# Patient Record
Sex: FEMALE | Race: BLACK OR AFRICAN AMERICAN | NOT HISPANIC OR LATINO | Employment: OTHER | URBAN - METROPOLITAN AREA
[De-identification: names, ages, dates, MRNs, and addresses within clinical notes are randomized per-mention and may not be internally consistent; named-entity substitution may affect disease eponyms.]

---

## 2017-01-31 ENCOUNTER — PATIENT MESSAGE (OUTPATIENT)
Dept: ADMINISTRATIVE | Facility: OTHER | Age: 67
End: 2017-01-31

## 2017-06-26 ENCOUNTER — OFFICE VISIT (OUTPATIENT)
Dept: INTERNAL MEDICINE | Facility: CLINIC | Age: 67
End: 2017-06-26
Attending: FAMILY MEDICINE
Payer: MEDICARE

## 2017-06-26 ENCOUNTER — LAB VISIT (OUTPATIENT)
Dept: LAB | Facility: OTHER | Age: 67
End: 2017-06-26
Attending: FAMILY MEDICINE
Payer: MEDICARE

## 2017-06-26 VITALS
DIASTOLIC BLOOD PRESSURE: 60 MMHG | HEART RATE: 74 BPM | BODY MASS INDEX: 25.64 KG/M2 | OXYGEN SATURATION: 97 % | HEIGHT: 62 IN | SYSTOLIC BLOOD PRESSURE: 96 MMHG | WEIGHT: 139.31 LBS

## 2017-06-26 DIAGNOSIS — F43.9 STRESS: ICD-10-CM

## 2017-06-26 DIAGNOSIS — R59.9 LYMPH NODE ENLARGEMENT: ICD-10-CM

## 2017-06-26 DIAGNOSIS — D64.9 NORMOCYTIC ANEMIA: ICD-10-CM

## 2017-06-26 DIAGNOSIS — Z13.220 ENCOUNTER FOR LIPID SCREENING FOR CARDIOVASCULAR DISEASE: ICD-10-CM

## 2017-06-26 DIAGNOSIS — R53.83 FATIGUE, UNSPECIFIED TYPE: ICD-10-CM

## 2017-06-26 DIAGNOSIS — Z13.6 ENCOUNTER FOR LIPID SCREENING FOR CARDIOVASCULAR DISEASE: ICD-10-CM

## 2017-06-26 DIAGNOSIS — M50.20 HERNIATED DISC, CERVICAL: ICD-10-CM

## 2017-06-26 DIAGNOSIS — M25.519 SHOULDER PAIN, UNSPECIFIED CHRONICITY, UNSPECIFIED LATERALITY: ICD-10-CM

## 2017-06-26 DIAGNOSIS — M25.519 SHOULDER PAIN, UNSPECIFIED CHRONICITY, UNSPECIFIED LATERALITY: Primary | ICD-10-CM

## 2017-06-26 DIAGNOSIS — R35.0 URINARY FREQUENCY: ICD-10-CM

## 2017-06-26 DIAGNOSIS — M54.9 UPPER BACK PAIN: ICD-10-CM

## 2017-06-26 LAB
ALBUMIN SERPL BCP-MCNC: 3.9 G/DL
ALP SERPL-CCNC: 68 U/L
ALT SERPL W/O P-5'-P-CCNC: 10 U/L
ANION GAP SERPL CALC-SCNC: 9 MMOL/L
AST SERPL-CCNC: 14 U/L
BASOPHILS # BLD AUTO: 0.01 K/UL
BASOPHILS NFR BLD: 0.2 %
BILIRUB SERPL-MCNC: 0.4 MG/DL
BUN SERPL-MCNC: 13 MG/DL
CALCIUM SERPL-MCNC: 10.2 MG/DL
CHLORIDE SERPL-SCNC: 106 MMOL/L
CHOLEST/HDLC SERPL: 5.3 {RATIO}
CO2 SERPL-SCNC: 24 MMOL/L
CREAT SERPL-MCNC: 0.9 MG/DL
DIFFERENTIAL METHOD: ABNORMAL
EOSINOPHIL # BLD AUTO: 0.1 K/UL
EOSINOPHIL NFR BLD: 1.2 %
ERYTHROCYTE [DISTWIDTH] IN BLOOD BY AUTOMATED COUNT: 16.6 %
EST. GFR  (AFRICAN AMERICAN): >60 ML/MIN/1.73 M^2
EST. GFR  (NON AFRICAN AMERICAN): >60 ML/MIN/1.73 M^2
FERRITIN SERPL-MCNC: 12 NG/ML
GLUCOSE SERPL-MCNC: 81 MG/DL
HCT VFR BLD AUTO: 38.3 %
HDL/CHOLESTEROL RATIO: 19 %
HDLC SERPL-MCNC: 353 MG/DL
HDLC SERPL-MCNC: 67 MG/DL
HGB BLD-MCNC: 12.2 G/DL
IRON SERPL-MCNC: 57 UG/DL
LDLC SERPL CALC-MCNC: 249.6 MG/DL
LYMPHOCYTES # BLD AUTO: 2.2 K/UL
LYMPHOCYTES NFR BLD: 39.2 %
MCH RBC QN AUTO: 25.5 PG
MCHC RBC AUTO-ENTMCNC: 31.9 %
MCV RBC AUTO: 80 FL
MONOCYTES # BLD AUTO: 0.4 K/UL
MONOCYTES NFR BLD: 7.8 %
NEUTROPHILS # BLD AUTO: 2.9 K/UL
NEUTROPHILS NFR BLD: 51.2 %
NONHDLC SERPL-MCNC: 286 MG/DL
PLATELET # BLD AUTO: 318 K/UL
PMV BLD AUTO: 10.3 FL
POTASSIUM SERPL-SCNC: 4.6 MMOL/L
PROT SERPL-MCNC: 8 G/DL
RBC # BLD AUTO: 4.78 M/UL
SATURATED IRON: 12 %
SODIUM SERPL-SCNC: 139 MMOL/L
T4 FREE SERPL-MCNC: 0.91 NG/DL
TOTAL IRON BINDING CAPACITY: 474 UG/DL
TRANSFERRIN SERPL-MCNC: 320 MG/DL
TRIGL SERPL-MCNC: 182 MG/DL
TSH SERPL DL<=0.005 MIU/L-ACNC: 0.86 UIU/ML
WBC # BLD AUTO: 5.67 K/UL

## 2017-06-26 PROCEDURE — 99999 PR PBB SHADOW E&M-EST. PATIENT-LVL III: CPT | Mod: PBBFAC,,, | Performed by: FAMILY MEDICINE

## 2017-06-26 PROCEDURE — 80053 COMPREHEN METABOLIC PANEL: CPT

## 2017-06-26 PROCEDURE — 1159F MED LIST DOCD IN RCRD: CPT | Mod: ,,, | Performed by: FAMILY MEDICINE

## 2017-06-26 PROCEDURE — 82728 ASSAY OF FERRITIN: CPT

## 2017-06-26 PROCEDURE — 99214 OFFICE O/P EST MOD 30 MIN: CPT | Mod: S$PBB,,, | Performed by: FAMILY MEDICINE

## 2017-06-26 PROCEDURE — 84466 ASSAY OF TRANSFERRIN: CPT

## 2017-06-26 PROCEDURE — 80061 LIPID PANEL: CPT

## 2017-06-26 PROCEDURE — 84443 ASSAY THYROID STIM HORMONE: CPT

## 2017-06-26 PROCEDURE — 83540 ASSAY OF IRON: CPT

## 2017-06-26 PROCEDURE — 84439 ASSAY OF FREE THYROXINE: CPT

## 2017-06-26 PROCEDURE — 36415 COLL VENOUS BLD VENIPUNCTURE: CPT

## 2017-06-26 PROCEDURE — 85025 COMPLETE CBC W/AUTO DIFF WBC: CPT

## 2017-06-26 PROCEDURE — 1125F AMNT PAIN NOTED PAIN PRSNT: CPT | Mod: ,,, | Performed by: FAMILY MEDICINE

## 2017-06-26 RX ORDER — HYDROCODONE BITARTRATE AND ACETAMINOPHEN 5; 325 MG/1; MG/1
TABLET ORAL
COMMUNITY
Start: 2017-06-06 | End: 2018-03-09

## 2017-06-26 RX ORDER — LIDOCAINE AND PRILOCAINE 25; 25 MG/G; MG/G
CREAM TOPICAL
Qty: 30 G | Refills: 0 | Status: SHIPPED | OUTPATIENT
Start: 2017-06-26 | End: 2018-08-01

## 2017-06-26 RX ORDER — METHYLPREDNISOLONE 4 MG/1
TABLET ORAL
COMMUNITY
Start: 2017-03-24 | End: 2017-06-26

## 2017-06-26 RX ORDER — PREGABALIN 25 MG/1
25 CAPSULE ORAL 2 TIMES DAILY
COMMUNITY
End: 2017-07-25

## 2017-06-26 RX ORDER — LORAZEPAM 2 MG/1
TABLET ORAL
COMMUNITY
Start: 2017-06-19 | End: 2017-06-26

## 2017-06-26 RX ORDER — HYDROCODONE BITARTRATE AND ACETAMINOPHEN 7.5; 325 MG/1; MG/1
TABLET ORAL
COMMUNITY
Start: 2017-05-10 | End: 2017-06-26

## 2017-06-26 NOTE — PROGRESS NOTES
"Subjective:      Patient ID: Jasson Pineda is a 66 y.o. female.    Chief Complaint: Annual Exam    She did have shoulder replacement 6 months ago. Upper back pain for 10 years, pressure on it worsens it, she used to get trigger point injection by pain management for it. She did just restart lyrica one week. She has noticed increased urination for the last month and she has follow up with urology. She has noticed fatigue for one month. Sleep is low because of shoulder pain.  In the last few weeks her mood has been low, interest in hobbies not there, no guilt, energy level low, sleep is low, anxiety controlled, no SI or HI.      Review of Systems   Constitutional: Positive for activity change and fatigue.   HENT: Negative for hearing loss, rhinorrhea and trouble swallowing.    Eyes: Negative for discharge and visual disturbance.   Respiratory: Negative for chest tightness and wheezing.    Cardiovascular: Negative for chest pain and palpitations.   Gastrointestinal: Negative for blood in stool, constipation, diarrhea and vomiting.   Endocrine: Positive for polyuria. Negative for polydipsia.   Genitourinary: Negative for difficulty urinating, dysuria, hematuria and menstrual problem.   Musculoskeletal: Positive for neck pain. Negative for arthralgias and joint swelling.   Neurological: Negative for headaches.   Psychiatric/Behavioral: Negative for confusion and dysphoric mood.     I personally reviewed Past Medical History, Past Surgical history,  Past Social History and Family History    Objective:   BP 96/60   Pulse 74   Ht 5' 2" (1.575 m)   Wt 63.2 kg (139 lb 5.3 oz)   SpO2 97%   BMI 25.48 kg/m²     Physical Exam   Constitutional: She is oriented to person, place, and time. She appears well-developed and well-nourished. No distress.   HENT:   Head: Normocephalic and atraumatic.   Right Ear: External ear normal.   Left Ear: External ear normal.   Mouth/Throat: Oropharynx is clear and moist.   Eyes: " Conjunctivae and EOM are normal. Pupils are equal, round, and reactive to light. Right eye exhibits no discharge. Left eye exhibits no discharge. No scleral icterus.   Neck: Normal range of motion. Neck supple. No thyromegaly present.   Cardiovascular: Normal rate, regular rhythm, normal heart sounds and intact distal pulses.  Exam reveals no gallop.    No murmur heard.  Pulmonary/Chest: Effort normal and breath sounds normal. No respiratory distress. She has no wheezes. She has no rales. She exhibits no tenderness.   Abdominal: Soft. Bowel sounds are normal. She exhibits no distension and no mass. There is no tenderness. There is no rebound and no guarding.   Musculoskeletal: Normal range of motion.        Cervical back: Normal.        Thoracic back: She exhibits tenderness. She exhibits normal range of motion, no bony tenderness and no edema.   TTP left thoracic paraspinal muscle    Lymphadenopathy:        Head (right side): Submandibular adenopathy present.        Head (left side): Submandibular adenopathy present.   Neurological: She is alert and oriented to person, place, and time.   Skin: Skin is warm and dry.   Psychiatric: She has a normal mood and affect. Her behavior is normal. Judgment and thought content normal.   Vitals reviewed.      Jasson was seen today for annual exam.    Diagnoses and all orders for this visit:    Shoulder pain, unspecified chronicity, unspecified laterality  Upper back pain  Herniated disc, cervical  -will trial topical emla, if no improvement, will schedule follow up with pain management to discuss trigger point injections   -     Ambulatory consult to Pain Clinic  -     lidocaine-prilocaine (EMLA) cream; Apply topically as needed.  -     CBC auto differential; Future  -     Comprehensive metabolic panel; Future  -     TSH; Future  -     T4, free; Future  -     Ambulatory consult to Pain Clinic    Urinary frequency  -     Urinalysis  -     Urine culture    Fatigue, unspecified  type  Normocytic anemia  -     CBC auto differential; Future  -     Comprehensive metabolic panel; Future  -     TSH; Future  -     T4, free; Future  -     Ferritin; Future  -     Iron and TIBC; Future    Stress  -patient declined medication or therapy, she is worried about the recovery time for shoulder replacement     Encounter for lipid screening for cardiovascular disease  -     Lipid panel; Future    Lymph node enlargement  -     US Soft Tissue Head Neck Thyroid; Future    Other orders  -     (In Office Administered) Pneumococcal Conjugate Vaccine (13 Valent) (IM)

## 2017-06-26 NOTE — PROGRESS NOTES
Patient given Pneumococcal 13 IM in the LD. Patient tolerated well and Band-Aid was applied. Lot#[id number] Exp:05/01/2018. Patient advised to wait in the lobby for 15 min to make sure no adverse reactions occur. Patient states verbal understanding and has no further questions.  Pt has been given vaccine information sheet.

## 2017-06-27 ENCOUNTER — TELEPHONE (OUTPATIENT)
Dept: INTERNAL MEDICINE | Facility: CLINIC | Age: 67
End: 2017-06-27

## 2017-06-27 DIAGNOSIS — E78.5 HYPERLIPIDEMIA, UNSPECIFIED HYPERLIPIDEMIA TYPE: Primary | ICD-10-CM

## 2017-06-27 DIAGNOSIS — E61.1 LOW IRON: ICD-10-CM

## 2017-06-27 RX ORDER — ATORVASTATIN CALCIUM 40 MG/1
40 TABLET, FILM COATED ORAL NIGHTLY
Qty: 30 TABLET | Refills: 3 | Status: SHIPPED | OUTPATIENT
Start: 2017-06-27 | End: 2017-09-27 | Stop reason: SDUPTHER

## 2017-06-27 NOTE — TELEPHONE ENCOUNTER
Informed pt of lab results and advise. Lab has been scheduled. Pt demonstrated verbal understanding of information and had no further questions or concerns at this time.

## 2017-06-27 NOTE — TELEPHONE ENCOUNTER
"Your iron studies are low, we will call to get you scheduled for a colonoscopy for further work up  Your thyroid studies are normal.   Your LDL or "bad cholesterol" is elevated which places you at increased risk for heart attacks and strokes.  I recommend a low cholesterol, low carbohydrate diet and exercise to achieve weight loss. In addition to his I would like to start you on a medication called a statin, which is very effective at lowering your cholesterol. lipitor has been sent to the pharmacy for you   We will need to recheck your cholesterol in 4 weeks   "

## 2017-06-28 ENCOUNTER — HOSPITAL ENCOUNTER (OUTPATIENT)
Dept: RADIOLOGY | Facility: OTHER | Age: 67
Discharge: HOME | End: 2017-06-28
Attending: FAMILY MEDICINE
Payer: MEDICARE

## 2017-06-28 DIAGNOSIS — R59.9 LYMPH NODE ENLARGEMENT: ICD-10-CM

## 2017-06-28 PROCEDURE — 76536 US EXAM OF HEAD AND NECK: CPT | Mod: TC

## 2017-06-28 PROCEDURE — 76536 US EXAM OF HEAD AND NECK: CPT | Mod: 26,,, | Performed by: RADIOLOGY

## 2017-07-05 ENCOUNTER — TELEPHONE (OUTPATIENT)
Dept: ENDOSCOPY | Facility: HOSPITAL | Age: 67
End: 2017-07-05

## 2017-07-12 ENCOUNTER — PATIENT OUTREACH (OUTPATIENT)
Dept: ADMINISTRATIVE | Facility: HOSPITAL | Age: 67
End: 2017-07-12

## 2017-07-14 ENCOUNTER — OFFICE VISIT (OUTPATIENT)
Dept: UROLOGY | Facility: CLINIC | Age: 67
End: 2017-07-14
Attending: UROLOGY
Payer: MEDICARE

## 2017-07-14 ENCOUNTER — HOSPITAL ENCOUNTER (OUTPATIENT)
Dept: RADIOLOGY | Facility: OTHER | Age: 67
Discharge: HOME | End: 2017-07-14
Attending: UROLOGY
Payer: MEDICARE

## 2017-07-14 ENCOUNTER — TELEPHONE (OUTPATIENT)
Dept: INTERNAL MEDICINE | Facility: CLINIC | Age: 67
End: 2017-07-14

## 2017-07-14 VITALS
DIASTOLIC BLOOD PRESSURE: 69 MMHG | HEART RATE: 70 BPM | HEIGHT: 62 IN | WEIGHT: 139.88 LBS | BODY MASS INDEX: 25.74 KG/M2 | SYSTOLIC BLOOD PRESSURE: 101 MMHG

## 2017-07-14 DIAGNOSIS — N20.0 KIDNEY STONE: ICD-10-CM

## 2017-07-14 DIAGNOSIS — N20.0 KIDNEY STONES: Primary | ICD-10-CM

## 2017-07-14 PROCEDURE — 1126F AMNT PAIN NOTED NONE PRSNT: CPT | Mod: ,,, | Performed by: UROLOGY

## 2017-07-14 PROCEDURE — 1159F MED LIST DOCD IN RCRD: CPT | Mod: ,,, | Performed by: UROLOGY

## 2017-07-14 PROCEDURE — 99999 PR PBB SHADOW E&M-EST. PATIENT-LVL III: CPT | Mod: PBBFAC,,, | Performed by: UROLOGY

## 2017-07-14 PROCEDURE — 99213 OFFICE O/P EST LOW 20 MIN: CPT | Mod: S$PBB,,, | Performed by: UROLOGY

## 2017-07-14 PROCEDURE — 74000 XR KUB: CPT | Mod: 26,,, | Performed by: RADIOLOGY

## 2017-07-14 PROCEDURE — 99213 OFFICE O/P EST LOW 20 MIN: CPT | Mod: PBBFAC,25 | Performed by: UROLOGY

## 2017-07-14 PROCEDURE — 76770 US EXAM ABDO BACK WALL COMP: CPT | Mod: 26,,, | Performed by: RADIOLOGY

## 2017-07-14 RX ORDER — TETANUS TOXOID, REDUCED DIPHTHERIA TOXOID AND ACELLULAR PERTUSSIS VACCINE, ADSORBED 5; 2.5; 8; 8; 2.5 [IU]/.5ML; [IU]/.5ML; UG/.5ML; UG/.5ML; UG/.5ML
SUSPENSION INTRAMUSCULAR
COMMUNITY
Start: 2017-06-26 | End: 2017-07-14 | Stop reason: ALTCHOICE

## 2017-07-14 NOTE — PROGRESS NOTES
"Subjective:      Jasson Pineda is a 66 y.o. female who returns today regarding her     History of renal stones.  No flank pain no symptoms.  Last year she was stone free following left-sided ureteroscopy.  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    The following portions of the patient's history were reviewed and updated as appropriate: allergies, current medications, past family history, past medical history, past social history, past surgical history and problem list.    Review of Systems  Pertinent items are noted in HPI.  A comprehensive multipoint review of systems was negative except as otherwise stated in the HPI.     Objective:   Vitals: /69 (BP Location: Left arm, Patient Position: Sitting, BP Method: Automatic)   Pulse 70   Ht 5' 2" (1.575 m)   Wt 63.5 kg (139 lb 14.1 oz)   BMI 25.58 kg/m²     Physical Exam   General: alert and oriented, no acute distress  Respiratory: Symmetric expansion, non-labored breathing  Cardiovascular: no peripheral edema  Abdomen: soft, non distended  Skin: normal coloration and turgor, no rashes, no suspicious skin lesions noted  Neuro: no gross deficits  Psych: normal judgment and insight, normal mood/affect and non-anxious    Physical Exam    Lab Review   Urinalysis demonstrates no specimen  Lab Results   Component Value Date    WBC 5.67 06/26/2017    HGB 12.2 06/26/2017    HCT 38.3 06/26/2017    MCV 80 (L) 06/26/2017     06/26/2017     Lab Results   Component Value Date    CREATININE 0.9 06/26/2017    BUN 13 06/26/2017       Imaging  Renal ultrasound shows 2 nonobstructive renal stones 5 and 9 mm.  KUB by report shows no stones but I believe there are 2 fragments which are faintly visible but obscured by bowel gas on the left side  Assessment and Plan:   Kidney stones  -     POCT Bladder Scan  -     POCT URINE DIPSTICK WITHOUT MICROSCOPE  -     X-Ray KUB      We discussed observation lithotripsy and ureteroscopy.  " Lithotripsy would require stopping anticoagulation which is prohibitively risky.  We will order a 24-hour urine and have her return to review the results.    High fluid, low salt diet  Avoid coffee, tea and cola  Drink water and diet lemonade

## 2017-07-25 ENCOUNTER — LAB VISIT (OUTPATIENT)
Dept: LAB | Facility: OTHER | Age: 67
End: 2017-07-25
Attending: FAMILY MEDICINE
Payer: MEDICARE

## 2017-07-25 ENCOUNTER — OFFICE VISIT (OUTPATIENT)
Dept: INTERNAL MEDICINE | Facility: CLINIC | Age: 67
End: 2017-07-25
Attending: FAMILY MEDICINE
Payer: MEDICARE

## 2017-07-25 VITALS
WEIGHT: 140.19 LBS | HEART RATE: 69 BPM | DIASTOLIC BLOOD PRESSURE: 60 MMHG | BODY MASS INDEX: 25.8 KG/M2 | SYSTOLIC BLOOD PRESSURE: 94 MMHG | OXYGEN SATURATION: 99 % | HEIGHT: 62 IN

## 2017-07-25 DIAGNOSIS — B02.29 POST HERPETIC NEURALGIA: ICD-10-CM

## 2017-07-25 DIAGNOSIS — R59.1 LAD (LYMPHADENOPATHY): Primary | ICD-10-CM

## 2017-07-25 DIAGNOSIS — Z12.11 SCREENING FOR COLORECTAL CANCER: ICD-10-CM

## 2017-07-25 DIAGNOSIS — E78.5 HYPERLIPIDEMIA, UNSPECIFIED HYPERLIPIDEMIA TYPE: ICD-10-CM

## 2017-07-25 DIAGNOSIS — Z12.12 SCREENING FOR COLORECTAL CANCER: ICD-10-CM

## 2017-07-25 DIAGNOSIS — Z12.31 ENCOUNTER FOR SCREENING MAMMOGRAM FOR BREAST CANCER: ICD-10-CM

## 2017-07-25 DIAGNOSIS — E61.1 LOW IRON: ICD-10-CM

## 2017-07-25 LAB
CHOLEST/HDLC SERPL: 2.8 {RATIO}
HDL/CHOLESTEROL RATIO: 35.2 %
HDLC SERPL-MCNC: 210 MG/DL
HDLC SERPL-MCNC: 74 MG/DL
LDLC SERPL CALC-MCNC: 114 MG/DL
NONHDLC SERPL-MCNC: 136 MG/DL
TRIGL SERPL-MCNC: 110 MG/DL

## 2017-07-25 PROCEDURE — 99999 PR PBB SHADOW E&M-EST. PATIENT-LVL IV: CPT | Mod: PBBFAC,,, | Performed by: FAMILY MEDICINE

## 2017-07-25 PROCEDURE — 99214 OFFICE O/P EST MOD 30 MIN: CPT | Mod: PBBFAC | Performed by: FAMILY MEDICINE

## 2017-07-25 PROCEDURE — 99214 OFFICE O/P EST MOD 30 MIN: CPT | Mod: S$PBB,,, | Performed by: FAMILY MEDICINE

## 2017-07-25 PROCEDURE — 1125F AMNT PAIN NOTED PAIN PRSNT: CPT | Mod: ,,, | Performed by: FAMILY MEDICINE

## 2017-07-25 PROCEDURE — 1159F MED LIST DOCD IN RCRD: CPT | Mod: ,,, | Performed by: FAMILY MEDICINE

## 2017-07-25 RX ORDER — PREGABALIN 50 MG/1
50 CAPSULE ORAL 3 TIMES DAILY
Qty: 90 CAPSULE | Refills: 0 | Status: SHIPPED | OUTPATIENT
Start: 2017-07-25 | End: 2018-03-09 | Stop reason: CLARIF

## 2017-07-25 NOTE — PROGRESS NOTES
"Subjective:      Patient ID: Jasson Pineda is a 66 y.o. female.    Chief Complaint: Shoulder Pain (4 f/u)    She reports her shoulder pain is improving. She reports right shoulder bothers her everyday. She did follow up with Dr. Patel and he did inject her incision and she is scheduled to see dermatology for scar revision.  She does have post herpetic neuralgia which is helped by lyrica on her back. She reports her fatigue is improved and her depression is improving. In the last few weeks her mood has been mainly happy, interest in hobbies there, sleep varies with her shoulder/back pain, no SI or HI, anxiety level is controlled.        Review of Systems   Constitutional: Negative.    Respiratory: Negative.    Cardiovascular: Negative.    Gastrointestinal: Negative.      I personally reviewed Past Medical History, Past Surgical history,  Past Social History and Family History    Objective:   BP 94/60   Pulse 69   Ht 5' 2" (1.575 m)   Wt 63.6 kg (140 lb 3.4 oz)   SpO2 99%   BMI 25.65 kg/m²     Physical Exam   Constitutional: She is oriented to person, place, and time. She appears well-developed and well-nourished. No distress.   HENT:   Head: Normocephalic and atraumatic.   Right Ear: Hearing, tympanic membrane, external ear and ear canal normal.   Left Ear: Hearing, tympanic membrane, external ear and ear canal normal.   Mouth/Throat: Oropharynx is clear and moist.   Eyes: Conjunctivae and EOM are normal. Pupils are equal, round, and reactive to light. Right eye exhibits no discharge. Left eye exhibits no discharge. No scleral icterus.   Neck: Normal range of motion. Neck supple.   Cardiovascular: Normal rate, regular rhythm, normal heart sounds and intact distal pulses.  Exam reveals no gallop.    No murmur heard.  Pulmonary/Chest: Effort normal and breath sounds normal. No respiratory distress. She has no wheezes. She has no rales. She exhibits no tenderness.   Abdominal: Soft. Bowel sounds are " normal. She exhibits no distension and no mass. There is no tenderness. There is no rebound and no guarding.   Lymphadenopathy:        Head (right side): Submandibular adenopathy present. No occipital adenopathy present.        Head (left side): Submandibular adenopathy present. No occipital adenopathy present.     She has no cervical adenopathy.     She has no axillary adenopathy.   Neurological: She is alert and oriented to person, place, and time.   Psychiatric: She has a normal mood and affect. Her behavior is normal. Judgment and thought content normal.   Vitals reviewed.      Jasson was seen today for shoulder pain.    Diagnoses and all orders for this visit:    LAD (lymphadenopathy)  -will monitor and patient to follow up in ENT in 4-6 weeks  -     Ambulatory consult to ENT    Screening for colorectal cancer  Low iron  -patient currently on xarelto for recurrent dvt ppx, Dr. Doty has been contacted in regards to holding her medication prior to colonoscopy, discussed the importance of completing this to work up low iron     Encounter for screening mammogram for breast cancer  -patient cannot lift up arm yet, recommend she schedule in the next 1-2 months once arm is improving    Post herpetic neuralgia  -increase lyrica to 50mg tid     Other orders  -     Cancel: Ambulatory referral to Gastroenterology  -     Cancel: Mammo Digital Screening Bilat with CAD; Future  -     pregabalin (LYRICA) 50 MG capsule; Take 1 capsule (50 mg total) by mouth 3 (three) times daily.

## 2017-08-22 ENCOUNTER — OFFICE VISIT (OUTPATIENT)
Dept: UROLOGY | Facility: CLINIC | Age: 67
End: 2017-08-22
Attending: UROLOGY
Payer: MEDICARE

## 2017-08-22 VITALS
WEIGHT: 143 LBS | DIASTOLIC BLOOD PRESSURE: 67 MMHG | HEIGHT: 62 IN | HEART RATE: 83 BPM | SYSTOLIC BLOOD PRESSURE: 107 MMHG | BODY MASS INDEX: 26.31 KG/M2

## 2017-08-22 DIAGNOSIS — R10.9 RIGHT FLANK PAIN: ICD-10-CM

## 2017-08-22 DIAGNOSIS — N20.0 RENAL STONES: Primary | ICD-10-CM

## 2017-08-22 LAB
BILIRUB SERPL-MCNC: ABNORMAL MG/DL
BLOOD URINE, POC: 50
COLOR, POC UA: ABNORMAL
GLUCOSE UR QL STRIP: ABNORMAL
KETONES UR QL STRIP: ABNORMAL
LEUKOCYTE ESTERASE URINE, POC: ABNORMAL
NITRITE, POC UA: ABNORMAL
PH, POC UA: 5
PROTEIN, POC: ABNORMAL
SPECIFIC GRAVITY, POC UA: 1
UROBILINOGEN, POC UA: ABNORMAL

## 2017-08-22 PROCEDURE — 1159F MED LIST DOCD IN RCRD: CPT | Mod: S$GLB,,, | Performed by: UROLOGY

## 2017-08-22 PROCEDURE — 1125F AMNT PAIN NOTED PAIN PRSNT: CPT | Mod: S$GLB,,, | Performed by: UROLOGY

## 2017-08-22 PROCEDURE — 99214 OFFICE O/P EST MOD 30 MIN: CPT | Mod: 25,S$GLB,, | Performed by: UROLOGY

## 2017-08-22 PROCEDURE — 81002 URINALYSIS NONAUTO W/O SCOPE: CPT | Mod: S$GLB,,, | Performed by: UROLOGY

## 2017-08-22 RX ORDER — TAMSULOSIN HYDROCHLORIDE 0.4 MG/1
0.4 CAPSULE ORAL DAILY
Qty: 30 CAPSULE | Refills: 11 | Status: SHIPPED | OUTPATIENT
Start: 2017-08-22 | End: 2017-09-05 | Stop reason: ALTCHOICE

## 2017-08-22 RX ORDER — POTASSIUM CITRATE 10 MEQ/1
10 TABLET, EXTENDED RELEASE ORAL
Qty: 90 TABLET | Refills: 11 | Status: SHIPPED | OUTPATIENT
Start: 2017-08-22 | End: 2017-09-05 | Stop reason: SDUPTHER

## 2017-08-22 NOTE — PROGRESS NOTES
"Subjective:      Jasson Pineda is a 66 y.o. female who returns today regarding her     Renal stones    Right flank pain started last week.    The following portions of the patient's history were reviewed and updated as appropriate: allergies, current medications, past family history, past medical history, past social history, past surgical history and problem list.    Review of Systems  Pertinent items are noted in HPI.  A comprehensive multipoint review of systems was negative except as otherwise stated in the HPI.     Objective:   Vitals: /67   Pulse 83   Ht 5' 2" (1.575 m)   Wt 64.9 kg (143 lb)   BMI 26.16 kg/m²     Physical Exam   General: alert and oriented, no acute distress  Respiratory: Symmetric expansion, non-labored breathing  Cardiovascular: no peripheral edema  Abdomen: mild right cva tenderness and soft, non distended  Skin: normal coloration and turgor, no rashes, no suspicious skin lesions noted  Neuro: no gross deficits  Psych: normal judgment and insight, normal mood/affect and non-anxious    Physical Exam    Lab Review   Urinalysis demonstrates +blood othwsie neg; ph 5; sg 1.005    24hr urine  Volume 890cc  Low citrate  High uric acid    Lab Results   Component Value Date    WBC 5.67 06/26/2017    HGB 12.2 06/26/2017    HCT 38.3 06/26/2017    MCV 80 (L) 06/26/2017     06/26/2017     Lab Results   Component Value Date    CREATININE 0.9 06/26/2017    BUN 13 06/26/2017       Imaging  -  Assessment and Plan:   Renal stones  -     URIC ACID; Future; Expected date: 08/22/2017  -     Comprehensive metabolic panel; Future; Expected date: 08/22/2017  -     CT Renal Stone Study ABD Pelvis WO; Future; Expected date: 08/22/2017    Right flank pain  -     CT Renal Stone Study ABD Pelvis WO; Future; Expected date: 08/22/2017    Other orders  -     potassium citrate (UROCIT-K) 10 mEq (1,080 mg) TbSR; Take 1 tablet (10 mEq total) by mouth 3 (three) times daily with meals.  Dispense: 90 " tablet; Refill: 11  -     tamsulosin (FLOMAX) 0.4 mg Cp24; Take 1 capsule (0.4 mg total) by mouth once daily.  Dispense: 30 capsule; Refill: 11      High fluid, low salt diet  Avoid coffee, tea and cola  Drink water and diet lemonade    rtc 1week    Pt is on anticoagulation

## 2017-08-24 ENCOUNTER — HOSPITAL ENCOUNTER (OUTPATIENT)
Dept: RADIOLOGY | Facility: OTHER | Age: 67
Discharge: HOME | End: 2017-08-24
Attending: UROLOGY
Payer: MEDICARE

## 2017-08-24 DIAGNOSIS — R10.9 RIGHT FLANK PAIN: ICD-10-CM

## 2017-08-24 DIAGNOSIS — N20.0 RENAL STONES: ICD-10-CM

## 2017-08-24 PROCEDURE — 74176 CT ABD & PELVIS W/O CONTRAST: CPT | Mod: 26,,, | Performed by: RADIOLOGY

## 2017-08-24 PROCEDURE — 74176 CT ABD & PELVIS W/O CONTRAST: CPT | Mod: TC

## 2017-08-28 ENCOUNTER — LAB VISIT (OUTPATIENT)
Dept: LAB | Facility: OTHER | Age: 67
End: 2017-08-28
Attending: UROLOGY
Payer: MEDICARE

## 2017-08-28 DIAGNOSIS — N20.0 RENAL STONES: ICD-10-CM

## 2017-08-28 LAB
ALBUMIN SERPL BCP-MCNC: 3.9 G/DL
ALP SERPL-CCNC: 77 U/L
ALT SERPL W/O P-5'-P-CCNC: 22 U/L
ANION GAP SERPL CALC-SCNC: 10 MMOL/L
AST SERPL-CCNC: 22 U/L
BILIRUB SERPL-MCNC: 0.4 MG/DL
BUN SERPL-MCNC: 12 MG/DL
CALCIUM SERPL-MCNC: 10 MG/DL
CHLORIDE SERPL-SCNC: 108 MMOL/L
CO2 SERPL-SCNC: 24 MMOL/L
CREAT SERPL-MCNC: 1 MG/DL
EST. GFR  (AFRICAN AMERICAN): >60 ML/MIN/1.73 M^2
EST. GFR  (NON AFRICAN AMERICAN): 58 ML/MIN/1.73 M^2
GLUCOSE SERPL-MCNC: 65 MG/DL
POTASSIUM SERPL-SCNC: 4.5 MMOL/L
PROT SERPL-MCNC: 8.1 G/DL
SODIUM SERPL-SCNC: 142 MMOL/L
URATE SERPL-MCNC: 4.2 MG/DL

## 2017-08-28 PROCEDURE — 84550 ASSAY OF BLOOD/URIC ACID: CPT

## 2017-08-28 PROCEDURE — 36415 COLL VENOUS BLD VENIPUNCTURE: CPT

## 2017-08-28 PROCEDURE — 80053 COMPREHEN METABOLIC PANEL: CPT

## 2017-09-05 ENCOUNTER — OFFICE VISIT (OUTPATIENT)
Dept: UROLOGY | Facility: CLINIC | Age: 67
End: 2017-09-05
Attending: UROLOGY
Payer: MEDICARE

## 2017-09-05 VITALS
BODY MASS INDEX: 26.69 KG/M2 | SYSTOLIC BLOOD PRESSURE: 102 MMHG | WEIGHT: 145.06 LBS | HEART RATE: 77 BPM | HEIGHT: 62 IN | DIASTOLIC BLOOD PRESSURE: 63 MMHG

## 2017-09-05 DIAGNOSIS — N20.0 RENAL STONES: Primary | ICD-10-CM

## 2017-09-05 PROCEDURE — 1126F AMNT PAIN NOTED NONE PRSNT: CPT | Mod: S$GLB,,, | Performed by: UROLOGY

## 2017-09-05 PROCEDURE — 1159F MED LIST DOCD IN RCRD: CPT | Mod: S$GLB,,, | Performed by: UROLOGY

## 2017-09-05 PROCEDURE — 99214 OFFICE O/P EST MOD 30 MIN: CPT | Mod: S$GLB,,, | Performed by: UROLOGY

## 2017-09-05 RX ORDER — POTASSIUM CITRATE 10 MEQ/1
10 TABLET, EXTENDED RELEASE ORAL
Qty: 90 TABLET | Refills: 1 | Status: SHIPPED | OUTPATIENT
Start: 2017-09-05 | End: 2018-08-01

## 2017-09-05 NOTE — PROGRESS NOTES
"Subjective:      Jasson Pineda is a 66 y.o. female who returns today regarding her     ) Pain resolved spontaneously.  No complaints at this time.  She is tolerating potassium citrate without difficulty.    The following portions of the patient's history were reviewed and updated as appropriate: allergies, current medications, past family history, past medical history, past social history, past surgical history and problem list.    Review of Systems  Pertinent items are noted in HPI.  A comprehensive multipoint review of systems was negative except as otherwise stated in the HPI.     Objective:   Vitals: /63 (BP Location: Left arm, Patient Position: Sitting, BP Method: Large (Automatic))   Pulse 77   Ht 5' 2" (1.575 m)   Wt 65.8 kg (145 lb 1 oz)   BMI 26.53 kg/m²     Physical Exam   General: alert and oriented, no acute distress  Respiratory: Symmetric expansion, non-labored breathing  Cardiovascular: no peripheral edema  Abdomen: soft, non distended  Skin: normal coloration and turgor, no rashes, no suspicious skin lesions noted  Neuro: no gross deficits  Psych: normal judgment and insight, normal mood/affect and non-anxious    Physical Exam    Lab Review   Urinalysis demonstrates negative for all components  Lab Results   Component Value Date    WBC 5.67 06/26/2017    HGB 12.2 06/26/2017    HCT 38.3 06/26/2017    MCV 80 (L) 06/26/2017     06/26/2017     Lab Results   Component Value Date    CREATININE 1.0 08/28/2017    BUN 12 08/28/2017       Imaging  The scan shows no hydronephrosis.  There are 2 punctate nonobstructing stones in the left kidney the largest being 4 mm    Assessment and Plan:   Renal stones  -     POCT URINE DIPSTICK WITHOUT MICROSCOPE  -     X-Ray KUB; Future; Expected date: 03/05/2018    Other orders  -     potassium citrate (UROCIT-K) 10 mEq (1,080 mg) TbSR; Take 1 tablet (10 mEq total) by mouth 3 (three) times daily with meals.  Dispense: 90 tablet; Refill: " 1      Stop Flomax.  Return to clinic 6 months with KUB  High fluid, low salt diet  Avoid coffee, tea and cola  Drink water and diet lemonade

## 2017-09-22 DIAGNOSIS — Z12.31 OTHER SCREENING MAMMOGRAM: ICD-10-CM

## 2017-09-27 RX ORDER — ATORVASTATIN CALCIUM 40 MG/1
TABLET, FILM COATED ORAL
Qty: 90 TABLET | Refills: 3 | Status: SHIPPED | OUTPATIENT
Start: 2017-09-27 | End: 2018-06-21 | Stop reason: SDUPTHER

## 2017-10-23 RX ORDER — ATORVASTATIN CALCIUM 40 MG/1
TABLET, FILM COATED ORAL
Qty: 30 TABLET | Refills: 0 | Status: SHIPPED | OUTPATIENT
Start: 2017-10-23 | End: 2017-10-23 | Stop reason: SDUPTHER

## 2017-10-23 RX ORDER — ATORVASTATIN CALCIUM 40 MG/1
TABLET, FILM COATED ORAL
Qty: 90 TABLET | Refills: 0 | Status: SHIPPED | OUTPATIENT
Start: 2017-10-23 | End: 2018-03-09 | Stop reason: SDUPTHER

## 2017-11-01 ENCOUNTER — HOSPITAL ENCOUNTER (OUTPATIENT)
Dept: RADIOLOGY | Facility: OTHER | Age: 67
Discharge: HOME | End: 2017-11-01
Attending: FAMILY MEDICINE
Payer: MEDICARE

## 2017-11-01 DIAGNOSIS — Z12.31 SCREENING MAMMOGRAM, ENCOUNTER FOR: ICD-10-CM

## 2017-11-01 PROCEDURE — 77063 BREAST TOMOSYNTHESIS BI: CPT | Mod: 26,,, | Performed by: RADIOLOGY

## 2017-11-01 PROCEDURE — 77067 SCR MAMMO BI INCL CAD: CPT | Mod: TC

## 2017-11-01 PROCEDURE — 77067 SCR MAMMO BI INCL CAD: CPT | Mod: 26,,, | Performed by: RADIOLOGY

## 2018-03-08 NOTE — PROGRESS NOTES
"Subjective:      Jasson Pineda is a 67 y.o. female who returns today regarding her nephrolithiasis. She is an established patient of Dr. Padilla and is new to me today.    The patient has a history of renal stones. S/p ESWL in 2016 and ureteroscopy in 2014. She intermittently taking potassium citrate, due to SE. Reports only drinking water and diet lemonade. Denies flank pain.     Today the pt reports urinary frequency for the last few months, which she finds bothersome. Denies dysuria, abdominal/flank pain and fever.     The following portions of the patient's history were reviewed and updated as appropriate: allergies, current medications, past family history, past medical history, past social history, past surgical history and problem list.    Review of Systems  A comprehensive multipoint review of systems was negative except as otherwise stated in the HPI.     Objective:   Vitals:   Vitals:    03/09/18 1357   BP: 121/75   Pulse: 81     Physical Exam   General: alert and oriented, no acute distress  Respiratory: Symmetric expansion, non-labored breathing  Cardiovascular: no peripheral edema  Abdomen: soft, non distended  Skin: normal coloration and turgor, no rashes, no suspicious skin lesions noted  Neuro: no gross deficits  Psych: normal judgment and insight, normal mood/affect and non-anxious    Lab Review   Urinalysis demonstrates negative for all components  PVR: 51 mL  Lab Results   Component Value Date    WBC 5.67 06/26/2017    HGB 12.2 06/26/2017    HCT 38.3 06/26/2017    MCV 80 (L) 06/26/2017     06/26/2017     Lab Results   Component Value Date    CREATININE 1.0 08/28/2017    BUN 12 08/28/2017       Imaging   (all images personally reviewed; agree with report below)  KUB (3/9/18)- "Abdomen supine views, comparison CT 8/24/17. The intestinal gas pattern is unremarkable. One or 2 small stones are seen at the low pole of the left kidney, similar to before. No ureter stones are detected. " "Skeletal structures are intact."    Assessment and Plan:   Jasson was seen today for follow-up.    Diagnoses and all orders for this visit:    Kidney stone  -     X-Ray KUB; Future    Urinary frequency  -     mirabegron (MYRBETRIQ) 25 mg Tb24 ER tablet; Take 1 tablet (25 mg total) by mouth once daily.  -     oxybutynin (DITROPAN-XL) 5 MG TR24; Take 1 tablet (5 mg total) by mouth once daily.    OAB (overactive bladder)    Plan:  --Stones are stable   --Trial of myrbetriq for OAB symptoms (paper rx for ditropan provided if myrbetriq is too expensive)  --Discussed common bladder irritants such as caffeine, alcohol, citrus, and acidic and spicy foods. Encouraged to minimize in diet to reduce symptoms.  --Follow up in 6-8 weeks with PVR, KUB in 6 months   "

## 2018-03-09 ENCOUNTER — HOSPITAL ENCOUNTER (OUTPATIENT)
Dept: RADIOLOGY | Facility: OTHER | Age: 68
Discharge: HOME | End: 2018-03-09
Attending: UROLOGY
Payer: MEDICARE

## 2018-03-09 ENCOUNTER — OFFICE VISIT (OUTPATIENT)
Dept: UROLOGY | Facility: CLINIC | Age: 68
End: 2018-03-09
Attending: UROLOGY
Payer: MEDICARE

## 2018-03-09 VITALS
HEIGHT: 63 IN | HEART RATE: 81 BPM | BODY MASS INDEX: 25.87 KG/M2 | DIASTOLIC BLOOD PRESSURE: 75 MMHG | WEIGHT: 146 LBS | SYSTOLIC BLOOD PRESSURE: 121 MMHG

## 2018-03-09 DIAGNOSIS — R35.0 URINARY FREQUENCY: ICD-10-CM

## 2018-03-09 DIAGNOSIS — N20.0 RENAL STONES: ICD-10-CM

## 2018-03-09 DIAGNOSIS — N20.0 KIDNEY STONE: Primary | ICD-10-CM

## 2018-03-09 DIAGNOSIS — N32.81 OAB (OVERACTIVE BLADDER): ICD-10-CM

## 2018-03-09 LAB
BILIRUB SERPL-MCNC: NORMAL MG/DL
BLOOD URINE, POC: NORMAL
COLOR, POC UA: YELLOW
GLUCOSE UR QL STRIP: NORMAL
KETONES UR QL STRIP: NORMAL
LEUKOCYTE ESTERASE URINE, POC: NORMAL
NITRITE, POC UA: NORMAL
PH, POC UA: 7
POC RESIDUAL URINE VOLUME: 51 ML (ref 0–100)
PROTEIN, POC: NORMAL
SPECIFIC GRAVITY, POC UA: 1
UROBILINOGEN, POC UA: NORMAL

## 2018-03-09 PROCEDURE — 74018 RADEX ABDOMEN 1 VIEW: CPT | Mod: 26,,, | Performed by: RADIOLOGY

## 2018-03-09 PROCEDURE — 99214 OFFICE O/P EST MOD 30 MIN: CPT | Mod: 25,S$GLB,, | Performed by: NURSE PRACTITIONER

## 2018-03-09 PROCEDURE — 81002 URINALYSIS NONAUTO W/O SCOPE: CPT | Mod: S$GLB,,, | Performed by: NURSE PRACTITIONER

## 2018-03-09 PROCEDURE — 51798 US URINE CAPACITY MEASURE: CPT | Mod: S$GLB,,, | Performed by: NURSE PRACTITIONER

## 2018-03-09 PROCEDURE — 74018 RADEX ABDOMEN 1 VIEW: CPT | Mod: TC,FY

## 2018-03-09 RX ORDER — LIDOCAINE 50 MG/G
1 PATCH TOPICAL
COMMUNITY
End: 2018-08-01

## 2018-03-09 RX ORDER — PREGABALIN 150 MG/1
150 CAPSULE ORAL 2 TIMES DAILY
COMMUNITY

## 2018-03-09 RX ORDER — OXYBUTYNIN CHLORIDE 5 MG/1
5 TABLET, EXTENDED RELEASE ORAL DAILY
Qty: 30 TABLET | Refills: 11 | Status: SHIPPED | OUTPATIENT
Start: 2018-03-09 | End: 2018-04-17 | Stop reason: ALTCHOICE

## 2018-04-01 ENCOUNTER — EXTERNAL CHRONIC CARE MANAGEMENT (OUTPATIENT)
Dept: PRIMARY CARE CLINIC | Facility: CLINIC | Age: 68
End: 2018-04-01
Payer: MEDICARE

## 2018-04-17 ENCOUNTER — OFFICE VISIT (OUTPATIENT)
Dept: UROLOGY | Facility: CLINIC | Age: 68
End: 2018-04-17
Payer: MEDICARE

## 2018-04-17 VITALS — DIASTOLIC BLOOD PRESSURE: 66 MMHG | HEART RATE: 80 BPM | SYSTOLIC BLOOD PRESSURE: 101 MMHG

## 2018-04-17 DIAGNOSIS — N32.81 OAB (OVERACTIVE BLADDER): Primary | ICD-10-CM

## 2018-04-17 PROCEDURE — 99213 OFFICE O/P EST LOW 20 MIN: CPT | Mod: S$GLB,,, | Performed by: NURSE PRACTITIONER

## 2018-04-17 RX ORDER — OXYBUTYNIN CHLORIDE 10 MG/1
10 TABLET, EXTENDED RELEASE ORAL DAILY
Qty: 30 TABLET | Refills: 11 | Status: SHIPPED | OUTPATIENT
Start: 2018-04-17 | End: 2019-01-15

## 2018-04-17 NOTE — PROGRESS NOTES
Subjective:      Jasson Pineda is a 67 y.o. female who returns today regarding her urinary frequency.     The patient has a history of renal stones. S/p ESWL in 2016 and ureteroscopy in 2014. She intermittently taking potassium citrate, due to SE. Reports only drinking water and diet lemonade. Denies flank pain.     The patient was seen last month and reported bothersome urinary frequency for the last few months. She started ditropan 5 mg XL. Denies SE of the medication but has not found a significant improvement in her urinary symptoms with the medication. Denies dysuria and flank pain.      The following portions of the patient's history were reviewed and updated as appropriate: allergies, current medications, past family history, past medical history, past social history, past surgical history and problem list.    Review of Systems  A comprehensive multipoint review of systems was negative except as otherwise stated in the HPI.     Objective:   Vitals: /66   Pulse 80     Physical Exam   General: alert and oriented, no acute distress  Respiratory: Symmetric expansion, non-labored breathing  Cardiovascular: no peripheral edema  Abdomen: soft, non distended  Skin: normal coloration and turgor, no rashes, no suspicious skin lesions noted  Neuro: no gross deficits  Psych: normal judgment and insight, normal mood/affect and non-anxious    Lab Review   Urinalysis demonstrates : no sample  PVR: 12 mL  Lab Results   Component Value Date    WBC 5.67 06/26/2017    HGB 12.2 06/26/2017    HCT 38.3 06/26/2017    MCV 80 (L) 06/26/2017     06/26/2017     Lab Results   Component Value Date    CREATININE 1.0 08/28/2017    BUN 12 08/28/2017       Imaging   None    Assessment and Plan:   Diagnoses and all orders for this visit:    OAB (overactive bladder)  -     oxybutynin (DITROPAN-XL) 10 MG 24 hr tablet; Take 1 tablet (10 mg total) by mouth once daily.    Plan:  --Increase dose of ditropan to 10 mg XL  daily  --Discussed common bladder irritants such as caffeine, alcohol, citrus, and acidic and spicy foods. Encouraged to minimize in diet to reduce symptoms.  --Follow up as previously scheduled with Dr. Padilla in 6 months with [facility]

## 2018-04-30 PROCEDURE — 99490 CHRNC CARE MGMT STAFF 1ST 20: CPT | Mod: PBBFAC | Performed by: FAMILY MEDICINE

## 2018-04-30 PROCEDURE — 99490 CHRNC CARE MGMT STAFF 1ST 20: CPT | Mod: S$PBB,,, | Performed by: FAMILY MEDICINE

## 2018-05-01 ENCOUNTER — EXTERNAL CHRONIC CARE MANAGEMENT (OUTPATIENT)
Dept: PRIMARY CARE CLINIC | Facility: CLINIC | Age: 68
End: 2018-05-01
Payer: MEDICARE

## 2018-05-31 PROCEDURE — 99490 CHRNC CARE MGMT STAFF 1ST 20: CPT | Mod: PBBFAC | Performed by: FAMILY MEDICINE

## 2018-05-31 PROCEDURE — 99490 CHRNC CARE MGMT STAFF 1ST 20: CPT | Mod: S$PBB,,, | Performed by: FAMILY MEDICINE

## 2018-06-04 ENCOUNTER — OFFICE VISIT (OUTPATIENT)
Dept: FAMILY MEDICINE | Facility: CLINIC | Age: 68
End: 2018-06-04
Payer: MEDICARE

## 2018-06-04 VITALS
SYSTOLIC BLOOD PRESSURE: 100 MMHG | RESPIRATION RATE: 20 BRPM | TEMPERATURE: 98 F | DIASTOLIC BLOOD PRESSURE: 66 MMHG | WEIGHT: 149.06 LBS | BODY MASS INDEX: 26.41 KG/M2 | HEIGHT: 63 IN

## 2018-06-04 DIAGNOSIS — K21.9 GASTROESOPHAGEAL REFLUX DISEASE, ESOPHAGITIS PRESENCE NOT SPECIFIED: ICD-10-CM

## 2018-06-04 DIAGNOSIS — R07.9 CHEST PAIN, UNSPECIFIED TYPE: Primary | ICD-10-CM

## 2018-06-04 DIAGNOSIS — M94.0 COSTOCHONDRITIS: ICD-10-CM

## 2018-06-04 PROCEDURE — 99213 OFFICE O/P EST LOW 20 MIN: CPT | Mod: PBBFAC,PO | Performed by: FAMILY MEDICINE

## 2018-06-04 PROCEDURE — 99999 PR PBB SHADOW E&M-EST. PATIENT-LVL III: CPT | Mod: PBBFAC,,, | Performed by: FAMILY MEDICINE

## 2018-06-04 PROCEDURE — 99214 OFFICE O/P EST MOD 30 MIN: CPT | Mod: S$PBB,,, | Performed by: FAMILY MEDICINE

## 2018-06-04 RX ORDER — SUCRALFATE 1 G/1
1 TABLET ORAL
Qty: 120 TABLET | Refills: 0 | Status: SHIPPED | OUTPATIENT
Start: 2018-06-04 | End: 2018-07-28 | Stop reason: SDUPTHER

## 2018-06-04 RX ORDER — HYDROCODONE BITARTRATE AND ACETAMINOPHEN 5; 325 MG/1; MG/1
5 TABLET ORAL 2 TIMES DAILY PRN
COMMUNITY
Start: 2018-06-02 | End: 2018-06-22

## 2018-06-04 RX ORDER — DICLOFENAC SODIUM 10 MG/G
2 GEL TOPICAL 4 TIMES DAILY
Qty: 100 G | Refills: 1 | Status: SHIPPED | OUTPATIENT
Start: 2018-06-04 | End: 2018-08-01

## 2018-06-04 NOTE — PATIENT INSTRUCTIONS
Full Liquid Diet  A full liquid diet is a middle step between a clear liquid diet and eating solid foods. A clear liquid diet allows only liquids you can see through. A full liquid diet allows thicker, liquid foods as listed below. It can be anything that is liquid at room temperature.  The full liquid diet may be used before or after surgery. It is also used before some medical tests. It is easy to digest and leaves little food in the stomach and intestines.  A full liquid diet meets calorie and protein needs for your body with liquids only. It should not be used for more than 5 days. Your healthcare provider or dietitian may also order high-protein, high-calorie liquid supplements. These will give you extra vitamins and minerals. You may include the items below on a full liquid diet.    Adults  Adults should drink a total of 2 to 3 quarts of liquid per day. It may be easier to drink small, frequent servings rather than a few large ones. People with severe kidney or heart disease can drink only limited amounts of fluids. Check with your provider.  · Cereals and soups. Creamy, hot breakfast cereals (wheat or rice), pureed soups (including pureed meats, bland vegetables, and white potatoes), tomato puree.  · Desserts. Gelatin, whipped topping, custard-style yogurt, pudding, custard, plain ice cream, sherbet, sorbet, popsicles, fruit juice bars.  · Drinks. Coffee, tea, cream, milk, milkshakes, fruit and vegetable juices, sodas, mineral water (plain or flavored), liquid gelatin, electrolyte replacement sport drinks.  · Other items. Salt, mild-flavored seasonings, chocolate flavoring, gravy, margarine, sugar, syrup, jelly, honey, hard candy (to suck on).  Children  Follow the healthcare providers instructions. Young children who are eating solid foods may be able to have the items listed above without problems. Be sure to follow these safety measures:  · Don't give hard candies to young children. The candies may  cause choking.  · Children under 1 year old. Do not give honey. It may cause illness.  · Children under 2 years old. Ask your childs provider if you should supplement your childs diet with oral rehydration solutions, which have electrolytes. You can buy these drinks at pharmacies and grocery stores. You dont need a prescription.  · Children over 1 year old. Limit milk to 3 or 4 cups per day. Too much milk can make your child less hungry for other foods.  Date Last Reviewed: 8/1/2016 © 2000-2017 [employer]. [address] [place] [id number]. All rights reserved. This information is not intended as a substitute for professional medical care. Always follow your healthcare professional's instructions.

## 2018-06-05 NOTE — PROGRESS NOTES
Subjective:       Patient ID: Jasson Pineda is a 67 y.o. female.    Chief Complaint:  Patient had a follow-up after being seen in the emergency room for chest pain  Nausea developed after the patient took the pain medicine given to her making it difficult for her to eat  Now patient is feeling weak because she has only been able to take fluids for 2 days.  HPI see above  Review of Systems   Constitutional: Positive for fatigue.   HENT: Negative.    Eyes: Negative.    Respiratory: Negative.    Cardiovascular: Positive for chest pain.   Gastrointestinal: Positive for nausea.   Endocrine: Negative.    Genitourinary: Negative.    Musculoskeletal: Negative.    Skin: Negative.    Allergic/Immunologic: Negative.    Neurological: Positive for dizziness, light-headedness and headaches.   Hematological: Negative.    Psychiatric/Behavioral: Negative.        Objective:      Physical Exam   Constitutional: She is oriented to person, place, and time. She appears well-developed and well-nourished. She appears distressed.   HENT:   Head: Normocephalic and atraumatic.   Right Ear: External ear normal.   Left Ear: External ear normal.   Nose: Nose normal.   Mouth/Throat: Oropharynx is clear and moist. No oropharyngeal exudate.   Eyes: Conjunctivae and EOM are normal. Pupils are equal, round, and reactive to light.   Neck: Normal range of motion. Neck supple. No JVD present. No thyromegaly present.   Cardiovascular: Normal rate, regular rhythm and normal heart sounds.    No murmur heard.  Pulmonary/Chest: Effort normal and breath sounds normal. She exhibits tenderness.   Abdominal: Soft. Bowel sounds are normal. She exhibits no distension and no mass. There is no tenderness. There is no rebound and no guarding. No hernia.   Neurological: She is alert and oriented to person, place, and time. She displays normal reflexes. No cranial nerve deficit or sensory deficit. She exhibits normal muscle tone. Coordination normal.   Skin:  Skin is warm and dry. No rash noted. No erythema. No pallor.   Psychiatric: She has a normal mood and affect. Her behavior is normal. Judgment and thought content normal.   Nursing note and vitals reviewed.      Assessment:       1. Chest pain, unspecified type    2. Costochondritis    3. Gastroesophageal reflux disease, esophagitis presence not specified        Plan:     see med card dated 06/04/2018       XARELTO 20 mg Tab 1 tablet, Nightly        sucralfate (CARAFATE) 1 gram tablet 1 g, Before meals & nightly        pregabalin (LYRICA) 150 MG capsule 150 mg, 2 times daily        potassium citrate (UROCIT-K) 10 mEq (1,080 mg) TbSR 10 mEq, 3 times daily with meals        oxybutynin (DITROPAN-XL) 10 MG 24 hr tablet 10 mg, Daily        lidocaine-prilocaine (EMLA) cream As needed (PRN)        lidocaine (LIDODERM) 5 % 1 patch, Every 24 hours (non-standard times)        HYDROcodone-acetaminophen (NORCO) 5-325 mg per tablet 5 tablet, 2 times daily PRN        diclofenac sodium 1 % Gel 2 g, 4 times daily        atorvastatin (LIPITOR) 40 MG tablet          Negative POCT URINE DIPSTICK WITHOUT MICROSCOPE

## 2018-06-20 PROBLEM — J06.9 UPPER RESPIRATORY INFECTION: Status: ACTIVE | Noted: 2018-06-20

## 2018-06-20 PROBLEM — J02.9 ACUTE PHARYNGITIS: Status: ACTIVE | Noted: 2018-06-20

## 2018-06-20 PROBLEM — A09 INFECTIOUS GASTROENTERITIS: Status: ACTIVE | Noted: 2018-06-20

## 2018-06-21 ENCOUNTER — OFFICE VISIT (OUTPATIENT)
Dept: INTERNAL MEDICINE | Facility: CLINIC | Age: 68
End: 2018-06-21
Attending: FAMILY MEDICINE
Payer: MEDICARE

## 2018-06-21 VITALS
HEART RATE: 72 BPM | BODY MASS INDEX: 27.31 KG/M2 | WEIGHT: 154.13 LBS | DIASTOLIC BLOOD PRESSURE: 68 MMHG | HEIGHT: 63 IN | OXYGEN SATURATION: 98 % | SYSTOLIC BLOOD PRESSURE: 110 MMHG

## 2018-06-21 DIAGNOSIS — R07.9 CHEST PAIN, UNSPECIFIED TYPE: Primary | ICD-10-CM

## 2018-06-21 DIAGNOSIS — K21.9 GASTROESOPHAGEAL REFLUX DISEASE, ESOPHAGITIS PRESENCE NOT SPECIFIED: ICD-10-CM

## 2018-06-21 DIAGNOSIS — R13.10 DYSPHAGIA, UNSPECIFIED TYPE: ICD-10-CM

## 2018-06-21 DIAGNOSIS — D68.8 OTHER SPECIFIED COAGULATION DEFECTS: ICD-10-CM

## 2018-06-21 PROCEDURE — 99999 PR PBB SHADOW E&M-EST. PATIENT-LVL IV: CPT | Mod: PBBFAC,,, | Performed by: FAMILY MEDICINE

## 2018-06-21 PROCEDURE — 99214 OFFICE O/P EST MOD 30 MIN: CPT | Mod: PBBFAC | Performed by: FAMILY MEDICINE

## 2018-06-21 PROCEDURE — 99214 OFFICE O/P EST MOD 30 MIN: CPT | Mod: S$PBB,,, | Performed by: FAMILY MEDICINE

## 2018-06-21 RX ORDER — ENOXAPARIN SODIUM 100 MG/ML
INJECTION SUBCUTANEOUS
COMMUNITY
Start: 2018-06-20 | End: 2018-09-28

## 2018-06-21 RX ORDER — OMEPRAZOLE 40 MG/1
40 CAPSULE, DELAYED RELEASE ORAL DAILY
Qty: 30 CAPSULE | Refills: 1 | Status: SHIPPED | OUTPATIENT
Start: 2018-06-21 | End: 2018-08-13 | Stop reason: SDUPTHER

## 2018-06-21 RX ORDER — ATORVASTATIN CALCIUM 40 MG/1
TABLET, FILM COATED ORAL
Qty: 90 TABLET | Refills: 3 | Status: SHIPPED | OUTPATIENT
Start: 2018-06-21 | End: 2019-06-20 | Stop reason: SDUPTHER

## 2018-06-21 RX ORDER — POLYETHYLENE GLYCOL 3350 17 G/17G
17 POWDER, FOR SOLUTION ORAL DAILY PRN
Qty: 100 EACH | Refills: 1 | Status: SHIPPED | OUTPATIENT
Start: 2018-06-21 | End: 2018-08-01

## 2018-06-21 NOTE — PROGRESS NOTES
"Subjective:      Patient ID: Jasson Pineda is a 67 y.o. female.    Chief Complaint: Chest Pain    2 months ago seen in the ED for weakness, lack of appetite and chest pain. She had a negative troponin, CMP, CK, CBC, chest xray, CT chest to rule out a PE and treated for costochondritis, she took flexeril. She then followed up 5 weeks later and negative urinalysis. She started the medication for reflux and topical gel. Overall she is feeling better and thinks the medication helped. She has a tightness in her chest with movement, she also notices with eating it will bring on the pain. She is also constipated. She denies blood or black tarry stools. She reports she was packing and lifting boxes prior to onset.       Review of Systems   Constitutional: Negative.    Respiratory: Negative.    Cardiovascular: Positive for chest pain. Negative for palpitations and leg swelling.   Gastrointestinal: Positive for constipation.   Genitourinary: Negative.    Neurological: Negative.      I personally reviewed Past Medical History, Past Surgical history,  Past Social History and Family History    Objective:   /68   Pulse 72   Ht 5' 3" (1.6 m)   Wt 69.9 kg (154 lb 1.6 oz)   SpO2 98%   BMI 27.30 kg/m²     Physical Exam   Constitutional: She is oriented to person, place, and time. She appears well-developed and well-nourished. No distress.   HENT:   Head: Normocephalic.   Right Ear: External ear normal.   Left Ear: External ear normal.   Mouth/Throat: Oropharynx is clear and moist.   Eyes: Conjunctivae and EOM are normal. Pupils are equal, round, and reactive to light. Right eye exhibits no discharge. Left eye exhibits no discharge. No scleral icterus.   Neck: Normal range of motion. Neck supple.   Cardiovascular: Normal rate, regular rhythm, normal heart sounds and intact distal pulses.  Exam reveals no gallop.    No murmur heard.  Pulmonary/Chest: Effort normal and breath sounds normal. No respiratory distress. " She has no wheezes. She has no rales. She exhibits tenderness.   Abdominal: Soft. Bowel sounds are normal. She exhibits no distension and no mass. There is no tenderness. There is no rebound and no guarding.   Neurological: She is alert and oriented to person, place, and time.   Skin: Skin is warm and dry.   Vitals reviewed.      Jasson was seen today for chest pain.    Diagnoses and all orders for this visit:    Chest pain, unspecified type  Gastroesophageal reflux disease, esophagitis presence not specified  Dysphagia, unspecified type  Other specified coagulation defects  -discussed with patient possibility of esophageal stricture, will check US and stress test, consider repeat labs after reviewing labs completed in ED [facility], release of records signed   -ED prompts reviewed   -     atorvastatin (LIPITOR) 40 MG tablet; TAKE 1 TABLET(40 MG) BY MOUTH EVERY EVENING  -     Ambulatory consult to Gastroenterology  -     omeprazole (PRILOSEC) 40 MG capsule; Take 1 capsule (40 mg total) by mouth once daily.  -     Exercise stress echo; Future  -     polyethylene glycol (GLYCOLAX) 17 gram PwPk; Take 17 g by mouth daily as needed.  -     US Abdominal Aorta; Future  -     Ambulatory referral to Gastroenterology

## 2018-06-21 NOTE — PATIENT INSTRUCTIONS
80-90 fl oz water daily  Take fiber supplements such as Metamucil, Citrucel, Konsyl, etc. (Benefiber is less effective so avoid)  The goal is 1-2 nonstraining nonloose bowel movements per day.  In general we recommend about 25-30 grams of fiber daily or reaching the above bowel movement goal.  Sitz baths or tub soaks three times a day      Discharge Instructions: Eating a Soft Diet  You have been prescribed a soft diet (also called gastrointestinal soft diet or bland diet). This reduces the amount of work your digestive tract has to do. It also reduces the chance that your digestive tract will be irritated by the food you eat. A soft diet is prescribed for people with digestive problems. The diet consists of foods that are tender, mildly seasoned, and easy to digest. While on this diet, you should not eat fried or spicy foods, or raw fruits and vegetables. Also avoid alcoholic beverages.  General guidelines  · Eat in a calm, relaxed atmosphere. How you eat may be as important as what you eat. Dont rush while eating. Chew your food slowly and thoroughly, and swallow slowly.  · Eat small frequent meals throughout the day, but dont eat within 2 hours of bedtime.  · Avoid any foods that cause discomfort.  · Dont use NSAIDs (nonsteroidal anti-inflammatory drugs), such as aspirin, and ibuprofen. Also avoid medicine that contain aspirin. NSAIDs can cause ulcers and delay or prevent ulcer healing.  · Use antacids as needed, but keep in mind that magnesium-containing antacids may cause diarrhea.  Foods to eat  · Cream of wheat and cream of rice  · Cooked white rice  · Mashed potatoes, and boiled potatoes without skin  · Plain pasta and noodles  · Plain white crackers (such as no-salt soda crackers)  · White bread  · Applesauce  · Cooked fruits without skins or seeds  · Mild juices, such as apple and grape  · Bananas  · Cooked or mashed vegetables without stems and seeds  ¨ Carrots  ¨ Summer squash (zucchini, yellow  squash)  ¨ Winter squash (acorn, butternut, spaghetti squash)  · Cottage cheese  · Mild hard or soft cheeses  · Custard  · Yogurt without seeds or nuts  · Milk (you may need lactose-free milk)  · Ice cream without seeds or nuts  · Smooth peanut butter  · Eggs  · Fish, turkey, chicken, or other meat that is not tough or stringy  · Tofu  Foods to avoid  · Nuts and seeds  · Snack foods, such as the following:  ¨ Chocolate-containing snacks, candy, pastries, or cakes.  ¨ Potato chips (plain, barbecued, or other flavors)  ¨ Taco chips or nachos  ¨ Corn chips  ¨ Popcorn, popcorn cakes, or rice cakes  ¨ Crackers with nuts, seeds, or spicy seasonings  ¨ French fries  · Fried or greasy foods  · Whole-grain breads, rolls, and crackers  · Breads and rolls with nuts, seeds, or bran  · Bran and granola cereals  · Berries with seeds, such as strawberries, raspberries, and blackberries  · Acidic fruits, such as oranges, grapefruits, nik, limes, and pineapples  · Raw vegetables  · Mild or hot peppers  · Sauerkraut and pickled vegetables  · Tomatoes or tomato products, such as tomato paste, tomato sauce, and tomato juice  · Barbecue sauce  · Spicy or flavored cheeses, such as jalapeño and black pepper cheese  · Crunchy peanut butter  · Dried cooked beans, such as fisher, kidney, or navy beans  · The following meats:  ¨ Fried or greasy meats  ¨ Processed, spicy meats, such as sausage, dale, ham, and lunch meats  ¨ Ribs and other meats with barbecue sauce  ¨ Tough or stringy meats, such as corned beef or beef jerky  Fluids to avoid  · Alcoholic beverages  · Coffee and regular teas  · Kush and other drinks with caffeine  · Cranberry, orange, pineapple, and grapefruit juice  · Lemonade  · Vegetable juice  · Whole milk, if you are lactose intolerant  Follow-up  Make a follow-up appointment with a dietitian as directed by our staff.  Date Last Reviewed: 6/21/2015  © 4301-3226 [employer]. [address] [facility], [place] [address]. All rights reserved. This information is not intended as a substitute for professional medical care. Always follow your healthcare professional's instructions.        Treating Dysphagia     Talk to your healthcare provider before you take any medicines.    A medical evaluation helps your healthcare provider find the cause of your trouble swallowing, or dysphagia. Your evaluation may include a medical history and some special tests. Your provider will make a treatment plan based on the results of your evaluation. You may need to take medicines. In some cases, your provider may suggest a procedure to stretch or widen your esophagus (esophageal dilation). Or your provider may suggest surgery.  What you can do  To help control dysphagia, follow your treatment plan. Take all medicines as directed. You also can help lessen your dysphagia symptoms by being careful about what and how you eat.  Medicines  You may need medicines, such as those that:  · Reduce or neutralize stomach acids  · Control esophagus muscle spasms  · Treat an allergic disorder of the esophagus that is causing the problem  · Are injected into the esophagus to help symptoms  Esophageal dilation  Dilation is a procedure that your provider can use to widen your esophagus. It is most often done when a narrowing (stricture) of the esophagus is causing the problem. There are many ways your provider can widen your esophagus. He or she can discuss them with you.  Eating tips  · Eat slowly in a relaxed setting.  · Dont talk while you eat.  · Take small bites and chew slowly and thoroughly  · Sit in an upright position during and after meals.  · Ask your provider about any special diets that may help, such as liquid diets.  · If you have trouble swallowing solid foods, you can use a  to mash or purée them.  · Thicken liquids with milk, juice, broth, gravy, or starch to make swallowing easier.  Occupational or speech therapy  Your healthcare  provider may recommend that you have an evaluation or sessions with a speech or occupational therapist. These specialists in dysphagia may give you exercises and instructions to help you eat safely.   Date Last Reviewed: 10/1/2016  © 0404-1375 [employer]. [address] [place] [address], [place] [address]. All rights reserved. This information is not intended as a substitute for professional medical care. Always follow your healthcare professional's instructions.        Understanding Dysphagia    If you have a problem swallowing foods or liquids, you may have dysphagia. This condition has a number of causes. Your healthcare provider can find out what is causing your problem and help relieve your symptoms.  When You Swallow  Your tongue pushes foods or liquids from your mouth to your throat as you eat or drink and swallow. They then pass down the esophagus (a muscular tube) into the stomach. To keep foods or liquids moving, the esophagus muscles tighten and relax in wavelive motions.  Causes of dysphagia  With dysphagia, foods or liquids do not easily pass down the esophagus. Dysphagia may happen if the esophagus walls thicken, causing a narrowing (stricture) of the passage. Dysphagia can also be caused by any of the following:  · A problem in the esophagus, such as an ulcer, stricture, irritation, infection, inflammation, or cancer  · Muscles in your mouth, throat, or esophagus that dont work right or are not coordinated  · A nerve or brain problem (such as a stroke) that leaves your mouth, tongue, or throat muscles weak or changes how your muscles coordinate  Common symptoms  If you have dysphagia, you may:  · Feel chest pressure or pain when you swallow  · Choke or cough when swallowing  · Vomit after eating or drinking  · Regurgitate food or liquid out your nose   · Aspirate (inhale into the lungs) foods or liquids when you swallow  · Have fatigue and weight loss  Date Last Reviewed: 7/1/2016 © 2000-2017  [employer]. [address] [place] [address], [place] [address]. All rights reserved. This information is not intended as a substitute for professional medical care. Always follow your healthcare professional's instructions.

## 2018-06-22 ENCOUNTER — HOSPITAL ENCOUNTER (OUTPATIENT)
Dept: RADIOLOGY | Facility: OTHER | Age: 68
Discharge: HOME | End: 2018-06-22
Attending: FAMILY MEDICINE
Payer: MEDICARE

## 2018-06-22 ENCOUNTER — TELEPHONE (OUTPATIENT)
Dept: ENDOSCOPY | Facility: HOSPITAL | Age: 68
End: 2018-06-22

## 2018-06-22 ENCOUNTER — OFFICE VISIT (OUTPATIENT)
Dept: GASTROENTEROLOGY | Facility: CLINIC | Age: 68
End: 2018-06-22
Payer: MEDICARE

## 2018-06-22 VITALS
BODY MASS INDEX: 27.7 KG/M2 | HEIGHT: 62 IN | WEIGHT: 150.56 LBS | DIASTOLIC BLOOD PRESSURE: 66 MMHG | HEART RATE: 68 BPM | SYSTOLIC BLOOD PRESSURE: 102 MMHG

## 2018-06-22 DIAGNOSIS — R07.89 NON-CARDIAC CHEST PAIN: Primary | ICD-10-CM

## 2018-06-22 DIAGNOSIS — D50.9 IRON DEFICIENCY ANEMIA, UNSPECIFIED IRON DEFICIENCY ANEMIA TYPE: ICD-10-CM

## 2018-06-22 DIAGNOSIS — R13.10 DYSPHAGIA, UNSPECIFIED TYPE: ICD-10-CM

## 2018-06-22 DIAGNOSIS — K21.9 GASTROESOPHAGEAL REFLUX DISEASE, ESOPHAGITIS PRESENCE NOT SPECIFIED: ICD-10-CM

## 2018-06-22 DIAGNOSIS — Z80.0 FAMILY HISTORY OF COLON CANCER: ICD-10-CM

## 2018-06-22 DIAGNOSIS — R13.10 ODYNOPHAGIA: ICD-10-CM

## 2018-06-22 DIAGNOSIS — R13.19 ESOPHAGEAL DYSPHAGIA: ICD-10-CM

## 2018-06-22 DIAGNOSIS — R07.9 CHEST PAIN, UNSPECIFIED TYPE: ICD-10-CM

## 2018-06-22 DIAGNOSIS — D68.8 OTHER SPECIFIED COAGULATION DEFECTS: ICD-10-CM

## 2018-06-22 PROBLEM — A09 INFECTIOUS GASTROENTERITIS: Status: RESOLVED | Noted: 2018-06-20 | Resolved: 2018-06-22

## 2018-06-22 PROBLEM — J06.9 UPPER RESPIRATORY INFECTION: Status: RESOLVED | Noted: 2018-06-20 | Resolved: 2018-06-22

## 2018-06-22 PROBLEM — J02.9 ACUTE PHARYNGITIS: Status: RESOLVED | Noted: 2018-06-20 | Resolved: 2018-06-22

## 2018-06-22 PROCEDURE — 99999 PR PBB SHADOW E&M-EST. PATIENT-LVL III: CPT | Mod: PBBFAC,,, | Performed by: INTERNAL MEDICINE

## 2018-06-22 PROCEDURE — 76775 US EXAM ABDO BACK WALL LIM: CPT | Mod: 26,,, | Performed by: INTERNAL MEDICINE

## 2018-06-22 PROCEDURE — 99204 OFFICE O/P NEW MOD 45 MIN: CPT | Mod: S$PBB,,, | Performed by: INTERNAL MEDICINE

## 2018-06-22 PROCEDURE — 99213 OFFICE O/P EST LOW 20 MIN: CPT | Mod: PBBFAC,25 | Performed by: INTERNAL MEDICINE

## 2018-06-22 PROCEDURE — 76775 US EXAM ABDO BACK WALL LIM: CPT | Mod: TC

## 2018-06-22 NOTE — LETTER
June 26, 2018      Jennifer Cheatham MD  [address]  [facility] [address]           [facility]  [address]  [facility] [address]  Phone: [phone]  Fax: [phone]          Patient: Jasson Pineda   MR Number: [id number]   YOB: 1950   Date of Visit: 6/22/2018       Dear Dr. Jennifer Cheatham:    Thank you for referring Jasson Pineda to me for evaluation. Attached you will find relevant portions of my assessment and plan of care.    If you have questions, please do not hesitate to call me. I look forward to following Jasson Pineda along with you.    Sincerely,    Armand Foy MD    Enclosure  CC:  No Recipients    If you would like to receive this communication electronically, please contact [email or web address] or [phone] to request more information on [employer] access.    For providers and/or their staff who would like to refer a patient to [facility], please contact us through our one-stop-shop provider referral line, [facility], at [phone].    If you feel you have received this communication in error or would no longer like to receive these types of communications, please e-mail [email or web address]

## 2018-06-22 NOTE — PROGRESS NOTES
[facility] Consultation Note    Reason for Consult:    Chief Complaint   Patient presents with    Chest Pain    GI Problem     food gets stuck in esophagus, it's very painful and she has to drink water to make food go down       PCP:   Jennifer Cheatham    Referring MD:  Jennifer Cheatham Md  [address] [place] [address]    HPI:  Jasson Pineda is a 67 y.o. female here for evaluation of chest pain and dysphagia.  She also reports mild odynophagia.  Symptoms have been present for about a month.  She has a constant tightness in her chest that is worse after activity.  The discomfort never goes away.  It was up to 10/10 intensity but is now a 5/10.  She went to the local ED at [facility] and was told no acute coronary findings (EKG and labs okay).  She denies heartburn or indigestion or regurgitation.  She also denies abdominal pain, nausea, or vomiting.  She has occasional constipation, but bowels are generally regular without blood or black stools.  She denies NSAID use.  She was prescribed omeprazole yesterday, but hasn't started it yet.      She believes her last colonoscopy was around 2-3 years ago, but is unsure.  Her mother had colon cancer at age 74.  She also reports that a CT of the chest was done in May and was negative for PE.      She provides labs for me to review and I note below.          ROS:  Constitutional: No fevers, chills, No weight loss, normal appetite  ENT: No congestion, rhinorrhea, or chronic sinus problems  CV: Positive for chest pain as per the HPI; positive for dyspnea as well  Pulm: No cough, No shortness of breath  Ophtho: No vision changes or pain  GI: see HPI  Derm: No rash or lesions  Heme: No lymphadenopathy, No bruising  MSK: No arthritis or joint swelling  : No dysuria, No frequent urination          Medical History:  has a past medical history of Anticoagulant long-term use; Chronic back pain; Chronic neck pain;  DVT (deep venous thrombosis); Herniated disc, cervical; Kidney stone; Lumbar herniated disc; PE (pulmonary embolism); and Pulmonary embolism.    Surgical History:  has a past surgical history that includes Kidney stone surgery; left knee surgery; Tonsillectomy;  section; Hernia repair; Total shoulder arthroplasty (Right); and Breast cyst excision (Left).    Family History: family history includes Breast cancer (age of onset: 45) in her maternal cousin; Breast cancer (age of onset: 50) in her maternal aunt; Cervical cancer in her maternal aunt; Colon cancer (age of onset: 74) in her mother; Diabetes in her sister; Liver cancer in her maternal grandfather and sister.    Social History:  reports that she has never smoked. She has never used smokeless tobacco. She reports that she does not drink alcohol or use drugs.    Review of patient's allergies indicates:   Allergen Reactions    Sulfa (sulfonamide antibiotics) Rash       Prior to Admission medications    Medication Sig Start Date End Date Taking? Authorizing Provider   atorvastatin (LIPITOR) 40 MG tablet TAKE 1 TABLET(40 MG) BY MOUTH EVERY EVENING 18  Yes Jennifer Cheatham MD   diclofenac sodium 1 % Gel Apply 2 g topically 4 (four) times daily. 18  Yes Gudelia Mcdonough MD   enoxaparin (LOVENOX) 80 mg/0.8 mL Syrg  18  Yes Historical Provider, MD   lidocaine (LIDODERM) 5 % Place 1 patch onto the skin every 24 hours. Remove & Discard patch within 12 hours or as directed by MD   Yes Historical Provider, MD   lidocaine-prilocaine (EMLA) cream Apply topically as needed. 17  Yes Jennifer Cheatham MD   omeprazole (PRILOSEC) 40 MG capsule Take 1 capsule (40 mg total) by mouth once daily. 18 Yes Jennifer Cheatham MD   oxybutynin (DITROPAN-XL) 10 MG 24 hr tablet Take 1 tablet (10 mg total) by mouth once daily. 18 Yes Chantel Boyle, ROCK   potassium citrate (UROCIT-K) 10 mEq (1,080 mg) TbSR Take 1 tablet  "(10 mEq total) by mouth 3 (three) times daily with meals. 9/5/17 9/5/18 Yes Inocencio Padilla MD   pregabalin (LYRICA) 150 MG capsule Take 150 mg by mouth 2 (two) times daily.   Yes Historical Provider, MD   sucralfate (CARAFATE) 1 gram tablet Take 1 tablet (1 g total) by mouth 4 (four) times daily before meals and nightly. 6/4/18  Yes Gudelia Mcdonough MD   XARELTO 20 mg Tab Take 1 tablet by mouth every evening.  12/29/15  Yes Historical Provider, MD   polyethylene glycol (GLYCOLAX) 17 gram PwPk Take 17 g by mouth daily as needed. 6/21/18   Jennifer Cheatham MD   HYDROcodone-acetaminophen (NORCO) 5-325 mg per tablet 5 tablets 2 (two) times daily as needed. 6/2/18 6/22/18  Historical Provider, MD       Objective Findings:  Vital Signs:  /66   Pulse 68   Ht 5' 2" (1.575 m)   Wt 68.3 kg (150 lb 9.2 oz)   BMI 27.54 kg/m²   Body mass index is 27.54 kg/m².    Physical Exam:  General Appearance:  Well appearing in no acute distress, appears stated age  Head:  Normocephalic, atraumatic  Eyes:  No scleral icterus or pallor, EOMI  ENT:  Neck supple, moist mucosa; OP clear  Lungs:  CTA bilaterally in anterior and posterior fields, no wheezes, no crackles; no dullness  Heart:  Regular rate and rhythm, S1, S2 normal, no murmurs heard  Abdomen:  Soft, non tender, non distended with positive bowel sounds in all four quadrants. No hepatosplenomegaly, ascites, or mass  Extremities:  No clubbing, cyanosis, or edema  Skin:  No rash        Labs:  Lab Results   Component Value Date    WBC 5.67 06/26/2017    HGB 12.2 06/26/2017    HCT 38.3 06/26/2017    MCV 80 (L) 06/26/2017    RDW 16.6 (H) 06/26/2017     06/26/2017    GRAN 2.9 06/26/2017    GRAN 51.2 06/26/2017    LYMPH 2.2 06/26/2017    LYMPH 39.2 06/26/2017    MONO 0.4 06/26/2017    MONO 7.8 06/26/2017    EOS 0.1 06/26/2017    BASO 0.01 06/26/2017     Lab Results   Component Value Date     08/28/2017    K 4.5 08/28/2017     08/28/2017    CO2 24 " 08/28/2017    GLU 65 (L) 08/28/2017    BUN 12 08/28/2017    CREATININE 1.0 08/28/2017    CALCIUM 10.0 08/28/2017    PROT 8.1 08/28/2017    ALBUMIN 3.9 08/28/2017    BILITOT 0.4 08/28/2017    ALKPHOS 77 08/28/2017    AST 22 08/28/2017    ALT 22 08/28/2017     Outside labs 4/29/18:  WBC 4.9; Hb 11.9; Plt 253; MCV 80.6  , Cl 110, K 4.5, bicarb 19, BUN 14, creat 0.8, glucose 86, and Ca 9.4  PT 10.5, INR 1  Troponin 0.0  Total prot 7.8, alb 3.7, t bili 0.3, AST 24, ALT 16, alk phos 59                  Assessment:  Jasson Pineda is a 67 y.o. female with:  1. Non-cardiac chest pain    2. Esophageal dysphagia    3. Odynophagia    4. Iron deficiency anemia, unspecified iron deficiency anemia type    5. Family history of colon cancer      The constant nature of the pain is atypical for coronary syndrome, but she also has not yet seen her cardiologist.  Consider esophagitis, stricture, infection.  She has not yet started the recommended PPI.        Recommendations/Plan:  1. Follow-up with cardiologist  2. Start taking omeprazole as directed  3. I have placed order for EGD  4. Colonoscopy for assessment of MARIA DEL CARMEN and family history of colon cancer  5. Labs today to evaluate her iron and for Celiac disease    Follow-up pending the above.      Order summary:  Orders Placed This Encounter    Celiac Disease Panel    CBC auto differential    Ferritin    Iron and TIBC    Case request GI: EGD (ESOPHAGOGASTRODUODENOSCOPY), COLONOSCOPY         Thank you so much for allowing me to participate in the care of Jasson Pineda    Armand Foy MD

## 2018-06-26 ENCOUNTER — TELEPHONE (OUTPATIENT)
Dept: ENDOSCOPY | Facility: HOSPITAL | Age: 68
End: 2018-06-26

## 2018-06-26 NOTE — TELEPHONE ENCOUNTER
Received documentation for approval to hold Xarelto 2 days prior and Lovenox 25 hrs prior to scheduled EGD/Colonscopy. See Media tab for scanned documentation per Dr. Doty.  Waiting to schedule patient until after 7/3/18 Echo is completed.

## 2018-06-28 ENCOUNTER — TELEPHONE (OUTPATIENT)
Dept: INTERNAL MEDICINE | Facility: CLINIC | Age: 68
End: 2018-06-28

## 2018-06-28 NOTE — TELEPHONE ENCOUNTER
----- Message from Kizzy Rose sent at 6/28/2018  7:55 AM CDT -----            Name of Who is Calling:NABOR ZEPEDA [[id number]]    What is the request in detail: pt was in on 6/21/18, she is calling because she is not feeling any better. Please call pt    Can the clinic reply by [facility]: no    [facility] Back [facility]: [phone]    10:11 AM  Patient states that she is not feeling any better since her last visit. Was placed on a soft diet, but when she eats she fills as if the food stops in the middle of her chest. Patient was scheduled for an endoscopy and colonoscopy in September but was like recommendations on what to do in the mean time.

## 2018-06-29 NOTE — TELEPHONE ENCOUNTER
Returned patient's call. Mrs. Pineda stated she did not request a call from Dr. Foy staff and she does not need any assistance. She needed to speak to Dr. Cheatham staff.    Mrs. Pineda thanked MA for call.

## 2018-06-30 ENCOUNTER — EXTERNAL CHRONIC CARE MANAGEMENT (OUTPATIENT)
Dept: PRIMARY CARE CLINIC | Facility: CLINIC | Age: 68
End: 2018-06-30
Payer: MEDICARE

## 2018-06-30 PROCEDURE — 99490 CHRNC CARE MGMT STAFF 1ST 20: CPT | Mod: S$PBB,,, | Performed by: FAMILY MEDICINE

## 2018-06-30 PROCEDURE — 99490 CHRNC CARE MGMT STAFF 1ST 20: CPT | Mod: PBBFAC | Performed by: FAMILY MEDICINE

## 2018-07-01 ENCOUNTER — HOSPITAL ENCOUNTER (EMERGENCY)
Facility: HOSPITAL | Age: 68
Discharge: HOME | End: 2018-07-01
Attending: EMERGENCY MEDICINE
Payer: MEDICARE

## 2018-07-01 VITALS
OXYGEN SATURATION: 96 % | TEMPERATURE: 99 F | BODY MASS INDEX: 27.6 KG/M2 | HEIGHT: 62 IN | RESPIRATION RATE: 17 BRPM | SYSTOLIC BLOOD PRESSURE: 104 MMHG | DIASTOLIC BLOOD PRESSURE: 62 MMHG | WEIGHT: 150 LBS | HEART RATE: 72 BPM

## 2018-07-01 DIAGNOSIS — R07.9 CHEST PAIN: Primary | ICD-10-CM

## 2018-07-01 LAB
ALBUMIN SERPL BCP-MCNC: 4 G/DL
ALP SERPL-CCNC: 75 U/L
ALT SERPL W/O P-5'-P-CCNC: 34 U/L
ANION GAP SERPL CALC-SCNC: 13 MMOL/L
AST SERPL-CCNC: 25 U/L
BASOPHILS # BLD AUTO: 0.02 K/UL
BASOPHILS NFR BLD: 0.3 %
BILIRUB SERPL-MCNC: 0.4 MG/DL
BNP SERPL-MCNC: 13 PG/ML
BUN SERPL-MCNC: 8 MG/DL
CALCIUM SERPL-MCNC: 10.1 MG/DL
CHLORIDE SERPL-SCNC: 109 MMOL/L
CO2 SERPL-SCNC: 20 MMOL/L
CREAT SERPL-MCNC: 0.9 MG/DL
DIFFERENTIAL METHOD: ABNORMAL
EOSINOPHIL # BLD AUTO: 0 K/UL
EOSINOPHIL NFR BLD: 0.5 %
ERYTHROCYTE [DISTWIDTH] IN BLOOD BY AUTOMATED COUNT: 15.8 %
EST. GFR  (AFRICAN AMERICAN): >60 ML/MIN/1.73 M^2
EST. GFR  (NON AFRICAN AMERICAN): >60 ML/MIN/1.73 M^2
GLUCOSE SERPL-MCNC: 100 MG/DL
HCT VFR BLD AUTO: 42.2 %
HGB BLD-MCNC: 13 G/DL
IMM GRANULOCYTES # BLD AUTO: 0.03 K/UL
IMM GRANULOCYTES NFR BLD AUTO: 0.5 %
INR PPP: 1.1
LYMPHOCYTES # BLD AUTO: 1.7 K/UL
LYMPHOCYTES NFR BLD: 29.2 %
MCH RBC QN AUTO: 25.6 PG
MCHC RBC AUTO-ENTMCNC: 30.8 G/DL
MCV RBC AUTO: 83 FL
MONOCYTES # BLD AUTO: 0.4 K/UL
MONOCYTES NFR BLD: 7.1 %
NEUTROPHILS # BLD AUTO: 3.6 K/UL
NEUTROPHILS NFR BLD: 62.4 %
NRBC BLD-RTO: 0 /100 WBC
PLATELET # BLD AUTO: 266 K/UL
PMV BLD AUTO: 10.7 FL
POTASSIUM SERPL-SCNC: 4.4 MMOL/L
PROT SERPL-MCNC: 8 G/DL
PROTHROMBIN TIME: 11.5 SEC
RBC # BLD AUTO: 5.08 M/UL
SODIUM SERPL-SCNC: 142 MMOL/L
TROPONIN I SERPL DL<=0.01 NG/ML-MCNC: <0.006 NG/ML
WBC # BLD AUTO: 5.79 K/UL

## 2018-07-01 PROCEDURE — 25000003 PHARM REV CODE 250: Performed by: EMERGENCY MEDICINE

## 2018-07-01 PROCEDURE — 96361 HYDRATE IV INFUSION ADD-ON: CPT

## 2018-07-01 PROCEDURE — 93010 ELECTROCARDIOGRAM REPORT: CPT | Mod: ,,, | Performed by: INTERNAL MEDICINE

## 2018-07-01 PROCEDURE — 83880 ASSAY OF NATRIURETIC PEPTIDE: CPT

## 2018-07-01 PROCEDURE — 80053 COMPREHEN METABOLIC PANEL: CPT

## 2018-07-01 PROCEDURE — 96360 HYDRATION IV INFUSION INIT: CPT

## 2018-07-01 PROCEDURE — 85025 COMPLETE CBC W/AUTO DIFF WBC: CPT

## 2018-07-01 PROCEDURE — 99284 EMERGENCY DEPT VISIT MOD MDM: CPT | Mod: 25

## 2018-07-01 PROCEDURE — 84484 ASSAY OF TROPONIN QUANT: CPT

## 2018-07-01 PROCEDURE — 99285 EMERGENCY DEPT VISIT HI MDM: CPT | Mod: ,,, | Performed by: EMERGENCY MEDICINE

## 2018-07-01 PROCEDURE — 85610 PROTHROMBIN TIME: CPT

## 2018-07-01 PROCEDURE — 93005 ELECTROCARDIOGRAM TRACING: CPT

## 2018-07-01 RX ADMIN — ALUMINUM HYDROXIDE, MAGNESIUM HYDROXIDE, AND SIMETHICONE 50 ML: 200; 200; 20 SUSPENSION ORAL at 08:07

## 2018-07-01 RX ADMIN — SODIUM CHLORIDE 1000 ML: 0.9 INJECTION, SOLUTION INTRAVENOUS at 07:07

## 2018-07-01 NOTE — ED NOTES
LOC: The patient is awake, alert, and aware of environment. The patient is oriented x 3 and speaking appropriately.   APPEARANCE: No acute distress noted.   PSYCHOSOCIAL: Patient is calm and cooperative.   SKIN: The skin is warm, dry.   RESPIRATORY: Airway is open and patent. Bilateral chest rise and fall. Respirations are spontaneous, even and unlabored. Normal effort and rate noted. No accessory muscle use noted.   CARDIAC: Patient has a normal rate and rhythm. Pt reports mid sternal tightness non-radiating. Pt reports sob with exertion. RR 20 even and unlabored.   ABDOMEN: Soft and non tender to palpation. No distention noted.   URINARY:  Voids independently.   NEUROLOGIC: Eyes open spontaneously. Speech clear. Tolerating saliva secretions well. Able to follow commands, demonstrating ability to actively and appropriately communicate within context of current conversation. Symmetrical facial muscles. Moving all extremities well. Movement is purposeful.   MUSCULOSKELETAL: No obvious deformities noted.

## 2018-07-01 NOTE — ED PROVIDER NOTES
"Encounter Date: 2018    SCRIBE #1 NOTE: I, Milena Hamilton, am scribing for, and in the presence of,  Dr. Flores. I have scribed the entire note. the EKG reading.       History     Chief Complaint   Patient presents with    Chest Pain     onset x1 week ago     Time patient was seen by the provider: 7:19 AM      The patient is a 67 y.o. female with anticoagulant long-term use and history of DVT and PE who presents to the ED with a complaint of constant chest pain x 1 month. Patient reports mid sternal chest pain that is exacerbated on exertion and even more with PO intake. She describes it as a, "smothering feeling," and notes it feels like it radiates to her throat. She endorses trouble swallowing, decreased appetite and states, "it feels like my food gets stuck when I eat." She notes she has been on a soft diet since she noticed trouble swallowing and her last PO intake was two spoonfuls of vegetable soup last night. She also endorses minimal cough and SOB. Denies fever. Patient is on xarelto and is compliant with medications.      The history is provided by the patient and medical records.     Review of patient's allergies indicates:   Allergen Reactions    Sulfa (sulfonamide antibiotics) Rash     Past Medical History:   Diagnosis Date    Anticoagulant long-term use     xarelto    Chronic back pain     Chronic neck pain     DVT (deep venous thrombosis)     Herniated disc, cervical     Kidney stone     Lumbar herniated disc     PE (pulmonary embolism)     2013    Pulmonary embolism      Past Surgical History:   Procedure Laterality Date    BREAST CYST EXCISION Left      SECTION      x1    HERNIA REPAIR      umbilical    KIDNEY STONE SURGERY      ureteral stent    left knee surgery      TONSILLECTOMY      TOTAL SHOULDER ARTHROPLASTY Right      Family History   Problem Relation Age of Onset    Colon cancer Mother 74    Liver cancer Sister     Diabetes Sister     Liver cancer Maternal " Grandfather     Breast cancer Maternal Aunt 50        50s    Cervical cancer Maternal Aunt     Breast cancer Maternal Cousin 45    Ovarian cancer Neg Hx      Social History   Substance Use Topics    Smoking status: Never Smoker    Smokeless tobacco: Never Used    Alcohol use No     Review of Systems   Constitutional: Positive for appetite change (decreased). Negative for fever.   HENT: Positive for trouble swallowing. Negative for sore throat.    Respiratory: Positive for cough (minimal), chest tightness and shortness of breath.    Cardiovascular: Positive for chest pain (midsternal radiates to throat).   Gastrointestinal: Negative for nausea.   Genitourinary: Negative for dysuria.   Musculoskeletal: Negative for back pain.   Skin: Negative for rash.   Neurological: Negative for weakness.   Hematological: Does not bruise/bleed easily.       Physical Exam     Initial Vitals [07/01/18 0659]   BP Pulse Resp Temp SpO2   (!) 107/59 107 18 98.9 °F (37.2 °C) 100 %      MAP       --         Physical Exam    Nursing note and vitals reviewed.  Constitutional: She appears well-developed and well-nourished. She is not diaphoretic. No distress.   HENT:   Head: Normocephalic and atraumatic.   Mouth/Throat: Oropharynx is clear and moist.   Eyes: Conjunctivae and EOM are normal.   Neck: Normal range of motion. Neck supple.   Cardiovascular: Normal rate and regular rhythm.   Pulmonary/Chest: Breath sounds normal. No respiratory distress.   Abdominal: Soft. She exhibits no distension. There is no tenderness. There is no guarding.   Musculoskeletal: Normal range of motion. She exhibits no edema.   Neurological: She is alert and oriented to person, place, and time. No cranial nerve deficit.   Skin: Skin is warm and dry. No rash noted.         ED Course   Procedures  Labs Reviewed   CBC W/ AUTO DIFFERENTIAL - Abnormal; Notable for the following:        Result Value    MCH 25.6 (*)     MCHC 30.8 (*)     RDW 15.8 (*)     All other  components within normal limits   COMPREHENSIVE METABOLIC PANEL - Abnormal; Notable for the following:     CO2 20 (*)     All other components within normal limits   B-TYPE NATRIURETIC PEPTIDE   TROPONIN I   PROTIME-INR     EKG Readings: (Independently Interpreted)   normal sinus rhythm at a rate of 75 with no ST abnormalities.       Imaging Results          X-Ray Chest PA And Lateral (Final result)  Result time 07/01/18 08:42:54    Final result by Marino Morales MD (07/01/18 08:42:54)                 Impression:      No acute findings.      Electronically signed by: Marino Morales MD  Date:    07/01/2018  Time:    08:42             Narrative:    EXAMINATION:  XR CHEST PA AND LATERAL    CLINICAL HISTORY:  Chest pain, unspecified    TECHNIQUE:  PA and lateral views of the chest were performed.    COMPARISON:  None    FINDINGS:  The cardiomediastinal contour is within normal limits.  The lungs are clear.  No pneumothorax.  No pleural effusions.                                 Medical Decision Making:   History:   Old Medical Records: I decided to obtain old medical records.  Old Records Summarized: records from previous admission(s) and other records.       <> Summary of Records: Patient seen by 3 providers in past month for chest pain. One of the visits note she was recently seen in outside ED for chest pain, where she had a CTA that was negative for PE. She was seen by GI for chest pain and dysphasia. There are plans for EGD and stress echo, both ordered, but have not been completed.   Initial Assessment:   68 yo f, c/o CP x 1 month, smothering pain, unusual in that it is significantly worsened by eating and feels that food getting stuck (only taking liquids now and recent GI eval as above) but also thinks pain worsens with physical exertion.  In addition, reports pain has been presently constantly since onset weeks-month ago.  + compliant with anticoagulants for management of PE.  On exam, well-appearing, exam  normal.  EKG normal  Differential Diagnosis:   Very atypical CP, seems more likely to be GI given strong association with food, less likely cardiac (HEART score 2) and scheduled for cardiology f/u this week    Very low suspicion for PE as pt on long-term anticoagulation and compliant w meds, recent CTA negative  Independently Interpreted Test(s):   I have ordered and independently interpreted EKG Reading(s) - see prior notes  Clinical Tests:   Lab Tests: Ordered and Reviewed  Radiological Study: Ordered and Reviewed  Medical Tests: Ordered and Reviewed  ED Management:  Labs, troponin, CXR  Reassess    8:22 AM  Given applesauce, able to tolerate without gagging/vomiting but reports severe pain/discomfort-mid chest region- after swallowing  Will give GI cocktail  Labs normal (troponin negative and given long duration of pain, do not feel repeat needed), scheduled for stress test in 2 days  Safe for d/c home with continuation of outpatient work-up    9:13 AM  Pt reports significant improvement in sx with GI cocktail  Will d/c home            Scribe Attestation:   Scribe #1: I performed the above scribed service and the documentation accurately describes the services I performed. I attest to the accuracy of the note.               Clinical Impression:   The encounter diagnosis was Chest pain.      Disposition:   Disposition: Discharged  Condition: Stable       I, Dr. Nancy Flores, personally performed the services described in this documentation. All medical record entries made by the scribe were at my direction and in my presence.  I have reviewed the chart and agree that the record reflects my personal performance and is accurate and complete. Nancy Flores MD.  8:08 AM 07/02/2018                     Nancy Flores MD  07/02/18 0758

## 2018-07-01 NOTE — ED TRIAGE NOTES
Pt reports midsternal tightness non-radiating chest pain that woke her up around 2:30 am. PT reports the chest pain has been constant x 1 week. Pt reports she is scheduled for a stress test. Pt reports sob with exertion.

## 2018-07-03 ENCOUNTER — HOSPITAL ENCOUNTER (OUTPATIENT)
Dept: CARDIOLOGY | Facility: OTHER | Age: 68
Discharge: HOME | End: 2018-07-03
Attending: FAMILY MEDICINE
Payer: MEDICARE

## 2018-07-03 DIAGNOSIS — D68.8 OTHER SPECIFIED COAGULATION DEFECTS: ICD-10-CM

## 2018-07-03 DIAGNOSIS — R07.9 CHEST PAIN, UNSPECIFIED TYPE: ICD-10-CM

## 2018-07-03 DIAGNOSIS — K21.9 GASTROESOPHAGEAL REFLUX DISEASE, ESOPHAGITIS PRESENCE NOT SPECIFIED: ICD-10-CM

## 2018-07-03 DIAGNOSIS — R13.10 DYSPHAGIA, UNSPECIFIED TYPE: ICD-10-CM

## 2018-07-03 LAB
DIASTOLIC DYSFUNCTION: NO
RETIRED EF AND QEF - SEE NOTES: 64 (ref 55–65)

## 2018-07-03 PROCEDURE — 93321 DOPPLER ECHO F-UP/LMTD STD: CPT

## 2018-07-08 ENCOUNTER — PATIENT MESSAGE (OUTPATIENT)
Dept: GASTROENTEROLOGY | Facility: CLINIC | Age: 68
End: 2018-07-08

## 2018-07-08 ENCOUNTER — PATIENT MESSAGE (OUTPATIENT)
Dept: INTERNAL MEDICINE | Facility: CLINIC | Age: 68
End: 2018-07-08

## 2018-07-09 ENCOUNTER — PATIENT MESSAGE (OUTPATIENT)
Dept: ENDOSCOPY | Facility: HOSPITAL | Age: 68
End: 2018-07-09

## 2018-07-09 DIAGNOSIS — Z12.11 SPECIAL SCREENING FOR MALIGNANT NEOPLASMS, COLON: Primary | ICD-10-CM

## 2018-07-09 RX ORDER — POLYETHYLENE GLYCOL 3350, SODIUM SULFATE ANHYDROUS, SODIUM BICARBONATE, SODIUM CHLORIDE, POTASSIUM CHLORIDE 236; 22.74; 6.74; 5.86; 2.97 G/4L; G/4L; G/4L; G/4L; G/4L
4 POWDER, FOR SOLUTION ORAL ONCE
Qty: 4000 ML | Refills: 0 | Status: SHIPPED | OUTPATIENT
Start: 2018-07-09 | End: 2018-07-09

## 2018-07-09 NOTE — TELEPHONE ENCOUNTER
Attempted to contact patient, but she did not answer. Left voicemail for patient to return call, in regards to test results.    [facility] message sent to patient.

## 2018-07-17 ENCOUNTER — SURGERY (OUTPATIENT)
Age: 68
End: 2018-07-17

## 2018-07-17 ENCOUNTER — ANESTHESIA EVENT (OUTPATIENT)
Dept: ENDOSCOPY | Facility: HOSPITAL | Age: 68
End: 2018-07-17
Payer: MEDICARE

## 2018-07-17 ENCOUNTER — ANESTHESIA (OUTPATIENT)
Dept: ENDOSCOPY | Facility: HOSPITAL | Age: 68
End: 2018-07-17
Payer: MEDICARE

## 2018-07-17 ENCOUNTER — HOSPITAL ENCOUNTER (OUTPATIENT)
Facility: HOSPITAL | Age: 68
Discharge: HOME | End: 2018-07-17
Attending: INTERNAL MEDICINE | Admitting: INTERNAL MEDICINE
Payer: MEDICARE

## 2018-07-17 VITALS
SYSTOLIC BLOOD PRESSURE: 113 MMHG | WEIGHT: 148 LBS | BODY MASS INDEX: 27.23 KG/M2 | DIASTOLIC BLOOD PRESSURE: 70 MMHG | RESPIRATION RATE: 18 BRPM | HEART RATE: 108 BPM | TEMPERATURE: 98 F | OXYGEN SATURATION: 98 % | HEIGHT: 62 IN

## 2018-07-17 DIAGNOSIS — R13.19 ESOPHAGEAL DYSPHAGIA: ICD-10-CM

## 2018-07-17 DIAGNOSIS — D50.9 IDA (IRON DEFICIENCY ANEMIA): ICD-10-CM

## 2018-07-17 DIAGNOSIS — D50.9 IRON DEFICIENCY ANEMIA, UNSPECIFIED IRON DEFICIENCY ANEMIA TYPE: ICD-10-CM

## 2018-07-17 DIAGNOSIS — R07.89 NON-CARDIAC CHEST PAIN: Primary | ICD-10-CM

## 2018-07-17 PROCEDURE — 43235 EGD DIAGNOSTIC BRUSH WASH: CPT | Mod: 51,,, | Performed by: INTERNAL MEDICINE

## 2018-07-17 PROCEDURE — 45378 DIAGNOSTIC COLONOSCOPY: CPT | Mod: ,,, | Performed by: INTERNAL MEDICINE

## 2018-07-17 PROCEDURE — 63600175 PHARM REV CODE 636 W HCPCS: Performed by: NURSE ANESTHETIST, CERTIFIED REGISTERED

## 2018-07-17 PROCEDURE — 43235 EGD DIAGNOSTIC BRUSH WASH: CPT | Performed by: INTERNAL MEDICINE

## 2018-07-17 PROCEDURE — 25000003 PHARM REV CODE 250: Performed by: INTERNAL MEDICINE

## 2018-07-17 PROCEDURE — 37000009 HC ANESTHESIA EA ADD 15 MINS: Performed by: INTERNAL MEDICINE

## 2018-07-17 PROCEDURE — 37000008 HC ANESTHESIA 1ST 15 MINUTES: Performed by: INTERNAL MEDICINE

## 2018-07-17 PROCEDURE — 25000003 PHARM REV CODE 250: Performed by: NURSE ANESTHETIST, CERTIFIED REGISTERED

## 2018-07-17 PROCEDURE — 45378 DIAGNOSTIC COLONOSCOPY: CPT | Performed by: INTERNAL MEDICINE

## 2018-07-17 PROCEDURE — E9220 PRA ENDO ANESTHESIA: HCPCS | Mod: ,,, | Performed by: NURSE ANESTHETIST, CERTIFIED REGISTERED

## 2018-07-17 RX ORDER — SODIUM CHLORIDE 0.9 % (FLUSH) 0.9 %
3 SYRINGE (ML) INJECTION
Status: DISCONTINUED | OUTPATIENT
Start: 2018-07-17 | End: 2018-07-17 | Stop reason: HOSPADM

## 2018-07-17 RX ORDER — SODIUM CHLORIDE 9 MG/ML
INJECTION, SOLUTION INTRAVENOUS CONTINUOUS
Status: DISCONTINUED | OUTPATIENT
Start: 2018-07-17 | End: 2018-07-17 | Stop reason: HOSPADM

## 2018-07-17 RX ORDER — LIDOCAINE HCL/PF 100 MG/5ML
SYRINGE (ML) INTRAVENOUS
Status: DISCONTINUED | OUTPATIENT
Start: 2018-07-17 | End: 2018-07-17

## 2018-07-17 RX ORDER — PROPOFOL 10 MG/ML
VIAL (ML) INTRAVENOUS
Status: DISCONTINUED | OUTPATIENT
Start: 2018-07-17 | End: 2018-07-17

## 2018-07-17 RX ORDER — PROPOFOL 10 MG/ML
VIAL (ML) INTRAVENOUS CONTINUOUS PRN
Status: DISCONTINUED | OUTPATIENT
Start: 2018-07-17 | End: 2018-07-17

## 2018-07-17 RX ORDER — GLYCOPYRROLATE 0.2 MG/ML
INJECTION INTRAMUSCULAR; INTRAVENOUS
Status: DISCONTINUED | OUTPATIENT
Start: 2018-07-17 | End: 2018-07-17

## 2018-07-17 RX ADMIN — PROPOFOL 50 MG: 10 INJECTION, EMULSION INTRAVENOUS at 04:07

## 2018-07-17 RX ADMIN — LIDOCAINE HYDROCHLORIDE 100 MG: 20 INJECTION, SOLUTION INTRAVENOUS at 04:07

## 2018-07-17 RX ADMIN — SODIUM CHLORIDE: 0.9 INJECTION, SOLUTION INTRAVENOUS at 01:07

## 2018-07-17 RX ADMIN — GLYCOPYRROLATE 0.2 MG: 0.2 INJECTION, SOLUTION INTRAMUSCULAR; INTRAVENOUS at 04:07

## 2018-07-17 RX ADMIN — SODIUM CHLORIDE: 0.9 INJECTION, SOLUTION INTRAVENOUS at 04:07

## 2018-07-17 RX ADMIN — PROPOFOL 150 MCG/KG/MIN: 10 INJECTION, EMULSION INTRAVENOUS at 04:07

## 2018-07-17 NOTE — [employer] PATIENT INSTRUCTIONS
Discharge Summary/Instructions after an Endoscopic Procedure  Patient Name: Jasson Pineda  Patient MRN: [id number]  Patient YOB: 1950 Tuesday, July 17, 2018  Armand Foy MD  RESTRICTIONS:  During your procedure today, you received medications for sedation.  These   medications may affect your judgment, balance and coordination.  Therefore,   for 24 hours, you have the following restrictions:   - DO NOT drive a car, operate machinery, make legal/financial decisions,   sign important papers or drink alcohol.    ACTIVITY:  Today: no heavy lifting, straining or running due to procedural   sedation/anesthesia.  The following day: return to full activity including work.  DIET:  Eat and drink normally unless instructed otherwise.     TREATMENT FOR COMMON SIDE EFFECTS:  - Mild abdominal pain, nausea, belching, bloating or excessive gas:  rest,   eat lightly and use a heating pad.  - Sore Throat: treat with throat lozenges and/or gargle with warm salt   water.  - Because air was used during the procedure, expelling large amounts of air   from your rectum or belching is normal.  - If a bowel prep was taken, you may not have a bowel movement for 1-3 days.    This is normal.  SYMPTOMS TO WATCH FOR AND REPORT TO YOUR PHYSICIAN:  1. Abdominal pain or bloating, other than gas cramps.  2. Chest pain.  3. Back pain.  4. Signs of infection such as: chills or fever occurring within 24 hours   after the procedure.  5. Rectal bleeding, which would show as bright red, maroon, or black stools.   (A tablespoon of blood from the rectum is not serious, especially if   hemorrhoids are present.)  6. Vomiting.  7. Weakness or dizziness.  GO DIRECTLY TO THE NEAREST EMERGENCY ROOM IF YOU HAVE ANY OF THE FOLLOWING:      Difficulty breathing              Chills and/or fever over 101 F   Persistent vomiting and/or vomiting blood   Severe abdominal pain   Severe chest pain   Black, tarry stools   Bleeding- more than one  tablespoon   Any other symptom or condition that you feel may need urgent attention  Your doctor recommends these additional instructions:  If any biopsies were taken, your doctors clinic will contact you in 1 to 2   weeks with any results.  - Discharge patient to home.   - Patient has a contact number available for emergencies.  The signs and   symptoms of potential delayed complications were discussed with the   patient.  Return to normal activities tomorrow.  Written discharge   instructions were provided to the patient.   - Resume previous diet.   - Continue present medications.   - Resume Lovenox (enoxaparin) today and Xarelto (rivaroxaban) today at prior   doses.   - Repeat colonoscopy in 10 years for screening purposes.  For questions, problems or results please call your physician - Armand Foy MD at Work:  [phone].  [facility] [place], EMERGENCY ROOM PHONE NUMBER: [phone]  IF A COMPLICATION OR EMERGENCY SITUATION ARISES AND YOU ARE UNABLE TO REACH   YOUR PHYSICIAN - GO DIRECTLY TO THE EMERGENCY ROOM.  Armand Foy MD  7/17/2018 4:37:49 PM  This report has been verified and signed electronically.  [employer]

## 2018-07-17 NOTE — ANESTHESIA PREPROCEDURE EVALUATION
07/17/2018  Jasson Pineda is a 67 y.o., female.  Patient Active Problem List   Diagnosis    Kidney stone    Herniated disc, cervical    Complete rotator cuff tear or rupture of right shoulder, not specified as traumatic    Shoulder arthritis    OAB (overactive bladder)    Body mass index (BMI) of 25.0 to 29.9    Non-cardiac chest pain    Esophageal dysphagia    Iron deficiency anemia    MARIA DEL CARMEN (iron deficiency anemia)     Past Medical History:   Diagnosis Date    Anticoagulant long-term use     xarelto    Chronic back pain     Chronic neck pain     DVT (deep venous thrombosis)     Herniated disc, cervical     Kidney stone     Lumbar herniated disc     PE (pulmonary embolism)     2013    Pulmonary embolism          Anesthesia Evaluation    I have reviewed the Patient Summary Reports.     I have reviewed the Medications.     Review of Systems  Anesthesia Hx:  No problems with previous Anesthesia  Denies Family Hx of Anesthesia complications.   Denies Personal Hx of Anesthesia complications.   Hematology/Oncology:  Hematology Normal   Oncology Normal     EENT/Dental:EENT/Dental Normal   Cardiovascular:   Exercise tolerance: good    Pulmonary:  Pulmonary Normal Hx of PE; on anticoagulants   Renal/:   renal calculi    Hepatic/GI:  Hepatic/GI Normal    Musculoskeletal:  Musculoskeletal Normal    Neurological:   Neuromuscular Disease,    Endocrine:  Endocrine Normal    Dermatological:  Skin Normal    Psych:  Psychiatric Normal           Physical Exam  General:  Well nourished    Airway/Jaw/Neck:  Airway Findings: Mouth Opening: Normal Tongue: Normal  General Airway Assessment: Adult  TM Distance: Normal, at least 6 cm       Chest/Lungs:  Chest/Lungs Clear    Heart/Vascular:  Heart Findings: Normal Heart murmur: negative            Anesthesia Plan  Type of Anesthesia, risks & benefits  discussed:  Anesthesia Type:  general  Patient's Preference:   Intra-op Monitoring Plan: standard ASA monitors  Intra-op Monitoring Plan Comments:   Post Op Pain Control Plan:   Post Op Pain Control Plan Comments:   Induction:   IV  Beta Blocker:  Patient is not currently on a Beta-Blocker (No further documentation required).       Informed Consent: Patient understands risks and agrees with Anesthesia plan.  Questions answered. Anesthesia consent signed with patient.  ASA Score: 2     Day of Surgery Review of History & Physical:    H&P update referred to the surgeon.         Ready For Surgery From Anesthesia Perspective.

## 2018-07-17 NOTE — INTERVAL H&P NOTE
Pre-Procedure H and P Addendum    Patient seen and examined.  History and exam unchanged from prior history and physical.      Procedure: EGD and colonoscopy   Indication: Non-cardiac chest pain, dysphagia, and iron deficiency anemia  ASA Class: per anesthesiology  Airway: normal  Neck Mobility: full range of motion  Mallampatti score: per anesthesia  History of anesthesia problems: no  Family history of anesthesia problems: no  Anesthesia Plan: MAC    Anesthesia/Surgery risks, benefits and alternative options discussed and understood by patient/family.          Active Hospital Problems    Diagnosis  POA    MARIA DEL CARMEN (iron deficiency anemia) [D50.9]  Yes      Resolved Hospital Problems    Diagnosis Date Resolved POA   No resolved problems to display.

## 2018-07-17 NOTE — PROVATION PATIENT INSTRUCTIONS
Discharge Summary/Instructions after an Endoscopic Procedure  Patient Name: Jasson Pineda  Patient MRN: [id number]  Patient YOB: 1950 Tuesday, July 17, 2018  Armand Foy MD  RESTRICTIONS:  During your procedure today, you received medications for sedation.  These   medications may affect your judgment, balance and coordination.  Therefore,   for 24 hours, you have the following restrictions:   - DO NOT drive a car, operate machinery, make legal/financial decisions,   sign important papers or drink alcohol.    ACTIVITY:  Today: no heavy lifting, straining or running due to procedural   sedation/anesthesia.  The following day: return to full activity including work.  DIET:  Eat and drink normally unless instructed otherwise.     TREATMENT FOR COMMON SIDE EFFECTS:  - Mild abdominal pain, nausea, belching, bloating or excessive gas:  rest,   eat lightly and use a heating pad.  - Sore Throat: treat with throat lozenges and/or gargle with warm salt   water.  - Because air was used during the procedure, expelling large amounts of air   from your rectum or belching is normal.  - If a bowel prep was taken, you may not have a bowel movement for 1-3 days.    This is normal.  SYMPTOMS TO WATCH FOR AND REPORT TO YOUR PHYSICIAN:  1. Abdominal pain or bloating, other than gas cramps.  2. Chest pain.  3. Back pain.  4. Signs of infection such as: chills or fever occurring within 24 hours   after the procedure.  5. Rectal bleeding, which would show as bright red, maroon, or black stools.   (A tablespoon of blood from the rectum is not serious, especially if   hemorrhoids are present.)  6. Vomiting.  7. Weakness or dizziness.  GO DIRECTLY TO THE NEAREST EMERGENCY ROOM IF YOU HAVE ANY OF THE FOLLOWING:      Difficulty breathing              Chills and/or fever over 101 F   Persistent vomiting and/or vomiting blood   Severe abdominal pain   Severe chest pain   Black, tarry stools   Bleeding- more than one  tablespoon   Any other symptom or condition that you feel may need urgent attention  Your doctor recommends these additional instructions:  If any biopsies were taken, your doctors clinic will contact you in 1 to 2   weeks with any results.  - Perform a colonoscopy now.   - See colonoscopy report for further details.  - Schedule for esophageal manometry.  For questions, problems or results please call your physician - Armand Foy MD at Work:  [phone].  [facility] [place], EMERGENCY ROOM PHONE NUMBER: [phone]  IF A COMPLICATION OR EMERGENCY SITUATION ARISES AND YOU ARE UNABLE TO REACH   YOUR PHYSICIAN - GO DIRECTLY TO THE EMERGENCY ROOM.  Armand Foy MD  7/17/2018 4:42:07 PM  This report has been verified and signed electronically.  [employer]

## 2018-07-17 NOTE — TRANSFER OF CARE
"Anesthesia Transfer of Care Note    Patient: Jasson Pineda    Procedure(s) Performed: Procedure(s) (LRB):  EGD (ESOPHAGOGASTRODUODENOSCOPY) (N/A)  COLONOSCOPY (N/A)    Patient location: PACU    Anesthesia Type: general    Transport from OR: Transported from OR on room air with adequate spontaneous ventilation    Post pain: adequate analgesia    Post assessment: no apparent anesthetic complications and tolerated procedure well    Post vital signs: stable    Level of consciousness: awake, alert and oriented    Nausea/Vomiting: no nausea/vomiting    Complications: none    Transfer of care protocol was followed      Last vitals:   Visit Vitals  /70 (BP Location: Left arm, Patient Position: Lying)   Pulse 103   Temp 36.7 °C (98.1 °F) (Temporal)   Resp 16   Ht 5' 2" (1.575 m)   Wt 67.1 kg (148 lb)   SpO2 97%   Breastfeeding? No   BMI 27.07 kg/m²     "

## 2018-07-18 NOTE — ANESTHESIA POSTPROCEDURE EVALUATION
"Anesthesia Post Evaluation    Patient: Jasson Pineda    Procedure(s) Performed: Procedure(s) (LRB):  EGD (ESOPHAGOGASTRODUODENOSCOPY) (N/A)  COLONOSCOPY (N/A)    Final Anesthesia Type: general  Patient location during evaluation: GI PACU  Patient participation: Yes- Able to Participate  Level of consciousness: awake and alert  Post-procedure vital signs: reviewed and stable  Pain management: adequate  Airway patency: patent  PONV status at discharge: No PONV  Anesthetic complications: no      Cardiovascular status: stable  Respiratory status: unassisted and spontaneous ventilation  Hydration status: euvolemic  Follow-up not needed.        Visit Vitals  /70 (BP Location: Left arm, Patient Position: Lying)   Pulse 108   Temp 36.6 °C (97.9 °F) (Temporal)   Resp 18   Ht 5' 2" (1.575 m)   Wt 67.1 kg (148 lb)   SpO2 98%   Breastfeeding? No   BMI 27.07 kg/m²       Pain/Riley Score: Pain Assessment Performed: Yes (7/17/2018  4:45 PM)  Presence of Pain: non-verbal indicators absent (7/17/2018  4:45 PM)  Riley Score: 10 (7/17/2018  4:37 PM)      "

## 2018-07-19 ENCOUNTER — TELEPHONE (OUTPATIENT)
Dept: GASTROENTEROLOGY | Facility: CLINIC | Age: 68
End: 2018-07-19

## 2018-07-19 RX ORDER — DICYCLOMINE HYDROCHLORIDE 10 MG/1
10 CAPSULE ORAL 4 TIMES DAILY PRN
Qty: 40 CAPSULE | Refills: 0 | Status: SHIPPED | OUTPATIENT
Start: 2018-07-19 | End: 2018-07-29

## 2018-07-19 NOTE — TELEPHONE ENCOUNTER
----- Message from Armand Foy MD sent at 7/17/2018  4:37 PM CDT -----  Regarding: follow-up  Needs follow-up appointment with BRANDON next available to discuss iron deficiency anemia.

## 2018-07-19 NOTE — TELEPHONE ENCOUNTER
Attempted to contact patient, but she did not answer. Left voicemail for patient to return call, in regards to scheduling GI f/u appointment.

## 2018-07-19 NOTE — TELEPHONE ENCOUNTER
Recommendation given to patient per Dr. Foy. Patient verbalized understanding. Number given to endoscopy schedulers.

## 2018-07-19 NOTE — TELEPHONE ENCOUNTER
We can try Bentyl to see if this helps as an antispasm medication.  However, I feel this may not be related to her esophagus or intestinal tract.  She needs to schedule the manometry that I recommended.

## 2018-07-19 NOTE — TELEPHONE ENCOUNTER
Returned patient's call.     Mrs. Pineda does not to scheduled her follow up that was recommended by Dr. Foy at this time.    She is complaining on chest and throat pain rated a 10/10. She is also experiencing chest tightness when she moves around.    She would like to know what does Dr. Foy recommends to help with her symptoms.    Message routed to provider.

## 2018-07-19 NOTE — TELEPHONE ENCOUNTER
----- Message from Cassi Santa MA sent at 7/19/2018  1:03 PM CDT -----  Contact: self    Patient Returning Call from [facility]    Who Left Message for Patient:  magno  Communication Preference:  Phone# above  Additional Information:  na

## 2018-07-24 ENCOUNTER — TELEPHONE (OUTPATIENT)
Dept: ENDOSCOPY | Facility: HOSPITAL | Age: 68
End: 2018-07-24

## 2018-07-25 ENCOUNTER — SURGERY (OUTPATIENT)
Age: 68
End: 2018-07-25

## 2018-07-25 ENCOUNTER — HOSPITAL ENCOUNTER (OUTPATIENT)
Facility: HOSPITAL | Age: 68
Discharge: HOME | End: 2018-07-25
Attending: INTERNAL MEDICINE | Admitting: INTERNAL MEDICINE
Payer: MEDICARE

## 2018-07-25 VITALS
HEIGHT: 62 IN | TEMPERATURE: 98 F | WEIGHT: 146 LBS | RESPIRATION RATE: 16 BRPM | BODY MASS INDEX: 26.87 KG/M2 | DIASTOLIC BLOOD PRESSURE: 71 MMHG | HEART RATE: 64 BPM | SYSTOLIC BLOOD PRESSURE: 118 MMHG | OXYGEN SATURATION: 96 %

## 2018-07-25 DIAGNOSIS — R13.10 DYSPHAGIA: ICD-10-CM

## 2018-07-25 PROCEDURE — 91037 ESOPH IMPED FUNCTION TEST: CPT | Performed by: INTERNAL MEDICINE

## 2018-07-25 PROCEDURE — 25000003 PHARM REV CODE 250: Performed by: INTERNAL MEDICINE

## 2018-07-25 PROCEDURE — 91010 ESOPHAGUS MOTILITY STUDY: CPT | Performed by: INTERNAL MEDICINE

## 2018-07-25 PROCEDURE — 91010 ESOPHAGUS MOTILITY STUDY: CPT | Mod: 26,,, | Performed by: INTERNAL MEDICINE

## 2018-07-25 RX ORDER — LIDOCAINE HYDROCHLORIDE 20 MG/ML
JELLY TOPICAL ONCE
Status: COMPLETED | OUTPATIENT
Start: 2018-07-25 | End: 2018-07-25

## 2018-07-25 RX ADMIN — LIDOCAINE HYDROCHLORIDE 10 ML: 20 JELLY TOPICAL at 08:07

## 2018-07-25 NOTE — DISCHARGE INSTRUCTIONS
Esophageal Manometry     A catheter measures pressure along the esophagus.     Esophageal manometry is a test to measure the strength and function of the esophagus (the food pipe). Results can help identify causes of heartburn, swallowing problems, or chest pain. The test can also help plan surgery and determine the success of previous surgery.  Preparing for the test  Be sure to talk to your healthcare provider about any medicines you take. Some medicines can affect the test results. Also ask any questions you have about the risks of the test. These include irritation to the nose and throat. Be sure not to smoke, eat, or drink for up to 12 hours before the test.  During the test  Manometry takes about an hour. Usually you lie down during the test. Your nose and throat are numbed. Then a soft, thin tube is placed through the nose and down the esophagus. At first you may notice a gagging feeling. You will be asked to swallow several times. Holes along the tube measure the pressure while you swallow. Measurements are printed out as tracings, much like a heart test tracing. After the test, another catheter may be left in the esophagus for up to 24 hours to measure acid (pH) levels.  After esophageal manometry  Youll probably discuss the results of the test with your healthcare provider at another appointment. This is because time is needed to review the tracings. You may have a mild sore throat for a short time. As soon as the numbness in your throat is gone, you can return to eating and your normal activities.  Date Last Reviewed: 6/1/2016  © 1215-7163 [employer]. [address] [place] [address], [place] [address]. All rights reserved. This information is not intended as a substitute for professional medical care. Always follow your healthcare professional's instructions.

## 2018-07-28 DIAGNOSIS — K21.9 GASTROESOPHAGEAL REFLUX DISEASE, ESOPHAGITIS PRESENCE NOT SPECIFIED: ICD-10-CM

## 2018-07-29 RX ORDER — SUCRALFATE 1 G/1
TABLET ORAL
Qty: 120 TABLET | Refills: 0 | Status: SHIPPED | OUTPATIENT
Start: 2018-07-29 | End: 2018-08-01

## 2018-07-29 NOTE — [employer] PATIENT INSTRUCTIONS
Discharge Summary/Instructions after an Endoscopic Procedure  Patient Name: Jasson Pineda  Patient MRN: [id number]  Patient YOB: 1950 Wednesday, July 25, 2018  Armand Foy MD  RESTRICTIONS:  During your procedure today, you received medications for sedation.  These   medications may affect your judgment, balance and coordination.  Therefore,   for 24 hours, you have the following restrictions:   - DO NOT drive a car, operate machinery, make legal/financial decisions,   sign important papers or drink alcohol.    ACTIVITY:  Today: no heavy lifting, straining or running due to procedural   sedation/anesthesia.  The following day: return to full activity including work.  DIET:  Eat and drink normally unless instructed otherwise.     TREATMENT FOR COMMON SIDE EFFECTS:  - Mild abdominal pain, nausea, belching, bloating or excessive gas:  rest,   eat lightly and use a heating pad.  - Sore Throat: treat with throat lozenges and/or gargle with warm salt   water.  - Because air was used during the procedure, expelling large amounts of air   from your rectum or belching is normal.  - If a bowel prep was taken, you may not have a bowel movement for 1-3 days.    This is normal.  SYMPTOMS TO WATCH FOR AND REPORT TO YOUR PHYSICIAN:  1. Abdominal pain or bloating, other than gas cramps.  2. Chest pain.  3. Back pain.  4. Signs of infection such as: chills or fever occurring within 24 hours   after the procedure.  5. Rectal bleeding, which would show as bright red, maroon, or black stools.   (A tablespoon of blood from the rectum is not serious, especially if   hemorrhoids are present.)  6. Vomiting.  7. Weakness or dizziness.  GO DIRECTLY TO THE NEAREST EMERGENCY ROOM IF YOU HAVE ANY OF THE FOLLOWING:      Difficulty breathing              Chills and/or fever over 101 F   Persistent vomiting and/or vomiting blood   Severe abdominal pain   Severe chest pain   Black, tarry stools   Bleeding- more than one  tablespoon   Any other symptom or condition that you feel may need urgent attention  Your doctor recommends these additional instructions:  If any biopsies were taken, your doctors clinic will contact you in 1 to 2   weeks with any results.  - Return to GI clinic.   For questions, problems or results please call your physician - Armand Foy MD at Work:  [phone].  [facility], EMERGENCY ROOM PHONE NUMBER: [phone]  IF A COMPLICATION OR EMERGENCY SITUATION ARISES AND YOU ARE UNABLE TO REACH   YOUR PHYSICIAN - GO DIRECTLY TO THE EMERGENCY ROOM.  Armand Foy MD  7/29/2018 4:44:58 PM  This report has been verified and signed electronically.  [employer]

## 2018-07-31 ENCOUNTER — PATIENT MESSAGE (OUTPATIENT)
Dept: ADMINISTRATIVE | Facility: HOSPITAL | Age: 68
End: 2018-07-31

## 2018-07-31 ENCOUNTER — EXTERNAL CHRONIC CARE MANAGEMENT (OUTPATIENT)
Dept: PRIMARY CARE CLINIC | Facility: CLINIC | Age: 68
End: 2018-07-31
Payer: MEDICARE

## 2018-07-31 PROCEDURE — 99490 CHRNC CARE MGMT STAFF 1ST 20: CPT | Mod: S$PBB,,, | Performed by: FAMILY MEDICINE

## 2018-07-31 PROCEDURE — 99490 CHRNC CARE MGMT STAFF 1ST 20: CPT | Mod: PBBFAC | Performed by: FAMILY MEDICINE

## 2018-08-01 ENCOUNTER — OFFICE VISIT (OUTPATIENT)
Dept: INTERNAL MEDICINE | Facility: CLINIC | Age: 68
End: 2018-08-01
Attending: FAMILY MEDICINE
Payer: MEDICARE

## 2018-08-01 VITALS
HEIGHT: 62 IN | WEIGHT: 147.69 LBS | OXYGEN SATURATION: 99 % | DIASTOLIC BLOOD PRESSURE: 72 MMHG | BODY MASS INDEX: 27.18 KG/M2 | SYSTOLIC BLOOD PRESSURE: 120 MMHG | HEART RATE: 69 BPM

## 2018-08-01 DIAGNOSIS — R13.19 ESOPHAGEAL DYSPHAGIA: Primary | ICD-10-CM

## 2018-08-01 DIAGNOSIS — F41.9 ANXIETY: ICD-10-CM

## 2018-08-01 PROCEDURE — 99214 OFFICE O/P EST MOD 30 MIN: CPT | Mod: S$PBB,,, | Performed by: FAMILY MEDICINE

## 2018-08-01 PROCEDURE — 99213 OFFICE O/P EST LOW 20 MIN: CPT | Mod: PBBFAC | Performed by: FAMILY MEDICINE

## 2018-08-01 PROCEDURE — 99999 PR PBB SHADOW E&M-EST. PATIENT-LVL III: CPT | Mod: PBBFAC,,, | Performed by: FAMILY MEDICINE

## 2018-08-01 RX ORDER — SUCRALFATE 1 G/1
1 TABLET ORAL 4 TIMES DAILY
Qty: 120 TABLET | Refills: 3 | Status: SHIPPED | OUTPATIENT
Start: 2018-08-01 | End: 2018-11-28

## 2018-08-01 RX ORDER — ESCITALOPRAM OXALATE 5 MG/1
5 TABLET ORAL DAILY
Qty: 30 TABLET | Refills: 1 | Status: SHIPPED | OUTPATIENT
Start: 2018-08-01 | End: 2018-11-28

## 2018-08-01 RX ORDER — SUCRALFATE 1 G/1
1 TABLET ORAL 4 TIMES DAILY
COMMUNITY
End: 2018-08-01 | Stop reason: SDUPTHER

## 2018-08-01 NOTE — PROGRESS NOTES
"Subjective:      Patient ID: Jasson Pineda is a 67 y.o. female.    Chief Complaint: Dysphagia    She reports with eating her food does feel like it gets stuck in her throat and upper stomach and within about 4 hours it moves down. She denies dizziness, lightheadedness with it. She does take carafate four times a day and this does help with the pain. No pain with consuming liquids. No blood or black tarry stools. Her bowel movements are soft daily and not straining. She reports her mood is down, interest in hobbies not there, no guilt, energy level is low, sleep is variable, no panic attacks, elevated anxiety, no SI or HI.       Review of Systems   Constitutional: Negative for activity change, appetite change, chills, diaphoresis, fatigue, fever and unexpected weight change.   HENT: Negative for congestion, ear discharge, ear pain, hearing loss, postnasal drip, rhinorrhea, sinus pressure and sore throat.    Eyes: Negative for visual disturbance.   Respiratory: Negative for cough, shortness of breath and wheezing.    Cardiovascular: Negative for chest pain.   Gastrointestinal: Negative for abdominal pain, constipation, diarrhea, nausea and vomiting.   Genitourinary: Negative for dysuria, frequency, hematuria and vaginal discharge.   Musculoskeletal: Negative.    Skin: Negative.    Neurological: Negative for dizziness, facial asymmetry, light-headedness and headaches.   Psychiatric/Behavioral: Negative for suicidal ideas.     I personally reviewed Past Medical History, Past Surgical history,  Past Social History and Family History    Objective:   /72   Pulse 69   Ht 5' 2" (1.575 m)   Wt 67 kg (147 lb 11.3 oz)   SpO2 99%   BMI 27.02 kg/m²     Physical Exam   Constitutional: She is oriented to person, place, and time. She appears well-developed and well-nourished. No distress.   HENT:   Head: Normocephalic.   Right Ear: External ear normal.   Left Ear: External ear normal.   Mouth/Throat: Oropharynx " is clear and moist.   Eyes: Conjunctivae and EOM are normal. Pupils are equal, round, and reactive to light. Right eye exhibits no discharge. Left eye exhibits no discharge. No scleral icterus.   Neck: Normal range of motion. No tracheal deviation present. No thyromegaly present.   Cardiovascular: Normal rate, regular rhythm, normal heart sounds and intact distal pulses.  Exam reveals no gallop.    No murmur heard.  Pulmonary/Chest: Effort normal and breath sounds normal. No respiratory distress. She has no wheezes. She has no rales. She exhibits no tenderness.   Abdominal: Soft. Bowel sounds are normal. She exhibits no distension and no mass. There is no tenderness. There is no rebound and no guarding.   Musculoskeletal: Normal range of motion.   Neurological: She is alert and oriented to person, place, and time.   Skin: Skin is warm and dry.   Psychiatric: She has a normal mood and affect. Her behavior is normal. Judgment and thought content normal.   Vitals reviewed.      Jasson was seen today for dysphagia.    Diagnoses and all orders for this visit:    Esophageal dysphagia  -negative EGD and manometry with esophageal dysmotility   -     sucralfate (CARAFATE) 1 gram tablet; Take 1 tablet (1 g total) by mouth 4 (four) times daily.  -discussed soft diet     Anxiety  -     escitalopram oxalate (LEXAPRO) 5 MG Tab; Take 1 tablet (5 mg total) by mouth once daily.  -return in 4 weeks     Other orders

## 2018-08-01 NOTE — PATIENT INSTRUCTIONS
Escitalopram tablets  What is this medicine?  ESCITALOPRAM (es sye TEN oh pram) is used to treat depression and certain types of anxiety.  How should I use this medicine?  Take this medicine by mouth with a glass of water. Follow the directions on the prescription label. You can take it with or without food. If it upsets your stomach, take it with food. Take your medicine at regular intervals. Do not take it more often than directed. Do not stop taking this medicine suddenly except upon the advice of your doctor. Stopping this medicine too quickly may cause serious side effects or your condition may worsen.  A special MedGuide will be given to you by the pharmacist with each prescription and refill. Be sure to read this information carefully each time.  Talk to your pediatrician regarding the use of this medicine in children. Special care may be needed.  What side effects may I notice from receiving this medicine?  Side effects that you should report to your doctor or health care professional as soon as possible:  · allergic reactions like skin rash, itching or hives, swelling of the face, lips, or tongue  · confusion  · feeling faint or lightheaded, falls  · fast talking and excited feelings or actions that are out of control  · hallucination, loss of contact with reality  · seizures  · suicidal thoughts or other mood changes  · unusual bleeding or bruising  Side effects that usually do not require medical attention (report to your doctor or health care professional if they continue or are bothersome):  · blurred vision  · changes in appetite  · change in sex drive or performance  · headache  · increased sweating  · nausea  What may interact with this medicine?  Do not take this medicine with any of the following medications:  · certain medicines for fungal infections like fluconazole, itraconazole, ketoconazole, posaconazole,  voriconazole  · cisapride  · citalopram  · dofetilide  · dronedarone  · linezolid  · MAOIs like Carbex, Eldepryl, Marplan, Nardil, and Parnate  · methylene blue (injected into a vein)  · pimozide  · thioridazine  · ziprasidone  This medicine may also interact with the following medications:  · alcohol  · aspirin and aspirin-like medicines  · carbamazepine  · certain medicines for depression, anxiety, or psychotic disturbances  · certain medicines for migraine headache like almotriptan, eletriptan, frovatriptan, naratriptan, rizatriptan, sumatriptan, zolmitriptan  · certain medicines for sleep  · certain medicines that treat or prevent blood clots like warfarin, enoxaparin, dalteparin  · cimetidine  · diuretics  · fentanyl  · furazolidone  · isoniazid  · lithium  · metoprolol  · NSAIDs, medicines for pain and inflammation, like ibuprofen or naproxen  · other medicines that prolong the QT interval (cause an abnormal heart rhythm)  · procarbazine  · rasagiline  · supplements like Deandre's wort, kava kava, valerian  · tramadol  · tryptophan  What if I miss a dose?  If you miss a dose, take it as soon as you can. If it is almost time for your next dose, take only that dose. Do not take double or extra doses.  Where should I keep my medicine?  Keep out of reach of children.  Store at room temperature between 15 and 30 degrees C (59 and 86 degrees F). Throw away any unused medicine after the expiration date.  What should I tell my health care provider before I take this medicine?  They need to know if you have any of these conditions:  · bipolar disorder or a family history of bipolar disorder  · diabetes  · glaucoma  · heart disease  · kidney or liver disease  · receiving electroconvulsive therapy  · seizures (convulsions)  · suicidal thoughts, plans, or attempt by you or a family member  · an unusual or allergic reaction to escitalopram, the related drug citalopram, other medicines, foods, dyes, or  preservatives  · pregnant or trying to become pregnant  · breast-feeding  What should I watch for while using this medicine?  Tell your doctor if your symptoms do not get better or if they get worse. Visit your doctor or health care professional for regular checks on your progress. Because it may take several weeks to see the full effects of this medicine, it is important to continue your treatment as prescribed by your doctor.  Patients and their families should watch out for new or worsening thoughts of suicide or depression. Also watch out for sudden changes in feelings such as feeling anxious, agitated, panicky, irritable, hostile, aggressive, impulsive, severely restless, overly excited and hyperactive, or not being able to sleep. If this happens, especially at the beginning of treatment or after a change in dose, call your health care professional.  You may get drowsy or dizzy. Do not drive, use machinery, or do anything that needs mental alertness until you know how this medicine affects you. Do not stand or sit up quickly, especially if you are an older patient. This reduces the risk of dizzy or fainting spells. Alcohol may interfere with the effect of this medicine. Avoid alcoholic drinks.  Your mouth may get dry. Chewing sugarless gum or sucking hard candy, and drinking plenty of water may help. Contact your doctor if the problem does not go away or is severe.  NOTE:This sheet is a summary. It may not cover all possible information. If you have questions about this medicine, talk to your doctor, pharmacist, or health care provider. Copyright© 2017 [employer]

## 2018-08-13 DIAGNOSIS — R07.9 CHEST PAIN, UNSPECIFIED TYPE: ICD-10-CM

## 2018-08-13 DIAGNOSIS — K21.9 GASTROESOPHAGEAL REFLUX DISEASE, ESOPHAGITIS PRESENCE NOT SPECIFIED: ICD-10-CM

## 2018-08-13 DIAGNOSIS — R13.10 DYSPHAGIA, UNSPECIFIED TYPE: ICD-10-CM

## 2018-08-13 DIAGNOSIS — D68.8 OTHER SPECIFIED COAGULATION DEFECTS: ICD-10-CM

## 2018-08-13 RX ORDER — OMEPRAZOLE 40 MG/1
CAPSULE, DELAYED RELEASE ORAL
Qty: 30 CAPSULE | Refills: 1 | Status: SHIPPED | OUTPATIENT
Start: 2018-08-13 | End: 2018-09-14

## 2018-08-31 ENCOUNTER — EXTERNAL CHRONIC CARE MANAGEMENT (OUTPATIENT)
Dept: PRIMARY CARE CLINIC | Facility: CLINIC | Age: 68
End: 2018-08-31
Payer: MEDICARE

## 2018-08-31 PROCEDURE — 99490 CHRNC CARE MGMT STAFF 1ST 20: CPT | Mod: PBBFAC | Performed by: FAMILY MEDICINE

## 2018-08-31 PROCEDURE — 99490 CHRNC CARE MGMT STAFF 1ST 20: CPT | Mod: S$PBB,,, | Performed by: FAMILY MEDICINE

## 2018-09-14 ENCOUNTER — OFFICE VISIT (OUTPATIENT)
Dept: GASTROENTEROLOGY | Facility: CLINIC | Age: 68
End: 2018-09-14
Payer: MEDICARE

## 2018-09-14 VITALS
HEART RATE: 84 BPM | WEIGHT: 153.69 LBS | DIASTOLIC BLOOD PRESSURE: 66 MMHG | HEIGHT: 62 IN | BODY MASS INDEX: 28.28 KG/M2 | SYSTOLIC BLOOD PRESSURE: 103 MMHG

## 2018-09-14 DIAGNOSIS — M94.0 COSTOCHONDRITIS: ICD-10-CM

## 2018-09-14 DIAGNOSIS — K22.4 INEFFECTIVE ESOPHAGEAL MOTILITY: Primary | ICD-10-CM

## 2018-09-14 PROBLEM — R07.89 NON-CARDIAC CHEST PAIN: Status: RESOLVED | Noted: 2018-06-22 | Resolved: 2018-09-14

## 2018-09-14 PROBLEM — D50.9 IDA (IRON DEFICIENCY ANEMIA): Status: RESOLVED | Noted: 2018-07-17 | Resolved: 2018-09-14

## 2018-09-14 PROBLEM — D50.9 IRON DEFICIENCY ANEMIA: Status: RESOLVED | Noted: 2018-06-22 | Resolved: 2018-09-14

## 2018-09-14 PROCEDURE — 99999 PR PBB SHADOW E&M-EST. PATIENT-LVL III: CPT | Mod: PBBFAC,,, | Performed by: INTERNAL MEDICINE

## 2018-09-14 PROCEDURE — 99213 OFFICE O/P EST LOW 20 MIN: CPT | Mod: PBBFAC | Performed by: INTERNAL MEDICINE

## 2018-09-14 PROCEDURE — 99213 OFFICE O/P EST LOW 20 MIN: CPT | Mod: S$PBB,,, | Performed by: INTERNAL MEDICINE

## 2018-09-14 NOTE — PROGRESS NOTES
[facility] Established Patient Visit    Reason for Visit:    Chief Complaint   Patient presents with    Follow-up    Chest Pain       PCP: Jennifer Cheatham    HPI:  Jasson Pineda is a 67 y.o. female here for follow-up of cheat pain.  I last saw her in  for the same.  She had a stress echo done 7/3/18 that was normal.  EGD 18 was normal.  Esophageal manometry done 18 revealed ineffective esophageal motility.  I performed a colonoscopy also 18 that revealed only diverticulosis and a 10-yr follow-up was recommended.  She has been on omeprazole.  She is doing better now.  The pain has decreased, but not resolved.  She eats soft foods and hasn't tried to advance her diet.  She is also on carafate.  She still has intermittent pain in the xyphoid area with tenderness.  No new complaints.           ROS:  GI: see HPI      PMHX:  has a past medical history of Anticoagulant long-term use, Chronic back pain, Chronic neck pain, DVT (deep venous thrombosis), Herniated disc, cervical, Kidney stone, Lumbar herniated disc, PE (pulmonary embolism), and Pulmonary embolism.    PSHX:  has a past surgical history that includes Kidney stone surgery; left knee surgery; Tonsillectomy;  section; Hernia repair; Total shoulder arthroplasty (Right); Breast cyst excision (Left); MANOMETRY, ESOPHAGEAL, WITH IMPEDANCE MEASUREMENT (N/A, 2018); EGD (ESOPHAGOGASTRODUODENOSCOPY) (N/A, 2018); COLONOSCOPY (N/A, 2018); ARTHROPLASTY-SHOULDER-REVERSE (Right, 2016); REPAIR-TENDON-BICEP (Right, 2016); CYSTOSCOPY WITH STENT PLACEMENT (3/24/2016); and CYSTOSCOPY WITH RETROGRADE PYELOGRAM (Left, 3/24/2016).    The patient's social and family histories were reviewed by me and updated in the appropriate section of the electronic medical record.    Review of patient's allergies indicates:   Allergen Reactions    Sulfa (sulfonamide antibiotics) Rash       Prior to Admission  "medications    Medication Sig Start Date End Date Taking? Authorizing Provider   atorvastatin (LIPITOR) 40 MG tablet TAKE 1 TABLET(40 MG) BY MOUTH EVERY EVENING 6/21/18  Yes Jennifer Cheatham MD   oxybutynin (DITROPAN-XL) 10 MG 24 hr tablet Take 1 tablet (10 mg total) by mouth once daily. 4/17/18 4/17/19 Yes Chantel Boyle NP   pregabalin (LYRICA) 150 MG capsule Take 150 mg by mouth 2 (two) times daily.   Yes Historical Provider, MD   sucralfate (CARAFATE) 1 gram tablet Take 1 tablet (1 g total) by mouth 4 (four) times daily. 8/1/18  Yes Jennifer Cheatham MD   XARELTO 20 mg Tab Take 1 tablet by mouth every evening.  12/29/15  Yes Historical Provider, MD   enoxaparin (LOVENOX) 80 mg/0.8 mL Syrg  6/20/18   Historical Provider, MD   escitalopram oxalate (LEXAPRO) 5 MG Tab Take 1 tablet (5 mg total) by mouth once daily. 8/1/18 8/1/19  Jennifer Cheatham MD   omeprazole (PRILOSEC) 40 MG capsule TAKE 1 CAPSULE BY MOUTH EVERY DAY 8/13/18 9/14/18  Jennifer Cheatham MD         Objective Findings:  Vital Signs:  /66   Pulse 84   Ht 5' 2" (1.575 m)   Wt 69.7 kg (153 lb 10.6 oz)   BMI 28.10 kg/m²  Body mass index is 28.1 kg/m².    Physical Exam:  General Appearance:  Well appearing in no acute distress, appears stated age      Labs:  Lab Results   Component Value Date    WBC 5.79 07/01/2018    HGB 13.0 07/01/2018    HCT 42.2 07/01/2018    MCV 83 07/01/2018    RDW 15.8 (H) 07/01/2018     07/01/2018    GRAN 3.6 07/01/2018    GRAN 62.4 07/01/2018    LYMPH 1.7 07/01/2018    LYMPH 29.2 07/01/2018    MONO 0.4 07/01/2018    MONO 7.1 07/01/2018    EOS 0.0 07/01/2018    BASO 0.02 07/01/2018     Lab Results   Component Value Date     07/01/2018    K 4.4 07/01/2018     07/01/2018    CO2 20 (L) 07/01/2018     07/01/2018    BUN 8 07/01/2018    CREATININE 0.9 07/01/2018    CALCIUM 10.1 07/01/2018    PROT 8.0 07/01/2018    ALBUMIN 4.0 07/01/2018    BILITOT 0.4 07/01/2018    ALKPHOS 75 " 07/01/2018    AST 25 07/01/2018    ALT 34 07/01/2018               Assessment:  Jasson Pineda is a 67 y.o. female here with:  1. Ineffective esophageal motility    2. Costochondritis      Symptoms of chest pain likely related to her esophageal motility disorder and have improved with dietary changes and omeprazole.  There may have been a component of acid reflux.  She also appears to have xyphoiditis/costochondritis.        Recommendations:  1. She may try to advance her diet, but should take small bites and chew well.  Drink liquids between bites.    2. Continue omeprazole as is  3. Stop carafate  4. Follow-up with PCP regarding costochondritis     Follow-up in GI clinic in 6 months        Armand Foy MD

## 2018-09-28 ENCOUNTER — HOSPITAL ENCOUNTER (OUTPATIENT)
Dept: RADIOLOGY | Facility: OTHER | Age: 68
Discharge: HOME | End: 2018-09-28
Attending: NURSE PRACTITIONER
Payer: MEDICARE

## 2018-09-28 ENCOUNTER — OFFICE VISIT (OUTPATIENT)
Dept: UROLOGY | Facility: CLINIC | Age: 68
End: 2018-09-28
Attending: UROLOGY
Payer: MEDICARE

## 2018-09-28 VITALS
WEIGHT: 153 LBS | HEART RATE: 86 BPM | DIASTOLIC BLOOD PRESSURE: 77 MMHG | HEIGHT: 62 IN | SYSTOLIC BLOOD PRESSURE: 119 MMHG | BODY MASS INDEX: 28.16 KG/M2

## 2018-09-28 DIAGNOSIS — N32.81 OAB (OVERACTIVE BLADDER): ICD-10-CM

## 2018-09-28 DIAGNOSIS — N20.0 KIDNEY STONE: ICD-10-CM

## 2018-09-28 DIAGNOSIS — N20.0 KIDNEY STONE: Primary | ICD-10-CM

## 2018-09-28 LAB
BILIRUB SERPL-MCNC: ABNORMAL MG/DL
BLOOD URINE, POC: ABNORMAL
COLOR, POC UA: ABNORMAL
GLUCOSE UR QL STRIP: ABNORMAL
KETONES UR QL STRIP: ABNORMAL
LEUKOCYTE ESTERASE URINE, POC: ABNORMAL
NITRITE, POC UA: ABNORMAL
PH, POC UA: 7
POC RESIDUAL URINE VOLUME: 15 ML (ref 0–100)
PROTEIN, POC: ABNORMAL
SPECIFIC GRAVITY, POC UA: 1.01
UROBILINOGEN, POC UA: 1

## 2018-09-28 PROCEDURE — 81002 URINALYSIS NONAUTO W/O SCOPE: CPT | Mod: S$GLB,,, | Performed by: UROLOGY

## 2018-09-28 PROCEDURE — 51798 US URINE CAPACITY MEASURE: CPT | Mod: S$GLB,,, | Performed by: UROLOGY

## 2018-09-28 PROCEDURE — 74018 RADEX ABDOMEN 1 VIEW: CPT | Mod: 26,,, | Performed by: RADIOLOGY

## 2018-09-28 PROCEDURE — 99214 OFFICE O/P EST MOD 30 MIN: CPT | Mod: 25,S$GLB,, | Performed by: UROLOGY

## 2018-09-28 PROCEDURE — 74018 RADEX ABDOMEN 1 VIEW: CPT | Mod: TC,FY

## 2018-09-28 NOTE — PROGRESS NOTES
"Subjective:      Jasson Pineda is a 67 y.o. female who returns today regarding her     History of renal stones.  No recent flank pain.  She still has overactive bladder symptoms.  She stopped her medications because she feels like she is taking to medicate medications overall.  She has urgency but no incontinence..    The following portions of the patient's history were reviewed and updated as appropriate: allergies, current medications, past family history, past medical history, past social history, past surgical history and problem list.    Review of Systems  Pertinent items are noted in HPI.  A comprehensive multipoint review of systems was negative except as otherwise stated in the HPI.     Objective:   Vitals: /77 (BP Location: Right arm, Patient Position: Sitting, BP Method: Large (Automatic))   Pulse 86   Ht 5' 2" (1.575 m)   Wt 69.4 kg (153 lb)   BMI 27.98 kg/m²     Physical Exam   General: alert and oriented, no acute distress  Respiratory: Symmetric expansion, non-labored breathing  Cardiovascular: no peripheral edema  Abdomen: non distended -  Skin: normal coloration and turgor, no rashes, no suspicious skin lesions noted  Neuro: no gross deficits  Psych: normal judgment and insight, normal mood/affect and non-anxious    Physical Exam    Lab Review   Urinalysis demonstrates negative for all components  Lab Results   Component Value Date    WBC 5.79 07/01/2018    HGB 13.0 07/01/2018    HCT 42.2 07/01/2018    MCV 83 07/01/2018     07/01/2018     Lab Results   Component Value Date    CREATININE 0.9 07/01/2018    BUN 8 07/01/2018       Imaging   Postvoid residual 11 cc    KUB very small fragments in left lower pole unchanged  Assessment and Plan:   Kidney stone; small left lower pole fragments nonobstructive not symptomatic  High fluid, low salt diet  Avoid coffee, tea and cola  Drink water and diet lemonade      OAB (overactive bladder)  Avoid pm fluids  Avoid caffeine and " alcohol  Timed voiding      Return to clinic 1 year with KUB

## 2018-09-30 ENCOUNTER — EXTERNAL CHRONIC CARE MANAGEMENT (OUTPATIENT)
Dept: PRIMARY CARE CLINIC | Facility: CLINIC | Age: 68
End: 2018-09-30
Payer: MEDICARE

## 2018-09-30 PROCEDURE — 99490 CHRNC CARE MGMT STAFF 1ST 20: CPT | Mod: S$PBB,,, | Performed by: FAMILY MEDICINE

## 2018-09-30 PROCEDURE — 99490 CHRNC CARE MGMT STAFF 1ST 20: CPT | Mod: PBBFAC | Performed by: FAMILY MEDICINE

## 2018-10-08 DIAGNOSIS — R93.89 ABNORMAL X-RAY: Primary | ICD-10-CM

## 2018-10-09 ENCOUNTER — TELEPHONE (OUTPATIENT)
Dept: INTERNAL MEDICINE | Facility: CLINIC | Age: 68
End: 2018-10-09

## 2018-10-09 DIAGNOSIS — J84.9 INTERSTITIAL LUNG DISEASE: Primary | ICD-10-CM

## 2018-10-09 NOTE — TELEPHONE ENCOUNTER
----- Message from Chantel Boyle NP sent at 10/8/2018  2:24 PM CDT -----  Dr. Cheatham,  I ordered a CXR for Ms. Pineda and asked her to follow up with you. Hope this is okay!  Chantel

## 2018-10-17 ENCOUNTER — HOSPITAL ENCOUNTER (OUTPATIENT)
Dept: RADIOLOGY | Facility: OTHER | Age: 68
Discharge: HOME | End: 2018-10-17
Attending: NURSE PRACTITIONER
Payer: MEDICARE

## 2018-10-17 ENCOUNTER — OFFICE VISIT (OUTPATIENT)
Dept: INTERNAL MEDICINE | Facility: CLINIC | Age: 68
End: 2018-10-17
Attending: FAMILY MEDICINE
Payer: MEDICARE

## 2018-10-17 VITALS
DIASTOLIC BLOOD PRESSURE: 72 MMHG | SYSTOLIC BLOOD PRESSURE: 124 MMHG | BODY MASS INDEX: 28.16 KG/M2 | HEIGHT: 62 IN | WEIGHT: 153 LBS

## 2018-10-17 DIAGNOSIS — N64.4 BREAST PAIN, RIGHT: ICD-10-CM

## 2018-10-17 DIAGNOSIS — Z12.31 VISIT FOR SCREENING MAMMOGRAM: ICD-10-CM

## 2018-10-17 DIAGNOSIS — R91.8 OPACITY OF LUNG ON IMAGING STUDY: Primary | ICD-10-CM

## 2018-10-17 DIAGNOSIS — R93.89 ABNORMAL X-RAY: ICD-10-CM

## 2018-10-17 DIAGNOSIS — R06.02 SOB (SHORTNESS OF BREATH): ICD-10-CM

## 2018-10-17 DIAGNOSIS — J84.9 INTERSTITIAL LUNG DISEASE: ICD-10-CM

## 2018-10-17 PROCEDURE — 99214 OFFICE O/P EST MOD 30 MIN: CPT | Mod: PBBFAC,25 | Performed by: FAMILY MEDICINE

## 2018-10-17 PROCEDURE — 99214 OFFICE O/P EST MOD 30 MIN: CPT | Mod: S$PBB,,, | Performed by: FAMILY MEDICINE

## 2018-10-17 PROCEDURE — 71046 X-RAY EXAM CHEST 2 VIEWS: CPT | Mod: TC,FY

## 2018-10-17 PROCEDURE — 71046 X-RAY EXAM CHEST 2 VIEWS: CPT | Mod: 26,,, | Performed by: RADIOLOGY

## 2018-10-17 PROCEDURE — 99999 PR PBB SHADOW E&M-EST. PATIENT-LVL IV: CPT | Mod: PBBFAC,,, | Performed by: FAMILY MEDICINE

## 2018-10-17 NOTE — PROGRESS NOTES
"Subjective:      Patient ID: Jasson Pineda is a 67 y.o. female.    Chief Complaint: Chest Pain (f/u)    HPI   She does find that soft diet did help but she is now eating small portions and liquids with the small portions. She reports food is not getting stuck. She had imaging completed by urology that revealed an opacity on CXR. She does not have any fevers, chills, coughing, chest pain, wheezing. She is not taking lexapro for anxiety. No history of smoking. Her  was a smoker and has had second hand smoke exposure for 22 years. She has never worked in a ship yard or factory. No new pets or international travel. She has burning sensation in her right breast for a few days. No trauma or injury to the breast. No rashes or skin changes. No change in bra.       Review of Systems   Constitutional: Negative for activity change, appetite change, chills, diaphoresis, fatigue, fever and unexpected weight change.   HENT: Negative for congestion, ear discharge, ear pain, hearing loss, postnasal drip, rhinorrhea, sinus pressure and sore throat.    Respiratory: Negative for cough, shortness of breath and wheezing.    Cardiovascular: Negative for chest pain.   Gastrointestinal: Negative for abdominal pain, constipation, diarrhea, nausea and vomiting.   Genitourinary: Negative for dysuria and frequency.   Musculoskeletal: Negative.    Psychiatric/Behavioral: Negative for suicidal ideas.     I personally reviewed Past Medical History, Past Surgical history,  Past Social History and Family History      Objective:   /72   Ht 5' 2" (1.575 m)   Wt 69.4 kg (152 lb 16 oz)   BMI 27.98 kg/m²     Physical Exam   Constitutional: She is oriented to person, place, and time. She appears well-developed and well-nourished. No distress.   HENT:   Head: Normocephalic and atraumatic.   Right Ear: Hearing, tympanic membrane, external ear and ear canal normal.   Left Ear: Hearing, tympanic membrane, external ear and ear canal " normal.   Nose: Nose normal.   Mouth/Throat: Uvula is midline and oropharynx is clear and moist. No oropharyngeal exudate.   Eyes: Conjunctivae and EOM are normal. Pupils are equal, round, and reactive to light. Right eye exhibits no discharge. Left eye exhibits no discharge. No scleral icterus.   Neck: Normal range of motion. Neck supple.   Cardiovascular: Normal rate, regular rhythm, normal heart sounds and intact distal pulses. Exam reveals no gallop.   No murmur heard.  Pulmonary/Chest: Effort normal and breath sounds normal. No respiratory distress. She has no wheezes. She has no rales. She exhibits no tenderness. Right breast exhibits no inverted nipple, no mass, no nipple discharge, no skin change and no tenderness.   Abdominal: Soft. Bowel sounds are normal. She exhibits no distension and no mass. There is no tenderness. There is no rebound and no guarding.   Lymphadenopathy:        Head (right side): No submental and no submandibular adenopathy present.        Head (left side): No submental adenopathy present.     She has no axillary adenopathy.        Right: No inguinal and no supraclavicular adenopathy present.        Left: No inguinal and no supraclavicular adenopathy present.   Neurological: She is alert and oriented to person, place, and time.   Skin: Skin is warm and dry.   Vitals reviewed.      1. Opacity of lung on imaging study    2. SOB (shortness of breath)    3. Interstitial lung disease    4. Breast pain, right    5. Visit for screening mammogram        1-3. Will repeat cxr,  pulmonology follow up for further work up, will check pft, ED prompts reviewed  4/5. Will schedule imaging, breast specialist follow up if no improvement or worsening of symptoms     Orders Placed This Encounter   Procedures    X-Ray Chest PA And Lateral    Mammo Digital Screening Bilateral With CAD    US Breast Right Complete    Ambulatory consult to Pulmonology    Complete PFT with bronchodilator and FeNO

## 2018-10-31 ENCOUNTER — EXTERNAL CHRONIC CARE MANAGEMENT (OUTPATIENT)
Dept: PRIMARY CARE CLINIC | Facility: CLINIC | Age: 68
End: 2018-10-31
Payer: MEDICARE

## 2018-10-31 PROCEDURE — 99490 CHRNC CARE MGMT STAFF 1ST 20: CPT | Mod: S$PBB,,, | Performed by: FAMILY MEDICINE

## 2018-10-31 PROCEDURE — 99490 CHRNC CARE MGMT STAFF 1ST 20: CPT | Mod: PBBFAC | Performed by: FAMILY MEDICINE

## 2018-11-19 ENCOUNTER — HOSPITAL ENCOUNTER (OUTPATIENT)
Dept: RADIOLOGY | Facility: OTHER | Age: 68
Discharge: HOME | End: 2018-11-19
Attending: FAMILY MEDICINE
Payer: MEDICARE

## 2018-11-19 DIAGNOSIS — N63.0 LUMP OR MASS IN BREAST: ICD-10-CM

## 2018-11-19 DIAGNOSIS — Z12.31 VISIT FOR SCREENING MAMMOGRAM: ICD-10-CM

## 2018-11-19 DIAGNOSIS — N64.4 BREAST PAIN, RIGHT: ICD-10-CM

## 2018-11-19 PROCEDURE — 77066 DX MAMMO INCL CAD BI: CPT | Mod: 26,,, | Performed by: RADIOLOGY

## 2018-11-19 PROCEDURE — 77062 BREAST TOMOSYNTHESIS BI: CPT | Mod: 26,,, | Performed by: RADIOLOGY

## 2018-11-19 PROCEDURE — 77066 DX MAMMO INCL CAD BI: CPT | Mod: TC

## 2018-11-19 PROCEDURE — 76642 ULTRASOUND BREAST LIMITED: CPT | Mod: TC,RT

## 2018-11-19 PROCEDURE — 76642 ULTRASOUND BREAST LIMITED: CPT | Mod: 26,RT,, | Performed by: RADIOLOGY

## 2018-11-20 ENCOUNTER — HOSPITAL ENCOUNTER (OUTPATIENT)
Dept: PULMONOLOGY | Facility: OTHER | Age: 68
Discharge: HOME | End: 2018-11-20
Attending: FAMILY MEDICINE
Payer: MEDICARE

## 2018-11-20 DIAGNOSIS — R06.02 SOB (SHORTNESS OF BREATH): ICD-10-CM

## 2018-11-20 PROCEDURE — 94729 DIFFUSING CAPACITY: CPT

## 2018-11-20 PROCEDURE — 94727 GAS DIL/WSHOT DETER LNG VOL: CPT

## 2018-11-20 PROCEDURE — 94060 EVALUATION OF WHEEZING: CPT

## 2018-11-22 NOTE — PROCEDURES
The patient is a 68-year-old female, 62 inches tall and 150 pounds.    DIAGNOSIS:  Shortness of breath.    The patient has a forced vital capacity of 2.24 L, which is 100% of predicted.    FEV1 is 1.87, which is 118% of predicted.  FEV1/FVC is 118% of predicted.  FEF   25-75 123% of predicted.  There is no significant response to bronchodilators.    Total lung capacity is reduced at 73% of predicted based on a decreased   expiratory reserve volume of 49% of predicted.  This can be seen with abdominal   obesity or kyphoscoliosis.  Diffusing capacity is normal.  Flow volume loop is   consistent with above.    ASSESSMENT:  1.  Normal spirometry values.  2.  Mild restrictive ventilatory defect as discussed above.  3.  No evidence of diffusion defect.      CCS/HN  dd: 11/22/2018 08:04:57 (CST)  td: 11/22/2018 08:29:04 (CST)  Doc ID   #[id number]  Job ID #[id number]    CC: Jennifer Cheatham M.D.

## 2018-11-28 ENCOUNTER — HOSPITAL ENCOUNTER (OUTPATIENT)
Dept: RADIOLOGY | Facility: OTHER | Age: 68
Discharge: HOME | End: 2018-11-28
Attending: FAMILY MEDICINE
Payer: MEDICARE

## 2018-11-28 ENCOUNTER — OFFICE VISIT (OUTPATIENT)
Dept: INTERNAL MEDICINE | Facility: CLINIC | Age: 68
End: 2018-11-28
Attending: FAMILY MEDICINE
Payer: MEDICARE

## 2018-11-28 VITALS
OXYGEN SATURATION: 98 % | HEART RATE: 74 BPM | HEIGHT: 62 IN | SYSTOLIC BLOOD PRESSURE: 124 MMHG | DIASTOLIC BLOOD PRESSURE: 70 MMHG | BODY MASS INDEX: 27.79 KG/M2 | WEIGHT: 151 LBS

## 2018-11-28 DIAGNOSIS — R91.8 OPACITY OF LUNG ON IMAGING STUDY: ICD-10-CM

## 2018-11-28 DIAGNOSIS — K21.9 GASTROESOPHAGEAL REFLUX DISEASE, ESOPHAGITIS PRESENCE NOT SPECIFIED: ICD-10-CM

## 2018-11-28 DIAGNOSIS — F41.9 ANXIETY: Primary | ICD-10-CM

## 2018-11-28 PROCEDURE — 99213 OFFICE O/P EST LOW 20 MIN: CPT | Mod: PBBFAC,25 | Performed by: FAMILY MEDICINE

## 2018-11-28 PROCEDURE — 99999 PR PBB SHADOW E&M-EST. PATIENT-LVL III: CPT | Mod: PBBFAC,,, | Performed by: FAMILY MEDICINE

## 2018-11-28 PROCEDURE — 99214 OFFICE O/P EST MOD 30 MIN: CPT | Mod: S$PBB,,, | Performed by: FAMILY MEDICINE

## 2018-11-28 PROCEDURE — 71046 X-RAY EXAM CHEST 2 VIEWS: CPT | Mod: 26,,, | Performed by: RADIOLOGY

## 2018-11-28 PROCEDURE — 71046 X-RAY EXAM CHEST 2 VIEWS: CPT | Mod: TC,FY

## 2018-11-28 RX ORDER — PHENOL/SODIUM PHENOLATE
1 AEROSOL, SPRAY (ML) MUCOUS MEMBRANE DAILY PRN
Qty: 30 EACH | Refills: 2 | Status: SHIPPED | OUTPATIENT
Start: 2018-11-28 | End: 2018-11-30

## 2018-11-28 RX ORDER — TRAMADOL HYDROCHLORIDE 50 MG/1
50 TABLET ORAL 2 TIMES DAILY PRN
Refills: 1 | COMMUNITY
Start: 2018-10-26

## 2018-11-28 RX ORDER — ESCITALOPRAM OXALATE 10 MG/1
10 TABLET ORAL DAILY
Qty: 30 TABLET | Refills: 1 | Status: SHIPPED | OUTPATIENT
Start: 2018-11-28 | End: 2019-01-22 | Stop reason: SDUPTHER

## 2018-11-30 ENCOUNTER — EXTERNAL CHRONIC CARE MANAGEMENT (OUTPATIENT)
Dept: PRIMARY CARE CLINIC | Facility: CLINIC | Age: 68
End: 2018-11-30
Payer: MEDICARE

## 2018-11-30 DIAGNOSIS — K21.9 GASTROESOPHAGEAL REFLUX DISEASE, ESOPHAGITIS PRESENCE NOT SPECIFIED: Primary | ICD-10-CM

## 2018-11-30 PROCEDURE — 99490 CHRNC CARE MGMT STAFF 1ST 20: CPT | Mod: S$PBB,,, | Performed by: FAMILY MEDICINE

## 2018-11-30 PROCEDURE — 99490 CHRNC CARE MGMT STAFF 1ST 20: CPT | Mod: PBBFAC | Performed by: FAMILY MEDICINE

## 2018-11-30 RX ORDER — OMEPRAZOLE 20 MG/1
20 CAPSULE, DELAYED RELEASE ORAL DAILY
Qty: 30 CAPSULE | Refills: 11 | Status: SHIPPED | OUTPATIENT
Start: 2018-11-30 | End: 2019-12-11 | Stop reason: SDUPTHER

## 2018-12-03 DIAGNOSIS — R06.02 SOB (SHORTNESS OF BREATH): Primary | ICD-10-CM

## 2018-12-12 RX ORDER — ESCITALOPRAM OXALATE 5 MG/1
TABLET ORAL
Qty: 30 TABLET | Refills: 1 | Status: SHIPPED | OUTPATIENT
Start: 2018-12-12 | End: 2019-01-15

## 2018-12-31 ENCOUNTER — EXTERNAL CHRONIC CARE MANAGEMENT (OUTPATIENT)
Dept: PRIMARY CARE CLINIC | Facility: CLINIC | Age: 68
End: 2018-12-31
Payer: MEDICARE

## 2018-12-31 PROCEDURE — 99490 CHRNC CARE MGMT STAFF 1ST 20: CPT | Mod: S$PBB,,, | Performed by: FAMILY MEDICINE

## 2018-12-31 PROCEDURE — 99490 PR CHRONIC CARE MGMT, 1ST 20 MIN: ICD-10-PCS | Mod: S$PBB,,, | Performed by: FAMILY MEDICINE

## 2018-12-31 PROCEDURE — 99490 CHRNC CARE MGMT STAFF 1ST 20: CPT | Mod: PBBFAC | Performed by: FAMILY MEDICINE

## 2019-01-10 ENCOUNTER — HOSPITAL ENCOUNTER (OUTPATIENT)
Dept: RADIOLOGY | Facility: HOSPITAL | Age: 69
Discharge: HOME | End: 2019-01-10
Attending: INTERNAL MEDICINE
Payer: MEDICARE

## 2019-01-10 ENCOUNTER — OFFICE VISIT (OUTPATIENT)
Dept: PULMONOLOGY | Facility: CLINIC | Age: 69
End: 2019-01-10
Payer: MEDICARE

## 2019-01-10 VITALS
DIASTOLIC BLOOD PRESSURE: 76 MMHG | HEIGHT: 62 IN | SYSTOLIC BLOOD PRESSURE: 130 MMHG | HEART RATE: 84 BPM | OXYGEN SATURATION: 98 % | BODY MASS INDEX: 27.38 KG/M2 | WEIGHT: 148.81 LBS

## 2019-01-10 DIAGNOSIS — R06.02 SOB (SHORTNESS OF BREATH): ICD-10-CM

## 2019-01-10 DIAGNOSIS — R93.89 NEW ABNORMALITY ON CHEST X-RAY: Primary | ICD-10-CM

## 2019-01-10 PROCEDURE — 99999 PR PBB SHADOW E&M-EST. PATIENT-LVL III: CPT | Mod: PBBFAC,,, | Performed by: INTERNAL MEDICINE

## 2019-01-10 PROCEDURE — 99213 OFFICE O/P EST LOW 20 MIN: CPT | Mod: PBBFAC,25 | Performed by: INTERNAL MEDICINE

## 2019-01-10 PROCEDURE — 71046 XR CHEST PA AND LATERAL: ICD-10-PCS | Mod: 26,,, | Performed by: RADIOLOGY

## 2019-01-10 PROCEDURE — 99999 PR PBB SHADOW E&M-EST. PATIENT-LVL III: ICD-10-PCS | Mod: PBBFAC,,, | Performed by: INTERNAL MEDICINE

## 2019-01-10 PROCEDURE — 99204 PR OFFICE/OUTPT VISIT, NEW, LEVL IV, 45-59 MIN: ICD-10-PCS | Mod: S$PBB,,, | Performed by: INTERNAL MEDICINE

## 2019-01-10 PROCEDURE — 99204 OFFICE O/P NEW MOD 45 MIN: CPT | Mod: S$PBB,,, | Performed by: INTERNAL MEDICINE

## 2019-01-10 PROCEDURE — 71046 X-RAY EXAM CHEST 2 VIEWS: CPT | Mod: 26,,, | Performed by: RADIOLOGY

## 2019-01-10 PROCEDURE — 71046 X-RAY EXAM CHEST 2 VIEWS: CPT | Mod: TC,FY

## 2019-01-10 NOTE — LETTER
January 14, 2019      Jennifer Cheatham MD  [address]  [facility] [address]           [facility]  [address] [facility] [address]  [facility] [address]  Phone: [phone]          Patient: Jasson Pineda   MR Number: [id number]   YOB: 1950   Date of Visit: 1/10/2019       Dear Dr. Jennifer Cheatham:    Thank you for referring Jasson Pineda to me for evaluation. Attached you will find relevant portions of my assessment and plan of care.    If you have questions, please do not hesitate to call me. I look forward to following Jasson Pineda along with you.    Sincerely,    Desmond Morris MD    Enclosure  CC:  No Recipients    If you would like to receive this communication electronically, please contact [email or web address] or [phone] to request more information on [employer] access.    For providers and/or their staff who would like to refer a patient to [facility], please contact us through our one-stop-shop provider referral line, [facility], at [phone].    If you feel you have received this communication in error or would no longer like to receive these types of communications, please e-mail [email or web address]

## 2019-01-14 NOTE — PROGRESS NOTES
Subjective:      Patient ID: Jasson Pineda is a 68 y.o. female.    Chief Complaint: Abnormal Ct Scan    HPI  67 yo female referred for assessment of an abnormal chest x-ray. She had a film that raised the question of a mass in the lateral aspect of the right lung. In the interpretation, the radiologist states it is a confluence of shadows of rib and scapula. She has had a shoulder replacement and was not able to wing her shoulders and hence she has the tip of the scapula overlying the rib. NO mass. A repeat Chest x-ray done today confirms NO mass.SHE IS A NON SMOKER  Review of Systems   Constitutional: Negative.    HENT: Negative.    Eyes: Negative.    Respiratory: Negative.         Had normal PFT's 11/20/18   Cardiovascular: Negative.    Genitourinary: Negative.    Musculoskeletal: Negative.         Right shoulder replacement.   Skin: Negative.    Gastrointestinal: Negative.    Neurological: Negative.    Psychiatric/Behavioral: Negative.      Objective:     Physical Exam   Constitutional: She is oriented to person, place, and time. She appears well-developed and well-nourished. No distress.   HENT:   Head: Normocephalic and atraumatic.   Right Ear: External ear normal.   Left Ear: External ear normal.   Nose: Nose normal.   Mouth/Throat: Oropharynx is clear and moist.   Eyes: Conjunctivae and EOM are normal. Pupils are equal, round, and reactive to light.   Neck: Normal range of motion. Neck supple. No JVD present. No thyromegaly present.   Cardiovascular: Normal rate, regular rhythm, normal heart sounds and intact distal pulses. Exam reveals no gallop.   No murmur heard.  Pulmonary/Chest: Breath sounds normal. No stridor. No respiratory distress. She has no wheezes. She has no rales. She exhibits no tenderness.   Clear   Sa02: 98%   Abdominal: Soft. Bowel sounds are normal. She exhibits no distension and no mass. There is no tenderness. There is no rebound and no guarding.   Musculoskeletal: Normal range  of motion. She exhibits no edema.   Lymphadenopathy:     She has no cervical adenopathy.   Neurological: She is alert and oriented to person, place, and time. She has normal reflexes. She displays normal reflexes. No cranial nerve deficit.   Skin: Skin is warm and dry. No rash noted.   Psychiatric: She has a normal mood and affect. Her behavior is normal. Judgment and thought content normal.   Nursing note and vitals reviewed.      Assessment:     1. New abnormality on chest x-ray      Outpatient Encounter Medications as of 1/10/2019   Medication Sig Dispense Refill    AFLURIA QUAD [id number], PF, 60 mcg/0.5 mL vaccine ADM 0.5ML IM UTD  0    atorvastatin (LIPITOR) 40 MG tablet TAKE 1 TABLET(40 MG) BY MOUTH EVERY EVENING 90 tablet 3    escitalopram oxalate (LEXAPRO) 10 MG tablet Take 1 tablet (10 mg total) by mouth once daily. 30 tablet 1    escitalopram oxalate (LEXAPRO) 5 MG Tab TAKE 1 TABLET BY MOUTH EVERY DAY 30 tablet 1    omeprazole (PRILOSEC) 20 MG capsule Take 1 capsule (20 mg total) by mouth once daily. 30 capsule 11    oxybutynin (DITROPAN-XL) 10 MG 24 hr tablet Take 1 tablet (10 mg total) by mouth once daily. 30 tablet 11    pregabalin (LYRICA) 150 MG capsule Take 150 mg by mouth 2 (two) times daily.      traMADol (ULTRAM) 50 mg tablet Take 50 mg by mouth 2 (two) times daily as needed.  1    XARELTO 20 mg Tab Take 1 tablet by mouth every evening.   1     No facility-administered encounter medications on file as of 1/10/2019.      No orders of the defined types were placed in this encounter.    Plan:   Artifact on chest x-ray: Tip of the right scapula  Problem List Items Addressed This Visit     None      Visit Diagnoses     New abnormality on chest x-ray    -  Primary

## 2019-01-15 ENCOUNTER — OFFICE VISIT (OUTPATIENT)
Dept: INTERNAL MEDICINE | Facility: CLINIC | Age: 69
End: 2019-01-15
Attending: FAMILY MEDICINE
Payer: MEDICARE

## 2019-01-15 VITALS
HEIGHT: 62 IN | SYSTOLIC BLOOD PRESSURE: 102 MMHG | BODY MASS INDEX: 26.25 KG/M2 | HEART RATE: 79 BPM | OXYGEN SATURATION: 98 % | WEIGHT: 142.63 LBS | DIASTOLIC BLOOD PRESSURE: 60 MMHG

## 2019-01-15 DIAGNOSIS — K22.4 ESOPHAGEAL MOTILITY DISORDER: Primary | ICD-10-CM

## 2019-01-15 DIAGNOSIS — D68.9 CLOTTING DISORDER: ICD-10-CM

## 2019-01-15 DIAGNOSIS — K21.9 GASTROESOPHAGEAL REFLUX DISEASE, ESOPHAGITIS PRESENCE NOT SPECIFIED: ICD-10-CM

## 2019-01-15 DIAGNOSIS — R07.89 XYPHOIDALGIA: ICD-10-CM

## 2019-01-15 DIAGNOSIS — J84.9 INTERSTITIAL LUNG DISEASE: ICD-10-CM

## 2019-01-15 PROCEDURE — 99999 PR PBB SHADOW E&M-EST. PATIENT-LVL IV: CPT | Mod: PBBFAC,,, | Performed by: FAMILY MEDICINE

## 2019-01-15 PROCEDURE — 99214 OFFICE O/P EST MOD 30 MIN: CPT | Mod: S$PBB,,, | Performed by: FAMILY MEDICINE

## 2019-01-15 PROCEDURE — 99214 OFFICE O/P EST MOD 30 MIN: CPT | Mod: PBBFAC | Performed by: FAMILY MEDICINE

## 2019-01-15 PROCEDURE — 99214 PR OFFICE/OUTPT VISIT, EST, LEVL IV, 30-39 MIN: ICD-10-PCS | Mod: S$PBB,,, | Performed by: FAMILY MEDICINE

## 2019-01-15 PROCEDURE — 99999 PR PBB SHADOW E&M-EST. PATIENT-LVL IV: ICD-10-PCS | Mod: PBBFAC,,, | Performed by: FAMILY MEDICINE

## 2019-01-15 RX ORDER — LIDOCAINE AND PRILOCAINE 25; 25 MG/G; MG/G
CREAM TOPICAL
Qty: 30 G | Refills: 1 | Status: SHIPPED | OUTPATIENT
Start: 2019-01-15 | End: 2019-06-03

## 2019-01-15 NOTE — PROGRESS NOTES
"Subjective:      Patient ID: Jasson Pineda is a 68 y.o. female.    Chief Complaint: Anxiety    HPI     She is taking 10 mg lexapro. She finds her mood is good, interest in hobbies there, no guilt, energy level is ok, no panic attacks, anxiety level controlled, no SI or HI. She has tried to advance her diet but finds that food still gets stuck. She does have pain over the xyphoid process. It is the same and no worsening.     Review of Systems   Constitutional: Negative.    HENT: Negative.    Respiratory: Negative for shortness of breath.    Gastrointestinal: Positive for abdominal distention. Negative for abdominal pain, anal bleeding, blood in stool, constipation, diarrhea, nausea, rectal pain and vomiting.   Genitourinary: Negative.    Neurological: Negative.      I personally reviewed Past Medical History, Past Surgical history,  Past Social History and Family History      Objective:   /60   Pulse 79   Ht 5' 2" (1.575 m)   Wt 64.7 kg (142 lb 10.2 oz)   SpO2 98%   BMI 26.09 kg/m²     Physical Exam   Constitutional: She is oriented to person, place, and time. She appears well-developed and well-nourished. No distress.   HENT:   Head: Normocephalic and atraumatic.   Right Ear: Hearing, tympanic membrane, external ear and ear canal normal.   Left Ear: Hearing, tympanic membrane, external ear and ear canal normal.   Nose: Nose normal.   Mouth/Throat: Uvula is midline and oropharynx is clear and moist. No oropharyngeal exudate.   Eyes: Conjunctivae and EOM are normal. Pupils are equal, round, and reactive to light. Right eye exhibits no discharge. Left eye exhibits no discharge. No scleral icterus.   Neck: Normal range of motion. Neck supple.   Cardiovascular: Normal rate, regular rhythm, normal heart sounds and intact distal pulses. Exam reveals no gallop.   No murmur heard.  Pulmonary/Chest: Effort normal and breath sounds normal. No respiratory distress. She has no wheezes. She has no rales. She " exhibits no tenderness.   Abdominal: Soft. Bowel sounds are normal. She exhibits no distension and no mass. There is no tenderness. There is no rebound and no guarding.   Neurological: She is alert and oriented to person, place, and time.   Skin: Skin is warm and dry.   Vitals reviewed.      1. Esophageal motility disorder    2. Gastroesophageal reflux disease, esophagitis presence not specified    3. Xyphoidalgia    4. Clotting disorder    5. Interstitial lung disease      1. Patient to try soft diet, f/u GI   2. Follow up with nutritionist   3. Trial emla and discuss with pain management alternative treatment options  4. stable, cont xarelto   5. Followed up with pulmonology and stable       Orders Placed This Encounter   Procedures    Ambulatory consult to Speech Therapy    Ambulatory consult to Nutrition Services     Medications Ordered This Encounter   Medications    lidocaine-prilocaine (EMLA) cream     Sig: Apply topically as needed.     Dispense:  30 g     Refill:  1     Esophageal dysmotility unable to take nsaids/tylenol

## 2019-01-15 NOTE — PATIENT INSTRUCTIONS
esophageal motility disorder follow up in March with gastroenterology       Soft Diet  Your healthcare provider has prescribed a soft diet (also called gastrointestinal soft diet). This means eating foods that are soft, low in fiber, and easy to digest. This diet is for people with digestive problems. A soft diet provides foods that are easy to chew and swallow. Foods should be bite-size and very soft or moist. Follow your healthcare providers specific instructions about what foods and drinks you may have. The general guidelines below can help you get started on this diet.       Beverages  OK: Milk, tea, coffee, fruit juices, carbonated beverages, nutrition shakes, and drinks (Note: Thin liquids may be hard to swallow. They may need to be thickened.)  Avoid: None, unless they need to be thickened  Breads and crackers  OK: Refined white, wheat, or seedless rye bread; fernanda or soda crackers that have been moistened; plain rolls or bagels; very soft tortillas  Avoid: Whole-grain breads, rolls, or bagels with nuts, raisins, or seeds; crackers, croutons, taco shells  Cereals and grains  OK: Cooked cereals, plain dry cereals that have been moistened, plain macaroni, spaghetti, noodles, rice  Avoid: Whole-grain cereals and granola, or cereals containing bran, raisins, seeds or nuts; coconut; brown or wild rice  Desserts and sweets  OK: Moist cake; soft fruit pie with bottom crust only; soft cookies moistened in milk or other liquid; gelatin, custard, pudding, plain ice cream, plain sherbet, sugar, honey, clear jelly  Avoid: Pastries, desserts, and ice cream that have nuts, coconut, seeds, or dried fruit; popcorn; chips of any kind including potato chips and tortilla chips; jam, marmalade  Eggs and cheese  OK: Poached, soft boiled, or scrambled eggs; cottage cheese, ricotta cheese, cream cheese, cheese sauces, or cheese melted in other dishes  Avoid: Crisp fried eggs, cheese slices and cubes  Fruits  OK: Avocado, banana,  baked peeled apple, applesauce, peeled ripe peaches or pears, canned fruit (apricots, cherries, peaches, pears), melons  Avoid: Raw apple, dried fruits, coconut, pineapple, grapes, fruit jordan, fruit snacks  Meat and fish  OK: All fresh meat, poultry, or fish that is cooked until tender  Avoid: Meat, fish, or poultry that is fried; tough or stringy meat including dale, sausage, bratwurst, jerky, corned beef  Other protein foods  OK: Tofu, baked beans, smooth peanut butter or other nut or seed butters  Avoid: Deep-fried tofu; crunchy peanut or other nut or seed butters; nuts or seeds that are whole or chopped  Soups  OK: All soups, but they may need to be thickened. Thin liquid may be too hard to swallow.  Avoid: Soups made with stringy meat pieces or chunky vegetables  Vegetables  OK: Peeled and well-cooked potatoes or sweet potatoes; fresh, cooked, canned, or frozen vegetables without seeds, skin, or coarse fiber  Avoid: Raw vegetables, deep-fried vegetables (such as tempura), and corn  Date Last Reviewed: 8/1/2016 © 2000-2017 for[MD]. [address] [place] [address], [place] [address]. All rights reserved. This information is not intended as a substitute for professional medical care. Always follow your healthcare professional's instructions.

## 2019-01-18 ENCOUNTER — TELEPHONE (OUTPATIENT)
Dept: INTERNAL MEDICINE | Facility: CLINIC | Age: 69
End: 2019-01-18

## 2019-01-18 NOTE — TELEPHONE ENCOUNTER
Left voice message for patient to schedule appointment from referral to Nutrition Services.  Brian ZHONG  [phone]

## 2019-01-22 RX ORDER — ESCITALOPRAM OXALATE 10 MG/1
TABLET ORAL
Qty: 30 TABLET | Refills: 1 | Status: SHIPPED | OUTPATIENT
Start: 2019-01-22 | End: 2019-03-27 | Stop reason: SDUPTHER

## 2019-01-31 ENCOUNTER — HOSPITAL ENCOUNTER (EMERGENCY)
Facility: HOSPITAL | Age: 69
Discharge: HOME | End: 2019-01-31
Attending: EMERGENCY MEDICINE
Payer: MEDICARE

## 2019-01-31 VITALS
HEART RATE: 74 BPM | WEIGHT: 142 LBS | HEIGHT: 62 IN | DIASTOLIC BLOOD PRESSURE: 54 MMHG | TEMPERATURE: 98 F | RESPIRATION RATE: 19 BRPM | BODY MASS INDEX: 26.13 KG/M2 | OXYGEN SATURATION: 97 % | SYSTOLIC BLOOD PRESSURE: 98 MMHG

## 2019-01-31 DIAGNOSIS — R53.83 FATIGUE: ICD-10-CM

## 2019-01-31 DIAGNOSIS — R11.2 NAUSEA AND VOMITING, INTRACTABILITY OF VOMITING NOT SPECIFIED, UNSPECIFIED VOMITING TYPE: Primary | ICD-10-CM

## 2019-01-31 LAB
ALBUMIN SERPL BCP-MCNC: 3.8 G/DL
ALP SERPL-CCNC: 67 U/L
ALT SERPL W/O P-5'-P-CCNC: 17 U/L
ANION GAP SERPL CALC-SCNC: 15 MMOL/L
AST SERPL-CCNC: 27 U/L
BACTERIA #/AREA URNS AUTO: NORMAL /HPF
BASOPHILS # BLD AUTO: 0.03 K/UL
BASOPHILS NFR BLD: 0.5 %
BILIRUB SERPL-MCNC: 0.8 MG/DL
BILIRUB UR QL STRIP: NEGATIVE
BUN SERPL-MCNC: 10 MG/DL
CALCIUM SERPL-MCNC: 10 MG/DL
CHLORIDE SERPL-SCNC: 100 MMOL/L
CLARITY UR REFRACT.AUTO: CLEAR
CO2 SERPL-SCNC: 19 MMOL/L
COLOR UR AUTO: YELLOW
CREAT SERPL-MCNC: 0.8 MG/DL
CREAT SERPL-MCNC: 0.9 MG/DL (ref 0.5–1.4)
DIFFERENTIAL METHOD: ABNORMAL
EOSINOPHIL # BLD AUTO: 0.1 K/UL
EOSINOPHIL NFR BLD: 1.6 %
ERYTHROCYTE [DISTWIDTH] IN BLOOD BY AUTOMATED COUNT: 15.9 %
EST. GFR  (AFRICAN AMERICAN): >60 ML/MIN/1.73 M^2
EST. GFR  (NON AFRICAN AMERICAN): >60 ML/MIN/1.73 M^2
GLUCOSE SERPL-MCNC: 95 MG/DL
GLUCOSE UR QL STRIP: NEGATIVE
HCT VFR BLD AUTO: 43.1 %
HGB BLD-MCNC: 13.7 G/DL
HGB UR QL STRIP: ABNORMAL
IMM GRANULOCYTES # BLD AUTO: 0.04 K/UL
IMM GRANULOCYTES NFR BLD AUTO: 0.6 %
KETONES UR QL STRIP: ABNORMAL
LACTATE SERPL-SCNC: 1.4 MMOL/L
LEUKOCYTE ESTERASE UR QL STRIP: ABNORMAL
LIPASE SERPL-CCNC: 26 U/L
LYMPHOCYTES # BLD AUTO: 0.7 K/UL
LYMPHOCYTES NFR BLD: 10.7 %
MCH RBC QN AUTO: 26 PG
MCHC RBC AUTO-ENTMCNC: 31.8 G/DL
MCV RBC AUTO: 82 FL
MICROSCOPIC COMMENT: NORMAL
MONOCYTES # BLD AUTO: 0.4 K/UL
MONOCYTES NFR BLD: 6.4 %
NEUTROPHILS # BLD AUTO: 5 K/UL
NEUTROPHILS NFR BLD: 80.2 %
NITRITE UR QL STRIP: NEGATIVE
NRBC BLD-RTO: 0 /100 WBC
PH UR STRIP: 6 [PH] (ref 5–8)
PLATELET # BLD AUTO: 248 K/UL
PMV BLD AUTO: 11.1 FL
POTASSIUM SERPL-SCNC: 3.6 MMOL/L
PROT SERPL-MCNC: 7.7 G/DL
PROT UR QL STRIP: NEGATIVE
RBC # BLD AUTO: 5.26 M/UL
RBC #/AREA URNS AUTO: 1 /HPF (ref 0–4)
SAMPLE: NORMAL
SODIUM SERPL-SCNC: 134 MMOL/L
SP GR UR STRIP: 1.01 (ref 1–1.03)
SQUAMOUS #/AREA URNS AUTO: 0 /HPF
URN SPEC COLLECT METH UR: ABNORMAL
WBC # BLD AUTO: 6.27 K/UL
WBC #/AREA URNS AUTO: 2 /HPF (ref 0–5)

## 2019-01-31 PROCEDURE — 96361 HYDRATE IV INFUSION ADD-ON: CPT

## 2019-01-31 PROCEDURE — 25500020 PHARM REV CODE 255: Performed by: EMERGENCY MEDICINE

## 2019-01-31 PROCEDURE — 99284 EMERGENCY DEPT VISIT MOD MDM: CPT | Mod: ,,, | Performed by: PHYSICIAN ASSISTANT

## 2019-01-31 PROCEDURE — 85025 COMPLETE CBC W/AUTO DIFF WBC: CPT

## 2019-01-31 PROCEDURE — 93010 EKG 12-LEAD: ICD-10-PCS | Mod: ,,, | Performed by: INTERNAL MEDICINE

## 2019-01-31 PROCEDURE — 83690 ASSAY OF LIPASE: CPT

## 2019-01-31 PROCEDURE — 63600175 PHARM REV CODE 636 W HCPCS: Performed by: PHYSICIAN ASSISTANT

## 2019-01-31 PROCEDURE — 93005 ELECTROCARDIOGRAM TRACING: CPT

## 2019-01-31 PROCEDURE — 99284 PR EMERGENCY DEPT VISIT,LEVEL IV: ICD-10-PCS | Mod: ,,, | Performed by: PHYSICIAN ASSISTANT

## 2019-01-31 PROCEDURE — 87040 BLOOD CULTURE FOR BACTERIA: CPT | Mod: 59

## 2019-01-31 PROCEDURE — 83605 ASSAY OF LACTIC ACID: CPT

## 2019-01-31 PROCEDURE — 96375 TX/PRO/DX INJ NEW DRUG ADDON: CPT

## 2019-01-31 PROCEDURE — 96365 THER/PROPH/DIAG IV INF INIT: CPT | Mod: 59

## 2019-01-31 PROCEDURE — 80053 COMPREHEN METABOLIC PANEL: CPT

## 2019-01-31 PROCEDURE — 93010 ELECTROCARDIOGRAM REPORT: CPT | Mod: ,,, | Performed by: INTERNAL MEDICINE

## 2019-01-31 PROCEDURE — 99285 EMERGENCY DEPT VISIT HI MDM: CPT | Mod: 25

## 2019-01-31 PROCEDURE — 81001 URINALYSIS AUTO W/SCOPE: CPT

## 2019-01-31 PROCEDURE — 25000003 PHARM REV CODE 250: Performed by: PHYSICIAN ASSISTANT

## 2019-01-31 RX ORDER — ONDANSETRON 4 MG/1
4 TABLET, FILM COATED ORAL EVERY 6 HOURS
Qty: 12 TABLET | Refills: 0 | Status: SHIPPED | OUTPATIENT
Start: 2019-01-31 | End: 2021-01-13

## 2019-01-31 RX ORDER — MORPHINE SULFATE 4 MG/ML
4 INJECTION, SOLUTION INTRAMUSCULAR; INTRAVENOUS
Status: COMPLETED | OUTPATIENT
Start: 2019-01-31 | End: 2019-01-31

## 2019-01-31 RX ORDER — ONDANSETRON 2 MG/ML
4 INJECTION INTRAMUSCULAR; INTRAVENOUS
Status: COMPLETED | OUTPATIENT
Start: 2019-01-31 | End: 2019-01-31

## 2019-01-31 RX ADMIN — MORPHINE SULFATE 4 MG: 4 INJECTION INTRAVENOUS at 02:01

## 2019-01-31 RX ADMIN — ONDANSETRON 4 MG: 2 INJECTION INTRAMUSCULAR; INTRAVENOUS at 02:01

## 2019-01-31 RX ADMIN — SODIUM CHLORIDE 1000 ML: 0.9 INJECTION, SOLUTION INTRAVENOUS at 02:01

## 2019-01-31 RX ADMIN — SODIUM CHLORIDE 1000 ML: 0.9 INJECTION, SOLUTION INTRAVENOUS at 04:01

## 2019-01-31 RX ADMIN — IOHEXOL 75 ML: 350 INJECTION, SOLUTION INTRAVENOUS at 03:01

## 2019-01-31 RX ADMIN — PIPERACILLIN AND TAZOBACTAM 4.5 G: 4; .5 INJECTION, POWDER, LYOPHILIZED, FOR SOLUTION INTRAVENOUS; PARENTERAL at 02:01

## 2019-01-31 NOTE — ED TRIAGE NOTES
Jasson Pineda, a 68 y.o. female presents to the ED w/ complaint of generalized weakness, decreased appetite, nausea and abdominal pain. Pt states that she was diagnosed with diverticulitis Thursday and is currently on Cipro and Flagyl. Pt states that she is still experiencing 7/10 epigastric pain. Pain is worsened with touch. Pt states that pain is now radiating into back after EMS pressed chest wall. Pt also reports having near syncopal episode/dizziness when attempting to stand. Pt in no acute distress at this time.    Triage note:  Chief Complaint   Patient presents with    Fatigue     Weakness x 1 week. Near syncopal episode earlier today.      Review of patient's allergies indicates:   Allergen Reactions    Sulfa (sulfonamide antibiotics) Rash     Past Medical History:   Diagnosis Date    Anticoagulant long-term use     xarelto    Chronic back pain     Chronic neck pain     DVT (deep venous thrombosis)     Herniated disc, cervical     Kidney stone     Lumbar herniated disc     PE (pulmonary embolism)     2013    Pulmonary embolism       Adult Physical Assessment  LOC: Jasson Pineda, 68 y.o. female verified via two identifiers.  The patient is awake, alert, oriented and speaking appropriately at this time.  APPEARANCE: Patient resting comfortably and appears to be in no acute distress at this time. Patient is clean and well groomed, patient's clothing is properly fastened.  SKIN:The skin is warm and dry, color consistent with ethnicity, patient has normal skin turgor and moist mucus membranes, skin intact, no breakdown or brusing noted.  MUSCULOSKELETAL: Patient moving all extremities well, no obvious swelling or deformities noted. Tenderness to sternum noted.  RESPIRATORY: Airway is open and patent, respirations are spontaneous, patient has a normal effort and rate, no accessory muscle use noted.  CARDIAC: Patient has a normal rate and rhythm, no periphreal edema noted in any  extremity, capillary refill < 3 seconds in all extremities  ABDOMEN: Soft and non tender to palpation, no abdominal distention noted. Bowel sounds present in all four quadrants.  NEUROLOGIC: Eyes open spontaneously, behavior appropriate to situation, follows commands, facial expression symmetrical, bilateral hand grasp equal and even, purposeful motor response noted, normal sensation in all extremities when touched with a finger.

## 2019-01-31 NOTE — ED PROVIDER NOTES
Encounter Date: 2019       History     Chief Complaint   Patient presents with    Fatigue     Weakness x 1 week. Near syncopal episode earlier today.      68-year-old female presents to the ER for evaluation of generalized malaise fatigue abdominal pain and nausea.  Symptoms started a week ago.  She presented to [facility] on Thursday last week, approximately 7 days ago.  She was diagnosed with diverticulitis and discharged with Cipro and Flagyl which she has been compliant with.  She endorses decreased appetite for the past couple of days.  Upon arrival here vital signs within normal limits.  She complains of left lower quadrant abdominal pain and appears uncomfortable.  She does not appear to be septic or toxic.          Review of patient's allergies indicates:   Allergen Reactions    Sulfa (sulfonamide antibiotics) Rash     Past Medical History:   Diagnosis Date    Anticoagulant long-term use     xarelto    Chronic back pain     Chronic neck pain     DVT (deep venous thrombosis)     Herniated disc, cervical     Kidney stone     Lumbar herniated disc     PE (pulmonary embolism)         Pulmonary embolism      Past Surgical History:   Procedure Laterality Date    ARTHROPLASTY-SHOULDER-REVERSE Right 2016    Performed by Sedrick Patel MD at [facility] OR    BREAST CYST EXCISION Left      SECTION      x1    COLONOSCOPY N/A 2018    Performed by Aramnd Foy [facility] at [facility] ENDO (4TH FLR)    CYSTOSCOPY WITH RETROGRADE PYELOGRAM Left 3/24/2016    Performed by Inocencio Padilla MD at [facility] OR    CYSTOSCOPY WITH STENT PLACEMENT  3/24/2016    Performed by Inocencio Padilla MD at [facility] OR    EGD (ESOPHAGOGASTRODUODENOSCOPY) N/A 2018    Performed by Armand Foy MD at [facility] ENDO (4TH FLR)    HERNIA REPAIR      umbilical    KIDNEY STONE SURGERY      ureteral stent    left knee surgery      MANOMETRY, ESOPHAGEAL, WITH IMPEDANCE MEASUREMENT N/A 2018     Performed by [facility] at [facility] (4TH FLR)    REPAIR-TENDON-BICEP Right 12/19/2016    Per[facility] at [facility] OR    TONSILLECTOMY      TOTAL SHOULDER ARTHROPLASTY Right      Family History   Problem Relation Age of Onset    Colon cancer Mother 74    Liver cancer Sister     Diabetes Sister     Liver cancer Maternal Grandfather     Breast cancer Maternal Aunt 50        50s    Cervical cancer Maternal Aunt     Breast cancer Maternal Cousin 45    Ovarian cancer Neg Hx     Esophageal cancer Neg Hx      Social History     Tobacco Use    Smoking status: Never Smoker    Smokeless tobacco: Never Used   Substance Use Topics    Alcohol use: No    Drug use: No     Review of Systems   Constitutional: Positive for appetite change, chills, fatigue and fever.   HENT: Negative for sore throat.    Respiratory: Negative for shortness of breath.    Cardiovascular: Negative for chest pain.   Gastrointestinal: Positive for abdominal pain and nausea. Negative for abdominal distention, anal bleeding, blood in stool, constipation, diarrhea and vomiting.   Genitourinary: Negative for dysuria.   Musculoskeletal: Negative for back pain.   Skin: Negative for rash.   Neurological: Negative for weakness.   Hematological: Does not bruise/bleed easily.       Physical Exam     Initial Vitals [01/31/19 0118]   BP Pulse Resp Temp SpO2   103/76 84 18 98 °F (36.7 °C) 98 %      MAP       --         Physical Exam    Constitutional: Vital signs are normal. She appears well-developed and well-nourished. She is not diaphoretic. No distress.   HENT:   Head: Normocephalic and atraumatic.   Right Ear: External ear normal.   Left Ear: External ear normal.   Eyes: Conjunctivae are normal.   Cardiovascular: Normal rate, regular rhythm and normal heart sounds. Exam reveals no gallop and no friction rub.    No murmur heard.  Pulmonary/Chest: No respiratory distress. She has no wheezes. She has no rhonchi. She has no  rales. She exhibits no tenderness.   Clear on exam   Abdominal: Soft. Normal appearance and bowel sounds are normal. She exhibits no distension and no mass. There is tenderness. There is no rebound and no guarding.   Diffuse tenderness, Worse to the LLQ   Musculoskeletal: Normal range of motion.   Neurological: She is alert and oriented to person, place, and time.   Skin: Skin is warm and intact.   Psychiatric: She has a normal mood and affect. Her speech is normal and behavior is normal. Cognition and memory are normal.         ED Course   Procedures  Labs Reviewed   CULTURE, BLOOD   CULTURE, BLOOD   URINALYSIS, REFLEX TO URINE CULTURE   CBC W/ AUTO DIFFERENTIAL   COMPREHENSIVE METABOLIC PANEL   LIPASE   URINALYSIS, REFLEX TO URINE CULTURE   LACTIC ACID, PLASMA   POCT CREATININE          Imaging Results    None          Medical Decision Making:   Differential Diagnosis:   Diverticulitis, abscess, bowel obstruction, Failed Outpatient MNGT  ED Management:  69 yo F with abdominal pain, I suspect failed outpatient mngt of diverticulitis  Will get labs, repeat CT, fluids and ABX  No acute findings on labs or CT imaging.   Pt reports improvement in her fatigue and abdominal discomfort  Plan:  Second liter of fluids, ambulate patient and PO challenge.   Patient passed a p.o. Challenge,   Blood pressure improved, I suspect blood pressure was low secondary to hypovolemia, and patient being fatigued.   Patient ambulated well in the ED and is stable for discharge in good with the follow-up plan.                      Clinical Impression:   The primary encounter diagnosis was Nausea and vomiting, intractability of vomiting not specified, unspecified vomiting type. A diagnosis of Fatigue was also pertinent to this visit.      Disposition:   Disposition: Discharged  Condition: Stable                        Shon Esquivel PA-C  01/31/19 0535

## 2019-02-05 LAB
BACTERIA BLD CULT: NORMAL
BACTERIA BLD CULT: NORMAL

## 2019-02-16 ENCOUNTER — OFFICE VISIT (OUTPATIENT)
Dept: URGENT CARE | Facility: CLINIC | Age: 69
End: 2019-02-16
Payer: MEDICARE

## 2019-02-16 VITALS
HEIGHT: 62 IN | HEART RATE: 99 BPM | TEMPERATURE: 102 F | WEIGHT: 142 LBS | DIASTOLIC BLOOD PRESSURE: 72 MMHG | RESPIRATION RATE: 18 BRPM | OXYGEN SATURATION: 96 % | BODY MASS INDEX: 26.13 KG/M2 | SYSTOLIC BLOOD PRESSURE: 101 MMHG

## 2019-02-16 DIAGNOSIS — J11.1 INFLUENZA: ICD-10-CM

## 2019-02-16 DIAGNOSIS — R50.9 FEVER, UNSPECIFIED FEVER CAUSE: Primary | ICD-10-CM

## 2019-02-16 DIAGNOSIS — J40 BRONCHITIS: ICD-10-CM

## 2019-02-16 LAB
CTP QC/QA: YES
FLUAV AG NPH QL: POSITIVE
FLUBV AG NPH QL: NEGATIVE

## 2019-02-16 PROCEDURE — 99214 PR OFFICE/OUTPT VISIT, EST, LEVL IV, 30-39 MIN: ICD-10-PCS | Mod: S$GLB,,, | Performed by: NURSE PRACTITIONER

## 2019-02-16 PROCEDURE — 87804 POCT INFLUENZA A/B: ICD-10-PCS | Mod: 59,QW,S$GLB, | Performed by: NURSE PRACTITIONER

## 2019-02-16 PROCEDURE — 87804 INFLUENZA ASSAY W/OPTIC: CPT | Mod: QW,S$GLB,, | Performed by: NURSE PRACTITIONER

## 2019-02-16 PROCEDURE — 99214 OFFICE O/P EST MOD 30 MIN: CPT | Mod: S$GLB,,, | Performed by: NURSE PRACTITIONER

## 2019-02-16 RX ORDER — OSELTAMIVIR PHOSPHATE 75 MG/1
75 CAPSULE ORAL 2 TIMES DAILY
Qty: 10 CAPSULE | Refills: 0 | Status: SHIPPED | OUTPATIENT
Start: 2019-02-16 | End: 2019-02-21

## 2019-02-16 RX ORDER — PROMETHAZINE HYDROCHLORIDE AND DEXTROMETHORPHAN HYDROBROMIDE 6.25; 15 MG/5ML; MG/5ML
5 SYRUP ORAL 3 TIMES DAILY PRN
Qty: 75 ML | Refills: 0 | Status: SHIPPED | OUTPATIENT
Start: 2019-02-16 | End: 2019-02-26

## 2019-02-16 RX ORDER — ALBUTEROL SULFATE 90 UG/1
2 AEROSOL, METERED RESPIRATORY (INHALATION) EVERY 4 HOURS PRN
Qty: 1 INHALER | Refills: 0 | Status: SHIPPED | OUTPATIENT
Start: 2019-02-16 | End: 2023-03-01

## 2019-02-16 NOTE — PROGRESS NOTES
"Subjective:       Patient ID: Jasson Pineda is a 68 y.o. female.    Vitals:  height is 5' 2" (1.575 m) and weight is 64.4 kg (142 lb). Her temperature is 102 °F (38.9 °C) (abnormal). Her blood pressure is 101/72 and her pulse is 99. Her respiration is 18 and oxygen saturation is 96%.     Chief Complaint: Cough (cough, headache, chills x 2 days )    Cough   This is a new problem. The current episode started yesterday. The problem has been unchanged. The problem occurs constantly. The cough is productive of sputum. Associated symptoms include chills and wheezing. Pertinent negatives include no ear pain, eye redness, fever, hemoptysis, myalgias, rash, sore throat or shortness of breath. Treatments tried: Mucinex DM. The treatment provided no relief.       Constitution: Positive for chills. Negative for sweating, fatigue and fever.   HENT: Positive for congestion. Negative for ear pain, sinus pain, sinus pressure, sore throat and voice change.    Neck: Negative for painful lymph nodes.   Eyes: Negative for eye redness.   Respiratory: Positive for cough, sputum production and wheezing. Negative for chest tightness, bloody sputum, COPD, shortness of breath, stridor and asthma.    Gastrointestinal: Negative for nausea and vomiting.   Musculoskeletal: Negative for muscle ache.   Skin: Negative for rash.   Allergic/Immunologic: Negative for seasonal allergies and asthma.   Hematologic/Lymphatic: Negative for swollen lymph nodes.       Objective:      Physical Exam   Constitutional: She is oriented to person, place, and time. She appears well-developed and well-nourished. She is cooperative.  Non-toxic appearance. She does not appear ill. No distress.   HENT:   Head: Normocephalic and atraumatic.   Right Ear: Hearing, external ear and ear canal normal. Tympanic membrane is retracted.   Left Ear: Hearing, external ear and ear canal normal. Tympanic membrane is retracted.   Nose: Mucosal edema present. No rhinorrhea or " nasal deformity. No epistaxis. Right sinus exhibits frontal sinus tenderness. Right sinus exhibits no maxillary sinus tenderness. Left sinus exhibits frontal sinus tenderness. Left sinus exhibits no maxillary sinus tenderness.   Mouth/Throat: Uvula is midline and mucous membranes are normal. No trismus in the jaw. Normal dentition. No uvula swelling. Posterior oropharyngeal erythema present. Tonsils are 2+ on the right. Tonsils are 1+ on the left.   Eyes: Conjunctivae and lids are normal. Right eye exhibits no discharge. Left eye exhibits no discharge. No scleral icterus.   Sclera clear bilat   Neck: Trachea normal, normal range of motion, full passive range of motion without pain and phonation normal. Neck supple.   Cardiovascular: Normal rate, regular rhythm, normal heart sounds, intact distal pulses and normal pulses.   Pulmonary/Chest: Effort normal. No respiratory distress. She has decreased breath sounds in the right upper field and the left upper field.   Abdominal: Soft. Normal appearance and bowel sounds are normal. She exhibits no distension, no pulsatile midline mass and no mass. There is no tenderness.   Musculoskeletal: Normal range of motion. She exhibits no edema or deformity.   Neurological: She is alert and oriented to person, place, and time. She exhibits normal muscle tone. Coordination normal.   Skin: Skin is warm, dry and intact. She is not diaphoretic. No pallor.   Psychiatric: She has a normal mood and affect. Her speech is normal and behavior is normal. Judgment and thought content normal. Cognition and memory are normal.   Nursing note and vitals reviewed.      Assessment:       1. Fever, unspecified fever cause    2. Influenza    3. Bronchitis        Plan:         Fever, unspecified fever cause  -     POCT Influenza A/B    Influenza  -     POCT Influenza A/B  -     oseltamivir (TAMIFLU) 75 MG capsule; Take 1 capsule (75 mg total) by mouth 2 (two) times daily. for 5 days  Dispense: 10  capsule; Refill: 0  -     promethazine-dextromethorphan (PROMETHAZINE-DM) 6.25-15 mg/5 mL Syrp; Take 5 mLs by mouth 3 (three) times daily as needed.  Dispense: 75 mL; Refill: 0    Bronchitis  -     albuterol (PROVENTIL/VENTOLIN HFA) 90 mcg/actuation inhaler; Inhale 2 puffs into the lungs every 4 (four) hours as needed for Wheezing. Rescue  Dispense: 1 Inhaler; Refill: 0        Please drink plenty of fluids.  Please get plenty of rest.  Please return here or go to the Emergency Department for any concerns or worsening of condition.  Tamiflu prescription has been discussed and if prescribed, please take to completion unless you cannot tolerate the side effects.     If you were prescribed a narcotic medication, do not drive or operate heavy equipment or machinery while taking these medications.  If you were given a steroid shot in the clinic and have also been given a prescription for a steroid such as Prednisone or a Medrol Dose Pack, please begin taking them tomorrow.  If you do not have Hypertension or any history of palpitations, it is ok to take over the counter Sudafed or Mucinex D or Allegra-D or Claritin-D or Zyrtec-D.    If you do take one of the above, it is ok to combine that with plain over the counter Mucinex or Allegra or Claritin or Zyrtec.  If for example you are taking Zyrtec -D, you can combine that with Mucinex, but not Mucinex-D.  If you are taking Mucinex-D, you can combine that with plain Allegra or Claritin or Zyrtec.     If you do have Hypertension or palpitations, it is safe to take Coricidin HBP for relief of sinus symptoms.     If not allergic, please take over the counter Tylenol (Acetaminophen) and/or Motrin (Ibuprofen) as directed for control of pain and/or fever.  Please follow up with your primary care doctor or specialist as needed.    If you  smoke, please stop smoking.

## 2019-02-16 NOTE — PATIENT INSTRUCTIONS
Please drink plenty of fluids.  Please get plenty of rest.  Please return here or go to the Emergency Department for any concerns or worsening of condition.  Tamiflu prescription has been discussed and if prescribed, please take to completion unless you cannot tolerate the side effects.     If you were prescribed a narcotic medication, do not drive or operate heavy equipment or machinery while taking these medications.  If you were given a steroid shot in the clinic and have also been given a prescription for a steroid such as Prednisone or a Medrol Dose Pack, please begin taking them tomorrow.  If you do not have Hypertension or any history of palpitations, it is ok to take over the counter Sudafed or Mucinex D or Allegra-D or Claritin-D or Zyrtec-D.    If you do take one of the above, it is ok to combine that with plain over the counter Mucinex or Allegra or Claritin or Zyrtec.  If for example you are taking Zyrtec -D, you can combine that with Mucinex, but not Mucinex-D.  If you are taking Mucinex-D, you can combine that with plain Allegra or Claritin or Zyrtec.     If you do have Hypertension or palpitations, it is safe to take Coricidin HBP for relief of sinus symptoms.     If not allergic, please take over the counter Tylenol (Acetaminophen) and/or Motrin (Ibuprofen) as directed for control of pain and/or fever.  Please follow up with your primary care doctor or specialist as needed.    If you  smoke, please stop smoking

## 2019-03-27 RX ORDER — ESCITALOPRAM OXALATE 10 MG/1
TABLET ORAL
Qty: 90 TABLET | Refills: 1 | Status: SHIPPED | OUTPATIENT
Start: 2019-03-27 | End: 2019-09-22 | Stop reason: SDUPTHER

## 2019-03-31 ENCOUNTER — EXTERNAL CHRONIC CARE MANAGEMENT (OUTPATIENT)
Dept: PRIMARY CARE CLINIC | Facility: CLINIC | Age: 69
End: 2019-03-31
Payer: MEDICARE

## 2019-03-31 PROCEDURE — 99490 CHRNC CARE MGMT STAFF 1ST 20: CPT | Mod: PBBFAC | Performed by: FAMILY MEDICINE

## 2019-03-31 PROCEDURE — 99490 PR CHRONIC CARE MGMT, 1ST 20 MIN: ICD-10-PCS | Mod: S$PBB,,, | Performed by: FAMILY MEDICINE

## 2019-03-31 PROCEDURE — 99490 CHRNC CARE MGMT STAFF 1ST 20: CPT | Mod: S$PBB,,, | Performed by: FAMILY MEDICINE

## 2019-04-03 ENCOUNTER — OFFICE VISIT (OUTPATIENT)
Dept: GASTROENTEROLOGY | Facility: CLINIC | Age: 69
End: 2019-04-03
Payer: MEDICARE

## 2019-04-03 VITALS
SYSTOLIC BLOOD PRESSURE: 112 MMHG | WEIGHT: 139.31 LBS | HEART RATE: 67 BPM | DIASTOLIC BLOOD PRESSURE: 70 MMHG | HEIGHT: 62 IN | BODY MASS INDEX: 25.64 KG/M2

## 2019-04-03 DIAGNOSIS — R13.19 ESOPHAGEAL DYSPHAGIA: ICD-10-CM

## 2019-04-03 DIAGNOSIS — Q45.3 ABNORMALITY OF PANCREATIC DUCT: ICD-10-CM

## 2019-04-03 DIAGNOSIS — Z87.19 HISTORY OF COLONIC DIVERTICULITIS: ICD-10-CM

## 2019-04-03 DIAGNOSIS — R93.2 ABNORMAL FINDINGS ON DIAGNOSTIC IMAGING OF LIVER AND BILIARY TRACT: ICD-10-CM

## 2019-04-03 DIAGNOSIS — K22.4 INEFFECTIVE ESOPHAGEAL MOTILITY: Primary | ICD-10-CM

## 2019-04-03 DIAGNOSIS — M94.0 COSTOCHONDRITIS: ICD-10-CM

## 2019-04-03 PROCEDURE — 99214 OFFICE O/P EST MOD 30 MIN: CPT | Mod: S$PBB,,, | Performed by: INTERNAL MEDICINE

## 2019-04-03 PROCEDURE — 99999 PR PBB SHADOW E&M-EST. PATIENT-LVL III: ICD-10-PCS | Mod: PBBFAC,,, | Performed by: INTERNAL MEDICINE

## 2019-04-03 PROCEDURE — 99214 PR OFFICE/OUTPT VISIT, EST, LEVL IV, 30-39 MIN: ICD-10-PCS | Mod: S$PBB,,, | Performed by: INTERNAL MEDICINE

## 2019-04-03 PROCEDURE — 99999 PR PBB SHADOW E&M-EST. PATIENT-LVL III: CPT | Mod: PBBFAC,,, | Performed by: INTERNAL MEDICINE

## 2019-04-03 PROCEDURE — 99213 OFFICE O/P EST LOW 20 MIN: CPT | Mod: PBBFAC | Performed by: INTERNAL MEDICINE

## 2019-04-03 RX ORDER — DIAZEPAM 10 MG/1
10 TABLET ORAL ONCE
Qty: 1 TABLET | Refills: 0 | Status: SHIPPED | OUTPATIENT
Start: 2019-04-03 | End: 2019-06-03

## 2019-04-03 RX ORDER — FLUTICASONE PROPIONATE 50 MCG
SPRAY, SUSPENSION (ML) NASAL DAILY PRN
COMMUNITY
End: 2023-03-01

## 2019-04-03 RX ORDER — CICLOPIROX OLAMINE 7.7 MG/G
CREAM TOPICAL
Refills: 4 | COMMUNITY
Start: 2019-03-20 | End: 2019-06-03

## 2019-04-03 NOTE — PROGRESS NOTES
[facility] Established Patient Visit    Reason for Visit:    Chief Complaint   Patient presents with    Follow-up       PCP: Jennifer Cheatham    HPI:  Jasson Pineda is a 68 y.o. female here for follow-up of problems eating.  I last saw her in September of last year for ineffective esophageal motility and diagnosed costochondritis as well.  I recommended dietary changes and omeprazole.  She continues to have trouble eating.  Things get stuck.  She cannot eat regular foods.  Soft foods are better tolerated.  She still has a lot of pain in the xyphoid area.  She feels a knot in that area and swelling that is worse after eating.  She denies heartburn or regurgitation.  She takes Prilosec daily.  Lexapro was started 2-3 months ago.  Tramadol helps pain some.  She is on Lyrica.      She was also diagnosed with diverticulitis in January at [facility] (records reviewed in [facility]where).  She had abdominal pain, nausea, and diarrhea with CT evidence of diverticulitis of the descending colon (uncomplicated).  Sh was treated with cipro and flagyl with improvement in symptoms after 2 weeks.      Colonoscopy 18 by me.  Diverticulosis noted, but was otherwise normal with 10-yr repeat.          PMHX:  has a past medical history of Anticoagulant long-term use, Chronic back pain, Chronic neck pain, DVT (deep venous thrombosis), Herniated disc, cervical, Kidney stone, Lumbar herniated disc, PE (pulmonary embolism), and Pulmonary embolism.    PSHX:  has a past surgical history that includes Kidney stone surgery; left knee surgery; Tonsillectomy;  section; Hernia repair; Total shoulder arthroplasty (Right); Breast cyst excision (Left); Esophagogastroduodenoscopy (N/A, 2018); Colonoscopy (N/A, 2018); and Esophageal manometry with measurement of impedance (N/A, 2018).    The patient's social and family histories were reviewed by me and updated in the appropriate section of the  "electronic medical record.    Review of patient's allergies indicates:   Allergen Reactions    Sulfa (sulfonamide antibiotics) Rash       Prior to Admission medications    Medication Sig Start Date End Date Taking? Authorizing Provider   albuterol (PROVENTIL/VENTOLIN HFA) 90 mcg/actuation inhaler Inhale 2 puffs into the lungs every 4 (four) hours as needed for Wheezing. Rescue 2/16/19  Yes Roxanne Ayoub NP   atorvastatin (LIPITOR) 40 MG tablet TAKE 1 TABLET(40 MG) BY MOUTH EVERY EVENING 6/21/18  Yes Jennifer Cheatham MD   ciclopirox (LOPROX) 0.77 % Crea 1 GM APPLY ON THE SKIN TWICE A DAY 3/20/19  Yes Historical Provider, MD   escitalopram oxalate (LEXAPRO) 10 MG tablet TAKE 1 TABLET BY MOUTH EVERY DAY 3/27/19  Yes Jennifer Cheatham MD   fluticasone (FLONASE) 50 mcg/actuation nasal spray daily as needed.   Yes Historical Provider, MD   lidocaine-prilocaine (EMLA) cream Apply topically as needed. 1/15/19  Yes Jennifer Cheatham MD   omeprazole (PRILOSEC) 20 MG capsule Take 1 capsule (20 mg total) by mouth once daily. 11/30/18 11/30/19 Yes Jennifer Cheatham MD   ondansetron (ZOFRAN) 4 MG tablet Take 1 tablet (4 mg total) by mouth every 6 (six) hours. 1/31/19  Yes Shon Esquivel PA-C   pregabalin (LYRICA) 150 MG capsule Take 150 mg by mouth 2 (two) times daily.   Yes Historical Provider, MD   traMADol (ULTRAM) 50 mg tablet Take 50 mg by mouth 2 (two) times daily as needed. 10/26/18  Yes Historical Provider, MD   XARELTO 20 mg Tab Take 1 tablet by mouth every evening.  12/29/15  Yes Historical Provider, MD   AFLURIA QUAD 6446-2108, PF, 60 mcg/0.5 mL vaccine ADM 0.5ML IM UTD 10/8/18   Historical Provider, MD         Objective Findings:  Vital Signs:  /70   Pulse 67   Ht 5' 2" (1.575 m)   Wt 63.2 kg (139 lb 5.3 oz)   BMI 25.48 kg/m²  Body mass index is 25.48 kg/m².    Physical Exam:  General Appearance:  Well appearing in no acute distress, appears stated age  Abdomen: tender to superficial " palpation of the xyphoid area with increased pain during Carnett's maneuver.        Labs:  Lab Results   Component Value Date    WBC 6.27 01/31/2019    HGB 13.7 01/31/2019    HCT 43.1 01/31/2019    MCV 82 01/31/2019    RDW 15.9 (H) 01/31/2019     01/31/2019    GRAN 5.0 01/31/2019    GRAN 80.2 (H) 01/31/2019    LYMPH 0.7 (L) 01/31/2019    LYMPH 10.7 (L) 01/31/2019    MONO 0.4 01/31/2019    MONO 6.4 01/31/2019    EOS 0.1 01/31/2019    BASO 0.03 01/31/2019     Lab Results   Component Value Date     (L) 01/31/2019    K 3.6 01/31/2019     01/31/2019    CO2 19 (L) 01/31/2019    GLU 95 01/31/2019    BUN 10 01/31/2019    CREATININE 0.8 01/31/2019    CALCIUM 10.0 01/31/2019    PROT 7.7 01/31/2019    ALBUMIN 3.8 01/31/2019    BILITOT 0.8 01/31/2019    ALKPHOS 67 01/31/2019    AST 27 01/31/2019    ALT 17 01/31/2019           Imaging:  CT abodmen 1/24/19 as per HPI, also with prominent pancreatic duct at 4 mm            Assessment:  Jasson Pineda is a 68 y.o. female here with:  1. Ineffective esophageal motility    2. Esophageal dysphagia    3. Costochondritis    4. Abnormality of pancreatic duct    5. History of colonic diverticulitis    6. Abnormal findings on diagnostic imaging of liver and biliary tract       Ongoing issues with dysphagia secondary to ineffective esophageal motility.  No signs of reflux on Prilosec.  Consider spasms as well.      She continues to have pain in the xyphoid area that appear musculoskeletal in nature.  I don't think this is related to her esophagus.     The prominent pancreatic duct is a new finding.  Unclear as well of the clinical significance.    Recent diverticulitis, uncomplicated, improved with antibiotics.  Colonoscopy was done less than a year ago and without significant findings.          Recommendations:  1. I will get an MRCP to eval her pancreas better  2. Try peppermint oil.  If this doesn't help, then can try Levsin.  3. Consider trazodone  4. I will  get an esophagram with barium tablet  5. Follow-up with PCP regarding xyphoid pain      Follow-up pending the above      Order summary:  Orders Placed This Encounter   Procedures    FL Esophagram With Barium Tablet    MRI MRCP Without Contrast         Armand Foy MD

## 2019-04-07 ENCOUNTER — HOSPITAL ENCOUNTER (EMERGENCY)
Facility: HOSPITAL | Age: 69
Discharge: HOME | End: 2019-04-07
Attending: EMERGENCY MEDICINE
Payer: MEDICARE

## 2019-04-07 VITALS
RESPIRATION RATE: 18 BRPM | HEART RATE: 88 BPM | TEMPERATURE: 99 F | BODY MASS INDEX: 27.23 KG/M2 | HEIGHT: 62 IN | WEIGHT: 148 LBS | DIASTOLIC BLOOD PRESSURE: 55 MMHG | SYSTOLIC BLOOD PRESSURE: 102 MMHG

## 2019-04-07 DIAGNOSIS — R11.0 NAUSEA: ICD-10-CM

## 2019-04-07 DIAGNOSIS — R07.9 CHEST PAIN: ICD-10-CM

## 2019-04-07 DIAGNOSIS — R10.13 MIDEPIGASTRIC PAIN: Primary | ICD-10-CM

## 2019-04-07 LAB
ABO + RH BLD: NORMAL
ALBUMIN SERPL BCP-MCNC: 4.1 G/DL (ref 3.5–5.2)
ALLENS TEST: NORMAL
ALP SERPL-CCNC: 63 U/L (ref 55–135)
ALT SERPL W/O P-5'-P-CCNC: 11 U/L (ref 10–44)
ANION GAP SERPL CALC-SCNC: 9 MMOL/L (ref 8–16)
AST SERPL-CCNC: 14 U/L (ref 10–40)
BACTERIA #/AREA URNS AUTO: NORMAL /HPF
BASOPHILS # BLD AUTO: 0.01 K/UL (ref 0–0.2)
BASOPHILS NFR BLD: 0.2 % (ref 0–1.9)
BILIRUB SERPL-MCNC: 0.9 MG/DL (ref 0.1–1)
BILIRUB UR QL STRIP: NEGATIVE
BLD GP AB SCN CELLS X3 SERPL QL: NORMAL
BUN SERPL-MCNC: 10 MG/DL (ref 8–23)
CALCIUM SERPL-MCNC: 10.3 MG/DL (ref 8.7–10.5)
CHLORIDE SERPL-SCNC: 104 MMOL/L (ref 95–110)
CLARITY UR REFRACT.AUTO: CLEAR
CO2 SERPL-SCNC: 26 MMOL/L (ref 23–29)
COLOR UR AUTO: YELLOW
CREAT SERPL-MCNC: 0.9 MG/DL (ref 0.5–1.4)
DIFFERENTIAL METHOD: ABNORMAL
EOSINOPHIL # BLD AUTO: 0 K/UL (ref 0–0.5)
EOSINOPHIL NFR BLD: 0.7 % (ref 0–8)
ERYTHROCYTE [DISTWIDTH] IN BLOOD BY AUTOMATED COUNT: 16.1 % (ref 11.5–14.5)
EST. GFR  (AFRICAN AMERICAN): >60 ML/MIN/1.73 M^2
EST. GFR  (NON AFRICAN AMERICAN): >60 ML/MIN/1.73 M^2
GLUCOSE SERPL-MCNC: 101 MG/DL (ref 70–110)
GLUCOSE UR QL STRIP: NEGATIVE
HCT VFR BLD AUTO: 38.3 % (ref 37–48.5)
HGB BLD-MCNC: 12.6 G/DL (ref 12–16)
HGB UR QL STRIP: ABNORMAL
HYALINE CASTS UR QL AUTO: 1 /LPF
IMM GRANULOCYTES # BLD AUTO: 0.01 K/UL (ref 0–0.04)
IMM GRANULOCYTES NFR BLD AUTO: 0.2 % (ref 0–0.5)
KETONES UR QL STRIP: NEGATIVE
LACTATE SERPL-SCNC: 2.5 MMOL/L (ref 0.5–2.2)
LDH SERPL L TO P-CCNC: 1.15 MMOL/L (ref 0.5–2.2)
LEUKOCYTE ESTERASE UR QL STRIP: ABNORMAL
LIPASE SERPL-CCNC: 27 U/L (ref 4–60)
LYMPHOCYTES # BLD AUTO: 1.3 K/UL (ref 1–4.8)
LYMPHOCYTES NFR BLD: 32.3 % (ref 18–48)
MAGNESIUM SERPL-MCNC: 2 MG/DL (ref 1.6–2.6)
MCH RBC QN AUTO: 26.7 PG (ref 27–31)
MCHC RBC AUTO-ENTMCNC: 32.9 G/DL (ref 32–36)
MCV RBC AUTO: 81 FL (ref 82–98)
MICROSCOPIC COMMENT: NORMAL
MONOCYTES # BLD AUTO: 0.3 K/UL (ref 0.3–1)
MONOCYTES NFR BLD: 8.2 % (ref 4–15)
NEUTROPHILS # BLD AUTO: 2.4 K/UL (ref 1.8–7.7)
NEUTROPHILS NFR BLD: 58.4 % (ref 38–73)
NITRITE UR QL STRIP: NEGATIVE
NRBC BLD-RTO: 0 /100 WBC
PH UR STRIP: 7 [PH] (ref 5–8)
PLATELET # BLD AUTO: 267 K/UL (ref 150–350)
PMV BLD AUTO: 9.9 FL (ref 9.2–12.9)
POTASSIUM SERPL-SCNC: 3.5 MMOL/L (ref 3.5–5.1)
PROT SERPL-MCNC: 7.8 G/DL (ref 6–8.4)
PROT UR QL STRIP: NEGATIVE
RBC # BLD AUTO: 4.72 M/UL (ref 4–5.4)
RBC #/AREA URNS AUTO: 4 /HPF (ref 0–4)
SAMPLE: NORMAL
SITE: NORMAL
SODIUM SERPL-SCNC: 139 MMOL/L (ref 136–145)
SP GR UR STRIP: 1.01 (ref 1–1.03)
SQUAMOUS #/AREA URNS AUTO: 2 /HPF
TROPONIN I SERPL DL<=0.01 NG/ML-MCNC: <0.006 NG/ML (ref 0–0.03)
URN SPEC COLLECT METH UR: ABNORMAL
WBC # BLD AUTO: 4.15 K/UL (ref 3.9–12.7)
WBC #/AREA URNS AUTO: 2 /HPF (ref 0–5)

## 2019-04-07 PROCEDURE — 99285 EMERGENCY DEPT VISIT HI MDM: CPT | Mod: GC,,, | Performed by: EMERGENCY MEDICINE

## 2019-04-07 PROCEDURE — 96361 HYDRATE IV INFUSION ADD-ON: CPT

## 2019-04-07 PROCEDURE — 86901 BLOOD TYPING SEROLOGIC RH(D): CPT

## 2019-04-07 PROCEDURE — 96374 THER/PROPH/DIAG INJ IV PUSH: CPT | Mod: 59

## 2019-04-07 PROCEDURE — 84484 ASSAY OF TROPONIN QUANT: CPT

## 2019-04-07 PROCEDURE — 80053 COMPREHEN METABOLIC PANEL: CPT

## 2019-04-07 PROCEDURE — 85025 COMPLETE CBC W/AUTO DIFF WBC: CPT

## 2019-04-07 PROCEDURE — 93010 EKG 12-LEAD: ICD-10-PCS | Mod: ,,, | Performed by: INTERNAL MEDICINE

## 2019-04-07 PROCEDURE — 81001 URINALYSIS AUTO W/SCOPE: CPT

## 2019-04-07 PROCEDURE — 96375 TX/PRO/DX INJ NEW DRUG ADDON: CPT

## 2019-04-07 PROCEDURE — 93005 ELECTROCARDIOGRAM TRACING: CPT

## 2019-04-07 PROCEDURE — 25500020 PHARM REV CODE 255: Performed by: EMERGENCY MEDICINE

## 2019-04-07 PROCEDURE — 83690 ASSAY OF LIPASE: CPT

## 2019-04-07 PROCEDURE — 93010 ELECTROCARDIOGRAM REPORT: CPT | Mod: ,,, | Performed by: INTERNAL MEDICINE

## 2019-04-07 PROCEDURE — 83735 ASSAY OF MAGNESIUM: CPT

## 2019-04-07 PROCEDURE — 99285 PR EMERGENCY DEPT VISIT,LEVEL V: ICD-10-PCS | Mod: GC,,, | Performed by: EMERGENCY MEDICINE

## 2019-04-07 PROCEDURE — 83605 ASSAY OF LACTIC ACID: CPT

## 2019-04-07 PROCEDURE — 99285 EMERGENCY DEPT VISIT HI MDM: CPT | Mod: 25

## 2019-04-07 PROCEDURE — 25000003 PHARM REV CODE 250: Performed by: EMERGENCY MEDICINE

## 2019-04-07 PROCEDURE — 63600175 PHARM REV CODE 636 W HCPCS: Performed by: STUDENT IN AN ORGANIZED HEALTH CARE EDUCATION/TRAINING PROGRAM

## 2019-04-07 RX ORDER — ONDANSETRON 2 MG/ML
4 INJECTION INTRAMUSCULAR; INTRAVENOUS
Status: COMPLETED | OUTPATIENT
Start: 2019-04-07 | End: 2019-04-07

## 2019-04-07 RX ADMIN — ALUMINUM HYDROXIDE, MAGNESIUM HYDROXIDE, AND SIMETHICONE 50 ML: 200; 200; 20 SUSPENSION ORAL at 06:04

## 2019-04-07 RX ADMIN — ONDANSETRON 4 MG: 2 INJECTION INTRAMUSCULAR; INTRAVENOUS at 06:04

## 2019-04-07 RX ADMIN — IOHEXOL 75 ML: 350 INJECTION, SOLUTION INTRAVENOUS at 08:04

## 2019-04-07 RX ADMIN — SODIUM CHLORIDE 1000 ML: 0.9 INJECTION, SOLUTION INTRAVENOUS at 07:04

## 2019-04-07 NOTE — ED PROVIDER NOTES
Encounter Date: 2019       History     Chief Complaint   Patient presents with    Chest Pain     pt reports substernal chest pain that started last night and dizziness, reports taking tramodol PTA with no relief of symptoms, denies cardiac hx      67 y/o F with a history of DVT and PE (2013) on Xarelto presents with severe epigastric pain that radiates towards her back. Patient states that this is a chronic ongoing issue since July. However this Saturday, the pain was extremely severe and sharp and was associated with multiple episodes of diarrhea that was black in nature. Endorses nausea but no vomiting. Denies fevers, chills, trauma, palpitations. Does not have any urinary symptoms however, patient noticed that her urine was more dark in nature. Patient has tried tramadol with no relief. Has seen Dr. Fyo (GI) multiple times for this issue, and colonoscopy and EGD was unrevealing. No Echo on file. Esophageal manometry does reveal esophageal dysmotility. Patient was scheduled to receive MRI next Thursday.         Review of patient's allergies indicates:   Allergen Reactions    Sulfa (sulfonamide antibiotics) Rash     Past Medical History:   Diagnosis Date    Anticoagulant long-term use     xarelto    Chronic back pain     Chronic neck pain     DVT (deep venous thrombosis)     Herniated disc, cervical     Kidney stone     Lumbar herniated disc     PE (pulmonary embolism)         Pulmonary embolism      Past Surgical History:   Procedure Laterality Date    ARTHROPLASTY-SHOULDER-REVERSE Right 2016    Performed by [facility]el [facility] at [facility] OR    BREAST CYST EXCISION Left      SECTION      x1    COLONOSCOPY N/A 2018    [facility] ([facility]    CYSTOSCOPY WITH RETROGRADE PYELOGRAM Left 3/24/2016    Performed by Inocencio Padilla MD at [facility] OR    CYSTOSCOPY WITH STENT PLACEMENT  3/24/2016    Performed by [facility]io Pdailla [facility] at [facility] OR     EGD (ESOPHAGOGASTRODUODENOSCOPY) N/A 7/17/2018    [facility]O (4TH FLR)    HERNIA REPAIR      umbilical    KIDNEY STONE SURGERY      ureteral stent    left knee surgery      MANOMETRY, ESOPHAGEAL, WITH IMPEDANCE MEASUREMENT N/A 7/25/2018    Performed by [facility] at [facility] ENDO (4TH FLR)    REPAIR-TENDON-BICEP Right 12/19/2016    [facility] by [facility] at [facility] OR    TONSILLECTOMY      TOTAL SHOULDER ARTHROPLASTY Right      Family History   Problem Relation Age of Onset    Colon cancer Mother 74    Liver cancer Sister     Diabetes Sister     Liver cancer Maternal Grandfather     Breast cancer Maternal Aunt 50        50s    Cervical cancer Maternal Aunt     Breast cancer Maternal Cousin 45    Ovarian cancer Neg Hx     Esophageal cancer Neg Hx      Social History     Tobacco Use    Smoking status: Never Smoker    Smokeless tobacco: Never Used   Substance Use Topics    Alcohol use: No    Drug use: No     Review of Systems   Constitutional: Positive for fatigue and unexpected weight change. Negative for activity change, appetite change, chills, diaphoresis and fever.   HENT: Negative for congestion, dental problem, drooling, ear discharge, ear pain, facial swelling, hearing loss, mouth sores, nosebleeds, postnasal drip, rhinorrhea, sinus pressure, sinus pain, sneezing, sore throat, tinnitus, trouble swallowing and voice change.    Respiratory: Positive for shortness of breath. Negative for apnea, cough, choking, chest tightness, wheezing and stridor.    Cardiovascular: Negative for chest pain, palpitations and leg swelling.   Gastrointestinal: Positive for abdominal distention, abdominal pain, blood in stool and diarrhea. Negative for anal bleeding, constipation, nausea, rectal pain and vomiting.   Endocrine: Negative for cold intolerance, heat intolerance, polydipsia, polyphagia and polyuria.   Genitourinary: Negative for decreased urine volume,  difficulty urinating, dyspareunia, dysuria, enuresis, flank pain, frequency, genital sores, hematuria, menstrual problem, pelvic pain, urgency, vaginal bleeding and vaginal discharge.   Musculoskeletal: Negative for arthralgias, back pain, gait problem, joint swelling, myalgias, neck pain and neck stiffness.   Skin: Negative for color change, pallor and rash.   Neurological: Positive for light-headedness. Negative for dizziness, tremors, seizures, syncope, facial asymmetry, speech difficulty, weakness, numbness and headaches.   Hematological: Negative for adenopathy. Does not bruise/bleed easily.   Psychiatric/Behavioral: Negative for agitation, behavioral problems, confusion, decreased concentration, dysphoric mood and hallucinations. The patient is not nervous/anxious and is not hyperactive.        Physical Exam     Initial Vitals [04/07/19 0549]   BP Pulse Resp Temp SpO2   (!) 102/55 88 18 98.8 °F (37.1 °C) --      MAP       --         Physical Exam    Constitutional: She appears well-developed and well-nourished. She is not diaphoretic. She appears distressed.   HENT:   Head: Normocephalic and atraumatic.   Right Ear: External ear normal.   Left Ear: External ear normal.   Nose: Nose normal.   Mouth/Throat: Oropharynx is clear and moist. No oropharyngeal exudate.   Eyes: Conjunctivae and EOM are normal. Pupils are equal, round, and reactive to light. Right eye exhibits no discharge. Left eye exhibits no discharge. No scleral icterus.   Neck: Normal range of motion. No thyromegaly present.   Cardiovascular: Normal rate, regular rhythm, normal heart sounds and intact distal pulses. Exam reveals no gallop and no friction rub.    No murmur heard.  Pulmonary/Chest: Breath sounds normal. No respiratory distress. She has no wheezes. She has no rhonchi. She has no rales. She exhibits no tenderness.   Abdominal: Soft. Bowel sounds are normal. She exhibits distension. She exhibits no mass. There is tenderness. There is no  rebound and no guarding.   Pain on palpation of epigastric region   Genitourinary:   Genitourinary Comments: Mild flank pain    Musculoskeletal: Normal range of motion. She exhibits no edema or tenderness.   Neurological: She is alert and oriented to person, place, and time. She has normal strength and normal reflexes. She displays normal reflexes. No cranial nerve deficit or sensory deficit. GCS score is 15. GCS eye subscore is 4. GCS verbal subscore is 5. GCS motor subscore is 6.   Skin: Skin is warm and dry. No rash noted. No erythema. No pallor.         ED Course   Procedures  Labs Reviewed   CBC W/ AUTO DIFFERENTIAL   COMPREHENSIVE METABOLIC PANEL   LACTIC ACID, PLASMA   LIPASE   MAGNESIUM   TROPONIN I   URINALYSIS, REFLEX TO URINE CULTURE   TYPE & SCREEN     EKG Readings: (Independently Interpreted)   NSR with QTc at 438        Imaging Results    None          Medical Decision Making:   History:   Old Medical Records: I decided to obtain old medical records.  Old Records Summarized: records from previous admission(s) and records from clinic visits.  Initial Assessment:   67 y/o F with a medical history of DVT and PE (on xarelto) in 2013, presents to the ED with severe, sharp epigastric pain that radiates towards her back.  It began Saturday, and is associated with multiple episodes of diarrhea that is a mixture of dark and regular brown stool. Patient has been having this chronic ongoing issue since last July. GI workup revealed esophageal dysmotility. On PE, patient exhibits midepigastric tenderness on palpation and flank pain.     Differential Diagnosis:   Esophageal dysmotility versus gastritis versus acute pancreatitis versus pylonephritis vs [place] Prateek syndrome vs aortic dissection vs cardiac chest pain (low likelihood)   ED Management:  Obtained EKG (NSR with QTc 438). CBC, CMP, lactic acid, lipase, CXR, troponin and urinalysis ordered. Patient received GI cocktail and bentyl to help relieve her  symptoms.   10:25 AM  After the bentyl patient's symptoms were relieved. CT of the abdomen revealed nonobstructing nephroliathiasis. LA was initially elevated to 2.5 but after patient received 1 L of LA decreased to 1.15       APC / Resident Notes:   Patient presents with piercing epigastric pain that began yesterday. Patient states that this pain has bee chronic (since last July), however, she was unable to bear the pain with any of her pain  Medications. After the GI cocktail patient felt significantly better. Labs were unremarkable. Pain can be due to an exacerbation of her esophageal dysmotility. She will follow up with her GI doctor next week. Scheduled for an MRI of her abdomen (gastroenterologist) after she gets discharged.                 Clinical Impression:       ICD-10-CM ICD-9-CM   1. Midepigastric pain R10.13 789.06   2. Chest pain R07.9 786.50   3. Nausea R11.0 787.02                                Phoenix Bal MD  Resident  04/07/19 6640

## 2019-04-07 NOTE — ED TRIAGE NOTES
Ongoing issue since last July.  Increasing epigastric pain that is not getting better, pt has been seen for same thing many times.  Multiple complaints regarding diarrhea, decreased appetite, dizziness.

## 2019-04-18 ENCOUNTER — HOSPITAL ENCOUNTER (OUTPATIENT)
Dept: RADIOLOGY | Facility: HOSPITAL | Age: 69
Discharge: HOME | End: 2019-04-18
Attending: INTERNAL MEDICINE
Payer: MEDICARE

## 2019-04-18 DIAGNOSIS — R93.2 ABNORMAL FINDINGS ON DIAGNOSTIC IMAGING OF LIVER AND BILIARY TRACT: ICD-10-CM

## 2019-04-18 DIAGNOSIS — Q45.3 ABNORMALITY OF PANCREATIC DUCT: ICD-10-CM

## 2019-04-18 PROCEDURE — 74181 MRI ABDOMEN W/O CONTRAST: CPT | Mod: 26,,, | Performed by: RADIOLOGY

## 2019-04-18 PROCEDURE — 76376 3D RENDER W/INTRP POSTPROCES: CPT | Mod: TC

## 2019-04-18 PROCEDURE — 74181 MRI ABDOMEN W/O CONTRAST: CPT | Mod: TC

## 2019-04-18 PROCEDURE — 76376 MRI ABDOMEN WITHOUT CONTRAST MRCP: ICD-10-PCS | Mod: 26,,, | Performed by: RADIOLOGY

## 2019-04-18 PROCEDURE — 76376 3D RENDER W/INTRP POSTPROCES: CPT | Mod: 26,,, | Performed by: RADIOLOGY

## 2019-04-18 PROCEDURE — 74181 MRI ABDOMEN WITHOUT CONTRAST MRCP: ICD-10-PCS | Mod: 26,,, | Performed by: RADIOLOGY

## 2019-04-25 ENCOUNTER — PES CALL (OUTPATIENT)
Dept: ADMINISTRATIVE | Facility: CLINIC | Age: 69
End: 2019-04-25

## 2019-04-29 ENCOUNTER — PATIENT MESSAGE (OUTPATIENT)
Dept: GASTROENTEROLOGY | Facility: CLINIC | Age: 69
End: 2019-04-29

## 2019-04-30 ENCOUNTER — EXTERNAL CHRONIC CARE MANAGEMENT (OUTPATIENT)
Dept: PRIMARY CARE CLINIC | Facility: CLINIC | Age: 69
End: 2019-04-30
Payer: MEDICARE

## 2019-04-30 PROCEDURE — 99490 CHRNC CARE MGMT STAFF 1ST 20: CPT | Mod: S$PBB,,, | Performed by: FAMILY MEDICINE

## 2019-04-30 PROCEDURE — 99490 CHRNC CARE MGMT STAFF 1ST 20: CPT | Mod: PBBFAC | Performed by: FAMILY MEDICINE

## 2019-04-30 PROCEDURE — 99490 PR CHRONIC CARE MGMT, 1ST 20 MIN: ICD-10-PCS | Mod: S$PBB,,, | Performed by: FAMILY MEDICINE

## 2019-05-16 ENCOUNTER — PATIENT MESSAGE (OUTPATIENT)
Dept: GASTROENTEROLOGY | Facility: CLINIC | Age: 69
End: 2019-05-16

## 2019-05-21 ENCOUNTER — TELEPHONE (OUTPATIENT)
Dept: RADIOLOGY | Facility: HOSPITAL | Age: 69
End: 2019-05-21

## 2019-05-30 ENCOUNTER — TELEPHONE (OUTPATIENT)
Dept: RADIOLOGY | Facility: HOSPITAL | Age: 69
End: 2019-05-30

## 2019-05-31 ENCOUNTER — HOSPITAL ENCOUNTER (OUTPATIENT)
Dept: RADIOLOGY | Facility: HOSPITAL | Age: 69
Discharge: HOME | End: 2019-05-31
Attending: INTERNAL MEDICINE
Payer: MEDICARE

## 2019-05-31 ENCOUNTER — EXTERNAL CHRONIC CARE MANAGEMENT (OUTPATIENT)
Dept: PRIMARY CARE CLINIC | Facility: CLINIC | Age: 69
End: 2019-05-31
Payer: MEDICARE

## 2019-05-31 DIAGNOSIS — R13.19 ESOPHAGEAL DYSPHAGIA: ICD-10-CM

## 2019-05-31 DIAGNOSIS — K22.4 INEFFECTIVE ESOPHAGEAL MOTILITY: ICD-10-CM

## 2019-05-31 PROCEDURE — 74220 X-RAY XM ESOPHAGUS 1CNTRST: CPT | Mod: 26,,, | Performed by: RADIOLOGY

## 2019-05-31 PROCEDURE — 74220 X-RAY XM ESOPHAGUS 1CNTRST: CPT | Mod: TC

## 2019-05-31 PROCEDURE — 99490 CHRNC CARE MGMT STAFF 1ST 20: CPT | Mod: S$PBB,,, | Performed by: FAMILY MEDICINE

## 2019-05-31 PROCEDURE — 99490 PR CHRONIC CARE MGMT, 1ST 20 MIN: ICD-10-PCS | Mod: S$PBB,,, | Performed by: FAMILY MEDICINE

## 2019-05-31 PROCEDURE — 99490 CHRNC CARE MGMT STAFF 1ST 20: CPT | Mod: PBBFAC | Performed by: FAMILY MEDICINE

## 2019-05-31 PROCEDURE — 74220 FL ESOPHAGRAM WITH BARIUM TABLET: ICD-10-PCS | Mod: 26,,, | Performed by: RADIOLOGY

## 2019-06-03 ENCOUNTER — HOSPITAL ENCOUNTER (OUTPATIENT)
Dept: PREADMISSION TESTING | Facility: OTHER | Age: 69
Discharge: HOME | End: 2019-06-03
Attending: SPECIALIST
Payer: MEDICARE

## 2019-06-03 ENCOUNTER — ANESTHESIA EVENT (OUTPATIENT)
Dept: SURGERY | Facility: OTHER | Age: 69
End: 2019-06-03
Payer: MEDICARE

## 2019-06-03 VITALS
DIASTOLIC BLOOD PRESSURE: 56 MMHG | BODY MASS INDEX: 26.87 KG/M2 | HEIGHT: 62 IN | OXYGEN SATURATION: 100 % | SYSTOLIC BLOOD PRESSURE: 99 MMHG | TEMPERATURE: 98 F | WEIGHT: 146 LBS | HEART RATE: 77 BPM

## 2019-06-03 LAB
ANION GAP SERPL CALC-SCNC: 4 MMOL/L (ref 8–16)
BASOPHILS # BLD AUTO: 0.01 K/UL (ref 0–0.2)
BASOPHILS NFR BLD: 0.2 % (ref 0–1.9)
BUN SERPL-MCNC: 14 MG/DL (ref 8–23)
CALCIUM SERPL-MCNC: 9.6 MG/DL (ref 8.7–10.5)
CHLORIDE SERPL-SCNC: 106 MMOL/L (ref 95–110)
CO2 SERPL-SCNC: 29 MMOL/L (ref 23–29)
CREAT SERPL-MCNC: 0.9 MG/DL (ref 0.5–1.4)
DIFFERENTIAL METHOD: ABNORMAL
EOSINOPHIL # BLD AUTO: 0.1 K/UL (ref 0–0.5)
EOSINOPHIL NFR BLD: 2.4 % (ref 0–8)
ERYTHROCYTE [DISTWIDTH] IN BLOOD BY AUTOMATED COUNT: 15.8 % (ref 11.5–14.5)
EST. GFR  (AFRICAN AMERICAN): >60 ML/MIN/1.73 M^2
EST. GFR  (NON AFRICAN AMERICAN): >60 ML/MIN/1.73 M^2
GLUCOSE SERPL-MCNC: 80 MG/DL (ref 70–110)
HCT VFR BLD AUTO: 38.1 % (ref 37–48.5)
HGB BLD-MCNC: 12 G/DL (ref 12–16)
LYMPHOCYTES # BLD AUTO: 1.6 K/UL (ref 1–4.8)
LYMPHOCYTES NFR BLD: 34.4 % (ref 18–48)
MCH RBC QN AUTO: 25.8 PG (ref 27–31)
MCHC RBC AUTO-ENTMCNC: 31.5 G/DL (ref 32–36)
MCV RBC AUTO: 82 FL (ref 82–98)
MONOCYTES # BLD AUTO: 0.4 K/UL (ref 0.3–1)
MONOCYTES NFR BLD: 9.8 % (ref 4–15)
NEUTROPHILS # BLD AUTO: 2.4 K/UL (ref 1.8–7.7)
NEUTROPHILS NFR BLD: 53 % (ref 38–73)
PLATELET # BLD AUTO: 275 K/UL (ref 150–350)
PMV BLD AUTO: 10.6 FL (ref 9.2–12.9)
POTASSIUM SERPL-SCNC: 4.7 MMOL/L (ref 3.5–5.1)
RBC # BLD AUTO: 4.65 M/UL (ref 4–5.4)
SODIUM SERPL-SCNC: 139 MMOL/L (ref 136–145)
WBC # BLD AUTO: 4.51 K/UL (ref 3.9–12.7)

## 2019-06-03 PROCEDURE — 85025 COMPLETE CBC W/AUTO DIFF WBC: CPT

## 2019-06-03 PROCEDURE — 80048 BASIC METABOLIC PNL TOTAL CA: CPT

## 2019-06-03 PROCEDURE — 36415 COLL VENOUS BLD VENIPUNCTURE: CPT

## 2019-06-03 RX ORDER — LIDOCAINE HYDROCHLORIDE 10 MG/ML
0.5 INJECTION, SOLUTION EPIDURAL; INFILTRATION; INTRACAUDAL; PERINEURAL ONCE
Status: CANCELLED | OUTPATIENT
Start: 2019-06-03 | End: 2019-06-03

## 2019-06-03 RX ORDER — PREGABALIN 75 MG/1
75 CAPSULE ORAL ONCE
Status: CANCELLED | OUTPATIENT
Start: 2019-06-03 | End: 2019-06-03

## 2019-06-03 RX ORDER — SODIUM CHLORIDE, SODIUM LACTATE, POTASSIUM CHLORIDE, CALCIUM CHLORIDE 600; 310; 30; 20 MG/100ML; MG/100ML; MG/100ML; MG/100ML
INJECTION, SOLUTION INTRAVENOUS CONTINUOUS
Status: CANCELLED | OUTPATIENT
Start: 2019-06-03

## 2019-06-03 RX ORDER — ACETAMINOPHEN 500 MG
1000 TABLET ORAL
Status: CANCELLED | OUTPATIENT
Start: 2019-06-03 | End: 2019-06-03

## 2019-06-03 NOTE — DISCHARGE INSTRUCTIONS
PRE-ADMIT TESTING -  [phone]    [address]  [place] [facility]          Your surgery has been scheduled at [facility]. We are pleased to have the opportunity to serve you. For Further Information please call [phone].    On the day of surgery please report to the Information Desk on the 1st floor.    · CONTACT YOUR PHYSICIAN'S OFFICE THE DAY PRIOR TO YOUR SURGERY TO OBTAIN YOUR ARRIVAL TIME.     · The evening before surgery do not eat anything after 9 p.m. ( this includes hard candy, chewing gum and mints).  You may only have GATORADE, POWERADE AND WATER  from 9 p.m. until you leave your home.   DO NOT DRINK ANY LIQUIDS ON THE WAY TO THE HOSPITAL.      SPECIAL MEDICATION INSTRUCTIONS: TAKE medications checked off by the Anesthesiologist on your Medication List.    Angiogram Patients: Take medications as instructed by your physician, including aspirin.     Surgery Patients:    If you take ASPIRIN - Your PHYSICIAN/SURGEON will need to inform you IF/OR when you need to stop taking aspirin prior to your surgery.     Do Not take any medications containing IBUPROFEN.  Do Not Wear any make-up or dark nail polish   (especially eye make-up) to surgery. If you come to surgery with makeup on you will be required to remove the makeup or nail polish.    Do not shave your surgical area at least 5 days prior to your surgery. The surgical prep will be performed at the hospital according to Infection Control regulations.    Leave all valuables at home.   Do Not wear any jewelry or watches, including any metal in body piercings. Jewelry must be removed prior to coming to the hospital.  There is a possibility that rings that are unable to be removed may be cut off if they are on the surgical extremity.    Contact Lens must be removed before surgery. Either do not wear the contact lens or bring a case and solution for storage.  Please bring a container for eyeglasses or dentures as required.  Bring  any paperwork your physician has provided, such as consent forms,  history and physicals, doctor's orders, etc.   Bring comfortable clothes that are loose fitting to wear upon discharge. Take into consideration the type of surgery being performed.  Maintain your diet as advised per your physician the day prior to surgery.      Adequate rest the night before surgery is advised.   Park in the Parking lot behind the hospital or in the [place] [facility] across the street from the parking lot. Parking is complimentary.  If you will be discharged the same day as your procedure, please arrange for a responsible adult to drive you home or to accompany you if traveling by taxi.   YOU WILL NOT BE PERMITTED TO DRIVE OR TO LEAVE THE HOSPITAL ALONE AFTER SURGERY.   It is strongly recommended that you arrange for someone to remain with you for the first 24 hrs following your surgery.    The Surgeon will speak to your family/visitor after your surgery regarding the outcome of your surgery and post op care.  The Surgeon may speak to you after your surgery, but there is a possibility you may not remember the details.  Please check with your family members regarding the conversation with the Surgeon.      Thank you for your cooperation.  The Staff of [facility].                Bathing Instructions with Hibiclens     Shower the evening before and morning of your procedure with Hibiclens:   Wash your face with water and your regular face wash/soap   Apply Hibiclens directly on your skin or on a wet washcloth and wash gently. When showering: Move away from the shower stream when applying Hibiclens to avoid rinsing off too soon.   Rinse thoroughly with warm water   Do not dilute Hibiclens         Dry off as usual, do not use any deodorant, powder, body lotions, perfume, after shave or cologne.

## 2019-06-03 NOTE — ANESTHESIA PREPROCEDURE EVALUATION
06/03/2019  Jasson Pineda is a 68 y.o., female.    Anesthesia Evaluation    I have reviewed the Patient Summary Reports.    I have reviewed the Nursing Notes.   I have reviewed the Medications.     Review of Systems  Anesthesia Hx:  No problems with previous Anesthesia  Denies Family Hx of Anesthesia complications.   Denies Personal Hx of Anesthesia complications.   Social:  Non-Smoker    Hematology/Oncology:  Hematology Normal   Oncology Normal   Hematology Comments: Pt on Xarelto    EENT/Dental:EENT/Dental Normal   Cardiovascular:  Cardiovascular Normal  Has had two DVT's one spontaneous, one five days after total shoulder. Dr.Nguyen Persaud following. [place] to coordinate Xarelto stopping and starting   Pulmonary:  Pulmonary Normal    Renal/:  Renal/ Normal     Hepatic/GI:   GERD Recent workup per Dr Foy for GI problems.  Currently on Prilosec   Musculoskeletal:  Musculoskeletal Normal    Neurological:  Neurology Normal    Endocrine:  Endocrine Normal    Dermatological:  Skin Normal    Psych:  Psychiatric Normal           Physical Exam  General:  Well nourished    Airway/Jaw/Neck:  Airway Findings: Mouth Opening: Normal Mallampati: II      Dental:  Dental Findings: In tact        Mental Status:  Mental Status Findings:  Cooperative, Alert and Oriented         Anesthesia Plan  Type of Anesthesia, risks & benefits discussed:  Anesthesia Type:  general  Patient's Preference:   Intra-op Monitoring Plan: standard ASA monitors  Intra-op Monitoring Plan Comments:   Post Op Pain Control Plan: multimodal analgesia and peripheral nerve block  Post Op Pain Control Plan Comments:   Induction:   IV  Beta Blocker:         Informed Consent: Patient understands risks and agrees with Anesthesia plan.  Questions answered. Anesthesia consent signed with patient.  ASA Score: 3     Day of Surgery Review  of History & Physical:    H&P update referred to the surgeon.     Anesthesia Plan Notes: Labs ordered  Recent EKG in Epic        Ready For Surgery From Anesthesia Perspective.

## 2019-06-04 NOTE — PRE ADMISSION SCREENING
Patient states her cardiologist instructed her to stop her xarelto 2-days prior to surgery,she was instructed to call dr berry's office and notify him when she was instructed to stop her xarelto.

## 2019-06-11 ENCOUNTER — HOSPITAL ENCOUNTER (OUTPATIENT)
Facility: OTHER | Age: 69
Discharge: HOME | End: 2019-06-11
Attending: ORTHOPAEDIC SURGERY | Admitting: ORTHOPAEDIC SURGERY
Payer: MEDICARE

## 2019-06-11 ENCOUNTER — ANESTHESIA (OUTPATIENT)
Dept: SURGERY | Facility: OTHER | Age: 69
End: 2019-06-11
Payer: MEDICARE

## 2019-06-11 VITALS
HEIGHT: 62 IN | WEIGHT: 146 LBS | DIASTOLIC BLOOD PRESSURE: 53 MMHG | OXYGEN SATURATION: 95 % | RESPIRATION RATE: 16 BRPM | HEART RATE: 67 BPM | TEMPERATURE: 98 F | BODY MASS INDEX: 26.87 KG/M2 | SYSTOLIC BLOOD PRESSURE: 108 MMHG

## 2019-06-11 DIAGNOSIS — M75.122 COMPLETE ROTATOR CUFF TEAR OR RUPTURE OF LEFT SHOULDER, NOT SPECIFIED AS TRAUMATIC: Primary | ICD-10-CM

## 2019-06-11 PROBLEM — S46.119A LABRAL TEAR OF LONG HEAD OF BICEPS TENDON, INITIAL ENCOUNTER: Status: ACTIVE | Noted: 2019-06-11

## 2019-06-11 PROBLEM — M75.42 IMPINGEMENT SYNDROME OF LEFT SHOULDER: Status: ACTIVE | Noted: 2019-06-11

## 2019-06-11 PROBLEM — M19.012 PRIMARY OSTEOARTHRITIS OF LEFT SHOULDER: Status: ACTIVE | Noted: 2019-06-11

## 2019-06-11 PROCEDURE — 25000003 PHARM REV CODE 250: Performed by: ORTHOPAEDIC SURGERY

## 2019-06-11 PROCEDURE — 36000710: Performed by: ORTHOPAEDIC SURGERY

## 2019-06-11 PROCEDURE — 71000016 HC POSTOP RECOV ADDL HR: Performed by: ORTHOPAEDIC SURGERY

## 2019-06-11 PROCEDURE — 25000003 PHARM REV CODE 250: Performed by: NURSE ANESTHETIST, CERTIFIED REGISTERED

## 2019-06-11 PROCEDURE — C1713 ANCHOR/SCREW BN/BN,TIS/BN: HCPCS | Performed by: ORTHOPAEDIC SURGERY

## 2019-06-11 PROCEDURE — 71000033 HC RECOVERY, INTIAL HOUR: Performed by: ORTHOPAEDIC SURGERY

## 2019-06-11 PROCEDURE — 71000039 HC RECOVERY, EACH ADD'L HOUR: Performed by: ORTHOPAEDIC SURGERY

## 2019-06-11 PROCEDURE — 63600175 PHARM REV CODE 636 W HCPCS: Performed by: ANESTHESIOLOGY

## 2019-06-11 PROCEDURE — S0077 INJECTION, CLINDAMYCIN PHOSP: HCPCS | Performed by: ORTHOPAEDIC SURGERY

## 2019-06-11 PROCEDURE — 27201423 OPTIME MED/SURG SUP & DEVICES STERILE SUPPLY: Performed by: ORTHOPAEDIC SURGERY

## 2019-06-11 PROCEDURE — 25000003 PHARM REV CODE 250: Performed by: ANESTHESIOLOGY

## 2019-06-11 PROCEDURE — 37000008 HC ANESTHESIA 1ST 15 MINUTES: Performed by: ORTHOPAEDIC SURGERY

## 2019-06-11 PROCEDURE — 37000009 HC ANESTHESIA EA ADD 15 MINS: Performed by: ORTHOPAEDIC SURGERY

## 2019-06-11 PROCEDURE — 63600175 PHARM REV CODE 636 W HCPCS: Performed by: NURSE ANESTHETIST, CERTIFIED REGISTERED

## 2019-06-11 PROCEDURE — 36000711: Performed by: ORTHOPAEDIC SURGERY

## 2019-06-11 PROCEDURE — 71000015 HC POSTOP RECOV 1ST HR: Performed by: ORTHOPAEDIC SURGERY

## 2019-06-11 PROCEDURE — 63600175 PHARM REV CODE 636 W HCPCS: Performed by: ORTHOPAEDIC SURGERY

## 2019-06-11 DEVICE — ANCHOR BIOCOMP W/3 SUTURES: Type: IMPLANTABLE DEVICE | Site: SHOULDER | Status: FUNCTIONAL

## 2019-06-11 DEVICE — ANCHOR BIOCOMP SWVLLOK: Type: IMPLANTABLE DEVICE | Site: SHOULDER | Status: FUNCTIONAL

## 2019-06-11 RX ORDER — GLYCOPYRROLATE 0.2 MG/ML
INJECTION INTRAMUSCULAR; INTRAVENOUS
Status: DISCONTINUED | OUTPATIENT
Start: 2019-06-11 | End: 2019-06-11

## 2019-06-11 RX ORDER — HYDROCODONE BITARTRATE AND ACETAMINOPHEN 10; 325 MG/1; MG/1
1 TABLET ORAL
Qty: 40 TABLET | Refills: 0 | Status: SHIPPED | OUTPATIENT
Start: 2019-06-11 | End: 2020-07-09

## 2019-06-11 RX ORDER — ACETAMINOPHEN 500 MG
1000 TABLET ORAL
Status: COMPLETED | OUTPATIENT
Start: 2019-06-11 | End: 2019-06-11

## 2019-06-11 RX ORDER — PROPOFOL 10 MG/ML
VIAL (ML) INTRAVENOUS
Status: DISCONTINUED | OUTPATIENT
Start: 2019-06-11 | End: 2019-06-11

## 2019-06-11 RX ORDER — ONDANSETRON 2 MG/ML
4 INJECTION INTRAMUSCULAR; INTRAVENOUS DAILY PRN
Status: DISCONTINUED | OUTPATIENT
Start: 2019-06-11 | End: 2019-06-11 | Stop reason: HOSPADM

## 2019-06-11 RX ORDER — ROCURONIUM BROMIDE 10 MG/ML
INJECTION, SOLUTION INTRAVENOUS
Status: DISCONTINUED | OUTPATIENT
Start: 2019-06-11 | End: 2019-06-11

## 2019-06-11 RX ORDER — MIDAZOLAM HYDROCHLORIDE 1 MG/ML
2 INJECTION INTRAMUSCULAR; INTRAVENOUS
Status: COMPLETED | OUTPATIENT
Start: 2019-06-11 | End: 2019-06-11

## 2019-06-11 RX ORDER — PREGABALIN 75 MG/1
75 CAPSULE ORAL ONCE
Status: COMPLETED | OUTPATIENT
Start: 2019-06-11 | End: 2019-06-11

## 2019-06-11 RX ORDER — LIDOCAINE HYDROCHLORIDE 10 MG/ML
0.5 INJECTION, SOLUTION EPIDURAL; INFILTRATION; INTRACAUDAL; PERINEURAL ONCE
Status: DISCONTINUED | OUTPATIENT
Start: 2019-06-11 | End: 2019-06-11 | Stop reason: HOSPADM

## 2019-06-11 RX ORDER — SODIUM CHLORIDE 0.9 % (FLUSH) 0.9 %
10 SYRINGE (ML) INJECTION
Status: DISCONTINUED | OUTPATIENT
Start: 2019-06-11 | End: 2019-06-11 | Stop reason: HOSPADM

## 2019-06-11 RX ORDER — MEPERIDINE HYDROCHLORIDE 25 MG/ML
12.5 INJECTION INTRAMUSCULAR; INTRAVENOUS; SUBCUTANEOUS ONCE AS NEEDED
Status: DISCONTINUED | OUTPATIENT
Start: 2019-06-11 | End: 2019-06-11 | Stop reason: HOSPADM

## 2019-06-11 RX ORDER — ONDANSETRON 2 MG/ML
4 INJECTION INTRAMUSCULAR; INTRAVENOUS EVERY 12 HOURS PRN
Status: CANCELLED | OUTPATIENT
Start: 2019-06-11

## 2019-06-11 RX ORDER — OXYCODONE HYDROCHLORIDE 5 MG/1
10 TABLET ORAL EVERY 4 HOURS PRN
Status: CANCELLED | OUTPATIENT
Start: 2019-06-11

## 2019-06-11 RX ORDER — HYDROMORPHONE HYDROCHLORIDE 2 MG/ML
0.4 INJECTION, SOLUTION INTRAMUSCULAR; INTRAVENOUS; SUBCUTANEOUS EVERY 5 MIN PRN
Status: DISCONTINUED | OUTPATIENT
Start: 2019-06-11 | End: 2019-06-11 | Stop reason: HOSPADM

## 2019-06-11 RX ORDER — ONDANSETRON 4 MG/1
8 TABLET, ORALLY DISINTEGRATING ORAL EVERY 8 HOURS PRN
Qty: 10 TABLET | Refills: 1 | Status: SHIPPED | OUTPATIENT
Start: 2019-06-11 | End: 2021-01-13

## 2019-06-11 RX ORDER — EPINEPHRINE 1 MG/ML
INJECTION, SOLUTION INTRACARDIAC; INTRAMUSCULAR; INTRAVENOUS; SUBCUTANEOUS
Status: DISCONTINUED | OUTPATIENT
Start: 2019-06-11 | End: 2019-06-11 | Stop reason: HOSPADM

## 2019-06-11 RX ORDER — EPHEDRINE SULFATE 50 MG/ML
INJECTION, SOLUTION INTRAVENOUS
Status: DISCONTINUED | OUTPATIENT
Start: 2019-06-11 | End: 2019-06-11

## 2019-06-11 RX ORDER — NEOSTIGMINE METHYLSULFATE 1 MG/ML
INJECTION, SOLUTION INTRAVENOUS
Status: DISCONTINUED | OUTPATIENT
Start: 2019-06-11 | End: 2019-06-11

## 2019-06-11 RX ORDER — HYDROCODONE BITARTRATE AND ACETAMINOPHEN 5; 325 MG/1; MG/1
1 TABLET ORAL EVERY 4 HOURS PRN
Status: CANCELLED | OUTPATIENT
Start: 2019-06-11

## 2019-06-11 RX ORDER — SODIUM CHLORIDE, SODIUM LACTATE, POTASSIUM CHLORIDE, CALCIUM CHLORIDE 600; 310; 30; 20 MG/100ML; MG/100ML; MG/100ML; MG/100ML
INJECTION, SOLUTION INTRAVENOUS CONTINUOUS
Status: DISCONTINUED | OUTPATIENT
Start: 2019-06-11 | End: 2019-06-11 | Stop reason: HOSPADM

## 2019-06-11 RX ORDER — FENTANYL CITRATE 50 UG/ML
100 INJECTION, SOLUTION INTRAMUSCULAR; INTRAVENOUS EVERY 5 MIN PRN
Status: COMPLETED | OUTPATIENT
Start: 2019-06-11 | End: 2019-06-11

## 2019-06-11 RX ORDER — OXYCODONE AND ACETAMINOPHEN 10; 325 MG/1; MG/1
1 TABLET ORAL
Qty: 40 TABLET | Refills: 0 | Status: SHIPPED | OUTPATIENT
Start: 2019-06-11 | End: 2019-07-25

## 2019-06-11 RX ORDER — LIDOCAINE HCL/PF 100 MG/5ML
SYRINGE (ML) INTRAVENOUS
Status: DISCONTINUED | OUTPATIENT
Start: 2019-06-11 | End: 2019-06-11

## 2019-06-11 RX ORDER — ROPIVACAINE HYDROCHLORIDE 5 MG/ML
INJECTION, SOLUTION EPIDURAL; INFILTRATION; PERINEURAL
Status: COMPLETED | OUTPATIENT
Start: 2019-06-11 | End: 2019-06-11

## 2019-06-11 RX ORDER — ONDANSETRON 2 MG/ML
INJECTION INTRAMUSCULAR; INTRAVENOUS
Status: DISCONTINUED | OUTPATIENT
Start: 2019-06-11 | End: 2019-06-11

## 2019-06-11 RX ORDER — OXYCODONE HYDROCHLORIDE 5 MG/1
5 TABLET ORAL
Status: DISCONTINUED | OUTPATIENT
Start: 2019-06-11 | End: 2019-06-11 | Stop reason: HOSPADM

## 2019-06-11 RX ORDER — SODIUM CHLORIDE 0.9 % (FLUSH) 0.9 %
3 SYRINGE (ML) INJECTION
Status: DISCONTINUED | OUTPATIENT
Start: 2019-06-11 | End: 2019-06-11 | Stop reason: HOSPADM

## 2019-06-11 RX ORDER — CLINDAMYCIN PHOSPHATE 900 MG/50ML
900 INJECTION, SOLUTION INTRAVENOUS
Status: COMPLETED | OUTPATIENT
Start: 2019-06-11 | End: 2019-06-11

## 2019-06-11 RX ADMIN — NEOSTIGMINE METHYLSULFATE 5 MG: 1 INJECTION INTRAVENOUS at 10:06

## 2019-06-11 RX ADMIN — MIDAZOLAM HYDROCHLORIDE 2 MG: 1 INJECTION, SOLUTION INTRAMUSCULAR; INTRAVENOUS at 08:06

## 2019-06-11 RX ADMIN — PROPOFOL 150 MG: 10 INJECTION, EMULSION INTRAVENOUS at 09:06

## 2019-06-11 RX ADMIN — GLYCOPYRROLATE 0.6 MG: 0.2 INJECTION, SOLUTION INTRAMUSCULAR; INTRAVENOUS at 10:06

## 2019-06-11 RX ADMIN — CLINDAMYCIN PHOSPHATE 900 MG: 18 INJECTION, SOLUTION INTRAVENOUS at 10:06

## 2019-06-11 RX ADMIN — SODIUM CHLORIDE, SODIUM LACTATE, POTASSIUM CHLORIDE, AND CALCIUM CHLORIDE: 600; 310; 30; 20 INJECTION, SOLUTION INTRAVENOUS at 08:06

## 2019-06-11 RX ADMIN — OXYCODONE HYDROCHLORIDE 5 MG: 5 TABLET ORAL at 12:06

## 2019-06-11 RX ADMIN — LIDOCAINE HYDROCHLORIDE 100 MG: 20 INJECTION, SOLUTION INTRAVENOUS at 09:06

## 2019-06-11 RX ADMIN — HYDROMORPHONE HYDROCHLORIDE 0.4 MG: 2 INJECTION, SOLUTION INTRAMUSCULAR; INTRAVENOUS; SUBCUTANEOUS at 12:06

## 2019-06-11 RX ADMIN — ONDANSETRON 4 MG: 2 INJECTION INTRAMUSCULAR; INTRAVENOUS at 10:06

## 2019-06-11 RX ADMIN — SUGAMMADEX 50 MG: 100 INJECTION, SOLUTION INTRAVENOUS at 11:06

## 2019-06-11 RX ADMIN — PREGABALIN 75 MG: 75 CAPSULE ORAL at 08:06

## 2019-06-11 RX ADMIN — ACETAMINOPHEN 1000 MG: 500 TABLET, FILM COATED ORAL at 08:06

## 2019-06-11 RX ADMIN — EPHEDRINE SULFATE 10 MG: 50 INJECTION INTRAMUSCULAR; INTRAVENOUS; SUBCUTANEOUS at 10:06

## 2019-06-11 RX ADMIN — FENTANYL CITRATE 100 MCG: 50 INJECTION, SOLUTION INTRAMUSCULAR; INTRAVENOUS at 08:06

## 2019-06-11 RX ADMIN — ROCURONIUM BROMIDE 50 MG: 10 INJECTION, SOLUTION INTRAVENOUS at 09:06

## 2019-06-11 RX ADMIN — ROPIVACAINE HYDROCHLORIDE 20 ML: 5 INJECTION, SOLUTION EPIDURAL; INFILTRATION; PERINEURAL at 08:06

## 2019-06-11 NOTE — ANESTHESIA PROCEDURE NOTES
Peripheral Block    Patient location during procedure: holding area   Block not for primary anesthetic.  Reason for block: at surgeon's request and post-op pain management   Post-op Pain Location: shoulder pain   End time: 6/11/2019 8:45 AM  Staffing  Anesthesiologist: Yohan Walters MD  Performed: anesthesiologist   Preanesthetic Checklist  Completed: patient identified, site marked, surgical consent, pre-op evaluation, timeout performed, IV checked, risks and benefits discussed and monitors and equipment checked  Peripheral Block  Patient position: supine  Prep: ChloraPrep  Patient monitoring: heart rate, cardiac monitor, continuous pulse ox and frequent blood pressure checks  Block type: interscalene  Laterality: left  Injection technique: single shot  Needle  Needle type: Stimuplex   Needle gauge: 22 G  Needle length: 4 in  Needle localization: nerve stimulator and ultrasound guidance     Assessment  Injection assessment: negative aspiration, negative parasthesia and local visualized surrounding nerve  Paresthesia pain: none  Slow fractionated injection: yes

## 2019-06-11 NOTE — OR NURSING
The patient was transported to room 48, the stretcher was locked and placed in the lowest position. The patient family members at the room upon arrival. A bedside handoff given to MAUREEN Villa.

## 2019-06-11 NOTE — OP NOTE
[facility]  Surgery Department  Operative Note    SUMMARY     Date of Procedure: 6/11/2019     Procedure:   1.  Left shoulder arthroscopic rotator cuff repair  2.  Left shoulder arthroscopic biceps tenotomy  3.  Left shoulder arthroscopic subacromial decompression (acromioplasty, CA ligament excision, bursectomy)  4.  Left shoulder arthroscopic distal clavicle coplaning  5.  Left shoulder arthroscopic partial synovectomy    Surgeon(s) and Role:     * Sedrick Patel MD - Primary    Assisting Surgeon: None    Pre-Operative Diagnosis:   1.  Left shoulder full-thickness rotator cuff tear  2.  Left shoulder long head biceps tendon tear  3.  Left shoulder impingement syndrome  4.  Left shoulder AC joint osteoarthritis  5.  Left shoulder glenohumeral joint osteoarthritis    Post-Operative Diagnosis:   Same    Anesthesia: General + regional    Technical Procedures Used: Ms. Pineda was brought to the operating room on 06/11/2019 for planned left shoulder arthroscopy.  She was given 900 mg of clindamycin as well as interscalene block by the anesthesia service preoperatively.  She was brought to the operating room and placed in the supine position.  General endotracheal anesthesia was administered. She was then placed in the lateral decubitus position with all bony prominences padded appropriately with her arm in a shoulder suspension device.  Her left shoulder was then prepped and draped in the usual sterile fashion and a time-out procedure was performed identifying the left shoulder as surgical site. Approximately 12 lb of suspension reuse.  A surgical marking pen was used to yolanda the bony landmarks.  20 cc of normal saline were then injected into the glenohumeral joint taping capsular distention and a standard posterior portal was then established.  This was then followed by an anterior portal established under direct visualization with spinal needle localization.  Diagnostic arthroscopy then  proceeded.    There is evidence of diffuse grade 3 and some patchy grade 4 chondromalacia involving the humeral head and lesser grade 2 and patchy grade 3 changes to the glenoid.  There was circumferential degenerative labral fraying.  There is extensive synovitis within the rotator interval.  There was a partial-thickness (25%) upper 1/3 subscapularis tear.  There was a partial-thickness (75%) long head biceps tendon tear within the joint with associated inflammatory changes around the long head biceps tendon. There was evidence of a full-thickness supraspinatus tear at the insertion.  The shaver was then brought in and the labral degenerative fraying was trimmed back to stable rim.  The ablator device was used to perform a biceps tenotomy which the tendon was seen to incarcerated cells within the groove.  Any unstable cartilage fragments were debrided with the oscillating shaver.  A percutaneously placed spinal needle was then used and a PDS suture was then shuttled through the spinal needle and retrieved out the anterior portal. This marked the location of the full-thickness tear for later inspection from the bursal side.  The partial-thickness subscapularis tear was left alone.  The extensive synovitis within the rotator interval was debrided any bleeding was controlled with the ablator device.    The scope was then placed into the subacromial space and a thorough bursectomy of some chronic appearing bursal tissue.  A type 3 acromion with a hypertrophied CA ligament was also encountered. There was calcification of the CA ligament and this was excised with the aid of the ablator device and a shaver.  The type 3 acromion was then smoothed to a type 1 morphology significantly increasing the acromiohumeral distance.  A an impinging distal clavicle inferior osteophyte was also encountered and a distal clavicle coplaning was performed to the level of the acromioplasty.  The shaver was then brought in to remove any  soft tissue off the rotator cuff insertion.  A full-thickness crescent-shaped tear involving the supraspinatus was encountered with no retraction.  After all soft tissue was removed a passport cannula was placed through a lateral portal and accessory lateral portal was established followed by the punch followed by 5.5 mm triple loaded corkscrew anchor.  All 6 sutures were then passed in a horizontal mattress fashion starting anteriorly and working posteriorly. A microfracture awl was then used to microfracture the insertion site.  Sliding knots were then tied starting posteriorly and working anteriorly getting a nice tension-free repair. A double row was then utilized placing 24.75 mm SwiveLock anchors 1 anterior 1 posterior each with 3 sutures each. The sutures were then cut the repair was probed and found to be solid with no undue tension.  The shaver was then reintroduced to remove any soft tissue or bony debris.  After this was done the portals were then closed with 3 0 Prolene suture, soft dressing was applied followed by a polar Care unit and an abduction brace.  The patient was then extubated in the operating room and taken to the PACU without complication.    Description of the Findings of the Procedure: see above    Significant Surgical Tasks Conducted by the Assistant(s), if Applicable: camera. instrumentation    Complications: No    Estimated Blood Loss (EBL): 3cc           Implants:   Implant Name Type Inv. Item Serial No.  Lot No. LRB No. Used   ANCHOR DBL LOAD 4.75MM SWVLK - [id number]  ANCHOR DBL LOAD 4.75MM SWVLK  [employer] [id number] Left 2   ANCHOR BIOCOMP W/3 SUTURES - [id number]  ANCHOR BIOCOMP W/3 SUTURES  [employer] [id number] Left 1       Specimens:   Specimen (12h ago, onward)    None                  Condition: Good    Disposition: PACU - hemodynamically stable.    Attestation: I was present and scrubbed for the entire procedure.    Discharge Note    SUMMARY     Admit Date:  6/11/2019    Discharge Date and Time:  06/11/2019 11:11 AM    Hospital Course (synopsis of major diagnoses, care, treatment, and services provided during the course of the hospital stay): outpt     Final Diagnosis: Post-Op Diagnosis Codes:     * Complete rotator cuff tear or rupture of left shoulder, not specified as traumatic [M75.122]    Disposition: Home or Self Care    Follow Up/Patient Instructions:     Medications:  Reconciled Home Medications:      Medication List      START taking these medications    ondansetron 4 MG Tbdl  Commonly known as:  ZOFRAN-ODT  Take 2 tablets (8 mg total) by mouth every 8 (eight) hours as needed.     oxyCODONE-acetaminophen  mg per tablet  Commonly known as:  PERCOCET  Take 1 tablet by mouth every 4 to 6 hours as needed.        CONTINUE taking these medications    AFLURIA QUAD 8569-9777 (PF) 60 mcg (15 mcg x 4)/0.5 mL Syrg vaccine  Generic drug:  influenza  ADM 0.5ML IM UTD     albuterol 90 mcg/actuation inhaler  Commonly known as:  PROVENTIL/VENTOLIN HFA  Inhale 2 puffs into the lungs every 4 (four) hours as needed for Wheezing. Rescue     atorvastatin 40 MG tablet  Commonly known as:  LIPITOR  TAKE 1 TABLET(40 MG) BY MOUTH EVERY EVENING     escitalopram oxalate 10 MG tablet  Commonly known as:  LEXAPRO  TAKE 1 TABLET BY MOUTH EVERY DAY     fluticasone propionate 50 mcg/actuation nasal spray  Commonly known as:  FLONASE  daily as needed.     omeprazole 20 MG capsule  Commonly known as:  PRILOSEC  Take 1 capsule (20 mg total) by mouth once daily.     ondansetron 4 MG tablet  Commonly known as:  ZOFRAN  Take 1 tablet (4 mg total) by mouth every 6 (six) hours.     pregabalin 150 MG capsule  Commonly known as:  LYRICA  Take 150 mg by mouth 2 (two) times daily.     traMADol 50 mg tablet  Commonly known as:  ULTRAM  Take 50 mg by mouth 2 (two) times daily as needed.     XARELTO 20 mg Tab  Generic drug:  rivaroxaban  Take 1 tablet by mouth every evening. Patient stated will stop  xarelto 6/9-per cardiologist          Discharge Procedure Orders   Diet general     Call MD for:  temperature >100.4     Call MD for:  persistent nausea and vomiting     Call MD for:  severe uncontrolled pain     Call MD for:  difficulty breathing, headache or visual disturbances     Call MD for:  redness, tenderness, or signs of infection (pain, swelling, redness, odor or green/yellow discharge around incision site)     Call MD for:  hives     Call MD for:  persistent dizziness or light-headedness     Call MD for:  extreme fatigue     Ice to affected area     Lifting restrictions     Keep surgical extremity elevated     Remove dressing in 72 hours   Order Comments: Then change daily, keep clean, dry and covered     Follow-up Information     Sedrick Berry MD. Schedule an appointment as soon as possible for a visit in 10 days.    Specialty:  Orthopedic Surgery  Why:  For suture removal, For wound re-check  Contact information:  [address] [place] [address] [phone]

## 2019-06-11 NOTE — OR NURSING
"The patient is lying on the stretcher in a sound sleep, awakens to voice. The patient vital signs are stable, pain reassessed and the patient stated "4, I have a headache".   "

## 2019-06-11 NOTE — OR NURSING
"The patient is being prepared for her transfer to the 4th floor, room, 48, by stretcher. I called and gave the sister an update. The patient vitals are stable, afebrile, pain reassessed on a scale of 0-10, and the patient stated "much better a 3".   "

## 2019-06-11 NOTE — PLAN OF CARE
Jasson Pineda has met all discharge criteria from Phase II. Vital Signs are stable, ambulating  without difficulty. Discharge instructions given, patient verbalized understanding. Discharged from facility via wheelchair in stable condition.

## 2019-06-11 NOTE — TRANSFER OF CARE
"Anesthesia Transfer of Care Note    Patient: Jasson Pineda    Procedure(s) Performed: Procedure(s) (LRB):  ARTHROSCOPY, SHOULDER (Left)  REPAIR, ROTATOR CUFF, ARTHROSCOPIC (Left)  CAPSULORRHAPHY (Left)  ARTHROSCOPY, SHOULDER, WITH DISTAL CLAVICLE EXCISION (Left)  TENOTOMY, BICEPS, ARTHROSCOPIC (Left)    Patient location: PACU    Anesthesia Type: general    Transport from OR: Transported from OR on 2-3 L/min O2 by NC with adequate spontaneous ventilation    Post pain: adequate analgesia    Post assessment: no apparent anesthetic complications    Post vital signs: stable    Level of consciousness: responds to stimulation    Nausea/Vomiting: no nausea/vomiting    Transfer of care protocol was followed      Last vitals:   Visit Vitals  /67 (BP Location: Right arm, Patient Position: Lying)   Pulse 67   Temp 36.6 °C (97.8 °F) (Oral)   Resp 18   Ht 5' 2" (1.575 m)   Wt 66.2 kg (146 lb)   SpO2 99%   Breastfeeding? No   BMI 26.70 kg/m²     "

## 2019-06-11 NOTE — DISCHARGE INSTRUCTIONS
Anesthesia: After Your Surgery  Youve just had surgery. During surgery, you received medication called anesthesia to keep you comfortable and pain-free. After surgery, you may experience some pain or nausea. This is common. Here are some tips for feeling better and recovering after surgery.    Going home  Your doctor or nurse will show you how to take care of yourself when you go home. He or she will also answer your questions. Have an adult family member or friend drive you home. For the first 24 hours after your surgery:  · Do not drive or use heavy equipment.  · Do not make important decisions or sign legal documents.  · Avoid alcohol.  · Have someone stay with you, if needed. He or she can watch for problems and help keep you safe.  Be sure to keep all follow-up appointments with your doctor. And rest after your procedure for as long as your doctor tells you to.    Coping with pain  If you have pain after surgery, pain medication will help you feel better. Take it as directed, before pain becomes severe. Also, ask your doctor or pharmacist about other ways to control pain, such as with heat, ice, and relaxation. And follow any other instructions your surgeon or nurse gives you.    URINARY RETENTION  Should you experience a decrease in your urine output or are unable to urinate following surgery, this can be due to the medications given during surgery.  We recommend you going to the nearest Emergency Department.    Tips for taking pain medication  To get the best relief possible, remember these points:  · Pain medications can upset your stomach. Taking them with a little food may help.  · Most pain relievers taken by mouth need at least 20 to 30 minutes to take effect.  · Taking medication on a schedule can help you remember to take it. Try to time your medication so that you can take it before beginning an activity, such as dressing, walking, or sitting down for dinner.  · Constipation is a common side effect  of pain medications. Contact your doctor before taking any medications like laxatives or stool softeners to help relieve constipation. Also ask about any dietary restrictions, because drinking lots of fluids and eating foods like fruits and vegetables that are high in fiber can also help. Remember, dont take laxatives unless your surgeon has prescribed them.  · Mixing alcohol and pain medication can cause dizziness and slow your breathing. It can even be fatal. Dont drink alcohol while taking pain medication.  · Pain medication can slow your reflexes. Dont drive or operate machinery while taking pain medication.  If your health care provider tells you to take acetaminophen to help relieve your pain, ask him or her how much you are supposed to take each day. (Acetaminophen is the generic name for Tylenol and other brand-name pain relievers.) Acetaminophen or other pain relievers may interact with your prescription medicines or other over-the-counter (OTC) drugs. Some prescription medications contain acetaminophen along with other active ingredients. Using both prescription and OTC acetaminophen for pain can cause you to overdose. The FDA recommends that you read the labels on your OTC medications carefully. This will help you to clearly understand the list of active ingredients, dosing instructions, and any warnings. It may also help you avoid taking too much acetaminophen. If you have questions or don't understand the information, ask your pharmacist or health care provider to explain it to you before you take the OTC medication.    Managing nausea  Some people have an upset stomach after surgery. This is often due to anesthesia, pain, pain medications, or the stress of surgery. The following tips will help you manage nausea and get good nutrition as you recover. If you were on a special diet before surgery, ask your doctor if you should follow it during recovery. These tips may help:  · Dont push yourself to  eat. Your body will tell you when to eat and how much.  · Start off with clear liquids and soup. They are easier to digest.  · Progress to semi-solid foods (mashed potatoes, applesauce, and gelatin) as you feel ready.  · Slowly move to solid foods. Dont eat fatty, rich, or spicy foods at first.  · Dont force yourself to have three large meals a day. Instead, eat smaller amounts more often.  · Take pain medications with a small amount of solid food, such as crackers or toast to avoid nausea.      Call your surgeon if    · You feel too sleepy, dizzy, or groggy (medication may be too strong).  · You have side effects like nausea, vomiting, or skin changes (rash, itching, or hives).   © 4659-8882 [employer]. [address] [place] [address]. All rights reserved. This information is not intended as a substitute for professional medical care. Always follow your healthcare professional's instructions.                   Shoulder Arthroscopy Post-Op Instructions                                       Dr. Sedrick Patel    1. Sling/Brace- You should wear the brace until the first post-op visit. You can take the sling off to shower but keep your arm at your side.  Do not lift your arm away from your body unless told otherwise.  I will give you/your family specific instructions about the length of brace wear.    2. Bandage- If you have physical therapy set up they will remove the bandage. Otherwise you can remove it in 3 days. Keep the incisions clean, dry and covered. Do not get it wet until you see me.     3. Ice- Use the polar care as much as you want. Make sure there are clothes or a towel between the cooling unit and your skin.     4. Exercise- If you have PT set up, they will instruct you on exercises to do. If you do not, it is OK to lean your arm over and make circles with your hand pointing to the floor (called Pendulum exercises). Do not lift or grasp anything away from the body until you  see me. It is OK to take your arm out of the sling and extend your elbow as long as your elbow is at your side.     5. Medications- You will be given pain and nausea prescriptions to go home. Take the medications as written.  Call the office if you are running low with at least 2-3 days notice to give us time to refill it.  We also recommend taking a baby aspirin for 2 weeks after surgery to decrease the (very,very low) risk of blood clot formation.    6. Bathing- Do not get your shoulder wet until you see me in clinic.    7. Appointment- You should already have your post-op appointment scheduled. If you do not or you can't remember, call the office for more information.

## 2019-06-11 NOTE — ANESTHESIA POSTPROCEDURE EVALUATION
Anesthesia Post Evaluation    Patient: Jasson Pineda    Procedure(s) Performed: Procedure(s) (LRB):  ARTHROSCOPY, SHOULDER (Left)  REPAIR, ROTATOR CUFF, ARTHROSCOPIC (Left)  CAPSULORRHAPHY (Left)  ARTHROSCOPY, SHOULDER, WITH DISTAL CLAVICLE EXCISION (Left)  TENOTOMY, BICEPS, ARTHROSCOPIC (Left)    Final Anesthesia Type: general  Patient location during evaluation: PACU  Patient participation: Yes- Able to Participate  Level of consciousness: awake and alert  Post-procedure vital signs: reviewed and stable  Pain management: adequate  Airway patency: patent  PONV status at discharge: No PONV  Anesthetic complications: no      Cardiovascular status: blood pressure returned to baseline  Respiratory status: unassisted and spontaneous ventilation  Hydration status: euvolemic  Follow-up not needed.          Vitals Value Taken Time   /61 6/11/2019 12:19 PM   Temp 36.6 °C (97.8 °F) 6/11/2019  7:55 AM   Pulse 76 6/11/2019 12:20 PM   Resp 18 6/11/2019 12:15 PM   SpO2 97 % 6/11/2019 12:20 PM   Vitals shown include unvalidated device data.      No case tracking events are documented in the log.      Pain/Riley Score: Pain Rating Prior to Med Admin: 4 (6/11/2019 12:03 PM)  Riley Score: 9 (6/11/2019 12:00 PM)

## 2019-06-20 DIAGNOSIS — R07.9 CHEST PAIN, UNSPECIFIED TYPE: ICD-10-CM

## 2019-06-20 DIAGNOSIS — D68.8 OTHER SPECIFIED COAGULATION DEFECTS: ICD-10-CM

## 2019-06-20 DIAGNOSIS — K21.9 GASTROESOPHAGEAL REFLUX DISEASE, ESOPHAGITIS PRESENCE NOT SPECIFIED: ICD-10-CM

## 2019-06-20 DIAGNOSIS — R13.10 DYSPHAGIA, UNSPECIFIED TYPE: ICD-10-CM

## 2019-06-20 RX ORDER — ATORVASTATIN CALCIUM 40 MG/1
TABLET, FILM COATED ORAL
Qty: 90 TABLET | Refills: 3 | Status: SHIPPED | OUTPATIENT
Start: 2019-06-20 | End: 2020-07-15

## 2019-06-23 ENCOUNTER — TELEPHONE (OUTPATIENT)
Dept: GASTROENTEROLOGY | Facility: HOSPITAL | Age: 69
End: 2019-06-23

## 2019-06-23 DIAGNOSIS — R13.13 CRICOPHARYNGEAL DYSPHAGIA: Primary | ICD-10-CM

## 2019-06-24 NOTE — TELEPHONE ENCOUNTER
Esophagram showed a prominent cricopharyngeal bar.  This could be contributing to her dysphagia symptoms.  I recommend that she see an ENT doctor.  Referral placed.

## 2019-06-24 NOTE — TELEPHONE ENCOUNTER
Results and recommendations given to patient per Dr. Foy. Patient verbalized understanding.     Mrs. Spaulding will contact our ENT department to schedule an appointment.

## 2019-06-30 ENCOUNTER — EXTERNAL CHRONIC CARE MANAGEMENT (OUTPATIENT)
Dept: PRIMARY CARE CLINIC | Facility: CLINIC | Age: 69
End: 2019-06-30
Payer: MEDICARE

## 2019-06-30 PROCEDURE — 99490 CHRNC CARE MGMT STAFF 1ST 20: CPT | Mod: S$PBB,,, | Performed by: FAMILY MEDICINE

## 2019-06-30 PROCEDURE — 99490 PR CHRONIC CARE MGMT, 1ST 20 MIN: ICD-10-PCS | Mod: S$PBB,,, | Performed by: FAMILY MEDICINE

## 2019-06-30 PROCEDURE — 99490 CHRNC CARE MGMT STAFF 1ST 20: CPT | Mod: PBBFAC | Performed by: FAMILY MEDICINE

## 2019-07-23 ENCOUNTER — PATIENT OUTREACH (OUTPATIENT)
Dept: ADMINISTRATIVE | Facility: OTHER | Age: 69
End: 2019-07-23

## 2019-07-25 ENCOUNTER — OFFICE VISIT (OUTPATIENT)
Dept: OTOLARYNGOLOGY | Facility: CLINIC | Age: 69
End: 2019-07-25
Payer: MEDICARE

## 2019-07-25 VITALS
TEMPERATURE: 99 F | DIASTOLIC BLOOD PRESSURE: 68 MMHG | SYSTOLIC BLOOD PRESSURE: 101 MMHG | WEIGHT: 149.81 LBS | BODY MASS INDEX: 27.57 KG/M2 | HEIGHT: 62 IN | HEART RATE: 91 BPM

## 2019-07-25 DIAGNOSIS — Z87.19 HISTORY OF GASTROESOPHAGEAL REFLUX (GERD): ICD-10-CM

## 2019-07-25 DIAGNOSIS — R13.19 ESOPHAGEAL DYSPHAGIA: ICD-10-CM

## 2019-07-25 DIAGNOSIS — R13.13 CRICOPHARYNGEAL DYSPHAGIA: ICD-10-CM

## 2019-07-25 DIAGNOSIS — K22.4 ESOPHAGEAL DYSMOTILITY: ICD-10-CM

## 2019-07-25 PROCEDURE — 99204 OFFICE O/P NEW MOD 45 MIN: CPT | Mod: 25,S$GLB,, | Performed by: OTOLARYNGOLOGY

## 2019-07-25 PROCEDURE — 99204 PR OFFICE/OUTPT VISIT, NEW, LEVL IV, 45-59 MIN: ICD-10-PCS | Mod: 25,S$GLB,, | Performed by: OTOLARYNGOLOGY

## 2019-07-25 PROCEDURE — 31575 LARYNGOSCOPY: ICD-10-PCS | Mod: S$GLB,,, | Performed by: OTOLARYNGOLOGY

## 2019-07-25 PROCEDURE — 31575 DIAGNOSTIC LARYNGOSCOPY: CPT | Mod: S$GLB,,, | Performed by: OTOLARYNGOLOGY

## 2019-07-25 NOTE — PROCEDURES
Laryngoscopy  Date/Time: 7/25/2019 10:44 AM  Performed by: Elisa Juan MD  Authorized by: Elisa Juan MD     Consent Done?:  Yes (Verbal)  Anesthesia:     Local anesthetic:  Lidocaine 2% and Dev-Synephrine 1/2%    Patient tolerance:  Patient tolerated the procedure well with no immediate complications    Decongestion performed?: Yes    Laryngoscopy:     Areas examined:  Nasal cavities, nasopharynx, oropharynx, hypopharynx, larynx and vocal cords  Nose External:      No external nasal deformity  Nose Intranasal:      Mucosa no polyps     Mucosa ulcers not present     No mucosa lesions present  Nasopharynx:      No mucosa lesions     Adenoids not present     Posterior choanae patent  Larynx/hypopharynx:      No epiglottis lesions     No epiglottis edema     No AE folds lesions     No vocal cord polyps     Equal and normal bilateral     No hypopharynx lesions     No piriform sinus pooling     No piriform sinus lesions

## 2019-07-25 NOTE — LETTER
August 7, 2019      Armand Foy MD  [address] [facility] [address]           [facility] [place] [address]  [address]  [facility] [address]  Phone: [phone]  Fax: [phone]          Patient: Jasson Pineda   MR Number: [id number]   YOB: 1950   Date of Visit: 7/25/2019       Dear Dr. Armand Foy:    Thank you for referring Jasson Pineda to me for evaluation. Attached you will find relevant portions of my assessment and plan of care.    If you have questions, please do not hesitate to call me. I look forward to following Jasson Pineda along with you.    Sincerely,    Socorro Brennan  CC:  No Recipients    If you would like to receive this communication electronically, please contact [email or web address] or [phone] to request more information on [employer] access.    For providers and/or their staff who would like to refer a patient to [facility], please contact us through our one-stop-shop provider referral line, [facility], at [phone].    If you feel you have received this communication in error or would no longer like to receive these types of communications, please e-mail [email or web address]

## 2019-07-25 NOTE — PATIENT INSTRUCTIONS
Reflux precautions as reviewed.  Increase omeprazole to twice a day if needed for now.  Continue soft diet as you are doing.  See Dr. Burgos for further recommendations.

## 2019-07-25 NOTE — PROGRESS NOTES
Subjective:       Patient ID: Jasson Pineda is a 68 y.o. female.    Chief Complaint: Other (Trouble when he eats certain foods, soft foods go down okay)    She is a new patient for me here today complaining of swallowing problems for about the past year.  States certain foods seem to get stuck and take a while to clear.  Examples include chicken and meats and other solid foods.  Feels they lodge in the lower chest with some discomfort until clears.  Soft foods go down fine as well as liquids and pills.  States had EGD within the past several months without change in her complaints.  Had esophagram ordered by GI and told cricopharyngeal bar and to see ENT.  Esophagram report reviewed done 05/31/2019 which also demonstrates possible esophageal web, tertiary contractions, small hiatal hernia, and lower esophageal Schatzki's ring.  Denies choking or regurgitation of food.  Lost 20 lb, unclear if intentional then regained it.  History of GERD on omeprazole regularly with some breakthrough symptoms of belching, burning, and sour taste.  Medical history reviewed otherwise and no tobacco or alcohol.        Review of Systems   Ears: Negative for hearing loss, ear pain, ear pressure, ringing in ear, ear discharge, ear infections, dizziness, head trauma, taken gentramycin/streptomycin and family history of hearing loss.    Nose:  Negative for nosebleeds, nasal obstruction, nasal or sinus surgery, loss of smell, postnasal drip and snoring.    Mouth/Throat: Positive for impaired swallowing and hoarse voice. Negative for throat mass, neck mass, oral ulcers and neck lumps.   Constitutional: Negative for recent unexplained weight loss and fever.    Eyes:  Negative for visual change.   Cardiovascular:  Negative for palpitations and history of high blood pressure.   Respiratory:  Negative for asthma, emphysema, history of tuberculosis, recent cough and shortness of breath.    Gastrointestinal:  Positive for acid reflux.  Negative for history of stomach ulcers or pain and blood in stool.   Other:  Positive for bladder problem and arthritis. Negative for weakness, disturbances in coordination, slurred, swollen glands, anemia and persistent infections.           Objective:        Vitals:    07/25/19 0959   BP: 101/68   Pulse: 91   Temp: 98.5 °F (36.9 °C)     Body mass index is 27.4 kg/m².  Physical Exam   Constitutional: She is oriented to person, place, and time. She appears well-developed and well-nourished. No distress.   HENT:   Head: Normocephalic and atraumatic.   Right Ear: Tympanic membrane, external ear and ear canal normal.   Left Ear: Tympanic membrane, external ear and ear canal normal.   Nose: No mucosal edema, rhinorrhea or nasal deformity. No epistaxis.   Mouth/Throat: Oropharynx is clear and moist and mucous membranes are normal. No oral lesions. No uvula swelling. No oropharyngeal exudate, posterior oropharyngeal edema or posterior oropharyngeal erythema.   Neck: Phonation normal. Neck supple. No tracheal deviation present.   Pulmonary/Chest: Effort normal. No respiratory distress.   Lymphadenopathy:     She has no cervical adenopathy.   Neurological: She is alert and oriented to person, place, and time. She displays no weakness.   Skin: Skin is warm and dry.   Psychiatric: She has a normal mood and affect. Her behavior is normal. Her speech is not slurred.       Tests / Results:    Epic chart reviewed including EGD report done 07/17/2018 and esophagram done 05/31/2019.        Assessment:       1. Esophageal dysphagia    2. Cricopharyngeal dysphagia    3. Esophageal dysmotility    4. History of gastroesophageal reflux (GERD)        Plan:        Reviewed above and office endoscopy and she would like to proceed.  See procedure note.    Reviewed all above and factors contributing to her swallowing complaints.  Has seen GI who referred for further evaluation of cricopharyngeal bar and patient to see Dr. Burgos for this.   Continue regular follow-ups as per GI.  Soft diet and swallow precautions and reflux precautions.  Increase omeprazole to twice daily for now.

## 2019-07-29 ENCOUNTER — OFFICE VISIT (OUTPATIENT)
Dept: INTERNAL MEDICINE | Facility: CLINIC | Age: 69
End: 2019-07-29
Payer: MEDICARE

## 2019-07-29 VITALS
WEIGHT: 147.25 LBS | HEART RATE: 82 BPM | BODY MASS INDEX: 27.8 KG/M2 | OXYGEN SATURATION: 96 % | HEIGHT: 61 IN | DIASTOLIC BLOOD PRESSURE: 60 MMHG | SYSTOLIC BLOOD PRESSURE: 108 MMHG

## 2019-07-29 DIAGNOSIS — M75.121 COMPLETE ROTATOR CUFF TEAR OR RUPTURE OF RIGHT SHOULDER, NOT SPECIFIED AS TRAUMATIC: ICD-10-CM

## 2019-07-29 DIAGNOSIS — R06.02 SOB (SHORTNESS OF BREATH): ICD-10-CM

## 2019-07-29 DIAGNOSIS — M75.42 IMPINGEMENT SYNDROME OF LEFT SHOULDER: ICD-10-CM

## 2019-07-29 DIAGNOSIS — M19.019 SHOULDER ARTHRITIS: ICD-10-CM

## 2019-07-29 DIAGNOSIS — M50.20 HERNIATED DISC, CERVICAL: ICD-10-CM

## 2019-07-29 DIAGNOSIS — N32.81 OAB (OVERACTIVE BLADDER): ICD-10-CM

## 2019-07-29 DIAGNOSIS — M19.012 PRIMARY OSTEOARTHRITIS OF LEFT SHOULDER: ICD-10-CM

## 2019-07-29 DIAGNOSIS — M75.122 COMPLETE ROTATOR CUFF TEAR OR RUPTURE OF LEFT SHOULDER, NOT SPECIFIED AS TRAUMATIC: ICD-10-CM

## 2019-07-29 DIAGNOSIS — Z78.0 POSTMENOPAUSAL STATE: ICD-10-CM

## 2019-07-29 DIAGNOSIS — S46.119A LABRAL TEAR OF LONG HEAD OF BICEPS TENDON, INITIAL ENCOUNTER: ICD-10-CM

## 2019-07-29 DIAGNOSIS — N20.0 KIDNEY STONE: ICD-10-CM

## 2019-07-29 DIAGNOSIS — Z00.00 ENCOUNTER FOR PREVENTIVE HEALTH EXAMINATION: Primary | ICD-10-CM

## 2019-07-29 DIAGNOSIS — K22.4 INEFFECTIVE ESOPHAGEAL MOTILITY: ICD-10-CM

## 2019-07-29 PROCEDURE — 99999 PR PBB SHADOW E&M-EST. PATIENT-LVL IV: CPT | Mod: PBBFAC,,, | Performed by: NURSE PRACTITIONER

## 2019-07-29 PROCEDURE — G0439 PPPS, SUBSEQ VISIT: HCPCS | Mod: S$GLB,,, | Performed by: NURSE PRACTITIONER

## 2019-07-29 PROCEDURE — 99214 OFFICE O/P EST MOD 30 MIN: CPT | Mod: PBBFAC | Performed by: NURSE PRACTITIONER

## 2019-07-29 PROCEDURE — G0439 PR MEDICARE ANNUAL WELLNESS SUBSEQUENT VISIT: ICD-10-PCS | Mod: S$GLB,,, | Performed by: NURSE PRACTITIONER

## 2019-07-29 PROCEDURE — 99999 PR PBB SHADOW E&M-EST. PATIENT-LVL IV: ICD-10-PCS | Mod: PBBFAC,,, | Performed by: NURSE PRACTITIONER

## 2019-07-29 NOTE — PATIENT INSTRUCTIONS
Counseling and Referral of Other Preventative  (Italic type indicates deductible and co-insurance are waived)    Patient Name: Jasson Pineda  Today's Date: 7/29/2019    Health Maintenance       Date Due Completion Date    Shingles Vaccine (2 of 3) 08/21/2017 6/26/2017    Pneumococcal Vaccine (65+ Low/Medium Risk) (2 of 2 - PPSV23) 06/26/2018 6/26/2017    DEXA SCAN 07/19/2019 7/19/2016    Influenza Vaccine 08/01/2019 10/8/2018    Mammogram 11/19/2020 11/19/2018    Override on 6/1/2015: Done    Lipid Panel 04/05/2023 4/5/2018    Colonoscopy 07/17/2023 7/17/2018    Override on 6/6/2012: Done    TETANUS VACCINE 06/26/2027 6/26/2017        No orders of the defined types were placed in this encounter.    The following information is provided to all patients.  This information is to help you find resources for any of the problems found today that may be affecting your health:                Living healthy guide: www.[facility].gov      Understanding Diabetes: www.diabetes.org      Eating healthy: www.cdc.gov/healthyweight      CDC home safety checklist: www.cdc.gov/steadi/patient.html      Agency on Aging: www.goea.[place].gov      Alcoholics anonymous (AA): www.aa.org      Physical Activity: www.mikey.nih.gov/bl5khpn      Tobacco use: www.quitwithusla.org

## 2019-07-29 NOTE — PROGRESS NOTES
"Jasson Pineda presented for a  Medicare AWV and comprehensive Health Risk Assessment today. The following components were reviewed and updated:    · Medical history  · Family History  · Social history  · Allergies and Current Medications  · Health Risk Assessment  · Health Maintenance  · Care Team     ** See Completed Assessments for Annual Wellness Visit within the encounter summary.**       The following assessments were completed:  · Living Situation  · CAGE  · Depression Screening  · Timed Get Up and Go  · Whisper Test  · Cognitive Function Screening  · Nutrition Screening  · ADL Screening  · PAQ Screening          Vitals:    07/29/19 0906   BP: 108/60   BP Location: Right arm   Patient Position: Sitting   Pulse: 82   SpO2: 96%   Weight: 66.8 kg (147 lb 4.3 oz)   Height: 5' 1" (1.549 m)     Body mass index is 27.83 kg/m².     Physical Exam   Constitutional: She is oriented to person, place, and time. She appears well-developed and well-nourished.   HENT:   Head: Normocephalic and atraumatic. Not macrocephalic and not microcephalic. Head is without raccoon's eyes, without Marie's sign, without abrasion, without contusion, without laceration, without right periorbital erythema and without left periorbital erythema. Hair is normal.   Right Ear: No lacerations. No drainage, swelling or tenderness. No foreign bodies. No mastoid tenderness. Tympanic membrane is not injected, not scarred, not perforated, not erythematous, not retracted and not bulging. Tympanic membrane mobility is normal. No middle ear effusion. No hemotympanum. No decreased hearing is noted.   Left Ear: No lacerations. No drainage, swelling or tenderness. No foreign bodies. No mastoid tenderness. Tympanic membrane is not injected, not scarred, not perforated, not erythematous, not retracted and not bulging. Tympanic membrane mobility is normal.  No middle ear effusion. No hemotympanum. No decreased hearing is noted.   Nose: Nose normal. No mucosal " edema, rhinorrhea, nose lacerations, sinus tenderness or nasal deformity.   Mouth/Throat: Uvula is midline.   Eyes: Conjunctivae and lids are normal. No scleral icterus.   Neck: Trachea normal and normal range of motion. Neck supple. No spinous process tenderness and no muscular tenderness present. No neck rigidity. No edema, no erythema and normal range of motion present. No thyroid mass and no thyromegaly present.   Cardiovascular: Normal rate, regular rhythm, normal heart sounds and intact distal pulses. Exam reveals no gallop and no friction rub.   No murmur heard.  Pulmonary/Chest: Effort normal and breath sounds normal. No stridor. No respiratory distress. She has no wheezes. She has no rales. She exhibits no tenderness.   Abdominal: Soft. Bowel sounds are normal. She exhibits no distension.   Musculoskeletal: Normal range of motion.   Lymphadenopathy:        Head (right side): No submental, no submandibular, no tonsillar, no preauricular and no posterior auricular adenopathy present.        Head (left side): No submental, no submandibular, no tonsillar, no preauricular, no posterior auricular and no occipital adenopathy present.   Neurological: She is alert and oriented to person, place, and time.   Skin: Skin is warm and dry.   Psychiatric: She has a normal mood and affect. Her behavior is normal. Judgment and thought content normal.   Vitals reviewed.      Diagnoses and health risks identified today and associated recommendations/orders:    1. Encounter for preventive health examination  Annual Health Risk Assessment (HRA) visit today.  Counseling and referral of health maintenance and preventative health measures performed.  Patient given annual wellness paperwork to take home.  Encouraged to return in 1 year for subsequent HRA visit.   - DXA Bone Density Spine And Hip; Future    2. Body mass index (BMI) of 25.0 to 29.9  Chronic. Stable. Continue current treatment plan as previously prescribed by  PCP.    3. Kidney stone  Chronic. Stable. Continue current treatment plan as previously prescribed by PCP.    4. OAB (overactive bladder)  Chronic. Stable. Continue current treatment plan as previously prescribed by PCP.    5. Herniated disc, cervical  Chronic. Stable. Continue current treatment plan as previously prescribed by PCP.    6. Ineffective esophageal motility  Chronic. Stable. Continue current treatment plan as previously prescribed by PCP.    7. Complete rotator cuff tear or rupture of right shoulder, not specified as traumatic  Chronic. Stable. Continue current treatment plan as previously prescribed by PCP.    8. Shoulder arthritis  Chronic. Stable. Continue current treatment plan as previously prescribed by PCP.    9. Complete rotator cuff tear or rupture of left shoulder, not specified as traumatic  Chronic. Stable. Continue current treatment plan as previously prescribed by PCP.    10. Impingement syndrome of left shoulder  Chronic. Stable. Continue current treatment plan as previously prescribed by PCP.    11. Primary osteoarthritis of left shoulder  Chronic. Stable. Continue current treatment plan as previously prescribed by PCP.    12. SOB (shortness of breath)  Chronic. Stable. Continue current treatment plan as previously prescribed by PCP.    13. Labral tear of long head of biceps tendon, initial encounter  Chronic. Stable. Continue current treatment plan as previously prescribed by PCP.    14. Postmenopausal state  - DXA Bone Density Spine And Hip; Future      Provided Luellen with a 5-10 year written screening schedule and personal prevention plan. Recommendations were developed using the USPSTF age appropriate recommendations. Education, counseling, and referrals were provided as needed. After Visit Summary printed and given to patient which includes a list of additional screenings\tests needed.    Follow up in about 1 year (around 7/29/2020).    Get Coleman NP  I offered to discuss  end of life issues, including information on how to make advance directives that the patient could use to name someone who would make medical decisions on their behalf if they became too ill to make themselves.    ___Patient declined  _X_Patient is interested, I provided paper work and offered to discuss.

## 2019-07-31 ENCOUNTER — EXTERNAL CHRONIC CARE MANAGEMENT (OUTPATIENT)
Dept: PRIMARY CARE CLINIC | Facility: CLINIC | Age: 69
End: 2019-07-31
Payer: MEDICARE

## 2019-07-31 ENCOUNTER — HOSPITAL ENCOUNTER (OUTPATIENT)
Dept: RADIOLOGY | Facility: OTHER | Age: 69
Discharge: HOME | End: 2019-07-31
Attending: NURSE PRACTITIONER
Payer: MEDICARE

## 2019-07-31 DIAGNOSIS — Z78.0 POSTMENOPAUSAL STATE: ICD-10-CM

## 2019-07-31 PROCEDURE — 99490 CHRNC CARE MGMT STAFF 1ST 20: CPT | Mod: S$PBB,,, | Performed by: FAMILY MEDICINE

## 2019-07-31 PROCEDURE — 77080 DXA BONE DENSITY AXIAL: CPT | Mod: TC

## 2019-07-31 PROCEDURE — 77080 DEXA BONE DENSITY SPINE HIP: ICD-10-PCS | Mod: 26,,, | Performed by: RADIOLOGY

## 2019-07-31 PROCEDURE — 99490 CHRNC CARE MGMT STAFF 1ST 20: CPT | Mod: PBBFAC | Performed by: FAMILY MEDICINE

## 2019-07-31 PROCEDURE — 77080 DXA BONE DENSITY AXIAL: CPT | Mod: 26,,, | Performed by: RADIOLOGY

## 2019-07-31 PROCEDURE — 99490 PR CHRONIC CARE MGMT, 1ST 20 MIN: ICD-10-PCS | Mod: S$PBB,,, | Performed by: FAMILY MEDICINE

## 2019-08-31 ENCOUNTER — EXTERNAL CHRONIC CARE MANAGEMENT (OUTPATIENT)
Dept: PRIMARY CARE CLINIC | Facility: CLINIC | Age: 69
End: 2019-08-31
Payer: MEDICARE

## 2019-08-31 PROCEDURE — 99490 CHRNC CARE MGMT STAFF 1ST 20: CPT | Mod: PBBFAC | Performed by: FAMILY MEDICINE

## 2019-08-31 PROCEDURE — 99490 PR CHRONIC CARE MGMT, 1ST 20 MIN: ICD-10-PCS | Mod: S$PBB,,, | Performed by: FAMILY MEDICINE

## 2019-08-31 PROCEDURE — 99490 CHRNC CARE MGMT STAFF 1ST 20: CPT | Mod: S$PBB,,, | Performed by: FAMILY MEDICINE

## 2019-09-22 RX ORDER — ESCITALOPRAM OXALATE 10 MG/1
TABLET ORAL
Qty: 90 TABLET | Refills: 4 | Status: SHIPPED | OUTPATIENT
Start: 2019-09-22 | End: 2020-11-19 | Stop reason: SDUPTHER

## 2019-09-30 ENCOUNTER — EXTERNAL CHRONIC CARE MANAGEMENT (OUTPATIENT)
Dept: PRIMARY CARE CLINIC | Facility: CLINIC | Age: 69
End: 2019-09-30
Payer: MEDICARE

## 2019-09-30 PROCEDURE — 99490 CHRNC CARE MGMT STAFF 1ST 20: CPT | Mod: S$PBB,,, | Performed by: FAMILY MEDICINE

## 2019-09-30 PROCEDURE — 99490 CHRNC CARE MGMT STAFF 1ST 20: CPT | Mod: PBBFAC | Performed by: FAMILY MEDICINE

## 2019-09-30 PROCEDURE — 99490 PR CHRONIC CARE MGMT, 1ST 20 MIN: ICD-10-PCS | Mod: S$PBB,,, | Performed by: FAMILY MEDICINE

## 2019-10-01 ENCOUNTER — PATIENT OUTREACH (OUTPATIENT)
Dept: ADMINISTRATIVE | Facility: OTHER | Age: 69
End: 2019-10-01

## 2019-10-02 ENCOUNTER — OFFICE VISIT (OUTPATIENT)
Dept: OTOLARYNGOLOGY | Facility: CLINIC | Age: 69
End: 2019-10-02
Payer: MEDICARE

## 2019-10-02 VITALS
HEART RATE: 85 BPM | WEIGHT: 149.88 LBS | BODY MASS INDEX: 28.32 KG/M2 | DIASTOLIC BLOOD PRESSURE: 74 MMHG | SYSTOLIC BLOOD PRESSURE: 116 MMHG

## 2019-10-02 DIAGNOSIS — K22.4 CRICOPHARYNGEUS MUSCLE DYSFUNCTION: ICD-10-CM

## 2019-10-02 DIAGNOSIS — R13.14 DYSPHAGIA, PHARYNGOESOPHAGEAL: Primary | ICD-10-CM

## 2019-10-02 DIAGNOSIS — K22.4 ESOPHAGEAL DYSMOTILITY: ICD-10-CM

## 2019-10-02 DIAGNOSIS — R49.0 DYSPHONIA: ICD-10-CM

## 2019-10-02 DIAGNOSIS — K44.9 HIATAL HERNIA: ICD-10-CM

## 2019-10-02 PROCEDURE — 99213 OFFICE O/P EST LOW 20 MIN: CPT | Mod: PBBFAC | Performed by: OTOLARYNGOLOGY

## 2019-10-02 PROCEDURE — 31575 DIAGNOSTIC LARYNGOSCOPY: CPT | Mod: PBBFAC | Performed by: OTOLARYNGOLOGY

## 2019-10-02 PROCEDURE — 99213 OFFICE O/P EST LOW 20 MIN: CPT | Mod: 25,S$PBB,, | Performed by: OTOLARYNGOLOGY

## 2019-10-02 PROCEDURE — 99213 PR OFFICE/OUTPT VISIT, EST, LEVL III, 20-29 MIN: ICD-10-PCS | Mod: 25,S$PBB,, | Performed by: OTOLARYNGOLOGY

## 2019-10-02 PROCEDURE — 99999 PR PBB SHADOW E&M-EST. PATIENT-LVL III: CPT | Mod: PBBFAC,,, | Performed by: OTOLARYNGOLOGY

## 2019-10-02 PROCEDURE — 31575 PR LARYNGOSCOPY, FLEXIBLE; DIAGNOSTIC: ICD-10-PCS | Mod: S$PBB,,, | Performed by: OTOLARYNGOLOGY

## 2019-10-02 PROCEDURE — 99999 PR PBB SHADOW E&M-EST. PATIENT-LVL III: ICD-10-PCS | Mod: PBBFAC,,, | Performed by: OTOLARYNGOLOGY

## 2019-10-02 PROCEDURE — 31575 DIAGNOSTIC LARYNGOSCOPY: CPT | Mod: S$PBB,,, | Performed by: OTOLARYNGOLOGY

## 2019-10-02 NOTE — LETTER
October 3, 2019      SERA Whitfield MD  [address]  [address]  [facility] [address]           [facility]  [address]  [facility] [address]  Phone: [phone]  Fax: [phone]          Patient: Jasson Pineda   MR Number: [id number]   YOB: 1950   Date of Visit: 10/2/2019       Dear Dr. SERA Whitfield:    Thank you for referring Jasson Pineda to me for evaluation. Attached you will find relevant portions of my assessment and plan of care.    If you have questions, please do not hesitate to call me. I look forward to following Jasson Pineda along with you.    Sincerely,    James Burgos MD    Enclosure  CC:  No Recipients    If you would like to receive this communication electronically, please contact [email or web address] or [phone] to request more information on [employer] access.    For providers and/or their staff who would like to refer a patient to [facility], please contact us through our one-stop-shop provider referral line, [facility], at [phone].    If you feel you have received this communication in error or would no longer like to receive these types of communications, please e-mail [email or web address]

## 2019-10-02 NOTE — PROGRESS NOTES
[facility]  Department of Otorhinolaryngology and Communication Sciences    Jasson Pineda is a 68 y.o. female who presents to the [facility] for consultation at the kind request of Dr. 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     She complains of dysphagia to solid food such as meat, rice, and bread. Occasionally has difficulty with soft foods. Food feels like it goes down to her chest and then gets stuck. Localizes symptoms to the chest/epigastirc region almost always. Eventually the bolus passes after she relaxes. Reports that only rarely does she feel the food bolus gets trapped proximally. She is presently restricting her diet to soft food.    Onset was sudden. Duration is 9-10 months. Symptoms are moderate. Time course is constant. Symptoms are stable. Exacerbating factors include solid foods. She denies any alleviating factors. There have not been prior episodes. She reports globus sensation and intermittent hoarseness. Lost 15 pounds a few months ago but has since gained the weight back. Reports some discomfort with swallowing, but feels that tramadol, which she takes for back pain, has helped. She is also on Lyrica. She is considering some procedural intervention for her back pain in the near future.    She does not drink a significant amount of caffeine. She drinks at least 4 bottles  of water per day. She reports heartburn. She has been seen by gastroenterologist Dr. Foy, whose workup is below. She is taking Omeprazole BID, which helps with heartburn.    She is not smoking. She denies otalgia, hemoptysis, hematemesis, fevers, chills, sweats, neck mass.     She is a  at the [employer].     Prior workup (Dr. Foy):  5/31/2019 esophagram; Esophageal dysmotility.  Small hiatal hernia.  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    7/17/2018 EGD: normal    HRM 7/25/2018: Ineffective esophageal motility    (IEM: 50% or more ineffective swallows with DCI less than 450 mmHg/sec/cm).    Voice Handicap Index-10 (VHI-10) score is 1.   Reflux Symptom Index (RSI) score is 23.   Eating Assessment Tool-10 (EAT-10) score is 34.   Dyspnea Index (DI) score is 27.  Cough Severity Index (CSI) score is 0.    Past Medical History  She has a past medical history of Anticoagulant long-term use, Chronic back pain, Chronic neck pain, DVT (deep venous thrombosis), Herniated disc, cervical, Kidney stone, Lumbar herniated disc, PE (pulmonary embolism), Pulmonary emboli, and Pulmonary embolism.    Past Surgical History  She has a past surgical history that includes Kidney stone surgery; left knee surgery; Tonsillectomy;  section; Hernia repair; Total shoulder arthroplasty (Right); Breast cyst excision (Left); Esophagogastroduodenoscopy (N/A, 2018); Colonoscopy (N/A, 2018); Esophageal manometry with measurement of impedance (N/A, 2018); Shoulder arthroscopy (Left, 2019); Arthroscopic repair of rotator cuff of shoulder (Left, 2019); Arthroscopy of shoulder with removal of distal clavicle (Left, 2019); and Arthroscopic tenotomy of biceps tendon (Left, 2019).    Family History  Her family history includes Breast cancer (age of onset: 45) in her maternal cousin; Breast cancer (age of onset: 50) in her maternal aunt; Cervical cancer in her maternal aunt; Colon cancer (age of onset: 74) in her mother; Diabetes in her sister; Liver cancer in her maternal grandfather and sister; No Known Problems in her daughter, daughter, and sister.    Social History  She reports that she has never smoked. She has never used smokeless tobacco. She reports that she does not drink alcohol or use drugs.    Allergies  She is allergic to sulfa (sulfonamide antibiotics).    Medications  She has a current medication list which includes the following prescription(s): afluria quad 7350-5407 (pf), albuterol, atorvastatin,  escitalopram oxalate, fluticasone propionate, hydrocodone-acetaminophen, omeprazole, ondansetron, ondansetron, pregabalin, tramadol, and xarelto.    Review of Systems   Constitutional: Negative for fever.   HENT: Negative for sore throat.    Eyes: Negative for visual disturbance.   Respiratory: Negative for wheezing.    Cardiovascular: Negative for chest pain.   Gastrointestinal: Negative for nausea.   Musculoskeletal: Negative for arthralgias.   Skin: Negative for rash.   Neurological: Negative for tremors.   Hematological: Does not bruise/bleed easily.   Psychiatric/Behavioral: The patient is not nervous/anxious.           Objective:     /74 (Patient Position: Sitting)   Pulse 85   Wt 68 kg (149 lb 14.4 oz)   BMI 28.32 kg/m²    Physical Exam    Constitutional: comfortable, well dressed  Psychiatric: appropriate affect  Respiratory: comfortably breathing, symmetric chest rise, no stridor  Voice: mild low volume montana/roughness  Cardiovascular: upper extremities non-edematous  Lymphatic: no cervical lymphadenopathy  Neurologic: alert and oriented to time, place, person, and situation; cranial nerves 3-12 grossly intact  Head: normocephalic  Eyes: conjunctivae and sclerae clear  Ears: normal pinnae, normal external auditory canals, tympanic membranes intact  Nose: mucosa pink and noncongested, no masses, no mucopurulence, no polyps  Oral cavity / oropharynx: no mucosal lesions  Neck: soft, full range of motion, laryngotracheal complex palpable with appropriate landmarks, larynx elevates on swallowing  Indirect laryngoscopy: limited due to gag    Procedure  Flexible Laryngeal Videostroboscopy (62949): Laryngeal videostroboscopy is indicated to assess laryngeal vibratory biomechanics and vocal fold oscillation, which cannot be assessed with a plain light examination. This was carried out transnasally with a distal chip videoendoscope. After verbal consent was obtained, the patient was positioned and the nose was  topically decongested with 1% phenylephrine and topically anesthetized with 4% lidocaine. The endoscope was passed through the most patent nasal cavity and positioned to image the nasopharynx, larynx, and hypopharynx in detail. The following features were examined: nasopharyngeal, laryngeal, hypopharyngeal masses; velopharyngeal strength, closure, and symmetry of motion; vocal fold range and symmetry of motion; laryngeal mucosal edema, erythema, inflammation, and hydration; salivary pooling; and gross laryngeal sensation. During phonation, the vocal folds were assessed for glottal closure; mucosal wave; vocal fold lesions; vibratory periodicity, amplitude, and phase symmetry; and vertical height match. The equipment was removed. The patient tolerated the procedure well without complication. All findings were normal except:  - hypodynamic pharyngeal squeeze  - phonatory supraglottic hyperfunction, concentric    Data Reviewed    see HPI.      Assessment:     Jasson Pineda is a 68 y.o. female with multifactorial dysphagia. Findings from workup thus far include esophageal dysmotility, circopharyngeal dysfunction, a small anterio esophageal web, and a hiatal hernia. She localizes symptoms to her chest/epigastic region, so the significance of the proximal findings is uncertain.       Plan:        I had a discussion with the patient regarding her condition and the further workup and management options.      I recommended definitive assessment of her pharyngoesophageal segment via modified barium swallow study. I will review her case afterwards at our interdisciplinary swallowing conference and contact her with consensus recommendations.    All questions were answered, and the patient is in agreement with the above.     James Burgos M.D.  [facility]  Department of Otorhinolaryngology and Communication Sciences

## 2019-10-14 ENCOUNTER — TELEPHONE (OUTPATIENT)
Dept: RADIOLOGY | Facility: HOSPITAL | Age: 69
End: 2019-10-14

## 2019-10-15 ENCOUNTER — HOSPITAL ENCOUNTER (OUTPATIENT)
Dept: RADIOLOGY | Facility: HOSPITAL | Age: 69
Discharge: HOME | End: 2019-10-15
Attending: OTOLARYNGOLOGY
Payer: MEDICARE

## 2019-10-15 ENCOUNTER — CLINICAL SUPPORT (OUTPATIENT)
Dept: SPEECH THERAPY | Facility: HOSPITAL | Age: 69
End: 2019-10-15
Attending: OTOLARYNGOLOGY
Payer: MEDICARE

## 2019-10-15 DIAGNOSIS — K22.4 ESOPHAGEAL DYSMOTILITY: ICD-10-CM

## 2019-10-15 DIAGNOSIS — K22.4 CRICOPHARYNGEUS MUSCLE DYSFUNCTION: ICD-10-CM

## 2019-10-15 DIAGNOSIS — R13.14 DYSPHAGIA, PHARYNGOESOPHAGEAL: ICD-10-CM

## 2019-10-15 PROCEDURE — 92611 MOTION FLUOROSCOPY/SWALLOW: CPT | Mod: GN,KX | Performed by: SPEECH-LANGUAGE PATHOLOGIST

## 2019-10-15 PROCEDURE — 74230 X-RAY XM SWLNG FUNCJ C+: CPT | Mod: 26,,, | Performed by: RADIOLOGY

## 2019-10-15 PROCEDURE — 25500020 PHARM REV CODE 255: Performed by: OTOLARYNGOLOGY

## 2019-10-15 PROCEDURE — A9698 NON-RAD CONTRAST MATERIALNOC: HCPCS | Performed by: OTOLARYNGOLOGY

## 2019-10-15 PROCEDURE — 74230 X-RAY XM SWLNG FUNCJ C+: CPT | Mod: TC

## 2019-10-15 PROCEDURE — 74230 FL MODIFIED BARIUM SWALLOW SPEECH STUDY: ICD-10-PCS | Mod: 26,,, | Performed by: RADIOLOGY

## 2019-10-15 RX ADMIN — BARIUM SULFATE 4 G: 960 POWDER, FOR SUSPENSION ORAL at 02:10

## 2019-10-15 NOTE — PROGRESS NOTES
Referring provider: Dr. James Burgos  Reason for visit:  Modified barium swallow study (CPT 09598)    Report Summary   --Date: 10/15/2019  --Purpose: to evaluate anatomy and physiology of the oropharyngeal swallow, to determine effectiveness of rehabilitation strategies, and to determine diet consistency and intervention recommendations.   --Diet recommendations: mechanical soft/soft solids with thin liquids as tolerated, per pt preference    Subjective / History    Jasson Pineda is a 68 y.o. female referred by Dr. Burgos for Modified Barium Swallow Study (85349).  She is currently on a soft solid diet.  Patient reports a several year history of difficulty swallowing/tolerating solid foods; she describes feeling like food gets stuck and having to drink a lot of water to make it go down.  Problem occurs with solid foods such as meat.  She reports a sensation of food getting stuck in her mid-chest.  She describes a sore throat and acidic taste in her mouth.  Reports that occasionally it feels like liquids go the wrong way.  She denies issues with liquids or small pills.     Diet modification as a result of this problem: yes - avoids solid food, meat, etc  Weight loss: a few lbs which pt reports she is gaining back  History of aspiration pneumonia: no  History of needing Heimlich maneuver: no    Past Medical History:   Diagnosis Date    Anticoagulant long-term use     xarelto    Chronic back pain     Chronic neck pain     DVT (deep venous thrombosis)     Herniated disc, cervical     Kidney stone     Lumbar herniated disc     PE (pulmonary embolism)     2013    Pulmonary emboli     after surgery    Pulmonary embolism      Current Outpatient Medications on File Prior to Visit   Medication Sig Dispense Refill    AFLURIA QUAD [id number], PF, 60 mcg/0.5 mL vaccine ADM 0.5ML IM UTD  0    albuterol (PROVENTIL/VENTOLIN HFA) 90 mcg/actuation inhaler Inhale 2 puffs into the lungs every 4 (four) hours as needed  for Wheezing. Rescue 1 Inhaler 0    atorvastatin (LIPITOR) 40 MG tablet TAKE 1 TABLET(40 MG) BY MOUTH EVERY EVENING 90 tablet 3    escitalopram oxalate (LEXAPRO) 10 MG tablet TAKE 1 TABLET BY MOUTH EVERY DAY 90 tablet 4    fluticasone (FLONASE) 50 mcg/actuation nasal spray daily as needed.      HYDROcodone-acetaminophen (NORCO)  mg per tablet Take 1 tablet by mouth every 4 to 6 hours as needed. 40 tablet 0    omeprazole (PRILOSEC) 20 MG capsule Take 1 capsule (20 mg total) by mouth once daily. 30 capsule 11    ondansetron (ZOFRAN) 4 MG tablet Take 1 tablet (4 mg total) by mouth every 6 (six) hours. 12 tablet 0    ondansetron (ZOFRAN-ODT) 4 MG TbDL Take 2 tablets (8 mg total) by mouth every 8 (eight) hours as needed. 10 tablet 1    pregabalin (LYRICA) 150 MG capsule Take 150 mg by mouth 2 (two) times daily.      traMADol (ULTRAM) 50 mg tablet Take 50 mg by mouth 2 (two) times daily as needed.  1    XARELTO 20 mg Tab Take 1 tablet by mouth every evening. Patient stated will stop xarelto 6/9-per cardiologist  1     No current facility-administered medications on file prior to visit.        Objective   Lateral videofluorographic views were obtained. The radiologist and speech pathologist were present for the entire procedure.     Consistencies assessed / method of administration  Thin liquid/cup sip  Thin liquid/sequential cup sips  Applesauce/tsp  Pudding/tsp  Cracker  Barium tablet w/ water    Oral phase  - Grossly WNL with adequate/timely lip closure, tongue control during bolus hold, bolus preparation, and bolus transport/lingual motion.  Little to no oral residue.      Pharyngeal phase  - Mildly delayed initiation: to pyriforms/valleculae  - Adequate soft palate elevation  - Adequate laryngeal elevation and anterior hyoid excursion  - Adequate tongue base retraction  - Complete epiglottic movement  - Adequate laryngeal vestibular closure: no pen/asp  - Mildly reduced pharyngeal stripping wave  -  Minimal pharyngeal residue: pyriforms  - Reduced PE segment opening: prominent cricopharyngeal bar; resulted in very minimal retrograde bolus flow to pyriforms with liquid/puree trials.  Non obstructive for cracker trial.  Brief hangup period with pill, but cleared in a timely manner.  Shown below: variable degrees of UES obstruction.           Esophageal phase (screen)  - Adequate esophageal clearance: some retrograde motion of partial bolus around UES    Strategies/therapeutic interventions attempted  - Multiple swallows per bolus.  Strategies were effective in decreasing/eliminating risk for pharyngeal residue.   - Ongoing treatment recommendations and rationale (if treatment is recommended): n/a    Rosenbek Penetration-Aspiration Scale (Rosenbek et al 1996)  Best Trial: 1. Contrast does not enter airway.   Worst Trial: 1. Contrast does not enter airway.     Assessment / Impressions   The results of this MBSS indicate that patient demonstrates mild-mod swallowing dysfunction c/b prominent cricopharyngeal muscle, which was non-obstructive on consistencies tested today.  No pen/asp observed.      Recommendations / POC   -Diet: recommend mechanical soft/soft solids with thin liquids as tolerated, per pt preference  -Therapeutic technique: recommend small bites/sips and alternate solid with liquid wash   -Contact clinician or referring provider for repeat MBSS if swallowing status changes or worsens  -F/u with Dr. Burgos  -Refer to interdisciplinary swallow conference

## 2019-10-16 NOTE — PROGRESS NOTES
I have read and reviewed the swallowing evaluation and plan of care.  James Burgos MD, 10/16/2019

## 2019-10-16 NOTE — PROGRESS NOTES
Variably obstructive CP bar, but symptoms are primarily thoracic/epigastic.   Will discuss at dysphagia conference.

## 2019-10-31 ENCOUNTER — EXTERNAL CHRONIC CARE MANAGEMENT (OUTPATIENT)
Dept: PRIMARY CARE CLINIC | Facility: CLINIC | Age: 69
End: 2019-10-31
Payer: MEDICARE

## 2019-10-31 PROCEDURE — 99490 CHRNC CARE MGMT STAFF 1ST 20: CPT | Mod: S$PBB,,, | Performed by: FAMILY MEDICINE

## 2019-10-31 PROCEDURE — 99490 PR CHRONIC CARE MGMT, 1ST 20 MIN: ICD-10-PCS | Mod: S$PBB,,, | Performed by: FAMILY MEDICINE

## 2019-10-31 PROCEDURE — 99490 CHRNC CARE MGMT STAFF 1ST 20: CPT | Mod: PBBFAC | Performed by: FAMILY MEDICINE

## 2019-11-12 ENCOUNTER — TELEPHONE (OUTPATIENT)
Dept: GASTROENTEROLOGY | Facility: CLINIC | Age: 69
End: 2019-11-12

## 2019-11-12 NOTE — TELEPHONE ENCOUNTER
[facility]   [facility] MULTIDISCIPLINARY CONFERENCE  PATIENT REVIEW FORM  ____________________________________________________________    CLINIC #: [id number]    DATE: 11/12/2019    DIAGNOSIS:     [] Achalsia       [] Gastropariesis           [] Functional Dyspepsia   [] Chronic Diarrhea      [x] Dysphagia   [] Pseudoachalasia       [] GERD   [x] Hiatal Hernia       [x] esophageal Dysmotility   [] Dumping Syndrome  [] Cyclic Vomiting   [] IBS       [] Outlet Obstruction    [] Fecal Incontinence    [] Hirschsprung's Disease        PRESENTER:      [] Cinthya    [] Paul   [x] Aubrey        [] Chen  [] Bruce       PATIENT SUMMARY:   Dysphagia, MBSS/esophagram w cricopharyngeal bar, PT w  distal esophageal obstructive symptoms  esoph manometry  w ineffective esophageal motility  Seen in GI by     RECOMMENDATIONS:   Neuromodulation (trazodone)  PT Is on SSRI    CONSULT NEEDED:         [] Surgery    [] ENT    [x] GI    [] Speech Pathology    Needs to f/u w Dr. Foy in GI  Dr. Mendes w discuss neuromodulation w Dr. Foy              IMAGING:       [] Esophagram     [] MBS    [] CT ABD/PELVIS       [] GES   [] MRI    [] UGI w/SBFT   [] SITZ MARKER      [] EKG    [] U/S    [] DEFOGRAM    PROCEDURES:    [] EGD  [] Impedance  [] MANOMETRY  [] SURGICAL INTERVENTION      [] COLONOSCOPY    [] ERCP    [] EUS    [] ENTEROSCOPY

## 2019-11-30 ENCOUNTER — EXTERNAL CHRONIC CARE MANAGEMENT (OUTPATIENT)
Dept: PRIMARY CARE CLINIC | Facility: CLINIC | Age: 69
End: 2019-11-30
Payer: MEDICARE

## 2019-11-30 PROCEDURE — 99490 CHRNC CARE MGMT STAFF 1ST 20: CPT | Mod: S$PBB,,, | Performed by: FAMILY MEDICINE

## 2019-11-30 PROCEDURE — 99490 PR CHRONIC CARE MGMT, 1ST 20 MIN: ICD-10-PCS | Mod: S$PBB,,, | Performed by: FAMILY MEDICINE

## 2019-11-30 PROCEDURE — 99490 CHRNC CARE MGMT STAFF 1ST 20: CPT | Mod: PBBFAC | Performed by: FAMILY MEDICINE

## 2019-12-11 DIAGNOSIS — K21.9 GASTROESOPHAGEAL REFLUX DISEASE, ESOPHAGITIS PRESENCE NOT SPECIFIED: ICD-10-CM

## 2019-12-11 RX ORDER — OMEPRAZOLE 20 MG/1
CAPSULE, DELAYED RELEASE ORAL
Qty: 30 CAPSULE | Refills: 11 | Status: SHIPPED | OUTPATIENT
Start: 2019-12-11 | End: 2020-01-21 | Stop reason: DRUGHIGH

## 2019-12-16 ENCOUNTER — PATIENT OUTREACH (OUTPATIENT)
Dept: ADMINISTRATIVE | Facility: OTHER | Age: 69
End: 2019-12-16

## 2019-12-18 ENCOUNTER — TELEPHONE (OUTPATIENT)
Dept: GASTROENTEROLOGY | Facility: CLINIC | Age: 69
End: 2019-12-18

## 2019-12-18 NOTE — TELEPHONE ENCOUNTER
----- Message from Elza Armstrong sent at 12/18/2019  3:23 PM CST -----  Contact: pt   Pt missed appt today 12/18   Walter didn't show up     Nothing available       Can you please work pt in   ?   Pt still having problems, the other doctors have seen pt and say she needs to come back to see dr Foy    Ph [phone]    Thanks

## 2019-12-18 NOTE — TELEPHONE ENCOUNTER
----- Message from Elza Armstrong sent at 12/18/2019  3:23 PM CST -----  Contact: pt   Pt missed appt today 12/18   Walter didn't show up     Nothing available       Can you please work pt in   ?   Pt still having problems, the other doctors have seen pt and say she needs to come back to see dr Foy    Ph [phone]    Thanks

## 2019-12-19 ENCOUNTER — OFFICE VISIT (OUTPATIENT)
Dept: GASTROENTEROLOGY | Facility: CLINIC | Age: 69
End: 2019-12-19
Payer: MEDICARE

## 2019-12-19 VITALS
DIASTOLIC BLOOD PRESSURE: 70 MMHG | BODY MASS INDEX: 29.01 KG/M2 | HEART RATE: 74 BPM | HEIGHT: 62 IN | SYSTOLIC BLOOD PRESSURE: 107 MMHG | WEIGHT: 157.63 LBS

## 2019-12-19 DIAGNOSIS — K22.4 INEFFECTIVE ESOPHAGEAL MOTILITY: ICD-10-CM

## 2019-12-19 DIAGNOSIS — R10.13 EPIGASTRIC ABDOMINAL PAIN: ICD-10-CM

## 2019-12-19 DIAGNOSIS — K21.9 GASTROESOPHAGEAL REFLUX DISEASE WITHOUT ESOPHAGITIS: ICD-10-CM

## 2019-12-19 DIAGNOSIS — R13.10 DYSPHAGIA, UNSPECIFIED TYPE: Primary | ICD-10-CM

## 2019-12-19 DIAGNOSIS — K44.9 HIATAL HERNIA: ICD-10-CM

## 2019-12-19 DIAGNOSIS — J39.2 CRICOPHARYNGEAL HYPERTROPHY: ICD-10-CM

## 2019-12-19 PROCEDURE — 1125F PR PAIN SEVERITY QUANTIFIED, PAIN PRESENT: ICD-10-PCS | Mod: ,,, | Performed by: INTERNAL MEDICINE

## 2019-12-19 PROCEDURE — 1159F MED LIST DOCD IN RCRD: CPT | Mod: ,,, | Performed by: INTERNAL MEDICINE

## 2019-12-19 PROCEDURE — 99214 OFFICE O/P EST MOD 30 MIN: CPT | Mod: S$PBB,,, | Performed by: INTERNAL MEDICINE

## 2019-12-19 PROCEDURE — 1159F PR MEDICATION LIST DOCUMENTED IN MEDICAL RECORD: ICD-10-PCS | Mod: ,,, | Performed by: INTERNAL MEDICINE

## 2019-12-19 PROCEDURE — 99213 OFFICE O/P EST LOW 20 MIN: CPT | Mod: PBBFAC | Performed by: INTERNAL MEDICINE

## 2019-12-19 PROCEDURE — 1125F AMNT PAIN NOTED PAIN PRSNT: CPT | Mod: ,,, | Performed by: INTERNAL MEDICINE

## 2019-12-19 PROCEDURE — 99999 PR PBB SHADOW E&M-EST. PATIENT-LVL III: ICD-10-PCS | Mod: PBBFAC,,, | Performed by: INTERNAL MEDICINE

## 2019-12-19 PROCEDURE — 99999 PR PBB SHADOW E&M-EST. PATIENT-LVL III: CPT | Mod: PBBFAC,,, | Performed by: INTERNAL MEDICINE

## 2019-12-19 PROCEDURE — 99214 PR OFFICE/OUTPT VISIT, EST, LEVL IV, 30-39 MIN: ICD-10-PCS | Mod: S$PBB,,, | Performed by: INTERNAL MEDICINE

## 2019-12-19 RX ORDER — OMEPRAZOLE 40 MG/1
40 CAPSULE, DELAYED RELEASE ORAL EVERY MORNING
Qty: 30 CAPSULE | Refills: 0 | Status: SHIPPED | OUTPATIENT
Start: 2019-12-19 | End: 2020-01-21

## 2019-12-19 NOTE — LETTER
December 23, 2019      Jennifer Cheatham MD  [address]  [facility] [address]           [facility]  [address]  [facility] [address]  Phone: [phone]  Fax: [phone]          Patient: Jasson Pineda   MR Number: [id number]   YOB: 1950   Date of Visit: 12/19/2019       Dear Dr. Jennifer Cheatham:    Thank you for referring Jasson Pineda to me for evaluation. Attached you will find relevant portions of my assessment and plan of care.    If you have questions, please do not hesitate to call me. I look forward to following Jasson Pineda along with you.    Sincerely,    Armand Foy MD    Enclosure  CC:  No Recipients    If you would like to receive this communication electronically, please contact [email or web address] or [phone] to request more information on [facility] access.    For providers and/or their staff who would like to refer a patient to [facility], please contact us through our one-stop-shop provider referral line, [facility], [place], at [phone].    If you feel you have received this communication in error or would no longer like to receive these types of communications, please e-mail [email or web address]

## 2019-12-19 NOTE — PROGRESS NOTES
[facility] Established Patient Visit    Reason for Visit:    Chief Complaint   Patient presents with    Follow-up       PCP: Jennifer Cheatham    HPI:  Jasson Pineda is a 69 y.o. female here for follow-up of dysphagia.  I last saw her in April this year for the same problem.  In the interim, she has been seen by ENT.  Dr. Burgos discussed her at the swallowing conference.  It was felt that she may benefit from neuromodulation.  She continues to have troubles with food feeling like it sticks in the lower center of her chest.  It occurs every time she eats.  Water helps.  She has altered her diet which also helps.  She had lost some weight in July but has gained some back.  Continues to have epigastric pain after eating with a small lump in that area.  Tramadol and Lyrica have helped mildly.  She is on omeprazole 20 mg p.o. once daily.  After eating she feels like she needs to belch.  She denies pyrosis.  Gas-X helps with some of these symptoms.            PMHX:  has a past medical history of Anticoagulant long-term use, Chronic back pain, Chronic neck pain, DVT (deep venous thrombosis), Herniated disc, cervical, Kidney stone, Lumbar herniated disc, PE (pulmonary embolism), Pulmonary emboli, and Pulmonary embolism.    PSHX:  has a past surgical history that includes Kidney stone surgery; left knee surgery; Tonsillectomy;  section; Hernia repair; Total shoulder arthroplasty (Right); Breast cyst excision (Left); Esophagogastroduodenoscopy (N/A, 2018); Colonoscopy (N/A, 2018); Esophageal manometry with measurement of impedance (N/A, 2018); Shoulder arthroscopy (Left, 2019); Arthroscopic repair of rotator cuff of shoulder (Left, 2019); Arthroscopy of shoulder with removal of distal clavicle (Left, 2019); and Arthroscopic tenotomy of biceps tendon (Left, 2019).    The patient's social and family histories were reviewed by me and updated in the  appropriate section of the electronic medical record.    Review of patient's allergies indicates:   Allergen Reactions    Sulfa (sulfonamide antibiotics) Rash       Prior to Admission medications    Medication Sig Start Date End Date Taking? Authorizing Provider   albuterol (PROVENTIL/VENTOLIN HFA) 90 mcg/actuation inhaler Inhale 2 puffs into the lungs every 4 (four) hours as needed for Wheezing. Rescue 2/16/19  Yes Roxanne Ayoub NP   atorvastatin (LIPITOR) 40 MG tablet TAKE 1 TABLET(40 MG) BY MOUTH EVERY EVENING 6/20/19  Yes Jennifer Cheatham MD   escitalopram oxalate (LEXAPRO) 10 MG tablet TAKE 1 TABLET BY MOUTH EVERY DAY 9/22/19  Yes Jennifer Cheatham MD   fluticasone (FLONASE) 50 mcg/actuation nasal spray daily as needed.   Yes Historical Provider, MD   HYDROcodone-acetaminophen (NORCO)  mg per tablet Take 1 tablet by mouth every 4 to 6 hours as needed. 6/11/19  Yes Sedrick Patel MD   omeprazole (PRILOSEC) 20 MG capsule TAKE 1 CAPSULE BY MOUTH EVERY DAY 12/11/19  Yes Jennifer Cheatham MD   ondansetron (ZOFRAN) 4 MG tablet Take 1 tablet (4 mg total) by mouth every 6 (six) hours. 1/31/19  Yes Shon Esquivel PA-C   pregabalin (LYRICA) 150 MG capsule Take 150 mg by mouth 2 (two) times daily.   Yes Historical Provider, MD   traMADol (ULTRAM) 50 mg tablet Take 50 mg by mouth 2 (two) times daily as needed. 10/26/18  Yes Historical Provider, MD   XARELTO 20 mg Tab Take 1 tablet by mouth every evening. Patient stated will stop xarelto 6/9-per cardiologist 12/29/15  Yes Historical Provider, MD   AFLURIA QUAD [id number], PF, 60 mcg/0.5 mL vaccine ADM 0.5ML IM UTD 10/8/18   Historical Provider, MD   omeprazole (PRILOSEC) 40 MG capsule Take 1 capsule (40 mg total) by mouth every morning. 12/19/19 1/18/20  Armand Foy MD   ondansetron (ZOFRAN-ODT) 4 MG TbDL Take 2 tablets (8 mg total) by mouth every 8 (eight) hours as needed. 6/11/19   Sedrick Patel MD         Objective Findings:  Vital  "Signs:  /70   Pulse 74   Ht 5' 2" (1.575 m)   Wt 71.5 kg (157 lb 10.1 oz)   BMI 28.83 kg/m²  Body mass index is 28.83 kg/m².    Physical Exam:  General Appearance:  Well appearing in no acute distress, appears stated age                Assessment:  Jasson Pineda is a 69 y.o. female here with:  1. Dysphagia, unspecified type    2. Epigastric abdominal pain    3. Gastroesophageal reflux disease without esophagitis    4. Ineffective esophageal motility    5. Cricopharyngeal hypertrophy    6. Hiatal hernia      Complicated dysphagia case.  Also seen by ENT and discussed at swallowing conference.  Symptoms are not consistent with cricopharyngeal disorder.  She has a small hiatal hernia seen on imaging which was not appreciated during endoscopy.  Esophageal manometry indicated ineffective esophageal motility.  She continues to have epigastric pain after eating.  She has a history reflux and is on 20 mg of omeprazole every day.  She is taking it appropriately.  Cause of her symptoms is still not completely clear.  This could be functional.  Other considerations could be untreated reflux or even esophageal dysmotility.        Recommendations:  1.  I will increase her omeprazole to 40 mg p.o. once daily in the morning.  2.  If no improvement in symptoms after 2 weeks, then will give her a trial of trazodone.      Follow-up 2-3 months.      Medical decision making:  Visit time 20 min with more than 50% of time spent counseling on a cause of her symptoms as well as treatment of the same.            Armand Foy MD        "

## 2019-12-30 ENCOUNTER — OFFICE VISIT (OUTPATIENT)
Dept: URGENT CARE | Facility: CLINIC | Age: 69
End: 2019-12-30
Payer: MEDICARE

## 2019-12-30 VITALS
HEIGHT: 62 IN | RESPIRATION RATE: 18 BRPM | BODY MASS INDEX: 28.89 KG/M2 | TEMPERATURE: 98 F | WEIGHT: 157 LBS | SYSTOLIC BLOOD PRESSURE: 102 MMHG | DIASTOLIC BLOOD PRESSURE: 73 MMHG | OXYGEN SATURATION: 96 % | HEART RATE: 101 BPM

## 2019-12-30 DIAGNOSIS — R10.13 EPIGASTRIC PAIN: Primary | ICD-10-CM

## 2019-12-30 LAB
GLUCOSE SERPL-MCNC: 90 MG/DL (ref 70–110)
POC ANION GAP: 16 MMOL/L
POC BUN: 9 MMOL/L
POC CHLORIDE: 105 MMOL/L
POC CREATININE: 0.8 MG/DL (ref 0.6–1.3)
POC HEMATOCRIT: 44 %PCV (ref 37–47)
POC HEMOGLOBIN: 15 G/DL (ref 12.5–16)
POC ICA: 1.22 MMOL/L
POC POTASSIUM: 4 MMOL/L
POC SODIUM: 141 MMOL/L
POC TCO2: 25 MMOL/L

## 2019-12-30 PROCEDURE — 99214 OFFICE O/P EST MOD 30 MIN: CPT | Mod: S$GLB,,, | Performed by: FAMILY MEDICINE

## 2019-12-30 PROCEDURE — 99214 PR OFFICE/OUTPT VISIT, EST, LEVL IV, 30-39 MIN: ICD-10-PCS | Mod: S$GLB,,, | Performed by: FAMILY MEDICINE

## 2019-12-30 PROCEDURE — 80047 BASIC METABLC PNL IONIZED CA: CPT | Mod: QW,S$GLB,, | Performed by: FAMILY MEDICINE

## 2019-12-30 PROCEDURE — 80047 POCT CHEMISTRY PANEL: ICD-10-PCS | Mod: QW,S$GLB,, | Performed by: FAMILY MEDICINE

## 2019-12-30 RX ORDER — DICYCLOMINE HYDROCHLORIDE 20 MG/1
20 TABLET ORAL EVERY 6 HOURS
Qty: 30 TABLET | Refills: 0 | Status: SHIPPED | OUTPATIENT
Start: 2019-12-30 | End: 2020-07-09

## 2019-12-30 NOTE — PROGRESS NOTES
"Subjective:       Patient ID: Jasson Pineda is a 69 y.o. female.    Vitals:  height is 5' 2" (1.575 m) and weight is 71.2 kg (157 lb). Her temperature is 97.8 °F (36.6 °C). Her blood pressure is 102/73 and her pulse is 101. Her respiration is 18 and oxygen saturation is 96%.     Chief Complaint: Dizziness (started on 12/21/19 not better since ER VISIT ON 12/26/19) and Diarrhea (started yesterday (every time she eat )    Patient states for the past 10 or 11 days she has been having dizziness headache.  She was seen in the ER or days ago for these symptoms with a negative workup.  She was given fluids which improved symptoms.  She has been followed by GI for epigastric/chest pain. Her omeprazole was recently increased.  No recent travel antibiotic use raw food consumption.  She had negative flu swab in the ER.  Symptoms worse with position changes.  She is not feeling nauseated but has no appetite.  She had diarrhea x2 last 24 hr without blood.  Of note 3-4 days before her symptoms started she abruptly stopped her Lyrica as she cannot find the medication, found this morning but did not take anything yet.    Dizziness:   Chronicity:  Recurrent  Onset:  1 to 4 weeks ago  Progression since onset:  Unchanged  Pain Scale:  3/10  Severity:  Mild  Duration:  1 minute   Associated symptoms: light-headedness and chest pain (Midsternal).no fever, no headaches, no nausea, no vomiting and no weakness.  Exacerbated by: STANDING.  Treatments tried: FLUIDS.no head trauma, no ear trauma, no head trauma and no anxiety.      Constitution: Negative for chills, fatigue and fever.   HENT: Negative for congestion and sore throat.    Neck: Negative for painful lymph nodes.   Cardiovascular: Positive for chest pain (Midsternal). Negative for leg swelling.   Eyes: Negative for double vision and blurred vision.   Respiratory: Negative for cough and shortness of breath.    Gastrointestinal: Positive for abdominal pain ( epigastric) and " diarrhea. Negative for nausea, vomiting, bright red blood in stool and dark colored stools.   Genitourinary: Negative for dysuria, frequency, urgency and history of kidney stones.   Musculoskeletal: Negative for joint pain, joint swelling, muscle cramps and muscle ache.   Skin: Negative for color change, pale, rash and bruising.   Allergic/Immunologic: Negative for seasonal allergies.   Neurological: Positive for dizziness and light-headedness. Negative for history of vertigo, passing out and headaches.   Hematologic/Lymphatic: Negative for swollen lymph nodes.   Psychiatric/Behavioral: Negative for nervous/anxious, sleep disturbance and depression. The patient is not nervous/anxious.        Objective:      Physical Exam   Constitutional: She is oriented to person, place, and time. She appears well-developed and well-nourished. She is cooperative.  Non-toxic appearance. She does not appear ill. No distress.   HENT:   Head: Normocephalic and atraumatic.   Right Ear: Hearing, external ear and ear canal normal. A middle ear effusion (Clear) is present.   Left Ear: Hearing, external ear and ear canal normal. A middle ear effusion ( clear) is present.   Nose: Nose normal. No mucosal edema, rhinorrhea or nasal deformity. No epistaxis. Right sinus exhibits no maxillary sinus tenderness and no frontal sinus tenderness. Left sinus exhibits no maxillary sinus tenderness and no frontal sinus tenderness.   Mouth/Throat: Uvula is midline, oropharynx is clear and moist and mucous membranes are normal. No trismus in the jaw. Normal dentition. No uvula swelling. No posterior oropharyngeal erythema.   Pt alert oriented.    Eyes: Conjunctivae and lids are normal. Right eye exhibits no discharge. Left eye exhibits no discharge. No scleral icterus.   Neck: Trachea normal, normal range of motion, full passive range of motion without pain and phonation normal. Neck supple.   Cardiovascular: Normal rate, regular rhythm, normal heart  sounds, intact distal pulses and normal pulses.   No murmur heard.  Pulmonary/Chest: Effort normal and breath sounds normal. No respiratory distress. She has no decreased breath sounds. She has no wheezes. She has no rhonchi. She has no rales.   Abdominal: Soft. Normal appearance and bowel sounds are normal. She exhibits no distension, no pulsatile midline mass and no mass. There is generalized tenderness. There is no rigidity, no rebound and no guarding.   Musculoskeletal: Normal range of motion. She exhibits no edema or deformity.   Neurological: She is alert and oriented to person, place, and time. She exhibits normal muscle tone. Coordination normal.   Skin: Skin is warm, dry, intact, not diaphoretic and not pale.   Psychiatric: She has a normal mood and affect. Her speech is normal and behavior is normal. Judgment and thought content normal. Cognition and memory are normal.   Nursing note and vitals reviewed.        Results for orders placed or performed in visit on 12/30/19   POCT Chemistry Panel   Result Value Ref Range    POC Sodium 141 MMOL/L    POC Potassium 4.0 MMOL/L    POC Chloride 105 MMOL/L    POC BUN 9 MMOL/L    POC Glucose 90 70 - 110 MG/DL    POC Creatinine 0.8 0.6 - 1.3 mg/dL    POC iCA 1.22 MMOL/L    POC TCO2 25 MMOL/L    POC Hematocrit 44 37 - 47 %PCV    POC Hemoglobin 15.0 12.5 - 16 g/dL    POC Anion Gap 16 MMOL/L       Assessment:       1. Epigastric pain        Plan:         Epigastric pain  -     (pyxis) gi cocktail (mylanta 30 mL, lidocaine 2 % viscous 10 mL, dicyclomine 10 mL) 50 mL  -     POCT Chemistry Panel  -     dicyclomine (BENTYL) 20 mg tablet; Take 1 tablet (20 mg total) by mouth every 6 (six) hours.  Dispense: 30 tablet; Refill: 0    no real change in sx after gi cocktail. Unclear etiology of her sx - she had a completely neg workup a few days ago. Considered worsening GERD as she appears to have a complicated case based on notes from GI, possible withdrawal from abrupt stop in  lyrica? Advised to restart. Discussed ER precautions. Advised close f/u with her pcp or gi whichever she can get in with for follow up. Pt agreeable. Will see if bentyl helps some of her pain    Patient Instructions   PLEASE READ YOUR DISCHARGE INSTRUCTIONS ENTIRELY AS IT CONTAINS IMPORTANT INFORMATION.    Take the bentyl for stomach cramping (may cause drowsiness).     Use gatorade/pedialyte or rehydration packets to help stay hydrated. Vitamin water and plain water do not contain rehydrating electrolytes.  Increase clear liquids (water, gatorade, pedialyte, broths, jello, etc) Hold off on solids for 12-18 hours. Then advance to BRAT diet (banana, rice, applesauce, tea, toast/crackers), then advance further as tolerated. Avoid spicy or fatty foods.   Use Peptobismol to help alleviate your diarrhea symptoms.     You can take imodium to alleviate the dairrhea    Wash hands frequently while sick. Avoid ibuprofen or other NSAIDS until you are well.     Please go to the ER if you experience worsening pain, blood in your vomit or stool, high fever, worsening dizziness, fainting, swelling of your abdomen, inability to pass gas or stool.     Please see your pcp or gi for further evaluation    Start your lyrica again      Please arrange follow up with your primary medical clinic as soon as possible. You must understand that you've received an Urgent Care treatment only and that you may be released before all of your medical problems are known or treated. You, the patient, will arrange for follow up as instructed. If your symptoms worsen or fail to improve you should go to the Emergency Room.  WE CANNOT RULE OUT ALL POSSIBLE CAUSES OF YOUR SYMPTOMS IN THE URGENT CARE SETTING PLEASE GO TO THE ER IF YOU FEELS YOUR CONDITION IS WORSENING OR YOU WOULD LIKE EMERGENT EVALUATION.      Epigastric Pain (Uncertain Cause)     Epigastric pain can be a sign of disease in the upper abdomen. Common causes include:  · Acid reflux (stomach  acid flowing up into the esophagus)  · Gastritis (irritation of the stomach lining)  · Peptic Ulcer Disease  · Inflammation of the pancreas  · Gallstone  · Infection in the gallbladder  Pain may be dull or burning. It may spread upward to the chest or to the back. There may be other symptoms such as belching, bloating, cramps or hunger pains. There may be weight loss or poor appetite, nausea or vomiting.  Since the diagnosis of your pain is not certain yet, further tests may sometimes be needed. Sometimes the doctor will treat you for the most likely condition to see if there is improvement before doing further tests.  Home care  Medicines  · Antacids help neutralize the normal acids in your stomach. Examples are Maalox, Mylanta, Rolaids, and Tums. If you dont like the liquid, you can also try a chewable one. You may find one works better than another for you. Overuse can cause diarrhea or constipation.  · Acid blockers (H2 blockers) decrease acid production. Examples are cimetidine (Tagamet), famotidine (Pepcid) and ranitidine (Zantac).  · Acid inhibitors (PPIs) decrease acid production in a different way than the blockers. You may find they work better, but can take a little longer to take effect.  Examples are omeprazole (Prilosec), lansoprazole (Prevacid), pantoprazole (Protonix), rabeprazole (Aciphex), and esomeprazole (Nexium).  · Take an antacid 30-60 minutes after eating and at bedtime, but not at the same time as an acid blocker.  · Try not to take NSAIDs. Aspirin may also cause problems, but if taking it for your heart or other medical reasons, talk to your doctor before stopping it; you do not want to cause a worse problem, like a heart attack or stroke.  Diet  · If certain foods seem to cause your spasm, try to avoid them.   · Eat slowly and chew food well before swallowing. Symptoms of gastritis can be worsened by certain foods. Limit or avoid fatty, fried, and spicy foods, as well as coffee,  chocolate, mint, and foods with high acid content such as tomatoes and citrus fruit and juices (orange, grapefruit, lemon).  · Avoid alcohol, caffeine, and tobacco, which can delay healing and worsen your problem.  · Try eating smaller meals with snacks in between  Follow-up care  Follow up with your healthcare provider or as advised.  When to seek medical advice  Call your healthcare provider right away if any of the following occur:  · Stomach pain worsens or moves to the right lower part of the abdomen  · Chest pain appears, or if it worsens or spreads to the chest, back, neck, shoulder, or arm  · Frequent vomiting (cant keep down liquids)  · Blood in the stool or vomit (red or black color)  · Feeling weak or dizzy, fainting, or having trouble breathing  · Fever of 100.4ºF (38ºC) or higher, or as directed by your healthcare provider  · Abdominal swelling  Date Last Reviewed: 9/25/2015  © 3436-7473 [employer]. [address] [place] [address]. All rights reserved. This information is not intended as a substitute for professional medical care. Always follow your healthcare professional's instructions.        Uncertain Causes of Diarrhea (Adult)    Diarrhea is when stools are loose and watery. This can be caused by:  · Viral infections  · Bacterial infections  · Food poisoning  · Parasites  · Irritable bowel syndrome (IBS)  · Inflammatory bowel diseases such as ulcerative colitis, Crohn's disease, and celiac disease  · Food intolerance, such as to lactose, the sugar found in milk and milk products  · Reaction to medicines like antibiotics, laxatives, cancer drugs, and antacids  Along with diarrhea, you may also have:  · Abdominal pain and cramping  · Nausea and vomiting  · Loss of bowel control  · Fever and chills  · Bloody stools  In some cases, antibiotics may help to treat diarrhea. You may have a stool sample test. This is done to see what is causing your diarrhea, and if antibiotics  will help treat it. The results of a stool sample test may take up to 2 days. The healthcare provider may not give you antibiotics until he or she has the stool test results.  Diarrhea can cause dehydration. This is the loss of too much water and other fluids from the body. When this occurs, body fluid must be replaced. This can be done with oral rehydration solutions. Oral rehydration solutions are available at drugstores and grocery stores without a prescription.  Home care  Follow all instructions given by your healthcare provider. Rest at home for the next 24 hours, or until you feel better. Avoid caffeine, tobacco, and alcohol. These can make diarrhea, cramping, and pain worse.  If taking medicines:  · Dont take over-the-counter diarrhea or nausea medicines unless your healthcare provider tells you to.  · You may use acetaminophen or NSAID medicines like ibuprofen or naproxen to reduce pain and fever. Dont use these if you have chronic liver or kidney disease, or ever had a stomach ulcer or gastrointestinal bleeding. Don't use NSAID medicines if you are already taking one for another condition (like arthritis) or are on daily aspirin therapy (such as for heart disease or after a stroke). Talk with your healthcare provider first.  · If antibiotics were prescribed, be sure you take them until they are finished. Dont stop taking them even when you feel better. Antibiotics must be taken as a full course.  To prevent the spread of illness:  · Remember that washing with soap and water and using alcohol-based  is the best way to prevent the spread of infection.  · Clean the toilet after each use.  · Wash your hands before eating.  · Wash your hands before and after preparing food. Keep in mind that people with diarrhea or vomiting should not prepare food for others.  · Wash your hands after using cutting boards, countertops, and knives that have been in contact with raw foods.  · Wash and then peel fruits  and vegetables.  · Keep uncooked meats away from cooked and ready-to-eat foods.  · Use a food thermometer when cooking. Cook poultry to at least 165°F (74°C). Cook ground meat (beef, veal, pork, lamb) to at least 160°F (71°C). Cook fresh beef, veal, lamb, and pork to at least 145°F (63°C).  · Dont eat raw or undercooked eggs (poached or jamarcus side up), poultry, meat, or unpasteurized milk and juices.  Food and drinks  The main goal while treating vomiting or diarrhea is to prevent dehydration. This is done by taking small amounts of liquids often.  · Keep in mind that liquids are more important than food right now.  · Drink only small amounts of liquids at a time.  · Dont force yourself to eat, especially if you are having cramping, vomiting, or diarrhea. Dont eat large amounts at a time, even if you are hungry.  · If you eat, avoid fatty, greasy, spicy, or fried foods.  · Dont eat dairy foods or drink milk if you have diarrhea. These can make diarrhea worse.  During the first 24 hours you can try:  · Oral rehydration solutions. Do not use sports drinks. They have too much sugar and not enough electrolytes.  · Soft drinks without caffeine  · Ginger ale  · Water (plain or flavored)  · Decaf tea or coffee  · Clear broth, consommé, or bouillon  · Gelatin, popsicles, or frozen fruit juice bars  The second 24 hours, if you are feeling better, you can add:  · Hot cereal, plain toast, bread, rolls, or crackers  · Plain noodles, rice, mashed potatoes, chicken noodle soup, or rice soup  · Unsweetened canned fruit (no pineapple)  · Bananas  As you recover:  · Limit fat intake to less than 15 grams per day. Dont eat margarine, butter, oils, mayonnaise, sauces, gravies, fried foods, peanut butter, meat, poultry, or fish.  · Limit fiber. Dont eat raw or cooked vegetables, fresh fruits except bananas, or bran cereals.  · Limit caffeine and chocolate.  · Limit dairy.  · Dont use spices or seasonings except salt.  · Go back  to your normal diet over time, as you feel better and your symptoms improve.  · If the symptoms come back, go back to a simple diet or clear liquids.  Follow-up care  Follow up with your healthcare provider, or as advised. If a stool sample was taken or cultures were done, call the healthcare provider for the results as instructed.  Call 911  Call 911 if you have any of these symptoms:  · Trouble breathing  · Confusion  · Extreme drowsiness or trouble walking  · Loss of consciousness  · Rapid heart rate  · Chest pain  · Stiff neck  · Seizure  When to seek medical advice  Call your healthcare provider right away if any of these occur:  · Abdominal pain that gets worse  · Constant lower right abdominal pain  · Continued vomiting and inability to keep liquids down  · Diarrhea more than 5 times a day  · Blood in vomit or stool  · Dark urine or no urine for 8 hours, dry mouth and tongue, tiredness, weakness, or dizziness  · Drowsiness  · New rash  · You dont get better in 2 to 3 days  · Fever of 100.4°F (38°C) or higher that doesnt get lower with medicine  Date Last Reviewed: 1/3/2016  © 3952-7334 [employer]. [address] [place] [id number]. All rights reserved. This information is not intended as a substitute for professional medical care. Always follow your healthcare professional's instructions.

## 2019-12-30 NOTE — PATIENT INSTRUCTIONS
PLEASE READ YOUR DISCHARGE INSTRUCTIONS ENTIRELY AS IT CONTAINS IMPORTANT INFORMATION.    Take the bentyl for stomach cramping (may cause drowsiness).     Use gatorade/pedialyte or rehydration packets to help stay hydrated. Vitamin water and plain water do not contain rehydrating electrolytes.  Increase clear liquids (water, gatorade, pedialyte, broths, jello, etc) Hold off on solids for 12-18 hours. Then advance to BRAT diet (banana, rice, applesauce, tea, toast/crackers), then advance further as tolerated. Avoid spicy or fatty foods.   Use Peptobismol to help alleviate your diarrhea symptoms.     You can take imodium to alleviate the dairrhea    Wash hands frequently while sick. Avoid ibuprofen or other NSAIDS until you are well.     Please go to the ER if you experience worsening pain, blood in your vomit or stool, high fever, worsening dizziness, fainting, swelling of your abdomen, inability to pass gas or stool.     Please see your pcp or gi for further evaluation    Start your lyrica again      Please arrange follow up with your primary medical clinic as soon as possible. You must understand that you've received an Urgent Care treatment only and that you may be released before all of your medical problems are known or treated. You, the patient, will arrange for follow up as instructed. If your symptoms worsen or fail to improve you should go to the Emergency Room.  WE CANNOT RULE OUT ALL POSSIBLE CAUSES OF YOUR SYMPTOMS IN THE URGENT CARE SETTING PLEASE GO TO THE ER IF YOU FEELS YOUR CONDITION IS WORSENING OR YOU WOULD LIKE EMERGENT EVALUATION.      Epigastric Pain (Uncertain Cause)     Epigastric pain can be a sign of disease in the upper abdomen. Common causes include:  · Acid reflux (stomach acid flowing up into the esophagus)  · Gastritis (irritation of the stomach lining)  · Peptic Ulcer Disease  · Inflammation of the pancreas  · Gallstone  · Infection in the gallbladder  Pain may be dull or burning. It  may spread upward to the chest or to the back. There may be other symptoms such as belching, bloating, cramps or hunger pains. There may be weight loss or poor appetite, nausea or vomiting.  Since the diagnosis of your pain is not certain yet, further tests may sometimes be needed. Sometimes the doctor will treat you for the most likely condition to see if there is improvement before doing further tests.  Home care  Medicines  · Antacids help neutralize the normal acids in your stomach. Examples are Maalox, Mylanta, Rolaids, and Tums. If you dont like the liquid, you can also try a chewable one. You may find one works better than another for you. Overuse can cause diarrhea or constipation.  · Acid blockers (H2 blockers) decrease acid production. Examples are cimetidine (Tagamet), famotidine (Pepcid) and ranitidine (Zantac).  · Acid inhibitors (PPIs) decrease acid production in a different way than the blockers. You may find they work better, but can take a little longer to take effect.  Examples are omeprazole (Prilosec), lansoprazole (Prevacid), pantoprazole (Protonix), rabeprazole (Aciphex), and esomeprazole (Nexium).  · Take an antacid 30-60 minutes after eating and at bedtime, but not at the same time as an acid blocker.  · Try not to take NSAIDs. Aspirin may also cause problems, but if taking it for your heart or other medical reasons, talk to your doctor before stopping it; you do not want to cause a worse problem, like a heart attack or stroke.  Diet  · If certain foods seem to cause your spasm, try to avoid them.   · Eat slowly and chew food well before swallowing. Symptoms of gastritis can be worsened by certain foods. Limit or avoid fatty, fried, and spicy foods, as well as coffee, chocolate, mint, and foods with high acid content such as tomatoes and citrus fruit and juices (orange, grapefruit, lemon).  · Avoid alcohol, caffeine, and tobacco, which can delay healing and worsen your problem.  · Try eating  smaller meals with snacks in between  Follow-up care  Follow up with your healthcare provider or as advised.  When to seek medical advice  Call your healthcare provider right away if any of the following occur:  · Stomach pain worsens or moves to the right lower part of the abdomen  · Chest pain appears, or if it worsens or spreads to the chest, back, neck, shoulder, or arm  · Frequent vomiting (cant keep down liquids)  · Blood in the stool or vomit (red or black color)  · Feeling weak or dizzy, fainting, or having trouble breathing  · Fever of 100.4ºF (38ºC) or higher, or as directed by your healthcare provider  · Abdominal swelling  Date Last Reviewed: 9/25/2015 © 2000-2017 [employer]. [address] [place] [address], [place] [address]. All rights reserved. This information is not intended as a substitute for professional medical care. Always follow your healthcare professional's instructions.        Uncertain Causes of Diarrhea (Adult)    Diarrhea is when stools are loose and watery. This can be caused by:  · Viral infections  · Bacterial infections  · Food poisoning  · Parasites  · Irritable bowel syndrome (IBS)  · Inflammatory bowel diseases such as ulcerative colitis, Crohn's disease, and celiac disease  · Food intolerance, such as to lactose, the sugar found in milk and milk products  · Reaction to medicines like antibiotics, laxatives, cancer drugs, and antacids  Along with diarrhea, you may also have:  · Abdominal pain and cramping  · Nausea and vomiting  · Loss of bowel control  · Fever and chills  · Bloody stools  In some cases, antibiotics may help to treat diarrhea. You may have a stool sample test. This is done to see what is causing your diarrhea, and if antibiotics will help treat it. The results of a stool sample test may take up to 2 days. The healthcare provider may not give you antibiotics until he or she has the stool test results.  Diarrhea can cause dehydration. This is the loss of too  much water and other fluids from the body. When this occurs, body fluid must be replaced. This can be done with oral rehydration solutions. Oral rehydration solutions are available at drugstores and grocery stores without a prescription.  Home care  Follow all instructions given by your healthcare provider. Rest at home for the next 24 hours, or until you feel better. Avoid caffeine, tobacco, and alcohol. These can make diarrhea, cramping, and pain worse.  If taking medicines:  · Dont take over-the-counter diarrhea or nausea medicines unless your healthcare provider tells you to.  · You may use acetaminophen or NSAID medicines like ibuprofen or naproxen to reduce pain and fever. Dont use these if you have chronic liver or kidney disease, or ever had a stomach ulcer or gastrointestinal bleeding. Don't use NSAID medicines if you are already taking one for another condition (like arthritis) or are on daily aspirin therapy (such as for heart disease or after a stroke). Talk with your healthcare provider first.  · If antibiotics were prescribed, be sure you take them until they are finished. Dont stop taking them even when you feel better. Antibiotics must be taken as a full course.  To prevent the spread of illness:  · Remember that washing with soap and water and using alcohol-based  is the best way to prevent the spread of infection.  · Clean the toilet after each use.  · Wash your hands before eating.  · Wash your hands before and after preparing food. Keep in mind that people with diarrhea or vomiting should not prepare food for others.  · Wash your hands after using cutting boards, countertops, and knives that have been in contact with raw foods.  · Wash and then peel fruits and vegetables.  · Keep uncooked meats away from cooked and ready-to-eat foods.  · Use a food thermometer when cooking. Cook poultry to at least 165°F (74°C). Cook ground meat (beef, veal, pork, lamb) to at least 160°F (71°C). Cook  fresh beef, veal, lamb, and pork to at least 145°F (63°C).  · Dont eat raw or undercooked eggs (poached or jamarcus side up), poultry, meat, or unpasteurized milk and juices.  Food and drinks  The main goal while treating vomiting or diarrhea is to prevent dehydration. This is done by taking small amounts of liquids often.  · Keep in mind that liquids are more important than food right now.  · Drink only small amounts of liquids at a time.  · Dont force yourself to eat, especially if you are having cramping, vomiting, or diarrhea. Dont eat large amounts at a time, even if you are hungry.  · If you eat, avoid fatty, greasy, spicy, or fried foods.  · Dont eat dairy foods or drink milk if you have diarrhea. These can make diarrhea worse.  During the first 24 hours you can try:  · Oral rehydration solutions. Do not use sports drinks. They have too much sugar and not enough electrolytes.  · Soft drinks without caffeine  · Ginger ale  · Water (plain or flavored)  · Decaf tea or coffee  · Clear broth, consommé, or bouillon  · Gelatin, popsicles, or frozen fruit juice bars  The second 24 hours, if you are feeling better, you can add:  · Hot cereal, plain toast, bread, rolls, or crackers  · Plain noodles, rice, mashed potatoes, chicken noodle soup, or rice soup  · Unsweetened canned fruit (no pineapple)  · Bananas  As you recover:  · Limit fat intake to less than 15 grams per day. Dont eat margarine, butter, oils, mayonnaise, sauces, gravies, fried foods, peanut butter, meat, poultry, or fish.  · Limit fiber. Dont eat raw or cooked vegetables, fresh fruits except bananas, or bran cereals.  · Limit caffeine and chocolate.  · Limit dairy.  · Dont use spices or seasonings except salt.  · Go back to your normal diet over time, as you feel better and your symptoms improve.  · If the symptoms come back, go back to a simple diet or clear liquids.  Follow-up care  Follow up with your healthcare provider, or as advised. If a  stool sample was taken or cultures were done, call the healthcare provider for the results as instructed.  Call 911  Call 911 if you have any of these symptoms:  · Trouble breathing  · Confusion  · Extreme drowsiness or trouble walking  · Loss of consciousness  · Rapid heart rate  · Chest pain  · Stiff neck  · Seizure  When to seek medical advice  Call your healthcare provider right away if any of these occur:  · Abdominal pain that gets worse  · Constant lower right abdominal pain  · Continued vomiting and inability to keep liquids down  · Diarrhea more than 5 times a day  · Blood in vomit or stool  · Dark urine or no urine for 8 hours, dry mouth and tongue, tiredness, weakness, or dizziness  · Drowsiness  · New rash  · You dont get better in 2 to 3 days  · Fever of 100.4°F (38°C) or higher that doesnt get lower with medicine  Date Last Reviewed: 1/3/2016  © 0943-6887 [employer]. [address] [place] [address], [place] [address]. All rights reserved. This information is not intended as a substitute for professional medical care. Always follow your healthcare professional's instructions.

## 2019-12-31 ENCOUNTER — EXTERNAL CHRONIC CARE MANAGEMENT (OUTPATIENT)
Dept: PRIMARY CARE CLINIC | Facility: CLINIC | Age: 69
End: 2019-12-31
Payer: MEDICARE

## 2019-12-31 PROCEDURE — 99490 CHRNC CARE MGMT STAFF 1ST 20: CPT | Mod: PBBFAC | Performed by: FAMILY MEDICINE

## 2019-12-31 PROCEDURE — 99490 CHRNC CARE MGMT STAFF 1ST 20: CPT | Mod: S$PBB,,, | Performed by: FAMILY MEDICINE

## 2019-12-31 PROCEDURE — 99490 PR CHRONIC CARE MGMT, 1ST 20 MIN: ICD-10-PCS | Mod: S$PBB,,, | Performed by: FAMILY MEDICINE

## 2020-01-21 RX ORDER — OMEPRAZOLE 40 MG/1
CAPSULE, DELAYED RELEASE ORAL
Qty: 30 CAPSULE | Refills: 0 | Status: SHIPPED | OUTPATIENT
Start: 2020-01-21 | End: 2020-11-19 | Stop reason: SDUPTHER

## 2020-01-28 ENCOUNTER — TELEPHONE (OUTPATIENT)
Dept: GASTROENTEROLOGY | Facility: CLINIC | Age: 70
End: 2020-01-28

## 2020-01-28 NOTE — TELEPHONE ENCOUNTER
MA attempted to contact patient to schedule her follow up appointment with Dr. Foy. Patient did not answer. Unable to leave voicemail for patient to return a call to our office.

## 2020-03-31 ENCOUNTER — EXTERNAL CHRONIC CARE MANAGEMENT (OUTPATIENT)
Dept: PRIMARY CARE CLINIC | Facility: CLINIC | Age: 70
End: 2020-03-31
Payer: MEDICARE

## 2020-03-31 PROCEDURE — 99490 PR CHRONIC CARE MGMT, 1ST 20 MIN: ICD-10-PCS | Mod: S$PBB,,, | Performed by: FAMILY MEDICINE

## 2020-03-31 PROCEDURE — 99490 CHRNC CARE MGMT STAFF 1ST 20: CPT | Mod: S$PBB,,, | Performed by: FAMILY MEDICINE

## 2020-03-31 PROCEDURE — 99490 CHRNC CARE MGMT STAFF 1ST 20: CPT | Mod: PBBFAC | Performed by: FAMILY MEDICINE

## 2020-04-30 ENCOUNTER — EXTERNAL CHRONIC CARE MANAGEMENT (OUTPATIENT)
Dept: PRIMARY CARE CLINIC | Facility: CLINIC | Age: 70
End: 2020-04-30
Payer: MEDICARE

## 2020-04-30 PROCEDURE — 99490 CHRNC CARE MGMT STAFF 1ST 20: CPT | Mod: S$PBB,,, | Performed by: FAMILY MEDICINE

## 2020-04-30 PROCEDURE — 99490 PR CHRONIC CARE MGMT, 1ST 20 MIN: ICD-10-PCS | Mod: S$PBB,,, | Performed by: FAMILY MEDICINE

## 2020-04-30 PROCEDURE — 99490 CHRNC CARE MGMT STAFF 1ST 20: CPT | Mod: PBBFAC | Performed by: FAMILY MEDICINE

## 2020-05-04 ENCOUNTER — TELEPHONE (OUTPATIENT)
Dept: CARDIOLOGY | Facility: CLINIC | Age: 70
End: 2020-05-04

## 2020-05-04 NOTE — TELEPHONE ENCOUNTER
Returning call from voice mail.  No answer.  Left message stating Dr Doty's nurse would return her call.

## 2020-05-05 DIAGNOSIS — I26.99 PULMONARY EMBOLISM, UNSPECIFIED CHRONICITY, UNSPECIFIED PULMONARY EMBOLISM TYPE, UNSPECIFIED WHETHER ACUTE COR PULMONALE PRESENT: Primary | ICD-10-CM

## 2020-05-05 RX ORDER — RIVAROXABAN 20 MG/1
1 TABLET, FILM COATED ORAL NIGHTLY
Qty: 90 TABLET | Refills: 3 | Status: SHIPPED | OUTPATIENT
Start: 2020-05-05 | End: 2020-07-13 | Stop reason: SDUPTHER

## 2020-05-22 ENCOUNTER — LAB VISIT (OUTPATIENT)
Dept: PRIMARY CARE CLINIC | Facility: CLINIC | Age: 70
End: 2020-05-22
Payer: COMMERCIAL

## 2020-05-22 DIAGNOSIS — R05.9 COUGH: Primary | ICD-10-CM

## 2020-05-22 PROCEDURE — U0003 INFECTIOUS AGENT DETECTION BY NUCLEIC ACID (DNA OR RNA); SEVERE ACUTE RESPIRATORY SYNDROME CORONAVIRUS 2 (SARS-COV-2) (CORONAVIRUS DISEASE [COVID-19]), AMPLIFIED PROBE TECHNIQUE, MAKING USE OF HIGH THROUGHPUT TECHNOLOGIES AS DESCRIBED BY CMS-2020-01-R: HCPCS

## 2020-05-24 LAB — SARS-COV-2 RNA RESP QL NAA+PROBE: NOT DETECTED

## 2020-05-31 ENCOUNTER — EXTERNAL CHRONIC CARE MANAGEMENT (OUTPATIENT)
Dept: PRIMARY CARE CLINIC | Facility: CLINIC | Age: 70
End: 2020-05-31
Payer: MEDICARE

## 2020-05-31 PROCEDURE — 99490 CHRNC CARE MGMT STAFF 1ST 20: CPT | Mod: PBBFAC | Performed by: FAMILY MEDICINE

## 2020-05-31 PROCEDURE — 99490 CHRNC CARE MGMT STAFF 1ST 20: CPT | Mod: S$PBB,,, | Performed by: FAMILY MEDICINE

## 2020-05-31 PROCEDURE — 99490 PR CHRONIC CARE MGMT, 1ST 20 MIN: ICD-10-PCS | Mod: S$PBB,,, | Performed by: FAMILY MEDICINE

## 2020-06-22 ENCOUNTER — PES CALL (OUTPATIENT)
Dept: ADMINISTRATIVE | Facility: CLINIC | Age: 70
End: 2020-06-22

## 2020-06-30 ENCOUNTER — OFFICE VISIT (OUTPATIENT)
Dept: INTERNAL MEDICINE | Facility: CLINIC | Age: 70
End: 2020-06-30
Payer: MEDICARE

## 2020-06-30 ENCOUNTER — EXTERNAL CHRONIC CARE MANAGEMENT (OUTPATIENT)
Dept: PRIMARY CARE CLINIC | Facility: CLINIC | Age: 70
End: 2020-06-30
Payer: MEDICARE

## 2020-06-30 VITALS
SYSTOLIC BLOOD PRESSURE: 82 MMHG | DIASTOLIC BLOOD PRESSURE: 58 MMHG | BODY MASS INDEX: 29.59 KG/M2 | OXYGEN SATURATION: 98 % | WEIGHT: 156.75 LBS | HEIGHT: 61 IN | HEART RATE: 76 BPM

## 2020-06-30 DIAGNOSIS — Z00.00 ENCOUNTER FOR PREVENTIVE HEALTH EXAMINATION: Primary | ICD-10-CM

## 2020-06-30 DIAGNOSIS — S46.119A LABRAL TEAR OF LONG HEAD OF BICEPS TENDON, INITIAL ENCOUNTER: ICD-10-CM

## 2020-06-30 DIAGNOSIS — R06.02 SOB (SHORTNESS OF BREATH): ICD-10-CM

## 2020-06-30 DIAGNOSIS — M75.42 IMPINGEMENT SYNDROME OF LEFT SHOULDER: ICD-10-CM

## 2020-06-30 DIAGNOSIS — N20.0 KIDNEY STONE: ICD-10-CM

## 2020-06-30 DIAGNOSIS — M19.012 PRIMARY OSTEOARTHRITIS OF LEFT SHOULDER: ICD-10-CM

## 2020-06-30 DIAGNOSIS — K22.4 INEFFECTIVE ESOPHAGEAL MOTILITY: ICD-10-CM

## 2020-06-30 DIAGNOSIS — M50.20 HERNIATED DISC, CERVICAL: ICD-10-CM

## 2020-06-30 DIAGNOSIS — M75.121 COMPLETE TEAR OF RIGHT ROTATOR CUFF, UNSPECIFIED WHETHER TRAUMATIC: ICD-10-CM

## 2020-06-30 DIAGNOSIS — M75.122 COMPLETE TEAR OF LEFT ROTATOR CUFF, UNSPECIFIED WHETHER TRAUMATIC: ICD-10-CM

## 2020-06-30 DIAGNOSIS — N32.81 OAB (OVERACTIVE BLADDER): ICD-10-CM

## 2020-06-30 DIAGNOSIS — M19.019 SHOULDER ARTHRITIS: ICD-10-CM

## 2020-06-30 PROCEDURE — 99214 OFFICE O/P EST MOD 30 MIN: CPT | Mod: S$GLB,,, | Performed by: NURSE PRACTITIONER

## 2020-06-30 PROCEDURE — 99999 PR PBB SHADOW E&M-EST. PATIENT-LVL III: CPT | Mod: PBBFAC,,, | Performed by: NURSE PRACTITIONER

## 2020-06-30 PROCEDURE — 99213 OFFICE O/P EST LOW 20 MIN: CPT | Mod: PBBFAC,25 | Performed by: NURSE PRACTITIONER

## 2020-06-30 PROCEDURE — 99490 CHRNC CARE MGMT STAFF 1ST 20: CPT | Mod: S$PBB,,, | Performed by: INTERNAL MEDICINE

## 2020-06-30 PROCEDURE — G2058 PR CHRON CARE MGMT, EA ADDTL 20 MINS: ICD-10-PCS | Mod: S$PBB,,, | Performed by: INTERNAL MEDICINE

## 2020-06-30 PROCEDURE — 99214 PR OFFICE/OUTPT VISIT, EST, LEVL IV, 30-39 MIN: ICD-10-PCS | Mod: S$GLB,,, | Performed by: NURSE PRACTITIONER

## 2020-06-30 PROCEDURE — G2058 CCM ADD 20MIN: HCPCS | Mod: S$PBB,,, | Performed by: INTERNAL MEDICINE

## 2020-06-30 PROCEDURE — G2058 CCM ADD 20MIN: HCPCS | Mod: PBBFAC | Performed by: INTERNAL MEDICINE

## 2020-06-30 PROCEDURE — 99490 PR CHRONIC CARE MGMT, 1ST 20 MIN: ICD-10-PCS | Mod: S$PBB,,, | Performed by: INTERNAL MEDICINE

## 2020-06-30 PROCEDURE — 99999 PR PBB SHADOW E&M-EST. PATIENT-LVL III: ICD-10-PCS | Mod: PBBFAC,,, | Performed by: NURSE PRACTITIONER

## 2020-06-30 PROCEDURE — 99490 CHRNC CARE MGMT STAFF 1ST 20: CPT | Mod: PBBFAC | Performed by: INTERNAL MEDICINE

## 2020-06-30 NOTE — PROGRESS NOTES
"  Jasson Pineda presented for a  Medicare AWV and comprehensive Health Risk Assessment today. The following components were reviewed and updated:    · Medical history  · Family History  · Social history  · Allergies and Current Medications  · Health Risk Assessment  · Health Maintenance  · Care Team     ** See Completed Assessments for Annual Wellness Visit within the encounter summary.**       The following assessments were completed:  · Living Situation  · CAGE  · Depression Screening  · Timed Get Up and Go  · Whisper Test  · Cognitive Function Screening  · Nutrition Screening  · ADL Screening  · PAQ Screening          Vitals:    06/30/20 0854   BP: (!) 82/58   BP Location: Left arm   Patient Position: Sitting   Pulse: 76   SpO2: 98%   Weight: 71.1 kg (156 lb 12 oz)   Height: 5' 1" (1.549 m)     Body mass index is 29.62 kg/m².     Physical Exam  Vitals signs reviewed.   Constitutional:       Appearance: Normal appearance. She is well-developed.   HENT:      Head: Normocephalic and atraumatic. Not macrocephalic and not microcephalic. No raccoon eyes, Marie's sign, abrasion, contusion, right periorbital erythema, left periorbital erythema or laceration. Hair is normal.      Right Ear: No decreased hearing noted. No laceration, drainage, swelling or tenderness. No middle ear effusion. No foreign body. No mastoid tenderness. No hemotympanum. Tympanic membrane is not injected, scarred, perforated, erythematous, retracted or bulging. Tympanic membrane has normal mobility.      Left Ear: No decreased hearing noted. No laceration, drainage, swelling or tenderness.  No middle ear effusion. No foreign body. No mastoid tenderness. No hemotympanum. Tympanic membrane is not injected, scarred, perforated, erythematous, retracted or bulging. Tympanic membrane has normal mobility.      Nose: Nose normal. No nasal deformity, laceration or mucosal edema.      Mouth/Throat:      Pharynx: Uvula midline.   Eyes:      General: Lids " are normal. No scleral icterus.     Conjunctiva/sclera: Conjunctivae normal.   Neck:      Musculoskeletal: Neck supple. Normal range of motion. No edema, erythema or spinous process tenderness.      Thyroid: No thyroid mass or thyromegaly.      Trachea: Trachea normal.   Cardiovascular:      Rate and Rhythm: Normal rate and regular rhythm.      Heart sounds: No murmur. No friction rub. No gallop.    Pulmonary:      Effort: Pulmonary effort is normal. No respiratory distress.      Breath sounds: Normal breath sounds. No stridor. No wheezing, rhonchi or rales.   Chest:      Chest wall: No tenderness.   Abdominal:      Palpations: Abdomen is soft.   Musculoskeletal: Normal range of motion.   Lymphadenopathy:      Head:      Right side of head: No submental, submandibular, tonsillar, preauricular or posterior auricular adenopathy.      Left side of head: No submental, submandibular, tonsillar, preauricular, posterior auricular or occipital adenopathy.   Skin:     General: Skin is warm and dry.   Neurological:      Mental Status: She is alert and oriented to person, place, and time.   Psychiatric:         Behavior: Behavior normal.         Thought Content: Thought content normal.         Judgment: Judgment normal.         Diagnoses and health risks identified today and associated recommendations/orders:    1. Encounter for preventive health examination  Annual Health Risk Assessment (HRA) visit today.  Counseling and referral of health maintenance and preventative health measures performed.  Patient given annual wellness paperwork to take home.  Encouraged to return in 1 year for subsequent HRA visit.     2. Body mass index (BMI) of 25.0 to 29.9  Chronic. Stable. Continue current treatment plan as previously prescribed by PCP.    3. Labral tear of long head of biceps tendon, initial encounter  Chronic. Stable. Continue current treatment plan as previously prescribed by PCP.    4. Shoulder arthritis  Chronic. Stable.  Continue current treatment plan as previously prescribed by PCP.    5. Primary osteoarthritis of left shoulder  Chronic. Stable. Continue current treatment plan as previously prescribed by PCP.    6. Impingement syndrome of left shoulder  Chronic. Stable. Continue current treatment plan as previously prescribed by PCP.    7. Ineffective esophageal motility  Chronic. Stable. Continue current treatment plan as previously prescribed by PCP.    8. OAB (overactive bladder)  Chronic. Stable. Continue current treatment plan as previously prescribed by PCP.    9. Kidney stone  Chronic. Stable. Continue current treatment plan as previously prescribed by PCP.    10. Herniated disc, cervical  Chronic. Stable. Continue current treatment plan as previously prescribed by PCP.    11. Complete tear of left rotator cuff, unspecified whether traumatic  Chronic. Stable. Continue current treatment plan as previously prescribed by PCP.    12. Complete tear of right rotator cuff, unspecified whether traumatic  Chronic. Stable. Continue current treatment plan as previously prescribed by PCP.    13. SOB (shortness of breath)  Chronic. Stable. Continue current treatment plan as previously prescribed by PCP.        Provided Luellen with a 5-10 year written screening schedule and personal prevention plan. Recommendations were developed using the USPSTF age appropriate recommendations. Education, counseling, and referrals were provided as needed. After Visit Summary printed and given to patient which includes a list of additional screenings\tests needed.    Follow up in about 1 year (around 6/30/2021).    Get Coleman NP  I offered to discuss end of life issues, including information on how to make advance directives that the patient could use to name someone who would make medical decisions on their behalf if they became too ill to make themselves.    ___Patient declined  _X_Patient is interested, I provided paper work and offered to  discuss.

## 2020-06-30 NOTE — PATIENT INSTRUCTIONS
Counseling and Referral of Other Preventative  (Italic type indicates deductible and co-insurance are waived)    Patient Name: Jasson Pineda  Today's Date: 6/30/2020    Health Maintenance       Date Due Completion Date    Pneumococcal Vaccine (65+ Low/Medium Risk) (2 of 2 - PPSV23) 07/29/2020 (Originally 6/26/2018) 6/26/2017    Shingles Vaccine (2 of 3) 07/29/2020 (Originally 8/21/2017) 6/26/2017    Influenza Vaccine (Season Ended) 09/01/2020 10/8/2018    Mammogram 11/19/2020 11/19/2018    Override on 6/1/2015: Done    DEXA SCAN 07/31/2022 7/31/2019    Lipid Panel 01/21/2025 1/21/2020    TETANUS VACCINE 06/26/2027 6/26/2017    Colorectal Cancer Screening 07/17/2028 1/20/2020    Override on 6/6/2012: Done        No orders of the defined types were placed in this encounter.    The following information is provided to all patients.  This information is to help you find resources for any of the problems found today that may be affecting your health:                Living healthy guide: www.[facility].[place].gov      Understanding Diabetes: www.diabetes.org      Eating healthy: www.cdc.gov/healthyweight      CDC home safety checklist: www.cdc.gov/steadi/patient.html      Agency on Aging: www.goea.[place].[facility]      Alcoholics anonymous (AA): www.aa.org      Physical Activity: www.mikey.nih.gov/jx3vojb      Tobacco use: www.quitwithusla.org

## 2020-07-02 ENCOUNTER — OFFICE VISIT (OUTPATIENT)
Dept: URGENT CARE | Facility: CLINIC | Age: 70
End: 2020-07-02
Payer: MEDICARE

## 2020-07-02 VITALS
WEIGHT: 156 LBS | HEIGHT: 61 IN | DIASTOLIC BLOOD PRESSURE: 64 MMHG | HEART RATE: 79 BPM | RESPIRATION RATE: 18 BRPM | BODY MASS INDEX: 29.45 KG/M2 | SYSTOLIC BLOOD PRESSURE: 95 MMHG | OXYGEN SATURATION: 95 % | TEMPERATURE: 99 F

## 2020-07-02 DIAGNOSIS — L03.116 CELLULITIS OF LEFT LOWER EXTREMITY: ICD-10-CM

## 2020-07-02 DIAGNOSIS — S89.92XS: Primary | ICD-10-CM

## 2020-07-02 PROCEDURE — 99214 PR OFFICE/OUTPT VISIT, EST, LEVL IV, 30-39 MIN: ICD-10-PCS | Mod: S$GLB,,, | Performed by: FAMILY MEDICINE

## 2020-07-02 PROCEDURE — 73590 X-RAY EXAM OF LOWER LEG: CPT | Mod: LT,S$GLB,, | Performed by: RADIOLOGY

## 2020-07-02 PROCEDURE — 73590 XR TIBIA FIBULA 2 VIEW LEFT: ICD-10-PCS | Mod: LT,S$GLB,, | Performed by: RADIOLOGY

## 2020-07-02 PROCEDURE — 99214 OFFICE O/P EST MOD 30 MIN: CPT | Mod: S$GLB,,, | Performed by: FAMILY MEDICINE

## 2020-07-02 RX ORDER — CEPHALEXIN 500 MG/1
500 CAPSULE ORAL EVERY 12 HOURS
Qty: 20 CAPSULE | Refills: 0 | Status: SHIPPED | OUTPATIENT
Start: 2020-07-02 | End: 2020-07-09

## 2020-07-02 NOTE — PATIENT INSTRUCTIONS
PLEASE READ YOUR DISCHARGE INSTRUCTIONS ENTIRELY AS IT CONTAINS IMPORTANT INFORMATION.    Please return here or go to the Emergency Department for any concerns or worsening of condition.    If you were prescribed antibiotics, please take them to completion.      Please return here in 1-3 days for a recheck of your wound.    If not allergic, please take over the counter Tylenol (Acetaminophen) and/or Motrin (Ibuprofen) as directed for control of pain and/or fever.    Please follow up with your primary care doctor or specialist as needed.  Soak wound as discussed and apply warm compresses frequently      If you smoke, please stop smoking.    Please return or see your primary care doctor if you develop new or worsening symptoms.     Please arrange follow up with your primary medical clinic as soon as possible. You must understand that you've received an Urgent Care treatment only and that you may be released before all of your medical problems are known or treated. You, the patient, will arrange for follow up as instructed. If your symptoms worsen or fail to improve you should go to the Emergency Room.  Discharge Instructions for Cellulitis  You have been diagnosed with cellulitis. This is an infection in the deepest layer of the skin. In some cases, the infection also affects the muscle. Cellulitis is caused by bacteria. The bacteria can enter the body through broken skin. This can happen with a cut, scratch, animal bite, or an insect bite that has been scratched. You may have been treated in the hospital with antibiotics and fluids. You will likely be given a prescription for antibiotics to take at home. This sheet will help you take care of yourself at home.  Home care  When you are home:  · Take the prescribed antibiotic medicine you are given as directed until it is gone. Take it even if you feel better. It treats the infection and stops it from returning. Not taking all the medicine can make future infections  hard to treat.  · Keep the infected area clean.  · When possible, raise the infected area above the level of your heart. This helps keep swelling down.  · Talk with your healthcare provider if you are in pain. Ask what kind of over-the-counter medicine you can take for pain.  · Apply clean bandages as advised.  · Take your temperature once a day for a week.  · Wash your hands often to prevent spreading the infection.  In the future, wash your hands before and after you touch cuts, scratches, or bandages. This will help prevent infection.   When to call your healthcare provider  Call your healthcare provider immediately if you have any of the following:  · Difficulty or pain when moving the joints above or below the infected area  · Discharge or pus draining from the area  · Fever of 100.4°F (38°C) or higher, or as directed by your healthcare provider  · Pain that gets worse in or around the infected   · Redness that gets worse in or around the infected area, particularly if the area of redness expands to a wider area  · Shaking chills  · Swelling of the infected area  · Vomiting   Date Last Reviewed: 8/1/2016  © 1301-7556 [employer]. [address] [place] [address]. All rights reserved. This information is not intended as a substitute for professional medical care. Always follow your healthcare professional's instructions.        Lower Extremity Contusion  You have a contusion (bruise) of a lower extremity (leg, knee, ankle, foot, or toe). Symptoms include pain, swelling, and skin discoloration. No bones are broken. This injury may take from a few days to a few weeks to heal.  During that time, the bruise may change from reddish in color, to purple-blue, to green-yellow, to yellow-brown.  Home care  · Unless another medicine was prescribed, you can take acetaminophen, ibuprofen, or naproxen to control pain. (If you have chronic liver or kidney disease or ever had a stomach ulcer or  gastrointestinal bleeding, talk with your doctor before using these medicines.)  · Elevate the injured area to reduce pain and swelling. As much as possible, sit or lie down with the injured area raised about the level of your heart. This is especially important during the first 48 hours.  · Ice the injured area to help reduce pain and swelling. Wrap a cold source (ice pack or ice cubes in a plastic bag) in a thin towel. Apply to the bruised area for 20 minutes every 1 to 2 hours the first day. Continue this 3 to 4 times a day until the pain and swelling goes away.  · If crutches have been advised, do not bear full weight on the injured leg until you can do so without pain. You may return to sports when you are able to put full weight and impact on the injured leg without pain.  Follow up  Follow up with your healthcare provider or our staff as advised. Call if you are not improving within the next 1 to 2 weeks.  When to seek medical advice   Call your healthcare provider right away if any of these occur:  · Increased pain or swelling  · Foot or toes become cold, blue, numb or tingly  · Signs of infection: Warmth, drainage, or increased redness or pain around the injury  · Inability to move the injured area   · Frequent bruising for unknown reasons  Date Last Reviewed: 2/1/2017 © 2000-2017 [employer]. [address] [place] [address], [place] [address]. All rights reserved. This information is not intended as a substitute for professional medical care. Always follow your healthcare professional's instructions.

## 2020-07-02 NOTE — PROGRESS NOTES
"Subjective:       Patient ID: Jasson Pineda is a 69 y.o. female.    Vitals:  height is 5' 1" (1.549 m) and weight is 70.8 kg (156 lb). Her temperature is 98.7 °F (37.1 °C). Her blood pressure is 95/64 and her pulse is 79. Her respiration is 18 and oxygen saturation is 95%.     Chief Complaint: Leg Injury (left lower leg )    Pt states 6 weeks ago she feel over a container and injuried left lower leg. Pt complain of localized swelling with slightly improved after few days of injury but never completely resolved.  Also complain of mild pain off and on which got worse for last 2 days.  Patient describes pain intensity is 5/10 at present, localized to side of injury over left lower leg.  Denies weakness.  Denies difficulty on weight-bearing.    Leg Pain   The incident occurred more than 1 week ago. The incident occurred at home. The injury mechanism was a fall. The pain is present in the left leg. The quality of the pain is described as aching. The pain is at a severity of 6/10. The pain is moderate. Associated symptoms include numbness. She has tried ice for the symptoms. The treatment provided mild relief.       Constitution: Negative for chills, fatigue and fever.   HENT: Negative for congestion and sore throat.    Neck: Negative for painful lymph nodes.   Cardiovascular: Negative for chest pain and leg swelling.   Eyes: Negative for double vision and blurred vision.   Respiratory: Negative for cough and shortness of breath.    Gastrointestinal: Negative for nausea, vomiting and diarrhea.   Genitourinary: Negative for dysuria, frequency, urgency and history of kidney stones.   Musculoskeletal: Negative for joint pain, joint swelling, muscle cramps and muscle ache.   Skin: Negative for color change, pale, rash and bruising.   Allergic/Immunologic: Negative for seasonal allergies.   Neurological: Positive for numbness. Negative for dizziness, history of vertigo, light-headedness, passing out and headaches. "   Hematologic/Lymphatic: Negative for swollen lymph nodes.   Psychiatric/Behavioral: Negative for nervous/anxious, sleep disturbance and depression. The patient is not nervous/anxious.        Objective:      Physical Exam   Constitutional: She is oriented to person, place, and time. She appears well-developed. She is cooperative.  Non-toxic appearance. She does not appear ill. No distress.   HENT:   Head: Normocephalic and atraumatic.   Right Ear: Hearing, tympanic membrane, external ear and ear canal normal.   Left Ear: Hearing, tympanic membrane, external ear and ear canal normal.   Nose: Nose normal. No mucosal edema, rhinorrhea or nasal deformity. No epistaxis. Right sinus exhibits no maxillary sinus tenderness and no frontal sinus tenderness. Left sinus exhibits no maxillary sinus tenderness and no frontal sinus tenderness.   Mouth/Throat: Uvula is midline, oropharynx is clear and moist and mucous membranes are normal. No trismus in the jaw. Normal dentition. No uvula swelling. No posterior oropharyngeal erythema.   Eyes: Conjunctivae and lids are normal. Right eye exhibits no discharge. Left eye exhibits no discharge. No scleral icterus.   Neck: Trachea normal, normal range of motion, full passive range of motion without pain and phonation normal. Neck supple.   Cardiovascular: Normal rate, regular rhythm, normal heart sounds and normal pulses.   Pulmonary/Chest: Effort normal and breath sounds normal. No respiratory distress.   Abdominal: Soft. Normal appearance and bowel sounds are normal. She exhibits no distension, no pulsatile midline mass and no mass. There is no abdominal tenderness.   Musculoskeletal: Normal range of motion.         General: Swelling present. No deformity.      Comments: Left lower Leg: +ve swellig of about 4-5cm loacted at lower anterior aspect. +ve TTP. +ve warmth. No redness. FROM at knee and ankle. Neurovascular intact.    Neurological: She is alert and oriented to person, place,  and time. She exhibits normal muscle tone. Coordination normal.   Skin: Skin is warm, dry, intact, not diaphoretic and not pale.   Psychiatric: Her speech is normal and behavior is normal. Judgment and thought content normal.   Nursing note and vitals reviewed.        Assessment:       1. Injury of left lower leg, sequela    2. Cellulitis of left lower extremity        Plan:         Injury of left lower leg, sequela  -     X-Ray Tibia Fibula 2 View Left; Future; Expected date: 07/02/2020  -     Ambulatory referral/consult to Orthopedics    Cellulitis of left lower extremity    Other orders  -     cephALEXin (KEFLEX) 500 MG capsule; Take 1 capsule (500 mg total) by mouth every 12 (twelve) hours. for 10 days  Dispense: 20 capsule; Refill: 0        PLEASE READ YOUR DISCHARGE INSTRUCTIONS ENTIRELY AS IT CONTAINS IMPORTANT INFORMATION.    Please return here or go to the Emergency Department for any concerns or worsening of condition.    If you were prescribed antibiotics, please take them to completion.      Please return here in 1-3 days for a recheck of your wound.    If not allergic, please take over the counter Tylenol (Acetaminophen) and/or Motrin (Ibuprofen) as directed for control of pain and/or fever.    Please follow up with your primary care doctor or specialist as needed.  Soak wound as discussed and apply warm compresses frequently      If you smoke, please stop smoking.    Please return or see your primary care doctor if you develop new or worsening symptoms.     Please arrange follow up with your primary medical clinic as soon as possible. You must understand that you've received an Urgent Care treatment only and that you may be released before all of your medical problems are known or treated. You, the patient, will arrange for follow up as instructed. If your symptoms worsen or fail to improve you should go to the Emergency Room.  Discharge Instructions for Cellulitis  You have been diagnosed with  cellulitis. This is an infection in the deepest layer of the skin. In some cases, the infection also affects the muscle. Cellulitis is caused by bacteria. The bacteria can enter the body through broken skin. This can happen with a cut, scratch, animal bite, or an insect bite that has been scratched. You may have been treated in the hospital with antibiotics and fluids. You will likely be given a prescription for antibiotics to take at home. This sheet will help you take care of yourself at home.  Home care  When you are home:  · Take the prescribed antibiotic medicine you are given as directed until it is gone. Take it even if you feel better. It treats the infection and stops it from returning. Not taking all the medicine can make future infections hard to treat.  · Keep the infected area clean.  · When possible, raise the infected area above the level of your heart. This helps keep swelling down.  · Talk with your healthcare provider if you are in pain. Ask what kind of over-the-counter medicine you can take for pain.  · Apply clean bandages as advised.  · Take your temperature once a day for a week.  · Wash your hands often to prevent spreading the infection.  In the future, wash your hands before and after you touch cuts, scratches, or bandages. This will help prevent infection.   When to call your healthcare provider  Call your healthcare provider immediately if you have any of the following:  · Difficulty or pain when moving the joints above or below the infected area  · Discharge or pus draining from the area  · Fever of 100.4°F (38°C) or higher, or as directed by your healthcare provider  · Pain that gets worse in or around the infected   · Redness that gets worse in or around the infected area, particularly if the area of redness expands to a wider area  · Shaking chills  · Swelling of the infected area  · Vomiting   Date Last Reviewed: 8/1/2016  © 4610-9278 [employer]. [address] [place] [address]  Road, [place] [address]. All rights reserved. This information is not intended as a substitute for professional medical care. Always follow your healthcare professional's instructions.        Lower Extremity Contusion  You have a contusion (bruise) of a lower extremity (leg, knee, ankle, foot, or toe). Symptoms include pain, swelling, and skin discoloration. No bones are broken. This injury may take from a few days to a few weeks to heal.  During that time, the bruise may change from reddish in color, to purple-blue, to green-yellow, to yellow-brown.  Home care  · Unless another medicine was prescribed, you can take acetaminophen, ibuprofen, or naproxen to control pain. (If you have chronic liver or kidney disease or ever had a stomach ulcer or gastrointestinal bleeding, talk with your doctor before using these medicines.)  · Elevate the injured area to reduce pain and swelling. As much as possible, sit or lie down with the injured area raised about the level of your heart. This is especially important during the first 48 hours.  · Ice the injured area to help reduce pain and swelling. Wrap a cold source (ice pack or ice cubes in a plastic bag) in a thin towel. Apply to the bruised area for 20 minutes every 1 to 2 hours the first day. Continue this 3 to 4 times a day until the pain and swelling goes away.  · If crutches have been advised, do not bear full weight on the injured leg until you can do so without pain. You may return to sports when you are able to put full weight and impact on the injured leg without pain.  Follow up  Follow up with your healthcare provider or our staff as advised. Call if you are not improving within the next 1 to 2 weeks.  When to seek medical advice   Call your healthcare provider right away if any of these occur:  · Increased pain or swelling  · Foot or toes become cold, blue, numb or tingly  · Signs of infection: Warmth, drainage, or increased redness or pain around the  injury  · Inability to move the injured area   · Frequent bruising for unknown reasons  Date Last Reviewed: 2/1/2017  © 0995-4736 [employer]. [address] [place] [address], [place] [address]. All rights reserved. This information is not intended as a substitute for professional medical care. Always follow your healthcare professional's instructions.

## 2020-07-09 ENCOUNTER — LAB VISIT (OUTPATIENT)
Dept: LAB | Facility: OTHER | Age: 70
End: 2020-07-09
Attending: INTERNAL MEDICINE
Payer: MEDICARE

## 2020-07-09 ENCOUNTER — OFFICE VISIT (OUTPATIENT)
Dept: INTERNAL MEDICINE | Facility: CLINIC | Age: 70
End: 2020-07-09
Attending: INTERNAL MEDICINE
Payer: MEDICARE

## 2020-07-09 VITALS
WEIGHT: 156.5 LBS | BODY MASS INDEX: 28.8 KG/M2 | HEIGHT: 62 IN | DIASTOLIC BLOOD PRESSURE: 68 MMHG | SYSTOLIC BLOOD PRESSURE: 104 MMHG | OXYGEN SATURATION: 97 % | HEART RATE: 92 BPM

## 2020-07-09 DIAGNOSIS — S80.10XA HEMATOMA OF LOWER LEG: ICD-10-CM

## 2020-07-09 DIAGNOSIS — Z00.00 ANNUAL PHYSICAL EXAM: Primary | ICD-10-CM

## 2020-07-09 DIAGNOSIS — Z00.00 ANNUAL PHYSICAL EXAM: ICD-10-CM

## 2020-07-09 DIAGNOSIS — G89.29 CHRONIC CHEST PAIN: ICD-10-CM

## 2020-07-09 DIAGNOSIS — R41.3 MEMORY LOSS: ICD-10-CM

## 2020-07-09 DIAGNOSIS — R07.9 CHRONIC CHEST PAIN: ICD-10-CM

## 2020-07-09 LAB
ALBUMIN SERPL BCP-MCNC: 3.9 G/DL (ref 3.5–5.2)
ALP SERPL-CCNC: 81 U/L (ref 55–135)
ALT SERPL W/O P-5'-P-CCNC: 16 U/L (ref 10–44)
ANION GAP SERPL CALC-SCNC: 11 MMOL/L (ref 8–16)
AST SERPL-CCNC: 17 U/L (ref 10–40)
BASOPHILS # BLD AUTO: 0.02 K/UL (ref 0–0.2)
BASOPHILS NFR BLD: 0.4 % (ref 0–1.9)
BILIRUB SERPL-MCNC: 0.4 MG/DL (ref 0.1–1)
BUN SERPL-MCNC: 16 MG/DL (ref 8–23)
CALCIUM SERPL-MCNC: 9.5 MG/DL (ref 8.7–10.5)
CHLORIDE SERPL-SCNC: 106 MMOL/L (ref 95–110)
CHOLEST SERPL-MCNC: 236 MG/DL (ref 120–199)
CHOLEST/HDLC SERPL: 3.1 {RATIO} (ref 2–5)
CO2 SERPL-SCNC: 25 MMOL/L (ref 23–29)
CREAT SERPL-MCNC: 0.9 MG/DL (ref 0.5–1.4)
DIFFERENTIAL METHOD: ABNORMAL
EOSINOPHIL # BLD AUTO: 0.1 K/UL (ref 0–0.5)
EOSINOPHIL NFR BLD: 1.3 % (ref 0–8)
ERYTHROCYTE [DISTWIDTH] IN BLOOD BY AUTOMATED COUNT: 16.4 % (ref 11.5–14.5)
EST. GFR  (AFRICAN AMERICAN): >60 ML/MIN/1.73 M^2
EST. GFR  (NON AFRICAN AMERICAN): >60 ML/MIN/1.73 M^2
GLUCOSE SERPL-MCNC: 70 MG/DL (ref 70–110)
HCT VFR BLD AUTO: 38.1 % (ref 37–48.5)
HDLC SERPL-MCNC: 76 MG/DL (ref 40–75)
HDLC SERPL: 32.2 % (ref 20–50)
HGB BLD-MCNC: 11.6 G/DL (ref 12–16)
IMM GRANULOCYTES # BLD AUTO: 0.02 K/UL (ref 0–0.04)
IMM GRANULOCYTES NFR BLD AUTO: 0.4 % (ref 0–0.5)
LDLC SERPL CALC-MCNC: 133 MG/DL (ref 63–159)
LYMPHOCYTES # BLD AUTO: 1.6 K/UL (ref 1–4.8)
LYMPHOCYTES NFR BLD: 29.8 % (ref 18–48)
MCH RBC QN AUTO: 24.9 PG (ref 27–31)
MCHC RBC AUTO-ENTMCNC: 30.4 G/DL (ref 32–36)
MCV RBC AUTO: 82 FL (ref 82–98)
MONOCYTES # BLD AUTO: 0.5 K/UL (ref 0.3–1)
MONOCYTES NFR BLD: 9.5 % (ref 4–15)
NEUTROPHILS # BLD AUTO: 3.1 K/UL (ref 1.8–7.7)
NEUTROPHILS NFR BLD: 58.6 % (ref 38–73)
NONHDLC SERPL-MCNC: 160 MG/DL
NRBC BLD-RTO: 0 /100 WBC
PLATELET # BLD AUTO: 264 K/UL (ref 150–350)
PMV BLD AUTO: 11 FL (ref 9.2–12.9)
POTASSIUM SERPL-SCNC: 4.6 MMOL/L (ref 3.5–5.1)
PROT SERPL-MCNC: 7.7 G/DL (ref 6–8.4)
RBC # BLD AUTO: 4.65 M/UL (ref 4–5.4)
SODIUM SERPL-SCNC: 142 MMOL/L (ref 136–145)
TRIGL SERPL-MCNC: 135 MG/DL (ref 30–150)
TSH SERPL DL<=0.005 MIU/L-ACNC: 0.95 UIU/ML (ref 0.4–4)
VIT B12 SERPL-MCNC: 546 PG/ML (ref 210–950)
WBC # BLD AUTO: 5.26 K/UL (ref 3.9–12.7)

## 2020-07-09 PROCEDURE — 36415 COLL VENOUS BLD VENIPUNCTURE: CPT

## 2020-07-09 PROCEDURE — 99213 OFFICE O/P EST LOW 20 MIN: CPT | Mod: PBBFAC | Performed by: INTERNAL MEDICINE

## 2020-07-09 PROCEDURE — 84425 ASSAY OF VITAMIN B-1: CPT

## 2020-07-09 PROCEDURE — 80061 LIPID PANEL: CPT

## 2020-07-09 PROCEDURE — 85025 COMPLETE CBC W/AUTO DIFF WBC: CPT

## 2020-07-09 PROCEDURE — 83036 HEMOGLOBIN GLYCOSYLATED A1C: CPT

## 2020-07-09 PROCEDURE — 99999 PR PBB SHADOW E&M-EST. PATIENT-LVL III: CPT | Mod: PBBFAC,,, | Performed by: INTERNAL MEDICINE

## 2020-07-09 PROCEDURE — 99999 PR PBB SHADOW E&M-EST. PATIENT-LVL III: ICD-10-PCS | Mod: PBBFAC,,, | Performed by: INTERNAL MEDICINE

## 2020-07-09 PROCEDURE — 80053 COMPREHEN METABOLIC PANEL: CPT

## 2020-07-09 PROCEDURE — 82607 VITAMIN B-12: CPT

## 2020-07-09 PROCEDURE — 99397 PR PREVENTIVE VISIT,EST,65 & OVER: ICD-10-PCS | Mod: S$PBB,,, | Performed by: INTERNAL MEDICINE

## 2020-07-09 PROCEDURE — 84443 ASSAY THYROID STIM HORMONE: CPT

## 2020-07-09 PROCEDURE — 99397 PER PM REEVAL EST PAT 65+ YR: CPT | Mod: S$PBB,,, | Performed by: INTERNAL MEDICINE

## 2020-07-09 NOTE — PROGRESS NOTES
Subjective:       Patient ID: Jasson Pineda is a 69 y.o. female.    Chief Complaint: Memory Loss and Annual Exam    Here for urgent visit    Memory loss discovered on recent MMSE. States she could not recall the 3 words. Did not have any other difficulties. She reflects that she is having some difficulty with short term memory at home. If she thinks hard she can often recollect what she previously forgot. She can forget things from a conversation she had earlier in the day with her sister. When prompted she can recall. She reports a 2 week history of frequent headaches, but otherwise has no other neurological complaints or concerns.  She denies new or worsening tremors, rigidity, blurry vision, dizziness, balance difficulties, tenderness, facial asymmetry, focal weakness, new numbness or tingling.  She takes Lyrica 150 mg b.i.d. for chronic neck and upper back pains.  This is managed by a pain MD outside of [facility] Dr. Jolley.  She is also on tramadol for this which may be contributing.    Chronic CP  Present for several years.  Reports she has seen numerous specialists without a cause.  Cardiologist told hiatl hernia was causing this. She is keeping a list of food triggers to avoid. She report whne going from lying to seated and when carrying or picking up heavy objects.  Pain is a substernal, pressure-like sensation that radiates to her back and is often triggered by carrying heavy objects. Pt denies SOB at rest or SPICER, PND, chest tightness, wheezing, chronic cough, hemoptysis, night sweats, or unexpected weight loss.      Knot on left side of shin. She fell and bumped her leg 6 weeks prior. She went to urgent care 1 week prior for increased pain. Had xray that was normal. Slightly tender. No enlarging. No new changes to overlying skin. No f/c.               Review of Systems   Constitutional: Negative for chills, fatigue, fever and unexpected weight change.   HENT: Negative for ear pain, hearing loss,  "postnasal drip, tinnitus, trouble swallowing and voice change.    Respiratory: Negative for cough, chest tightness, shortness of breath and wheezing.    Cardiovascular: Negative for chest pain, palpitations and leg swelling.   Gastrointestinal: Negative for abdominal pain, blood in stool, diarrhea, nausea and vomiting.   Endocrine: Negative for polydipsia, polyphagia and polyuria.   Genitourinary: Negative for difficulty urinating, dysuria, hematuria and vaginal bleeding.   Musculoskeletal: Positive for arthralgias, myalgias, neck pain and neck stiffness.   Skin: Positive for wound. Negative for rash.   Allergic/Immunologic: Negative for food allergies.   Neurological: Negative for dizziness, numbness and headaches.   Hematological: Does not bruise/bleed easily.   Psychiatric/Behavioral: The patient is nervous/anxious.        Objective:      Vitals:    07/09/20 0910   BP: 104/68   Pulse: 92   SpO2: 97%   Weight: 71 kg (156 lb 8.4 oz)   Height: 5' 2" (1.575 m)      Physical Exam  Constitutional:       General: She is not in acute distress.     Appearance: She is well-developed.   HENT:      Head: Normocephalic and atraumatic.      Mouth/Throat:      Pharynx: No oropharyngeal exudate.   Eyes:      General: No scleral icterus.     Conjunctiva/sclera: Conjunctivae normal.      Pupils: Pupils are equal, round, and reactive to light.   Neck:      Thyroid: No thyromegaly.   Cardiovascular:      Rate and Rhythm: Normal rate and regular rhythm.      Heart sounds: Normal heart sounds. No murmur.   Pulmonary:      Effort: Pulmonary effort is normal.      Breath sounds: Normal breath sounds. No wheezing or rales.   Abdominal:      General: There is no distension.      Palpations: Abdomen is soft.      Tenderness: There is no abdominal tenderness.   Musculoskeletal:         General: No tenderness.   Lymphadenopathy:      Cervical: No cervical adenopathy.   Skin:     General: Skin is warm and dry.          Neurological:      " Mental Status: She is alert and oriented to person, place, and time.      Cranial Nerves: No cranial nerve deficit.      Sensory: Sensation is intact. No sensory deficit.      Motor: No tremor or pronator drift.   Psychiatric:         Behavior: Behavior normal.         Assessment:       1. Annual physical exam    2. Memory loss    3. Chronic chest pain        Plan:       Jasson was seen today for memory loss and annual exam.    Diagnoses and all orders for this visit:    Annual physical exam  -     Comprehensive metabolic panel; Future  -     Lipid Panel; Future  -     TSH; Future  -     CBC auto differential; Future  -     Hemoglobin A1C; Future    Memory loss   MCI at worst. Start with basci labs and reduction of lyrica and f/u in 6 weeks.   -     Vitamin B12; Future  -     Vitamin B1; Future    Hematoma   Uncomplicated. Compression and time.     Chronic chest pain  Comments:  Atypical. Chronic chostochondiritis vs thoracic spine etiology       RTC in 6 weeks and we will perform our intake exam     Osbaldo Mendez MD  Internal Medicine-[facility]        Side effects of medication(s) were discussed in detail and patient voiced understanding.  Patient will call back for any issues or complications.

## 2020-07-10 LAB
ESTIMATED AVG GLUCOSE: 114 MG/DL (ref 68–131)
HBA1C MFR BLD HPLC: 5.6 % (ref 4–5.6)

## 2020-07-13 ENCOUNTER — OFFICE VISIT (OUTPATIENT)
Dept: CARDIOLOGY | Facility: CLINIC | Age: 70
End: 2020-07-13
Payer: MEDICARE

## 2020-07-13 VITALS
WEIGHT: 156.31 LBS | DIASTOLIC BLOOD PRESSURE: 62 MMHG | HEIGHT: 62 IN | SYSTOLIC BLOOD PRESSURE: 98 MMHG | BODY MASS INDEX: 28.76 KG/M2 | HEART RATE: 78 BPM

## 2020-07-13 DIAGNOSIS — I26.99 PULMONARY EMBOLISM, UNSPECIFIED CHRONICITY, UNSPECIFIED PULMONARY EMBOLISM TYPE, UNSPECIFIED WHETHER ACUTE COR PULMONALE PRESENT: ICD-10-CM

## 2020-07-13 DIAGNOSIS — E78.2 MIXED HYPERLIPIDEMIA: ICD-10-CM

## 2020-07-13 DIAGNOSIS — Z86.718 HISTORY OF DVT OF LOWER EXTREMITY: ICD-10-CM

## 2020-07-13 DIAGNOSIS — I26.99 PULMONARY EMBOLISM, UNSPECIFIED CHRONICITY, UNSPECIFIED PULMONARY EMBOLISM TYPE, UNSPECIFIED WHETHER ACUTE COR PULMONALE PRESENT: Primary | ICD-10-CM

## 2020-07-13 PROBLEM — R07.89 ATYPICAL CHEST PAIN: Status: ACTIVE | Noted: 2018-04-10

## 2020-07-13 PROCEDURE — 99999 PR PBB SHADOW E&M-EST. PATIENT-LVL III: ICD-10-PCS | Mod: PBBFAC,,, | Performed by: INTERNAL MEDICINE

## 2020-07-13 PROCEDURE — 99213 OFFICE O/P EST LOW 20 MIN: CPT | Mod: PBBFAC | Performed by: INTERNAL MEDICINE

## 2020-07-13 PROCEDURE — 99214 PR OFFICE/OUTPT VISIT, EST, LEVL IV, 30-39 MIN: ICD-10-PCS | Mod: S$PBB,,, | Performed by: INTERNAL MEDICINE

## 2020-07-13 PROCEDURE — 99999 PR PBB SHADOW E&M-EST. PATIENT-LVL III: CPT | Mod: PBBFAC,,, | Performed by: INTERNAL MEDICINE

## 2020-07-13 PROCEDURE — 99214 OFFICE O/P EST MOD 30 MIN: CPT | Mod: S$PBB,,, | Performed by: INTERNAL MEDICINE

## 2020-07-13 RX ORDER — RIVAROXABAN 20 MG/1
1 TABLET, FILM COATED ORAL NIGHTLY
Qty: 90 TABLET | Refills: 3 | Status: SHIPPED | OUTPATIENT
Start: 2020-07-13 | End: 2021-01-13 | Stop reason: SDUPTHER

## 2020-07-13 NOTE — PROGRESS NOTES
Cardiology    7/13/2020  8:39 AM    Problem list  Patient Active Problem List   Diagnosis    Kidney stone    Herniated disc, cervical    Complete rotator cuff tear or rupture of right shoulder, not specified as traumatic    Shoulder arthritis    OAB (overactive bladder)    Body mass index (BMI) of 25.0 to 29.9    Atypical chest pain    Ineffective esophageal motility    SOB (shortness of breath)    Complete rotator cuff tear or rupture of left shoulder, not specified as traumatic    Impingement syndrome of left shoulder    Primary osteoarthritis of left shoulder    Labral tear of long head of biceps tendon, initial encounter    History of DVT of lower extremity    Pulmonary embolism    Mixed hyperlipidemia       CC:  DVT, PE    HPI:  Doing well.  No CP, SOB, bleeding.  Since last visit, she has changed her diet and her hiatal hernia symptoms improved.       Medications  Current Outpatient Medications   Medication Sig Dispense Refill    albuterol (PROVENTIL/VENTOLIN HFA) 90 mcg/actuation inhaler Inhale 2 puffs into the lungs every 4 (four) hours as needed for Wheezing. Rescue 1 Inhaler 0    atorvastatin (LIPITOR) 40 MG tablet TAKE 1 TABLET(40 MG) BY MOUTH EVERY EVENING 90 tablet 3    escitalopram oxalate (LEXAPRO) 10 MG tablet TAKE 1 TABLET BY MOUTH EVERY DAY 90 tablet 4    fluticasone (FLONASE) 50 mcg/actuation nasal spray daily as needed.      omeprazole (PRILOSEC) 40 MG capsule TAKE 1 CAPSULE BY MOUTH EVERY DAY IN THE MORNING 30 capsule 0    ondansetron (ZOFRAN) 4 MG tablet Take 1 tablet (4 mg total) by mouth every 6 (six) hours. 12 tablet 0    ondansetron (ZOFRAN-ODT) 4 MG TbDL Take 2 tablets (8 mg total) by mouth every 8 (eight) hours as needed. 10 tablet 1    pregabalin (LYRICA) 150 MG capsule Take 150 mg by mouth 2 (two) times daily.      traMADol (ULTRAM) 50 mg tablet Take 50 mg by mouth 2 (two) times daily as needed.  1    XARELTO 20 mg Tab Take 1 tablet (20 mg total) by  mouth every evening. 90 tablet 3    AFLURIA QUAD [id number], PF, 60 mcg/0.5 mL vaccine ADM 0.5ML IM UTD  0     No current facility-administered medications for this visit.       Prior to Admission medications    Medication Sig Start Date End Date Taking? Authorizing Provider   albuterol (PROVENTIL/VENTOLIN HFA) 90 mcg/actuation inhaler Inhale 2 puffs into the lungs every 4 (four) hours as needed for Wheezing. Rescue 2/16/19  Yes Roxanne Ayoub NP   atorvastatin (LIPITOR) 40 MG tablet TAKE 1 TABLET(40 MG) BY MOUTH EVERY EVENING 6/20/19  Yes Jennifer Cheatham MD   escitalopram oxalate (LEXAPRO) 10 MG tablet TAKE 1 TABLET BY MOUTH EVERY DAY 9/22/19  Yes Jennifer Cheatham MD   fluticasone (FLONASE) 50 mcg/actuation nasal spray daily as needed.   Yes Historical Provider, MD   omeprazole (PRILOSEC) 40 MG capsule TAKE 1 CAPSULE BY MOUTH EVERY DAY IN THE MORNING 1/21/20  Yes Armand Foy MD   ondansetron (ZOFRAN) 4 MG tablet Take 1 tablet (4 mg total) by mouth every 6 (six) hours. 1/31/19  Yes Shon Esquivel PA-C   ondansetron (ZOFRAN-ODT) 4 MG TbDL Take 2 tablets (8 mg total) by mouth every 8 (eight) hours as needed. 6/11/19  Yes Sedrick Patel MD   pregabalin (LYRICA) 150 MG capsule Take 150 mg by mouth 2 (two) times daily.   Yes Historical Provider, MD   traMADol (ULTRAM) 50 mg tablet Take 50 mg by mouth 2 (two) times daily as needed. 10/26/18  Yes Historical Provider, MD   XARELTO 20 mg Tab Take 1 tablet (20 mg total) by mouth every evening. 5/5/20  Yes Sotero Doty MD   RICHARD QUAD [id number], PF, 60 mcg/0.5 mL vaccine ADM 0.5ML IM UTD 10/8/18   Historical Provider, MD         History  Past Medical History:   Diagnosis Date    Anticoagulant long-term use     xarelto    Chronic back pain     Chronic neck pain     DVT (deep venous thrombosis)     Herniated disc, cervical     Kidney stone     Lumbar herniated disc     PE (pulmonary embolism)     2013    Pulmonary emboli     after surgery     Pulmonary embolism      Past Surgical History:   Procedure Laterality Date    ARTHROSCOPIC REPAIR OF ROTATOR CUFF OF SHOULDER Left 2019    Procedure: REPAIR, ROTATOR CUFF, ARTHROSCOPIC;  Surgeon: Sedrick Patel MD;  Location: [facility];  Service: Orthopedics;  Laterality: Left;    ARTHROSCOPIC TENOTOMY OF BICEPS TENDON Left 2019    Procedure: TENOTOMY, BICEPS, ARTHROSCOPIC;  Surgeon: Sedrick Patel MD;  Location: [facility] [place] [facility];  Service: Orthopedics;  Laterality: Left;    ARTHROSCOPY OF SHOULDER WITH REMOVAL OF DISTAL CLAVICLE Left 2019    Procedure: ARTHROSCOPY, SHOULDER, WITH DISTAL CLAVICLE EXCISION;  Surgeon: Sedrick Patel MD;  Location: [facility] [place] OR;  Service: Orthopedics;  Laterality: Left;    BREAST CYST EXCISION Left      SECTION      x1    COLONOSCOPY N/A 2018    Procedure: COLONOSCOPY;  Surgeon: Armand Foy MD;  Location: [facility] ([address] [place] [address]);  Service: Endoscopy;  Laterality: N/A;    ESOPHAGEAL MANOMETRY WITH MEASUREMENT OF IMPEDANCE N/A 2018    Procedure: MANOMETRY, ESOPHAGEAL, WITH IMPEDANCE MEASUREMENT;  Surgeon: Armand Foy MD;  [facility]  Service: Endoscopy;  Laterality: N/A;    ESOPHAGOGASTRODUODENOSCOPY N/A 2018    Procedure: EGD (ESOPHAGOGASTRODUODENOSCOPY);  Surgeon: Armand Foy MD;  [facility]  Service: Endoscopy;  Laterality: N/A;  pt currently on Lovenox and Xarelto - ok per Dr. Doty to hold Plavix 2 days prior and Lovenox 24hr prior to case/see scanned media / for documentation of hold -- SM        HERNIA REPAIR      umbilical    KIDNEY STONE SURGERY      ureteral stent    left knee surgery      SHOULDER ARTHROSCOPY Left 2019    Procedure: ARTHROSCOPY, SHOULDER;  Surgeon: Sedrick Patel MD;  Location: [facility];  Service: Orthopedics;  Laterality: Left;  BLOCK    TONSILLECTOMY      TOTAL SHOULDER ARTHROPLASTY Right      Social History     Socioeconomic History    Marital status:       Spouse name: Not on file    Number of children: Not on file    Years of education: Not on file    Highest education level: Not on file   Occupational History    Occupation: teacher      Comment:     Social Needs    Financial resource strain: Not on file    Food insecurity     Worry: Not on file     Inability: Not on file    Transportation needs     Medical: Not on file     Non-medical: Not on file   Tobacco Use    Smoking status: Never Smoker    Smokeless tobacco: Never Used   Substance and Sexual Activity    Alcohol use: No    Drug use: No    Sexual activity: Not Currently     Partners: Male   Lifestyle    Physical activity     Days per week: Not on file     Minutes per session: Not on file    Stress: Not on file   Relationships    Social connections     Talks on phone: Not on file     Gets together: Not on file     Attends Muslim service: Not on file     Active member of club or organization: Not on file     Attends meetings of clubs or organizations: Not on file     Relationship status: Not on file   Other Topics Concern    Not on file   Social History Narrative    Not on file         Allergies  Review of patient's allergies indicates:   Allergen Reactions    Sulfa (sulfonamide antibiotics) Rash         Review of Systems   Review of Systems   Constitution: Negative for decreased appetite, fever and weight loss.   HENT: Negative for congestion and nosebleeds.    Eyes: Negative for double vision, vision loss in left eye, vision loss in right eye and visual disturbance.   Cardiovascular: Negative for chest pain, claudication, cyanosis, dyspnea on exertion, irregular heartbeat, leg swelling, near-syncope, orthopnea, palpitations, paroxysmal nocturnal dyspnea and syncope.   Respiratory: Negative for cough, hemoptysis, shortness of breath, sleep disturbances due to breathing, snoring, sputum production and wheezing.    Endocrine: Negative for cold intolerance and  heat intolerance.   Skin: Negative for nail changes and rash.   Musculoskeletal: Negative for joint pain, muscle cramps, muscle weakness and myalgias.   Gastrointestinal: Negative for change in bowel habit, heartburn, hematemesis, hematochezia, hemorrhoids and melena.   Neurological: Negative for dizziness, focal weakness and headaches.         Physical Exam  Wt Readings from Last 1 Encounters:   07/13/20 70.9 kg (156 lb 4.9 oz)     BP Readings from Last 3 Encounters:   07/13/20 98/62   07/09/20 104/68   07/02/20 95/64     Pulse Readings from Last 1 Encounters:   07/13/20 78     Physical Exam   Constitutional: She is oriented to person, place, and time. She appears well-developed and well-nourished.   Neck: No JVD present. Carotid bruit is not present.   Cardiovascular: Normal rate, regular rhythm, S1 normal, S2 normal and normal heart sounds.   Pulses:       Carotid pulses are 2+ on the right side and 2+ on the left side.       Radial pulses are 2+ on the right side and 2+ on the left side.        Dorsalis pedis pulses are 2+ on the right side and 2+ on the left side.   Abdominal: Bowel sounds are normal.   Musculoskeletal:         General: No edema.   Neurological: She is alert and oriented to person, place, and time.   Vitals reviewed.            The 10-year ASCVD risk score (Hodges DC Jr., et al., 2013) is: 8.5%    Values used to calculate the score:      Age: 69 years      Sex: Female      Is Non- : Yes      Diabetic: No      Tobacco smoker: No      Systolic Blood Pressure: 98 mmHg      Is BP treated: No      HDL Cholesterol: 76 mg/dL      Total Cholesterol: 236 mg/dL      Assessment  1. Pulmonary embolism, unspecified chronicity, unspecified pulmonary embolism type, unspecified whether acute cor pulmonale present  Stable, on Xarelto.  Needs lifelong OAC for recurrent PE's.     2. History of DVT of lower extremity  Recurrent, on xarelto.          Plan and Discussion  Continue current meds.   Encourage diet and exercise.     Follow Up  6 months    Time spent evaluating and treating patient 25 minutes with >50% of this time being face-to-face.     Sotero Doty MD, F.A.C.C, F.S.C.A.I.

## 2020-07-15 LAB — VIT B1 BLD-MCNC: 58 UG/L (ref 38–122)

## 2020-07-17 DIAGNOSIS — Z71.89 COMPLEX CARE COORDINATION: ICD-10-CM

## 2020-07-31 ENCOUNTER — EXTERNAL CHRONIC CARE MANAGEMENT (OUTPATIENT)
Dept: PRIMARY CARE CLINIC | Facility: CLINIC | Age: 70
End: 2020-07-31
Payer: MEDICARE

## 2020-07-31 PROCEDURE — 99490 PR CHRONIC CARE MGMT, 1ST 20 MIN: ICD-10-PCS | Mod: S$PBB,,, | Performed by: INTERNAL MEDICINE

## 2020-07-31 PROCEDURE — 99490 CHRNC CARE MGMT STAFF 1ST 20: CPT | Mod: PBBFAC | Performed by: INTERNAL MEDICINE

## 2020-07-31 PROCEDURE — 99490 CHRNC CARE MGMT STAFF 1ST 20: CPT | Mod: S$PBB,,, | Performed by: INTERNAL MEDICINE

## 2020-08-31 ENCOUNTER — EXTERNAL CHRONIC CARE MANAGEMENT (OUTPATIENT)
Dept: PRIMARY CARE CLINIC | Facility: CLINIC | Age: 70
End: 2020-08-31
Payer: MEDICARE

## 2020-08-31 PROCEDURE — 99490 PR CHRONIC CARE MGMT, 1ST 20 MIN: ICD-10-PCS | Mod: S$PBB,,, | Performed by: INTERNAL MEDICINE

## 2020-08-31 PROCEDURE — 99490 CHRNC CARE MGMT STAFF 1ST 20: CPT | Mod: PBBFAC | Performed by: INTERNAL MEDICINE

## 2020-08-31 PROCEDURE — 99490 CHRNC CARE MGMT STAFF 1ST 20: CPT | Mod: S$PBB,,, | Performed by: INTERNAL MEDICINE

## 2020-09-01 ENCOUNTER — OFFICE VISIT (OUTPATIENT)
Dept: INTERNAL MEDICINE | Facility: CLINIC | Age: 70
End: 2020-09-01
Attending: INTERNAL MEDICINE
Payer: MEDICARE

## 2020-09-01 VITALS
HEIGHT: 62 IN | WEIGHT: 159.38 LBS | SYSTOLIC BLOOD PRESSURE: 106 MMHG | BODY MASS INDEX: 29.33 KG/M2 | HEART RATE: 87 BPM | DIASTOLIC BLOOD PRESSURE: 60 MMHG | OXYGEN SATURATION: 98 %

## 2020-09-01 DIAGNOSIS — R13.10 DYSPHAGIA, UNSPECIFIED TYPE: ICD-10-CM

## 2020-09-01 DIAGNOSIS — I26.99 RECURRENT PULMONARY EMBOLISM: ICD-10-CM

## 2020-09-01 DIAGNOSIS — F32.9 REACTIVE DEPRESSION: ICD-10-CM

## 2020-09-01 DIAGNOSIS — Z12.39 BREAST CANCER SCREENING: Primary | ICD-10-CM

## 2020-09-01 PROCEDURE — 99214 PR OFFICE/OUTPT VISIT, EST, LEVL IV, 30-39 MIN: ICD-10-PCS | Mod: S$GLB,,, | Performed by: INTERNAL MEDICINE

## 2020-09-01 PROCEDURE — 99999 PR PBB SHADOW E&M-EST. PATIENT-LVL IV: ICD-10-PCS | Mod: PBBFAC,,, | Performed by: INTERNAL MEDICINE

## 2020-09-01 PROCEDURE — 99214 OFFICE O/P EST MOD 30 MIN: CPT | Mod: S$GLB,,, | Performed by: INTERNAL MEDICINE

## 2020-09-01 PROCEDURE — 99999 PR PBB SHADOW E&M-EST. PATIENT-LVL IV: CPT | Mod: PBBFAC,,, | Performed by: INTERNAL MEDICINE

## 2020-09-01 NOTE — PROGRESS NOTES
"Subjective:       Patient ID: Jasson Pineda is a 69 y.o. female.    Chief Complaint: Establish Care    Here to establish care    -Dysphagia  7/2018 normal EGD by Dr Foy at [facility]-  07/2018 manometry "Ineffective esophageal motility (IEM: 50% or more ineffective swallows with DCI less than 450 mmHg/sec/cm).  10/2019 ENT Dr Burgos "Variably obstructive CP bar, but symptoms are primarily thoracic/epigastic."  12/2019 GI clinic visit "This could be functional.  Other considerations could be untreated reflux or even esophageal dysmotility."  She continues to have periodic dysphagia to solids requiring her to drink a glass of water to get down.  No hx of recurrent oral ulcers, skin changes, Hx of misscarriages, chronic loose stools or abdomina pains, diffuse arthralgias, Hx of pericarditis. She is on lexapro and lyrica. Mood improved an is interested in weaning from this.     Memory is stable.    Incomplete emptying of bladder. Urology Dr Padilla.      Low back pain  [facility]  Ortho jamal for knee    -Recurrent PE-  Anticoagulation managed by Dr shaikh in cardiology.  No bleeding complications.       Review of Systems   Constitutional: Negative for chills, fatigue, fever and unexpected weight change.   HENT: Negative for ear pain, hearing loss, postnasal drip, tinnitus, trouble swallowing and voice change.    Respiratory: Negative for cough, chest tightness, shortness of breath and wheezing.    Cardiovascular: Negative for chest pain, palpitations and leg swelling.   Gastrointestinal: Negative for abdominal pain, blood in stool, diarrhea, nausea and vomiting.   Endocrine: Negative for polydipsia, polyphagia and polyuria.   Genitourinary: Negative for difficulty urinating, dysuria, hematuria and vaginal bleeding.   Skin: Negative for rash.   Allergic/Immunologic: Negative for food allergies.   Neurological: Negative for dizziness, numbness and headaches.   Hematological: Does not bruise/bleed " easily.   Psychiatric/Behavioral: The patient is not nervous/anxious.        Past Medical History:   Diagnosis Date    Anticoagulant long-term use     xarelto    Chronic back pain     Chronic neck pain     DVT (deep venous thrombosis)     Herniated disc, cervical     Kidney stone     Lumbar herniated disc     PE (pulmonary embolism)         Pulmonary emboli     after surgery    Pulmonary embolism      Past Surgical History:   Procedure Laterality Date    ARTHROSCOPIC REPAIR OF ROTATOR CUFF OF SHOULDER Left 2019    Procedure: REPAIR, ROTATOR CUFF, ARTHROSCOPIC;  Surgeon: Sedrick Patel MD;  Location: [facility];  Service: Orthopedics;  Laterality: Left;    ARTHROSCOPIC TENOTOMY OF BICEPS TENDON Left 2019    Procedure: TENOTOMY, BICEPS, ARTHROSCOPIC;  Surgeon: Sedrick Patel MD;  Location: [facility] OR;  Service: Orthopedics;  Laterality: Left;    ARTHROSCOPY OF SHOULDER WITH REMOVAL OF DISTAL CLAVICLE Left 2019    Procedure: ARTHROSCOPY, SHOULDER, WITH DISTAL CLAVICLE EXCISION;  Surgeon: Sedrick Patel MD;  Location: [facility];  Service: Orthopedics;  Laterality: Left;    BREAST CYST EXCISION Left      SECTION      x1    COLONOSCOPY N/A 2018    Procedure: COLONOSCOPY;  Surgeon: Armand Foy MD;  Location: [facility] ([address] [place] [address]);  Service: Endoscopy;  Laterality: N/A;    ESOPHAGEAL MANOMETRY WITH MEASUREMENT OF IMPEDANCE N/A 2018    Procedure: MANOMETRY, ESOPHAGEAL, WITH IMPEDANCE MEASUREMENT;  Surgeon: Armand Foy MD;  [facility]  Service: Endoscopy;  Laterality: N/A;    ESOPHAGOGASTRODUODENOSCOPY N/A 2018    Procedure: EGD (ESOPHAGOGASTRODUODENOSCOPY);  Surgeon: Armand Foy MD;  [facility]  Service: Endoscopy;  Laterality: N/A;  pt currently on Lovenox and Xarelto - ok per Dr. Doty to hold Plavix 2 days prior and Lovenox 24hr prior to case/see scanned media / for documentation of hold -- SM        HERNIA  REPAIR      umbilical    KIDNEY STONE SURGERY      ureteral stent    left knee surgery      SHOULDER ARTHROSCOPY Left 6/11/2019    Procedure: ARTHROSCOPY, SHOULDER;  Surgeon: Sedrick Patel MD;  Location: [facility];  Service: Orthopedics;  Laterality: Left;  BLOCK    TONSILLECTOMY      TOTAL SHOULDER ARTHROPLASTY Right      Family History   Problem Relation Age of Onset    No Known Problems Father     Colon cancer Mother 74    Liver cancer Sister     Diabetes Sister     Liver cancer Maternal Grandfather     Breast cancer Maternal Aunt 50        50s    Cervical cancer Maternal Aunt     Breast cancer Maternal Cousin 45    No Known Problems Daughter     No Known Problems Sister     No Known Problems Daughter     Ovarian cancer Neg Hx     Esophageal cancer Neg Hx      Social History     Socioeconomic History    Marital status:      Spouse name: Not on file    Number of children: Not on file    Years of education: Not on file    Highest education level: Not on file   Occupational History    Occupation: teacher      Comment:     Social Needs    Financial resource strain: Not on file    Food insecurity     Worry: Not on file     Inability: Not on file    Transportation needs     Medical: Not on file     Non-medical: Not on file   Tobacco Use    Smoking status: Never Smoker    Smokeless tobacco: Never Used   Substance and Sexual Activity    Alcohol use: No    Drug use: No    Sexual activity: Not Currently     Partners: Male   Lifestyle    Physical activity     Days per week: Not on file     Minutes per session: Not on file    Stress: Not on file   Relationships    Social connections     Talks on phone: Not on file     Gets together: Not on file     Attends Congregational service: Not on file     Active member of club or organization: Not on file     Attends meetings of clubs or organizations: Not on file     Relationship status: Not on file   Other Topics Concern  "   Not on file   Social History Narrative    Not on file         Objective:      Vitals:    09/01/20 1122   BP: 106/60   Pulse: 87   SpO2: 98%   Weight: 72.3 kg (159 lb 6.3 oz)   Height: 5' 2" (1.575 m)      Physical Exam  Constitutional:       General: She is not in acute distress.     Appearance: She is well-developed.   HENT:      Head: Normocephalic and atraumatic.      Mouth/Throat:      Pharynx: No oropharyngeal exudate.   Eyes:      General: No scleral icterus.     Conjunctiva/sclera: Conjunctivae normal.      Pupils: Pupils are equal, round, and reactive to light.   Neck:      Thyroid: No thyromegaly.   Cardiovascular:      Rate and Rhythm: Normal rate and regular rhythm.      Heart sounds: Normal heart sounds. No murmur.   Pulmonary:      Effort: Pulmonary effort is normal.      Breath sounds: Normal breath sounds. No wheezing or rales.   Abdominal:      General: There is no distension.      Palpations: Abdomen is soft.      Tenderness: There is no abdominal tenderness.   Musculoskeletal:         General: No tenderness.   Lymphadenopathy:      Cervical: No cervical adenopathy.   Skin:     General: Skin is warm and dry.   Neurological:      Mental Status: She is alert and oriented to person, place, and time.   Psychiatric:         Behavior: Behavior normal.         Assessment:       1. Breast cancer screening    2. Dysphagia, unspecified type    3. Recurrent pulmonary embolism    4. Reactive depression        Plan:       Jasson was seen today for establish care.    Diagnoses and all orders for this visit:    Breast cancer screening  -     Mammo Digital Screening Bilat w/ Neptali; Future    Dysphagia, unspecified type   F/u with ENT and GI. Pt mood is stable. She is going to stop lexapro and we will monitor to see how much anticholingeric SE could be contributing      Recurrent pulmonary embolism    Reactive depression  5mg daily for 2 weeks then can stop and monitor.    see dysphagia above.          Side " effects of medication(s) were discussed in detail and patient voiced understanding.  Patient will call back for any issues or complications.

## 2020-09-30 ENCOUNTER — EXTERNAL CHRONIC CARE MANAGEMENT (OUTPATIENT)
Dept: PRIMARY CARE CLINIC | Facility: CLINIC | Age: 70
End: 2020-09-30
Payer: MEDICARE

## 2020-09-30 PROCEDURE — 99490 PR CHRONIC CARE MGMT, 1ST 20 MIN: ICD-10-PCS | Mod: S$PBB,,, | Performed by: INTERNAL MEDICINE

## 2020-09-30 PROCEDURE — 99490 CHRNC CARE MGMT STAFF 1ST 20: CPT | Mod: PBBFAC | Performed by: INTERNAL MEDICINE

## 2020-09-30 PROCEDURE — 99490 CHRNC CARE MGMT STAFF 1ST 20: CPT | Mod: S$PBB,,, | Performed by: INTERNAL MEDICINE

## 2020-10-07 ENCOUNTER — PATIENT MESSAGE (OUTPATIENT)
Dept: INTERNAL MEDICINE | Facility: CLINIC | Age: 70
End: 2020-10-07

## 2020-10-14 ENCOUNTER — PATIENT MESSAGE (OUTPATIENT)
Dept: PHARMACY | Facility: CLINIC | Age: 70
End: 2020-10-14

## 2020-10-14 ENCOUNTER — CLINICAL SUPPORT (OUTPATIENT)
Dept: INTERNAL MEDICINE | Facility: CLINIC | Age: 70
End: 2020-10-14
Payer: MEDICARE

## 2020-10-14 DIAGNOSIS — Z23 IMMUNIZATION DUE: Primary | ICD-10-CM

## 2020-10-14 PROCEDURE — G0009 ADMIN PNEUMOCOCCAL VACCINE: HCPCS | Mod: PBBFAC

## 2020-10-14 NOTE — PROGRESS NOTES
"Patient was given vaccine information sheet for the [id number] (pneumococcal polyvalent) vaccine. The area of injection was palpated using the acromion process as a landmark. This area was cleaned with alcohol. Using a 25g 1" safety needle, 0.5mL of the vaccine was placed into the left deltoid muscle. The injection site was dressed with a bandage. Patient experienced no complications and was discharged in stable condition. Pneumovax 23 (pneumococcal polyvalent) vaccine Lot: [id number] Exp: 03/31/2021      "

## 2020-10-15 ENCOUNTER — IMMUNIZATION (OUTPATIENT)
Dept: PHARMACY | Facility: CLINIC | Age: 70
End: 2020-10-15
Payer: MEDICARE

## 2020-10-16 ENCOUNTER — PATIENT MESSAGE (OUTPATIENT)
Dept: CARDIOLOGY | Facility: CLINIC | Age: 70
End: 2020-10-16

## 2020-10-19 ENCOUNTER — PATIENT MESSAGE (OUTPATIENT)
Dept: CARDIOLOGY | Facility: CLINIC | Age: 70
End: 2020-10-19

## 2020-10-27 ENCOUNTER — HOSPITAL ENCOUNTER (EMERGENCY)
Facility: HOSPITAL | Age: 70
Discharge: HOME | End: 2020-10-27
Attending: EMERGENCY MEDICINE
Payer: MEDICARE

## 2020-10-27 VITALS
TEMPERATURE: 98 F | SYSTOLIC BLOOD PRESSURE: 124 MMHG | DIASTOLIC BLOOD PRESSURE: 68 MMHG | OXYGEN SATURATION: 99 % | HEART RATE: 81 BPM | HEIGHT: 62 IN | WEIGHT: 156 LBS | BODY MASS INDEX: 28.71 KG/M2 | RESPIRATION RATE: 18 BRPM

## 2020-10-27 DIAGNOSIS — M25.551 RIGHT HIP PAIN: ICD-10-CM

## 2020-10-27 DIAGNOSIS — R53.1 WEAKNESS: ICD-10-CM

## 2020-10-27 DIAGNOSIS — M16.11 OSTEOARTHRITIS OF RIGHT HIP, UNSPECIFIED OSTEOARTHRITIS TYPE: ICD-10-CM

## 2020-10-27 DIAGNOSIS — R68.89 MULTIPLE COMPLAINTS: ICD-10-CM

## 2020-10-27 DIAGNOSIS — R07.9 CHEST PAIN, UNSPECIFIED TYPE: Primary | ICD-10-CM

## 2020-10-27 DIAGNOSIS — R06.02 SHORTNESS OF BREATH: ICD-10-CM

## 2020-10-27 LAB
ALBUMIN SERPL BCP-MCNC: 3.8 G/DL (ref 3.5–5.2)
ALP SERPL-CCNC: 69 U/L (ref 55–135)
ALT SERPL W/O P-5'-P-CCNC: 14 U/L (ref 10–44)
ANION GAP SERPL CALC-SCNC: 12 MMOL/L (ref 8–16)
AST SERPL-CCNC: 15 U/L (ref 10–40)
BACTERIA #/AREA URNS AUTO: NORMAL /HPF
BASOPHILS # BLD AUTO: 0.03 K/UL (ref 0–0.2)
BASOPHILS NFR BLD: 0.4 % (ref 0–1.9)
BILIRUB SERPL-MCNC: 0.8 MG/DL (ref 0.1–1)
BILIRUB UR QL STRIP: NEGATIVE
BNP SERPL-MCNC: 20 PG/ML (ref 0–99)
BUN SERPL-MCNC: 9 MG/DL (ref 8–23)
CALCIUM SERPL-MCNC: 10 MG/DL (ref 8.7–10.5)
CHLORIDE SERPL-SCNC: 106 MMOL/L (ref 95–110)
CLARITY UR REFRACT.AUTO: CLEAR
CO2 SERPL-SCNC: 24 MMOL/L (ref 23–29)
COLOR UR AUTO: ABNORMAL
CREAT SERPL-MCNC: 0.9 MG/DL (ref 0.5–1.4)
CTP QC/QA: YES
CTP QC/QA: YES
DIFFERENTIAL METHOD: ABNORMAL
EOSINOPHIL # BLD AUTO: 0 K/UL (ref 0–0.5)
EOSINOPHIL NFR BLD: 0.1 % (ref 0–8)
ERYTHROCYTE [DISTWIDTH] IN BLOOD BY AUTOMATED COUNT: 16.2 % (ref 11.5–14.5)
EST. GFR  (AFRICAN AMERICAN): >60 ML/MIN/1.73 M^2
EST. GFR  (NON AFRICAN AMERICAN): >60 ML/MIN/1.73 M^2
GLUCOSE SERPL-MCNC: 86 MG/DL (ref 70–110)
GLUCOSE UR QL STRIP: NEGATIVE
HCT VFR BLD AUTO: 40.2 % (ref 37–48.5)
HGB BLD-MCNC: 12.6 G/DL (ref 12–16)
HGB UR QL STRIP: ABNORMAL
IMM GRANULOCYTES # BLD AUTO: 0.04 K/UL (ref 0–0.04)
IMM GRANULOCYTES NFR BLD AUTO: 0.6 % (ref 0–0.5)
KETONES UR QL STRIP: ABNORMAL
LEUKOCYTE ESTERASE UR QL STRIP: NEGATIVE
LYMPHOCYTES # BLD AUTO: 1.3 K/UL (ref 1–4.8)
LYMPHOCYTES NFR BLD: 19.4 % (ref 18–48)
MCH RBC QN AUTO: 26.2 PG (ref 27–31)
MCHC RBC AUTO-ENTMCNC: 31.3 G/DL (ref 32–36)
MCV RBC AUTO: 84 FL (ref 82–98)
MICROSCOPIC COMMENT: NORMAL
MONOCYTES # BLD AUTO: 0.4 K/UL (ref 0.3–1)
MONOCYTES NFR BLD: 5.1 % (ref 4–15)
NEUTROPHILS # BLD AUTO: 5.2 K/UL (ref 1.8–7.7)
NEUTROPHILS NFR BLD: 74.4 % (ref 38–73)
NITRITE UR QL STRIP: NEGATIVE
NRBC BLD-RTO: 0 /100 WBC
PH UR STRIP: 7 [PH] (ref 5–8)
PLATELET # BLD AUTO: 295 K/UL (ref 150–350)
PMV BLD AUTO: 10 FL (ref 9.2–12.9)
POC MOLECULAR INFLUENZA A AGN: NEGATIVE
POC MOLECULAR INFLUENZA B AGN: NEGATIVE
POTASSIUM SERPL-SCNC: 3.7 MMOL/L (ref 3.5–5.1)
PROT SERPL-MCNC: 7.9 G/DL (ref 6–8.4)
PROT UR QL STRIP: NEGATIVE
RBC # BLD AUTO: 4.81 M/UL (ref 4–5.4)
RBC #/AREA URNS AUTO: 1 /HPF (ref 0–4)
SARS-COV-2 RDRP RESP QL NAA+PROBE: NEGATIVE
SODIUM SERPL-SCNC: 142 MMOL/L (ref 136–145)
SP GR UR STRIP: 1 (ref 1–1.03)
SQUAMOUS #/AREA URNS AUTO: 0 /HPF
TROPONIN I SERPL DL<=0.01 NG/ML-MCNC: <0.006 NG/ML (ref 0–0.03)
URN SPEC COLLECT METH UR: ABNORMAL
WBC # BLD AUTO: 6.92 K/UL (ref 3.9–12.7)
WBC #/AREA URNS AUTO: 1 /HPF (ref 0–5)

## 2020-10-27 PROCEDURE — 85025 COMPLETE CBC W/AUTO DIFF WBC: CPT

## 2020-10-27 PROCEDURE — 25000003 PHARM REV CODE 250: Performed by: PHYSICIAN ASSISTANT

## 2020-10-27 PROCEDURE — 99284 EMERGENCY DEPT VISIT MOD MDM: CPT | Mod: ,,, | Performed by: PHYSICIAN ASSISTANT

## 2020-10-27 PROCEDURE — 99285 EMERGENCY DEPT VISIT HI MDM: CPT | Mod: 25

## 2020-10-27 PROCEDURE — U0002 COVID-19 LAB TEST NON-CDC: HCPCS | Performed by: EMERGENCY MEDICINE

## 2020-10-27 PROCEDURE — 80053 COMPREHEN METABOLIC PANEL: CPT

## 2020-10-27 PROCEDURE — 93010 EKG 12-LEAD: ICD-10-PCS | Mod: ,,, | Performed by: INTERNAL MEDICINE

## 2020-10-27 PROCEDURE — 93010 ELECTROCARDIOGRAM REPORT: CPT | Mod: ,,, | Performed by: INTERNAL MEDICINE

## 2020-10-27 PROCEDURE — 94761 N-INVAS EAR/PLS OXIMETRY MLT: CPT

## 2020-10-27 PROCEDURE — 36000 PLACE NEEDLE IN VEIN: CPT

## 2020-10-27 PROCEDURE — 84484 ASSAY OF TROPONIN QUANT: CPT

## 2020-10-27 PROCEDURE — 93005 ELECTROCARDIOGRAM TRACING: CPT

## 2020-10-27 PROCEDURE — 81001 URINALYSIS AUTO W/SCOPE: CPT

## 2020-10-27 PROCEDURE — 83880 ASSAY OF NATRIURETIC PEPTIDE: CPT

## 2020-10-27 PROCEDURE — 87502 INFLUENZA DNA AMP PROBE: CPT

## 2020-10-27 PROCEDURE — 99284 PR EMERGENCY DEPT VISIT,LEVEL IV: ICD-10-PCS | Mod: ,,, | Performed by: PHYSICIAN ASSISTANT

## 2020-10-27 RX ORDER — ACETAMINOPHEN 325 MG/1
650 TABLET ORAL
Status: COMPLETED | OUTPATIENT
Start: 2020-10-27 | End: 2020-10-27

## 2020-10-27 RX ORDER — ASPIRIN 325 MG
325 TABLET ORAL
Status: COMPLETED | OUTPATIENT
Start: 2020-10-27 | End: 2020-10-27

## 2020-10-27 RX ADMIN — ACETAMINOPHEN 650 MG: 325 TABLET ORAL at 08:10

## 2020-10-27 RX ADMIN — ASPIRIN 325 MG ORAL TABLET 325 MG: 325 PILL ORAL at 08:10

## 2020-10-27 NOTE — DISCHARGE INSTRUCTIONS
Please take Tylenol as needed for headaches.  Try Prilosec as directed by your cardiologist.  Please try to maintain food intake at home.  If you have a decreased appetite, try boost or Ensure smoothies.  Follow-up with your primary care physician or return to the emergency department for any new or worsening symptoms.

## 2020-10-27 NOTE — ED NOTES
Patient moved to ED room 10 via triage staff in wheelchair, patient assisted onto stretcher and changed into a gown. Patient placed on cardiac monitor, continuous pulse oximetry and automatic blood pressure cuff. Bed placed in low locked position, side rails up x 2, call light is within reach of patient or family, orientation to room and explanation of wait provided to family and patient, alarms set and turned on for monitor and pulse ox, awaiting MD evaluation and orders, will continue to monitor.

## 2020-10-27 NOTE — ED TRIAGE NOTES
"Pt presents to ER via POV from home w/ family reporting + generalized weakness, + right sided hip pains, + intermittent non-radiating mid-sternal CP described as :pressure" w/ " a lot of gas". + intermittent dizziness upon exertion w/ generalized HA. Denies SOB, increased WOB, fever, chills, N/V/D. PT states," Ever since I got my shingles shot I have been feeling real bad, two weeks ago".  "

## 2020-10-27 NOTE — ED NOTES
Two patient identifiers have been checked and are correct.      Appearance: Pt awake, alert & oriented to person, place & time. Pt in no acute distress at present time. Pt is clean and well groomed with clothes appropriately fastened.   Skin: Skin warm, dry & intact. Color consistent with ethnicity. Mucous membranes moist. No breakdown or brusing noted.   Musculoskeletal: Patient moving all extremities well, no obvious swelling or deformities noted. + right sided hips pains reported.   Respiratory: Respirations spontaneous, even, and non-labored. Visible chest rise noted. Airway is open and patent. No accessory muscle use noted.   Neurologic: Sensation is intact. Speech is clear and appropriate. Eyes open spontaneously, behavior appropriate to situation, follows commands, facial expression symmetrical, bilateral hand grasp equal and even, purposeful motor response noted.  Cardiac: All peripheral pulses present. No Bilateral lower extremity edema. Cap refill is <3 seconds.    Fall risk band and allergy band applied to patient.   Abdomen: Abdomen soft, non-tender to palpation.   : Pt reports no dysuria or hematuria.

## 2020-10-27 NOTE — PROVIDER PROGRESS NOTES - EMERGENCY DEPT.
Encounter Date: 10/27/2020    ED Physician Progress Notes         EKG - STEMI Decision  Initial Reading: No STEMI present.

## 2020-10-27 NOTE — ED PROVIDER NOTES
"Encounter Date: 10/27/2020       History     Chief Complaint   Patient presents with    Multiple Complaints     4-5 d, weakness, dizziness, chest pain going to my back, headache, no appetite     69-year-old female with past medical history DVT, PE on Xarelto, hyperlipidemia who presents to the emergency department with multiple complaints.  She reports constant substernal chest pain that began about 4 days ago with associated shortness of breath.  Pain has been constant since onset.  It is not positional worse with exertion.  She feels as though it is similar to gas pain.  Also endorses headaches, generalized weakness and lightheadedness.  She additionally reports nontraumatic right hip/buttocks pain that began about 2 weeks ago.  She reports using a walker at home secondary to pain.  She denies abdominal pain, nausea, vomiting, diarrhea, dysuria.  Has not taken any medication for her symptoms.  She reports receiving the shingles vaccination about 2 weeks ago and reports that everything has "gone downhill since then."  Denies other worsening or alleviating factors.  Reports that she has not eaten anything for 3 days. This is the extent of the patient's complaints.        Review of patient's allergies indicates:   Allergen Reactions    Sulfa (sulfonamide antibiotics) Rash     Past Medical History:   Diagnosis Date    Anticoagulant long-term use     xarelto    Chronic back pain     Chronic neck pain     DVT (deep venous thrombosis)     Herniated disc, cervical     Kidney stone     Lumbar herniated disc     PE (pulmonary embolism)     2013    Pulmonary emboli     after surgery    Pulmonary embolism      Past Surgical History:   Procedure Laterality Date    ARTHROSCOPIC REPAIR OF ROTATOR CUFF OF SHOULDER Left 6/11/2019    Procedure: REPAIR, ROTATOR CUFF, ARTHROSCOPIC;  Surgeon: Sedrick Patel MD;  Location: [facility];  Service: Orthopedics;  Laterality: Left;    ARTHROSCOPIC TENOTOMY OF BICEPS TENDON Left " 2019    Procedure: TENOTOMY, BICEPS, ARTHROSCOPIC;  Surgeon: Sedrick Patel MD;  Location: [facility] OR;  Service: Orthopedics;  Laterality: Left;    ARTHROSCOPY OF SHOULDER WITH REMOVAL OF DISTAL CLAVICLE Left 2019    Procedure: ARTHROSCOPY, SHOULDER, WITH DISTAL CLAVICLE EXCISION;  Surgeon: Sedrick Patel MD;  Location: [facility];  Service: Orthopedics;  Laterality: Left;    BREAST CYST EXCISION Left      SECTION      x1    COLONOSCOPY N/A 2018    Procedure: COLONOSCOPY;  Surgeon: Armand Foy MD;  Location: [facility] ENDO (4TH FLR);  Service: Endoscopy;  Laterality: N/A;    ESOPHAGEAL MANOMETRY WITH MEASUREMENT OF IMPEDANCE N/A 2018    Procedure: MANOMETRY, ESOPHAGEAL, WITH IMPEDANCE MEASUREMENT;  Surgeon: Armand Foy MD;  Location: [facility] ENDO (4TH FLR);  Service: Endoscopy;  Laterality: N/A;    ESOPHAGOGASTRODUODENOSCOPY N/A 2018    Procedure: EGD (ESOPHAGOGASTRODUODENOSCOPY);  Surgeon: Armand Foy MD;  Location: [facility] ([facility] FLR);  Service: Endoscopy;  Laterality: N/A;  pt currently on Lovenox and Xarelto - ok per Dr. Doty to hold Plavix 2 days prior and Lovenox 24hr prior to case/see scanned media / for documentation of hold -- SM        HERNIA REPAIR      umbilical    KIDNEY STONE SURGERY      ureteral stent    left knee surgery      SHOULDER ARTHROSCOPY Left 2019    Procedure: ARTHROSCOPY, SHOULDER;  Surgeon: Sedrick Patel MD;  Location: [facility];  Service: Orthopedics;  Laterality: Left;  BLOCK    TONSILLECTOMY      TOTAL SHOULDER ARTHROPLASTY Right      Family History   Problem Relation Age of Onset    No Known Problems Father     Colon cancer Mother 74    Liver cancer Sister     Diabetes Sister     Liver cancer Maternal Grandfather     Breast cancer Maternal Aunt 50        50s    Cervical cancer Maternal Aunt     Breast cancer Maternal Cousin 45    No Known Problems Daughter     No Known Problems Sister     No Known Problems  Daughter     Ovarian cancer Neg Hx     Esophageal cancer Neg Hx      Social History     Tobacco Use    Smoking status: Never Smoker    Smokeless tobacco: Never Used   Substance Use Topics    Alcohol use: No    Drug use: No     Review of Systems   Constitutional: Negative for fever.   HENT: Negative for sore throat.    Respiratory: Positive for shortness of breath.    Cardiovascular: Positive for chest pain.   Gastrointestinal: Negative for abdominal pain, diarrhea, nausea and vomiting.   Genitourinary: Negative for dysuria.   Musculoskeletal: Positive for arthralgias and back pain.   Skin: Negative for rash.   Neurological: Positive for weakness, light-headedness and headaches.   Hematological: Does not bruise/bleed easily.       Physical Exam     Initial Vitals [10/27/20 0743]   BP Pulse Resp Temp SpO2   120/68 90 18 98.2 °F (36.8 °C) 98 %      MAP       --         Physical Exam    Nursing note and vitals reviewed.  Constitutional: She appears well-developed and well-nourished. She is not diaphoretic. No distress.   HENT:   Head: Normocephalic and atraumatic.   Mouth/Throat: Oropharynx is clear and moist.   Eyes: Conjunctivae and EOM are normal. Pupils are equal, round, and reactive to light.   Neck: Normal range of motion. Neck supple.   Cardiovascular: Normal rate, regular rhythm, normal heart sounds and intact distal pulses. Exam reveals no gallop and no friction rub.    No murmur heard.  Pulmonary/Chest: Breath sounds normal. She has no wheezes. She has no rhonchi. She has no rales.   Abdominal: Soft. Bowel sounds are normal. There is no abdominal tenderness.   Musculoskeletal: Normal range of motion.   Neurological: She is alert and oriented to person, place, and time. She has normal strength. No cranial nerve deficit or sensory deficit. GCS score is 15. GCS eye subscore is 4. GCS verbal subscore is 5. GCS motor subscore is 6.   Skin: Skin is warm and dry. Capillary refill takes less than 2 seconds.    Psychiatric: She has a normal mood and affect. Her behavior is normal. Judgment and thought content normal.         ED Course   Procedures  Labs Reviewed   CBC W/ AUTO DIFFERENTIAL - Abnormal; Notable for the following components:       Result Value    MCH 26.2 (*)     MCHC 31.3 (*)     RDW 16.2 (*)     Immature Granulocytes 0.6 (*)     Gran % 74.4 (*)     All other components within normal limits   URINALYSIS, REFLEX TO URINE CULTURE - Abnormal; Notable for the following components:    Ketones, UA 1+ (*)     Occult Blood UA 1+ (*)     All other components within normal limits    Narrative:     Specimen Source->Urine   COMPREHENSIVE METABOLIC PANEL   TROPONIN I   B-TYPE NATRIURETIC PEPTIDE   URINALYSIS MICROSCOPIC    Narrative:     Specimen Source->Urine   SARS-COV-2 RDRP GENE    Narrative:     This test utilizes isothermal nucleic acid amplification   technology to detect the SARS-CoV-2 RdRp nucleic acid segment.   The analytical sensitivity (limit of detection) is 125 genome   equivalents/mL.   A POSITIVE result implies infection with the SARS-CoV-2 virus;   the patient is presumed to be contagious.     A NEGATIVE result means that SARS-CoV-2 nucleic acids are not   present above the limit of detection. A NEGATIVE result should be   treated as presumptive. It does not rule out the possibility of   COVID-19 and should not be the sole basis for treatment decisions.   If COVID-19 is strongly suspected based on clinical and exposure   history, re-testing using an alternate molecular assay should be   considered.   This test is only for use under the Food and Drug   Administration s Emergency Use Authorization (EUA).   Commercial kits are provided by [employer].   Performance characteristics of the EUA have been independently   verified by [facility] Department of   Pathology and Laboratory Medicine.   _________________________________________________________________   The authorized Fact Sheet  for Healthcare Providers and the authorized Fact   Sheet for Patients of the ID NOW COVID-19 are available on the FDA   website:     https://www.fda.gov/media/009850/download  https://www.fda.gov/media/012347/download         POCT INFLUENZA A/B MOLECULAR     EKG Readings: (Independently Interpreted)   Initial Reading: No STEMI. Rhythm: Normal Sinus Rhythm. Heart Rate: 86. T Waves Flipped: III and AVF.       Imaging Results          X-Ray Hip 2 View Right (Final result)  Result time 10/27/20 08:47:56    Final result by Luis Lowery III, MD (10/27/20 08:47:56)                 Narrative:    EXAMINATION:  XR HIP 2 VIEW RIGHT    CLINICAL HISTORY:  hip pain;    FINDINGS:  Two views right hip: No fracture dislocation bone destruction seen.  There is mild DJD.      Electronically signed by: Luis Lowery MD  Date:    10/27/2020  Time:    08:47                             X-Ray Chest AP Portable (Final result)  Result time 10/27/20 08:48:06    Final result by Sedrick Perez MD (10/27/20 08:48:06)                 Impression:      No acute findings      Electronically signed by: Sedrick Perez MD  Date:    10/27/2020  Time:    08:48             Narrative:    EXAMINATION:  XR CHEST AP PORTABLE    CLINICAL HISTORY:  Shortness of breath    TECHNIQUE:  Single frontal view of the chest was performed.    COMPARISON:  04/07/2019    FINDINGS:  Cardiac size is normal.  Lungs are clear.  No infiltrate, vascular congestion or pleural effusion is seen.  Shoulder prosthesis is present on the right.                              X-Rays:   Independently Interpreted Readings:   Chest X-Ray: Normal heart size.  No infiltrates.  No acute abnormalities.     Medical Decision Making:   History:   Old Medical Records: I decided to obtain old medical records.  Initial Assessment:   Emergent evaluation of a 69-year-old female who presents to the emergency department with complaints of headache, generalized weakness, lightheadedness, chest  pain, shortness breath, right hip pain.  Reports symptoms started approximately 4 days ago.  Patient is afebrile, hemodynamically stable, nontoxic appearing.  Will order labs, imaging, EKG and continue to monitor.  Differential Diagnosis:   Differential diagnosis includes but is limited to anemia, electrolyte derangement, urinary tract infection, ACS, pneumonia, COVID-19, influenza, lumbar radiculopathy, fracture.  Independently Interpreted Test(s):   I have ordered and independently interpreted X-rays - see prior notes.  I have ordered and independently interpreted EKG Reading(s) - see prior notes  Clinical Tests:   Lab Tests: Ordered and Reviewed  Radiological Study: Ordered and Reviewed  Medical Tests: Ordered and Reviewed  ED Management:  COVID and influenza negative.  Urinalysis inconsistent with infection.  No severe metabolic or hematologic derangements.  Troponin negative.  BNP within normal limits.  Chest x-ray without acute abnormality.  X-ray right hip reveals no fracture or dislocation but does reveal osteoarthritis.  Patient reports that she has not eaten anything in 3 days.  I suspect this is why she feels so weak.  I instructed her to try boost or Ensure smoothies at if she does not feel as though she can tolerate food.  Additionally recommend trying Tylenol as needed for headaches.  Her cardiologist recommended that she start Prilosec however she has not done so yet.  I recommend trying this medication.  Strict return precautions given. She voices understanding.  All questions answered.  The patient was instructed to follow up with a primary care provider in 2 days or to return to the emergency department for worsening symptoms. The treatment plan was discussed with the patient who demonstrated understanding and comfort with plan. The patient's history, physical exam, and plan of care was discussed with and agreed upon with my supervising physician.                      ED Course as of Oct 27 1104    [place] Oct 27, 2020   0825 SARS-CoV-2 RNA, Amplification, Qual: Negative [JM]   0910 WBC: 6.92 [JM]   0910 Hemoglobin: 12.6 [JM]   0910 Hematocrit: 40.2 [JM]   0910 Platelets: 295 [JM]   0910 Sodium: 142 [JM]   0910 Potassium: 3.7 [JM]   0910 BUN, Bld: 9 [JM]   0910 Creatinine: 0.9 [JM]   0910 Ketones, UA(!): 1+ [JM]   0910 Occult Blood UA(!): 1+ [JM]   0910 POC Molecular Influenza A Ag: Negative [JM]   0910 POC Molecular Influenza B Ag: Negative [JM]   0910 BNP: 20 [JM]   0910 Troponin I: <0.006 [JM]      ED Course User Index  [JM] Flavia Donato PA-C            Clinical Impression:       ICD-10-CM ICD-9-CM   1. Chest pain, unspecified type  R07.9 786.50   2. Multiple complaints  R68.89 780.99   3. Shortness of breath  R06.02 786.05   4. Weakness  R53.1 780.79   5. Right hip pain  M25.551 719.45   6. Osteoarthritis of right hip, unspecified osteoarthritis type  M16.11 715.95                          ED Disposition Condition    Discharge Stable        ED Prescriptions     None        Follow-up Information     Follow up With Specialties Details Why Contact Info    Osbaldo Hall MD Internal Medicine Schedule an appointment as soon as possible for a visit in 3 days  [address] [facility] [address]  [facility] [address]  [phone]      [facility] Emergency Medicine Go to  If symptoms worsen [facility] [facility] [address] [phone]                                       Flavia Donato PA-C  10/27/20 0896

## 2020-10-31 ENCOUNTER — EXTERNAL CHRONIC CARE MANAGEMENT (OUTPATIENT)
Dept: PRIMARY CARE CLINIC | Facility: CLINIC | Age: 70
End: 2020-10-31
Payer: MEDICARE

## 2020-10-31 PROCEDURE — 99490 PR CHRONIC CARE MGMT, 1ST 20 MIN: ICD-10-PCS | Mod: S$PBB,,, | Performed by: INTERNAL MEDICINE

## 2020-10-31 PROCEDURE — 99490 CHRNC CARE MGMT STAFF 1ST 20: CPT | Mod: S$PBB,,, | Performed by: INTERNAL MEDICINE

## 2020-10-31 PROCEDURE — 99490 CHRNC CARE MGMT STAFF 1ST 20: CPT | Mod: PBBFAC | Performed by: INTERNAL MEDICINE

## 2020-11-19 ENCOUNTER — PATIENT MESSAGE (OUTPATIENT)
Dept: INTERNAL MEDICINE | Facility: CLINIC | Age: 70
End: 2020-11-19

## 2020-11-19 DIAGNOSIS — F32.9 REACTIVE DEPRESSION: Primary | ICD-10-CM

## 2020-11-19 RX ORDER — ESCITALOPRAM OXALATE 10 MG/1
10 TABLET ORAL DAILY
Qty: 90 TABLET | Refills: 4 | Status: SHIPPED | OUTPATIENT
Start: 2020-11-19 | End: 2022-02-25 | Stop reason: SDUPTHER

## 2020-11-19 RX ORDER — OMEPRAZOLE 40 MG/1
40 CAPSULE, DELAYED RELEASE ORAL EVERY MORNING
Qty: 30 CAPSULE | Refills: 3 | Status: SHIPPED | OUTPATIENT
Start: 2020-11-19 | End: 2021-03-16 | Stop reason: SDUPTHER

## 2020-11-20 ENCOUNTER — PATIENT MESSAGE (OUTPATIENT)
Dept: INTERNAL MEDICINE | Facility: CLINIC | Age: 70
End: 2020-11-20

## 2020-11-30 ENCOUNTER — EXTERNAL CHRONIC CARE MANAGEMENT (OUTPATIENT)
Dept: PRIMARY CARE CLINIC | Facility: CLINIC | Age: 70
End: 2020-11-30
Payer: MEDICARE

## 2020-11-30 PROCEDURE — 99490 CHRNC CARE MGMT STAFF 1ST 20: CPT | Mod: S$PBB,,, | Performed by: INTERNAL MEDICINE

## 2020-11-30 PROCEDURE — 99490 PR CHRONIC CARE MGMT, 1ST 20 MIN: ICD-10-PCS | Mod: S$PBB,,, | Performed by: INTERNAL MEDICINE

## 2020-11-30 PROCEDURE — 99490 CHRNC CARE MGMT STAFF 1ST 20: CPT | Mod: PBBFAC | Performed by: INTERNAL MEDICINE

## 2020-12-09 ENCOUNTER — TELEPHONE (OUTPATIENT)
Dept: UROLOGY | Facility: CLINIC | Age: 70
End: 2020-12-09

## 2020-12-09 ENCOUNTER — PATIENT MESSAGE (OUTPATIENT)
Dept: UROLOGY | Facility: CLINIC | Age: 70
End: 2020-12-09

## 2020-12-09 ENCOUNTER — PATIENT MESSAGE (OUTPATIENT)
Dept: INTERNAL MEDICINE | Facility: CLINIC | Age: 70
End: 2020-12-09

## 2020-12-09 DIAGNOSIS — R31.0 GROSS HEMATURIA: Primary | ICD-10-CM

## 2020-12-09 NOTE — TELEPHONE ENCOUNTER
Please arrange pt to submit a urine sample to see if infxn involved. Agree with f/u with Dr Padilla. Please assure pt life threatening bleeding from urinary source is very rare.

## 2020-12-10 ENCOUNTER — PATIENT OUTREACH (OUTPATIENT)
Dept: ADMINISTRATIVE | Facility: OTHER | Age: 70
End: 2020-12-10

## 2020-12-10 ENCOUNTER — TELEPHONE (OUTPATIENT)
Dept: UROLOGY | Facility: CLINIC | Age: 70
End: 2020-12-10

## 2020-12-10 NOTE — PROGRESS NOTES
Health Maintenance Due   Topic Date Due    Influenza Vaccine (1) 08/01/2020    Mammogram  11/19/2020    Shingles Vaccine (3 of 3) 12/09/2020     Updates were requested from care everywhere.  Chart was reviewed for overdue Proactive [facility] ([facility]) topics (CRS, Breast Cancer Screening, Eye exam)  Health Maintenance has been updated.  LINKS immunization registry triggered.  Immunizations were reconciled.

## 2020-12-11 ENCOUNTER — OFFICE VISIT (OUTPATIENT)
Dept: UROLOGY | Facility: CLINIC | Age: 70
End: 2020-12-11
Payer: MEDICARE

## 2020-12-11 VITALS
BODY MASS INDEX: 28.75 KG/M2 | WEIGHT: 156.25 LBS | DIASTOLIC BLOOD PRESSURE: 68 MMHG | HEIGHT: 62 IN | HEART RATE: 74 BPM | SYSTOLIC BLOOD PRESSURE: 103 MMHG

## 2020-12-11 DIAGNOSIS — R31.0 GROSS HEMATURIA: Primary | ICD-10-CM

## 2020-12-11 DIAGNOSIS — N32.81 OAB (OVERACTIVE BLADDER): ICD-10-CM

## 2020-12-11 DIAGNOSIS — N20.0 NEPHROLITHIASIS: ICD-10-CM

## 2020-12-11 LAB
BILIRUB SERPL-MCNC: NEGATIVE MG/DL
BLOOD URINE, POC: ABNORMAL
CLARITY, POC UA: CLEAR
COLOR, POC UA: YELLOW
GLUCOSE UR QL STRIP: NORMAL
KETONES UR QL STRIP: NEGATIVE
LEUKOCYTE ESTERASE URINE, POC: NEGATIVE
NITRITE, POC UA: NEGATIVE
PH, POC UA: 7
PROTEIN, POC: 30
SPECIFIC GRAVITY, POC UA: 1
UROBILINOGEN, POC UA: NORMAL

## 2020-12-11 PROCEDURE — 87086 URINE CULTURE/COLONY COUNT: CPT

## 2020-12-11 PROCEDURE — 81002 POCT URINE DIPSTICK WITHOUT MICROSCOPE: ICD-10-PCS | Mod: S$GLB,,, | Performed by: NURSE PRACTITIONER

## 2020-12-11 PROCEDURE — 81002 URINALYSIS NONAUTO W/O SCOPE: CPT | Mod: S$GLB,,, | Performed by: NURSE PRACTITIONER

## 2020-12-11 PROCEDURE — 99213 PR OFFICE/OUTPT VISIT, EST, LEVL III, 20-29 MIN: ICD-10-PCS | Mod: 25,S$GLB,, | Performed by: NURSE PRACTITIONER

## 2020-12-11 PROCEDURE — 99213 OFFICE O/P EST LOW 20 MIN: CPT | Mod: 25,S$GLB,, | Performed by: NURSE PRACTITIONER

## 2020-12-11 NOTE — PROGRESS NOTES
"Subjective:      Jasson Pineda is a 70 y.o. female who returns today regarding her hematuria.     The patient has a history of renal stones. S/p ESWL in 2016 and ureteroscopy in 2014. No recent stone episodes.    She presents today reporting several episodes of gross hematuria over the last few months. Most recently she had gross hematuria (bright red urine) on 3 occasions on Tues/Wed of this week. This spontaneously resolved. Denies dysuria, worsening frequency, urgency, flank pain, N/V and fever/chills. On anticoagulation. Non smoker.      The following portions of the patient's history were reviewed and updated as appropriate: allergies, current medications, past family history, past medical history, past social history, past surgical history and problem list.    Review of Systems  Constitutional: no fever or chills  ENT: no nasal congestion or sore throat  Respiratory: no cough or shortness of breath  Cardiovascular: no chest pain or palpitations  Gastrointestinal: no nausea or vomiting, tolerating diet  Genitourinary: as per HPI  Hematologic/Lymphatic: no easy bruising or lymphadenopathy  Musculoskeletal: no arthralgias or myalgias  Neurological: no seizures or tremors  Behavioral/Psych: no auditory or visual hallucinations     Objective:   Vital Signs:/68 (BP Location: Left arm, Patient Position: Sitting, BP Method: Large (Automatic))   Pulse 74   Ht 5' 2" (1.575 m)   Wt 70.9 kg (156 lb 3.8 oz)   BMI 28.58 kg/m²     Physical Exam   General: alert and oriented, no acute distress  Head: normocephalic, atraumatic  Neck: supple, no lymphadenopathy, normal ROM, no masses  Respiratory: Symmetric expansion, non-labored breathing  Cardiovascular: regular rate and rhythm, nomal pulses, no peripheral edema  Abdomen: soft, non tender, non distended, no palpable masses, no hernias, no hepatomegaly or splenomegaly  Pelvic: deferred  Lymphatic: no inguinal nodes  Skin: normal coloration and turgor, no " rashes, no suspicious skin lesions noted  Neuro: alert and oriented x3, no gross deficits  Psych: normal judgment and insight, normal mood/affect and non-anxious  No CVA tenderness    Lab Review   Urinalysis demonstrates positive for red blood cells (trace)  PVR: 15 mL  Lab Results   Component Value Date    WBC 6.92 10/27/2020    HGB 12.6 10/27/2020    HCT 40.2 10/27/2020    MCV 84 10/27/2020     10/27/2020     Lab Results   Component Value Date    CREATININE 0.9 10/27/2020    BUN 9 10/27/2020       Imaging   None    Assessment:   Gross hematuria  Nephrolithiasis  OAB    Plan:   Jasson was seen today for hematuria.    Diagnoses and all orders for this visit:    Gross hematuria  -     POCT URINE DIPSTICK WITHOUT MICROSCOPE  -     Urine culture  -     CT Urogram Abd Pelvis W WO; Future  -     Cystoscopy; Future  -     Creatinine, serum; Future    Nephrolithiasis    OAB (overactive bladder)    Plan:  -- Discussed differential diagnosis for hematuria, including infection, nephrolithiasis, and neoplasms of kidney, ureter, or bladder.  -- Recommended proceeding with full hematuria workup, to include CT urogram and cystoscopy  -- Will send urine for culture  -- CT urogram next available   -- Cystoscopy in clinic after CT

## 2020-12-12 LAB
BACTERIA UR CULT: NORMAL
BACTERIA UR CULT: NORMAL

## 2020-12-14 ENCOUNTER — PATIENT MESSAGE (OUTPATIENT)
Dept: UROLOGY | Facility: CLINIC | Age: 70
End: 2020-12-14

## 2020-12-14 ENCOUNTER — PATIENT MESSAGE (OUTPATIENT)
Dept: INTERNAL MEDICINE | Facility: CLINIC | Age: 70
End: 2020-12-14

## 2020-12-14 DIAGNOSIS — R31.0 GROSS HEMATURIA: Primary | ICD-10-CM

## 2020-12-16 ENCOUNTER — LAB VISIT (OUTPATIENT)
Dept: LAB | Facility: OTHER | Age: 70
End: 2020-12-16
Attending: NURSE PRACTITIONER
Payer: COMMERCIAL

## 2020-12-16 DIAGNOSIS — R31.0 GROSS HEMATURIA: ICD-10-CM

## 2020-12-16 PROCEDURE — 87086 URINE CULTURE/COLONY COUNT: CPT

## 2020-12-17 ENCOUNTER — TELEPHONE (OUTPATIENT)
Dept: INTERNAL MEDICINE | Facility: CLINIC | Age: 70
End: 2020-12-17

## 2020-12-17 LAB — BACTERIA UR CULT: NO GROWTH

## 2020-12-22 ENCOUNTER — PATIENT MESSAGE (OUTPATIENT)
Dept: UROLOGY | Facility: CLINIC | Age: 70
End: 2020-12-22

## 2020-12-22 ENCOUNTER — HOSPITAL ENCOUNTER (OUTPATIENT)
Dept: RADIOLOGY | Facility: OTHER | Age: 70
Discharge: HOME | End: 2020-12-22
Attending: NURSE PRACTITIONER
Payer: MEDICARE

## 2020-12-22 ENCOUNTER — TELEPHONE (OUTPATIENT)
Dept: UROLOGY | Facility: CLINIC | Age: 70
End: 2020-12-22

## 2020-12-22 DIAGNOSIS — N20.0 NEPHROLITHIASIS: Primary | ICD-10-CM

## 2020-12-22 DIAGNOSIS — R31.0 GROSS HEMATURIA: ICD-10-CM

## 2020-12-22 PROCEDURE — 74178 CT UROGRAM ABD PELVIS W WO: ICD-10-PCS | Mod: 26,,, | Performed by: RADIOLOGY

## 2020-12-22 PROCEDURE — 25500020 PHARM REV CODE 255: Performed by: NURSE PRACTITIONER

## 2020-12-22 PROCEDURE — 74178 CT ABD&PLV WO CNTR FLWD CNTR: CPT | Mod: 26,,, | Performed by: RADIOLOGY

## 2020-12-22 PROCEDURE — 74178 CT ABD&PLV WO CNTR FLWD CNTR: CPT | Mod: TC

## 2020-12-22 RX ADMIN — IOHEXOL 125 ML: 350 INJECTION, SOLUTION INTRAVENOUS at 08:12

## 2020-12-22 NOTE — TELEPHONE ENCOUNTER
----- Message from Chantel Boyle NP sent at 12/22/2020  9:31 AM CST -----  Please schedule cysto next available. Please have her complete a KUB before that visit. Thanks!

## 2020-12-23 ENCOUNTER — TELEPHONE (OUTPATIENT)
Dept: UROLOGY | Facility: CLINIC | Age: 70
End: 2020-12-23

## 2020-12-28 ENCOUNTER — HOSPITAL ENCOUNTER (OUTPATIENT)
Dept: RADIOLOGY | Facility: OTHER | Age: 70
Discharge: HOME | End: 2020-12-28
Attending: NURSE PRACTITIONER
Payer: MEDICARE

## 2020-12-28 ENCOUNTER — TELEPHONE (OUTPATIENT)
Dept: UROLOGY | Facility: CLINIC | Age: 70
End: 2020-12-28

## 2020-12-28 DIAGNOSIS — N20.0 NEPHROLITHIASIS: ICD-10-CM

## 2020-12-28 PROCEDURE — 74018 XR KUB: ICD-10-PCS | Mod: 26,,, | Performed by: RADIOLOGY

## 2020-12-28 PROCEDURE — 74018 RADEX ABDOMEN 1 VIEW: CPT | Mod: 26,,, | Performed by: RADIOLOGY

## 2020-12-28 PROCEDURE — 74018 RADEX ABDOMEN 1 VIEW: CPT | Mod: TC,FY

## 2020-12-28 NOTE — TELEPHONE ENCOUNTER
Spoke to patient at 10:15am to reschedule appointment due to conflict of appointment. Patient has agreed to February 3,2021.    Yanet ANDERSON

## 2020-12-31 ENCOUNTER — EXTERNAL CHRONIC CARE MANAGEMENT (OUTPATIENT)
Dept: PRIMARY CARE CLINIC | Facility: CLINIC | Age: 70
End: 2020-12-31
Payer: MEDICARE

## 2020-12-31 PROCEDURE — 99490 PR CHRONIC CARE MGMT, 1ST 20 MIN: ICD-10-PCS | Mod: S$PBB,,, | Performed by: INTERNAL MEDICINE

## 2020-12-31 PROCEDURE — 99490 CHRNC CARE MGMT STAFF 1ST 20: CPT | Mod: S$PBB,,, | Performed by: INTERNAL MEDICINE

## 2020-12-31 PROCEDURE — 99490 CHRNC CARE MGMT STAFF 1ST 20: CPT | Mod: PBBFAC | Performed by: INTERNAL MEDICINE

## 2021-01-05 ENCOUNTER — PATIENT MESSAGE (OUTPATIENT)
Dept: ADMINISTRATIVE | Facility: HOSPITAL | Age: 71
End: 2021-01-05

## 2021-01-13 ENCOUNTER — OFFICE VISIT (OUTPATIENT)
Dept: CARDIOLOGY | Facility: CLINIC | Age: 71
End: 2021-01-13
Payer: MEDICARE

## 2021-01-13 VITALS
BODY MASS INDEX: 27.99 KG/M2 | HEART RATE: 80 BPM | OXYGEN SATURATION: 98 % | HEIGHT: 62 IN | WEIGHT: 152.13 LBS | DIASTOLIC BLOOD PRESSURE: 62 MMHG | SYSTOLIC BLOOD PRESSURE: 94 MMHG

## 2021-01-13 DIAGNOSIS — Z86.718 HISTORY OF DVT OF LOWER EXTREMITY: Primary | ICD-10-CM

## 2021-01-13 DIAGNOSIS — I26.99 PULMONARY EMBOLISM, UNSPECIFIED CHRONICITY, UNSPECIFIED PULMONARY EMBOLISM TYPE, UNSPECIFIED WHETHER ACUTE COR PULMONALE PRESENT: ICD-10-CM

## 2021-01-13 DIAGNOSIS — E78.2 MIXED HYPERLIPIDEMIA: ICD-10-CM

## 2021-01-13 PROCEDURE — 99213 OFFICE O/P EST LOW 20 MIN: CPT | Mod: PBBFAC | Performed by: INTERNAL MEDICINE

## 2021-01-13 PROCEDURE — 99999 PR PBB SHADOW E&M-EST. PATIENT-LVL III: CPT | Mod: PBBFAC,,, | Performed by: INTERNAL MEDICINE

## 2021-01-13 PROCEDURE — 99214 OFFICE O/P EST MOD 30 MIN: CPT | Mod: S$PBB,,, | Performed by: INTERNAL MEDICINE

## 2021-01-13 PROCEDURE — 99999 PR PBB SHADOW E&M-EST. PATIENT-LVL III: ICD-10-PCS | Mod: PBBFAC,,, | Performed by: INTERNAL MEDICINE

## 2021-01-13 PROCEDURE — 99214 PR OFFICE/OUTPT VISIT, EST, LEVL IV, 30-39 MIN: ICD-10-PCS | Mod: S$PBB,,, | Performed by: INTERNAL MEDICINE

## 2021-01-13 RX ORDER — RIVAROXABAN 20 MG/1
1 TABLET, FILM COATED ORAL NIGHTLY
Qty: 90 TABLET | Refills: 3 | Status: SHIPPED | OUTPATIENT
Start: 2021-01-13 | End: 2021-07-13 | Stop reason: SDUPTHER

## 2021-01-15 ENCOUNTER — IMMUNIZATION (OUTPATIENT)
Dept: INTERNAL MEDICINE | Facility: CLINIC | Age: 71
End: 2021-01-15
Payer: MEDICARE

## 2021-01-15 ENCOUNTER — HOSPITAL ENCOUNTER (OUTPATIENT)
Dept: RADIOLOGY | Facility: OTHER | Age: 71
Discharge: HOME | End: 2021-01-15
Attending: INTERNAL MEDICINE
Payer: MEDICARE

## 2021-01-15 ENCOUNTER — OFFICE VISIT (OUTPATIENT)
Dept: INTERNAL MEDICINE | Facility: CLINIC | Age: 71
End: 2021-01-15
Attending: INTERNAL MEDICINE
Payer: MEDICARE

## 2021-01-15 VITALS
HEART RATE: 77 BPM | SYSTOLIC BLOOD PRESSURE: 120 MMHG | WEIGHT: 155.63 LBS | DIASTOLIC BLOOD PRESSURE: 75 MMHG | HEIGHT: 62 IN | OXYGEN SATURATION: 96 % | BODY MASS INDEX: 28.64 KG/M2

## 2021-01-15 DIAGNOSIS — N32.81 OAB (OVERACTIVE BLADDER): ICD-10-CM

## 2021-01-15 DIAGNOSIS — Z12.31 ENCOUNTER FOR SCREENING MAMMOGRAM FOR MALIGNANT NEOPLASM OF BREAST: ICD-10-CM

## 2021-01-15 DIAGNOSIS — R31.0 GROSS HEMATURIA: ICD-10-CM

## 2021-01-15 DIAGNOSIS — Z23 NEED FOR VACCINATION: Primary | ICD-10-CM

## 2021-01-15 DIAGNOSIS — Z12.31 ENCOUNTER FOR SCREENING MAMMOGRAM FOR MALIGNANT NEOPLASM OF BREAST: Primary | ICD-10-CM

## 2021-01-15 PROCEDURE — 77067 SCR MAMMO BI INCL CAD: CPT | Mod: TC

## 2021-01-15 PROCEDURE — 99999 PR PBB SHADOW E&M-EST. PATIENT-LVL III: ICD-10-PCS | Mod: PBBFAC,,, | Performed by: INTERNAL MEDICINE

## 2021-01-15 PROCEDURE — 99214 OFFICE O/P EST MOD 30 MIN: CPT | Mod: S$PBB,,, | Performed by: INTERNAL MEDICINE

## 2021-01-15 PROCEDURE — 99214 PR OFFICE/OUTPT VISIT, EST, LEVL IV, 30-39 MIN: ICD-10-PCS | Mod: S$PBB,,, | Performed by: INTERNAL MEDICINE

## 2021-01-15 PROCEDURE — 99999 PR PBB SHADOW E&M-EST. PATIENT-LVL III: CPT | Mod: PBBFAC,,, | Performed by: INTERNAL MEDICINE

## 2021-01-15 PROCEDURE — 77067 MAMMO DIGITAL SCREENING BILAT WITH TOMO: ICD-10-PCS | Mod: 26,,, | Performed by: RADIOLOGY

## 2021-01-15 PROCEDURE — 99213 OFFICE O/P EST LOW 20 MIN: CPT | Mod: PBBFAC | Performed by: INTERNAL MEDICINE

## 2021-01-15 PROCEDURE — 77063 BREAST TOMOSYNTHESIS BI: CPT | Mod: 26,,, | Performed by: RADIOLOGY

## 2021-01-15 PROCEDURE — 77063 MAMMO DIGITAL SCREENING BILAT WITH TOMO: ICD-10-PCS | Mod: 26,,, | Performed by: RADIOLOGY

## 2021-01-15 PROCEDURE — 77067 SCR MAMMO BI INCL CAD: CPT | Mod: 26,,, | Performed by: RADIOLOGY

## 2021-01-15 PROCEDURE — 91300 COVID-19, MRNA, LNP-S, PF, 30 MCG/0.3 ML DOSE VACCINE: CPT | Mod: PBBFAC | Performed by: INTERNAL MEDICINE

## 2021-01-31 ENCOUNTER — EXTERNAL CHRONIC CARE MANAGEMENT (OUTPATIENT)
Dept: PRIMARY CARE CLINIC | Facility: CLINIC | Age: 71
End: 2021-01-31
Payer: MEDICARE

## 2021-01-31 PROCEDURE — 99490 CHRNC CARE MGMT STAFF 1ST 20: CPT | Mod: S$PBB,,, | Performed by: INTERNAL MEDICINE

## 2021-01-31 PROCEDURE — 99490 CHRNC CARE MGMT STAFF 1ST 20: CPT | Mod: PBBFAC | Performed by: INTERNAL MEDICINE

## 2021-01-31 PROCEDURE — 99490 PR CHRONIC CARE MGMT, 1ST 20 MIN: ICD-10-PCS | Mod: S$PBB,,, | Performed by: INTERNAL MEDICINE

## 2021-02-03 ENCOUNTER — PROCEDURE VISIT (OUTPATIENT)
Dept: UROLOGY | Facility: CLINIC | Age: 71
End: 2021-02-03
Attending: UROLOGY
Payer: MEDICARE

## 2021-02-03 ENCOUNTER — TELEPHONE (OUTPATIENT)
Dept: UROLOGY | Facility: CLINIC | Age: 71
End: 2021-02-03

## 2021-02-03 VITALS
DIASTOLIC BLOOD PRESSURE: 63 MMHG | HEIGHT: 62 IN | BODY MASS INDEX: 28.52 KG/M2 | HEART RATE: 96 BPM | WEIGHT: 155 LBS | SYSTOLIC BLOOD PRESSURE: 113 MMHG

## 2021-02-03 DIAGNOSIS — R31.0 GROSS HEMATURIA: ICD-10-CM

## 2021-02-03 DIAGNOSIS — N20.0 NEPHROLITHIASIS: Primary | ICD-10-CM

## 2021-02-03 LAB
BILIRUB SERPL-MCNC: ABNORMAL MG/DL
BLOOD URINE, POC: 50
CLARITY, POC UA: CLEAR
COLOR, POC UA: YELLOW
GLUCOSE UR QL STRIP: NORMAL
KETONES UR QL STRIP: ABNORMAL
LEUKOCYTE ESTERASE URINE, POC: ABNORMAL
NITRITE, POC UA: ABNORMAL
PH, POC UA: 5
PROTEIN, POC: ABNORMAL
SPECIFIC GRAVITY, POC UA: 1.02
UROBILINOGEN, POC UA: NORMAL

## 2021-02-03 PROCEDURE — 52000 CYSTOURETHROSCOPY: CPT | Mod: S$GLB,,, | Performed by: UROLOGY

## 2021-02-03 PROCEDURE — 81002 POCT URINE DIPSTICK WITHOUT MICROSCOPE: ICD-10-PCS | Mod: S$GLB,,, | Performed by: UROLOGY

## 2021-02-03 PROCEDURE — 81002 URINALYSIS NONAUTO W/O SCOPE: CPT | Mod: S$GLB,,, | Performed by: UROLOGY

## 2021-02-03 PROCEDURE — 52000 CYSTOSCOPY: ICD-10-PCS | Mod: S$GLB,,, | Performed by: UROLOGY

## 2021-02-03 RX ORDER — LIDOCAINE HYDROCHLORIDE 20 MG/ML
JELLY TOPICAL
Status: COMPLETED | OUTPATIENT
Start: 2021-02-03 | End: 2021-02-03

## 2021-02-03 RX ORDER — CIPROFLOXACIN 500 MG/1
500 TABLET ORAL
Status: COMPLETED | OUTPATIENT
Start: 2021-02-03 | End: 2021-02-03

## 2021-02-03 RX ADMIN — CIPROFLOXACIN 500 MG: 500 TABLET ORAL at 12:02

## 2021-02-03 RX ADMIN — LIDOCAINE HYDROCHLORIDE 5 ML: 20 JELLY TOPICAL at 12:02

## 2021-02-04 ENCOUNTER — PATIENT MESSAGE (OUTPATIENT)
Dept: CARDIOLOGY | Facility: CLINIC | Age: 71
End: 2021-02-04

## 2021-02-04 ENCOUNTER — HOSPITAL ENCOUNTER (OUTPATIENT)
Dept: PREADMISSION TESTING | Facility: OTHER | Age: 71
Discharge: HOME | End: 2021-02-04
Attending: UROLOGY
Payer: MEDICARE

## 2021-02-04 ENCOUNTER — ANESTHESIA EVENT (OUTPATIENT)
Dept: SURGERY | Facility: OTHER | Age: 71
End: 2021-02-04
Payer: MEDICARE

## 2021-02-04 ENCOUNTER — HOSPITAL ENCOUNTER (OUTPATIENT)
Dept: RADIOLOGY | Facility: OTHER | Age: 71
Discharge: HOME | End: 2021-02-04
Attending: UROLOGY
Payer: MEDICARE

## 2021-02-04 VITALS
DIASTOLIC BLOOD PRESSURE: 61 MMHG | TEMPERATURE: 98 F | BODY MASS INDEX: 28.71 KG/M2 | SYSTOLIC BLOOD PRESSURE: 103 MMHG | WEIGHT: 156 LBS | OXYGEN SATURATION: 96 % | HEIGHT: 62 IN | HEART RATE: 93 BPM

## 2021-02-04 DIAGNOSIS — I26.99 PULMONARY EMBOLISM, UNSPECIFIED CHRONICITY, UNSPECIFIED PULMONARY EMBOLISM TYPE, UNSPECIFIED WHETHER ACUTE COR PULMONALE PRESENT: Primary | ICD-10-CM

## 2021-02-04 DIAGNOSIS — N20.0 NEPHROLITHIASIS: ICD-10-CM

## 2021-02-04 DIAGNOSIS — Z01.818 PREOP TESTING: Primary | ICD-10-CM

## 2021-02-04 LAB
ANION GAP SERPL CALC-SCNC: 8 MMOL/L (ref 8–16)
BASOPHILS # BLD AUTO: 0.01 K/UL (ref 0–0.2)
BASOPHILS NFR BLD: 0.2 % (ref 0–1.9)
BUN SERPL-MCNC: 12 MG/DL (ref 8–23)
CALCIUM SERPL-MCNC: 9.3 MG/DL (ref 8.7–10.5)
CHLORIDE SERPL-SCNC: 107 MMOL/L (ref 95–110)
CO2 SERPL-SCNC: 27 MMOL/L (ref 23–29)
CREAT SERPL-MCNC: 0.9 MG/DL (ref 0.5–1.4)
DIFFERENTIAL METHOD: ABNORMAL
EOSINOPHIL # BLD AUTO: 0 K/UL (ref 0–0.5)
EOSINOPHIL NFR BLD: 0.6 % (ref 0–8)
ERYTHROCYTE [DISTWIDTH] IN BLOOD BY AUTOMATED COUNT: 16.2 % (ref 11.5–14.5)
EST. GFR  (AFRICAN AMERICAN): >60 ML/MIN/1.73 M^2
EST. GFR  (NON AFRICAN AMERICAN): >60 ML/MIN/1.73 M^2
GLUCOSE SERPL-MCNC: 79 MG/DL (ref 70–110)
HCT VFR BLD AUTO: 37.6 % (ref 37–48.5)
HGB BLD-MCNC: 11.6 G/DL (ref 12–16)
IMM GRANULOCYTES # BLD AUTO: 0.03 K/UL (ref 0–0.04)
IMM GRANULOCYTES NFR BLD AUTO: 0.5 % (ref 0–0.5)
LYMPHOCYTES # BLD AUTO: 1.5 K/UL (ref 1–4.8)
LYMPHOCYTES NFR BLD: 23.9 % (ref 18–48)
MCH RBC QN AUTO: 25.7 PG (ref 27–31)
MCHC RBC AUTO-ENTMCNC: 30.9 G/DL (ref 32–36)
MCV RBC AUTO: 83 FL (ref 82–98)
MONOCYTES # BLD AUTO: 0.6 K/UL (ref 0.3–1)
MONOCYTES NFR BLD: 9 % (ref 4–15)
NEUTROPHILS # BLD AUTO: 4.1 K/UL (ref 1.8–7.7)
NEUTROPHILS NFR BLD: 65.8 % (ref 38–73)
NRBC BLD-RTO: 0 /100 WBC
PLATELET # BLD AUTO: 233 K/UL (ref 150–350)
PMV BLD AUTO: 10.1 FL (ref 9.2–12.9)
POTASSIUM SERPL-SCNC: 4.1 MMOL/L (ref 3.5–5.1)
RBC # BLD AUTO: 4.51 M/UL (ref 4–5.4)
SODIUM SERPL-SCNC: 142 MMOL/L (ref 136–145)
WBC # BLD AUTO: 6.24 K/UL (ref 3.9–12.7)

## 2021-02-04 PROCEDURE — 74018 XR KUB: ICD-10-PCS | Mod: 26,,, | Performed by: RADIOLOGY

## 2021-02-04 PROCEDURE — 36415 COLL VENOUS BLD VENIPUNCTURE: CPT

## 2021-02-04 PROCEDURE — 85025 COMPLETE CBC W/AUTO DIFF WBC: CPT

## 2021-02-04 PROCEDURE — 80048 BASIC METABOLIC PNL TOTAL CA: CPT

## 2021-02-04 PROCEDURE — 74018 RADEX ABDOMEN 1 VIEW: CPT | Mod: 26,,, | Performed by: RADIOLOGY

## 2021-02-04 PROCEDURE — 74018 RADEX ABDOMEN 1 VIEW: CPT | Mod: TC,FY

## 2021-02-04 RX ORDER — SODIUM CHLORIDE, SODIUM LACTATE, POTASSIUM CHLORIDE, CALCIUM CHLORIDE 600; 310; 30; 20 MG/100ML; MG/100ML; MG/100ML; MG/100ML
INJECTION, SOLUTION INTRAVENOUS CONTINUOUS
Status: CANCELLED | OUTPATIENT
Start: 2021-02-04

## 2021-02-04 RX ORDER — ENOXAPARIN SODIUM 100 MG/ML
1 INJECTION SUBCUTANEOUS 2 TIMES DAILY
Qty: 4 SYRINGE | Refills: 1 | Status: SHIPPED | OUTPATIENT
Start: 2021-02-04 | End: 2021-08-12 | Stop reason: SDUPTHER

## 2021-02-04 RX ORDER — LIDOCAINE HYDROCHLORIDE 10 MG/ML
0.5 INJECTION, SOLUTION EPIDURAL; INFILTRATION; INTRACAUDAL; PERINEURAL ONCE
Status: CANCELLED | OUTPATIENT
Start: 2021-02-04 | End: 2021-02-04

## 2021-02-05 ENCOUNTER — IMMUNIZATION (OUTPATIENT)
Dept: INTERNAL MEDICINE | Facility: CLINIC | Age: 71
End: 2021-02-05
Payer: MEDICARE

## 2021-02-05 DIAGNOSIS — Z23 NEED FOR VACCINATION: Primary | ICD-10-CM

## 2021-02-05 PROCEDURE — 0002A COVID-19, MRNA, LNP-S, PF, 30 MCG/0.3 ML DOSE VACCINE: CPT | Mod: PBBFAC | Performed by: INTERNAL MEDICINE

## 2021-02-05 PROCEDURE — 91300 COVID-19, MRNA, LNP-S, PF, 30 MCG/0.3 ML DOSE VACCINE: CPT | Mod: PBBFAC | Performed by: INTERNAL MEDICINE

## 2021-02-06 ENCOUNTER — LAB VISIT (OUTPATIENT)
Dept: INTERNAL MEDICINE | Facility: CLINIC | Age: 71
End: 2021-02-06
Payer: MEDICARE

## 2021-02-06 DIAGNOSIS — N20.0 NEPHROLITHIASIS: ICD-10-CM

## 2021-02-06 PROCEDURE — U0003 INFECTIOUS AGENT DETECTION BY NUCLEIC ACID (DNA OR RNA); SEVERE ACUTE RESPIRATORY SYNDROME CORONAVIRUS 2 (SARS-COV-2) (CORONAVIRUS DISEASE [COVID-19]), AMPLIFIED PROBE TECHNIQUE, MAKING USE OF HIGH THROUGHPUT TECHNOLOGIES AS DESCRIBED BY CMS-2020-01-R: HCPCS

## 2021-02-07 LAB — SARS-COV-2 RNA RESP QL NAA+PROBE: NOT DETECTED

## 2021-02-08 ENCOUNTER — TELEPHONE (OUTPATIENT)
Dept: UROLOGY | Facility: CLINIC | Age: 71
End: 2021-02-08

## 2021-02-09 ENCOUNTER — TELEPHONE (OUTPATIENT)
Dept: UROLOGY | Facility: CLINIC | Age: 71
End: 2021-02-09

## 2021-02-09 ENCOUNTER — ANESTHESIA (OUTPATIENT)
Dept: SURGERY | Facility: OTHER | Age: 71
End: 2021-02-09
Payer: MEDICARE

## 2021-02-09 ENCOUNTER — HOSPITAL ENCOUNTER (OUTPATIENT)
Facility: OTHER | Age: 71
Discharge: HOME | End: 2021-02-09
Attending: UROLOGY | Admitting: UROLOGY
Payer: MEDICARE

## 2021-02-09 VITALS
HEIGHT: 62 IN | WEIGHT: 156 LBS | TEMPERATURE: 99 F | RESPIRATION RATE: 16 BRPM | HEART RATE: 75 BPM | BODY MASS INDEX: 28.71 KG/M2 | DIASTOLIC BLOOD PRESSURE: 65 MMHG | SYSTOLIC BLOOD PRESSURE: 110 MMHG | OXYGEN SATURATION: 97 %

## 2021-02-09 DIAGNOSIS — N20.0 NEPHROLITHIASIS: ICD-10-CM

## 2021-02-09 DIAGNOSIS — N20.0 KIDNEY STONE: Primary | ICD-10-CM

## 2021-02-09 PROCEDURE — 71000033 HC RECOVERY, INTIAL HOUR: Performed by: UROLOGY

## 2021-02-09 PROCEDURE — 63600175 PHARM REV CODE 636 W HCPCS: Performed by: NURSE ANESTHETIST, CERTIFIED REGISTERED

## 2021-02-09 PROCEDURE — 71000039 HC RECOVERY, EACH ADD'L HOUR: Performed by: UROLOGY

## 2021-02-09 PROCEDURE — 25000003 PHARM REV CODE 250: Performed by: ANESTHESIOLOGY

## 2021-02-09 PROCEDURE — 63600175 PHARM REV CODE 636 W HCPCS: Performed by: ANESTHESIOLOGY

## 2021-02-09 PROCEDURE — 71000015 HC POSTOP RECOV 1ST HR: Performed by: UROLOGY

## 2021-02-09 PROCEDURE — 36000704 HC OR TIME LEV I 1ST 15 MIN: Performed by: UROLOGY

## 2021-02-09 PROCEDURE — 50590 FRAGMENTING OF KIDNEY STONE: CPT | Mod: LT,,, | Performed by: UROLOGY

## 2021-02-09 PROCEDURE — 36000705 HC OR TIME LEV I EA ADD 15 MIN: Performed by: UROLOGY

## 2021-02-09 PROCEDURE — 37000009 HC ANESTHESIA EA ADD 15 MINS: Performed by: UROLOGY

## 2021-02-09 PROCEDURE — 25000003 PHARM REV CODE 250: Performed by: NURSE ANESTHETIST, CERTIFIED REGISTERED

## 2021-02-09 PROCEDURE — 37000008 HC ANESTHESIA 1ST 15 MINUTES: Performed by: UROLOGY

## 2021-02-09 PROCEDURE — 50590 PR FRAGMENT KIDNEY STONE/ ESWL: ICD-10-PCS | Mod: LT,,, | Performed by: UROLOGY

## 2021-02-09 RX ORDER — PHENYLEPHRINE HYDROCHLORIDE 10 MG/ML
INJECTION INTRAVENOUS
Status: DISCONTINUED | OUTPATIENT
Start: 2021-02-09 | End: 2021-02-09

## 2021-02-09 RX ORDER — HYDROMORPHONE HYDROCHLORIDE 2 MG/ML
0.4 INJECTION, SOLUTION INTRAMUSCULAR; INTRAVENOUS; SUBCUTANEOUS EVERY 5 MIN PRN
Status: DISCONTINUED | OUTPATIENT
Start: 2021-02-09 | End: 2021-02-09 | Stop reason: HOSPADM

## 2021-02-09 RX ORDER — OXYCODONE HYDROCHLORIDE 5 MG/1
5 TABLET ORAL
Status: DISCONTINUED | OUTPATIENT
Start: 2021-02-09 | End: 2021-02-09 | Stop reason: HOSPADM

## 2021-02-09 RX ORDER — ONDANSETRON 2 MG/ML
4 INJECTION INTRAMUSCULAR; INTRAVENOUS DAILY PRN
Status: DISCONTINUED | OUTPATIENT
Start: 2021-02-09 | End: 2021-02-09 | Stop reason: HOSPADM

## 2021-02-09 RX ORDER — ONDANSETRON 2 MG/ML
INJECTION INTRAMUSCULAR; INTRAVENOUS
Status: DISCONTINUED | OUTPATIENT
Start: 2021-02-09 | End: 2021-02-09

## 2021-02-09 RX ORDER — PROPOFOL 10 MG/ML
VIAL (ML) INTRAVENOUS
Status: DISCONTINUED | OUTPATIENT
Start: 2021-02-09 | End: 2021-02-09

## 2021-02-09 RX ORDER — DEXAMETHASONE SODIUM PHOSPHATE 4 MG/ML
INJECTION, SOLUTION INTRA-ARTICULAR; INTRALESIONAL; INTRAMUSCULAR; INTRAVENOUS; SOFT TISSUE
Status: DISCONTINUED | OUTPATIENT
Start: 2021-02-09 | End: 2021-02-09

## 2021-02-09 RX ORDER — FENTANYL CITRATE 50 UG/ML
INJECTION, SOLUTION INTRAMUSCULAR; INTRAVENOUS
Status: DISCONTINUED | OUTPATIENT
Start: 2021-02-09 | End: 2021-02-09

## 2021-02-09 RX ORDER — SODIUM CHLORIDE, SODIUM LACTATE, POTASSIUM CHLORIDE, CALCIUM CHLORIDE 600; 310; 30; 20 MG/100ML; MG/100ML; MG/100ML; MG/100ML
INJECTION, SOLUTION INTRAVENOUS CONTINUOUS
Status: DISCONTINUED | OUTPATIENT
Start: 2021-02-09 | End: 2021-02-09 | Stop reason: HOSPADM

## 2021-02-09 RX ORDER — DIPHENHYDRAMINE HYDROCHLORIDE 50 MG/ML
25 INJECTION INTRAMUSCULAR; INTRAVENOUS EVERY 6 HOURS PRN
Status: DISCONTINUED | OUTPATIENT
Start: 2021-02-09 | End: 2021-02-09 | Stop reason: HOSPADM

## 2021-02-09 RX ORDER — LIDOCAINE HYDROCHLORIDE 10 MG/ML
0.5 INJECTION, SOLUTION EPIDURAL; INFILTRATION; INTRACAUDAL; PERINEURAL ONCE
Status: DISCONTINUED | OUTPATIENT
Start: 2021-02-09 | End: 2021-02-09 | Stop reason: HOSPADM

## 2021-02-09 RX ORDER — SODIUM CHLORIDE 0.9 % (FLUSH) 0.9 %
3 SYRINGE (ML) INJECTION
Status: DISCONTINUED | OUTPATIENT
Start: 2021-02-09 | End: 2021-02-09 | Stop reason: HOSPADM

## 2021-02-09 RX ORDER — MEPERIDINE HYDROCHLORIDE 25 MG/ML
12.5 INJECTION INTRAMUSCULAR; INTRAVENOUS; SUBCUTANEOUS ONCE AS NEEDED
Status: DISCONTINUED | OUTPATIENT
Start: 2021-02-09 | End: 2021-02-09 | Stop reason: HOSPADM

## 2021-02-09 RX ORDER — LIDOCAINE HCL/PF 100 MG/5ML
SYRINGE (ML) INTRAVENOUS
Status: DISCONTINUED | OUTPATIENT
Start: 2021-02-09 | End: 2021-02-09

## 2021-02-09 RX ADMIN — PROPOFOL 200 MG: 10 INJECTION, EMULSION INTRAVENOUS at 11:02

## 2021-02-09 RX ADMIN — PHENYLEPHRINE HYDROCHLORIDE 100 MCG: 10 INJECTION INTRAVENOUS at 12:02

## 2021-02-09 RX ADMIN — ONDANSETRON HYDROCHLORIDE 4 MG: 2 INJECTION INTRAMUSCULAR; INTRAVENOUS at 11:02

## 2021-02-09 RX ADMIN — DEXAMETHASONE SODIUM PHOSPHATE 8 MG: 4 INJECTION, SOLUTION INTRAMUSCULAR; INTRAVENOUS at 11:02

## 2021-02-09 RX ADMIN — FENTANYL CITRATE 100 MCG: 50 INJECTION, SOLUTION INTRAMUSCULAR; INTRAVENOUS at 11:02

## 2021-02-09 RX ADMIN — OXYCODONE 5 MG: 5 TABLET ORAL at 01:02

## 2021-02-09 RX ADMIN — LIDOCAINE HYDROCHLORIDE 80 MG: 20 INJECTION, SOLUTION INTRAVENOUS at 11:02

## 2021-02-09 RX ADMIN — PHENYLEPHRINE HYDROCHLORIDE 200 MCG: 10 INJECTION INTRAVENOUS at 11:02

## 2021-02-09 RX ADMIN — HYDROMORPHONE HYDROCHLORIDE 0.4 MG: 2 INJECTION INTRAMUSCULAR; INTRAVENOUS; SUBCUTANEOUS at 01:02

## 2021-02-09 RX ADMIN — GLYCOPYRROLATE 0.2 MG: 0.2 INJECTION, SOLUTION INTRAMUSCULAR; INTRAVITREAL at 11:02

## 2021-02-09 RX ADMIN — SODIUM CHLORIDE, SODIUM LACTATE, POTASSIUM CHLORIDE, AND CALCIUM CHLORIDE: 600; 310; 30; 20 INJECTION, SOLUTION INTRAVENOUS at 11:02

## 2021-02-16 ENCOUNTER — PATIENT MESSAGE (OUTPATIENT)
Dept: INTERNAL MEDICINE | Facility: CLINIC | Age: 71
End: 2021-02-16

## 2021-02-28 ENCOUNTER — EXTERNAL CHRONIC CARE MANAGEMENT (OUTPATIENT)
Dept: PRIMARY CARE CLINIC | Facility: CLINIC | Age: 71
End: 2021-02-28
Payer: MEDICARE

## 2021-02-28 PROCEDURE — 99490 CHRNC CARE MGMT STAFF 1ST 20: CPT | Mod: S$PBB,,, | Performed by: INTERNAL MEDICINE

## 2021-02-28 PROCEDURE — 99490 PR CHRONIC CARE MGMT, 1ST 20 MIN: ICD-10-PCS | Mod: S$PBB,,, | Performed by: INTERNAL MEDICINE

## 2021-02-28 PROCEDURE — 99490 CHRNC CARE MGMT STAFF 1ST 20: CPT | Mod: PBBFAC | Performed by: INTERNAL MEDICINE

## 2021-03-08 ENCOUNTER — HOSPITAL ENCOUNTER (OUTPATIENT)
Dept: RADIOLOGY | Facility: OTHER | Age: 71
Discharge: HOME | End: 2021-03-08
Attending: UROLOGY
Payer: MEDICARE

## 2021-03-08 ENCOUNTER — OFFICE VISIT (OUTPATIENT)
Dept: UROLOGY | Facility: CLINIC | Age: 71
End: 2021-03-08
Attending: UROLOGY
Payer: MEDICARE

## 2021-03-08 VITALS
DIASTOLIC BLOOD PRESSURE: 73 MMHG | HEIGHT: 62 IN | SYSTOLIC BLOOD PRESSURE: 106 MMHG | HEART RATE: 82 BPM | BODY MASS INDEX: 28.72 KG/M2 | WEIGHT: 156.06 LBS

## 2021-03-08 DIAGNOSIS — N20.0 KIDNEY STONE: ICD-10-CM

## 2021-03-08 DIAGNOSIS — N32.81 OAB (OVERACTIVE BLADDER): Primary | ICD-10-CM

## 2021-03-08 LAB
BILIRUB SERPL-MCNC: ABNORMAL MG/DL
BLOOD URINE, POC: ABNORMAL
CLARITY, POC UA: CLEAR
COLOR, POC UA: YELLOW
GLUCOSE UR QL STRIP: NORMAL
KETONES UR QL STRIP: ABNORMAL
LEUKOCYTE ESTERASE URINE, POC: ABNORMAL
NITRITE, POC UA: ABNORMAL
PH, POC UA: 5
POC RESIDUAL URINE VOLUME: 98 ML (ref 0–100)
PROTEIN, POC: ABNORMAL
SPECIFIC GRAVITY, POC UA: 1
UROBILINOGEN, POC UA: NORMAL

## 2021-03-08 PROCEDURE — 99214 OFFICE O/P EST MOD 30 MIN: CPT | Mod: 24,25,S$GLB, | Performed by: UROLOGY

## 2021-03-08 PROCEDURE — 74018 XR ABDOMEN AP 1 VIEW: ICD-10-PCS | Mod: 26,,, | Performed by: RADIOLOGY

## 2021-03-08 PROCEDURE — 81002 URINALYSIS NONAUTO W/O SCOPE: CPT | Mod: S$GLB,,, | Performed by: UROLOGY

## 2021-03-08 PROCEDURE — 51798 US URINE CAPACITY MEASURE: CPT | Mod: S$GLB,,, | Performed by: UROLOGY

## 2021-03-08 PROCEDURE — 51798 POCT BLADDER SCAN: ICD-10-PCS | Mod: S$GLB,,, | Performed by: UROLOGY

## 2021-03-08 PROCEDURE — 74018 RADEX ABDOMEN 1 VIEW: CPT | Mod: TC,FY

## 2021-03-08 PROCEDURE — 99214 PR OFFICE/OUTPT VISIT, EST, LEVL IV, 30-39 MIN: ICD-10-PCS | Mod: 24,25,S$GLB, | Performed by: UROLOGY

## 2021-03-08 PROCEDURE — 74018 RADEX ABDOMEN 1 VIEW: CPT | Mod: 26,,, | Performed by: RADIOLOGY

## 2021-03-08 PROCEDURE — 81002 POCT URINE DIPSTICK WITHOUT MICROSCOPE: ICD-10-PCS | Mod: S$GLB,,, | Performed by: UROLOGY

## 2021-03-08 RX ORDER — TOLTERODINE 4 MG/1
4 CAPSULE, EXTENDED RELEASE ORAL DAILY
Qty: 30 CAPSULE | Refills: 11 | Status: SHIPPED | OUTPATIENT
Start: 2021-03-08 | End: 2021-03-15 | Stop reason: SDUPTHER

## 2021-03-15 ENCOUNTER — PATIENT MESSAGE (OUTPATIENT)
Dept: UROLOGY | Facility: CLINIC | Age: 71
End: 2021-03-15

## 2021-03-15 ENCOUNTER — PATIENT MESSAGE (OUTPATIENT)
Dept: INTERNAL MEDICINE | Facility: CLINIC | Age: 71
End: 2021-03-15

## 2021-03-15 DIAGNOSIS — N32.81 OAB (OVERACTIVE BLADDER): ICD-10-CM

## 2021-03-16 RX ORDER — TOLTERODINE 4 MG/1
4 CAPSULE, EXTENDED RELEASE ORAL DAILY
Qty: 30 CAPSULE | Refills: 11 | Status: SHIPPED | OUTPATIENT
Start: 2021-03-16 | End: 2021-03-23

## 2021-03-17 RX ORDER — OMEPRAZOLE 40 MG/1
40 CAPSULE, DELAYED RELEASE ORAL EVERY MORNING
Qty: 30 CAPSULE | Refills: 3 | Status: SHIPPED | OUTPATIENT
Start: 2021-03-17 | End: 2022-01-08 | Stop reason: SDUPTHER

## 2021-03-18 ENCOUNTER — PATIENT MESSAGE (OUTPATIENT)
Dept: RESEARCH | Facility: HOSPITAL | Age: 71
End: 2021-03-18

## 2021-03-22 ENCOUNTER — TELEPHONE (OUTPATIENT)
Dept: PHARMACY | Facility: CLINIC | Age: 71
End: 2021-03-22

## 2021-03-22 ENCOUNTER — PATIENT MESSAGE (OUTPATIENT)
Dept: UROLOGY | Facility: CLINIC | Age: 71
End: 2021-03-22

## 2021-03-22 ENCOUNTER — TELEPHONE (OUTPATIENT)
Dept: UROLOGY | Facility: CLINIC | Age: 71
End: 2021-03-22

## 2021-03-23 RX ORDER — OXYBUTYNIN CHLORIDE 5 MG/1
5 TABLET, EXTENDED RELEASE ORAL DAILY
Qty: 30 TABLET | Refills: 11 | Status: SHIPPED | OUTPATIENT
Start: 2021-03-23 | End: 2021-04-19

## 2021-03-26 ENCOUNTER — PATIENT MESSAGE (OUTPATIENT)
Dept: RESEARCH | Facility: HOSPITAL | Age: 71
End: 2021-03-26

## 2021-03-31 ENCOUNTER — EXTERNAL CHRONIC CARE MANAGEMENT (OUTPATIENT)
Dept: PRIMARY CARE CLINIC | Facility: CLINIC | Age: 71
End: 2021-03-31
Payer: MEDICARE

## 2021-03-31 PROCEDURE — 99490 CHRNC CARE MGMT STAFF 1ST 20: CPT | Mod: S$PBB,,, | Performed by: INTERNAL MEDICINE

## 2021-03-31 PROCEDURE — 99490 CHRNC CARE MGMT STAFF 1ST 20: CPT | Mod: PBBFAC | Performed by: INTERNAL MEDICINE

## 2021-03-31 PROCEDURE — 99490 PR CHRONIC CARE MGMT, 1ST 20 MIN: ICD-10-PCS | Mod: S$PBB,,, | Performed by: INTERNAL MEDICINE

## 2021-04-12 ENCOUNTER — PATIENT MESSAGE (OUTPATIENT)
Dept: UROLOGY | Facility: CLINIC | Age: 71
End: 2021-04-12

## 2021-04-19 ENCOUNTER — OFFICE VISIT (OUTPATIENT)
Dept: UROLOGY | Facility: CLINIC | Age: 71
End: 2021-04-19
Payer: MEDICARE

## 2021-04-19 ENCOUNTER — HOSPITAL ENCOUNTER (EMERGENCY)
Facility: OTHER | Age: 71
Discharge: HOME | End: 2021-04-19
Attending: EMERGENCY MEDICINE
Payer: MEDICARE

## 2021-04-19 VITALS
SYSTOLIC BLOOD PRESSURE: 125 MMHG | DIASTOLIC BLOOD PRESSURE: 72 MMHG | WEIGHT: 148 LBS | OXYGEN SATURATION: 100 % | HEIGHT: 62 IN | HEART RATE: 64 BPM | BODY MASS INDEX: 27.23 KG/M2 | RESPIRATION RATE: 18 BRPM | TEMPERATURE: 98 F

## 2021-04-19 VITALS
HEART RATE: 78 BPM | HEIGHT: 62 IN | WEIGHT: 156.06 LBS | BODY MASS INDEX: 28.72 KG/M2 | DIASTOLIC BLOOD PRESSURE: 65 MMHG | SYSTOLIC BLOOD PRESSURE: 105 MMHG

## 2021-04-19 DIAGNOSIS — R35.0 URINARY FREQUENCY: ICD-10-CM

## 2021-04-19 DIAGNOSIS — N32.81 OAB (OVERACTIVE BLADDER): Primary | ICD-10-CM

## 2021-04-19 DIAGNOSIS — M54.12 CERVICAL RADICULOPATHY: ICD-10-CM

## 2021-04-19 DIAGNOSIS — M54.2 NECK PAIN: ICD-10-CM

## 2021-04-19 DIAGNOSIS — S16.1XXA CERVICAL STRAIN, ACUTE, INITIAL ENCOUNTER: Primary | ICD-10-CM

## 2021-04-19 DIAGNOSIS — R39.15 URINARY URGENCY: ICD-10-CM

## 2021-04-19 LAB
BILIRUB SERPL-MCNC: ABNORMAL MG/DL
BLOOD URINE, POC: ABNORMAL
CLARITY, POC UA: CLEAR
COLOR, POC UA: YELLOW
GLUCOSE UR QL STRIP: NORMAL
KETONES UR QL STRIP: ABNORMAL
LEUKOCYTE ESTERASE URINE, POC: ABNORMAL
NITRITE, POC UA: ABNORMAL
PH, POC UA: 8
PROTEIN, POC: ABNORMAL
SPECIFIC GRAVITY, POC UA: 1
UROBILINOGEN, POC UA: NORMAL

## 2021-04-19 PROCEDURE — 99284 EMERGENCY DEPT VISIT MOD MDM: CPT | Mod: 25

## 2021-04-19 PROCEDURE — 96372 THER/PROPH/DIAG INJ SC/IM: CPT

## 2021-04-19 PROCEDURE — 81002 URINALYSIS NONAUTO W/O SCOPE: CPT | Mod: S$GLB,,, | Performed by: NURSE PRACTITIONER

## 2021-04-19 PROCEDURE — 99213 OFFICE O/P EST LOW 20 MIN: CPT | Mod: 25,S$GLB,, | Performed by: NURSE PRACTITIONER

## 2021-04-19 PROCEDURE — 93005 ELECTROCARDIOGRAM TRACING: CPT

## 2021-04-19 PROCEDURE — 81002 POCT URINE DIPSTICK WITHOUT MICROSCOPE: ICD-10-PCS | Mod: S$GLB,,, | Performed by: NURSE PRACTITIONER

## 2021-04-19 PROCEDURE — 99213 PR OFFICE/OUTPT VISIT, EST, LEVL III, 20-29 MIN: ICD-10-PCS | Mod: 25,S$GLB,, | Performed by: NURSE PRACTITIONER

## 2021-04-19 PROCEDURE — 93010 ELECTROCARDIOGRAM REPORT: CPT | Mod: ,,, | Performed by: INTERNAL MEDICINE

## 2021-04-19 PROCEDURE — 93010 EKG 12-LEAD: ICD-10-PCS | Mod: ,,, | Performed by: INTERNAL MEDICINE

## 2021-04-19 PROCEDURE — 87086 URINE CULTURE/COLONY COUNT: CPT | Performed by: NURSE PRACTITIONER

## 2021-04-19 PROCEDURE — 63600175 PHARM REV CODE 636 W HCPCS: Performed by: EMERGENCY MEDICINE

## 2021-04-19 PROCEDURE — 25000003 PHARM REV CODE 250: Performed by: EMERGENCY MEDICINE

## 2021-04-19 RX ORDER — LIDOCAINE 50 MG/G
1 PATCH TOPICAL DAILY
Qty: 15 PATCH | Refills: 1 | Status: SHIPPED | OUTPATIENT
Start: 2021-04-19 | End: 2022-03-08

## 2021-04-19 RX ORDER — LIDOCAINE 50 MG/G
1 PATCH TOPICAL
Status: DISCONTINUED | OUTPATIENT
Start: 2021-04-19 | End: 2021-04-19 | Stop reason: HOSPADM

## 2021-04-19 RX ORDER — OXYBUTYNIN CHLORIDE 10 MG/1
10 TABLET, EXTENDED RELEASE ORAL DAILY
Qty: 30 TABLET | Refills: 11 | Status: SHIPPED | OUTPATIENT
Start: 2021-04-19 | End: 2022-05-23

## 2021-04-19 RX ORDER — ORPHENADRINE CITRATE 30 MG/ML
60 INJECTION INTRAMUSCULAR; INTRAVENOUS
Status: COMPLETED | OUTPATIENT
Start: 2021-04-19 | End: 2021-04-19

## 2021-04-19 RX ORDER — METHOCARBAMOL 500 MG/1
1000 TABLET, FILM COATED ORAL 3 TIMES DAILY PRN
Qty: 20 TABLET | Refills: 0 | Status: SHIPPED | OUTPATIENT
Start: 2021-04-19 | End: 2021-04-24

## 2021-04-19 RX ADMIN — LIDOCAINE 1 PATCH: 50 PATCH TOPICAL at 01:04

## 2021-04-19 RX ADMIN — ORPHENADRINE CITRATE 60 MG: 30 INJECTION INTRAMUSCULAR; INTRAVENOUS at 01:04

## 2021-04-20 LAB
BACTERIA UR CULT: NORMAL
BACTERIA UR CULT: NORMAL

## 2021-04-26 ENCOUNTER — PATIENT MESSAGE (OUTPATIENT)
Dept: UROLOGY | Facility: CLINIC | Age: 71
End: 2021-04-26

## 2021-04-30 ENCOUNTER — EXTERNAL CHRONIC CARE MANAGEMENT (OUTPATIENT)
Dept: PRIMARY CARE CLINIC | Facility: CLINIC | Age: 71
End: 2021-04-30
Payer: MEDICARE

## 2021-04-30 PROCEDURE — 99490 CHRNC CARE MGMT STAFF 1ST 20: CPT | Mod: PBBFAC | Performed by: INTERNAL MEDICINE

## 2021-04-30 PROCEDURE — 99490 CHRNC CARE MGMT STAFF 1ST 20: CPT | Mod: S$PBB,,, | Performed by: INTERNAL MEDICINE

## 2021-04-30 PROCEDURE — 99490 PR CHRONIC CARE MGMT, 1ST 20 MIN: ICD-10-PCS | Mod: S$PBB,,, | Performed by: INTERNAL MEDICINE

## 2021-05-31 ENCOUNTER — EXTERNAL CHRONIC CARE MANAGEMENT (OUTPATIENT)
Dept: PRIMARY CARE CLINIC | Facility: CLINIC | Age: 71
End: 2021-05-31
Payer: MEDICARE

## 2021-05-31 PROCEDURE — 99490 CHRNC CARE MGMT STAFF 1ST 20: CPT | Mod: S$PBB,,, | Performed by: INTERNAL MEDICINE

## 2021-05-31 PROCEDURE — 99490 CHRNC CARE MGMT STAFF 1ST 20: CPT | Mod: PBBFAC | Performed by: INTERNAL MEDICINE

## 2021-05-31 PROCEDURE — 99490 PR CHRONIC CARE MGMT, 1ST 20 MIN: ICD-10-PCS | Mod: S$PBB,,, | Performed by: INTERNAL MEDICINE

## 2021-06-18 ENCOUNTER — PES CALL (OUTPATIENT)
Dept: ADMINISTRATIVE | Facility: CLINIC | Age: 71
End: 2021-06-18

## 2021-06-30 ENCOUNTER — EXTERNAL CHRONIC CARE MANAGEMENT (OUTPATIENT)
Dept: PRIMARY CARE CLINIC | Facility: CLINIC | Age: 71
End: 2021-06-30
Payer: MEDICARE

## 2021-06-30 PROCEDURE — 99490 CHRNC CARE MGMT STAFF 1ST 20: CPT | Mod: S$PBB,,, | Performed by: INTERNAL MEDICINE

## 2021-06-30 PROCEDURE — 99490 CHRNC CARE MGMT STAFF 1ST 20: CPT | Mod: PBBFAC | Performed by: INTERNAL MEDICINE

## 2021-06-30 PROCEDURE — 99490 PR CHRONIC CARE MGMT, 1ST 20 MIN: ICD-10-PCS | Mod: S$PBB,,, | Performed by: INTERNAL MEDICINE

## 2021-07-02 ENCOUNTER — TELEPHONE (OUTPATIENT)
Dept: INTERNAL MEDICINE | Facility: CLINIC | Age: 71
End: 2021-07-02

## 2021-07-06 ENCOUNTER — PES CALL (OUTPATIENT)
Dept: ADMINISTRATIVE | Facility: CLINIC | Age: 71
End: 2021-07-06

## 2021-07-06 ENCOUNTER — PATIENT MESSAGE (OUTPATIENT)
Dept: INTERNAL MEDICINE | Facility: CLINIC | Age: 71
End: 2021-07-06

## 2021-07-08 ENCOUNTER — PATIENT MESSAGE (OUTPATIENT)
Dept: INTERNAL MEDICINE | Facility: CLINIC | Age: 71
End: 2021-07-08

## 2021-07-09 ENCOUNTER — LAB VISIT (OUTPATIENT)
Dept: PRIMARY CARE CLINIC | Facility: CLINIC | Age: 71
End: 2021-07-09
Payer: MEDICARE

## 2021-07-09 DIAGNOSIS — I26.99 PULMONARY EMBOLISM, UNSPECIFIED CHRONICITY, UNSPECIFIED PULMONARY EMBOLISM TYPE, UNSPECIFIED WHETHER ACUTE COR PULMONALE PRESENT: ICD-10-CM

## 2021-07-09 DIAGNOSIS — Z86.718 HISTORY OF DVT OF LOWER EXTREMITY: ICD-10-CM

## 2021-07-09 DIAGNOSIS — E78.2 MIXED HYPERLIPIDEMIA: ICD-10-CM

## 2021-07-09 LAB
ALBUMIN SERPL BCP-MCNC: 3.8 G/DL (ref 3.5–5.2)
ALP SERPL-CCNC: 58 U/L (ref 55–135)
ALT SERPL W/O P-5'-P-CCNC: 17 U/L (ref 10–44)
ANION GAP SERPL CALC-SCNC: 9 MMOL/L (ref 8–16)
AST SERPL-CCNC: 21 U/L (ref 10–40)
BASOPHILS # BLD AUTO: 0.02 K/UL (ref 0–0.2)
BASOPHILS NFR BLD: 0.2 % (ref 0–1.9)
BILIRUB SERPL-MCNC: 0.5 MG/DL (ref 0.1–1)
BUN SERPL-MCNC: 9 MG/DL (ref 8–23)
CALCIUM SERPL-MCNC: 9.8 MG/DL (ref 8.7–10.5)
CHLORIDE SERPL-SCNC: 110 MMOL/L (ref 95–110)
CHOLEST SERPL-MCNC: 257 MG/DL (ref 120–199)
CHOLEST/HDLC SERPL: 3.8 {RATIO} (ref 2–5)
CO2 SERPL-SCNC: 26 MMOL/L (ref 23–29)
CREAT SERPL-MCNC: 1 MG/DL (ref 0.5–1.4)
DIFFERENTIAL METHOD: ABNORMAL
EOSINOPHIL # BLD AUTO: 0 K/UL (ref 0–0.5)
EOSINOPHIL NFR BLD: 0.3 % (ref 0–8)
ERYTHROCYTE [DISTWIDTH] IN BLOOD BY AUTOMATED COUNT: 16.8 % (ref 11.5–14.5)
EST. GFR  (AFRICAN AMERICAN): >60 ML/MIN/1.73 M^2
EST. GFR  (NON AFRICAN AMERICAN): 56.8 ML/MIN/1.73 M^2
GLUCOSE SERPL-MCNC: 76 MG/DL (ref 70–110)
HCT VFR BLD AUTO: 37.3 % (ref 37–48.5)
HDLC SERPL-MCNC: 68 MG/DL (ref 40–75)
HDLC SERPL: 26.5 % (ref 20–50)
HGB BLD-MCNC: 11.6 G/DL (ref 12–16)
IMM GRANULOCYTES # BLD AUTO: 0.05 K/UL (ref 0–0.04)
IMM GRANULOCYTES NFR BLD AUTO: 0.5 % (ref 0–0.5)
LDLC SERPL CALC-MCNC: 160.8 MG/DL (ref 63–159)
LYMPHOCYTES # BLD AUTO: 2.8 K/UL (ref 1–4.8)
LYMPHOCYTES NFR BLD: 28.4 % (ref 18–48)
MCH RBC QN AUTO: 26.5 PG (ref 27–31)
MCHC RBC AUTO-ENTMCNC: 31.1 G/DL (ref 32–36)
MCV RBC AUTO: 85 FL (ref 82–98)
MONOCYTES # BLD AUTO: 0.7 K/UL (ref 0.3–1)
MONOCYTES NFR BLD: 6.6 % (ref 4–15)
NEUTROPHILS # BLD AUTO: 6.3 K/UL (ref 1.8–7.7)
NEUTROPHILS NFR BLD: 64 % (ref 38–73)
NONHDLC SERPL-MCNC: 189 MG/DL
NRBC BLD-RTO: 0 /100 WBC
PLATELET # BLD AUTO: 287 K/UL (ref 150–450)
PMV BLD AUTO: 12.2 FL (ref 9.2–12.9)
POTASSIUM SERPL-SCNC: 3.9 MMOL/L (ref 3.5–5.1)
PROT SERPL-MCNC: 7.4 G/DL (ref 6–8.4)
RBC # BLD AUTO: 4.38 M/UL (ref 4–5.4)
SODIUM SERPL-SCNC: 145 MMOL/L (ref 136–145)
TRIGL SERPL-MCNC: 141 MG/DL (ref 30–150)
WBC # BLD AUTO: 9.79 K/UL (ref 3.9–12.7)

## 2021-07-09 PROCEDURE — 80061 LIPID PANEL: CPT | Performed by: INTERNAL MEDICINE

## 2021-07-09 PROCEDURE — 80053 COMPREHEN METABOLIC PANEL: CPT | Performed by: INTERNAL MEDICINE

## 2021-07-09 PROCEDURE — 36415 COLL VENOUS BLD VENIPUNCTURE: CPT | Mod: PBBFAC,PN

## 2021-07-09 PROCEDURE — 85025 COMPLETE CBC W/AUTO DIFF WBC: CPT | Performed by: INTERNAL MEDICINE

## 2021-07-13 ENCOUNTER — OFFICE VISIT (OUTPATIENT)
Dept: CARDIOLOGY | Facility: CLINIC | Age: 71
End: 2021-07-13
Payer: MEDICARE

## 2021-07-13 VITALS
WEIGHT: 146.38 LBS | SYSTOLIC BLOOD PRESSURE: 94 MMHG | OXYGEN SATURATION: 98 % | HEIGHT: 62 IN | DIASTOLIC BLOOD PRESSURE: 58 MMHG | BODY MASS INDEX: 26.94 KG/M2 | HEART RATE: 83 BPM

## 2021-07-13 DIAGNOSIS — E78.2 MIXED HYPERLIPIDEMIA: Primary | ICD-10-CM

## 2021-07-13 DIAGNOSIS — R00.2 PALPITATIONS: ICD-10-CM

## 2021-07-13 DIAGNOSIS — Z86.718 HISTORY OF DVT OF LOWER EXTREMITY: ICD-10-CM

## 2021-07-13 DIAGNOSIS — I26.99 PULMONARY EMBOLISM, UNSPECIFIED CHRONICITY, UNSPECIFIED PULMONARY EMBOLISM TYPE, UNSPECIFIED WHETHER ACUTE COR PULMONALE PRESENT: ICD-10-CM

## 2021-07-13 DIAGNOSIS — I27.82 CHRONIC PULMONARY EMBOLISM WITHOUT ACUTE COR PULMONALE, UNSPECIFIED PULMONARY EMBOLISM TYPE: ICD-10-CM

## 2021-07-13 PROCEDURE — 99214 OFFICE O/P EST MOD 30 MIN: CPT | Mod: S$PBB,,, | Performed by: INTERNAL MEDICINE

## 2021-07-13 PROCEDURE — 99999 PR PBB SHADOW E&M-EST. PATIENT-LVL III: ICD-10-PCS | Mod: PBBFAC,,, | Performed by: INTERNAL MEDICINE

## 2021-07-13 PROCEDURE — 99999 PR PBB SHADOW E&M-EST. PATIENT-LVL III: CPT | Mod: PBBFAC,,, | Performed by: INTERNAL MEDICINE

## 2021-07-13 PROCEDURE — 99214 PR OFFICE/OUTPT VISIT, EST, LEVL IV, 30-39 MIN: ICD-10-PCS | Mod: S$PBB,,, | Performed by: INTERNAL MEDICINE

## 2021-07-13 PROCEDURE — 99213 OFFICE O/P EST LOW 20 MIN: CPT | Mod: PBBFAC,PN | Performed by: INTERNAL MEDICINE

## 2021-07-13 RX ORDER — RIVAROXABAN 20 MG/1
20 TABLET, FILM COATED ORAL NIGHTLY
Qty: 90 TABLET | Refills: 3 | Status: SHIPPED | OUTPATIENT
Start: 2021-07-13 | End: 2021-10-12 | Stop reason: SDUPTHER

## 2021-07-20 ENCOUNTER — OFFICE VISIT (OUTPATIENT)
Dept: INTERNAL MEDICINE | Facility: CLINIC | Age: 71
End: 2021-07-20
Payer: MEDICARE

## 2021-07-20 VITALS
DIASTOLIC BLOOD PRESSURE: 64 MMHG | SYSTOLIC BLOOD PRESSURE: 122 MMHG | WEIGHT: 149.69 LBS | HEART RATE: 70 BPM | OXYGEN SATURATION: 95 % | BODY MASS INDEX: 27.55 KG/M2 | HEIGHT: 62 IN

## 2021-07-20 DIAGNOSIS — R07.89 ATYPICAL CHEST PAIN: ICD-10-CM

## 2021-07-20 DIAGNOSIS — M75.122 COMPLETE TEAR OF LEFT ROTATOR CUFF, UNSPECIFIED WHETHER TRAUMATIC: ICD-10-CM

## 2021-07-20 DIAGNOSIS — K22.4 INEFFECTIVE ESOPHAGEAL MOTILITY: ICD-10-CM

## 2021-07-20 DIAGNOSIS — I70.0 ATHEROSCLEROSIS OF AORTA: ICD-10-CM

## 2021-07-20 DIAGNOSIS — N20.0 KIDNEY STONE: ICD-10-CM

## 2021-07-20 DIAGNOSIS — R06.02 SOB (SHORTNESS OF BREATH): ICD-10-CM

## 2021-07-20 DIAGNOSIS — M19.012 PRIMARY OSTEOARTHRITIS OF LEFT SHOULDER: ICD-10-CM

## 2021-07-20 DIAGNOSIS — S46.119A LABRAL TEAR OF LONG HEAD OF BICEPS TENDON, INITIAL ENCOUNTER: ICD-10-CM

## 2021-07-20 DIAGNOSIS — I77.1 TORTUOUS AORTA: ICD-10-CM

## 2021-07-20 DIAGNOSIS — Z00.00 ENCOUNTER FOR PREVENTIVE HEALTH EXAMINATION: Primary | ICD-10-CM

## 2021-07-20 DIAGNOSIS — N32.81 OAB (OVERACTIVE BLADDER): ICD-10-CM

## 2021-07-20 DIAGNOSIS — M75.121 COMPLETE TEAR OF RIGHT ROTATOR CUFF, UNSPECIFIED WHETHER TRAUMATIC: ICD-10-CM

## 2021-07-20 DIAGNOSIS — N20.0 NEPHROLITHIASIS: ICD-10-CM

## 2021-07-20 DIAGNOSIS — M75.42 IMPINGEMENT SYNDROME OF LEFT SHOULDER: ICD-10-CM

## 2021-07-20 DIAGNOSIS — I27.82 CHRONIC PULMONARY EMBOLISM WITHOUT ACUTE COR PULMONALE, UNSPECIFIED PULMONARY EMBOLISM TYPE: ICD-10-CM

## 2021-07-20 DIAGNOSIS — M19.019 SHOULDER ARTHRITIS: ICD-10-CM

## 2021-07-20 DIAGNOSIS — E78.2 MIXED HYPERLIPIDEMIA: ICD-10-CM

## 2021-07-20 DIAGNOSIS — M50.20 HERNIATED DISC, CERVICAL: ICD-10-CM

## 2021-07-20 DIAGNOSIS — Z86.718 HISTORY OF DVT OF LOWER EXTREMITY: ICD-10-CM

## 2021-07-20 PROCEDURE — 99999 PR PBB SHADOW E&M-EST. PATIENT-LVL III: CPT | Mod: PBBFAC,,, | Performed by: NURSE PRACTITIONER

## 2021-07-20 PROCEDURE — 99999 PR PBB SHADOW E&M-EST. PATIENT-LVL III: ICD-10-PCS | Mod: PBBFAC,,, | Performed by: NURSE PRACTITIONER

## 2021-07-20 PROCEDURE — 99213 OFFICE O/P EST LOW 20 MIN: CPT | Mod: PBBFAC | Performed by: NURSE PRACTITIONER

## 2021-07-20 PROCEDURE — G0439 PPPS, SUBSEQ VISIT: HCPCS | Mod: ,,, | Performed by: NURSE PRACTITIONER

## 2021-07-20 PROCEDURE — G0439 PR MEDICARE ANNUAL WELLNESS SUBSEQUENT VISIT: ICD-10-PCS | Mod: ,,, | Performed by: NURSE PRACTITIONER

## 2021-07-22 ENCOUNTER — LAB VISIT (OUTPATIENT)
Dept: LAB | Facility: OTHER | Age: 71
End: 2021-07-22
Attending: INTERNAL MEDICINE
Payer: MEDICARE

## 2021-07-22 ENCOUNTER — OFFICE VISIT (OUTPATIENT)
Dept: INTERNAL MEDICINE | Facility: CLINIC | Age: 71
End: 2021-07-22
Attending: INTERNAL MEDICINE
Payer: MEDICARE

## 2021-07-22 VITALS
WEIGHT: 152.56 LBS | HEIGHT: 62 IN | DIASTOLIC BLOOD PRESSURE: 72 MMHG | BODY MASS INDEX: 28.07 KG/M2 | SYSTOLIC BLOOD PRESSURE: 106 MMHG | HEART RATE: 84 BPM | OXYGEN SATURATION: 96 %

## 2021-07-22 DIAGNOSIS — N32.81 OAB (OVERACTIVE BLADDER): ICD-10-CM

## 2021-07-22 DIAGNOSIS — I27.82 CHRONIC PULMONARY EMBOLISM WITHOUT ACUTE COR PULMONALE, UNSPECIFIED PULMONARY EMBOLISM TYPE: ICD-10-CM

## 2021-07-22 DIAGNOSIS — R20.8 OTHER DISTURBANCES OF SKIN SENSATION (CODE): ICD-10-CM

## 2021-07-22 DIAGNOSIS — D64.9 ANEMIA, UNSPECIFIED TYPE: ICD-10-CM

## 2021-07-22 DIAGNOSIS — R68.2 DRY MOUTH: ICD-10-CM

## 2021-07-22 DIAGNOSIS — E78.2 MIXED HYPERLIPIDEMIA: Primary | ICD-10-CM

## 2021-07-22 DIAGNOSIS — N20.0 NEPHROLITHIASIS: ICD-10-CM

## 2021-07-22 LAB
BASOPHILS # BLD AUTO: 0.03 K/UL (ref 0–0.2)
BASOPHILS NFR BLD: 0.4 % (ref 0–1.9)
DIFFERENTIAL METHOD: ABNORMAL
EOSINOPHIL # BLD AUTO: 0.1 K/UL (ref 0–0.5)
EOSINOPHIL NFR BLD: 1.7 % (ref 0–8)
ERYTHROCYTE [DISTWIDTH] IN BLOOD BY AUTOMATED COUNT: 16.4 % (ref 11.5–14.5)
HCT VFR BLD AUTO: 35.7 % (ref 37–48.5)
HGB BLD-MCNC: 11.3 G/DL (ref 12–16)
IMM GRANULOCYTES # BLD AUTO: 0.02 K/UL (ref 0–0.04)
IMM GRANULOCYTES NFR BLD AUTO: 0.3 % (ref 0–0.5)
LYMPHOCYTES # BLD AUTO: 2.4 K/UL (ref 1–4.8)
LYMPHOCYTES NFR BLD: 33.3 % (ref 18–48)
MCH RBC QN AUTO: 26.2 PG (ref 27–31)
MCHC RBC AUTO-ENTMCNC: 31.7 G/DL (ref 32–36)
MCV RBC AUTO: 83 FL (ref 82–98)
MONOCYTES # BLD AUTO: 0.5 K/UL (ref 0.3–1)
MONOCYTES NFR BLD: 6.9 % (ref 4–15)
NEUTROPHILS # BLD AUTO: 4.1 K/UL (ref 1.8–7.7)
NEUTROPHILS NFR BLD: 57.4 % (ref 38–73)
NRBC BLD-RTO: 0 /100 WBC
PLATELET # BLD AUTO: 251 K/UL (ref 150–450)
PMV BLD AUTO: 11.8 FL (ref 9.2–12.9)
RBC # BLD AUTO: 4.32 M/UL (ref 4–5.4)
WBC # BLD AUTO: 7.06 K/UL (ref 3.9–12.7)

## 2021-07-22 PROCEDURE — 99214 PR OFFICE/OUTPT VISIT, EST, LEVL IV, 30-39 MIN: ICD-10-PCS | Mod: S$PBB,,, | Performed by: INTERNAL MEDICINE

## 2021-07-22 PROCEDURE — 99212 OFFICE O/P EST SF 10 MIN: CPT | Mod: PBBFAC | Performed by: INTERNAL MEDICINE

## 2021-07-22 PROCEDURE — 36415 COLL VENOUS BLD VENIPUNCTURE: CPT | Performed by: INTERNAL MEDICINE

## 2021-07-22 PROCEDURE — 99999 PR PBB SHADOW E&M-EST. PATIENT-LVL II: CPT | Mod: PBBFAC,,, | Performed by: INTERNAL MEDICINE

## 2021-07-22 PROCEDURE — 85025 COMPLETE CBC W/AUTO DIFF WBC: CPT | Performed by: INTERNAL MEDICINE

## 2021-07-22 PROCEDURE — 99214 OFFICE O/P EST MOD 30 MIN: CPT | Mod: S$PBB,,, | Performed by: INTERNAL MEDICINE

## 2021-07-22 PROCEDURE — 99999 PR PBB SHADOW E&M-EST. PATIENT-LVL II: ICD-10-PCS | Mod: PBBFAC,,, | Performed by: INTERNAL MEDICINE

## 2021-07-22 PROCEDURE — 82728 ASSAY OF FERRITIN: CPT | Performed by: INTERNAL MEDICINE

## 2021-07-22 PROCEDURE — 82607 VITAMIN B-12: CPT | Performed by: INTERNAL MEDICINE

## 2021-07-22 PROCEDURE — 83540 ASSAY OF IRON: CPT | Performed by: INTERNAL MEDICINE

## 2021-07-22 RX ORDER — EZETIMIBE 10 MG/1
10 TABLET ORAL DAILY
Qty: 90 TABLET | Refills: 3 | Status: SHIPPED | OUTPATIENT
Start: 2021-07-22 | End: 2022-03-08

## 2021-07-23 LAB
FERRITIN SERPL-MCNC: 22 NG/ML (ref 20–300)
IRON SERPL-MCNC: 50 UG/DL (ref 30–160)
SATURATED IRON: 13 % (ref 20–50)
TOTAL IRON BINDING CAPACITY: 398 UG/DL (ref 250–450)
TRANSFERRIN SERPL-MCNC: 269 MG/DL (ref 200–375)
VIT B12 SERPL-MCNC: 790 PG/ML (ref 210–950)

## 2021-07-26 ENCOUNTER — PATIENT MESSAGE (OUTPATIENT)
Dept: CARDIOLOGY | Facility: CLINIC | Age: 71
End: 2021-07-26

## 2021-07-27 ENCOUNTER — CLINICAL SUPPORT (OUTPATIENT)
Dept: CARDIOLOGY | Facility: HOSPITAL | Age: 71
End: 2021-07-27
Attending: INTERNAL MEDICINE
Payer: MEDICARE

## 2021-07-27 DIAGNOSIS — R00.2 PALPITATIONS: ICD-10-CM

## 2021-07-27 PROCEDURE — 93272 CARDIAC EVENT MONITOR (CUPID ONLY): ICD-10-PCS | Mod: ,,, | Performed by: INTERNAL MEDICINE

## 2021-07-27 PROCEDURE — 93271 ECG/MONITORING AND ANALYSIS: CPT

## 2021-07-27 PROCEDURE — 93272 ECG/REVIEW INTERPRET ONLY: CPT | Mod: ,,, | Performed by: INTERNAL MEDICINE

## 2021-07-31 ENCOUNTER — EXTERNAL CHRONIC CARE MANAGEMENT (OUTPATIENT)
Dept: PRIMARY CARE CLINIC | Facility: CLINIC | Age: 71
End: 2021-07-31
Payer: MEDICARE

## 2021-07-31 PROCEDURE — 99490 CHRNC CARE MGMT STAFF 1ST 20: CPT | Mod: PBBFAC | Performed by: INTERNAL MEDICINE

## 2021-07-31 PROCEDURE — 99490 CHRNC CARE MGMT STAFF 1ST 20: CPT | Mod: S$PBB,,, | Performed by: INTERNAL MEDICINE

## 2021-07-31 PROCEDURE — 99490 PR CHRONIC CARE MGMT, 1ST 20 MIN: ICD-10-PCS | Mod: S$PBB,,, | Performed by: INTERNAL MEDICINE

## 2021-08-12 ENCOUNTER — TELEPHONE (OUTPATIENT)
Dept: CARDIOLOGY | Facility: CLINIC | Age: 71
End: 2021-08-12

## 2021-08-12 ENCOUNTER — PATIENT MESSAGE (OUTPATIENT)
Dept: CARDIOLOGY | Facility: CLINIC | Age: 71
End: 2021-08-12

## 2021-08-12 DIAGNOSIS — I26.99 PULMONARY EMBOLISM, UNSPECIFIED CHRONICITY, UNSPECIFIED PULMONARY EMBOLISM TYPE, UNSPECIFIED WHETHER ACUTE COR PULMONALE PRESENT: ICD-10-CM

## 2021-08-12 RX ORDER — ENOXAPARIN SODIUM 100 MG/ML
1 INJECTION SUBCUTANEOUS 2 TIMES DAILY
Qty: 8 SYRINGE | Refills: 1 | Status: SHIPPED | OUTPATIENT
Start: 2021-08-12 | End: 2021-10-12

## 2021-08-31 ENCOUNTER — EXTERNAL CHRONIC CARE MANAGEMENT (OUTPATIENT)
Dept: PRIMARY CARE CLINIC | Facility: CLINIC | Age: 71
End: 2021-08-31
Payer: MEDICARE

## 2021-08-31 PROCEDURE — 99490 CHRNC CARE MGMT STAFF 1ST 20: CPT | Mod: PBBFAC | Performed by: INTERNAL MEDICINE

## 2021-08-31 PROCEDURE — 99490 PR CHRONIC CARE MGMT, 1ST 20 MIN: ICD-10-PCS | Mod: S$PBB,,, | Performed by: INTERNAL MEDICINE

## 2021-08-31 PROCEDURE — 99490 CHRNC CARE MGMT STAFF 1ST 20: CPT | Mod: S$PBB,,, | Performed by: INTERNAL MEDICINE

## 2021-09-30 ENCOUNTER — EXTERNAL CHRONIC CARE MANAGEMENT (OUTPATIENT)
Dept: PRIMARY CARE CLINIC | Facility: CLINIC | Age: 71
End: 2021-09-30
Payer: MEDICARE

## 2021-09-30 PROCEDURE — 99490 CHRNC CARE MGMT STAFF 1ST 20: CPT | Mod: PBBFAC | Performed by: INTERNAL MEDICINE

## 2021-09-30 PROCEDURE — 99490 PR CHRONIC CARE MGMT, 1ST 20 MIN: ICD-10-PCS | Mod: S$PBB,,, | Performed by: INTERNAL MEDICINE

## 2021-09-30 PROCEDURE — 99490 CHRNC CARE MGMT STAFF 1ST 20: CPT | Mod: S$PBB,,, | Performed by: INTERNAL MEDICINE

## 2021-10-12 ENCOUNTER — OFFICE VISIT (OUTPATIENT)
Dept: CARDIOLOGY | Facility: CLINIC | Age: 71
End: 2021-10-12
Payer: MEDICARE

## 2021-10-12 VITALS
BODY MASS INDEX: 27.68 KG/M2 | WEIGHT: 150.44 LBS | OXYGEN SATURATION: 99 % | SYSTOLIC BLOOD PRESSURE: 100 MMHG | DIASTOLIC BLOOD PRESSURE: 70 MMHG | HEART RATE: 75 BPM | HEIGHT: 62 IN

## 2021-10-12 DIAGNOSIS — I26.99 PULMONARY EMBOLISM, UNSPECIFIED CHRONICITY, UNSPECIFIED PULMONARY EMBOLISM TYPE, UNSPECIFIED WHETHER ACUTE COR PULMONALE PRESENT: ICD-10-CM

## 2021-10-12 DIAGNOSIS — Z86.718 HISTORY OF DVT OF LOWER EXTREMITY: ICD-10-CM

## 2021-10-12 DIAGNOSIS — I70.0 ATHEROSCLEROSIS OF AORTA: Primary | ICD-10-CM

## 2021-10-12 DIAGNOSIS — E78.2 MIXED HYPERLIPIDEMIA: ICD-10-CM

## 2021-10-12 PROCEDURE — 99999 PR PBB SHADOW E&M-EST. PATIENT-LVL III: CPT | Mod: PBBFAC,,, | Performed by: INTERNAL MEDICINE

## 2021-10-12 PROCEDURE — 99214 OFFICE O/P EST MOD 30 MIN: CPT | Mod: S$PBB,,, | Performed by: INTERNAL MEDICINE

## 2021-10-12 PROCEDURE — 99213 OFFICE O/P EST LOW 20 MIN: CPT | Mod: PBBFAC,PN | Performed by: INTERNAL MEDICINE

## 2021-10-12 PROCEDURE — 99999 PR PBB SHADOW E&M-EST. PATIENT-LVL III: ICD-10-PCS | Mod: PBBFAC,,, | Performed by: INTERNAL MEDICINE

## 2021-10-12 PROCEDURE — 99214 PR OFFICE/OUTPT VISIT, EST, LEVL IV, 30-39 MIN: ICD-10-PCS | Mod: S$PBB,,, | Performed by: INTERNAL MEDICINE

## 2021-10-12 RX ORDER — RIVAROXABAN 20 MG/1
20 TABLET, FILM COATED ORAL NIGHTLY
Qty: 90 TABLET | Refills: 3 | Status: SHIPPED | OUTPATIENT
Start: 2021-10-12 | End: 2022-06-26 | Stop reason: SDUPTHER

## 2021-10-21 NOTE — ED NOTES
S-(situation): pt noticed when checking her bp with her machine today, her pulse was irregular. 80 bpm. She does NOT feel like it is beating abnormally. She does NOT feel like she is feeling palpitations. She has NO abnormal feelings.     B-(background):  NO hx of heart disease. She does take bp meds, and had some adjustments recently made in the doses. She does have an upcoming gavino with her PCP 11/1/21.     A-(assessment): irregular heart rate of unknown etiology.     R-(recommendations):  She should be able to  monitor this at home. She has no chest pain, SOB, nausea, no diaphoresis, no fluttering feeling in her chest.. no referred pain. She feels fine.    Seek emergency care Immediately If Any of the Following Occur:  * continuous chest pain accompanied with chest tightness, pressure, or  if it is accompanied by:    * Shortness of breath    * dizziness    * Cool, moist skin    * Nausea or vomiting    * Pain in the neck, shoulders, jaw, teeth, back , or arms.     * Blue of gray face, lips, earlobes, or fingernails,    * heart palpiataions.   * NO relief from repeated Nitroglycerin every 5 minutes for three doses.    Call if further questions/concerns  RUBY Newberry         X-ray at bedside, pt tolerated well.

## 2021-10-31 ENCOUNTER — EXTERNAL CHRONIC CARE MANAGEMENT (OUTPATIENT)
Dept: PRIMARY CARE CLINIC | Facility: CLINIC | Age: 71
End: 2021-10-31
Payer: MEDICARE

## 2021-10-31 PROCEDURE — 99490 CHRNC CARE MGMT STAFF 1ST 20: CPT | Mod: PBBFAC | Performed by: INTERNAL MEDICINE

## 2021-10-31 PROCEDURE — 99490 PR CHRONIC CARE MGMT, 1ST 20 MIN: ICD-10-PCS | Mod: S$PBB,,, | Performed by: INTERNAL MEDICINE

## 2021-10-31 PROCEDURE — 99490 CHRNC CARE MGMT STAFF 1ST 20: CPT | Mod: S$PBB,,, | Performed by: INTERNAL MEDICINE

## 2021-11-18 ENCOUNTER — OFFICE VISIT (OUTPATIENT)
Dept: INTERNAL MEDICINE | Facility: CLINIC | Age: 71
End: 2021-11-18
Attending: INTERNAL MEDICINE
Payer: MEDICARE

## 2021-11-18 VITALS
HEART RATE: 84 BPM | HEIGHT: 62 IN | OXYGEN SATURATION: 97 % | SYSTOLIC BLOOD PRESSURE: 110 MMHG | WEIGHT: 151.88 LBS | DIASTOLIC BLOOD PRESSURE: 60 MMHG | BODY MASS INDEX: 27.95 KG/M2

## 2021-11-18 DIAGNOSIS — I27.82 CHRONIC PULMONARY EMBOLISM WITHOUT ACUTE COR PULMONALE, UNSPECIFIED PULMONARY EMBOLISM TYPE: ICD-10-CM

## 2021-11-18 DIAGNOSIS — R07.89 OTHER CHEST PAIN: Primary | ICD-10-CM

## 2021-11-18 DIAGNOSIS — R51.9 MORNING HEADACHE: ICD-10-CM

## 2021-11-18 PROCEDURE — 99214 PR OFFICE/OUTPT VISIT, EST, LEVL IV, 30-39 MIN: ICD-10-PCS | Mod: S$PBB,,, | Performed by: INTERNAL MEDICINE

## 2021-11-18 PROCEDURE — 99214 OFFICE O/P EST MOD 30 MIN: CPT | Mod: PBBFAC | Performed by: INTERNAL MEDICINE

## 2021-11-18 PROCEDURE — 99999 PR PBB SHADOW E&M-EST. PATIENT-LVL IV: ICD-10-PCS | Mod: PBBFAC,,, | Performed by: INTERNAL MEDICINE

## 2021-11-18 PROCEDURE — 99999 PR PBB SHADOW E&M-EST. PATIENT-LVL IV: CPT | Mod: PBBFAC,,, | Performed by: INTERNAL MEDICINE

## 2021-11-18 PROCEDURE — 99214 OFFICE O/P EST MOD 30 MIN: CPT | Mod: S$PBB,,, | Performed by: INTERNAL MEDICINE

## 2021-11-29 ENCOUNTER — HOSPITAL ENCOUNTER (OUTPATIENT)
Dept: RADIOLOGY | Facility: OTHER | Age: 71
Discharge: HOME | End: 2021-11-29
Attending: INTERNAL MEDICINE
Payer: MEDICARE

## 2021-11-29 DIAGNOSIS — R07.89 OTHER CHEST PAIN: ICD-10-CM

## 2021-11-29 PROCEDURE — 71046 X-RAY EXAM CHEST 2 VIEWS: CPT | Mod: 26,,, | Performed by: INTERNAL MEDICINE

## 2021-11-29 PROCEDURE — 71046 X-RAY EXAM CHEST 2 VIEWS: CPT | Mod: TC,FY

## 2021-11-29 PROCEDURE — 71046 XR CHEST PA AND LATERAL: ICD-10-PCS | Mod: 26,,, | Performed by: INTERNAL MEDICINE

## 2021-11-30 ENCOUNTER — EXTERNAL CHRONIC CARE MANAGEMENT (OUTPATIENT)
Dept: PRIMARY CARE CLINIC | Facility: CLINIC | Age: 71
End: 2021-11-30
Payer: MEDICARE

## 2021-11-30 PROCEDURE — 99490 CHRNC CARE MGMT STAFF 1ST 20: CPT | Mod: S$PBB,,, | Performed by: INTERNAL MEDICINE

## 2021-11-30 PROCEDURE — 99490 PR CHRONIC CARE MGMT, 1ST 20 MIN: ICD-10-PCS | Mod: S$PBB,,, | Performed by: INTERNAL MEDICINE

## 2021-11-30 PROCEDURE — 99490 CHRNC CARE MGMT STAFF 1ST 20: CPT | Mod: PBBFAC | Performed by: INTERNAL MEDICINE

## 2021-12-08 ENCOUNTER — PATIENT OUTREACH (OUTPATIENT)
Dept: ADMINISTRATIVE | Facility: OTHER | Age: 71
End: 2021-12-08
Payer: MEDICARE

## 2021-12-09 ENCOUNTER — PATIENT MESSAGE (OUTPATIENT)
Dept: SLEEP MEDICINE | Facility: CLINIC | Age: 71
End: 2021-12-09
Payer: MEDICARE

## 2021-12-09 ENCOUNTER — IMMUNIZATION (OUTPATIENT)
Dept: INTERNAL MEDICINE | Facility: CLINIC | Age: 71
End: 2021-12-09
Payer: MEDICARE

## 2021-12-09 ENCOUNTER — TELEPHONE (OUTPATIENT)
Dept: SLEEP MEDICINE | Facility: CLINIC | Age: 71
End: 2021-12-09
Payer: MEDICARE

## 2021-12-09 DIAGNOSIS — Z23 NEED FOR VACCINATION: Primary | ICD-10-CM

## 2021-12-09 PROCEDURE — 0004A COVID-19, MRNA, LNP-S, PF, 30 MCG/0.3 ML DOSE VACCINE: CPT | Mod: PBBFAC

## 2021-12-21 ENCOUNTER — HOSPITAL ENCOUNTER (OUTPATIENT)
Dept: CARDIOLOGY | Facility: HOSPITAL | Age: 71
Discharge: HOME | End: 2021-12-21
Attending: INTERNAL MEDICINE
Payer: MEDICARE

## 2021-12-21 VITALS
BODY MASS INDEX: 27.79 KG/M2 | DIASTOLIC BLOOD PRESSURE: 60 MMHG | WEIGHT: 151 LBS | SYSTOLIC BLOOD PRESSURE: 110 MMHG | HEIGHT: 62 IN

## 2021-12-21 LAB
ASCENDING AORTA: 2.73 CM
AV INDEX (PROSTH): 0.9
AV MEAN GRADIENT: 6 MMHG
AV PEAK GRADIENT: 10 MMHG
AV VALVE AREA: 2.73 CM2
AV VELOCITY RATIO: 0.94
BSA FOR ECHO PROCEDURE: 1.73 M2
CV ECHO LV RWT: 0.52 CM
DOP CALC AO PEAK VEL: 1.58 M/S
DOP CALC AO VTI: 33.23 CM
DOP CALC LVOT AREA: 3 CM2
DOP CALC LVOT DIAMETER: 1.97 CM
DOP CALC LVOT PEAK VEL: 1.48 M/S
DOP CALC LVOT STROKE VOLUME: 90.69 CM3
DOP CALCLVOT PEAK VEL VTI: 29.77 CM
E WAVE DECELERATION TIME: 156.19 MSEC
E/A RATIO: 1.39
E/E' RATIO: 9.6 M/S
ECHO LV POSTERIOR WALL: 0.94 CM (ref 0.6–1.1)
EJECTION FRACTION: 65 %
FRACTIONAL SHORTENING: 39 % (ref 28–44)
INTERVENTRICULAR SEPTUM: 0.76 CM (ref 0.6–1.1)
IVRT: 82.78 MSEC
LA MAJOR: 4.91 CM
LA MINOR: 4.77 CM
LA WIDTH: 3.21 CM
LEFT ATRIUM SIZE: 3.7 CM
LEFT ATRIUM VOLUME INDEX MOD: 20.6 ML/M2
LEFT ATRIUM VOLUME INDEX: 28.7 ML/M2
LEFT ATRIUM VOLUME MOD: 35 CM3
LEFT ATRIUM VOLUME: 48.85 CM3
LEFT INTERNAL DIMENSION IN SYSTOLE: 2.2 CM (ref 2.1–4)
LEFT VENTRICLE DIASTOLIC VOLUME INDEX: 32.29 ML/M2
LEFT VENTRICLE DIASTOLIC VOLUME: 54.9 ML
LEFT VENTRICLE MASS INDEX: 51 G/M2
LEFT VENTRICLE SYSTOLIC VOLUME INDEX: 9.6 ML/M2
LEFT VENTRICLE SYSTOLIC VOLUME: 16.28 ML
LEFT VENTRICULAR INTERNAL DIMENSION IN DIASTOLE: 3.61 CM (ref 3.5–6)
LEFT VENTRICULAR MASS: 86.03 G
LV LATERAL E/E' RATIO: 8.73 M/S
LV SEPTAL E/E' RATIO: 10.67 M/S
MV PEAK A VEL: 0.69 M/S
MV PEAK E VEL: 0.96 M/S
MV STENOSIS PRESSURE HALF TIME: 45.29 MS
MV VALVE AREA P 1/2 METHOD: 4.86 CM2
PISA TR MAX VEL: 2.46 M/S
PULM VEIN S/D RATIO: 1.44
PV PEAK D VEL: 0.48 M/S
PV PEAK S VEL: 0.69 M/S
PV PEAK VELOCITY: 1.21 CM/S
RA MAJOR: 4.37 CM
RA PRESSURE: 3 MMHG
RA WIDTH: 2.25 CM
RIGHT VENTRICULAR END-DIASTOLIC DIMENSION: 3.37 CM
RV TISSUE DOPPLER FREE WALL SYSTOLIC VELOCITY 1 (APICAL 4 CHAMBER VIEW): 14.39 CM/S
SINUS: 2.79 CM
STJ: 2.18 CM
TDI LATERAL: 0.11 M/S
TDI SEPTAL: 0.09 M/S
TDI: 0.1 M/S
TR MAX PG: 24 MMHG
TRICUSPID ANNULAR PLANE SYSTOLIC EXCURSION: 2.08 CM
TV REST PULMONARY ARTERY PRESSURE: 27 MMHG

## 2021-12-21 PROCEDURE — 93306 ECHO (CUPID ONLY): ICD-10-PCS | Mod: 26,,, | Performed by: INTERNAL MEDICINE

## 2021-12-21 PROCEDURE — 93306 TTE W/DOPPLER COMPLETE: CPT | Mod: PN

## 2021-12-21 PROCEDURE — 93306 TTE W/DOPPLER COMPLETE: CPT | Mod: 26,,, | Performed by: INTERNAL MEDICINE

## 2021-12-31 ENCOUNTER — EXTERNAL CHRONIC CARE MANAGEMENT (OUTPATIENT)
Dept: PRIMARY CARE CLINIC | Facility: CLINIC | Age: 71
End: 2021-12-31
Payer: MEDICARE

## 2021-12-31 PROCEDURE — 99490 CHRNC CARE MGMT STAFF 1ST 20: CPT | Mod: PBBFAC | Performed by: INTERNAL MEDICINE

## 2021-12-31 PROCEDURE — 99490 PR CHRONIC CARE MGMT, 1ST 20 MIN: ICD-10-PCS | Mod: S$PBB,,, | Performed by: INTERNAL MEDICINE

## 2021-12-31 PROCEDURE — 99490 CHRNC CARE MGMT STAFF 1ST 20: CPT | Mod: S$PBB,,, | Performed by: INTERNAL MEDICINE

## 2022-01-08 ENCOUNTER — HOSPITAL ENCOUNTER (EMERGENCY)
Facility: OTHER | Age: 72
Discharge: HOME | End: 2022-01-08
Attending: EMERGENCY MEDICINE
Payer: MEDICARE

## 2022-01-08 VITALS
SYSTOLIC BLOOD PRESSURE: 107 MMHG | TEMPERATURE: 98 F | BODY MASS INDEX: 26.68 KG/M2 | HEIGHT: 62 IN | OXYGEN SATURATION: 100 % | RESPIRATION RATE: 18 BRPM | HEART RATE: 85 BPM | DIASTOLIC BLOOD PRESSURE: 76 MMHG | WEIGHT: 145 LBS

## 2022-01-08 DIAGNOSIS — R10.13 EPIGASTRIC PAIN: Primary | ICD-10-CM

## 2022-01-08 PROCEDURE — 99284 EMERGENCY DEPT VISIT MOD MDM: CPT | Mod: 25

## 2022-01-08 PROCEDURE — 25000003 PHARM REV CODE 250: Performed by: EMERGENCY MEDICINE

## 2022-01-08 PROCEDURE — 93010 ELECTROCARDIOGRAM REPORT: CPT | Mod: ,,, | Performed by: INTERNAL MEDICINE

## 2022-01-08 PROCEDURE — 93005 ELECTROCARDIOGRAM TRACING: CPT

## 2022-01-08 PROCEDURE — 93010 EKG 12-LEAD: ICD-10-PCS | Mod: ,,, | Performed by: INTERNAL MEDICINE

## 2022-01-08 RX ORDER — SUCRALFATE 1 G/10ML
1 SUSPENSION ORAL
Qty: 414 ML | Refills: 0 | Status: SHIPPED | OUTPATIENT
Start: 2022-01-08 | End: 2022-03-08

## 2022-01-08 RX ORDER — ONDANSETRON 4 MG/1
4 TABLET, ORALLY DISINTEGRATING ORAL
Status: COMPLETED | OUTPATIENT
Start: 2022-01-08 | End: 2022-01-08

## 2022-01-08 RX ORDER — SUCRALFATE 1 G/10ML
1 SUSPENSION ORAL EVERY 6 HOURS
Status: DISCONTINUED | OUTPATIENT
Start: 2022-01-08 | End: 2022-01-08 | Stop reason: HOSPADM

## 2022-01-08 RX ORDER — OMEPRAZOLE 40 MG/1
40 CAPSULE, DELAYED RELEASE ORAL EVERY MORNING
Qty: 30 CAPSULE | Refills: 3 | Status: SHIPPED | OUTPATIENT
Start: 2022-01-08 | End: 2022-05-17 | Stop reason: DRUGHIGH

## 2022-01-08 RX ADMIN — ONDANSETRON 4 MG: 4 TABLET, ORALLY DISINTEGRATING ORAL at 04:01

## 2022-01-08 RX ADMIN — SUCRALFATE 1 G: 1 SUSPENSION ORAL at 05:01

## 2022-01-08 RX ADMIN — ALUMINUM HYDROXIDE, MAGNESIUM HYDROXIDE, DIMETHICONE 30 ML: 200; 200; 20 LIQUID ORAL at 04:01

## 2022-01-08 NOTE — ED PROVIDER NOTES
Encounter Date: 1/8/2022    SCRIBE #1 NOTE: I, Марина Thompson, am scribing for, and in the presence of, Maicol Ramires MD.       History     Chief Complaint   Patient presents with    Abdominal Pain     Pt c.o epigastric pain onset this morning after eating breakfast.  Pt had dale and eggs. Pt feels like something is stuck in upper abd.  Pt states she has drank water and coke and was able to tolerate.  AAO x 3nadn skin w.d      This is a 71 y.o. female who presents with complaint of epigastric pain for a couple days. Pt reports eating diet with softer foods since the pain began and ate soft scrambled eggs this morning with dale. She took Gas X and Pepcid when discomfort persisted. Pt denies nausea and notes that pain radiates to her back. This is the extent of the patient's complaints at this time.    The history is provided by the patient.     Review of patient's allergies indicates:   Allergen Reactions    Sulfa (sulfonamide antibiotics) Rash     Past Medical History:   Diagnosis Date    Anticoagulant long-term use     xarelto    Chronic back pain     Chronic neck pain     DVT (deep venous thrombosis)     Herniated disc, cervical     Kidney stone     Lumbar herniated disc     PE (pulmonary embolism)     2013    Pulmonary emboli     after surgery    Pulmonary embolism      Past Surgical History:   Procedure Laterality Date    ARTHROSCOPIC REPAIR OF ROTATOR CUFF OF SHOULDER Left 6/11/2019    Procedure: REPAIR, ROTATOR CUFF, ARTHROSCOPIC;  Surgeon: Sedrick Patel MD;  [facility];  Service: Orthopedics;  Laterality: Left;    ARTHROSCOPIC TENOTOMY OF BICEPS TENDON Left 6/11/2019    Procedure: TENOTOMY, BICEPS, ARTHROSCOPIC;  Surgeon: Sedrick Patel MD;  Location: [facility] OR;  Service: Orthopedics;  Laterality: Left;    ARTHROSCOPY OF SHOULDER WITH REMOVAL OF DISTAL CLAVICLE Left 6/11/2019    Procedure: ARTHROSCOPY, SHOULDER, WITH DISTAL CLAVICLE EXCISION;  Surgeon: Sedrick Patel MD;   Location: [facility] OR;  Service: Orthopedics;  Laterality: Left;    BREAST CYST EXCISION Left      SECTION      x1    COLONOSCOPY N/A 2018    Procedure: COLONOSCOPY;  Surgeon: Armand Foy MD;  Location: [facility] ENDO (4TH FLR);  Service: Endoscopy;  Laterality: N/A;    ESOPHAGEAL MANOMETRY WITH MEASUREMENT OF IMPEDANCE N/A 2018    Procedure: MANOMETRY, ESOPHAGEAL, WITH IMPEDANCE MEASUREMENT;  Surgeon: Armand Foy MD;  Location: [facility] ENDO (4TH FLR);  Service: Endoscopy;  Laterality: N/A;    ESOPHAGOGASTRODUODENOSCOPY N/A 2018    Procedure: EGD (ESOPHAGOGASTRODUODENOSCOPY);  Surgeon: Armand Foy MD;  Location: [facility] ENDO (4TH FLR);  Service: Endoscopy;  Laterality: N/A;  pt currently on Lovenox and Xarelto - ok per Dr. Doty to hold Plavix 2 days prior and Lovenox 24hr prior to case/see scanned media / for documentation of hold -- SM        EXTRACORPOREAL SHOCK WAVE LITHOTRIPSY Left 2021    Procedure: LITHOTRIPSY-EXTRACORPOREAL SHOCK WAVE;  Surgeon: Jeremias Eric MD;  Location: [facility];  Service: Urology;  Laterality: Left;    HERNIA REPAIR      umbilical    KIDNEY STONE SURGERY      ureteral stent    left knee surgery      SHOULDER ARTHROSCOPY Left 2019    Procedure: ARTHROSCOPY, SHOULDER;  Surgeon: Sedrick Patel MD;  Location: [facility];  Service: Orthopedics;  Laterality: Left;  BLOCK    TONSILLECTOMY      TOTAL SHOULDER ARTHROPLASTY Right      Family History   Problem Relation Age of Onset    No Known Problems Father     Colon cancer Mother 74    Liver cancer Sister     Diabetes Sister     Liver cancer Maternal Grandfather     Breast cancer Maternal Aunt 50        50s    Cervical cancer Maternal Aunt     Breast cancer Maternal Cousin 45    No Known Problems Daughter     No Known Problems Sister     No Known Problems Daughter     Ovarian cancer Neg Hx     Esophageal cancer Neg Hx      Social History     Tobacco Use    Smoking status: Never  Smoker    Smokeless tobacco: Never Used   Substance Use Topics    Alcohol use: No    Drug use: No     Review of Systems   Constitutional: Negative for chills and fever.   HENT: Negative for rhinorrhea, sore throat and trouble swallowing.    Respiratory: Negative for chest tightness, shortness of breath and wheezing.    Cardiovascular: Negative for chest pain, palpitations and leg swelling.   Gastrointestinal: Positive for abdominal pain. Negative for diarrhea, nausea and vomiting.   Genitourinary: Negative for dysuria and vaginal bleeding.   Musculoskeletal: Positive for back pain.   Skin: Negative for rash.   Allergic/Immunologic: Negative for food allergies.   Neurological: Negative for speech difficulty and light-headedness.   Psychiatric/Behavioral: Negative for behavioral problems.   All other systems reviewed and are negative.      Physical Exam     Initial Vitals [01/08/22 1500]   BP Pulse Resp Temp SpO2   107/76 85 18 98.4 °F (36.9 °C) 100 %      MAP       --         Physical Exam    Nursing note and vitals reviewed.  Constitutional: She appears well-developed and well-nourished. She is not diaphoretic. No distress.   HENT:   Head: Normocephalic and atraumatic.   Right Ear: External ear normal.   Left Ear: External ear normal.   Eyes: Conjunctivae and EOM are normal. Pupils are equal, round, and reactive to light.   Neck: Neck supple. No tracheal deviation present.   Normal range of motion.  Cardiovascular: Normal rate, regular rhythm, normal heart sounds and intact distal pulses. Exam reveals no gallop and no friction rub.    No murmur heard.  Pulmonary/Chest: Breath sounds normal. No respiratory distress. She has no wheezes. She has no rhonchi. She has no rales. She exhibits no tenderness.   Abdominal: Abdomen is soft. Bowel sounds are normal. She exhibits no distension and no mass. There is abdominal tenderness (Midepigastric). There is no rebound and no guarding.   Musculoskeletal:         General:  Normal range of motion.      Cervical back: Normal range of motion and neck supple.     Neurological: She is alert and oriented to person, place, and time.   Skin: Skin is warm and dry. Capillary refill takes less than 2 seconds.   Psychiatric: She has a normal mood and affect. Thought content normal.         ED Course   Procedures  Labs Reviewed - No data to display  EKG Readings: (Independently Interpreted)   Initial Reading: No STEMI. Rhythm: Normal Sinus Rhythm. Heart Rate: 65.   Normal NV interval.       Imaging Results    None          Medications   ondansetron disintegrating tablet 4 mg (4 mg Oral Given 1/8/22 1628)   GI cocktail (mylanta 30 mL, LIDOcaine 2 % viscous 10 mL, dicyclomine 10 mL) 50 mL (30 mLs Oral Given 1/8/22 1628)     Medical Decision Making:   History:   Old Medical Records: I decided to obtain old medical records.  Differential Diagnosis:   Urinary tract infection, acute pyelonephritis, ureterolithiais, AAA rupture / dissection, diverticulitis, colitis, inflammatory bowel disease, gastroenteritis, gastritis, ulcer, cholecystitis, gallstones, pancreatitis, ileus, small bowel obstruction, appendicitis, constipation, intestinal gas pain, GERD, intestinal spasm,  intrabominal hemorrhage, intrabominal thrombus      Independently Interpreted Test(s):   I have ordered and independently interpreted EKG Reading(s) - see prior notes  Clinical Tests:   Medical Tests: Ordered and Reviewed  ED Management:  Patient with known hiatal hernia, epigastric abdominal pain classic symptoms onset after eating eggs with they can, has a benign abdominal exam, no fever and improved after medications; I feel it is safe and appropriate at this time for the patient to be discharged for follow up and re-evaluation as detailed in the discharge instructions. No further workup indicated based on their complaints or examination today. Discussed results with the patient. I educated the patient/guardian on the warning signs  and symptoms for which they must seek immediate medical attention. All questions addressed and patient/guardian were given discharge instructions and followup information.     Management decisions for this encounter made during a severe acute wave of the COVID-19 public health emergency. Available resources, standards for appropriate emergency department evaluation, and admission vs. discharge standards have necessarily shifted and remain dynamic.     Note was created using voice recognition software. It may have occasional typographical errors not identified and edited despite initial review prior to signing.            Scribe Attestation:   Scribe #1: I performed the above scribed service and the documentation accurately describes the services I performed. I attest to the accuracy of the note.        ED Course as of 01/09/22 1453   Sat Jan 08, 2022   1717 Patient states that the pain is beginning to ease up.  Patient is worried that it might be her hiatal hernia causing the pain. [MA]   1816 Patient reports significant relief.  We discussed diet lifestyle modifications and need to start taking her prilosec again. Will refill  [MA]      ED Course User Index  [MA] Maicol Ramries MD           Physician Attestation for Scribe: I, MAA, reviewed documentation as scribed in my presence, which is both accurate and complete.  Clinical Impression:   Final diagnoses:  [R10.13] Epigastric pain (Primary)          ED Disposition Condition    Discharge Stable        ED Prescriptions     Medication Sig Dispense Start Date End Date Auth. Provider    sucralfate (CARAFATE) 100 mg/mL suspension Take 10 mLs (1 g total) by mouth 4 (four) times daily before meals and nightly. 414 mL 1/8/2022  Maicol Ramires MD    omeprazole (PRILOSEC) 40 MG capsule Take 1 capsule (40 mg total) by mouth every morning. 30 capsule 1/8/2022  Maicol Ramires MD        Follow-up Information     Follow up With Specialties Details Why Contact Info     [facility] [place], [facility] by [facility] Locations Gastroenterology Schedule an appointment as soon as possible for a visit  For follow-up and re-evaluation of gastro issues [address]  [address]  [facility] [address]  [phone]      [facility] - Emergency Dept Emergency Medicine  As needed, for any new or worsening symptoms [address] [address]  [phone]           Maicol Ramires MD  01/09/22 1458

## 2022-01-18 ENCOUNTER — OFFICE VISIT (OUTPATIENT)
Dept: SLEEP MEDICINE | Facility: CLINIC | Age: 72
End: 2022-01-18
Attending: INTERNAL MEDICINE
Payer: MEDICARE

## 2022-01-18 VITALS
SYSTOLIC BLOOD PRESSURE: 107 MMHG | BODY MASS INDEX: 26.81 KG/M2 | DIASTOLIC BLOOD PRESSURE: 71 MMHG | WEIGHT: 146.63 LBS | HEART RATE: 81 BPM

## 2022-01-18 DIAGNOSIS — F51.09 OTHER INSOMNIA NOT DUE TO A SUBSTANCE OR KNOWN PHYSIOLOGICAL CONDITION: ICD-10-CM

## 2022-01-18 DIAGNOSIS — R35.1 NOCTURIA: Primary | ICD-10-CM

## 2022-01-18 DIAGNOSIS — R51.9 MORNING HEADACHE: ICD-10-CM

## 2022-01-18 PROCEDURE — 99204 OFFICE O/P NEW MOD 45 MIN: CPT | Mod: S$PBB,,, | Performed by: INTERNAL MEDICINE

## 2022-01-18 PROCEDURE — 99204 PR OFFICE/OUTPT VISIT, NEW, LEVL IV, 45-59 MIN: ICD-10-PCS | Mod: S$PBB,,, | Performed by: INTERNAL MEDICINE

## 2022-01-18 PROCEDURE — 99999 PR PBB SHADOW E&M-EST. PATIENT-LVL III: ICD-10-PCS | Mod: PBBFAC,,, | Performed by: INTERNAL MEDICINE

## 2022-01-18 PROCEDURE — 99999 PR PBB SHADOW E&M-EST. PATIENT-LVL III: CPT | Mod: PBBFAC,,, | Performed by: INTERNAL MEDICINE

## 2022-01-18 PROCEDURE — 99213 OFFICE O/P EST LOW 20 MIN: CPT | Mod: PBBFAC | Performed by: INTERNAL MEDICINE

## 2022-01-18 NOTE — PROGRESS NOTES
Referred by Osbaldo Hall MD     NEW PATIENT VISIT    Jasson Pineda  is a pleasant 71 y.o. female  with PMH significant for PE, hx DVT 2015, arthritis who presents today with trouble staying asleep.    She wakes up several times a night.  Attributes to pain in her chest when sleeping on her back or stomach.  Also has some trouble sleeping on her side due to shoulder discomfort    SLEEP SCHEDULE   Bed Time 9P   Sleep Latency 10min   Arousals 4   Nocturia once   Back to sleep 10minutes   Wake time 7 AM   Naps none   Work          Vitals:    01/18/22 0903   BP: 107/71   BP Location: Left arm   Patient Position: Sitting   BP Method: Medium (Automatic)   Pulse: 81   Weight: 66.5 kg (146 lb 9.7 oz)     Physical Exam:    GEN:   Well-appearing  Psych:  Appropriate affect, demonstrates insight  SKIN:  No rash on the face or bridge of the nose    LABS:   Lab Results   Component Value Date    HGB 11.3 (L) 07/22/2021       RECORDS REVIEWED PREVIOUSLY:    No prior sleep testing.    ASSESSMENT    No flowsheet data found.  PROBLEM DESCRIPTION/ Sx on Presentation  STATUS   PETERSON   + snoring, no snoring arousals, no current bed partners  HEENT: MP1, + tongue scalloping  New   Daytime Sx   occasional sleepiness when inactive around 3-4 PM  occasional sleepiness when driving long distance (attributes to medications)  ESS 6/24 on intake  New   Morning headache   Had trouble with these in the past but no longer having them on presentation  New   Insomnia   Sleep maintenance  Pain in her chest supine or prone  Pain in shoulder sleeping laterally  New   Nocturia   Once-twice per night  New   Other issues:     PLAN     -will proceed with sleep testing   -discussed trial of PAP therapy if PETERSON present   -driving precautions were discussed with the patient    RTC          The patient was given open opportunity to ask questions and/or express concerns about treatment plan.   All questions/concerns were discussed.     Two patient  identifiers used prior to evaluation.

## 2022-01-19 ENCOUNTER — TELEPHONE (OUTPATIENT)
Dept: SLEEP MEDICINE | Facility: OTHER | Age: 72
End: 2022-01-19
Payer: MEDICARE

## 2022-01-21 ENCOUNTER — TELEPHONE (OUTPATIENT)
Dept: SLEEP MEDICINE | Facility: OTHER | Age: 72
End: 2022-01-21
Payer: MEDICARE

## 2022-01-21 ENCOUNTER — PATIENT MESSAGE (OUTPATIENT)
Dept: INTERNAL MEDICINE | Facility: CLINIC | Age: 72
End: 2022-01-21
Payer: MEDICARE

## 2022-01-21 ENCOUNTER — PATIENT MESSAGE (OUTPATIENT)
Dept: SLEEP MEDICINE | Facility: OTHER | Age: 72
End: 2022-01-21
Payer: MEDICARE

## 2022-01-21 DIAGNOSIS — D68.8 OTHER SPECIFIED COAGULATION DEFECTS: ICD-10-CM

## 2022-01-21 DIAGNOSIS — K21.9 GASTROESOPHAGEAL REFLUX DISEASE: ICD-10-CM

## 2022-01-21 DIAGNOSIS — R07.9 CHEST PAIN, UNSPECIFIED TYPE: ICD-10-CM

## 2022-01-21 DIAGNOSIS — R13.10 DYSPHAGIA, UNSPECIFIED TYPE: ICD-10-CM

## 2022-01-21 NOTE — TELEPHONE ENCOUNTER
No new care gaps identified.  Powered by [employer]. Reference number: [id number].   1/21/2022 12:20:55 AM CST

## 2022-01-21 NOTE — TELEPHONE ENCOUNTER
Per ED visit appears pain was epigastric. Rec start OTC nexium for 14 days. ED/urgent care for acute onset CP or worsening of symptoms

## 2022-01-25 ENCOUNTER — PATIENT MESSAGE (OUTPATIENT)
Dept: CARDIOLOGY | Facility: CLINIC | Age: 72
End: 2022-01-25
Payer: MEDICARE

## 2022-01-25 DIAGNOSIS — I27.82 CHRONIC PULMONARY EMBOLISM WITHOUT ACUTE COR PULMONALE, UNSPECIFIED PULMONARY EMBOLISM TYPE: Primary | ICD-10-CM

## 2022-01-25 RX ORDER — ENOXAPARIN SODIUM 100 MG/ML
1 INJECTION SUBCUTANEOUS 2 TIMES DAILY
Qty: 4 EACH | Refills: 0 | Status: SHIPPED | OUTPATIENT
Start: 2022-01-25 | End: 2022-05-17

## 2022-01-26 RX ORDER — ATORVASTATIN CALCIUM 80 MG/1
TABLET, FILM COATED ORAL
Qty: 90 TABLET | Refills: 1 | Status: SHIPPED | OUTPATIENT
Start: 2022-01-26 | End: 2022-07-26

## 2022-01-26 NOTE — TELEPHONE ENCOUNTER
Refill Routing Note   Medication(s) are not appropriate for processing by [facility]:    - Drug-Disease Interaction (atorvastatin and Labral tear of long head of biceps tendon, initial encounter)     Medication-related problems identified: Drug-disease interaction     Medication reconciliation completed: No      Automatic Epic Protocol Generated Data:    Requested Prescriptions   Pending Prescriptions Disp Refills    atorvastatin (LIPITOR) 80 MG tablet [Pharmacy Med Name: ATORVASTATIN 80 MG TABLET] 90 tablet 1     Sig: TAKE 1 TABLET BY MOUTH EVERY DAY       Cardiovascular:  Antilipid - Statins Passed - 1/21/2022 12:20 AM        Passed - Patient is at least 18 years old        Passed - Valid encounter within last 15 months     Recent Visits  Date Type Provider Dept   11/18/21 Office Visit Osbaldo Hall MD [facility]   07/22/21 Office Visit Osbaldo Hall MD [facility]   01/15/21 Office Visit Osbaldo Hall MD [facility]   09/01/20 Office Visit Osbaldo Hall MD [facility]   07/09/20 Office Visit Osbaldo Hall MD [facility]   Showing recent visits within past 720 days and meeting all other requirements  Future Appointments  No visits were found meeting these conditions.  Showing future appointments within next 150 days and meeting all other requirements      Future Appointments              In 2 months MD [facility] [place] [facility], [facility]    In 3 months Osbaldo Hall MD [facility]                Passed - ALT is 131 or below and within 360 days     ALT   Date Value Ref Range Status   07/09/2021 17 10 - 44 U/L Final   10/27/2020 14 10 - 44 U/L Final   07/09/2020 16 10 - 44 U/L Final              Passed - AST is 119 or below and within 360 days     AST   Date Value Ref Range Status   07/09/2021 21 10 - 40 U/L Final   10/27/2020 15 10 - 40 U/L Final    07/09/2020 17 10 - 40 U/L Final              Passed - Total Cholesterol within 360 days     Lab Results   Component Value Date    CHOL 257 (H) 07/09/2021    CHOL 236 (H) 07/09/2020    CHOL 210 (H) 07/25/2017              Passed - LDL within 360 days     LDL Cholesterol   Date Value Ref Range Status   07/09/2021 160.8 (H) 63.0 - 159.0 mg/dL Final     Comment:     The National Cholesterol Education Program (NCEP) has set the  following guidelines (reference values) for LDL Cholesterol:  Optimal.......................<130 mg/dL  Borderline High...............130-159 mg/dL  High..........................160-189 mg/dL  Very High.....................>190 mg/dL              Passed - HDL within 360 days     HDL   Date Value Ref Range Status   07/09/2021 68 40 - 75 mg/dL Final     Comment:     The National Cholesterol Education Program (NCEP) has set the  following guidelines (reference values) for HDL Cholesterol:  Low...............<40 mg/dL  Optimal...........>60 mg/dL              Passed - Triglycerides within 360 days     Lab Results   Component Value Date    TRIG 141 07/09/2021    TRIG 135 07/09/2020    TRIG 110 07/25/2017                    Appointments  past 12m or future 3m with PCP    Date Provider   Last Visit   11/18/2021 Osbaldo Hall MD   Next Visit   5/18/2022 Osbaldo Hall MD   ED visits in past 90 days: 1     Note composed:10:15 PM 01/25/2022

## 2022-02-01 ENCOUNTER — TELEPHONE (OUTPATIENT)
Dept: SLEEP MEDICINE | Facility: OTHER | Age: 72
End: 2022-02-01
Payer: MEDICARE

## 2022-02-01 NOTE — TELEPHONE ENCOUNTER
IFOB negative .   Called patient no answer. Left a voicemail for patient to return call.   Left message to reschedule the sleep study.

## 2022-02-07 ENCOUNTER — TELEPHONE (OUTPATIENT)
Dept: ENDOSCOPY | Facility: HOSPITAL | Age: 72
End: 2022-02-07
Payer: MEDICARE

## 2022-02-08 ENCOUNTER — TELEPHONE (OUTPATIENT)
Dept: SLEEP MEDICINE | Facility: OTHER | Age: 72
End: 2022-02-08
Payer: MEDICARE

## 2022-02-08 NOTE — TELEPHONE ENCOUNTER
----- Message from Deidre Marks sent at 2/7/2022  4:30 PM CST -----  Regarding: appt  Name of Who is Calling: NABOR ZEPEDA [[id number]]           What is the request in detail: Pt would like a call back in regards to sooner appt than 04/2022 at [facility], pl advise           Can the clinic reply by [facility]: no           What Number to Call Back if not in [facility]: [phone]

## 2022-02-14 ENCOUNTER — TELEPHONE (OUTPATIENT)
Dept: SLEEP MEDICINE | Facility: OTHER | Age: 72
End: 2022-02-14
Payer: MEDICARE

## 2022-02-14 NOTE — TELEPHONE ENCOUNTER
Left message to reschedule the sleep study and spoke with patient. No response,sent out a message through [employer] to reschedule.

## 2022-02-16 ENCOUNTER — HOSPITAL ENCOUNTER (OUTPATIENT)
Dept: RADIOLOGY | Facility: OTHER | Age: 72
Discharge: HOME | End: 2022-02-16
Attending: INTERNAL MEDICINE
Payer: MEDICARE

## 2022-02-16 DIAGNOSIS — Z12.31 ENCOUNTER FOR SCREENING MAMMOGRAM FOR MALIGNANT NEOPLASM OF BREAST: ICD-10-CM

## 2022-02-16 PROCEDURE — 77067 SCR MAMMO BI INCL CAD: CPT | Mod: TC

## 2022-02-16 PROCEDURE — 77063 BREAST TOMOSYNTHESIS BI: CPT | Mod: TC

## 2022-02-16 PROCEDURE — 77063 BREAST TOMOSYNTHESIS BI: CPT | Mod: 26,,, | Performed by: RADIOLOGY

## 2022-02-16 PROCEDURE — 77067 MAMMO DIGITAL SCREENING BILAT WITH TOMO: ICD-10-PCS | Mod: 26,,, | Performed by: RADIOLOGY

## 2022-02-16 PROCEDURE — 77067 SCR MAMMO BI INCL CAD: CPT | Mod: 26,,, | Performed by: RADIOLOGY

## 2022-02-16 PROCEDURE — 77063 MAMMO DIGITAL SCREENING BILAT WITH TOMO: ICD-10-PCS | Mod: 26,,, | Performed by: RADIOLOGY

## 2022-02-25 ENCOUNTER — PATIENT MESSAGE (OUTPATIENT)
Dept: GASTROENTEROLOGY | Facility: CLINIC | Age: 72
End: 2022-02-25
Payer: MEDICARE

## 2022-02-25 ENCOUNTER — PATIENT MESSAGE (OUTPATIENT)
Dept: INTERNAL MEDICINE | Facility: CLINIC | Age: 72
End: 2022-02-25
Payer: MEDICARE

## 2022-02-25 ENCOUNTER — TELEPHONE (OUTPATIENT)
Dept: ENDOSCOPY | Facility: HOSPITAL | Age: 72
End: 2022-02-25
Payer: MEDICARE

## 2022-02-25 DIAGNOSIS — F32.9 REACTIVE DEPRESSION: ICD-10-CM

## 2022-02-25 RX ORDER — ESCITALOPRAM OXALATE 10 MG/1
10 TABLET ORAL DAILY
Qty: 90 TABLET | Refills: 2 | Status: SHIPPED | OUTPATIENT
Start: 2022-02-25 | End: 2022-05-18

## 2022-02-25 NOTE — TELEPHONE ENCOUNTER
No new care gaps identified.  Powered by [employer]. Reference number: [id number].   2/25/2022 1:09:48 PM CST

## 2022-02-25 NOTE — TELEPHONE ENCOUNTER
----- Message from Justice Coleman sent at 2/25/2022 12:04 PM CST -----  Regarding: Reschedule appt  Contact: pt  Pt needs to reschedule upcoming appt due to having a procedure that day       Pt @ [phone]

## 2022-02-28 ENCOUNTER — EXTERNAL CHRONIC CARE MANAGEMENT (OUTPATIENT)
Dept: PRIMARY CARE CLINIC | Facility: CLINIC | Age: 72
End: 2022-02-28
Payer: MEDICARE

## 2022-02-28 PROCEDURE — 99490 PR CHRONIC CARE MGMT, 1ST 20 MIN: ICD-10-PCS | Mod: S$PBB,,, | Performed by: INTERNAL MEDICINE

## 2022-02-28 PROCEDURE — 99490 CHRNC CARE MGMT STAFF 1ST 20: CPT | Mod: PBBFAC | Performed by: INTERNAL MEDICINE

## 2022-02-28 PROCEDURE — 99490 CHRNC CARE MGMT STAFF 1ST 20: CPT | Mod: S$PBB,,, | Performed by: INTERNAL MEDICINE

## 2022-03-07 ENCOUNTER — TELEPHONE (OUTPATIENT)
Dept: ENDOSCOPY | Facility: HOSPITAL | Age: 72
End: 2022-03-07
Payer: MEDICARE

## 2022-03-08 ENCOUNTER — LAB VISIT (OUTPATIENT)
Dept: LAB | Facility: HOSPITAL | Age: 72
End: 2022-03-08
Attending: INTERNAL MEDICINE
Payer: MEDICARE

## 2022-03-08 ENCOUNTER — OFFICE VISIT (OUTPATIENT)
Dept: GASTROENTEROLOGY | Facility: CLINIC | Age: 72
End: 2022-03-08
Payer: MEDICARE

## 2022-03-08 VITALS
HEIGHT: 61 IN | SYSTOLIC BLOOD PRESSURE: 100 MMHG | DIASTOLIC BLOOD PRESSURE: 70 MMHG | BODY MASS INDEX: 27.54 KG/M2 | WEIGHT: 145.88 LBS | HEART RATE: 78 BPM

## 2022-03-08 DIAGNOSIS — K22.4 INEFFECTIVE ESOPHAGEAL MOTILITY: ICD-10-CM

## 2022-03-08 DIAGNOSIS — Z86.2 HISTORY OF IRON DEFICIENCY ANEMIA: ICD-10-CM

## 2022-03-08 DIAGNOSIS — Z79.01 CURRENT USE OF LONG TERM ANTICOAGULATION: ICD-10-CM

## 2022-03-08 DIAGNOSIS — R10.13 EPIGASTRIC ABDOMINAL PAIN: Primary | ICD-10-CM

## 2022-03-08 DIAGNOSIS — R13.19 ESOPHAGEAL DYSPHAGIA: ICD-10-CM

## 2022-03-08 LAB
ERYTHROCYTE [DISTWIDTH] IN BLOOD BY AUTOMATED COUNT: 16 % (ref 11.5–14.5)
FERRITIN SERPL-MCNC: 22 NG/ML (ref 20–300)
HCT VFR BLD AUTO: 36.5 % (ref 37–48.5)
HGB BLD-MCNC: 11.3 G/DL (ref 12–16)
MCH RBC QN AUTO: 25.7 PG (ref 27–31)
MCHC RBC AUTO-ENTMCNC: 31 G/DL (ref 32–36)
MCV RBC AUTO: 83 FL (ref 82–98)
PLATELET # BLD AUTO: 280 K/UL (ref 150–450)
PMV BLD AUTO: 11.7 FL (ref 9.2–12.9)
RBC # BLD AUTO: 4.39 M/UL (ref 4–5.4)
WBC # BLD AUTO: 5.59 K/UL (ref 3.9–12.7)

## 2022-03-08 PROCEDURE — 85027 COMPLETE CBC AUTOMATED: CPT | Performed by: INTERNAL MEDICINE

## 2022-03-08 PROCEDURE — 99214 OFFICE O/P EST MOD 30 MIN: CPT | Mod: PBBFAC | Performed by: INTERNAL MEDICINE

## 2022-03-08 PROCEDURE — 82728 ASSAY OF FERRITIN: CPT | Performed by: INTERNAL MEDICINE

## 2022-03-08 PROCEDURE — 99999 PR PBB SHADOW E&M-EST. PATIENT-LVL IV: CPT | Mod: PBBFAC,,, | Performed by: INTERNAL MEDICINE

## 2022-03-08 PROCEDURE — 99214 OFFICE O/P EST MOD 30 MIN: CPT | Mod: S$PBB,,, | Performed by: INTERNAL MEDICINE

## 2022-03-08 PROCEDURE — 99999 PR PBB SHADOW E&M-EST. PATIENT-LVL IV: ICD-10-PCS | Mod: PBBFAC,,, | Performed by: INTERNAL MEDICINE

## 2022-03-08 PROCEDURE — 99214 PR OFFICE/OUTPT VISIT, EST, LEVL IV, 30-39 MIN: ICD-10-PCS | Mod: S$PBB,,, | Performed by: INTERNAL MEDICINE

## 2022-03-08 PROCEDURE — 36415 COLL VENOUS BLD VENIPUNCTURE: CPT | Performed by: INTERNAL MEDICINE

## 2022-03-08 NOTE — PROGRESS NOTES
[facility] Established Patient Visit    Reason for Visit:    Chief Complaint   Patient presents with    GI Problem     Epigastric abdominal pain    Other     Dysphagia         PCP: Osbaldo Hall      HPI:  Jasson Pineda is a 71 y.o. female here for evaluation of dysphagia and epigastric abdominal pain.  These are recurrent problems for her.  I saw her 2019 for similar symptoms.  She has a history of ineffective esophageal motility which has been evaluated also by ENT and discussed at the swallow conference.  She reported worsening of symptoms starting sometime last year which eventually prompted emergency department visit 2022.  She had been off of her PPI for a very long time, over a year.  She denies having significant heartburn or reflux.  She started having more problems with solid food dysphagia with food sticking in the lower chest.  The pain is in the epigastric and lower chest area that worsens with solid foods as well.  She drinks water to help the food pass.  Sleeping flat tends to worsen the pain as well.  She has back problems and is scheduled for WILIAN injections.  She is curious if the back might be causing some of the symptoms.  She was started on omeprazole again, 40 mg every morning, after her emergency department visit.  She has noticed some improvement but not resolution of symptoms.    Last EGD 2018 was within normal limits.          ROS:  Constitutional: No fevers, chills, No weight loss, normal appetite  GI: see HPI        PMHX:  has a past medical history of Anticoagulant long-term use, Chronic back pain, Chronic neck pain, DVT (deep venous thrombosis), Herniated disc, cervical, Kidney stone, Lumbar herniated disc, PE (pulmonary embolism), Pulmonary emboli, and Pulmonary embolism.    PSHX:  has a past surgical history that includes Kidney stone surgery; left knee surgery; Tonsillectomy;  section; Hernia repair; Total shoulder  arthroplasty (Right); Breast cyst excision (Left); Esophagogastroduodenoscopy (N/A, 7/17/2018); Colonoscopy (N/A, 7/17/2018); Esophageal manometry with measurement of impedance (N/A, 7/25/2018); Shoulder arthroscopy (Left, 6/11/2019); Arthroscopic repair of rotator cuff of shoulder (Left, 6/11/2019); Arthroscopy of shoulder with removal of distal clavicle (Left, 6/11/2019); Arthroscopic tenotomy of biceps tendon (Left, 6/11/2019); and Extracorporeal shock wave lithotripsy (Left, 2/9/2021).    The patient's social and family histories were reviewed by me and updated in the appropriate section of the electronic medical record.    Review of patient's allergies indicates:   Allergen Reactions    Sulfa (sulfonamide antibiotics) Rash       Prior to Admission medications    Medication Sig Start Date End Date Taking? Authorizing Provider   RICHARD CANTU [id number], PF, 60 mcg/0.5 mL vaccine ADM 0.5ML IM UTD 10/8/18  Yes Historical Provider   albuterol (PROVENTIL/VENTOLIN HFA) 90 mcg/actuation inhaler Inhale 2 puffs into the lungs every 4 (four) hours as needed for Wheezing. Rescue 2/16/19  Yes Roxanne Ayoub NP   atorvastatin (LIPITOR) 80 MG tablet TAKE 1 TABLET BY MOUTH EVERY DAY 1/26/22  Yes Osbaldo Hall MD   enoxaparin (LOVENOX) 80 mg/0.8 mL Syrg Inject 0.7 mLs (70 mg total) into the skin 2 (two) times a day. 1/25/22  Yes Sotero Doty MD   EScitalopram oxalate (LEXAPRO) 10 MG tablet Take 1 tablet (10 mg total) by mouth once daily. 2/25/22  Yes Osbaldo Hall MD   fluticasone (FLONASE) 50 mcg/actuation nasal spray daily as needed.   Yes Historical Provider   omeprazole (PRILOSEC) 40 MG capsule Take 1 capsule (40 mg total) by mouth every morning. 1/8/22  Yes Maicol Ramires MD   oxybutynin (DITROPAN-XL) 10 MG 24 hr tablet Take 1 tablet (10 mg total) by mouth once daily. 4/19/21 4/19/22 Yes Chantel Boyle NP   pregabalin (LYRICA) 150 MG capsule Take 150 mg by mouth 2 (two) times daily.   Yes Historical  "Provider   traMADol (ULTRAM) 50 mg tablet Take 50 mg by mouth 2 (two) times daily as needed. 10/26/18  Yes Historical Provider   XARELTO 20 mg Tab Take 1 tablet (20 mg total) by mouth every evening. 10/12/21  Yes Sotero Doty MD   ezetimibe (ZETIA) 10 mg tablet Take 1 tablet (10 mg total) by mouth once daily. 7/22/21 3/8/22  Osbaldo Hall MD   LIDOcaine (LIDODERM) 5 % Place 1 patch onto the skin once daily. Remove & Discard patch within 12 hours or as directed by MD 4/19/21 3/8/22  Germain Lynch II, MD   sucralfate (CARAFATE) 100 mg/mL suspension Take 10 mLs (1 g total) by mouth 4 (four) times daily before meals and nightly. 1/8/22 3/8/22  Maicol Ramires MD         Objective Findings:  Vital Signs:  /70   Pulse 78   Ht 5' 1" (1.549 m)   Wt 66.2 kg (145 lb 14.4 oz)   BMI 27.57 kg/m²  Body mass index is 27.57 kg/m².      Physical Exam:  General Appearance:  Well appearing in no acute distress, appears stated age          Labs:  Lab Results   Component Value Date    WBC 7.06 07/22/2021    HGB 11.3 (L) 07/22/2021    HCT 35.7 (L) 07/22/2021    MCV 83 07/22/2021    RDW 16.4 (H) 07/22/2021     07/22/2021    GRAN 4.1 07/22/2021    GRAN 57.4 07/22/2021    LYMPH 2.4 07/22/2021    LYMPH 33.3 07/22/2021    MONO 0.5 07/22/2021    MONO 6.9 07/22/2021    EOS 0.1 07/22/2021    BASO 0.03 07/22/2021     Lab Results   Component Value Date     07/09/2021    K 3.9 07/09/2021     07/09/2021    CO2 26 07/09/2021    GLU 76 07/09/2021    BUN 9 07/09/2021    CREATININE 1.0 07/09/2021    CALCIUM 9.8 07/09/2021    PROT 7.4 07/09/2021    ALBUMIN 3.8 07/09/2021    BILITOT 0.5 07/09/2021    ALKPHOS 58 07/09/2021    AST 21 07/09/2021    ALT 17 07/09/2021             Imaging:  Chest x-ray 11/29/2021 without acute findings.            Assessment:  Jasson Pineda is a 71 y.o. female here with:  1. Epigastric abdominal pain    2. Esophageal dysphagia    3. Ineffective esophageal motility    4. " History of iron deficiency anemia    5. Current use of long term anticoagulation      Worsening solid food dysphagia with epigastric abdominal pain.  History of ineffective esophageal motility.  She had been off of her PPI for a very long time, which could have exacerbated esophagitis or even an esophageal stricture.  Last EGD in July 2018 was unremarkable.    History of iron deficiency anemia with relatively stable blood counts.  Prior EGD and colonoscopy unremarkable for a cause.  Last colonoscopy was July 2018 with 10 year follow-up recommended.  Last hemoglobin was checked in July 2021.    She is on chronic anticoagulation with Xarelto due to history of DVT/PE.  This will need to be taken into account when arranging for her EGD.  She also has a couple of WILIAN injections scheduled in the near future, which will also need to be taken into account for scheduling purposes.      Recommendations:  1. Labs today for blood count iron level  2. I will arrange for EGD.      Follow-up pending results      Order summary:  Orders Placed This Encounter    CBC Without Differential    Ferritin    Case Request Endoscopy: EGD (ESOPHAGOGASTRODUODENOSCOPY)           Armand Foy MD

## 2022-03-08 NOTE — H&P (VIEW-ONLY)
[facility] Established Patient Visit    Reason for Visit:    Chief Complaint   Patient presents with    GI Problem     Epigastric abdominal pain    Other     Dysphagia         PCP: Osbaldo Hall      HPI:  Jasson Pineda is a 71 y.o. female here for evaluation of dysphagia and epigastric abdominal pain.  These are recurrent problems for her.  I saw her 2019 for similar symptoms.  She has a history of ineffective esophageal motility which has been evaluated also by ENT and discussed at the swallow conference.  She reported worsening of symptoms starting sometime last year which eventually prompted emergency department visit 2022.  She had been off of her PPI for a very long time, over a year.  She denies having significant heartburn or reflux.  She started having more problems with solid food dysphagia with food sticking in the lower chest.  The pain is in the epigastric and lower chest area that worsens with solid foods as well.  She drinks water to help the food pass.  Sleeping flat tends to worsen the pain as well.  She has back problems and is scheduled for WILIAN injections.  She is curious if the back might be causing some of the symptoms.  She was started on omeprazole again, 40 mg every morning, after her emergency department visit.  She has noticed some improvement but not resolution of symptoms.    Last EGD 2018 was within normal limits.          ROS:  Constitutional: No fevers, chills, No weight loss, normal appetite  GI: see HPI        PMHX:  has a past medical history of Anticoagulant long-term use, Chronic back pain, Chronic neck pain, DVT (deep venous thrombosis), Herniated disc, cervical, Kidney stone, Lumbar herniated disc, PE (pulmonary embolism), Pulmonary emboli, and Pulmonary embolism.    PSHX:  has a past surgical history that includes Kidney stone surgery; left knee surgery; Tonsillectomy;  section; Hernia repair; Total shoulder  arthroplasty (Right); Breast cyst excision (Left); Esophagogastroduodenoscopy (N/A, 7/17/2018); Colonoscopy (N/A, 7/17/2018); Esophageal manometry with measurement of impedance (N/A, 7/25/2018); Shoulder arthroscopy (Left, 6/11/2019); Arthroscopic repair of rotator cuff of shoulder (Left, 6/11/2019); Arthroscopy of shoulder with removal of distal clavicle (Left, 6/11/2019); Arthroscopic tenotomy of biceps tendon (Left, 6/11/2019); and Extracorporeal shock wave lithotripsy (Left, 2/9/2021).    The patient's social and family histories were reviewed by me and updated in the appropriate section of the electronic medical record.    Review of patient's allergies indicates:   Allergen Reactions    Sulfa (sulfonamide antibiotics) Rash       Prior to Admission medications    Medication Sig Start Date End Date Taking? Authorizing Provider   RICHARD CANTU [id number], PF, 60 mcg/0.5 mL vaccine ADM 0.5ML IM UTD 10/8/18  Yes Historical Provider   albuterol (PROVENTIL/VENTOLIN HFA) 90 mcg/actuation inhaler Inhale 2 puffs into the lungs every 4 (four) hours as needed for Wheezing. Rescue 2/16/19  Yes Roxanne Ayoub NP   atorvastatin (LIPITOR) 80 MG tablet TAKE 1 TABLET BY MOUTH EVERY DAY 1/26/22  Yes Osbaldo Hall MD   enoxaparin (LOVENOX) 80 mg/0.8 mL Syrg Inject 0.7 mLs (70 mg total) into the skin 2 (two) times a day. 1/25/22  Yes Sotero Doty MD   EScitalopram oxalate (LEXAPRO) 10 MG tablet Take 1 tablet (10 mg total) by mouth once daily. 2/25/22  Yes Osbaldo Hall MD   fluticasone (FLONASE) 50 mcg/actuation nasal spray daily as needed.   Yes Historical Provider   omeprazole (PRILOSEC) 40 MG capsule Take 1 capsule (40 mg total) by mouth every morning. 1/8/22  Yes Maicol Ramires MD   oxybutynin (DITROPAN-XL) 10 MG 24 hr tablet Take 1 tablet (10 mg total) by mouth once daily. 4/19/21 4/19/22 Yes Chantel Boyle NP   pregabalin (LYRICA) 150 MG capsule Take 150 mg by mouth 2 (two) times daily.   Yes Historical  "Provider   traMADol (ULTRAM) 50 mg tablet Take 50 mg by mouth 2 (two) times daily as needed. 10/26/18  Yes Historical Provider   XARELTO 20 mg Tab Take 1 tablet (20 mg total) by mouth every evening. 10/12/21  Yes Sotero Doty MD   ezetimibe (ZETIA) 10 mg tablet Take 1 tablet (10 mg total) by mouth once daily. 7/22/21 3/8/22  Osbaldo Hall MD   LIDOcaine (LIDODERM) 5 % Place 1 patch onto the skin once daily. Remove & Discard patch within 12 hours or as directed by MD 4/19/21 3/8/22  Germain Lynch II, MD   sucralfate (CARAFATE) 100 mg/mL suspension Take 10 mLs (1 g total) by mouth 4 (four) times daily before meals and nightly. 1/8/22 3/8/22  Maicol Ramires MD         Objective Findings:  Vital Signs:  /70   Pulse 78   Ht 5' 1" (1.549 m)   Wt 66.2 kg (145 lb 14.4 oz)   BMI 27.57 kg/m²  Body mass index is 27.57 kg/m².      Physical Exam:  General Appearance:  Well appearing in no acute distress, appears stated age          Labs:  Lab Results   Component Value Date    WBC 7.06 07/22/2021    HGB 11.3 (L) 07/22/2021    HCT 35.7 (L) 07/22/2021    MCV 83 07/22/2021    RDW 16.4 (H) 07/22/2021     07/22/2021    GRAN 4.1 07/22/2021    GRAN 57.4 07/22/2021    LYMPH 2.4 07/22/2021    LYMPH 33.3 07/22/2021    MONO 0.5 07/22/2021    MONO 6.9 07/22/2021    EOS 0.1 07/22/2021    BASO 0.03 07/22/2021     Lab Results   Component Value Date     07/09/2021    K 3.9 07/09/2021     07/09/2021    CO2 26 07/09/2021    GLU 76 07/09/2021    BUN 9 07/09/2021    CREATININE 1.0 07/09/2021    CALCIUM 9.8 07/09/2021    PROT 7.4 07/09/2021    ALBUMIN 3.8 07/09/2021    BILITOT 0.5 07/09/2021    ALKPHOS 58 07/09/2021    AST 21 07/09/2021    ALT 17 07/09/2021             Imaging:  Chest x-ray 11/29/2021 without acute findings.            Assessment:  Jasson Pineda is a 71 y.o. female here with:  1. Epigastric abdominal pain    2. Esophageal dysphagia    3. Ineffective esophageal motility    4. " History of iron deficiency anemia    5. Current use of long term anticoagulation      Worsening solid food dysphagia with epigastric abdominal pain.  History of ineffective esophageal motility.  She had been off of her PPI for a very long time, which could have exacerbated esophagitis or even an esophageal stricture.  Last EGD in July 2018 was unremarkable.    History of iron deficiency anemia with relatively stable blood counts.  Prior EGD and colonoscopy unremarkable for a cause.  Last colonoscopy was July 2018 with 10 year follow-up recommended.  Last hemoglobin was checked in July 2021.    She is on chronic anticoagulation with Xarelto due to history of DVT/PE.  This will need to be taken into account when arranging for her EGD.  She also has a couple of WILIAN injections scheduled in the near future, which will also need to be taken into account for scheduling purposes.      Recommendations:  1. Labs today for blood count iron level  2. I will arrange for EGD.      Follow-up pending results      Order summary:  Orders Placed This Encounter    CBC Without Differential    Ferritin    Case Request Endoscopy: EGD (ESOPHAGOGASTRODUODENOSCOPY)           Armand Foy MD

## 2022-03-09 ENCOUNTER — TELEPHONE (OUTPATIENT)
Dept: SLEEP MEDICINE | Facility: OTHER | Age: 72
End: 2022-03-09
Payer: MEDICARE

## 2022-03-09 NOTE — TELEPHONE ENCOUNTER
Patient canceled the sleep study in Feb,we left messages and talked to patient about rescheduling.  Also sent out a message through [facility] to reschedule.  No response.

## 2022-03-20 ENCOUNTER — HOSPITAL ENCOUNTER (EMERGENCY)
Facility: OTHER | Age: 72
Discharge: HOME | End: 2022-03-20
Attending: EMERGENCY MEDICINE
Payer: MEDICARE

## 2022-03-20 VITALS
OXYGEN SATURATION: 100 % | HEART RATE: 75 BPM | RESPIRATION RATE: 20 BRPM | HEIGHT: 63 IN | WEIGHT: 162 LBS | BODY MASS INDEX: 28.7 KG/M2 | SYSTOLIC BLOOD PRESSURE: 120 MMHG | DIASTOLIC BLOOD PRESSURE: 82 MMHG | TEMPERATURE: 99 F

## 2022-03-20 DIAGNOSIS — K44.9 HIATAL HERNIA: Primary | ICD-10-CM

## 2022-03-20 DIAGNOSIS — R07.9 CHEST PAIN: ICD-10-CM

## 2022-03-20 LAB
ALBUMIN SERPL BCP-MCNC: 4 G/DL (ref 3.5–5.2)
ALP SERPL-CCNC: 65 U/L (ref 55–135)
ALT SERPL W/O P-5'-P-CCNC: 49 U/L (ref 10–44)
ANION GAP SERPL CALC-SCNC: 14 MMOL/L (ref 8–16)
AST SERPL-CCNC: 26 U/L (ref 10–40)
BASOPHILS # BLD AUTO: 0.02 K/UL (ref 0–0.2)
BASOPHILS NFR BLD: 0.3 % (ref 0–1.9)
BILIRUB SERPL-MCNC: 0.6 MG/DL (ref 0.1–1)
BUN SERPL-MCNC: 7 MG/DL (ref 8–23)
CALCIUM SERPL-MCNC: 9.6 MG/DL (ref 8.7–10.5)
CHLORIDE SERPL-SCNC: 107 MMOL/L (ref 95–110)
CO2 SERPL-SCNC: 20 MMOL/L (ref 23–29)
CREAT SERPL-MCNC: 0.9 MG/DL (ref 0.5–1.4)
DIFFERENTIAL METHOD: ABNORMAL
EOSINOPHIL # BLD AUTO: 0.1 K/UL (ref 0–0.5)
EOSINOPHIL NFR BLD: 0.9 % (ref 0–8)
ERYTHROCYTE [DISTWIDTH] IN BLOOD BY AUTOMATED COUNT: 16.1 % (ref 11.5–14.5)
EST. GFR  (AFRICAN AMERICAN): >60 ML/MIN/1.73 M^2
EST. GFR  (NON AFRICAN AMERICAN): >60 ML/MIN/1.73 M^2
GLUCOSE SERPL-MCNC: 87 MG/DL (ref 70–110)
HCT VFR BLD AUTO: 35.9 % (ref 37–48.5)
HCV AB SERPL QL IA: NEGATIVE
HGB BLD-MCNC: 11.9 G/DL (ref 12–16)
IMM GRANULOCYTES # BLD AUTO: 0.03 K/UL (ref 0–0.04)
IMM GRANULOCYTES NFR BLD AUTO: 0.4 % (ref 0–0.5)
LYMPHOCYTES # BLD AUTO: 2.4 K/UL (ref 1–4.8)
LYMPHOCYTES NFR BLD: 36.4 % (ref 18–48)
MCH RBC QN AUTO: 26.7 PG (ref 27–31)
MCHC RBC AUTO-ENTMCNC: 33.1 G/DL (ref 32–36)
MCV RBC AUTO: 81 FL (ref 82–98)
MONOCYTES # BLD AUTO: 0.5 K/UL (ref 0.3–1)
MONOCYTES NFR BLD: 7.8 % (ref 4–15)
NEUTROPHILS # BLD AUTO: 3.6 K/UL (ref 1.8–7.7)
NEUTROPHILS NFR BLD: 54.2 % (ref 38–73)
NRBC BLD-RTO: 0 /100 WBC
PLATELET # BLD AUTO: 263 K/UL (ref 150–450)
PMV BLD AUTO: 10.7 FL (ref 9.2–12.9)
POTASSIUM SERPL-SCNC: 3.5 MMOL/L (ref 3.5–5.1)
PROT SERPL-MCNC: 7.6 G/DL (ref 6–8.4)
RBC # BLD AUTO: 4.46 M/UL (ref 4–5.4)
SODIUM SERPL-SCNC: 141 MMOL/L (ref 136–145)
TROPONIN I SERPL DL<=0.01 NG/ML-MCNC: <0.006 NG/ML (ref 0–0.03)
WBC # BLD AUTO: 6.67 K/UL (ref 3.9–12.7)

## 2022-03-20 PROCEDURE — 84484 ASSAY OF TROPONIN QUANT: CPT | Performed by: EMERGENCY MEDICINE

## 2022-03-20 PROCEDURE — 85025 COMPLETE CBC W/AUTO DIFF WBC: CPT | Performed by: EMERGENCY MEDICINE

## 2022-03-20 PROCEDURE — 93005 ELECTROCARDIOGRAM TRACING: CPT

## 2022-03-20 PROCEDURE — 93010 ELECTROCARDIOGRAM REPORT: CPT | Mod: ,,, | Performed by: INTERNAL MEDICINE

## 2022-03-20 PROCEDURE — 25000003 PHARM REV CODE 250: Performed by: EMERGENCY MEDICINE

## 2022-03-20 PROCEDURE — 99285 EMERGENCY DEPT VISIT HI MDM: CPT | Mod: 25

## 2022-03-20 PROCEDURE — 36000 PLACE NEEDLE IN VEIN: CPT

## 2022-03-20 PROCEDURE — 93010 EKG 12-LEAD: ICD-10-PCS | Mod: ,,, | Performed by: INTERNAL MEDICINE

## 2022-03-20 PROCEDURE — 86803 HEPATITIS C AB TEST: CPT | Performed by: EMERGENCY MEDICINE

## 2022-03-20 PROCEDURE — 80053 COMPREHEN METABOLIC PANEL: CPT | Performed by: EMERGENCY MEDICINE

## 2022-03-20 RX ORDER — LIDOCAINE HYDROCHLORIDE 20 MG/ML
10 SOLUTION OROPHARYNGEAL ONCE
Status: COMPLETED | OUTPATIENT
Start: 2022-03-20 | End: 2022-03-20

## 2022-03-20 RX ORDER — SUCRALFATE 1 G/10ML
1 SUSPENSION ORAL 4 TIMES DAILY
Qty: 420 ML | Refills: 0 | Status: SHIPPED | OUTPATIENT
Start: 2022-03-20 | End: 2022-05-17

## 2022-03-20 RX ORDER — MAG HYDROX/ALUMINUM HYD/SIMETH 200-200-20
30 SUSPENSION, ORAL (FINAL DOSE FORM) ORAL ONCE
Status: COMPLETED | OUTPATIENT
Start: 2022-03-20 | End: 2022-03-20

## 2022-03-20 RX ADMIN — ALUMINUM HYDROXIDE, MAGNESIUM HYDROXIDE, AND SIMETHICONE 30 ML: 200; 200; 20 SUSPENSION ORAL at 07:03

## 2022-03-20 RX ADMIN — LIDOCAINE HYDROCHLORIDE 10 ML: 20 SOLUTION ORAL; TOPICAL at 07:03

## 2022-03-20 NOTE — ED PROVIDER NOTES
Encounter Date: 3/20/2022    SCRIBE #1 NOTE: I, Yennifer Jenkins, ambrosio scribing for, and in the presence of, Marlene Wilson MD.       History     Chief Complaint   Patient presents with    Chest Pain     Pt c/o epigastric pain and pain increases with breathing started yesterday. Hx of PE. Pt is on blood thinners.     Time seen by provider: 6:34 PM    This is a 71 y.o. female with a PMHx of DVT, PE, chronic back pain who presents with complaint of substernal chest tightness since yesterday. Patient states she cannot lay on her back due to the pain radiating towards it. She also states she consumes minimal food due to exacerbation of tightness in her chest. She notes taking Xarelto yesterday. Patient denies any vomiting. Patient mentions coming to the ED two months ago for similar symptoms in the setting of a hiatal hernia. She was given a GI cocktail with resolution for pain. She notes having a upcoming scope schedule with . This is the extent of the patient's complaints at this time.    The history is provided by the patient.     Review of patient's allergies indicates:   Allergen Reactions    Sulfa (sulfonamide antibiotics) Rash     Past Medical History:   Diagnosis Date    Anticoagulant long-term use     xarelto    Chronic back pain     Chronic neck pain     DVT (deep venous thrombosis)     Herniated disc, cervical     Kidney stone     Lumbar herniated disc     PE (pulmonary embolism)     2013    Pulmonary emboli     after surgery    Pulmonary embolism      Past Surgical History:   Procedure Laterality Date    ARTHROSCOPIC REPAIR OF ROTATOR CUFF OF SHOULDER Left 6/11/2019    Procedure: REPAIR, ROTATOR CUFF, ARTHROSCOPIC;  Surgeon: Sedrick Patel MD;  Location: [facility] OR;  Service: Orthopedics;  Laterality: Left;    ARTHROSCOPIC TENOTOMY OF BICEPS TENDON Left 6/11/2019    Procedure: TENOTOMY, BICEPS, ARTHROSCOPIC;  Surgeon: Sedrick Patel MD;  Location: [facility] OR;  Service: Orthopedics;   Laterality: Left;    ARTHROSCOPY OF SHOULDER WITH REMOVAL OF DISTAL CLAVICLE Left 2019    Procedure: ARTHROSCOPY, SHOULDER, WITH DISTAL CLAVICLE EXCISION;  Surgeon: Sedrick Patel MD;  Location: [facility];  Service: Orthopedics;  Laterality: Left;    BREAST CYST EXCISION Left      SECTION      x1    COLONOSCOPY N/A 2018    Procedure: COLONOSCOPY;  Surgeon: Armand Foy MD;  Location: [facility] (4TH FLR);  Service: Endoscopy;  Laterality: N/A;    ESOPHAGEAL MANOMETRY WITH MEASUREMENT OF IMPEDANCE N/A 2018    Procedure: MANOMETRY, ESOPHAGEAL, WITH IMPEDANCE MEASUREMENT;  Surgeon: Armand Foy MD;  Location: [facility] ENDO (4TH FLR);  Service: Endoscopy;  Laterality: N/A;    ESOPHAGOGASTRODUODENOSCOPY N/A 2018    Procedure: EGD (ESOPHAGOGASTRODUODENOSCOPY);  Surgeon: Armand Foy MD;  Location: [facility] ENDO (4TH FLR);  Service: Endoscopy;  Laterality: N/A;  pt currently on Lovenox and Xarelto - ok per Dr. Doty to hold Plavix 2 days prior and Lovenox 24hr prior to case/see scanned media / for documentation of hold -- SM        EXTRACORPOREAL SHOCK WAVE LITHOTRIPSY Left 2021    Procedure: LITHOTRIPSY-EXTRACORPOREAL SHOCK WAVE;  Surgeon: Jeremias Eric MD;  Location: [facility];  Service: Urology;  Laterality: Left;    HERNIA REPAIR      umbilical    KIDNEY STONE SURGERY      ureteral stent    left knee surgery      SHOULDER ARTHROSCOPY Left 2019    Procedure: ARTHROSCOPY, SHOULDER;  Surgeon: Sedrick Patel MD;  Location: [facility];  Service: Orthopedics;  Laterality: Left;  BLOCK    TONSILLECTOMY      TOTAL SHOULDER ARTHROPLASTY Right      Family History   Problem Relation Age of Onset    No Known Problems Father     Colon cancer Mother 74    Ovarian cancer Mother     Liver cancer Sister     Diabetes Sister     Liver cancer Maternal Grandfather     Breast cancer Maternal Aunt 50        50s    Cervical cancer Maternal Aunt     Breast cancer Maternal  Cousin 45    No Known Problems Daughter     No Known Problems Sister     No Known Problems Daughter     Esophageal cancer Neg Hx      Social History     Tobacco Use    Smoking status: Never Smoker    Smokeless tobacco: Never Used   Substance Use Topics    Alcohol use: No    Drug use: No     Review of Systems   Constitutional: Negative for activity change, appetite change, chills, diaphoresis and fever.   HENT: Negative for congestion, sore throat and trouble swallowing.    Eyes: Negative for photophobia and visual disturbance.   Respiratory: Positive for chest tightness. Negative for cough and shortness of breath.    Cardiovascular: Negative for chest pain.   Gastrointestinal: Negative for abdominal pain, nausea and vomiting.   Endocrine: Negative for polydipsia and polyuria.   Genitourinary: Negative for difficulty urinating and flank pain.   Musculoskeletal: Negative for back pain and neck pain.   Skin: Negative for rash.   Neurological: Negative for weakness and headaches.   Psychiatric/Behavioral: Negative for confusion.       Physical Exam     Initial Vitals [03/20/22 1825]   BP Pulse Resp Temp SpO2   112/69 70 17 98.8 °F (37.1 °C) 99 %      MAP       --         Physical Exam    Nursing note and vitals reviewed.  Constitutional: She appears well-developed. She is cooperative.   HENT:   Head: Atraumatic.   Eyes: Conjunctivae and lids are normal.   Neck:   Normal range of motion.  Cardiovascular: Normal rate.   Pulmonary/Chest: She exhibits tenderness.   Reproducible to Subxiphoid region   Musculoskeletal:         General: Normal range of motion.      Cervical back: Normal range of motion.     Neurological: She is alert.   Skin: No rash noted.   Psychiatric: She has a normal mood and affect. Her speech is normal and behavior is normal.         ED Course   Procedures  Labs Reviewed   CBC W/ AUTO DIFFERENTIAL - Abnormal; Notable for the following components:       Result Value    Hemoglobin 11.9 (*)      Hematocrit 35.9 (*)     MCV 81 (*)     MCH 26.7 (*)     RDW 16.1 (*)     All other components within normal limits    Narrative:     Release to patient->Immediate   COMPREHENSIVE METABOLIC PANEL - Abnormal; Notable for the following components:    CO2 20 (*)     BUN 7 (*)     ALT 49 (*)     All other components within normal limits    Narrative:     Release to patient->Immediate   TROPONIN I    Narrative:     Release to patient->Immediate   HEPATITIS C ANTIBODY    Narrative:     Release to patient->Immediate     EKG Readings: (Independently Interpreted)   Initial Reading: No STEMI.   Normal sinus rhythm.  Rate of 72.  Normal Axis.  Normal Intervals.  No STEMI. Unchanged from 1/22/22       ECG Results          EKG 12-lead (In process)  Result time 03/20/22 20:37:00    In process by Interface, Lab In [facility] (03/20/22 20:37:00)                 Narrative:    Test Reason : R07.9,    Vent. Rate : 072 BPM     Atrial Rate : 072 BPM     P-R Int : 148 ms          QRS Dur : 076 ms      QT Int : 410 ms       P-R-T Axes : 054 066 059 degrees     QTc Int : 448 ms    Normal sinus rhythm  Normal ECG      Referred By: [email or web address]   SELF           Confirmed By:                             Imaging Results          X-Ray Chest 1 View (Final result)  Result time 03/20/22 18:59:54    Final result by Shon Bruce MD (03/20/22 18:59:54)                 Impression:      1. No acute cardiopulmonary process, hypoventilatory exam.      Electronically signed by: Shon Bruce MD  Date:    03/20/2022  Time:    18:59             Narrative:    EXAMINATION:  XR CHEST 1 VIEW    CLINICAL HISTORY:  Chest pain, unspecified    TECHNIQUE:  Single frontal view of the chest was performed.    COMPARISON:  11/29/2021    FINDINGS:  The cardiomediastinal silhouette is not enlarged noting calcification and tortuosity of the aorta..  There is no pleural effusion.  The trachea is midline.  The lungs are symmetrically expanded bilaterally without evidence  of acute parenchymal process. No large focal consolidation seen.  There is no pneumothorax.  The osseous structures are remarkable for degenerative changes.  There is osteopenia.  Right shoulder arthroplasty noted..                              X-Rays:   Independently Interpreted Readings:   Chest X-Ray: Trachea midline. No cardiomegaly. No pleural effusion. No pneumothorax.     Medications   aluminum-magnesium hydroxide-simethicone 200-200-20 mg/5 mL suspension 30 mL (30 mLs Oral Given 3/20/22 1932)     And   LIDOcaine HCl 2% oral solution 10 mL (10 mLs Oral Given 3/20/22 1932)     Medical Decision Making:   History:   Old Medical Records: I decided to obtain old medical records.  Initial Assessment:   Urgent evaluation of 71-year-old female with history dysmotility/dysphagia/chronic abdominal pain.  Patient is on Xarelto for DVT/PE.  Patient has EGD planned for the future here with epigastric pain and tightness.  Here pt tolerating secretions, able to sip water. Will obtain labs and ensure no acute serious  cardiopulm pathology   and likely dc home w gi fu.     Independently Interpreted Test(s):   I have ordered and independently interpreted X-rays - see prior notes.  I have ordered and independently interpreted EKG Reading(s) - see prior notes  Clinical Tests:   Lab Tests: Ordered and Reviewed  Radiological Study: Ordered and Reviewed  Medical Tests: Ordered and Reviewed          Scribe Attestation:   Scribe #1: I performed the above scribed service and the documentation accurately describes the services I performed. I attest to the accuracy of the note.               Physician Attestation for Scribe: I, Steve, reviewed documentation as scribed in my presence, which is both accurate and complete.  Clinical Impression:   Final diagnoses:  [R07.9] Chest pain  [K44.9] Hiatal hernia (Primary)          ED Disposition Condition    Discharge Stable        ED Prescriptions     Medication Sig Dispense Start Date End  Date Auth. Provider    sucralfate (CARAFATE) 100 mg/mL suspension Take 10 mLs (1 g total) by mouth 4 (four) times daily. 420 mL 3/20/2022  Marlene Wilson MD        Follow-up Information     Follow up With Specialties Details Why Contact Info    Armand Foy MD Gastroenterology Call in 1 day  [address] [facility] [address]  [phone]             Marlene Wilson MD  03/20/22 2111

## 2022-03-21 ENCOUNTER — TELEPHONE (OUTPATIENT)
Dept: ENDOSCOPY | Facility: HOSPITAL | Age: 72
End: 2022-03-21

## 2022-03-21 ENCOUNTER — TELEPHONE (OUTPATIENT)
Dept: ENDOSCOPY | Facility: HOSPITAL | Age: 72
End: 2022-03-21
Payer: MEDICARE

## 2022-03-21 DIAGNOSIS — I27.82 CHRONIC PULMONARY EMBOLISM WITHOUT ACUTE COR PULMONALE, UNSPECIFIED PULMONARY EMBOLISM TYPE: Primary | ICD-10-CM

## 2022-03-21 RX ORDER — ENOXAPARIN SODIUM 100 MG/ML
1 INJECTION SUBCUTANEOUS 2 TIMES DAILY
Qty: 4 EACH | Refills: 0 | Status: CANCELLED | OUTPATIENT
Start: 2022-03-21

## 2022-03-21 NOTE — TELEPHONE ENCOUNTER
MD Judi Huertas MA  Caller: Unspecified (Today, 11:52 AM)  Schedule EGD.   Can you get her in ASAP with Dr Foy?

## 2022-03-21 NOTE — TELEPHONE ENCOUNTER
----- Message from Margi Hood sent at 3/21/2022 10:48 AM CDT -----  Type :  Needs Medical Advice    Who Called: pt   Symptoms (please be specific):  hernia  How long has patient had these symptoms:  hernia  Pharmacy name and phone #:   no  Would the patient rather a call back or a response via [facility]?  Call   Best Call Back Number: [phone]  Additional Information: pt went in to ed the weekend

## 2022-03-21 NOTE — ED NOTES
Pt. Comes in for Chest pain 9/10 that started yesterday.Pt. complains of  tightness that radiates to her back. Pt. Denies SOB,LOC,N./V,Chest or ABD pains.

## 2022-03-21 NOTE — TELEPHONE ENCOUNTER
Spoke to pt. She does not need a prescription for Lovenox. She has some from a procedure she was scheduled to have on March 29, 2022 on her back. She cancelled the procedure due to her hiatal hernia. Instructed pt to call office back for Lovenox dosing once EGD has been scheduled. She verbalized understanding.

## 2022-03-21 NOTE — TELEPHONE ENCOUNTER
Patient needs to be scheduled for an ASAP EGD with Dr. Foy.  I see she is on Xarelto. Can she hold her Xarelto for 2 days prior per our Endoscopy Protocol? Please advise.    Sincerely,  MAUREEN Golden

## 2022-03-23 ENCOUNTER — PATIENT MESSAGE (OUTPATIENT)
Dept: CARDIOLOGY | Facility: CLINIC | Age: 72
End: 2022-03-23
Payer: MEDICARE

## 2022-03-23 ENCOUNTER — PATIENT MESSAGE (OUTPATIENT)
Dept: ENDOSCOPY | Facility: HOSPITAL | Age: 72
End: 2022-03-23
Payer: MEDICARE

## 2022-03-28 ENCOUNTER — ANESTHESIA (OUTPATIENT)
Dept: ENDOSCOPY | Facility: HOSPITAL | Age: 72
End: 2022-03-28
Payer: MEDICARE

## 2022-03-28 ENCOUNTER — ANESTHESIA EVENT (OUTPATIENT)
Dept: ENDOSCOPY | Facility: HOSPITAL | Age: 72
End: 2022-03-28
Payer: MEDICARE

## 2022-03-28 ENCOUNTER — HOSPITAL ENCOUNTER (OUTPATIENT)
Facility: HOSPITAL | Age: 72
Discharge: HOME | End: 2022-03-28
Attending: INTERNAL MEDICINE | Admitting: INTERNAL MEDICINE
Payer: MEDICARE

## 2022-03-28 VITALS
HEART RATE: 90 BPM | DIASTOLIC BLOOD PRESSURE: 70 MMHG | SYSTOLIC BLOOD PRESSURE: 118 MMHG | TEMPERATURE: 98 F | HEIGHT: 63 IN | OXYGEN SATURATION: 98 % | BODY MASS INDEX: 28.35 KG/M2 | RESPIRATION RATE: 16 BRPM | WEIGHT: 160 LBS

## 2022-03-28 DIAGNOSIS — R13.19 ESOPHAGEAL DYSPHAGIA: Primary | ICD-10-CM

## 2022-03-28 DIAGNOSIS — R13.10 DYSPHAGIA: ICD-10-CM

## 2022-03-28 PROCEDURE — 43235 PR EGD, FLEX, DIAGNOSTIC: ICD-10-PCS | Mod: ,,, | Performed by: INTERNAL MEDICINE

## 2022-03-28 PROCEDURE — 25000003 PHARM REV CODE 250: Performed by: NURSE ANESTHETIST, CERTIFIED REGISTERED

## 2022-03-28 PROCEDURE — 63600175 PHARM REV CODE 636 W HCPCS: Performed by: NURSE ANESTHETIST, CERTIFIED REGISTERED

## 2022-03-28 PROCEDURE — 43235 EGD DIAGNOSTIC BRUSH WASH: CPT | Mod: ,,, | Performed by: INTERNAL MEDICINE

## 2022-03-28 PROCEDURE — D9220A PRA ANESTHESIA: ICD-10-PCS | Mod: CRNA,,, | Performed by: NURSE ANESTHETIST, CERTIFIED REGISTERED

## 2022-03-28 PROCEDURE — 37000008 HC ANESTHESIA 1ST 15 MINUTES: Performed by: INTERNAL MEDICINE

## 2022-03-28 PROCEDURE — D9220A PRA ANESTHESIA: ICD-10-PCS | Mod: ANES,,, | Performed by: ANESTHESIOLOGY

## 2022-03-28 PROCEDURE — 37000009 HC ANESTHESIA EA ADD 15 MINS: Performed by: INTERNAL MEDICINE

## 2022-03-28 PROCEDURE — D9220A PRA ANESTHESIA: Mod: CRNA,,, | Performed by: NURSE ANESTHETIST, CERTIFIED REGISTERED

## 2022-03-28 PROCEDURE — D9220A PRA ANESTHESIA: Mod: ANES,,, | Performed by: ANESTHESIOLOGY

## 2022-03-28 PROCEDURE — 43235 EGD DIAGNOSTIC BRUSH WASH: CPT | Performed by: INTERNAL MEDICINE

## 2022-03-28 PROCEDURE — 25000003 PHARM REV CODE 250: Performed by: INTERNAL MEDICINE

## 2022-03-28 RX ORDER — LIDOCAINE HYDROCHLORIDE 20 MG/ML
INJECTION INTRAVENOUS
Status: DISCONTINUED | OUTPATIENT
Start: 2022-03-28 | End: 2022-03-28

## 2022-03-28 RX ORDER — SODIUM CHLORIDE 9 MG/ML
INJECTION, SOLUTION INTRAVENOUS CONTINUOUS
Status: DISCONTINUED | OUTPATIENT
Start: 2022-03-28 | End: 2022-03-28 | Stop reason: HOSPADM

## 2022-03-28 RX ORDER — PROPOFOL 10 MG/ML
VIAL (ML) INTRAVENOUS CONTINUOUS PRN
Status: DISCONTINUED | OUTPATIENT
Start: 2022-03-28 | End: 2022-03-28

## 2022-03-28 RX ORDER — PROPOFOL 10 MG/ML
VIAL (ML) INTRAVENOUS
Status: DISCONTINUED | OUTPATIENT
Start: 2022-03-28 | End: 2022-03-28

## 2022-03-28 RX ORDER — LORAZEPAM 2 MG/ML
0.25 INJECTION INTRAMUSCULAR ONCE AS NEEDED
Status: DISCONTINUED | OUTPATIENT
Start: 2022-03-28 | End: 2022-03-28 | Stop reason: HOSPADM

## 2022-03-28 RX ORDER — HYDROMORPHONE HYDROCHLORIDE 1 MG/ML
0.2 INJECTION, SOLUTION INTRAMUSCULAR; INTRAVENOUS; SUBCUTANEOUS EVERY 5 MIN PRN
Status: DISCONTINUED | OUTPATIENT
Start: 2022-03-28 | End: 2022-03-28 | Stop reason: HOSPADM

## 2022-03-28 RX ORDER — MEPERIDINE HYDROCHLORIDE 50 MG/ML
12.5 INJECTION INTRAMUSCULAR; INTRAVENOUS; SUBCUTANEOUS ONCE AS NEEDED
Status: DISCONTINUED | OUTPATIENT
Start: 2022-03-28 | End: 2022-03-28 | Stop reason: HOSPADM

## 2022-03-28 RX ADMIN — Medication 200 MCG/KG/MIN: at 12:03

## 2022-03-28 RX ADMIN — SODIUM CHLORIDE: 0.9 INJECTION, SOLUTION INTRAVENOUS at 11:03

## 2022-03-28 RX ADMIN — PROPOFOL 100 MG: 10 INJECTION, EMULSION INTRAVENOUS at 12:03

## 2022-03-28 RX ADMIN — LIDOCAINE HYDROCHLORIDE 50 MG: 20 INJECTION, SOLUTION INTRAVENOUS at 12:03

## 2022-03-28 RX ADMIN — GLYCOPYRROLATE 0.2 MG: 0.2 INJECTION, SOLUTION INTRAMUSCULAR; INTRAVITREAL at 12:03

## 2022-03-28 NOTE — [employer] PATIENT INSTRUCTIONS
Discharge Summary/Instructions after an Endoscopic Procedure  Patient Name: Jasson Pineda  Patient MRN: [id number]  Patient YOB: 1950 Monday, March 28, 2022  Armand Foy MD  Dear patient,  As a result of recent federal legislation (The Federal Cures Act), you may   receive lab or pathology results from your procedure in your [facility]   account before your physician is able to contact you. Your physician or   their representative will relay the results to you with their   recommendations at their soonest availability.  Thank you,  RESTRICTIONS:  During your procedure today, you received medications for sedation.  These   medications may affect your judgment, balance and coordination.  Therefore,   for 24 hours, you have the following restrictions:   - DO NOT drive a car, operate machinery, make legal/financial decisions,   sign important papers or drink alcohol.    ACTIVITY:  Today: no heavy lifting, straining or running due to procedural   sedation/anesthesia.  The following day: return to full activity including work.  DIET:  Eat and drink normally unless instructed otherwise.     TREATMENT FOR COMMON SIDE EFFECTS:  - Mild abdominal pain, nausea, belching, bloating or excessive gas:  rest,   eat lightly and use a heating pad.  - Sore Throat: treat with throat lozenges and/or gargle with warm salt   water.  - Because air was used during the procedure, expelling large amounts of air   from your rectum or belching is normal.  - If a bowel prep was taken, you may not have a bowel movement for 1-3 days.    This is normal.  SYMPTOMS TO WATCH FOR AND REPORT TO YOUR PHYSICIAN:  1. Abdominal pain or bloating, other than gas cramps.  2. Chest pain.  3. Back pain.  4. Signs of infection such as: chills or fever occurring within 24 hours   after the procedure.  5. Rectal bleeding, which would show as bright red, maroon, or black stools.   (A tablespoon of blood from the rectum is not serious, especially if    hemorrhoids are present.)  6. Vomiting.  7. Weakness or dizziness.  GO DIRECTLY TO THE NEAREST EMERGENCY ROOM IF YOU HAVE ANY OF THE FOLLOWING:      Difficulty breathing              Chills and/or fever over 101 F   Persistent vomiting and/or vomiting blood   Severe abdominal pain   Severe chest pain   Black, tarry stools   Bleeding- more than one tablespoon   Any other symptom or condition that you feel may need urgent attention  Your doctor recommends these additional instructions:  If any biopsies were taken, your doctors clinic will contact you in 1 to 2   weeks with any results.  - Discharge patient to home.   - Patient has a contact number available for emergencies.  The signs and   symptoms of potential delayed complications were discussed with the   patient.  Return to normal activities tomorrow.  Written discharge   instructions were provided to the patient.   - Resume previous diet.   - Continue present medications.   - Resume Lovenox (enoxaparin) today and Xarelto (rivaroxaban) today at prior   doses.   - Return to GI clinic at appointment to be scheduled.   For questions, problems or results please call your physician - Armand Foy MD at Work:  [phone].  [facility], EMERGENCY ROOM PHONE NUMBER: [phone]  IF A COMPLICATION OR EMERGENCY SITUATION ARISES AND YOU ARE UNABLE TO REACH   YOUR PHYSICIAN - GO DIRECTLY TO THE EMERGENCY ROOM.  Armand Foy MD  3/28/2022 12:39:12 PM  This report has been verified and signed electronically.  Dear patient,  As a result of recent federal legislation (The Federal Cures Act), you may   receive lab or pathology results from your procedure in your [facility]   account before your physician is able to contact you. Your physician or   their representative will relay the results to you with their   recommendations at their soonest availability.  Thank you,  [employer]ATION

## 2022-03-28 NOTE — INTERVAL H&P NOTE
The patient has been examined and the H&P has been reviewed:    Pre-Procedure H and P Addendum    Patient seen and examined.  History and exam unchanged from prior history and physical.      Procedure: EGD  Indication: Epigastric abdominal pain and dysphagia  ASA Class: per anesthesiology  Airway: normal  Neck Mobility: full range of motion  Mallampatti score: per anesthesia  History of anesthesia problems: no  Family history of anesthesia problems: no  Anesthesia Plan: MAC

## 2022-03-28 NOTE — ANESTHESIA PREPROCEDURE EVALUATION
03/28/2022  Jasson Pineda is a 71 y.o., female.    Patient Active Problem List   Diagnosis    Kidney stone    Herniated disc, cervical    Complete rotator cuff tear or rupture of right shoulder, not specified as traumatic    Shoulder arthritis    OAB (overactive bladder)    Body mass index (BMI) of 25.0 to 29.9    Atypical chest pain    Ineffective esophageal motility    SOB (shortness of breath)    Complete rotator cuff tear or rupture of left shoulder, not specified as traumatic    Impingement syndrome of left shoulder    Primary osteoarthritis of left shoulder    Labral tear of long head of biceps tendon, initial encounter    History of DVT of lower extremity    Chronic pulmonary embolism without acute cor pulmonale    Mixed hyperlipidemia    Nephrolithiasis    Tortuous aorta    Atherosclerosis of aorta    Current use of long term anticoagulation    History of iron deficiency anemia    Esophageal dysphagia         Pre-op Assessment    I have reviewed the Patient Summary Reports.     I have reviewed the Nursing Notes.    I have reviewed the Medications.     Review of Systems  Anesthesia Hx:  No problems with previous Anesthesia    Social:  Non-Smoker, No Alcohol Use    Hematology/Oncology:  Hematology Normal   Oncology Normal     EENT/Dental:EENT/Dental Normal   Cardiovascular:   Exercise tolerance: good NYHA Classification I    Pulmonary:  Pulmonary Normal    Renal/:  Renal/ Normal     Hepatic/GI:  Hepatic/GI Normal    Musculoskeletal:  Musculoskeletal Normal    Neurological:  Neurology Normal    Endocrine:  Endocrine Normal    Dermatological:  Skin Normal    Psych:  Psychiatric Normal           Physical Exam  General: Well nourished    Airway:  Mallampati: I / I  Mouth Opening: Normal  TM Distance: Normal  Tongue: Normal  Neck ROM: Normal  ROM    Dental:  Intact        Anesthesia Plan  Type of Anesthesia, risks & benefits discussed:    Anesthesia Type: Gen Natural Airway  Intra-op Monitoring Plan: Standard ASA Monitors  Post Op Pain Control Plan: multimodal analgesia and IV/PO Opioids PRN  Induction:  IV  Airway Plan: Direct, Post-Induction  Informed Consent: Informed consent signed with the Patient and all parties understand the risks and agree with anesthesia plan.  All questions answered. Patient consented to blood products? Yes  ASA Score: 3  Day of Surgery Review of History & Physical: H&P Update referred to the surgeon/provider.    Ready For Surgery From Anesthesia Perspective.     .    ECHO:  · The left ventricle is normal in size with concentric remodeling and normal systolic function.  · Normal central venous pressure (3 mmHg).  · The estimated PA systolic pressure is 27 mmHg.  · The estimated ejection fraction is 65%.  · Normal left ventricular diastolic function.  · Normal right ventricular size with normal right ventricular systolic function.

## 2022-03-28 NOTE — TRANSFER OF CARE
"Anesthesia Transfer of Care Note    Patient: Jasson Pineda    Procedure(s) Performed: Procedure(s) (LRB):  EGD (ESOPHAGOGASTRODUODENOSCOPY) (N/A)    Patient location: PACU    Anesthesia Type: general    Transport from OR: Transported from OR on room air with adequate spontaneous ventilation    Post pain: adequate analgesia    Post assessment: no apparent anesthetic complications and tolerated procedure well    Post vital signs: stable    Level of consciousness: sedated and responds to stimulation    Nausea/Vomiting: no nausea/vomiting    Complications: none    Transfer of care protocol was followed      Last vitals:   Visit Vitals  BP 92/63 (Patient Position: Lying)   Pulse 76   Temp 36.7 °C (98 °F) (Temporal)   Resp 16   Ht 5' 3" (1.6 m)   Wt 72.6 kg (160 lb)   SpO2 99%   Breastfeeding No   BMI 28.34 kg/m²     "

## 2022-03-28 NOTE — ANESTHESIA POSTPROCEDURE EVALUATION
Anesthesia Post Evaluation    Patient: Jasson Pineda    Procedure(s) Performed: Procedure(s) (LRB):  EGD (ESOPHAGOGASTRODUODENOSCOPY) (N/A)    Final Anesthesia Type: general      Patient location during evaluation: PACU  Patient participation: Yes- Able to Participate  Level of consciousness: awake and alert  Post-procedure vital signs: reviewed and stable  Pain management: adequate  Airway patency: patent    PONV status at discharge: No PONV  Anesthetic complications: no      Cardiovascular status: blood pressure returned to baseline  Respiratory status: spontaneous ventilation and room air  Hydration status: euvolemic  Follow-up not needed.          Vitals Value Taken Time   /70 03/28/22 1317   Temp 36.7 °C (98 °F) 03/28/22 1315   Pulse 81 03/28/22 1321   Resp 16 03/28/22 1315   SpO2 100 % 03/28/22 1321   Vitals shown include unvalidated device data.      No case tracking events are documented in the log.      Pain/Riley Score: Riley Score: 10 (3/28/2022  1:20 PM)

## 2022-03-31 ENCOUNTER — EXTERNAL CHRONIC CARE MANAGEMENT (OUTPATIENT)
Dept: PRIMARY CARE CLINIC | Facility: CLINIC | Age: 72
End: 2022-03-31
Payer: MEDICARE

## 2022-03-31 PROCEDURE — 99490 PR CHRONIC CARE MGMT, 1ST 20 MIN: ICD-10-PCS | Mod: S$PBB,,, | Performed by: INTERNAL MEDICINE

## 2022-03-31 PROCEDURE — 99490 CHRNC CARE MGMT STAFF 1ST 20: CPT | Mod: PBBFAC | Performed by: INTERNAL MEDICINE

## 2022-03-31 PROCEDURE — 99490 CHRNC CARE MGMT STAFF 1ST 20: CPT | Mod: S$PBB,,, | Performed by: INTERNAL MEDICINE

## 2022-04-12 ENCOUNTER — OFFICE VISIT (OUTPATIENT)
Dept: CARDIOLOGY | Facility: CLINIC | Age: 72
End: 2022-04-12
Payer: MEDICARE

## 2022-04-12 VITALS
SYSTOLIC BLOOD PRESSURE: 94 MMHG | HEART RATE: 79 BPM | HEIGHT: 63 IN | DIASTOLIC BLOOD PRESSURE: 60 MMHG | BODY MASS INDEX: 25.98 KG/M2 | WEIGHT: 146.63 LBS | OXYGEN SATURATION: 99 %

## 2022-04-12 DIAGNOSIS — E78.2 MIXED HYPERLIPIDEMIA: ICD-10-CM

## 2022-04-12 DIAGNOSIS — Z79.01 CURRENT USE OF LONG TERM ANTICOAGULATION: ICD-10-CM

## 2022-04-12 DIAGNOSIS — I77.1 TORTUOUS AORTA: ICD-10-CM

## 2022-04-12 DIAGNOSIS — R07.89 ATYPICAL CHEST PAIN: ICD-10-CM

## 2022-04-12 DIAGNOSIS — Z86.718 HISTORY OF DVT OF LOWER EXTREMITY: ICD-10-CM

## 2022-04-12 DIAGNOSIS — I70.0 ATHEROSCLEROSIS OF AORTA: Primary | ICD-10-CM

## 2022-04-12 PROCEDURE — 99213 OFFICE O/P EST LOW 20 MIN: CPT | Mod: PBBFAC,PN | Performed by: INTERNAL MEDICINE

## 2022-04-12 PROCEDURE — 99214 OFFICE O/P EST MOD 30 MIN: CPT | Mod: S$PBB,,, | Performed by: INTERNAL MEDICINE

## 2022-04-12 PROCEDURE — 99999 PR PBB SHADOW E&M-EST. PATIENT-LVL III: CPT | Mod: PBBFAC,,, | Performed by: INTERNAL MEDICINE

## 2022-04-12 PROCEDURE — 99999 PR PBB SHADOW E&M-EST. PATIENT-LVL III: ICD-10-PCS | Mod: PBBFAC,,, | Performed by: INTERNAL MEDICINE

## 2022-04-12 PROCEDURE — 99214 PR OFFICE/OUTPT VISIT, EST, LEVL IV, 30-39 MIN: ICD-10-PCS | Mod: S$PBB,,, | Performed by: INTERNAL MEDICINE

## 2022-04-12 NOTE — PROGRESS NOTES
Cardiology    4/12/2022  9:50 AM    Problem list  Patient Active Problem List   Diagnosis    Kidney stone    Herniated disc, cervical    Complete rotator cuff tear or rupture of right shoulder, not specified as traumatic    Shoulder arthritis    OAB (overactive bladder)    Body mass index (BMI) of 25.0 to 29.9    Atypical chest pain    Ineffective esophageal motility    SOB (shortness of breath)    Complete rotator cuff tear or rupture of left shoulder, not specified as traumatic    Impingement syndrome of left shoulder    Primary osteoarthritis of left shoulder    Labral tear of long head of biceps tendon, initial encounter    History of DVT of lower extremity    Chronic pulmonary embolism without acute cor pulmonale    Mixed hyperlipidemia    Nephrolithiasis    Tortuous aorta    Atherosclerosis of aorta    Current use of long term anticoagulation    History of iron deficiency anemia    Esophageal dysphagia       CC:  No complaints    HPI:  She denies any chest pain, shortness of breath, paroxysmal nocturnal dyspnea.  She denies any bleeding.  She underwent EGD and was found to have a sliding hiatal hernia.  She still has trouble swallowing certain foods.    Medications  Current Outpatient Medications   Medication Sig Dispense Refill    albuterol (PROVENTIL/VENTOLIN HFA) 90 mcg/actuation inhaler Inhale 2 puffs into the lungs every 4 (four) hours as needed for Wheezing. Rescue 1 Inhaler 0    atorvastatin (LIPITOR) 80 MG tablet TAKE 1 TABLET BY MOUTH EVERY DAY 90 tablet 1    EScitalopram oxalate (LEXAPRO) 10 MG tablet Take 1 tablet (10 mg total) by mouth once daily. 90 tablet 2    fluticasone (FLONASE) 50 mcg/actuation nasal spray daily as needed.      omeprazole (PRILOSEC) 40 MG capsule Take 1 capsule (40 mg total) by mouth every morning. 30 capsule 3    oxybutynin (DITROPAN-XL) 10 MG 24 hr tablet Take 1 tablet (10 mg total) by mouth once daily. 30 tablet 11    pregabalin  (LYRICA) 150 MG capsule Take 150 mg by mouth 2 (two) times daily.      traMADol (ULTRAM) 50 mg tablet Take 50 mg by mouth 2 (two) times daily as needed.  1    XARELTO 20 mg Tab Take 1 tablet (20 mg total) by mouth every evening. 90 tablet 3    AFLURIA QUAD [id number], PF, 60 mcg/0.5 mL vaccine ADM 0.5ML IM UTD  0    enoxaparin (LOVENOX) 80 mg/0.8 mL Syrg Inject 0.7 mLs (70 mg total) into the skin 2 (two) times a day. (Patient not taking: Reported on 4/12/2022) 4 each 0    sucralfate (CARAFATE) 100 mg/mL suspension Take 10 mLs (1 g total) by mouth 4 (four) times daily. (Patient not taking: Reported on 4/12/2022) 420 mL 0     No current facility-administered medications for this visit.      Prior to Admission medications    Medication Sig Start Date End Date Taking? Authorizing Provider   albuterol (PROVENTIL/VENTOLIN HFA) 90 mcg/actuation inhaler Inhale 2 puffs into the lungs every 4 (four) hours as needed for Wheezing. Rescue 2/16/19  Yes Roxanne Ayoub NP   atorvastatin (LIPITOR) 80 MG tablet TAKE 1 TABLET BY MOUTH EVERY DAY 1/26/22  Yes Osbaldo Hall MD   EScitalopram oxalate (LEXAPRO) 10 MG tablet Take 1 tablet (10 mg total) by mouth once daily. 2/25/22  Yes Osbaldo Hall MD   fluticasone (FLONASE) 50 mcg/actuation nasal spray daily as needed.   Yes Historical Provider   omeprazole (PRILOSEC) 40 MG capsule Take 1 capsule (40 mg total) by mouth every morning. 1/8/22  Yes Maicol Ramires MD   oxybutynin (DITROPAN-XL) 10 MG 24 hr tablet Take 1 tablet (10 mg total) by mouth once daily. 4/19/21 4/19/22 Yes Chantel Boyle NP   pregabalin (LYRICA) 150 MG capsule Take 150 mg by mouth 2 (two) times daily.   Yes Historical Provider   traMADol (ULTRAM) 50 mg tablet Take 50 mg by mouth 2 (two) times daily as needed. 10/26/18  Yes Historical Provider   XARELTO 20 mg Tab Take 1 tablet (20 mg total) by mouth every evening. 10/12/21  Yes Sotero Doty MD   AFLURIA QUAD [id number], PF, 60 mcg/0.5 mL vaccine  ADM 0.5ML IM UTD 10/8/18   Historical Provider   enoxaparin (LOVENOX) 80 mg/0.8 mL Syrg Inject 0.7 mLs (70 mg total) into the skin 2 (two) times a day.  Patient not taking: Reported on 2022   Sotero Doty MD   sucralfate (CARAFATE) 100 mg/mL suspension Take 10 mLs (1 g total) by mouth 4 (four) times daily.  Patient not taking: Reported on 2022 3/20/22   Marlene Wilson MD         History  Past Medical History:   Diagnosis Date    Anticoagulant long-term use     xarelto    Chronic back pain     Chronic neck pain     DVT (deep venous thrombosis)     Herniated disc, cervical     Kidney stone     Lumbar herniated disc     PE (pulmonary embolism)         Pulmonary emboli     after surgery    Pulmonary embolism      Past Surgical History:   Procedure Laterality Date    ARTHROSCOPIC REPAIR OF ROTATOR CUFF OF SHOULDER Left 2019    Procedure: REPAIR, ROTATOR CUFF, ARTHROSCOPIC;  Surgeon: Sedrick Patel MD;  Location: [facility];  Service: Orthopedics;  Laterality: Left;    ARTHROSCOPIC TENOTOMY OF BICEPS TENDON Left 2019    Procedure: TENOTOMY, BICEPS, ARTHROSCOPIC;  Surgeon: Sedrick Patel MD;  Location: [facility] OR;  Service: Orthopedics;  Laterality: Left;    ARTHROSCOPY OF SHOULDER WITH REMOVAL OF DISTAL CLAVICLE Left 2019    Procedure: ARTHROSCOPY, SHOULDER, WITH DISTAL CLAVICLE EXCISION;  Surgeon: Sedrick Patel MD;  Location: [facility];  Service: Orthopedics;  Laterality: Left;    BREAST CYST EXCISION Left      SECTION      x1    COLONOSCOPY N/A 2018    Procedure: COLONOSCOPY;  Surgeon: Armand Foy MD;  Location: [facility] ([address] [place] [address]);  Service: Endoscopy;  Laterality: N/A;    ESOPHAGEAL MANOMETRY WITH MEASUREMENT OF IMPEDANCE N/A 2018    Procedure: MANOMETRY, ESOPHAGEAL, WITH IMPEDANCE MEASUREMENT;  Surgeon: Armand Foy MD;  Location: [facility] (4TH FLR);  Service: Endoscopy;  Laterality: N/A;    ESOPHAGOGASTRODUODENOSCOPY N/A  7/17/2018    Procedure: EGD (ESOPHAGOGASTRODUODENOSCOPY);  Surgeon: Armand Foy MD;  Location: [facility] ENDO (4TH FLR);  Service: Endoscopy;  Laterality: N/A;  pt currently on Lovenox and Xarelto - ok per Dr. Doty to hold Plavix 2 days prior and Lovenox 24hr prior to case/see scanned media 7/9/ for documentation of hold -- SM        ESOPHAGOGASTRODUODENOSCOPY N/A 3/28/2022    Procedure: EGD (ESOPHAGOGASTRODUODENOSCOPY);  Surgeon: Armand Foy MD;  Location: [facility] ENDO (2ND FLR);  Service: Endoscopy;  Laterality: N/A;  okay to hold Xarelto for 2 days per Dr. Doty with lovenox bridgings - see note on 3/21/22  2nd floor for ASAP availabilty  fully vaccinated - sm    EXTRACORPOREAL SHOCK WAVE LITHOTRIPSY Left 2/9/2021    Procedure: LITHOTRIPSY-EXTRACORPOREAL SHOCK WAVE;  Surgeon: Jeremias Eric MD;  Location: [facility];  Service: Urology;  Laterality: Left;    HERNIA REPAIR      umbilical    KIDNEY STONE SURGERY      ureteral stent    left knee surgery      SHOULDER ARTHROSCOPY Left 6/11/2019    Procedure: ARTHROSCOPY, SHOULDER;  Surgeon: Sedrick Patel MD;  Location: [facility];  Service: Orthopedics;  Laterality: Left;  BLOCK    TONSILLECTOMY      TOTAL SHOULDER ARTHROPLASTY Right      Social History     Socioeconomic History    Marital status:    Occupational History    Occupation: teacher      Comment:     Tobacco Use    Smoking status: Never Smoker    Smokeless tobacco: Never Used   Substance and Sexual Activity    Alcohol use: No    Drug use: No    Sexual activity: Not Currently     Partners: Male         Allergies  Review of patient's allergies indicates:   Allergen Reactions    Sulfa (sulfonamide antibiotics) Rash         Review of Systems   Review of Systems   Constitutional: Negative for decreased appetite, fever and weight loss.   HENT: Negative for congestion and nosebleeds.    Eyes: Negative for double vision, vision loss in left eye, vision loss in  right eye and visual disturbance.   Cardiovascular: Negative for chest pain, claudication, cyanosis, dyspnea on exertion, irregular heartbeat, leg swelling, near-syncope, orthopnea, palpitations, paroxysmal nocturnal dyspnea and syncope.   Respiratory: Negative for cough, hemoptysis, shortness of breath, sleep disturbances due to breathing, snoring, sputum production and wheezing.    Endocrine: Negative for cold intolerance and heat intolerance.   Skin: Negative for nail changes and rash.   Musculoskeletal: Negative for joint pain, muscle cramps, muscle weakness and myalgias.   Gastrointestinal: Negative for change in bowel habit, heartburn, hematemesis, hematochezia, hemorrhoids and melena.   Neurological: Negative for dizziness, focal weakness and headaches.         Physical Exam  Wt Readings from Last 1 Encounters:   04/12/22 66.5 kg (146 lb 9.7 oz)     BP Readings from Last 3 Encounters:   04/12/22 94/60   03/28/22 118/70   03/20/22 120/82     Pulse Readings from Last 1 Encounters:   04/12/22 79     Body mass index is 25.97 kg/m².    Physical Exam  Vitals reviewed.   Constitutional:       Appearance: She is well-developed.   Neck:      Vascular: No carotid bruit or JVD.   Cardiovascular:      Rate and Rhythm: Normal rate and regular rhythm.      Pulses:           Carotid pulses are 2+ on the right side and 2+ on the left side.       Radial pulses are 2+ on the right side and 2+ on the left side.        Dorsalis pedis pulses are 2+ on the right side and 2+ on the left side.      Heart sounds: Normal heart sounds, S1 normal and S2 normal.   Abdominal:      General: Bowel sounds are normal.   Neurological:      Mental Status: She is alert and oriented to person, place, and time.             Assessment  1. Atherosclerosis of aorta  Stable    2. Tortuous aorta  Stable    3. Mixed hyperlipidemia  On statin, improving  - Comprehensive Metabolic Panel; Future  - Lipid Panel; Future    4. Current use of long term  anticoagulation  Stable, no bleeding    5. History of DVT of lower extremity  Stable on anticoagulation    6. Atypical chest pain  Resolved        Plan and Discussion  Continue current medications.  Encouraged to follow a low-fat diet.  Follow-up with GI for swallowing difficulty.    Follow Up  Six months with labs      Sotero Doty MD, F.A.C.C, F.S.C.A.I.

## 2022-04-30 ENCOUNTER — EXTERNAL CHRONIC CARE MANAGEMENT (OUTPATIENT)
Dept: PRIMARY CARE CLINIC | Facility: CLINIC | Age: 72
End: 2022-04-30
Payer: MEDICARE

## 2022-04-30 PROCEDURE — 99490 PR CHRONIC CARE MGMT, 1ST 20 MIN: ICD-10-PCS | Mod: S$PBB,,, | Performed by: INTERNAL MEDICINE

## 2022-04-30 PROCEDURE — 99490 CHRNC CARE MGMT STAFF 1ST 20: CPT | Mod: PBBFAC | Performed by: INTERNAL MEDICINE

## 2022-04-30 PROCEDURE — 99490 CHRNC CARE MGMT STAFF 1ST 20: CPT | Mod: S$PBB,,, | Performed by: INTERNAL MEDICINE

## 2022-05-17 ENCOUNTER — OFFICE VISIT (OUTPATIENT)
Dept: GASTROENTEROLOGY | Facility: CLINIC | Age: 72
End: 2022-05-17
Payer: MEDICARE

## 2022-05-17 VITALS
HEART RATE: 96 BPM | WEIGHT: 145.5 LBS | HEIGHT: 63 IN | DIASTOLIC BLOOD PRESSURE: 65 MMHG | BODY MASS INDEX: 25.78 KG/M2 | SYSTOLIC BLOOD PRESSURE: 95 MMHG

## 2022-05-17 DIAGNOSIS — R13.19 ESOPHAGEAL DYSPHAGIA: ICD-10-CM

## 2022-05-17 DIAGNOSIS — K22.4 INEFFECTIVE ESOPHAGEAL MOTILITY: Primary | ICD-10-CM

## 2022-05-17 DIAGNOSIS — K44.9 HIATAL HERNIA: ICD-10-CM

## 2022-05-17 DIAGNOSIS — K21.9 GASTROESOPHAGEAL REFLUX DISEASE WITHOUT ESOPHAGITIS: ICD-10-CM

## 2022-05-17 PROCEDURE — 99213 OFFICE O/P EST LOW 20 MIN: CPT | Mod: S$PBB,,, | Performed by: INTERNAL MEDICINE

## 2022-05-17 PROCEDURE — 99999 PR PBB SHADOW E&M-EST. PATIENT-LVL III: CPT | Mod: PBBFAC,,, | Performed by: INTERNAL MEDICINE

## 2022-05-17 PROCEDURE — 99999 PR PBB SHADOW E&M-EST. PATIENT-LVL III: ICD-10-PCS | Mod: PBBFAC,,, | Performed by: INTERNAL MEDICINE

## 2022-05-17 PROCEDURE — 99213 OFFICE O/P EST LOW 20 MIN: CPT | Mod: PBBFAC | Performed by: INTERNAL MEDICINE

## 2022-05-17 PROCEDURE — 99213 PR OFFICE/OUTPT VISIT, EST, LEVL III, 20-29 MIN: ICD-10-PCS | Mod: S$PBB,,, | Performed by: INTERNAL MEDICINE

## 2022-05-17 RX ORDER — OMEPRAZOLE 20 MG/1
20 CAPSULE, DELAYED RELEASE ORAL
Qty: 180 CAPSULE | Refills: 1 | Status: SHIPPED | OUTPATIENT
Start: 2022-05-17 | End: 2022-05-24

## 2022-05-17 NOTE — PROGRESS NOTES
[facility] Established Patient Visit    Reason for Visit:    Chief Complaint   Patient presents with    Follow-up    Abdominal Pain     When eating and right after eating       Dysphagia     Food feels trap        PCP: Osbaldo Hall      HPI:  Jasson Pineda is a 71 y.o. female here for follow-up of dysphagia.  I last saw her 2022 for the same.  She was also having epigastric abdominal pain.  Her dysphagia has not improved.  Food is still getting stuck at times.  She ran out of her omeprazole during which time her symptoms worsened.  She denies heartburn.  She avoids citrus foods, chocolate, fried foods, tomato based foods, carbonated beverages, and ice cream.  She denies having significant swallowing problems with liquids.  She has been eating softer foods which helps.  She has discomfort in the upper part of the abdomen that occurs after eating.  Her weight has been stable.    EGD 2022 revealed a 1 cm hiatal hernia.  Otherwise, the exam of the upper GI tract was unremarkable.          ROS:  Constitutional: No fevers, chills, No weight loss, normal appetite  GI: see HPI          PMHX:  has a past medical history of Anticoagulant long-term use, Chronic back pain, Chronic neck pain, DVT (deep venous thrombosis), Herniated disc, cervical, Kidney stone, Lumbar herniated disc, PE (pulmonary embolism), Pulmonary emboli, and Pulmonary embolism.    PSHX:  has a past surgical history that includes Kidney stone surgery; left knee surgery; Tonsillectomy;  section; Hernia repair; Total shoulder arthroplasty (Right); Breast cyst excision (Left); Esophagogastroduodenoscopy (N/A, 2018); Colonoscopy (N/A, 2018); Esophageal manometry with measurement of impedance (N/A, 2018); Shoulder arthroscopy (Left, 2019); Arthroscopic repair of rotator cuff of shoulder (Left, 2019); Arthroscopy of shoulder with removal of distal clavicle (Left, 2019);  Arthroscopic tenotomy of biceps tendon (Left, 6/11/2019); Extracorporeal shock wave lithotripsy (Left, 2/9/2021); and Esophagogastroduodenoscopy (N/A, 3/28/2022).    The patient's social and family histories were reviewed by me and updated in the appropriate section of the electronic medical record.    Review of patient's allergies indicates:   Allergen Reactions    Sulfa (sulfonamide antibiotics) Rash       Prior to Admission medications    Medication Sig Start Date End Date Taking? Authorizing Provider   EScitalopram oxalate (LEXAPRO) 10 MG tablet Take 1 tablet (10 mg total) by mouth once daily. 2/25/22  Yes Osbaldo Hall MD   fluticasone (FLONASE) 50 mcg/actuation nasal spray daily as needed.   Yes Historical Provider   pregabalin (LYRICA) 150 MG capsule Take 150 mg by mouth 2 (two) times daily.   Yes Historical Provider   traMADol (ULTRAM) 50 mg tablet Take 50 mg by mouth 2 (two) times daily as needed. 10/26/18  Yes Historical Provider   XARELTO 20 mg Tab Take 1 tablet (20 mg total) by mouth every evening. 10/12/21  Yes Sotero Doty MD   albuterol (PROVENTIL/VENTOLIN HFA) 90 mcg/actuation inhaler Inhale 2 puffs into the lungs every 4 (four) hours as needed for Wheezing. Rescue  Patient not taking: Reported on 5/17/2022 2/16/19   Roxanne Ayoub NP   atorvastatin (LIPITOR) 80 MG tablet TAKE 1 TABLET BY MOUTH EVERY DAY  Patient not taking: Reported on 5/17/2022 1/26/22   Osbaldo Hall MD   omeprazole (PRILOSEC) 20 MG capsule Take 1 capsule (20 mg total) by mouth 2 (two) times daily before meals. 5/17/22 11/13/22  Armand Foy MD   oxybutynin (DITROPAN-XL) 10 MG 24 hr tablet Take 1 tablet (10 mg total) by mouth once daily. 4/19/21 4/19/22  Chantel Boyle NP   AFLURIA QUAD [id number], PF, 60 mcg/0.5 mL vaccine ADM 0.5ML IM UTD 10/8/18 5/17/22  Historical Provider   enoxaparin (LOVENOX) 80 mg/0.8 mL Syrg Inject 0.7 mLs (70 mg total) into the skin 2 (two) times a day. 1/25/22 5/17/22  Sotero MARYBEL  "MD Lalitha   omeprazole (PRILOSEC) 40 MG capsule Take 1 capsule (40 mg total) by mouth every morning.  Patient not taking: Reported on 5/17/2022 1/8/22 5/17/22  Maicol Ramires MD   sucralfate (CARAFATE) 100 mg/mL suspension Take 10 mLs (1 g total) by mouth 4 (four) times daily. 3/20/22 5/17/22  Marlene Wilson MD         Objective Findings:  Vital Signs:  BP 95/65   Pulse 96   Ht 5' 3" (1.6 m)   Wt 66 kg (145 lb 8.1 oz)   BMI 25.77 kg/m²  Body mass index is 25.77 kg/m².      Physical Exam:  General Appearance:  Well appearing in no acute distress, appears stated age          Labs:  Lab Results   Component Value Date    WBC 6.67 03/20/2022    HGB 11.9 (L) 03/20/2022    HCT 35.9 (L) 03/20/2022    MCV 81 (L) 03/20/2022    RDW 16.1 (H) 03/20/2022     03/20/2022    GRAN 3.6 03/20/2022    GRAN 54.2 03/20/2022    LYMPH 2.4 03/20/2022    LYMPH 36.4 03/20/2022    MONO 0.5 03/20/2022    MONO 7.8 03/20/2022    EOS 0.1 03/20/2022    BASO 0.02 03/20/2022     Lab Results   Component Value Date     03/20/2022    K 3.5 03/20/2022     03/20/2022    CO2 20 (L) 03/20/2022    GLU 87 03/20/2022    BUN 7 (L) 03/20/2022    CREATININE 0.9 03/20/2022    CALCIUM 9.6 03/20/2022    PROT 7.6 03/20/2022    ALBUMIN 4.0 03/20/2022    BILITOT 0.6 03/20/2022    ALKPHOS 65 03/20/2022    AST 26 03/20/2022    ALT 49 (H) 03/20/2022                   Assessment:  Jasson Pineda is a 71 y.o. female here with:  1. Ineffective esophageal motility    2. Esophageal dysphagia    3. Gastroesophageal reflux disease without esophagitis    4. Hiatal hernia      History of ineffective esophageal motility which is likely the cause of her dysphagia symptoms.  Even though she does not have classic heartburn symptoms, reflux may be contributing to some of the abdominal discomfort after eating.  Her upper endoscopy was generally unremarkable except for a hiatal hernia, which was small.  I would not expect the hiatal hernia because many " symptoms; however, this could contribute to reflux.  She has been on omeprazole 40 mg daily.      Recommendations:  I was split her omeprazole dose from 40 mg daily to 20 mg twice daily, taken before meals.  This may provide a more consistent level of acid control.  We discussed that her symptoms may not be necessarily curable, but manageable.  We reviewed dietary changes with frequent small bites and liquid between bites.  If no improvement, we may need to have her see an esophageal specialist for a 2nd opinion.    Follow-up 3 months      Order summary:  Orders Placed This Encounter    omeprazole (PRILOSEC) 20 MG capsule           Armand Foy MD

## 2022-05-18 ENCOUNTER — PATIENT OUTREACH (OUTPATIENT)
Dept: ADMINISTRATIVE | Facility: OTHER | Age: 72
End: 2022-05-18
Payer: MEDICARE

## 2022-05-18 ENCOUNTER — OFFICE VISIT (OUTPATIENT)
Dept: OBSTETRICS AND GYNECOLOGY | Facility: CLINIC | Age: 72
End: 2022-05-18
Attending: INTERNAL MEDICINE
Payer: MEDICARE

## 2022-05-18 ENCOUNTER — LAB VISIT (OUTPATIENT)
Dept: LAB | Facility: OTHER | Age: 72
End: 2022-05-18
Attending: INTERNAL MEDICINE
Payer: MEDICARE

## 2022-05-18 ENCOUNTER — OFFICE VISIT (OUTPATIENT)
Dept: INTERNAL MEDICINE | Facility: CLINIC | Age: 72
End: 2022-05-18
Attending: INTERNAL MEDICINE
Payer: MEDICARE

## 2022-05-18 VITALS
SYSTOLIC BLOOD PRESSURE: 106 MMHG | BODY MASS INDEX: 25.75 KG/M2 | OXYGEN SATURATION: 99 % | HEART RATE: 89 BPM | WEIGHT: 145.31 LBS | DIASTOLIC BLOOD PRESSURE: 62 MMHG | HEIGHT: 63 IN

## 2022-05-18 VITALS
WEIGHT: 143.31 LBS | DIASTOLIC BLOOD PRESSURE: 70 MMHG | HEIGHT: 63 IN | BODY MASS INDEX: 25.39 KG/M2 | SYSTOLIC BLOOD PRESSURE: 110 MMHG

## 2022-05-18 DIAGNOSIS — I70.0 ATHEROSCLEROSIS OF AORTA: ICD-10-CM

## 2022-05-18 DIAGNOSIS — R68.89 FORGETFULNESS: ICD-10-CM

## 2022-05-18 DIAGNOSIS — Z86.2 HISTORY OF IRON DEFICIENCY ANEMIA: ICD-10-CM

## 2022-05-18 DIAGNOSIS — R71.8 MICROCYTOSIS: Primary | ICD-10-CM

## 2022-05-18 DIAGNOSIS — R13.19 ESOPHAGEAL DYSPHAGIA: ICD-10-CM

## 2022-05-18 DIAGNOSIS — R07.89 ATYPICAL CHEST PAIN: ICD-10-CM

## 2022-05-18 DIAGNOSIS — R27.8 OTHER LACK OF COORDINATION: ICD-10-CM

## 2022-05-18 DIAGNOSIS — R71.8 MICROCYTOSIS: ICD-10-CM

## 2022-05-18 DIAGNOSIS — R79.9 ABNORMAL FINDING OF BLOOD CHEMISTRY, UNSPECIFIED: ICD-10-CM

## 2022-05-18 DIAGNOSIS — Z01.419 WELL WOMAN EXAM WITH ROUTINE GYNECOLOGICAL EXAM: Primary | ICD-10-CM

## 2022-05-18 DIAGNOSIS — Z79.01 CURRENT USE OF LONG TERM ANTICOAGULATION: ICD-10-CM

## 2022-05-18 DIAGNOSIS — N32.81 OAB (OVERACTIVE BLADDER): ICD-10-CM

## 2022-05-18 DIAGNOSIS — Z12.4 PAP SMEAR FOR CERVICAL CANCER SCREENING: ICD-10-CM

## 2022-05-18 DIAGNOSIS — M85.80 OSTEOPENIA, UNSPECIFIED LOCATION: ICD-10-CM

## 2022-05-18 DIAGNOSIS — K22.4 INEFFECTIVE ESOPHAGEAL MOTILITY: ICD-10-CM

## 2022-05-18 DIAGNOSIS — Z78.0 POST-MENOPAUSAL: ICD-10-CM

## 2022-05-18 DIAGNOSIS — M50.20 HERNIATED DISC, CERVICAL: ICD-10-CM

## 2022-05-18 DIAGNOSIS — Z01.419 WELL WOMAN EXAM: ICD-10-CM

## 2022-05-18 LAB
25(OH)D3+25(OH)D2 SERPL-MCNC: 21 NG/ML (ref 30–96)
ALBUMIN SERPL BCP-MCNC: 4.2 G/DL (ref 3.5–5.2)
ALP SERPL-CCNC: 68 U/L (ref 55–135)
ALT SERPL W/O P-5'-P-CCNC: 20 U/L (ref 10–44)
ANION GAP SERPL CALC-SCNC: 10 MMOL/L (ref 8–16)
AST SERPL-CCNC: 21 U/L (ref 10–40)
BASOPHILS # BLD AUTO: 0.02 K/UL (ref 0–0.2)
BASOPHILS NFR BLD: 0.4 % (ref 0–1.9)
BILIRUB SERPL-MCNC: 0.6 MG/DL (ref 0.1–1)
BUN SERPL-MCNC: 10 MG/DL (ref 8–23)
CALCIUM SERPL-MCNC: 10.1 MG/DL (ref 8.7–10.5)
CHLORIDE SERPL-SCNC: 106 MMOL/L (ref 95–110)
CO2 SERPL-SCNC: 26 MMOL/L (ref 23–29)
CREAT SERPL-MCNC: 0.9 MG/DL (ref 0.5–1.4)
DIFFERENTIAL METHOD: ABNORMAL
EOSINOPHIL # BLD AUTO: 0.1 K/UL (ref 0–0.5)
EOSINOPHIL NFR BLD: 1.2 % (ref 0–8)
ERYTHROCYTE [DISTWIDTH] IN BLOOD BY AUTOMATED COUNT: 15.4 % (ref 11.5–14.5)
EST. GFR  (AFRICAN AMERICAN): >60 ML/MIN/1.73 M^2
EST. GFR  (NON AFRICAN AMERICAN): >60 ML/MIN/1.73 M^2
ESTIMATED AVG GLUCOSE: 111 MG/DL (ref 68–131)
FERRITIN SERPL-MCNC: 20 NG/ML (ref 20–300)
FOLATE SERPL-MCNC: 8.1 NG/ML (ref 4–24)
GLUCOSE SERPL-MCNC: 83 MG/DL (ref 70–110)
HBA1C MFR BLD: 5.5 % (ref 4–5.6)
HCT VFR BLD AUTO: 38.4 % (ref 37–48.5)
HGB BLD-MCNC: 12.1 G/DL (ref 12–16)
IMM GRANULOCYTES # BLD AUTO: 0.01 K/UL (ref 0–0.04)
IMM GRANULOCYTES NFR BLD AUTO: 0.2 % (ref 0–0.5)
IRON SERPL-MCNC: 69 UG/DL (ref 30–160)
LYMPHOCYTES # BLD AUTO: 1.4 K/UL (ref 1–4.8)
LYMPHOCYTES NFR BLD: 27.7 % (ref 18–48)
MCH RBC QN AUTO: 26.5 PG (ref 27–31)
MCHC RBC AUTO-ENTMCNC: 31.5 G/DL (ref 32–36)
MCV RBC AUTO: 84 FL (ref 82–98)
MONOCYTES # BLD AUTO: 0.4 K/UL (ref 0.3–1)
MONOCYTES NFR BLD: 7.1 % (ref 4–15)
NEUTROPHILS # BLD AUTO: 3.3 K/UL (ref 1.8–7.7)
NEUTROPHILS NFR BLD: 63.4 % (ref 38–73)
NRBC BLD-RTO: 0 /100 WBC
PLATELET # BLD AUTO: 270 K/UL (ref 150–450)
PMV BLD AUTO: 11.9 FL (ref 9.2–12.9)
POTASSIUM SERPL-SCNC: 4.4 MMOL/L (ref 3.5–5.1)
PROT SERPL-MCNC: 7.9 G/DL (ref 6–8.4)
RBC # BLD AUTO: 4.56 M/UL (ref 4–5.4)
SATURATED IRON: 16 % (ref 20–50)
SODIUM SERPL-SCNC: 142 MMOL/L (ref 136–145)
TOTAL IRON BINDING CAPACITY: 432 UG/DL (ref 250–450)
TRANSFERRIN SERPL-MCNC: 292 MG/DL (ref 200–375)
TSH SERPL DL<=0.005 MIU/L-ACNC: 0.9 UIU/ML (ref 0.4–4)
VIT B12 SERPL-MCNC: 575 PG/ML (ref 210–950)
WBC # BLD AUTO: 5.19 K/UL (ref 3.9–12.7)

## 2022-05-18 PROCEDURE — 82607 VITAMIN B-12: CPT | Performed by: INTERNAL MEDICINE

## 2022-05-18 PROCEDURE — 99999 PR PBB SHADOW E&M-EST. PATIENT-LVL IV: ICD-10-PCS | Mod: PBBFAC,,, | Performed by: NURSE PRACTITIONER

## 2022-05-18 PROCEDURE — 88175 CYTOPATH C/V AUTO FLUID REDO: CPT | Performed by: NURSE PRACTITIONER

## 2022-05-18 PROCEDURE — 82746 ASSAY OF FOLIC ACID SERUM: CPT | Performed by: INTERNAL MEDICINE

## 2022-05-18 PROCEDURE — 87624 HPV HI-RISK TYP POOLED RSLT: CPT | Performed by: NURSE PRACTITIONER

## 2022-05-18 PROCEDURE — 80053 COMPREHEN METABOLIC PANEL: CPT | Performed by: INTERNAL MEDICINE

## 2022-05-18 PROCEDURE — 99214 OFFICE O/P EST MOD 30 MIN: CPT | Mod: PBBFAC | Performed by: NURSE PRACTITIONER

## 2022-05-18 PROCEDURE — 99999 PR PBB SHADOW E&M-EST. PATIENT-LVL IV: CPT | Mod: PBBFAC,,, | Performed by: NURSE PRACTITIONER

## 2022-05-18 PROCEDURE — 85025 COMPLETE CBC W/AUTO DIFF WBC: CPT | Performed by: INTERNAL MEDICINE

## 2022-05-18 PROCEDURE — 82306 VITAMIN D 25 HYDROXY: CPT | Performed by: INTERNAL MEDICINE

## 2022-05-18 PROCEDURE — 86592 SYPHILIS TEST NON-TREP QUAL: CPT | Performed by: INTERNAL MEDICINE

## 2022-05-18 PROCEDURE — G0101 CA SCREEN;PELVIC/BREAST EXAM: HCPCS | Mod: S$PBB,,, | Performed by: NURSE PRACTITIONER

## 2022-05-18 PROCEDURE — 84466 ASSAY OF TRANSFERRIN: CPT | Performed by: INTERNAL MEDICINE

## 2022-05-18 PROCEDURE — 99999 PR PBB SHADOW E&M-EST. PATIENT-LVL III: ICD-10-PCS | Mod: PBBFAC,,, | Performed by: INTERNAL MEDICINE

## 2022-05-18 PROCEDURE — 99999 PR PBB SHADOW E&M-EST. PATIENT-LVL III: CPT | Mod: PBBFAC,,, | Performed by: INTERNAL MEDICINE

## 2022-05-18 PROCEDURE — 99215 OFFICE O/P EST HI 40 MIN: CPT | Mod: S$PBB,,, | Performed by: INTERNAL MEDICINE

## 2022-05-18 PROCEDURE — 83036 HEMOGLOBIN GLYCOSYLATED A1C: CPT | Performed by: INTERNAL MEDICINE

## 2022-05-18 PROCEDURE — 84443 ASSAY THYROID STIM HORMONE: CPT | Performed by: INTERNAL MEDICINE

## 2022-05-18 PROCEDURE — 82728 ASSAY OF FERRITIN: CPT | Performed by: INTERNAL MEDICINE

## 2022-05-18 PROCEDURE — G0101 PR CA SCREEN;PELVIC/BREAST EXAM: ICD-10-PCS | Mod: S$PBB,,, | Performed by: NURSE PRACTITIONER

## 2022-05-18 PROCEDURE — 99213 OFFICE O/P EST LOW 20 MIN: CPT | Mod: PBBFAC,27 | Performed by: INTERNAL MEDICINE

## 2022-05-18 PROCEDURE — 99215 PR OFFICE/OUTPT VISIT, EST, LEVL V, 40-54 MIN: ICD-10-PCS | Mod: S$PBB,,, | Performed by: INTERNAL MEDICINE

## 2022-05-18 PROCEDURE — 36415 COLL VENOUS BLD VENIPUNCTURE: CPT | Performed by: INTERNAL MEDICINE

## 2022-05-18 RX ORDER — TIZANIDINE 4 MG/1
TABLET ORAL
COMMUNITY
Start: 2022-03-21

## 2022-05-18 RX ORDER — PREGABALIN 25 MG/1
25 CAPSULE ORAL
COMMUNITY
End: 2022-09-01

## 2022-05-18 RX ORDER — TRAMADOL HYDROCHLORIDE 50 MG/1
50 TABLET ORAL
COMMUNITY
End: 2022-08-10 | Stop reason: SDUPTHER

## 2022-05-18 NOTE — PROGRESS NOTES
Subjective:       Patient ID: Jasson Pineda is a 71 y.o. female.    Chief Complaint: Shortness of Breath (6mth f/u)    Here for f/u    Was on omeprazole but stopped after Rxc ran out. Had EGD Normal aside from 1 cm type-I sliding hiatal hernia. Continues to get epigastric pain and discomfort with meals and dysphagia with solids. She is drinking smoothies for most of her meals lately. Symptoms present since 2018. HPI below. Saw Dr Foy yesterday. Will f/u his plan for pt dysmotility and dysphagia symptoms. We are going to make sure her SSRI is not contributing to any of her GI presentation and monitor her mood.    Only members have told her she has been forgetful.  Once she is prompted she is able to recall.  She lives alone, often helped takes care of her knees, able to maintain her finances and pay bills without any difficulty, cook, armani, Dr. Dex.  Capable of full ADLs.  She takes tizanidine 4 mg q.h.s. along with tramadol 50 mg b.i.d. pregabalin 150 mg b.i.d. and oxybutynin 10 mg.  These medications and their side effects were discussed in the context of forgetfulness.      ### atypical CP ###  Consistently occurs when she lies down on her back she get back pain penetrating to her chest. She can not sleep on her elft side due to shouilder pain. She sleep on her righ side. When she lies flat on her back she develops a sharp, intense pain that radiates to her chest. No associated PND, orthopnea, wheezing, SOB, LE edema.   Was seen by cardiology and had holter done 07/27/2021 that was normal. Does not repsond to albuterol.  Normal exercise stress test July 2018. Hx of recurrent PE.   5/18/22 IM CV previously referred to back and spine as I suspect atypical, positional chest is thoracic or paraspinal.      Low back pain  [facility]  Ortho [facility]al for knee    ### Recurrent PE ###  Anticoagulation managed by Dr shaikh in cardiology.  No bleeding complications.      ### Nephrolithiasis -- OAB  "###  Gross hematuria recurrent and urinary urgency.   12/22/20 CT urogram normal.   CTU shows 9mm non-obstructing left renal stone.   02/09/2021 Left ESWL   OAB She began ditropan 5 mg XL daily    5/18/22 oxybutynin has been increased to 10mg     ### Dysphagia ###  7/2018 normal EGD by Dr Foy at [facility]-  07/2018 manometry "Ineffective esophageal motility (IEM: 50% or more ineffective swallows with DCI less than 450 mmHg/sec/cm).  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 There is a lower esophageal Schatzki's ring.   10/2019 ENT Dr Burgos "Variably obstructive CP bar, but symptoms are primarily thoracic/epigastic."  12/2019 GI clinic visit "This could be functional.  Other considerations could be untreated reflux or even esophageal dysmotility."  She continues to have periodic dysphagia to solids requiring her to drink a glass of water to get down.  No hx of recurrent oral ulcers, skin changes, Hx of misscarriages, chronic loose stools or abdomina pains, diffuse arthralgias, Hx of pericarditis. She is on lexapro and lyrica. Mood improved an is interested in weaning from this.   1/15/21 IM CV no acute issues  3/28/2022 EGD by Dr Foy at [facility]. Normal aside from 1 cm type-I sliding hiatal hernia           Review of Systems   Constitutional: Negative for chills, fatigue, fever and unexpected weight change.   HENT: Negative for ear pain, hearing loss, postnasal drip, tinnitus, trouble swallowing and voice change.    Respiratory: Negative for cough, chest tightness, shortness of breath and wheezing.    Cardiovascular: Positive for chest pain. Negative for palpitations and leg swelling.   Gastrointestinal: Positive for abdominal pain. Negative for blood in stool, diarrhea, nausea and vomiting.   Endocrine: Negative for polydipsia, polyphagia and polyuria.   Genitourinary: Negative for difficulty urinating, dysuria, hematuria and vaginal bleeding.   Musculoskeletal: Positive for arthralgias " "and back pain.   Skin: Negative for rash.   Allergic/Immunologic: Negative for food allergies.   Neurological: Negative for dizziness, numbness and headaches.   Hematological: Does not bruise/bleed easily.   Psychiatric/Behavioral: The patient is not nervous/anxious.        Objective:      Vitals:    05/18/22 0845   BP: 106/62   Pulse: 89   SpO2: 99%   Weight: 65.9 kg (145 lb 4.5 oz)   Height: 5' 3" (1.6 m)      Physical Exam  Constitutional:       General: She is not in acute distress.     Appearance: She is well-developed.   HENT:      Head: Normocephalic and atraumatic.      Mouth/Throat:      Pharynx: No oropharyngeal exudate.   Eyes:      General: No scleral icterus.     Conjunctiva/sclera: Conjunctivae normal.      Pupils: Pupils are equal, round, and reactive to light.   Neck:      Thyroid: No thyromegaly.   Cardiovascular:      Rate and Rhythm: Normal rate and regular rhythm.      Heart sounds: Normal heart sounds. No murmur heard.  Pulmonary:      Effort: Pulmonary effort is normal.      Breath sounds: Normal breath sounds. No wheezing or rales.   Abdominal:      General: There is no distension.      Palpations: Abdomen is soft.      Tenderness: There is no abdominal tenderness.   Musculoskeletal:         General: No tenderness.   Lymphadenopathy:      Cervical: No cervical adenopathy.   Skin:     General: Skin is warm and dry.   Neurological:      Mental Status: She is alert and oriented to person, place, and time.   Psychiatric:         Behavior: Behavior normal.         Assessment:       1. Microcytosis    2. Well woman exam    3. Forgetfulness    4. Other lack of coordination     5. Esophageal dysphagia    6. History of iron deficiency anemia    7. Current use of long term anticoagulation    8. Atherosclerosis of aorta    9. Ineffective esophageal motility    10. Atypical chest pain    11. OAB (overactive bladder)    12. Herniated disc, cervical    13. Abnormal finding of blood chemistry, unspecified   "   14. Osteopenia, unspecified location    15. Post-menopausal        Plan:       Jasson was seen today for shortness of breath.    Diagnoses and all orders for this visit:    Microcytosis  -     Ferritin; Future  -     Iron and TIBC; Future  -     CBC Auto Differential; Future    Well woman exam  -     Ambulatory referral/consult to Gynecology; Future    Forgetfulness    She takes tizanidine 4 mg q.h.s. along with tramadol 50 mg b.i.d. pregabalin 150 mg b.i.d. and oxybutynin 10 mg.  These medications and their side effects were discussed in the context of forgetfulness.   I would start by reducing her pregabalin. She will start with tramadol.  -     Vitamin B12; Future  -     RPR; Future  -     Folate; Future  -     TSH; Future    Other lack of coordination   -     Vitamin B12; Future  -     Folate; Future  -     Hemoglobin A1C; Future  -     Comprehensive Metabolic Panel; Future    Esophageal dysphagia   per GI    History of iron deficiency anemia    Current use of long term anticoagulation    Atherosclerosis of aorta  -     Comprehensive Metabolic Panel; Future    Ineffective esophageal motility    Atypical chest pain   F/u back and spine as her higher atypical CP is suspicious for paraspinal and her epigastric for GI. At this time I do not suspect her small hiatal hernia is cause of her upper chest pain     OAB (overactive bladder)    Herniated disc, cervical        Osteopenia, unspecified location  Not taking calcium or Vitamin D. Please start OScal D or similar  -     DXA Bone Density Spine And Hip; Future  -     Vitamin D; Future    Post-menopausal  -     DXA Bone Density Spine And Hip; Future       >55 minutes were spent today reviewing patient chart.  Much of today's visit was spent discussing with patient my chart review, their concerns, health maintenance, my assessment, and recommendations.    Osbaldo Mendez MD  Internal Medicine-[facility]        Side effects of medication(s) were discussed in  detail and patient voiced understanding.  Patient will call back for any issues or complications.

## 2022-05-18 NOTE — PROGRESS NOTES
CC: Annual  HPI: Pt is a 71 y.o.  female who presents for routine annual exam. New pt- sent over from her primary today. She has not been to the gyn in years. No issues. She is postmenopausal. she is not on HRT. Unsure of her last pap smear. Mammogram is current. Two kids and two grand children- all girls.     FH:   Breast cancer: mother and maternal aunt  Colon cancer: none  Ovarian cancer: none  Uterine cancer: none    HPV vaccine: na  COVID vaccine: yes    Last pap smear: unknown  History of abnormal pap smears: no  Colonoscopy: current  DEXA: scheduled- last in 2019 showed osteopenia  Mammogram: current  STD history: no  Birth control:   OB history: G2  Tobacco use: no    ROS:  GENERAL: Feeling well overall. Denies fever or chills.   SKIN: Denies rash or lesions.   HEAD: Denies head injury or headache.   NODES: Denies enlarged lymph nodes.   CHEST: Denies chest pain or shortness of breath.   CARDIOVASCULAR: Denies palpitations or left sided chest pain.   ABDOMEN: No abdominal pain, constipation, diarrhea, nausea, vomiting or rectal bleeding.   URINARY: No dysuria, hematuria, or burning on urination.  REPRODUCTIVE: See HPI.   BREASTS: Denies pain, lumps, or nipple discharge.   HEMATOLOGIC: No easy bruisability or excessive bleeding.   MUSCULOSKELETAL: Denies joint pain or swelling.   NEUROLOGIC: Denies syncope or weakness.   PSYCHIATRIC: Denies depression, anxiety or mood swings.    PE:   APPEARANCE: Well nourished, well developed, Black or  female in no acute distress.  NODES: no cervical, supraclavicular, or inguinal lymphadenopathy  BREASTS: Symmetrical, no skin changes or visible lesions. No palpable masses, nipple discharge or adenopathy bilaterally.  ABDOMEN: Soft. No tenderness or masses. No distention. No hernias palpated. No CVA tenderness.  VULVA: No lesions. Normal external female genitalia.  URETHRAL MEATUS: Normal size and location, no lesions, no prolapse.  URETHRA: No masses,  tenderness, or prolapse.  VAGINA: atrophic No lesions or lacerations noted. No abnormal discharge present. No odor present.   CERVIX: No lesions or discharge. No cervical motion tenderness.   UTERUS: Normal size, regular shape, mobile, non-tender.  ADNEXA: No tenderness. No fullness or masses palpated in the adnexal regions.   ANUS PERINEUM: Normal.      Diagnosis:  1. Well woman exam with routine gynecological exam    2. Pap smear for cervical cancer screening        Plan:     Orders Placed This Encounter    HPV High Risk Genotypes, PCR    Liquid-Based Pap Smear, Screening     1. Pap updated  2. Mammo current  3. DXA this week    Patient was counseled today on the new ACS guidelines for cervical cytology screening as well as the current recommendations for breast cancer screening. She was counseled to follow up with her PCP for other routine health maintenance. Counseling session lasted approximately 10 minutes, and all her questions were answered.  For women over the age of 65, you can stop having cervical cancer screenings if you have never had abnormal cervical cells or cervical cancer, and youve had three negative Pap tests in a row. (You also can stop screening if youve had two negative Pap and HPV tests in a row in the past 10 years, with at least one test in the past 5 years.),    Follow-up with me in 2 years per Medicare

## 2022-05-19 LAB — RPR SER QL: NORMAL

## 2022-05-23 LAB
HPV HR 12 DNA SPEC QL NAA+PROBE: NEGATIVE
HPV16 AG SPEC QL: NEGATIVE
HPV18 DNA SPEC QL NAA+PROBE: NEGATIVE

## 2022-05-25 LAB
FINAL PATHOLOGIC DIAGNOSIS: NORMAL
Lab: NORMAL

## 2022-05-27 ENCOUNTER — TELEPHONE (OUTPATIENT)
Dept: INTERNAL MEDICINE | Facility: CLINIC | Age: 72
End: 2022-05-27
Payer: MEDICARE

## 2022-05-27 ENCOUNTER — PATIENT MESSAGE (OUTPATIENT)
Dept: INTERNAL MEDICINE | Facility: CLINIC | Age: 72
End: 2022-05-27
Payer: MEDICARE

## 2022-05-27 DIAGNOSIS — R13.19 ESOPHAGEAL DYSPHAGIA: Primary | ICD-10-CM

## 2022-05-27 RX ORDER — OMEPRAZOLE 20 MG/1
CAPSULE, DELAYED RELEASE ORAL
Qty: 180 CAPSULE | Refills: 1 | Status: SHIPPED | OUTPATIENT
Start: 2022-05-27 | End: 2022-11-23

## 2022-05-27 NOTE — TELEPHONE ENCOUNTER
NABOR ZEPEDA (Key: [id number]) - [id number]  Omeprazole 20MG dr capsules       View in Med Check  Status: PA Response - Approved    Created: May 27th, 2022 [phone]    Sent: May 27th, 2022

## 2022-05-31 ENCOUNTER — EXTERNAL CHRONIC CARE MANAGEMENT (OUTPATIENT)
Dept: PRIMARY CARE CLINIC | Facility: CLINIC | Age: 72
End: 2022-05-31
Payer: MEDICARE

## 2022-05-31 PROCEDURE — 99490 PR CHRONIC CARE MGMT, 1ST 20 MIN: ICD-10-PCS | Mod: S$PBB,,, | Performed by: INTERNAL MEDICINE

## 2022-05-31 PROCEDURE — 99490 CHRNC CARE MGMT STAFF 1ST 20: CPT | Mod: S$PBB,,, | Performed by: INTERNAL MEDICINE

## 2022-05-31 PROCEDURE — 99490 CHRNC CARE MGMT STAFF 1ST 20: CPT | Mod: PBBFAC | Performed by: INTERNAL MEDICINE

## 2022-06-15 ENCOUNTER — OFFICE VISIT (OUTPATIENT)
Dept: GASTROENTEROLOGY | Facility: CLINIC | Age: 72
End: 2022-06-15
Payer: MEDICARE

## 2022-06-15 ENCOUNTER — PES CALL (OUTPATIENT)
Dept: ADMINISTRATIVE | Facility: CLINIC | Age: 72
End: 2022-06-15
Payer: MEDICARE

## 2022-06-15 VITALS
HEIGHT: 61 IN | SYSTOLIC BLOOD PRESSURE: 106 MMHG | DIASTOLIC BLOOD PRESSURE: 69 MMHG | HEART RATE: 78 BPM | BODY MASS INDEX: 26.62 KG/M2 | WEIGHT: 141 LBS

## 2022-06-15 DIAGNOSIS — Z79.01 CURRENT USE OF LONG TERM ANTICOAGULATION: ICD-10-CM

## 2022-06-15 DIAGNOSIS — K21.9 GASTROESOPHAGEAL REFLUX DISEASE WITHOUT ESOPHAGITIS: ICD-10-CM

## 2022-06-15 DIAGNOSIS — Z87.19 HISTORY OF COLONIC DIVERTICULITIS: ICD-10-CM

## 2022-06-15 DIAGNOSIS — R13.19 ESOPHAGEAL DYSPHAGIA: ICD-10-CM

## 2022-06-15 DIAGNOSIS — K22.4 INEFFECTIVE ESOPHAGEAL MOTILITY: ICD-10-CM

## 2022-06-15 DIAGNOSIS — R10.13 EPIGASTRIC ABDOMINAL PAIN: Primary | ICD-10-CM

## 2022-06-15 DIAGNOSIS — Z86.2 HISTORY OF IRON DEFICIENCY ANEMIA: ICD-10-CM

## 2022-06-15 DIAGNOSIS — K44.9 HIATAL HERNIA: ICD-10-CM

## 2022-06-15 PROCEDURE — 99999 PR PBB SHADOW E&M-EST. PATIENT-LVL III: ICD-10-PCS | Mod: PBBFAC,,, | Performed by: INTERNAL MEDICINE

## 2022-06-15 PROCEDURE — 99213 OFFICE O/P EST LOW 20 MIN: CPT | Mod: PBBFAC | Performed by: INTERNAL MEDICINE

## 2022-06-15 PROCEDURE — 99212 OFFICE O/P EST SF 10 MIN: CPT | Mod: S$PBB,,, | Performed by: INTERNAL MEDICINE

## 2022-06-15 PROCEDURE — 99212 PR OFFICE/OUTPT VISIT, EST, LEVL II, 10-19 MIN: ICD-10-PCS | Mod: S$PBB,,, | Performed by: INTERNAL MEDICINE

## 2022-06-15 PROCEDURE — 99999 PR PBB SHADOW E&M-EST. PATIENT-LVL III: CPT | Mod: PBBFAC,,, | Performed by: INTERNAL MEDICINE

## 2022-06-15 NOTE — PROGRESS NOTES
[facility] Established Patient Visit    Reason for Visit:    Chief Complaint   Patient presents with    Follow-up       PCP: Osbaldo Hall      HPI:  Jasson Pineda is a 71 y.o. female here for follow-up of epigastric abdominal pain.  I last saw her 2022 for postprandial epigastric abdominal pain with history of dysphagia secondary to ineffective esophageal motility, GERD with small hiatal hernia, history of iron deficiency anemia, and history of diverticulitis.  We split her omeprazole to 20 mg twice daily at our last visit.  She had trouble obtaining this, but started at last week.  The pain is not as bad after eating.  She is able to eat more solid foods, but it still takes a while for the food go down.  She feels this is more tolerable now.  The pain was 10/10, but is now only about a 5/10.  She is followed regularly by a pain specialist, Dr. Wilson, with [facility] in [place], [place].  He is planning for an WILIAN a injection of T7/8 next week.          ROS:  Constitutional: No fevers, chills, No weight loss, normal appetite  GI: see HPI        PMHX:  has a past medical history of Anticoagulant long-term use, Chronic back pain, Chronic neck pain, DVT (deep venous thrombosis), Herniated disc, cervical, Kidney stone, Lumbar herniated disc, PE (pulmonary embolism), Pulmonary emboli, and Pulmonary embolism.    PSHX:  has a past surgical history that includes Kidney stone surgery; left knee surgery; Tonsillectomy;  section; Hernia repair; Total shoulder arthroplasty (Right); Breast cyst excision (Left); Esophagogastroduodenoscopy (N/A, 2018); Colonoscopy (N/A, 2018); Esophageal manometry with measurement of impedance (N/A, 2018); Shoulder arthroscopy (Left, 2019); Arthroscopic repair of rotator cuff of shoulder (Left, 2019); Arthroscopy of shoulder with removal of distal clavicle (Left, 2019); Arthroscopic tenotomy of  biceps tendon (Left, 6/11/2019); Extracorporeal shock wave lithotripsy (Left, 2/9/2021); and Esophagogastroduodenoscopy (N/A, 3/28/2022).    The patient's social and family histories were reviewed by me and updated in the appropriate section of the electronic medical record.    Review of patient's allergies indicates:   Allergen Reactions    Sulfa (sulfonamide antibiotics) Rash       Prior to Admission medications    Medication Sig Start Date End Date Taking? Authorizing Provider   albuterol (PROVENTIL/VENTOLIN HFA) 90 mcg/actuation inhaler Inhale 2 puffs into the lungs every 4 (four) hours as needed for Wheezing. Rescue 2/16/19  Yes Roxanne Ayoub NP   atorvastatin (LIPITOR) 80 MG tablet TAKE 1 TABLET BY MOUTH EVERY DAY 1/26/22  Yes Osbaldo Hall MD   cholecalciferol, vitamin D3, 1,250 mcg (50,000 unit) capsule Take 1 capsule (50,000 Units total) by mouth once a week. 6/7/22  Yes Osbaldo Hall MD   fluticasone (FLONASE) 50 mcg/actuation nasal spray daily as needed.   Yes Historical Provider   omeprazole (PRILOSEC) 20 MG capsule TAKE 1 CAPSULE BY MOUTH TWICE A DAY BEFORE MEALS 5/27/22  Yes Osbaldo Hall MD   oxybutynin (DITROPAN-XL) 10 MG 24 hr tablet TAKE 1 TABLET BY MOUTH EVERY DAY 5/23/22  Yes Jeremias Eric MD   pregabalin (LYRICA) 150 MG capsule Take 150 mg by mouth 2 (two) times daily.   Yes Historical Provider   pregabalin (LYRICA) 25 MG capsule Take 25 mg by mouth.   Yes Historical Provider   rivaroxaban (XARELTO) 20 mg Tab Take 20 mg by mouth. 9/3/21  Yes Historical Provider   tiZANidine (ZANAFLEX) 4 MG tablet TAKE 1 TABLET BY MOUTH AT BEDTIME AS NEEDED FOR MUSCLE RELAXER 3/21/22  Yes Historical Provider   traMADol (ULTRAM) 50 mg tablet Take 50 mg by mouth 2 (two) times daily as needed. 10/26/18  Yes Historical Provider   traMADoL (ULTRAM) 50 mg tablet Take 50 mg by mouth.   Yes Historical Provider   XARELTO 20 mg Tab Take 1 tablet (20 mg total) by mouth every evening. 10/12/21  Yes  "Sotero Doty MD         Objective Findings:  Vital Signs:  /69   Pulse 78   Ht 5' 1" (1.549 m)   Wt 64 kg (141 lb)   BMI 26.64 kg/m²  Body mass index is 26.64 kg/m².      Physical Exam:  General Appearance:  Well appearing in no acute distress, appears stated age          Labs:  Lab Results   Component Value Date    WBC 5.19 05/18/2022    HGB 12.1 05/18/2022    HCT 38.4 05/18/2022    MCV 84 05/18/2022    RDW 15.4 (H) 05/18/2022     05/18/2022    GRAN 3.3 05/18/2022    GRAN 63.4 05/18/2022    LYMPH 1.4 05/18/2022    LYMPH 27.7 05/18/2022    MONO 0.4 05/18/2022    MONO 7.1 05/18/2022    EOS 0.1 05/18/2022    BASO 0.02 05/18/2022     Lab Results   Component Value Date     05/18/2022    K 4.4 05/18/2022     05/18/2022    CO2 26 05/18/2022    GLU 83 05/18/2022    BUN 10 05/18/2022    CREATININE 0.9 05/18/2022    CALCIUM 10.1 05/18/2022    PROT 7.9 05/18/2022    ALBUMIN 4.2 05/18/2022    BILITOT 0.6 05/18/2022    ALKPHOS 68 05/18/2022    AST 21 05/18/2022    ALT 20 05/18/2022                       Assessment:  Jasson Pineda is a 71 y.o. female here with:  1. Epigastric abdominal pain    2. Ineffective esophageal motility    3. Esophageal dysphagia    4. Hiatal hernia    5. Gastroesophageal reflux disease without esophagitis    6. History of iron deficiency anemia    7. Current use of long term anticoagulation    8. History of colonic diverticulitis      The new dose of omeprazole 20 mg twice daily is providing some relief.  She is tolerating solid food better; although, she is continues to have problems with dysphagia.  This is not surprising given her history of ineffective esophageal motility.  She has only been on the new dosing for about a week now.      Recommendations:  Continue with current management and use of omeprazole 20 mg twice daily.  Will give more time for the medication to work.  No further intervention at this time.  Follow up with her pain specialist as " planned.      Follow-up with me in 2-3 months.            Armand Foy MD

## 2022-06-16 ENCOUNTER — PATIENT MESSAGE (OUTPATIENT)
Dept: CARDIOLOGY | Facility: CLINIC | Age: 72
End: 2022-06-16
Payer: MEDICARE

## 2022-06-16 DIAGNOSIS — I27.82 CHRONIC PULMONARY EMBOLISM WITHOUT ACUTE COR PULMONALE, UNSPECIFIED PULMONARY EMBOLISM TYPE: Primary | ICD-10-CM

## 2022-06-17 RX ORDER — ENOXAPARIN SODIUM 100 MG/ML
1 INJECTION SUBCUTANEOUS 2 TIMES DAILY
Qty: 6 EACH | Refills: 1 | Status: SHIPPED | OUTPATIENT
Start: 2022-06-17 | End: 2022-10-18

## 2022-06-20 ENCOUNTER — PATIENT MESSAGE (OUTPATIENT)
Dept: CARDIOLOGY | Facility: CLINIC | Age: 72
End: 2022-06-20
Payer: MEDICARE

## 2022-06-30 ENCOUNTER — EXTERNAL CHRONIC CARE MANAGEMENT (OUTPATIENT)
Dept: PRIMARY CARE CLINIC | Facility: CLINIC | Age: 72
End: 2022-06-30
Payer: MEDICARE

## 2022-06-30 PROCEDURE — 99490 CHRNC CARE MGMT STAFF 1ST 20: CPT | Mod: PBBFAC | Performed by: INTERNAL MEDICINE

## 2022-06-30 PROCEDURE — 99490 PR CHRONIC CARE MGMT, 1ST 20 MIN: ICD-10-PCS | Mod: S$PBB,,, | Performed by: INTERNAL MEDICINE

## 2022-06-30 PROCEDURE — 99490 CHRNC CARE MGMT STAFF 1ST 20: CPT | Mod: S$PBB,,, | Performed by: INTERNAL MEDICINE

## 2022-07-25 DIAGNOSIS — K21.9 GASTROESOPHAGEAL REFLUX DISEASE: ICD-10-CM

## 2022-07-25 DIAGNOSIS — R07.9 CHEST PAIN, UNSPECIFIED TYPE: ICD-10-CM

## 2022-07-25 DIAGNOSIS — R13.10 DYSPHAGIA, UNSPECIFIED TYPE: ICD-10-CM

## 2022-07-25 DIAGNOSIS — D68.8 OTHER SPECIFIED COAGULATION DEFECTS: ICD-10-CM

## 2022-07-25 NOTE — TELEPHONE ENCOUNTER
No new care gaps identified.  [facility] Embedded Care Gaps. Reference number: [id number]. 7/25/2022   8:54:23 AM CDT

## 2022-07-26 RX ORDER — ATORVASTATIN CALCIUM 80 MG/1
TABLET, FILM COATED ORAL
Qty: 90 TABLET | Refills: 3 | Status: SHIPPED | OUTPATIENT
Start: 2022-07-26

## 2022-07-26 NOTE — TELEPHONE ENCOUNTER
Refill Routing Note   Medication(s) are not appropriate for processing by [facility] for the following reason(s):      - Required laboratory values are outdated    [facility] action(s):  Defer Medication-related problems identified: Requires labs        Medication reconciliation completed: No     Appointments  past 12m or future 3m with PCP    Date Provider   Last Visit   5/18/2022 Osbaldo Hall MD   Next Visit   8/15/2022 Osbaldo Hall MD   ED visits in past 90 days: 0        Note composed:1:07 PM 07/26/2022

## 2022-07-28 ENCOUNTER — TELEPHONE (OUTPATIENT)
Dept: ENDOSCOPY | Facility: HOSPITAL | Age: 72
End: 2022-07-28
Payer: MEDICARE

## 2022-07-29 NOTE — TELEPHONE ENCOUNTER
----- Message from Nicho Sevilla sent at 7/28/2022 11:12 AM CDT -----  Regarding: Letter received to schedule appt  Contact: @[phone]  Pt requesting a call back to schedule her appt according to a letter received.  I attempted to schedule but was unable to.

## 2022-07-31 ENCOUNTER — EXTERNAL CHRONIC CARE MANAGEMENT (OUTPATIENT)
Dept: PRIMARY CARE CLINIC | Facility: CLINIC | Age: 72
End: 2022-07-31
Payer: MEDICARE

## 2022-07-31 PROCEDURE — 99490 PR CHRONIC CARE MGMT, 1ST 20 MIN: ICD-10-PCS | Mod: S$PBB,,, | Performed by: INTERNAL MEDICINE

## 2022-07-31 PROCEDURE — 99490 CHRNC CARE MGMT STAFF 1ST 20: CPT | Mod: S$PBB,,, | Performed by: INTERNAL MEDICINE

## 2022-07-31 PROCEDURE — 99490 CHRNC CARE MGMT STAFF 1ST 20: CPT | Mod: PBBFAC | Performed by: INTERNAL MEDICINE

## 2022-08-01 ENCOUNTER — HOSPITAL ENCOUNTER (EMERGENCY)
Facility: OTHER | Age: 72
Discharge: HOME | End: 2022-08-01
Attending: EMERGENCY MEDICINE
Payer: MEDICARE

## 2022-08-01 VITALS
BODY MASS INDEX: 26.62 KG/M2 | HEART RATE: 65 BPM | RESPIRATION RATE: 16 BRPM | OXYGEN SATURATION: 97 % | HEIGHT: 61 IN | DIASTOLIC BLOOD PRESSURE: 62 MMHG | SYSTOLIC BLOOD PRESSURE: 113 MMHG | TEMPERATURE: 98 F | WEIGHT: 141 LBS

## 2022-08-01 DIAGNOSIS — R13.10 DYSPHAGIA, UNSPECIFIED TYPE: Primary | ICD-10-CM

## 2022-08-01 DIAGNOSIS — R07.89 ATYPICAL CHEST PAIN: ICD-10-CM

## 2022-08-01 DIAGNOSIS — M54.6 MIDLINE THORACIC BACK PAIN, UNSPECIFIED CHRONICITY: ICD-10-CM

## 2022-08-01 DIAGNOSIS — R07.9 CHEST PAIN: ICD-10-CM

## 2022-08-01 LAB
ALBUMIN SERPL BCP-MCNC: 4.1 G/DL (ref 3.5–5.2)
ALP SERPL-CCNC: 68 U/L (ref 55–135)
ALT SERPL W/O P-5'-P-CCNC: 17 U/L (ref 10–44)
ANION GAP SERPL CALC-SCNC: 15 MMOL/L (ref 8–16)
AST SERPL-CCNC: 21 U/L (ref 10–40)
BASOPHILS # BLD AUTO: 0.02 K/UL (ref 0–0.2)
BASOPHILS NFR BLD: 0.4 % (ref 0–1.9)
BILIRUB SERPL-MCNC: 0.7 MG/DL (ref 0.1–1)
BNP SERPL-MCNC: 21 PG/ML (ref 0–99)
BUN SERPL-MCNC: 10 MG/DL (ref 8–23)
CALCIUM SERPL-MCNC: 10.2 MG/DL (ref 8.7–10.5)
CHLORIDE SERPL-SCNC: 108 MMOL/L (ref 95–110)
CO2 SERPL-SCNC: 21 MMOL/L (ref 23–29)
CREAT SERPL-MCNC: 0.8 MG/DL (ref 0.5–1.4)
DIFFERENTIAL METHOD: ABNORMAL
EOSINOPHIL # BLD AUTO: 0 K/UL (ref 0–0.5)
EOSINOPHIL NFR BLD: 0.8 % (ref 0–8)
ERYTHROCYTE [DISTWIDTH] IN BLOOD BY AUTOMATED COUNT: 15.7 % (ref 11.5–14.5)
EST. GFR  (NO RACE VARIABLE): >60 ML/MIN/1.73 M^2
GLUCOSE SERPL-MCNC: 78 MG/DL (ref 70–110)
HCT VFR BLD AUTO: 38.2 % (ref 37–48.5)
HGB BLD-MCNC: 12.2 G/DL (ref 12–16)
IMM GRANULOCYTES # BLD AUTO: 0.01 K/UL (ref 0–0.04)
IMM GRANULOCYTES NFR BLD AUTO: 0.2 % (ref 0–0.5)
INR PPP: 1.1 (ref 0.8–1.2)
LIPASE SERPL-CCNC: 25 U/L (ref 4–60)
LYMPHOCYTES # BLD AUTO: 1.6 K/UL (ref 1–4.8)
LYMPHOCYTES NFR BLD: 30.4 % (ref 18–48)
MCH RBC QN AUTO: 26.9 PG (ref 27–31)
MCHC RBC AUTO-ENTMCNC: 31.9 G/DL (ref 32–36)
MCV RBC AUTO: 84 FL (ref 82–98)
MONOCYTES # BLD AUTO: 0.3 K/UL (ref 0.3–1)
MONOCYTES NFR BLD: 6.2 % (ref 4–15)
NEUTROPHILS # BLD AUTO: 3.3 K/UL (ref 1.8–7.7)
NEUTROPHILS NFR BLD: 62 % (ref 38–73)
NRBC BLD-RTO: 0 /100 WBC
PLATELET # BLD AUTO: 277 K/UL (ref 150–450)
PMV BLD AUTO: 11.1 FL (ref 9.2–12.9)
POTASSIUM SERPL-SCNC: 3.6 MMOL/L (ref 3.5–5.1)
PROT SERPL-MCNC: 7.7 G/DL (ref 6–8.4)
PROTHROMBIN TIME: 11.9 SEC (ref 9–12.5)
RBC # BLD AUTO: 4.53 M/UL (ref 4–5.4)
SODIUM SERPL-SCNC: 144 MMOL/L (ref 136–145)
TROPONIN I SERPL DL<=0.01 NG/ML-MCNC: <0.006 NG/ML (ref 0–0.03)
WBC # BLD AUTO: 5.29 K/UL (ref 3.9–12.7)

## 2022-08-01 PROCEDURE — 93005 ELECTROCARDIOGRAM TRACING: CPT

## 2022-08-01 PROCEDURE — 83690 ASSAY OF LIPASE: CPT | Performed by: EMERGENCY MEDICINE

## 2022-08-01 PROCEDURE — 25000003 PHARM REV CODE 250: Performed by: EMERGENCY MEDICINE

## 2022-08-01 PROCEDURE — 25500020 PHARM REV CODE 255: Performed by: EMERGENCY MEDICINE

## 2022-08-01 PROCEDURE — 99285 EMERGENCY DEPT VISIT HI MDM: CPT | Mod: 25

## 2022-08-01 PROCEDURE — 93010 EKG 12-LEAD: ICD-10-PCS | Mod: ,,, | Performed by: INTERNAL MEDICINE

## 2022-08-01 PROCEDURE — 85610 PROTHROMBIN TIME: CPT | Performed by: EMERGENCY MEDICINE

## 2022-08-01 PROCEDURE — 63600175 PHARM REV CODE 636 W HCPCS: Performed by: EMERGENCY MEDICINE

## 2022-08-01 PROCEDURE — 93010 ELECTROCARDIOGRAM REPORT: CPT | Mod: ,,, | Performed by: INTERNAL MEDICINE

## 2022-08-01 PROCEDURE — 83880 ASSAY OF NATRIURETIC PEPTIDE: CPT | Performed by: EMERGENCY MEDICINE

## 2022-08-01 PROCEDURE — 96374 THER/PROPH/DIAG INJ IV PUSH: CPT | Mod: 59

## 2022-08-01 PROCEDURE — 85025 COMPLETE CBC W/AUTO DIFF WBC: CPT | Performed by: EMERGENCY MEDICINE

## 2022-08-01 PROCEDURE — 80053 COMPREHEN METABOLIC PANEL: CPT | Performed by: EMERGENCY MEDICINE

## 2022-08-01 PROCEDURE — 84484 ASSAY OF TROPONIN QUANT: CPT | Performed by: EMERGENCY MEDICINE

## 2022-08-01 RX ORDER — LIDOCAINE HYDROCHLORIDE 20 MG/ML
15 SOLUTION OROPHARYNGEAL ONCE
Status: COMPLETED | OUTPATIENT
Start: 2022-08-01 | End: 2022-08-01

## 2022-08-01 RX ORDER — MAG HYDROX/ALUMINUM HYD/SIMETH 200-200-20
30 SUSPENSION, ORAL (FINAL DOSE FORM) ORAL ONCE
Status: COMPLETED | OUTPATIENT
Start: 2022-08-01 | End: 2022-08-01

## 2022-08-01 RX ORDER — KETOROLAC TROMETHAMINE 30 MG/ML
10 INJECTION, SOLUTION INTRAMUSCULAR; INTRAVENOUS
Status: COMPLETED | OUTPATIENT
Start: 2022-08-01 | End: 2022-08-01

## 2022-08-01 RX ORDER — PANTOPRAZOLE SODIUM 40 MG/1
40 TABLET, DELAYED RELEASE ORAL DAILY
Qty: 30 TABLET | Refills: 0 | Status: SHIPPED | OUTPATIENT
Start: 2022-08-01 | End: 2022-10-18

## 2022-08-01 RX ORDER — HYDROCODONE BITARTRATE AND ACETAMINOPHEN 5; 325 MG/1; MG/1
1 TABLET ORAL EVERY 4 HOURS PRN
Qty: 12 TABLET | Refills: 0 | Status: SHIPPED | OUTPATIENT
Start: 2022-08-01 | End: 2023-01-11

## 2022-08-01 RX ADMIN — IOHEXOL 75 ML: 350 INJECTION, SOLUTION INTRAVENOUS at 12:08

## 2022-08-01 RX ADMIN — LIDOCAINE HYDROCHLORIDE 15 ML: 20 SOLUTION ORAL; TOPICAL at 01:08

## 2022-08-01 RX ADMIN — ALUMINUM HYDROXIDE, MAGNESIUM HYDROXIDE, AND SIMETHICONE 30 ML: 200; 200; 20 SUSPENSION ORAL at 01:08

## 2022-08-01 RX ADMIN — KETOROLAC TROMETHAMINE 10 MG: 30 INJECTION, SOLUTION INTRAMUSCULAR; INTRAVENOUS at 01:08

## 2022-08-01 NOTE — ED PROVIDER NOTES
SCRIBE #1 NOTE: I, Sedrick Christianson, am scribing for, and in the presence of, Allie Zhao MD.         Source of History:  The patient.     Chief complaint:  Chest Pain (Epigastric pain that pierces through to back x3 weeks, worse over past 3 days.  )      HPI:  Jasson Pineda is a 71 y.o. female presenting with midsternal chest pain that began around 3 weeks ago. She notes the pain radiates to her back while lying down. She also complains of fatigue and shortness of breath upon exertion. She denies any nausea, vomiting, or any specific injury. She states her pain is exacerbated when laying on her back and alleviated when laying on her side.      This is the extent to the patients complaints today here in the emergency department.    ROS: As per HPI and below:  General: No fever.  No chills.  Eyes: No visual changes.  ENT: No sore throat. No ear pain  Head: No headache.    Respiratory: +Shortness of breath.  Cardiovascular: +Chest pain.  Abdomen: No abdominal pain.  No nausea or vomiting.  Genito-Urinary: No abnormal urination.  Neurologic: +Fatigue. No focal weakness.  No numbness.  MSK: +Back pain.   Integument: No rashes or lesions.  Hematologic: No easy bruising.  Endocrine: No excessive thirst or urination.      Review of patient's allergies indicates:   Allergen Reactions    Sulfa (sulfonamide antibiotics) Rash       PMH:  As per HPI and below:  Past Medical History:   Diagnosis Date    Anticoagulant long-term use     xarelto    Chronic back pain     Chronic neck pain     DVT (deep venous thrombosis)     Herniated disc, cervical     Kidney stone     Lumbar herniated disc     PE (pulmonary embolism)     2013    Pulmonary emboli     after surgery    Pulmonary embolism      Past Surgical History:   Procedure Laterality Date    ARTHROSCOPIC REPAIR OF ROTATOR CUFF OF SHOULDER Left 6/11/2019    Procedure: REPAIR, ROTATOR CUFF, ARTHROSCOPIC;  Surgeon: Sedrick Patel MD;  Location: [facility] OR;   Service: Orthopedics;  Laterality: Left;    ARTHROSCOPIC TENOTOMY OF BICEPS TENDON Left 2019    Procedure: TENOTOMY, BICEPS, ARTHROSCOPIC;  Surgeon: Sedrick Patel MD;  Location: [facility] OR;  Service: Orthopedics;  Laterality: Left;    ARTHROSCOPY OF SHOULDER WITH REMOVAL OF DISTAL CLAVICLE Left 2019    Procedure: ARTHROSCOPY, SHOULDER, WITH DISTAL CLAVICLE EXCISION;  Surgeon: Sedrick Patel MD;  Location: [facility] OR;  Service: Orthopedics;  Laterality: Left;    BREAST CYST EXCISION Left      SECTION      x1    COLONOSCOPY N/A 2018    Procedure: COLONOSCOPY;  Surgeon: Armand Foy MD;  Location: [facility] ENDO (4TH FLR);  Service: Endoscopy;  Laterality: N/A;    ESOPHAGEAL MANOMETRY WITH MEASUREMENT OF IMPEDANCE N/A 2018    Procedure: MANOMETRY, ESOPHAGEAL, WITH IMPEDANCE MEASUREMENT;  Surgeon: Armand Foy MD;  Location: [facility] ENDO (4TH FLR);  Service: Endoscopy;  Laterality: N/A;    ESOPHAGOGASTRODUODENOSCOPY N/A 2018    Procedure: EGD (ESOPHAGOGASTRODUODENOSCOPY);  Surgeon: Armand Foy MD;  Location: [facility] ENDO (4TH FLR);  Service: Endoscopy;  Laterality: N/A;  pt currently on Lovenox and Xarelto - ok per Dr. Doty to hold Plavix 2 days prior and Lovenox 24hr prior to case/see scanned media / for documentation of hold -- SM        ESOPHAGOGASTRODUODENOSCOPY N/A 3/28/2022    Procedure: EGD (ESOPHAGOGASTRODUODENOSCOPY);  Surgeon: Armand Foy MD;  Location: [facility] ENDO (2ND FLR);  Service: Endoscopy;  Laterality: N/A;  okay to hold Xarelto for 2 days per Dr. Doty with lovenox bridgings - see note on 3/21/22  2nd floor for ASAP availabilty  fully vaccinated - sm    EXTRACORPOREAL SHOCK WAVE LITHOTRIPSY Left 2021    Procedure: LITHOTRIPSY-EXTRACORPOREAL SHOCK WAVE;  Surgeon: Jeremias Eric MD;  Location: [facility];  Service: Urology;  Laterality: Left;    HERNIA REPAIR      umbilical    KIDNEY STONE SURGERY      ureteral stent    left knee surgery       "SHOULDER ARTHROSCOPY Left 6/11/2019    Procedure: ARTHROSCOPY, SHOULDER;  Surgeon: Sedrick Patel MD;  Location: [facility];  Service: Orthopedics;  Laterality: Left;  BLOCK    TONSILLECTOMY      TOTAL SHOULDER ARTHROPLASTY Right        Social History     Tobacco Use    Smoking status: Never Smoker    Smokeless tobacco: Never Used   Substance Use Topics    Alcohol use: No    Drug use: No       Physical Exam:    /62 (BP Location: Right arm, Patient Position: Sitting)   Pulse 65   Temp 98.3 °F (36.8 °C) (Oral)   Resp 16   Ht 5' 1" (1.549 m)   Wt 64 kg (141 lb)   SpO2 97%   BMI 26.64 kg/m²   Nursing note and vital signs reviewed.    Appearance: No acute distress.  Eyes: No conjunctival injection.  Neck: No deformity.   ENT: Oropharynx clear.  No stridor.   Chest/ Respiratory: Clear to auscultation bilaterally.  Good air movement.  No wheezes.  No rhonchi. No rales. No accessory muscle use.  Cardiovascular: Regular rate and rhythm.  No murmurs. No gallops. No rubs.  Abdomen: Soft.  Not distended.  Nontender.  No guarding.  No rebound. Non-peritoneal.  Musculoskeletal: Good range of motion all joints.  No deformities.  Neck supple.  No meningismus.  Skin: No rashes seen.  Good turgor.  No abrasions.  No ecchymoses.  Neurologic: Motor intact.  Sensation intact.  Cerebellar intact.  Cranial nerves intact.  Mental Status:  Alert and oriented x 3.  Appropriate, conversant.        EKG:  I independently reviewed and interpreted the EKG and my findings are as follows:   Normal sinus rhythm. Rate of 93. Nonspecific ST abnormality. Otherwise normal EKG, similar to 3/2022.    Imaging:  I independently reviewed and interpreted the patient's Chest X-Ray and my findings are as follows:  CXR independently interpreted by myself shows normal heart size. No infiltrate. No bony deformity. No acute disease.      Imaging Results          CTA Chest Non-Coronary (PE Study) (Final result)  Result time 08/01/22 12:44:54    " Final result by Luis Lowery III, MD (08/01/22 12:44:54)                 Impression:      No evidence of pulmonary embolus or acute aortic syndrome.    Coronary artery calcifications.    The lungs are clear.      Electronically signed by: Luis Lowery MD  Date:    08/01/2022  Time:    12:44             Narrative:    EXAMINATION:  CTA CHEST NON CORONARY    CLINICAL HISTORY:  Pulmonary embolism (PE) suspected, high prob;    FINDINGS:  Patient was administered 75 cc of Omnipaque 350 intravenously.    The arch and great vessels are unremarkable.  Pulmonary vessels are very well opacified and there is no evidence of pulmonary embolus.  Upper abdominal organs show nothing unusual.  No mediastinal, hilar or axillary adenopathy is seen.  The trachea and the bronchi are patent.  No pulmonary nodules, masses or infiltrates are seen.  The bones showed DJD.  There are few vertebral body hemangiomas.                               X-Ray Chest PA And Lateral (Final result)  Result time 08/01/22 10:59:47    Final result by Luis Lowery III, MD (08/01/22 10:59:47)                 Impression:      No acute process seen.      Electronically signed by: Luis Lowery MD  Date:    08/01/2022  Time:    10:59             Narrative:    EXAMINATION:  XR CHEST PA AND LATERAL    CLINICAL HISTORY:  Chest Pain;    FINDINGS:  Chest two views: Heart size is normal.  Lungs are clear.  There is aortic plaque.  There is postoperative change of the right shoulder.                                  Labs:  Results for orders placed or performed during the hospital encounter of 08/01/22   CBC auto differential   Result Value Ref Range    WBC 5.29 3.90 - 12.70 K/uL    RBC 4.53 4.00 - 5.40 M/uL    Hemoglobin 12.2 12.0 - 16.0 g/dL    Hematocrit 38.2 37.0 - 48.5 %    MCV 84 82 - 98 fL    MCH 26.9 (L) 27.0 - 31.0 pg    MCHC 31.9 (L) 32.0 - 36.0 g/dL    RDW 15.7 (H) 11.5 - 14.5 %    Platelets 277 150 - 450 K/uL    MPV 11.1 9.2 - 12.9 fL    Immature  Granulocytes 0.2 0.0 - 0.5 %    Gran # (ANC) 3.3 1.8 - 7.7 K/uL    Immature Grans (Abs) 0.01 0.00 - 0.04 K/uL    Lymph # 1.6 1.0 - 4.8 K/uL    Mono # 0.3 0.3 - 1.0 K/uL    Eos # 0.0 0.0 - 0.5 K/uL    Baso # 0.02 0.00 - 0.20 K/uL    nRBC 0 0 /100 WBC    Gran % 62.0 38.0 - 73.0 %    Lymph % 30.4 18.0 - 48.0 %    Mono % 6.2 4.0 - 15.0 %    Eosinophil % 0.8 0.0 - 8.0 %    Basophil % 0.4 0.0 - 1.9 %    Differential Method Automated    Comprehensive metabolic panel   Result Value Ref Range    Sodium 144 136 - 145 mmol/L    Potassium 3.6 3.5 - 5.1 mmol/L    Chloride 108 95 - 110 mmol/L    CO2 21 (L) 23 - 29 mmol/L    Glucose 78 70 - 110 mg/dL    BUN 10 8 - 23 mg/dL    Creatinine 0.8 0.5 - 1.4 mg/dL    Calcium 10.2 8.7 - 10.5 mg/dL    Total Protein 7.7 6.0 - 8.4 g/dL    Albumin 4.1 3.5 - 5.2 g/dL    Total Bilirubin 0.7 0.1 - 1.0 mg/dL    Alkaline Phosphatase 68 55 - 135 U/L    AST 21 10 - 40 U/L    ALT 17 10 - 44 U/L    Anion Gap 15 8 - 16 mmol/L    eGFR >60 >60 mL/min/1.73 m^2   Brain natriuretic peptide   Result Value Ref Range    BNP 21 0 - 99 pg/mL   Troponin I   Result Value Ref Range    Troponin I <0.006 0.000 - 0.026 ng/mL   Protime-INR   Result Value Ref Range    Prothrombin Time 11.9 9.0 - 12.5 sec    INR 1.1 0.8 - 1.2   Lipase   Result Value Ref Range    Lipase 25 4 - 60 U/L       Initial Impression  71 y.o. female with chest and back pain which is chronic in nature, however patient has history of PE. Plan labs, CTA chest.     Differential Dx:  Myocardial ischemia, pericarditis, pulmonary embolus, chest wall pain, pleural inflammation and pulmonary infectious causes.    MDM:        ED Course as of 08/02/22 1332   Mon Aug 01, 2022   1114 Normal white blood cell count, negative troponin, normal BNP.  CTA negative for pulmonary embolus.  Prior diagnosis of dysphagia and esophageal dysmotility.  Patient is advised to follow up again with Gastroenterology.  Will give her a brief course of medications to treat her pain.   [LF]      ED Course User Index  [LF] Allie Zhao MD                  Scribe Attestation:   Scribe #1: I performed the above scribed service and the documentation accurately describes the services I performed. I attest to the accuracy of the note.    Physician Attestation for Scribe: I, Allie Zhao MD, reviewed documentation as scribed in my presence, which is both accurate and complete.    Diagnostic Impression:    1. Dysphagia, unspecified type    2. Chest pain    3. Midline thoracic back pain, unspecified chronicity    4. Atypical chest pain         ED Disposition Condition    Discharge Stable          ED Prescriptions     Medication Sig Dispense Start Date End Date Auth. Provider    HYDROcodone-acetaminophen (NORCO) 5-325 mg per tablet Take 1 tablet by mouth every 4 (four) hours as needed for Pain. 12 tablet 8/1/2022  Allie Zhao MD    pantoprazole (PROTONIX) 40 MG tablet Take 1 tablet (40 mg total) by mouth once daily. 30 tablet 8/1/2022 8/31/2022 Allie Zhao MD        Follow-up Information     Follow up With Specialties Details Why Contact Info    Osbaldo Hall MD Internal Medicine Schedule an appointment as soon as possible for a visit   [address]  [address]  [facility] [address]  [phone]      Pancho Miller Jr., MD General Surgery, Bariatrics Schedule an appointment as soon as possible for a visit   [address] [facility] [address]  [phone]             Allie Zhao MD  08/02/22 0723

## 2022-08-01 NOTE — ED TRIAGE NOTES
Pt presents to ED c/o epigastric chest pain radiating to back onset 3 weeks ago. Pt also reports generalized fatigue and SOB upon exertion. Denies nausea/vomiting, palpitations, injury. States she has to turn on her side when lying down due to the pain radiating to her back.

## 2022-08-05 ENCOUNTER — TELEPHONE (OUTPATIENT)
Dept: BARIATRICS | Facility: CLINIC | Age: 72
End: 2022-08-05
Payer: MEDICARE

## 2022-08-10 ENCOUNTER — IMMUNIZATION (OUTPATIENT)
Dept: INTERNAL MEDICINE | Facility: CLINIC | Age: 72
End: 2022-08-10
Payer: MEDICARE

## 2022-08-10 ENCOUNTER — OFFICE VISIT (OUTPATIENT)
Dept: INTERNAL MEDICINE | Facility: CLINIC | Age: 72
End: 2022-08-10
Attending: INTERNAL MEDICINE
Payer: MEDICARE

## 2022-08-10 VITALS
WEIGHT: 141.31 LBS | SYSTOLIC BLOOD PRESSURE: 110 MMHG | DIASTOLIC BLOOD PRESSURE: 78 MMHG | HEART RATE: 76 BPM | HEIGHT: 61 IN | BODY MASS INDEX: 26.68 KG/M2 | OXYGEN SATURATION: 99 %

## 2022-08-10 DIAGNOSIS — D18.09 VERTEBRAL BODY HEMANGIOMA: Primary | ICD-10-CM

## 2022-08-10 DIAGNOSIS — R13.10 DYSPHAGIA, UNSPECIFIED TYPE: ICD-10-CM

## 2022-08-10 DIAGNOSIS — R41.3 MEMORY LOSS: ICD-10-CM

## 2022-08-10 DIAGNOSIS — Z23 NEED FOR VACCINATION: Primary | ICD-10-CM

## 2022-08-10 PROCEDURE — 99214 OFFICE O/P EST MOD 30 MIN: CPT | Mod: S$PBB,,, | Performed by: INTERNAL MEDICINE

## 2022-08-10 PROCEDURE — 99214 PR OFFICE/OUTPT VISIT, EST, LEVL IV, 30-39 MIN: ICD-10-PCS | Mod: S$PBB,,, | Performed by: INTERNAL MEDICINE

## 2022-08-10 PROCEDURE — 99999 PR PBB SHADOW E&M-EST. PATIENT-LVL V: CPT | Mod: PBBFAC,,, | Performed by: INTERNAL MEDICINE

## 2022-08-10 PROCEDURE — 99999 PR PBB SHADOW E&M-EST. PATIENT-LVL V: ICD-10-PCS | Mod: PBBFAC,,, | Performed by: INTERNAL MEDICINE

## 2022-08-10 PROCEDURE — 91305 COVID-19, MRNA, LNP-S, PF, 30 MCG/0.3 ML DOSE VACCINE (PFIZER): CPT | Mod: PBBFAC

## 2022-08-10 PROCEDURE — 99215 OFFICE O/P EST HI 40 MIN: CPT | Mod: PBBFAC | Performed by: INTERNAL MEDICINE

## 2022-08-10 NOTE — PROGRESS NOTES
"Subjective:       Patient ID: Jasson Pineda is a 71 y.o. female.    Chief Complaint: Chest Pain (Pain changed pt appetite due to pain)    Here for f/u    All chronic symptoms of positional CP when she lies flat on her back, forgetfulness and anxiety, dysphagia to solids, SPICER (sedenatary).            8/1/2022 ED visit for continued positional CP. CTA ruled out PE. Did note vertebral body hemangiomas       ### Nephrolithiasis -- OAB ###  Gross hematuria recurrent and urinary urgency.   12/22/20 CT urogram normal.   CTU shows 9mm non-obstructing left renal stone.   02/09/2021 Left ESWL   OAB She began ditropan 5 mg XL daily    5/18/22 oxybutynin has been increased to 10mg     ### Dysphagia ###  7/2018 normal EGD by Dr Foy at [facility]-  07/2018 manometry "Ineffective esophageal motility (IEM: 50% or more ineffective swallows with DCI less than 450 mmHg/sec/cm).  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 There is a lower esophageal Schatzki's ring.   10/2019 ENT Dr Burgos "Variably obstructive CP bar, but symptoms are primarily thoracic/epigastic."  12/2019 GI clinic visit "This could be functional.  Other considerations could be untreated reflux or even esophageal dysmotility."  She continues to have periodic dysphagia to solids requiring her to drink a glass of water to get down.  No hx of recurrent oral ulcers, skin changes, Hx of misscarriages, chronic loose stools or abdomina pains, diffuse arthralgias, Hx of pericarditis. She is on lexapro and lyrica. Mood improved an is interested in weaning from this.   1/15/21 IM CV no acute issues  3/28/2022 EGD by Dr Foy at [facility]. Normal aside from 1 cm type-I sliding hiatal hernia   06/2022 CV Dr Foy writes "Continue with current management and use of omeprazole 20 mg twice daily.  Will give more time for the medication to work.  No further intervention at this time.  Follow up with her pain specialist as planned. Follow-up with me in " "2-3 months."  08/2022 We stopped her SSRI for >6 months and no change in GI symptoms     ### atypical CP ###  Consistently occurs when she lies down on her back she get back pain penetrating to her chest. She can not sleep on her elft side due to shouilder pain. She sleep on her righ side. When she lies flat on her back she develops a sharp, intense pain that radiates to her chest. No associated PND, orthopnea, wheezing, SOB, LE edema.   Was seen by cardiology and had holter done 07/27/2021 that was normal. Does not repsond to albuterol.  Normal exercise stress test July 2018. Hx of recurrent PE.   5/18/22 IM CV previously referred to back and spine as I suspect atypical, positional chest is thoracic or paraspinal.  Only members have told her she has been forgetful. Once she is prompted she is able to recall. She lives alone, often helped takes care of her knees, able to maintain her finances and pay bills without any difficulty, armani [place], Dr.. Capable of full ADLs. She takes tizanidine 4 mg q.h.s. along with tramadol 50 mg b.i.d. pregabalin 150 mg b.i.d. and oxybutynin 10 mg. These medications and their side effects were discussed in the context of forgetfulness.   08/2022 Reports having recent injections with zero change in symptoms. This was approx one month prior.     ### Recurrent PE ###  Anticoagulation managed by Dr shaikh in cardiology.  No bleeding complications.         Review of Systems   Constitutional: Negative for chills, fatigue, fever and unexpected weight change.   HENT: Negative for ear pain, hearing loss, postnasal drip, tinnitus, trouble swallowing and voice change.    Respiratory: Negative for cough, chest tightness, shortness of breath and wheezing.    Cardiovascular: Positive for chest pain. Negative for palpitations and leg swelling.   Gastrointestinal: Negative for abdominal pain, blood in stool, diarrhea, nausea and vomiting.   Endocrine: Negative for polydipsia, polyphagia and " "polyuria.   Genitourinary: Negative for difficulty urinating, dysuria, hematuria and vaginal bleeding.   Skin: Negative for rash.   Allergic/Immunologic: Negative for food allergies.   Neurological: Negative for dizziness, numbness and headaches.   Hematological: Does not bruise/bleed easily.   Psychiatric/Behavioral: The patient is nervous/anxious.        Objective:      Vitals:    08/10/22 0951   BP: 110/78   Pulse: 76   SpO2: 99%   Weight: 64.1 kg (141 lb 5 oz)   Height: 5' 1" (1.549 m)      Physical Exam  Constitutional:       General: She is not in acute distress.     Appearance: She is well-developed.   HENT:      Head: Normocephalic and atraumatic.      Mouth/Throat:      Pharynx: No oropharyngeal exudate.   Eyes:      General: No scleral icterus.     Conjunctiva/sclera: Conjunctivae normal.      Pupils: Pupils are equal, round, and reactive to light.   Neck:      Thyroid: No thyromegaly.   Cardiovascular:      Rate and Rhythm: Normal rate and regular rhythm.      Heart sounds: Normal heart sounds. No murmur heard.  Pulmonary:      Effort: Pulmonary effort is normal.      Breath sounds: Normal breath sounds. No wheezing or rales.   Abdominal:      General: There is no distension.      Palpations: Abdomen is soft.      Tenderness: There is no abdominal tenderness.   Musculoskeletal:         General: No tenderness.   Lymphadenopathy:      Cervical: No cervical adenopathy.   Skin:     General: Skin is warm and dry.   Neurological:      Mental Status: She is alert and oriented to person, place, and time.   Psychiatric:         Behavior: Behavior normal.         Assessment:       1. Vertebral body hemangioma    2. Dysphagia, unspecified type    3. Memory loss        Plan:       Jasson was seen today for chest pain.    Diagnoses and all orders for this visit:    Vertebral body hemangioma  Will see if contributory to her refractory thoracic pain  -     Ambulatory referral/consult to Neurosurgery; " Future    Dysphagia, unspecified type  -     Ambulatory referral/consult to Neurology; Future    Memory loss  -     Ambulatory referral/consult to Neurology; Future           Osbaldo Mendez MD  Internal Medicine-[facility]        Side effects of medication(s) were discussed in detail and patient voiced understanding.  Patient will call back for any issues or complications.

## 2022-08-11 ENCOUNTER — TELEPHONE (OUTPATIENT)
Dept: NEUROSURGERY | Facility: CLINIC | Age: 72
End: 2022-08-11
Payer: MEDICARE

## 2022-08-11 NOTE — TELEPHONE ENCOUNTER
Returned pts call, pt stated she had her booster and is not feeling well. Appt r/s for 8/16 @ 9 am w/ Roxanna Cuellar Made pt aware I will call her monday to remind her.    Pt voiced no concerns or questions.     ----- Message from Karen Eldridge sent at 8/11/2022  8:05 AM CDT -----  Pt called stated she need to reschedule her appt due to she is not feeling wel. She have a light fever and she is feeling achy. Call back [phone]

## 2022-08-16 ENCOUNTER — OFFICE VISIT (OUTPATIENT)
Dept: NEUROSURGERY | Facility: CLINIC | Age: 72
End: 2022-08-16
Payer: MEDICARE

## 2022-08-16 VITALS
OXYGEN SATURATION: 99 % | DIASTOLIC BLOOD PRESSURE: 60 MMHG | HEART RATE: 71 BPM | SYSTOLIC BLOOD PRESSURE: 106 MMHG | WEIGHT: 140.44 LBS | HEIGHT: 61 IN | BODY MASS INDEX: 26.51 KG/M2

## 2022-08-16 DIAGNOSIS — D18.09 VERTEBRAL BODY HEMANGIOMA: ICD-10-CM

## 2022-08-16 DIAGNOSIS — M54.6 CHRONIC THORACIC BACK PAIN, UNSPECIFIED BACK PAIN LATERALITY: Primary | ICD-10-CM

## 2022-08-16 DIAGNOSIS — G89.29 CHRONIC THORACIC BACK PAIN, UNSPECIFIED BACK PAIN LATERALITY: Primary | ICD-10-CM

## 2022-08-16 PROCEDURE — 99215 OFFICE O/P EST HI 40 MIN: CPT | Mod: PBBFAC | Performed by: PHYSICIAN ASSISTANT

## 2022-08-16 PROCEDURE — 99204 OFFICE O/P NEW MOD 45 MIN: CPT | Mod: S$PBB,,, | Performed by: PHYSICIAN ASSISTANT

## 2022-08-16 PROCEDURE — 99999 PR PBB SHADOW E&M-EST. PATIENT-LVL V: ICD-10-PCS | Mod: PBBFAC,,, | Performed by: PHYSICIAN ASSISTANT

## 2022-08-16 PROCEDURE — 99999 PR PBB SHADOW E&M-EST. PATIENT-LVL V: CPT | Mod: PBBFAC,,, | Performed by: PHYSICIAN ASSISTANT

## 2022-08-16 PROCEDURE — 99204 PR OFFICE/OUTPT VISIT, NEW, LEVL IV, 45-59 MIN: ICD-10-PCS | Mod: S$PBB,,, | Performed by: PHYSICIAN ASSISTANT

## 2022-08-16 NOTE — PROGRESS NOTES
[facility]  Neurosurgery    SUBJECTIVE:     History of Present Illness:  Jasson Pineda is a 71 y.o. female with below listed PMH who presents with chronic thoracic back pain and pain at the xyphoid process. Symptoms began 9 months ago with inciting event or injury. She cannot lie on her stomach or back due to the pain. She complains mostly of pain near her xyphoid process that worsens when eating. She is able to swallow food but feels increased pain after swallowing. The pain sometimes radiates bilaterally along the diaphragm. She reports posterior thoracic back pain at the level of the xyphoid process. This back pain is present mostly when rolling over in bed.     She has tried thoracic WILIAN with Dr. Serra to rule out thoracic radiculopathy. She confirms this was not helpful for her symptoms. She has also been undergoing a workup with GI with no confirmed diagnosis as of yet. She is referred to neurosurgery due to mention of vertebral body hemangiomas on recent CTA.     Denies pain, numbness, and weakness to the BUE and BLE.       (Not in a hospital admission)      Review of patient's allergies indicates:   Allergen Reactions    Sulfa (sulfonamide antibiotics) Rash       Past Medical History:   Diagnosis Date    Anticoagulant long-term use     xarelto    Chronic back pain     Chronic neck pain     DVT (deep venous thrombosis)     Herniated disc, cervical     Kidney stone     Lumbar herniated disc     PE (pulmonary embolism)     2013    Pulmonary emboli     after surgery    Pulmonary embolism      Past Surgical History:   Procedure Laterality Date    ARTHROSCOPIC REPAIR OF ROTATOR CUFF OF SHOULDER Left 6/11/2019    Procedure: REPAIR, ROTATOR CUFF, ARTHROSCOPIC;  Surgeon: Sedrick Patel MD;  Location: [facility];  Service: Orthopedics;  Laterality: Left;    ARTHROSCOPIC TENOTOMY OF BICEPS TENDON Left 6/11/2019    Procedure: TENOTOMY, BICEPS, ARTHROSCOPIC;  Surgeon: Sedrick Patel MD;   Location: [facility] OR;  Service: Orthopedics;  Laterality: Left;    ARTHROSCOPY OF SHOULDER WITH REMOVAL OF DISTAL CLAVICLE Left 2019    Procedure: ARTHROSCOPY, SHOULDER, WITH DISTAL CLAVICLE EXCISION;  Surgeon: Sedrick Patel MD;  Location: [facility] OR;  Service: Orthopedics;  Laterality: Left;    BREAST CYST EXCISION Left      SECTION      x1    COLONOSCOPY N/A 2018    Procedure: COLONOSCOPY;  Surgeon: Armand Foy MD;  Location: [facility] ENDO (4TH FLR);  Service: Endoscopy;  Laterality: N/A;    ESOPHAGEAL MANOMETRY WITH MEASUREMENT OF IMPEDANCE N/A 2018    Procedure: MANOMETRY, ESOPHAGEAL, WITH IMPEDANCE MEASUREMENT;  Surgeon: Armand Foy MD;  Location: [facility] (4TH FLR);  Service: Endoscopy;  Laterality: N/A;    ESOPHAGOGASTRODUODENOSCOPY N/A 2018    Procedure: EGD (ESOPHAGOGASTRODUODENOSCOPY);  Surgeon: Armand Foy MD;  Location: [facility] (4TH FLR);  Service: Endoscopy;  Laterality: N/A;  pt currently on Lovenox and Xarelto - ok per Dr. Doty to hold Plavix 2 days prior and Lovenox 24hr prior to case/see scanned media / for documentation of hold -- SM        ESOPHAGOGASTRODUODENOSCOPY N/A 3/28/2022    Procedure: EGD (ESOPHAGOGASTRODUODENOSCOPY);  Surgeon: Armand Foy MD;  Location: [facility] (2ND FLR);  Service: Endoscopy;  Laterality: N/A;  okay to hold Xarelto for 2 days per Dr. Doty with lovenox bridgings - see note on 3/21/22  2nd floor for ASAP availabilty  fully vaccinated - sm    EXTRACORPOREAL SHOCK WAVE LITHOTRIPSY Left 2021    Procedure: LITHOTRIPSY-EXTRACORPOREAL SHOCK WAVE;  Surgeon: Jeremias Eric MD;  Location: [facility] OR;  Service: Urology;  Laterality: Left;    HERNIA REPAIR      umbilical    KIDNEY STONE SURGERY      ureteral stent    left knee surgery      SHOULDER ARTHROSCOPY Left 2019    Procedure: ARTHROSCOPY, SHOULDER;  Surgeon: Sedrick Patel MD;  Location: [facility] OR;  Service: Orthopedics;  Laterality: Left;  BLOCK     TONSILLECTOMY      TOTAL SHOULDER ARTHROPLASTY Right      Family History   Problem Relation Age of Onset    No Known Problems Father     Colon cancer Mother 74    Ovarian cancer Mother     Liver cancer Sister     Diabetes Sister     Liver cancer Maternal Grandfather     Breast cancer Maternal Aunt 50        50s    Cervical cancer Maternal Aunt     Breast cancer Maternal Cousin 45    No Known Problems Daughter     No Known Problems Sister     No Known Problems Daughter     Esophageal cancer Neg Hx      Social History     Tobacco Use    Smoking status: Never Smoker    Smokeless tobacco: Never Used   Substance Use Topics    Alcohol use: No    Drug use: No        Review of Systems:  As noted in HPI    OBJECTIVE:     Vital Signs (Most Recent):  Pulse: 71 (08/16/22 0857)  BP: 106/60 (08/16/22 0857)  SpO2: 99 % (08/16/22 0857)    Physical Exam:  General: well developed, well nourished, no distress  Head: normocephalic, atraumatic  Neurologic: Alert and oriented. Thought content appropriate  GCS: Motor: 6/Verbal: 5/Eyes: 4 GCS Total: 15  Language: No aphasia  Speech: No dysarthria  Cranial nerves: face symmetric, tongue midline, CN II-XII grossly intact.   Eyes: pupils equal, round, reactive to light with accommodation, EOMI.   Pulmonary: normal respirations, not labored, no accessory muscles used  Sensory: intact to light touch throughout  Motor Strength: Moves all extremities spontaneously with good tone.  Full strength upper and lower extremities. No abnormal movements seen.     Strength  Deltoids Triceps Biceps Wrist Extension Wrist Flexion Hand    Upper: R 5/5 5/5 5/5 5/5 5/5 5/5    L 5/5 5/5 5/5 5/5 5/5 5/5     Iliopsoas Quadriceps Knee  Flexion Tibialis  anterior Gastro- cnemius    Lower: R 5/5 5/5 5/5 5/5 5/5     L 5/5 5/5 5/5 5/5 5/5      DTR's - 2 + and symmetric brachioradialis, patellar  Charles: absent  Clonus: absent  Skin: warm, dry and intact, no rashes  Gait: normal    Midline Bony  Tenderness: negative  Paraspinous muscle tenderness: negative    Diagnostic Results:  I have personally reviewed imaging and agree with the findings.     CTA Chest 8/1/22  The arch and great vessels are unremarkable.  Pulmonary vessels are very well opacified and there is no evidence of pulmonary embolus.  Upper abdominal organs show nothing unusual.  No mediastinal, hilar or axillary adenopathy is seen.  The trachea and the bronchi are patent.  No pulmonary nodules, masses or infiltrates are seen.  The bones showed DJD.  There are few vertebral body hemangiomas.    ASSESSMENT/PLAN:     Jasson Pineda is a 71 y.o. female who presents with chronic focal thoracic back pain and anterior xyphoid process pain. She has tried thoracic WILIAN with no relief of her symptoms. I will check a thoracic xray and have asked her to return to clinic with her thoracic MRI on a disc for review.     Suspect GI etiology but will confirm there is no thoracic nerve impingement before discharging patient       Please feel free to call with any further questions          Roxanna Chiu PA-C  [facility]  Department of Neurosurgery  [phone]    Disclaimer: This note was dictated by speech recognition. Minor errors in transcription may be present.  Please call with any questions.

## 2022-08-16 NOTE — Clinical Note
Xray today.  Patient will get her thoracic MRI on a disc and bring it to her next appt.  FU in 1-2 weeks to review both xray and MRI  She has an appt with neurology in December. Please see if it is something Carola can see so we can move the appt up.

## 2022-08-16 NOTE — PATIENT INSTRUCTIONS
Return to clinic for follow-up to review your thoracic MRI. We are looking for nerve impingement that could cause radiating pain.   Xrays today   Please bring our imaging disc with you to the appointment

## 2022-08-17 ENCOUNTER — HOSPITAL ENCOUNTER (OUTPATIENT)
Dept: RADIOLOGY | Facility: HOSPITAL | Age: 72
Discharge: HOME | End: 2022-08-17
Attending: PHYSICIAN ASSISTANT
Payer: MEDICARE

## 2022-08-17 ENCOUNTER — OFFICE VISIT (OUTPATIENT)
Dept: NEUROLOGY | Facility: CLINIC | Age: 72
End: 2022-08-17
Payer: MEDICARE

## 2022-08-17 VITALS
SYSTOLIC BLOOD PRESSURE: 111 MMHG | HEART RATE: 75 BPM | HEIGHT: 61 IN | WEIGHT: 138.69 LBS | DIASTOLIC BLOOD PRESSURE: 59 MMHG | BODY MASS INDEX: 26.19 KG/M2

## 2022-08-17 DIAGNOSIS — R41.3 MEMORY LOSS: ICD-10-CM

## 2022-08-17 DIAGNOSIS — R13.10 DYSPHAGIA, UNSPECIFIED TYPE: ICD-10-CM

## 2022-08-17 DIAGNOSIS — M54.6 CHRONIC THORACIC BACK PAIN, UNSPECIFIED BACK PAIN LATERALITY: ICD-10-CM

## 2022-08-17 DIAGNOSIS — G89.29 CHRONIC THORACIC BACK PAIN, UNSPECIFIED BACK PAIN LATERALITY: ICD-10-CM

## 2022-08-17 PROCEDURE — 99999 PR PBB SHADOW E&M-EST. PATIENT-LVL IV: CPT | Mod: PBBFAC,,,

## 2022-08-17 PROCEDURE — 72070 X-RAY EXAM THORAC SPINE 2VWS: CPT | Mod: 26,,, | Performed by: RADIOLOGY

## 2022-08-17 PROCEDURE — 72070 XR THORACIC SPINE AP LATERAL: ICD-10-PCS | Mod: 26,,, | Performed by: RADIOLOGY

## 2022-08-17 PROCEDURE — 72070 X-RAY EXAM THORAC SPINE 2VWS: CPT | Mod: TC,FY

## 2022-08-17 PROCEDURE — 99999 PR PBB SHADOW E&M-EST. PATIENT-LVL IV: ICD-10-PCS | Mod: PBBFAC,,,

## 2022-08-17 PROCEDURE — 99213 PR OFFICE/OUTPT VISIT, EST, LEVL III, 20-29 MIN: ICD-10-PCS | Mod: S$PBB,,,

## 2022-08-17 PROCEDURE — 99214 OFFICE O/P EST MOD 30 MIN: CPT | Mod: PBBFAC

## 2022-08-17 PROCEDURE — 99213 OFFICE O/P EST LOW 20 MIN: CPT | Mod: S$PBB,,,

## 2022-08-18 NOTE — PROGRESS NOTES
Subjective:       Patient ID: Jasson Pineda is a 71 y.o. female.    Chief Complaint:  Neurologic Problem (dysphagia)      History of Present Illness  71 year old female who presents today for evaluation for short term memory loss and dysphagia. Past medical history below. She states her family is concerned about her recent problem with short term memory. She reports not noticing her problems with memory until her family brought it to her attention. She noticed that she will forget things discussed in a conversation that was just had that day. But she will remember the conversation much later that day or the next day after she really think back about it. She also notes that she has been having pain after she swallows food for the past few months.Has to drink large amount of water so food can go down. 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 She states this has slightly improved her pain and she is able to get solids down. She feels that the food gets stuck in her chest after she swallows. She denies any problems chewing, forgetting to pay bills, or mood changes.    Past Medical History:   Diagnosis Date    Anticoagulant long-term use     xarelto    Chronic back pain     Chronic neck pain     DVT (deep venous thrombosis)     Herniated disc, cervical     Kidney stone     Lumbar herniated disc     PE (pulmonary embolism)     2013    Pulmonary emboli     after surgery    Pulmonary embolism        Past Surgical History:   Procedure Laterality Date    ARTHROSCOPIC REPAIR OF ROTATOR CUFF OF SHOULDER Left 6/11/2019    Procedure: REPAIR, ROTATOR CUFF, ARTHROSCOPIC;  Surgeon: Sedrick Patel MD;  Location: [facility];  Service: Orthopedics;  Laterality: Left;    ARTHROSCOPIC TENOTOMY OF BICEPS TENDON Left 6/11/2019    Procedure: TENOTOMY, BICEPS, ARTHROSCOPIC;  Surgeon: Sedrick Patel MD;  Location: [facility];  Service: Orthopedics;  Laterality: Left;     ARTHROSCOPY OF SHOULDER WITH REMOVAL OF DISTAL CLAVICLE Left 2019    Procedure: ARTHROSCOPY, SHOULDER, WITH DISTAL CLAVICLE EXCISION;  Surgeon: Sedrick Patel MD;  Location: [facility] OR;  Service: Orthopedics;  Laterality: Left;    BREAST CYST EXCISION Left      SECTION      x1    COLONOSCOPY N/A 2018    Procedure: COLONOSCOPY;  Surgeon: Armand Foy MD;  Location: [facility] (4TH FLR);  Service: Endoscopy;  Laterality: N/A;    ESOPHAGEAL MANOMETRY WITH MEASUREMENT OF IMPEDANCE N/A 2018    Procedure: MANOMETRY, ESOPHAGEAL, WITH IMPEDANCE MEASUREMENT;  Surgeon: Armand Foy MD;  Location: [facility] ENDO (4TH FLR);  Service: Endoscopy;  Laterality: N/A;    ESOPHAGOGASTRODUODENOSCOPY N/A 2018    Procedure: EGD (ESOPHAGOGASTRODUODENOSCOPY);  Surgeon: Armand Foy MD;  Location: [facility] (4TH FLR);  Service: Endoscopy;  Laterality: N/A;  pt currently on Lovenox and Xarelto - ok per Dr. Doty to hold Plavix 2 days prior and Lovenox 24hr prior to case/see scanned media / for documentation of hold -- SM        ESOPHAGOGASTRODUODENOSCOPY N/A 3/28/2022    Procedure: EGD (ESOPHAGOGASTRODUODENOSCOPY);  Surgeon: Armand Foy MD;  Location: [facility] (2ND FLR);  Service: Endoscopy;  Laterality: N/A;  okay to hold Xarelto for 2 days per Dr. Doty with lovenox bridgings - see note on 3/21/22  2nd floor for ASAP availabilty  fully vaccinated - sm    EXTRACORPOREAL SHOCK WAVE LITHOTRIPSY Left 2021    Procedure: LITHOTRIPSY-EXTRACORPOREAL SHOCK WAVE;  Surgeon: Jeremias Eric MD;  Location: [facility];  Service: Urology;  Laterality: Left;    HERNIA REPAIR      umbilical    KIDNEY STONE SURGERY      ureteral stent    left knee surgery      SHOULDER ARTHROSCOPY Left 2019    Procedure: ARTHROSCOPY, SHOULDER;  Surgeon: Sedrick Patel MD;  Location: [facility];  Service: Orthopedics;  Laterality: Left;  BLOCK    TONSILLECTOMY      TOTAL SHOULDER ARTHROPLASTY Right         Family History   Problem Relation Age of Onset    No Known Problems Father     Colon cancer Mother 74    Ovarian cancer Mother     Liver cancer Sister     Diabetes Sister     Liver cancer Maternal Grandfather     Breast cancer Maternal Aunt 50        50s    Cervical cancer Maternal Aunt     Breast cancer Maternal Cousin 45    No Known Problems Daughter     No Known Problems Sister     No Known Problems Daughter     Esophageal cancer Neg Hx        Social History     Socioeconomic History    Marital status:    Occupational History    Occupation: teacher      Comment:     Tobacco Use    Smoking status: Never Smoker    Smokeless tobacco: Never Used   Substance and Sexual Activity    Alcohol use: No    Drug use: No    Sexual activity: Not Currently     Partners: Male       Current Outpatient Medications   Medication Sig Dispense Refill    albuterol (PROVENTIL/VENTOLIN HFA) 90 mcg/actuation inhaler Inhale 2 puffs into the lungs every 4 (four) hours as needed for Wheezing. Rescue 1 Inhaler 0    atorvastatin (LIPITOR) 80 MG tablet TAKE 1 TABLET BY MOUTH EVERY DAY 90 tablet 3    cholecalciferol, vitamin D3, 1,250 mcg (50,000 unit) capsule Take 1 capsule (50,000 Units total) by mouth once a week. 12 capsule 3    enoxaparin (LOVENOX) 80 mg/0.8 mL Syrg Inject 0.6 mLs (60 mg total) into the skin 2 (two) times a day. 6 each 1    fluticasone (FLONASE) 50 mcg/actuation nasal spray daily as needed.      HYDROcodone-acetaminophen (NORCO) 5-325 mg per tablet Take 1 tablet by mouth every 4 (four) hours as needed for Pain. 12 tablet 0    omeprazole (PRILOSEC) 20 MG capsule TAKE 1 CAPSULE BY MOUTH TWICE A DAY BEFORE MEALS 180 capsule 1    oxybutynin (DITROPAN-XL) 10 MG 24 hr tablet TAKE 1 TABLET BY MOUTH EVERY DAY 90 tablet 3    pantoprazole (PROTONIX) 40 MG tablet Take 1 tablet (40 mg total) by mouth once daily. 30 tablet 0    pregabalin (LYRICA) 150 MG capsule Take 150  mg by mouth 2 (two) times daily.      pregabalin (LYRICA) 25 MG capsule Take 25 mg by mouth.      rivaroxaban (XARELTO) 20 mg Tab Take 20 mg by mouth.      tiZANidine (ZANAFLEX) 4 MG tablet TAKE 1 TABLET BY MOUTH AT BEDTIME AS NEEDED FOR MUSCLE RELAXER      traMADol (ULTRAM) 50 mg tablet Take 50 mg by mouth 2 (two) times daily as needed.  1    XARELTO 20 mg Tab TAKE 1 TABLET BY MOUTH EVERY DAY IN THE EVENING 90 tablet 3     No current facility-administered medications for this visit.       Review of patient's allergies indicates:   Allergen Reactions    Sulfa (sulfonamide antibiotics) Rash       Review of Systems  Review of Systems   Constitutional: Negative.    HENT: Positive for trouble swallowing.    Eyes: Negative.    Respiratory: Negative.    Cardiovascular: Negative.    Gastrointestinal: Negative.    Endocrine: Negative.    Genitourinary: Negative.    Musculoskeletal: Negative.    Skin: Negative.    Neurological: Negative.    Psychiatric/Behavioral: Positive for decreased concentration.       Objective:      Neurologic Exam     Mental Status   Oriented to person.   Oriented to place.   Disoriented to date and day. Oriented to year and month.   Registration: recalls 2 of 3 objects. Recall at 5 minutes: recalls 1 of 3 objects. Follows 2 step commands.   Attention: normal. Concentration: decreased.   Speech: speech is normal   Level of consciousness: alert  Knowledge: good.   Able to name object. Able to read. Able to repeat. Able to write. Abnormal comprehension.     Cranial Nerves   Cranial nerves II through XII intact.     CN II   Visual fields full to confrontation.     CN III, IV, VI   Pupils are equal, round, and reactive to light.  Extraocular motions are normal.   CN III: no CN III palsy  CN VI: no CN VI palsy  Nystagmus: none   Diplopia: none    CN V   Facial sensation intact.     CN VII   Facial expression full, symmetric.     CN VIII   CN VIII normal.     CN IX, X   CN IX normal.   CN X normal.      CN XI   CN XI normal.     CN XII   CN XII normal.     Motor Exam   Muscle bulk: normal  Overall muscle tone: normal  Right arm pronator drift: absent  Left arm pronator drift: absent    Strength   Right biceps: 5/5  Left biceps: 5/5  Right triceps: 5/5  Left triceps: 5/5  Right quadriceps: 5/5  Left quadriceps: 5/5  Right anterior tibial: 5/5  Left anterior tibial: 5/5  Right posterior tibial: 5/5  Left posterior tibial: 5/5    Sensory Exam   Light touch normal.     Gait, Coordination, and Reflexes     Gait  Gait: normal    Coordination   Romberg: negative    Tremor   Resting tremor: absent  Intention tremor: absent  Action tremor: absent    Reflexes   Right brachioradialis: 2+  Left brachioradialis: 2+  Right biceps: 2+  Left biceps: 2+  Right triceps: 2+  Left triceps: 2+  Right patellar: 2+  Left patellar: 2+  Right achilles: 2+  Left achilles: 2+  Right : 2+  Left : 2+      Physical Exam  HENT:      Head: Normocephalic and atraumatic.   Eyes:      Extraocular Movements: EOM normal.      Pupils: Pupils are equal, round, and reactive to light.   Cardiovascular:      Rate and Rhythm: Normal rate.   Pulmonary:      Effort: Pulmonary effort is normal.   Skin:     General: Skin is warm and dry.   Neurological:      Mental Status: She is alert.      Cranial Nerves: Cranial nerves 2-12 are intact.      Sensory: Sensation is intact.      Motor: Motor function is intact.      Coordination: Coordination is intact. Romberg Test normal.      Gait: Gait is intact.      Deep Tendon Reflexes:      Reflex Scores:       Tricep reflexes are 2+ on the right side and 2+ on the left side.       Bicep reflexes are 2+ on the right side and 2+ on the left side.       Brachioradialis reflexes are 2+ on the right side and 2+ on the left side.       Patellar reflexes are 2+ on the right side and 2+ on the left side.       Achilles reflexes are 2+ on the right side and 2+ on the left side.  Psychiatric:         Mood and Affect:  Mood normal.         Speech: Speech normal.         Behavior: Behavior is cooperative.         Cognition and Memory: She exhibits impaired recent memory.           Assessment and Plan:       1. Dysphagia, unspecified type  - MRI Brain Without Contrast; Future    2. Memory loss  - Ambulatory consult to Neuropsychology; Future  - VITAMIN B1; Future  - Vitamin B12 Deficiency Panel; Future  - METHYLMALONIC ACID, SERUM; Future  - Magnesium; Future  - FOLATE; Future  - Homocysteine, serum; Future

## 2022-08-20 NOTE — TELEPHONE ENCOUNTER
Care Due:                  Date            Visit Type   Department     Provider  --------------------------------------------------------------------------------                                EP -                              PRIMARY      [facility] INTERNAL  Last Visit: 05-      CARE ([facility])   MEDICINE       [facility]  Next Visit: None Scheduled  None         None Found                                                            Last  Test          Frequency    Reason                     Performed    Due Date  --------------------------------------------------------------------------------    Lipid Panel.  12 months..  atorvastatin.............  07- 07-    [facility] Embedded Care Gaps. Reference number: [id number]. 5/27/2022   11:05:44 AM CDT  
Please send to [facility] [id number]  
no radiation

## 2022-08-29 ENCOUNTER — HOSPITAL ENCOUNTER (OUTPATIENT)
Dept: RADIOLOGY | Facility: OTHER | Age: 72
Discharge: HOME | End: 2022-08-29
Payer: MEDICARE

## 2022-08-29 DIAGNOSIS — R13.10 DYSPHAGIA, UNSPECIFIED TYPE: ICD-10-CM

## 2022-08-29 PROCEDURE — 70551 MRI BRAIN STEM W/O DYE: CPT | Mod: 26,,, | Performed by: RADIOLOGY

## 2022-08-29 PROCEDURE — 70551 MRI BRAIN STEM W/O DYE: CPT | Mod: TC

## 2022-08-29 PROCEDURE — 70551 MRI BRAIN WITHOUT CONTRAST: ICD-10-PCS | Mod: 26,,, | Performed by: RADIOLOGY

## 2022-08-30 ENCOUNTER — PATIENT MESSAGE (OUTPATIENT)
Dept: INTERNAL MEDICINE | Facility: CLINIC | Age: 72
End: 2022-08-30
Payer: MEDICARE

## 2022-08-30 ENCOUNTER — OFFICE VISIT (OUTPATIENT)
Dept: NEUROSURGERY | Facility: CLINIC | Age: 72
End: 2022-08-30
Payer: MEDICARE

## 2022-08-30 VITALS
HEART RATE: 82 BPM | DIASTOLIC BLOOD PRESSURE: 57 MMHG | BODY MASS INDEX: 26.1 KG/M2 | SYSTOLIC BLOOD PRESSURE: 116 MMHG | HEIGHT: 61 IN | WEIGHT: 138.25 LBS | OXYGEN SATURATION: 98 %

## 2022-08-30 DIAGNOSIS — M54.9 CHRONIC MIDLINE BACK PAIN, UNSPECIFIED BACK LOCATION: ICD-10-CM

## 2022-08-30 DIAGNOSIS — D18.09 VERTEBRAL BODY HEMANGIOMA: Primary | ICD-10-CM

## 2022-08-30 DIAGNOSIS — M54.6 CHRONIC THORACIC BACK PAIN, UNSPECIFIED BACK PAIN LATERALITY: ICD-10-CM

## 2022-08-30 DIAGNOSIS — G89.29 CHRONIC MIDLINE BACK PAIN, UNSPECIFIED BACK LOCATION: ICD-10-CM

## 2022-08-30 DIAGNOSIS — R10.13 EPIGASTRIC PAIN: ICD-10-CM

## 2022-08-30 DIAGNOSIS — G89.29 CHRONIC THORACIC BACK PAIN, UNSPECIFIED BACK PAIN LATERALITY: ICD-10-CM

## 2022-08-30 PROCEDURE — 99213 OFFICE O/P EST LOW 20 MIN: CPT | Mod: PBBFAC | Performed by: PHYSICIAN ASSISTANT

## 2022-08-30 PROCEDURE — 99214 PR OFFICE/OUTPT VISIT, EST, LEVL IV, 30-39 MIN: ICD-10-PCS | Mod: S$PBB,,, | Performed by: PHYSICIAN ASSISTANT

## 2022-08-30 PROCEDURE — 99999 PR PBB SHADOW E&M-EST. PATIENT-LVL III: CPT | Mod: PBBFAC,,, | Performed by: PHYSICIAN ASSISTANT

## 2022-08-30 PROCEDURE — 99999 PR PBB SHADOW E&M-EST. PATIENT-LVL III: ICD-10-PCS | Mod: PBBFAC,,, | Performed by: PHYSICIAN ASSISTANT

## 2022-08-30 PROCEDURE — 99214 OFFICE O/P EST MOD 30 MIN: CPT | Mod: S$PBB,,, | Performed by: PHYSICIAN ASSISTANT

## 2022-08-30 NOTE — PROGRESS NOTES
[facility]  Neurosurgery    SUBJECTIVE:     Interval history 08/30/2022:   Patient returns to clinic to review thoracic MRI images.  Denies any change in her symptoms.      History of Present Illness 8/16/22:  Jasson Pineda is a 71 y.o. female with below listed PMH who presents with chronic thoracic back pain and pain at the xyphoid process. Symptoms began 9 months ago with inciting event or injury. She cannot lie on her stomach or back due to the pain. She complains mostly of pain near her xyphoid process that worsens when eating. She is able to swallow food but feels increased pain after swallowing. The pain sometimes radiates bilaterally along the diaphragm. She reports posterior thoracic back pain at the level of the xyphoid process. This back pain is present mostly when rolling over in bed.     She has tried thoracic WILIAN with Dr. Serra to rule out thoracic radiculopathy. She confirms this was not helpful for her symptoms. She has also been undergoing a workup with GI with no confirmed diagnosis as of yet. She is referred to neurosurgery due to mention of vertebral body hemangiomas on recent CTA.     Denies pain, numbness, and weakness to the BUE and BLE.       (Not in a hospital admission)      Review of patient's allergies indicates:   Allergen Reactions    Sulfa (sulfonamide antibiotics) Rash       Past Medical History:   Diagnosis Date    Anticoagulant long-term use     xarelto    Chronic back pain     Chronic neck pain     DVT (deep venous thrombosis)     Herniated disc, cervical     Kidney stone     Lumbar herniated disc     PE (pulmonary embolism)     2013    Pulmonary emboli     after surgery    Pulmonary embolism      Past Surgical History:   Procedure Laterality Date    ARTHROSCOPIC REPAIR OF ROTATOR CUFF OF SHOULDER Left 6/11/2019    Procedure: REPAIR, ROTATOR CUFF, ARTHROSCOPIC;  Surgeon: Sedrikc Patel MD;  Location: [facility];  Service: Orthopedics;  Laterality: Left;     ARTHROSCOPIC TENOTOMY OF BICEPS TENDON Left 2019    Procedure: TENOTOMY, BICEPS, ARTHROSCOPIC;  Surgeon: Sedrick Patel MD;  Location: [facility] OR;  Service: Orthopedics;  Laterality: Left;    ARTHROSCOPY OF SHOULDER WITH REMOVAL OF DISTAL CLAVICLE Left 2019    Procedure: ARTHROSCOPY, SHOULDER, WITH DISTAL CLAVICLE EXCISION;  Surgeon: Sedrick Patel MD;  Location: [facility] OR;  Service: Orthopedics;  Laterality: Left;    BREAST CYST EXCISION Left      SECTION      x1    COLONOSCOPY N/A 2018    Procedure: COLONOSCOPY;  Surgeon: Armand Foy MD;  Location: [facility] ENDO (4TH FLR);  Service: Endoscopy;  Laterality: N/A;    ESOPHAGEAL MANOMETRY WITH MEASUREMENT OF IMPEDANCE N/A 2018    Procedure: MANOMETRY, ESOPHAGEAL, WITH IMPEDANCE MEASUREMENT;  Surgeon: Armand Foy MD;  Location: [facility] ENDO (4TH FLR);  Service: Endoscopy;  Laterality: N/A;    ESOPHAGOGASTRODUODENOSCOPY N/A 2018    Procedure: EGD (ESOPHAGOGASTRODUODENOSCOPY);  Surgeon: Armand Foy MD;  Location: [facility] ENDO (4TH FLR);  Service: Endoscopy;  Laterality: N/A;  pt currently on Lovenox and Xarelto - ok per Dr. Doty to hold Plavix 2 days prior and Lovenox 24hr prior to case/see scanned media / for documentation of hold -- SM        ESOPHAGOGASTRODUODENOSCOPY N/A 3/28/2022    Procedure: EGD (ESOPHAGOGASTRODUODENOSCOPY);  Surgeon: Armand Foy MD;  Location: [facility] ENDO (2ND FLR);  Service: Endoscopy;  Laterality: N/A;  okay to hold Xarelto for 2 days per Dr. Doty with lovenox bridgings - see note on 3/21/22  2nd floor for ASAP availabilty  fully vaccinated - sm    EXTRACORPOREAL SHOCK WAVE LITHOTRIPSY Left 2021    Procedure: LITHOTRIPSY-EXTRACORPOREAL SHOCK WAVE;  Surgeon: Jeremias Eric MD;  Location: [facility];  Service: Urology;  Laterality: Left;    HERNIA REPAIR      umbilical    KIDNEY STONE SURGERY      ureteral stent    left knee surgery      SHOULDER ARTHROSCOPY Left 2019    Procedure:  ARTHROSCOPY, SHOULDER;  Surgeon: Sedrick Patel MD;  Location: [facility];  Service: Orthopedics;  Laterality: Left;  BLOCK    TONSILLECTOMY      TOTAL SHOULDER ARTHROPLASTY Right      Family History   Problem Relation Age of Onset    No Known Problems Father     Colon cancer Mother 74    Ovarian cancer Mother     Liver cancer Sister     Diabetes Sister     Liver cancer Maternal Grandfather     Breast cancer Maternal Aunt 50        50s    Cervical cancer Maternal Aunt     Breast cancer Maternal Cousin 45    No Known Problems Daughter     No Known Problems Sister     No Known Problems Daughter     Esophageal cancer Neg Hx      Social History     Tobacco Use    Smoking status: Never    Smokeless tobacco: Never   Substance Use Topics    Alcohol use: No    Drug use: No        Review of Systems:  As noted in HPI    OBJECTIVE:     Vital Signs (Most Recent):  Pulse: 82 (08/30/22 1109)  BP: (!) 116/57 (08/30/22 1109)  SpO2: 98 % (08/30/22 1109)    Physical Exam:  General: well developed, well nourished, no distress  Head: normocephalic, atraumatic  Neurologic: Alert and oriented. Thought content appropriate  GCS: Motor: 6/Verbal: 5/Eyes: 4 GCS Total: 15  Language: No aphasia  Speech: No dysarthria  Cranial nerves: face symmetric, tongue midline, CN II-XII grossly intact.   Eyes: pupils equal, round, reactive to light with accommodation, EOMI.   Pulmonary: normal respirations, not labored, no accessory muscles used  Sensory: intact to light touch throughout  Motor Strength: Moves all extremities spontaneously with good tone.  Full strength upper and lower extremities. No abnormal movements seen.     Strength  Deltoids Triceps Biceps Wrist Extension Wrist Flexion Hand    Upper: R 5/5 5/5 5/5 5/5 5/5 5/5    L 5/5 5/5 5/5 5/5 5/5 5/5     Iliopsoas Quadriceps Knee  Flexion Tibialis  anterior Gastro- cnemius    Lower: R 5/5 5/5 5/5 5/5 5/5     L 5/5 5/5 5/5 5/5 5/5      DTR's - 2 + and symmetric brachioradialis,  patellar  Charles: absent  Clonus: absent  Skin: warm, dry and intact, no rashes  Gait: normal    Midline Bony Tenderness: negative  Paraspinous muscle tenderness: negative    Diagnostic Results:  I have personally reviewed imaging and agree with the findings.     Outside thoracic MRI reviewed with patient.  To be scanned into chart  -right foraminal stenosis at what appears to be T10-11.  Will re-evaluate once scanned into the [facility] system    CTA Chest 8/1/22  The arch and great vessels are unremarkable.  Pulmonary vessels are very well opacified and there is no evidence of pulmonary embolus.  Upper abdominal organs show nothing unusual.  No mediastinal, hilar or axillary adenopathy is seen.  The trachea and the bronchi are patent.  No pulmonary nodules, masses or infiltrates are seen.  The bones showed DJD.  There are few vertebral body hemangiomas.    ASSESSMENT/PLAN:     Jasson Pineda is a 71 y.o. female who presents for follow-up of chronic focal thoracic back pain and anterior xyphoid process pain. She has tried thoracic WILIAN with no relief of her symptoms, level of WILIAN unclear.  Patient returned to clinic today with her thoracic MRI on a disc for further evaluation.  There is right-sided foraminal stenosis at what appears to be T10-11.  I will habitus scanned into the patient's chart for more thorough evaluation and to confirm the level.      I continue to suspect GI etiology but will review the patient's images with Dr. Sow before discharging patient     UPDATE: MRI thoracic scanned into chart and reviewed with Dr. Sow. No thoracic pathology corresponding to her symptoms. I believe abdominal imaging is warranted given chronic epigastric pain and back pain. CT abdomen w/wo contrast ordered.     Please feel free to call with any further questions          Roxanna Chiu PA-C  [facility]  Department of Neurosurgery  [phone]    Disclaimer: This note was dictated by speech  recognition. Minor errors in transcription may be present.  Please call with any questions.

## 2022-09-01 ENCOUNTER — TELEPHONE (OUTPATIENT)
Dept: NEUROSURGERY | Facility: CLINIC | Age: 72
End: 2022-09-01
Payer: MEDICARE

## 2022-09-01 NOTE — TELEPHONE ENCOUNTER
WALDOM regarding CT. Patient informed that Roxanna Scottraymondmona has ordered a CT and that she has been scheduled for 9/8/22 at 2:00 PM through [facility]. Patient made aware to call back for any questions or concerns. Phone number provided.

## 2022-09-06 DIAGNOSIS — M43.12 ACQUIRED SPONDYLOLISTHESIS OF CERVICAL VERTEBRA: Primary | ICD-10-CM

## 2022-09-06 DIAGNOSIS — M43.14: ICD-10-CM

## 2022-09-06 DIAGNOSIS — M48.02 SPINAL STENOSIS, CERVICAL REGION: ICD-10-CM

## 2022-09-06 NOTE — PROGRESS NOTES
Called patient to schedule MRI. Patient was informed that an appointment for an MRI of the cervical spine was created for September 21, 2022 at 9:00 AM. Patient verbally acknowledged and agreed.     Past Surgical History:   Procedure Laterality Date    LAPAROSCOPY      had IUD removed        Family History   Problem Relation Age of Onset    Cancer Mother        Social History   Substance Use Topics    Smoking status: Never Smoker    Smokeless tobacco: Never Used    Alcohol use No      Current Outpatient Prescriptions   Medication Sig Dispense Refill    ALPRAZolam (XANAX) 1 MG tablet Take 1 mg by mouth 2 times daily as needed for Anxiety. Lorren Lesches sertraline (ZOLOFT) 50 MG tablet Take 50 mg by mouth daily      Multiple Vitamin (ESSENTIAL ONE DAILY PO) Take by mouth      Norgestim-Eth Estrad Triphasic (ORTHO TRI-CYCLEN, 28, PO) Take by mouth      citalopram (CELEXA) 10 MG tablet Take 1 tablet by mouth daily 30 tablet 3     No current facility-administered medications for this visit. No Known Allergies    Health Maintenance   Topic Date Due    HIV screen  09/27/1997    DTaP/Tdap/Td vaccine (1 - Tdap) 09/27/2001    Cervical cancer screen  09/27/2003    Flu vaccine (1) 09/01/2018       Subjective:      Review of Systems   Constitutional: Negative for chills, fatigue, fever and unexpected weight change. HENT: Negative for congestion and rhinorrhea. Eyes: Negative for visual disturbance. Respiratory: Negative for cough and shortness of breath. Cardiovascular: Negative for chest pain and palpitations. Gastrointestinal: Negative for abdominal pain, constipation, diarrhea, nausea and vomiting. Endocrine: Negative for cold intolerance, heat intolerance, polydipsia and polyuria. Genitourinary: Negative for dysuria and flank pain. Musculoskeletal: Negative for back pain and myalgias. Skin: Negative for rash. Neurological: Negative for dizziness and light-headedness. Hematological: Does not bruise/bleed easily. Psychiatric/Behavioral: Negative for suicidal ideas. Objective:     Physical Exam   Constitutional: She is oriented to person, place, and time.  She appears well-developed and well-nourished. No distress. HENT:   Head: Normocephalic and atraumatic. Right Ear: Hearing, tympanic membrane, external ear and ear canal normal.   Left Ear: Hearing, tympanic membrane, external ear and ear canal normal.   Nose: Nose normal.   Mouth/Throat: Uvula is midline, oropharynx is clear and moist and mucous membranes are normal.   Eyes: Pupils are equal, round, and reactive to light. EOM are normal.   Neck: Normal range of motion. Neck supple. Thyromegaly (mild) present. Cardiovascular: Normal rate, regular rhythm and normal heart sounds. Exam reveals no gallop and no friction rub. No murmur heard. Pulmonary/Chest: Effort normal and breath sounds normal. No respiratory distress. She has no wheezes. Abdominal: Soft. Bowel sounds are normal. There is no tenderness. There is no CVA tenderness. Musculoskeletal: Normal range of motion. Lymphadenopathy:     She has no cervical adenopathy. Neurological: She is alert and oriented to person, place, and time. No cranial nerve deficit. Skin: Skin is warm and dry. No rash noted. She is not diaphoretic. Psychiatric: She has a normal mood and affect. Her behavior is normal. Judgment and thought content normal.   Nursing note and vitals reviewed. /72 (Site: Left Arm, Position: Sitting, Cuff Size: Medium Adult)   Pulse 96   Resp 18   Ht 5' 6.25\" (1.683 m)   Wt 203 lb 12.8 oz (92.4 kg)   LMP 08/23/2018 (Approximate)   Breastfeeding? No   BMI 32.65 kg/m²     Assessment:       Diagnosis Orders   1. Routine general medical examination at a health care facility  Lipid Panel    Basic Metabolic Panel   2. Anxiety  citalopram (CELEXA) 10 MG tablet   3. PMDD (premenstrual dysphoric disorder)  citalopram (CELEXA) 10 MG tablet   4.  Encounter for routine gynecological examination with Papanicolaou smear of cervix  Doyle MckenzieShriners Hospitals for ChildrenSchirmer, CNP, OB/GYN [place]*       Plan:      Health Maintenance-We are going to try to obtain

## 2022-09-08 ENCOUNTER — HOSPITAL ENCOUNTER (OUTPATIENT)
Dept: RADIOLOGY | Facility: OTHER | Age: 72
Discharge: HOME | End: 2022-09-08
Attending: PHYSICIAN ASSISTANT
Payer: MEDICARE

## 2022-09-08 DIAGNOSIS — R10.13 EPIGASTRIC PAIN: ICD-10-CM

## 2022-09-08 DIAGNOSIS — M54.9 CHRONIC MIDLINE BACK PAIN, UNSPECIFIED BACK LOCATION: ICD-10-CM

## 2022-09-08 DIAGNOSIS — G89.29 CHRONIC MIDLINE BACK PAIN, UNSPECIFIED BACK LOCATION: ICD-10-CM

## 2022-09-08 PROCEDURE — 74160 CT ABDOMEN W/CONTRAST: CPT | Mod: TC

## 2022-09-08 PROCEDURE — 25500020 PHARM REV CODE 255: Performed by: PHYSICIAN ASSISTANT

## 2022-09-08 PROCEDURE — 74160 CT ABDOMEN W/CONTRAST: CPT | Mod: 26,,, | Performed by: RADIOLOGY

## 2022-09-08 PROCEDURE — A9698 NON-RAD CONTRAST MATERIALNOC: HCPCS | Performed by: PHYSICIAN ASSISTANT

## 2022-09-08 PROCEDURE — 74160 CT ABDOMEN WITH CONTRAST: ICD-10-PCS | Mod: 26,,, | Performed by: RADIOLOGY

## 2022-09-08 RX ADMIN — IOHEXOL 75 ML: 350 INJECTION, SOLUTION INTRAVENOUS at 01:09

## 2022-09-08 RX ADMIN — IOHEXOL 500 ML: 9 SOLUTION ORAL at 01:09

## 2022-09-12 ENCOUNTER — HOSPITAL ENCOUNTER (OUTPATIENT)
Dept: RADIOLOGY | Facility: HOSPITAL | Age: 72
Discharge: HOME | End: 2022-09-12
Attending: PHYSICIAN ASSISTANT
Payer: MEDICARE

## 2022-09-12 DIAGNOSIS — M43.12 ACQUIRED SPONDYLOLISTHESIS OF CERVICAL VERTEBRA: ICD-10-CM

## 2022-09-12 DIAGNOSIS — M43.14: ICD-10-CM

## 2022-09-12 PROCEDURE — 72082 XR SCOLIOSIS COMPLETE: ICD-10-PCS | Mod: 26,,, | Performed by: RADIOLOGY

## 2022-09-12 PROCEDURE — 72082 X-RAY EXAM ENTIRE SPI 2/3 VW: CPT | Mod: 26,,, | Performed by: RADIOLOGY

## 2022-09-12 PROCEDURE — 72082 X-RAY EXAM ENTIRE SPI 2/3 VW: CPT | Mod: TC,PN

## 2022-09-13 ENCOUNTER — HOSPITAL ENCOUNTER (OUTPATIENT)
Dept: RADIOLOGY | Facility: OTHER | Age: 72
Discharge: HOME | End: 2022-09-13
Attending: PHYSICIAN ASSISTANT
Payer: MEDICARE

## 2022-09-13 DIAGNOSIS — M43.12 ACQUIRED SPONDYLOLISTHESIS OF CERVICAL VERTEBRA: ICD-10-CM

## 2022-09-13 PROCEDURE — 72052 XR CERVICAL SPINE 5 VIEW WITH FLEX AND EXT: ICD-10-PCS | Mod: 26,,, | Performed by: RADIOLOGY

## 2022-09-13 PROCEDURE — 72052 X-RAY EXAM NECK SPINE 6/>VWS: CPT | Mod: 26,,, | Performed by: RADIOLOGY

## 2022-09-13 PROCEDURE — 72052 X-RAY EXAM NECK SPINE 6/>VWS: CPT | Mod: TC,FY

## 2022-09-21 ENCOUNTER — HOSPITAL ENCOUNTER (OUTPATIENT)
Dept: RADIOLOGY | Facility: OTHER | Age: 72
Discharge: HOME | End: 2022-09-21
Attending: PHYSICIAN ASSISTANT
Payer: MEDICARE

## 2022-09-21 DIAGNOSIS — M48.02 SPINAL STENOSIS, CERVICAL REGION: ICD-10-CM

## 2022-09-21 DIAGNOSIS — M43.12 ACQUIRED SPONDYLOLISTHESIS OF CERVICAL VERTEBRA: ICD-10-CM

## 2022-09-21 PROCEDURE — 72141 MRI NECK SPINE W/O DYE: CPT | Mod: TC

## 2022-09-21 PROCEDURE — 72141 MRI NECK SPINE W/O DYE: CPT | Mod: 26,,, | Performed by: RADIOLOGY

## 2022-09-21 PROCEDURE — 72141 MRI CERVICAL SPINE WITHOUT CONTRAST: ICD-10-PCS | Mod: 26,,, | Performed by: RADIOLOGY

## 2022-09-22 ENCOUNTER — PATIENT MESSAGE (OUTPATIENT)
Dept: INTERNAL MEDICINE | Facility: CLINIC | Age: 72
End: 2022-09-22
Payer: MEDICARE

## 2022-09-30 ENCOUNTER — TELEPHONE (OUTPATIENT)
Dept: NEUROSURGERY | Facility: CLINIC | Age: 72
End: 2022-09-30
Payer: MEDICARE

## 2022-09-30 NOTE — TELEPHONE ENCOUNTER
Called patient to schedule 3 month follow up appointment with Roxanna KENNY. Patient was informed she has been scheduled for 1/3/22 @ 9:40 AM. Patient verbally acknowledged and agreed.

## 2022-10-03 ENCOUNTER — TELEPHONE (OUTPATIENT)
Dept: INTERNAL MEDICINE | Facility: CLINIC | Age: 72
End: 2022-10-03
Payer: MEDICARE

## 2022-10-05 ENCOUNTER — OFFICE VISIT (OUTPATIENT)
Dept: UROLOGY | Facility: CLINIC | Age: 72
End: 2022-10-05
Payer: MEDICARE

## 2022-10-05 VITALS — HEART RATE: 92 BPM | SYSTOLIC BLOOD PRESSURE: 118 MMHG | DIASTOLIC BLOOD PRESSURE: 60 MMHG

## 2022-10-05 DIAGNOSIS — N32.81 OAB (OVERACTIVE BLADDER): ICD-10-CM

## 2022-10-05 DIAGNOSIS — N20.0 NEPHROLITHIASIS: Primary | ICD-10-CM

## 2022-10-05 LAB
BACTERIA #/AREA URNS AUTO: NORMAL /HPF
BILIRUB SERPL-MCNC: NEGATIVE MG/DL
BLOOD URINE, POC: ABNORMAL
CLARITY, POC UA: CLEAR
COLOR, POC UA: YELLOW
GLUCOSE UR QL STRIP: NORMAL
KETONES UR QL STRIP: NEGATIVE
LEUKOCYTE ESTERASE URINE, POC: NEGATIVE
MICROSCOPIC COMMENT: NORMAL
NITRITE, POC UA: NEGATIVE
PH, POC UA: 5
PROTEIN, POC: NEGATIVE
RBC #/AREA URNS AUTO: 0 /HPF (ref 0–4)
SPECIFIC GRAVITY, POC UA: 1.02
SQUAMOUS #/AREA URNS AUTO: 1 /HPF
UROBILINOGEN, POC UA: NORMAL
WBC #/AREA URNS AUTO: 1 /HPF (ref 0–5)

## 2022-10-05 PROCEDURE — 99214 PR OFFICE/OUTPT VISIT, EST, LEVL IV, 30-39 MIN: ICD-10-PCS | Mod: S$GLB,,, | Performed by: NURSE PRACTITIONER

## 2022-10-05 PROCEDURE — 99214 OFFICE O/P EST MOD 30 MIN: CPT | Mod: S$GLB,,, | Performed by: NURSE PRACTITIONER

## 2022-10-05 PROCEDURE — 81002 URINALYSIS NONAUTO W/O SCOPE: CPT | Mod: S$GLB,,, | Performed by: NURSE PRACTITIONER

## 2022-10-05 PROCEDURE — 81001 URINALYSIS AUTO W/SCOPE: CPT | Performed by: NURSE PRACTITIONER

## 2022-10-05 PROCEDURE — 81002 POCT URINE DIPSTICK WITHOUT MICROSCOPE: ICD-10-PCS | Mod: S$GLB,,, | Performed by: NURSE PRACTITIONER

## 2022-10-05 RX ORDER — OXYBUTYNIN CHLORIDE 10 MG/1
10 TABLET, EXTENDED RELEASE ORAL DAILY
Qty: 30 TABLET | Refills: 11 | Status: SHIPPED | OUTPATIENT
Start: 2022-10-05 | End: 2023-01-11

## 2022-10-05 NOTE — PROGRESS NOTES
Subjective:      Jasson Pineda is a 71 y.o. female who returns today regarding her kidney stones.     She has a history of stones. S/p ESWL with Dr. Eric on 21.  Last seen in .     She presents today reporting urinary frequency and urgency for the last year. She was taking ditropan 10 mg XL; however her prescription . Denies dysuria and gross hematuria. Denies flank pain and fever/chills. Drinks minimal water during the day.     The following portions of the patient's history were reviewed and updated as appropriate: allergies, current medications, past family history, past medical history, past social history, past surgical history and problem list.    Review of Systems  Constitutional: no fever or chills  ENT: no nasal congestion or sore throat  Respiratory: no cough or shortness of breath  Cardiovascular: no chest pain or palpitations  Gastrointestinal: no nausea or vomiting, tolerating diet  Genitourinary: as per HPI  Hematologic/Lymphatic: no easy bruising or lymphadenopathy  Musculoskeletal: no arthralgias or myalgias  Neurological: no seizures or tremors  Behavioral/Psych: no auditory or visual hallucinations     Objective:   Vital Signs:   Vitals:    10/05/22 0828   BP: 118/60   Pulse: 92     Physical Exam   General: alert and oriented, no acute distress  Head: normocephalic, atraumatic  Neck: supple, normal ROM  Respiratory: Symmetric expansion, non-labored breathing  Cardiovascular: regular rate and rhythm  Abdomen: soft, non tender, non distended  Pelvic: deferred  Skin: normal coloration and turgor, no rashes, no suspicious skin lesions noted  Neuro: alert and oriented x3, no gross deficits  Psych: normal judgment and insight, normal mood/affect, and non-anxious    Lab Review   Urinalysis demonstrates negative for all components  Lab Results   Component Value Date    WBC 5.29 2022    HGB 12.2 2022    HCT 38.2 2022    MCV 84 2022     2022  "    Lab Results   Component Value Date    CREATININE 0.8 08/01/2022    BUN 10 08/01/2022       Imaging  (all images personally reviewed; agree with report below)  CT abdomen (9/8/22)- "The kidneys appear normal in overall size and there is no evidence for abnormal renal masses or hydronephrosis.  There are nonobstructing calculi identified within the lower pole of the left kidney with 3 calculi present each measuring 2-3 mm in size.  The kidneys are noted to concentrate contrast symmetrically."    Assessment:     1. Nephrolithiasis    2. OAB (overactive bladder)      Plan:   Jasson was seen today for nephrolithiasis.    Diagnoses and all orders for this visit:    Nephrolithiasis  -     POCT URINE DIPSTICK WITHOUT MICROSCOPE  -     X-Ray [facility]; Future    OAB (overactive bladder)  -     mirabegron (MYRBETRIQ) 50 mg Tb24; Take 1 tablet (50 mg total) by mouth once daily.  -     oxybutynin (DITROPAN-XL) 10 MG 24 hr tablet; Take 1 tablet (10 mg total) by mouth once daily.    Plan:  --Trial of ditropan 50 mg XL daily (backup rx for ditropan 10 mg XL daily- discussed potential SE)  --Small non obstructing stones noted on CT  --Recommend general preventive measures, to include increased hydration, low Na diet, and increased citrus intake  --She will message with urinary symptom update in a month  --Follow up annually with [facility] for stone surveillance   "

## 2022-10-14 ENCOUNTER — LAB VISIT (OUTPATIENT)
Dept: PRIMARY CARE CLINIC | Facility: CLINIC | Age: 72
End: 2022-10-14
Payer: MEDICARE

## 2022-10-14 DIAGNOSIS — E78.2 MIXED HYPERLIPIDEMIA: ICD-10-CM

## 2022-10-14 LAB
ALBUMIN SERPL BCP-MCNC: 3.8 G/DL (ref 3.5–5.2)
ALP SERPL-CCNC: 66 U/L (ref 55–135)
ALT SERPL W/O P-5'-P-CCNC: 15 U/L (ref 10–44)
ANION GAP SERPL CALC-SCNC: 5 MMOL/L (ref 8–16)
AST SERPL-CCNC: 17 U/L (ref 10–40)
BILIRUB SERPL-MCNC: 0.5 MG/DL (ref 0.1–1)
BUN SERPL-MCNC: 13 MG/DL (ref 8–23)
CALCIUM SERPL-MCNC: 9.5 MG/DL (ref 8.7–10.5)
CHLORIDE SERPL-SCNC: 109 MMOL/L (ref 95–110)
CHOLEST SERPL-MCNC: 199 MG/DL (ref 120–199)
CHOLEST/HDLC SERPL: 3 {RATIO} (ref 2–5)
CO2 SERPL-SCNC: 26 MMOL/L (ref 23–29)
CREAT SERPL-MCNC: 0.9 MG/DL (ref 0.5–1.4)
EST. GFR  (NO RACE VARIABLE): >60 ML/MIN/1.73 M^2
GLUCOSE SERPL-MCNC: 91 MG/DL (ref 70–110)
HDLC SERPL-MCNC: 66 MG/DL (ref 40–75)
HDLC SERPL: 33.2 % (ref 20–50)
LDLC SERPL CALC-MCNC: 116.8 MG/DL (ref 63–159)
NONHDLC SERPL-MCNC: 133 MG/DL
POTASSIUM SERPL-SCNC: 3.6 MMOL/L (ref 3.5–5.1)
PROT SERPL-MCNC: 6.6 G/DL (ref 6–8.4)
SODIUM SERPL-SCNC: 140 MMOL/L (ref 136–145)
TRIGL SERPL-MCNC: 81 MG/DL (ref 30–150)

## 2022-10-14 PROCEDURE — 80053 COMPREHEN METABOLIC PANEL: CPT | Performed by: INTERNAL MEDICINE

## 2022-10-14 PROCEDURE — 36415 COLL VENOUS BLD VENIPUNCTURE: CPT | Performed by: INTERNAL MEDICINE

## 2022-10-14 PROCEDURE — 80061 LIPID PANEL: CPT | Performed by: INTERNAL MEDICINE

## 2022-10-18 ENCOUNTER — OFFICE VISIT (OUTPATIENT)
Dept: CARDIOLOGY | Facility: CLINIC | Age: 72
End: 2022-10-18
Payer: MEDICARE

## 2022-10-18 VITALS
SYSTOLIC BLOOD PRESSURE: 114 MMHG | OXYGEN SATURATION: 99 % | DIASTOLIC BLOOD PRESSURE: 76 MMHG | WEIGHT: 141.31 LBS | HEIGHT: 61 IN | HEART RATE: 74 BPM | BODY MASS INDEX: 26.68 KG/M2

## 2022-10-18 DIAGNOSIS — I70.0 ATHEROSCLEROSIS OF AORTA: Primary | ICD-10-CM

## 2022-10-18 DIAGNOSIS — I27.82 CHRONIC PULMONARY EMBOLISM WITHOUT ACUTE COR PULMONALE, UNSPECIFIED PULMONARY EMBOLISM TYPE: ICD-10-CM

## 2022-10-18 DIAGNOSIS — E78.2 MIXED HYPERLIPIDEMIA: ICD-10-CM

## 2022-10-18 DIAGNOSIS — R07.89 OTHER CHEST PAIN: ICD-10-CM

## 2022-10-18 DIAGNOSIS — Z86.718 HISTORY OF DVT OF LOWER EXTREMITY: ICD-10-CM

## 2022-10-18 DIAGNOSIS — Z79.01 CURRENT USE OF LONG TERM ANTICOAGULATION: ICD-10-CM

## 2022-10-18 PROCEDURE — 99999 PR PBB SHADOW E&M-EST. PATIENT-LVL III: CPT | Mod: PBBFAC,,, | Performed by: INTERNAL MEDICINE

## 2022-10-18 PROCEDURE — 99213 OFFICE O/P EST LOW 20 MIN: CPT | Mod: PBBFAC,PN | Performed by: INTERNAL MEDICINE

## 2022-10-18 PROCEDURE — 99214 PR OFFICE/OUTPT VISIT, EST, LEVL IV, 30-39 MIN: ICD-10-PCS | Mod: S$PBB,,, | Performed by: INTERNAL MEDICINE

## 2022-10-18 PROCEDURE — 99214 OFFICE O/P EST MOD 30 MIN: CPT | Mod: S$PBB,,, | Performed by: INTERNAL MEDICINE

## 2022-10-18 PROCEDURE — 99999 PR PBB SHADOW E&M-EST. PATIENT-LVL III: ICD-10-PCS | Mod: PBBFAC,,, | Performed by: INTERNAL MEDICINE

## 2022-10-18 NOTE — PROGRESS NOTES
Cardiology    10/18/2022  9:32 AM    Problem list  Patient Active Problem List   Diagnosis    Kidney stone    Herniated disc, cervical    Complete rotator cuff tear or rupture of right shoulder, not specified as traumatic    Shoulder arthritis    OAB (overactive bladder)    Body mass index (BMI) of 25.0 to 29.9    Atypical chest pain    Ineffective esophageal motility    SOB (shortness of breath)    Complete rotator cuff tear or rupture of left shoulder, not specified as traumatic    Impingement syndrome of left shoulder    Primary osteoarthritis of left shoulder    Labral tear of long head of biceps tendon, initial encounter    History of DVT of lower extremity    Chronic pulmonary embolism without acute cor pulmonale    Mixed hyperlipidemia    Nephrolithiasis    Tortuous aorta    Atherosclerosis of aorta    Current use of long term anticoagulation    History of iron deficiency anemia    Esophageal dysphagia    Epigastric abdominal pain       CC:  Follow-up    HPI:  Patient reported having chest pain today check her while she was walking into the office.  The pain was in the mid part of her chest.  It also radiates to her back.  She has no shortness of breath.  She has been compliant with her medications including her anticoagulant.  She is still undergoing GI workup for dysphagia which was thought to be due to ineffective esophageal motility.    Medications  Current Outpatient Medications   Medication Sig Dispense Refill    albuterol (PROVENTIL/VENTOLIN HFA) 90 mcg/actuation inhaler Inhale 2 puffs into the lungs every 4 (four) hours as needed for Wheezing. Rescue 1 Inhaler 0    atorvastatin (LIPITOR) 80 MG tablet TAKE 1 TABLET BY MOUTH EVERY DAY 90 tablet 3    cholecalciferol, vitamin D3, 1,250 mcg (50,000 unit) capsule Take 1 capsule (50,000 Units total) by mouth once a week. 12 capsule 3    fluticasone (FLONASE) 50 mcg/actuation nasal spray daily as needed.      omeprazole (PRILOSEC) 20 MG capsule TAKE 1  CAPSULE BY MOUTH TWICE A DAY BEFORE MEALS 180 capsule 1    pregabalin (LYRICA) 150 MG capsule Take 150 mg by mouth 2 (two) times daily.      rivaroxaban (XARELTO) 20 mg Tab Take 20 mg by mouth.      tiZANidine (ZANAFLEX) 4 MG tablet TAKE 1 TABLET BY MOUTH AT BEDTIME AS NEEDED FOR MUSCLE RELAXER      traMADol (ULTRAM) 50 mg tablet Take 50 mg by mouth 2 (two) times daily as needed.  1    XARELTO 20 mg Tab TAKE 1 TABLET BY MOUTH EVERY DAY IN THE EVENING 90 tablet 3    HYDROcodone-acetaminophen (NORCO) 5-325 mg per tablet Take 1 tablet by mouth every 4 (four) hours as needed for Pain. (Patient not taking: No sig reported) 12 tablet 0    mirabegron (MYRBETRIQ) 50 mg Tb24 Take 1 tablet (50 mg total) by mouth once daily. (Patient not taking: Reported on 10/18/2022) 30 tablet 11    oxybutynin (DITROPAN-XL) 10 MG 24 hr tablet TAKE 1 TABLET BY MOUTH EVERY DAY (Patient not taking: No sig reported) 90 tablet 3    oxybutynin (DITROPAN-XL) 10 MG 24 hr tablet Take 1 tablet (10 mg total) by mouth once daily. (Patient not taking: Reported on 10/18/2022) 30 tablet 11     No current facility-administered medications for this visit.      Prior to Admission medications    Medication Sig Start Date End Date Taking? Authorizing Provider   albuterol (PROVENTIL/VENTOLIN HFA) 90 mcg/actuation inhaler Inhale 2 puffs into the lungs every 4 (four) hours as needed for Wheezing. Rescue 2/16/19  Yes Roxanne Ayoub NP   atorvastatin (LIPITOR) 80 MG tablet TAKE 1 TABLET BY MOUTH EVERY DAY 7/26/22  Yes Osbaldo Hall MD   cholecalciferol, vitamin D3, 1,250 mcg (50,000 unit) capsule Take 1 capsule (50,000 Units total) by mouth once a week. 6/7/22  Yes Osbaldo Hall MD   fluticasone (FLONASE) 50 mcg/actuation nasal spray daily as needed.   Yes Historical Provider   omeprazole (PRILOSEC) 20 MG capsule TAKE 1 CAPSULE BY MOUTH TWICE A DAY BEFORE MEALS 5/27/22  Yes Osbaldo Hall MD   pregabalin (LYRICA) 150 MG capsule Take 150 mg by mouth  2 (two) times daily.   Yes Historical Provider   rivaroxaban (XARELTO) 20 mg Tab Take 20 mg by mouth. 9/3/21  Yes Historical Provider   tiZANidine (ZANAFLEX) 4 MG tablet TAKE 1 TABLET BY MOUTH AT BEDTIME AS NEEDED FOR MUSCLE RELAXER 3/21/22  Yes Historical Provider   traMADol (ULTRAM) 50 mg tablet Take 50 mg by mouth 2 (two) times daily as needed. 10/26/18  Yes Historical Provider   XARELTO 20 mg Tab TAKE 1 TABLET BY MOUTH EVERY DAY IN THE EVENING 6/26/22  Yes Sotero Doty MD   HYDROcodone-acetaminophen (NORCO) 5-325 mg per tablet Take 1 tablet by mouth every 4 (four) hours as needed for Pain.  Patient not taking: No sig reported 8/1/22   Allie Zhao MD   mirabegron (MYRBETRIQ) 50 mg Tb24 Take 1 tablet (50 mg total) by mouth once daily.  Patient not taking: Reported on 10/18/2022 10/5/22 10/5/23  Chantel Boyle NP   oxybutynin (DITROPAN-XL) 10 MG 24 hr tablet TAKE 1 TABLET BY MOUTH EVERY DAY  Patient not taking: No sig reported 5/23/22   Jeremias Eric MD   oxybutynin (DITROPAN-XL) 10 MG 24 hr tablet Take 1 tablet (10 mg total) by mouth once daily.  Patient not taking: Reported on 10/18/2022 10/5/22 10/5/23  Chantel Boyle NP   enoxaparin (LOVENOX) 80 mg/0.8 mL Syrg Inject 0.6 mLs (60 mg total) into the skin 2 (two) times a day.  Patient not taking: Reported on 10/5/2022 6/17/22 10/18/22  Sotero Doty MD   pantoprazole (PROTONIX) 40 MG tablet Take 1 tablet (40 mg total) by mouth once daily.  Patient not taking: Reported on 10/5/2022 8/1/22 10/18/22  Allie Zhao MD         History  Past Medical History:   Diagnosis Date    Anticoagulant long-term use     xarelto    Chronic back pain     Chronic neck pain     DVT (deep venous thrombosis)     Herniated disc, cervical     Kidney stone     Lumbar herniated disc     PE (pulmonary embolism)     2013    Pulmonary emboli     after surgery    Pulmonary embolism      Past Surgical History:   Procedure Laterality Date    ARTHROSCOPIC REPAIR OF ROTATOR CUFF  OF SHOULDER Left 2019    Procedure: REPAIR, ROTATOR CUFF, ARTHROSCOPIC;  Surgeon: Sedrick Patel MD;  Location: [facility] OR;  Service: Orthopedics;  Laterality: Left;    ARTHROSCOPIC TENOTOMY OF BICEPS TENDON Left 2019    Procedure: TENOTOMY, BICEPS, ARTHROSCOPIC;  Surgeon: Sedrick Patel MD;  Location: [facility] OR;  Service: Orthopedics;  Laterality: Left;    ARTHROSCOPY OF SHOULDER WITH REMOVAL OF DISTAL CLAVICLE Left 2019    Procedure: ARTHROSCOPY, SHOULDER, WITH DISTAL CLAVICLE EXCISION;  Surgeon: Sedrick Patel MD;  Location: [facility] OR;  Service: Orthopedics;  Laterality: Left;    BREAST CYST EXCISION Left      SECTION      x1    COLONOSCOPY N/A 2018    Procedure: COLONOSCOPY;  Surgeon: Armand Foy MD;  Location: [facility] ENDO (4TH FLR);  Service: Endoscopy;  Laterality: N/A;    ESOPHAGEAL MANOMETRY WITH MEASUREMENT OF IMPEDANCE N/A 2018    Procedure: MANOMETRY, ESOPHAGEAL, WITH IMPEDANCE MEASUREMENT;  Surgeon: Armand Foy MD;  Location: [facility] ENDO (4TH FLR);  Service: Endoscopy;  Laterality: N/A;    ESOPHAGOGASTRODUODENOSCOPY N/A 2018    Procedure: EGD (ESOPHAGOGASTRODUODENOSCOPY);  Surgeon: Armand Foy MD;  Location: [facility] ENDO (4TH FLR);  Service: Endoscopy;  Laterality: N/A;  pt currently on Lovenox and Xarelto - ok per Dr. Doty to hold Plavix 2 days prior and Lovenox 24hr prior to case/see scanned media / for documentation of hold -- SM        ESOPHAGOGASTRODUODENOSCOPY N/A 3/28/2022    Procedure: EGD (ESOPHAGOGASTRODUODENOSCOPY);  Surgeon: Armand Foy MD;  Location: [facility] ENDO (2ND FLR);  Service: Endoscopy;  Laterality: N/A;  okay to hold Xarelto for 2 days per Dr. Doty with lovenox bridgings - see note on 3/21/22  2nd floor for ASAP availabilty  fully vaccinated - sm    EXTRACORPOREAL SHOCK WAVE LITHOTRIPSY Left 2021    Procedure: LITHOTRIPSY-EXTRACORPOREAL SHOCK WAVE;  Surgeon: Jeremias Eric MD;  Location: [facility] OR;  Service: Urology;   Laterality: Left;    HERNIA REPAIR      umbilical    KIDNEY STONE SURGERY      ureteral stent    left knee surgery      SHOULDER ARTHROSCOPY Left 6/11/2019    Procedure: ARTHROSCOPY, SHOULDER;  Surgeon: Sedrick Patel MD;  Location: [facility];  Service: Orthopedics;  Laterality: Left;  BLOCK    TONSILLECTOMY      TOTAL SHOULDER ARTHROPLASTY Right      Social History     Socioeconomic History    Marital status:    Occupational History    Occupation: teacher      Comment:     Tobacco Use    Smoking status: Never    Smokeless tobacco: Never   Substance and Sexual Activity    Alcohol use: No    Drug use: No    Sexual activity: Not Currently     Partners: Male         Allergies  Review of patient's allergies indicates:  No Known Allergies      Review of Systems   Review of Systems   Constitutional: Negative for decreased appetite, fever and weight loss.   HENT:  Negative for congestion and nosebleeds.    Eyes:  Negative for double vision, vision loss in left eye, vision loss in right eye and visual disturbance.   Cardiovascular:  Negative for chest pain, claudication, cyanosis, dyspnea on exertion, irregular heartbeat, leg swelling, near-syncope, orthopnea, palpitations, paroxysmal nocturnal dyspnea and syncope.   Respiratory:  Negative for cough, hemoptysis, shortness of breath, sleep disturbances due to breathing, snoring, sputum production and wheezing.    Endocrine: Negative for cold intolerance and heat intolerance.   Skin:  Negative for nail changes and rash.   Musculoskeletal:  Negative for joint pain, muscle cramps, muscle weakness and myalgias.   Gastrointestinal:  Negative for change in bowel habit, heartburn, hematemesis, hematochezia, hemorrhoids and melena.   Neurological:  Negative for dizziness, focal weakness and headaches.       Physical Exam  Wt Readings from Last 1 Encounters:   10/18/22 64.1 kg (141 lb 5 oz)     BP Readings from Last 3 Encounters:   10/18/22 114/76    10/05/22 118/60   08/30/22 (!) 116/57     Pulse Readings from Last 1 Encounters:   10/18/22 74     Body mass index is 26.7 kg/m².    Physical Exam  Vitals reviewed.   Constitutional:       Appearance: She is well-developed.   Neck:      Vascular: No carotid bruit or JVD.   Cardiovascular:      Rate and Rhythm: Normal rate and regular rhythm.      Pulses:           Carotid pulses are 2+ on the right side and 2+ on the left side.       Radial pulses are 2+ on the right side and 2+ on the left side.        Dorsalis pedis pulses are 2+ on the right side and 2+ on the left side.      Heart sounds: Normal heart sounds, S1 normal and S2 normal.   Abdominal:      General: Bowel sounds are normal.   Neurological:      Mental Status: She is alert and oriented to person, place, and time.           Assessment  1. Atherosclerosis of aorta  Unchanged    2. Mixed hyperlipidemia  Stable    3. History of DVT of lower extremity  Stable, on anticoagulation    4. Current use of long term anticoagulation  Stable    5. Chronic pulmonary embolism without acute cor pulmonale, unspecified pulmonary embolism type  Stable    6. Other chest pain  Being evaluated  - Nuclear Stress - Cardiology Interpreted; Future        Plan and Discussion  Given her complaint of chest pain, will proceed with a Lexiscan nuclear stress test.  She is unable to walk for exercise due to her chronic back pain.    Follow Up  2 months      Sotero Doty MD, F.A.C.C, F.S.C.A.I.

## 2022-10-19 ENCOUNTER — HOSPITAL ENCOUNTER (OUTPATIENT)
Dept: RADIOLOGY | Facility: OTHER | Age: 72
Discharge: HOME | End: 2022-10-19
Attending: NURSE PRACTITIONER
Payer: MEDICARE

## 2022-10-19 DIAGNOSIS — N20.0 NEPHROLITHIASIS: ICD-10-CM

## 2022-10-19 PROCEDURE — 74018 XR KUB: ICD-10-PCS | Mod: 26,,, | Performed by: RADIOLOGY

## 2022-10-19 PROCEDURE — 74018 RADEX ABDOMEN 1 VIEW: CPT | Mod: TC,FY

## 2022-10-19 PROCEDURE — 74018 RADEX ABDOMEN 1 VIEW: CPT | Mod: 26,,, | Performed by: RADIOLOGY

## 2022-10-20 ENCOUNTER — TELEPHONE (OUTPATIENT)
Dept: NEUROSURGERY | Facility: CLINIC | Age: 72
End: 2022-10-20
Payer: MEDICARE

## 2022-10-20 NOTE — TELEPHONE ENCOUNTER
"Called patient to inform her Roxanna relayed a message about her MRI: "Cervical MRI does not show any severe tightening on your spinal cord.  The motion we saw on your cervical x-rays still concerning, so I would like you to follow-up with me in 3 months to make sure no new symptoms related to your neck have developed." Patient verbally acknowledged and agreed.    "

## 2022-11-04 ENCOUNTER — NURSE TRIAGE (OUTPATIENT)
Dept: ADMINISTRATIVE | Facility: CLINIC | Age: 72
End: 2022-11-04
Payer: MEDICARE

## 2022-11-04 NOTE — TELEPHONE ENCOUNTER
On pain mgmnt. Missed appt at end of Oct. No available appt until Mon. Has not had Tramadol and Pregablin since Oct 23.  Weak, dizzy, shaky. Would like Dr webber. Per care advice, pt instructed to go to ED now. VU.    Reason for Disposition   Feeling very shaky (i.e., visible tremors of hands)    Additional Information   Negative: Coma (e.g., not moving, not talking, not responding to stimuli)   Negative: Difficult to awaken or acting confused (e.g., disoriented, slurred speech)   Negative: Seeing or hearing or feeling things that are not there (i.e., auditory, visual, or tactile hallucinations)   Negative: Slow, shallow and weak breathing   Negative: Seizure   Negative: Violent behavior, or threatening to physically hurt or kill someone   Negative: Sounds like a life-threatening emergency to the triager   Negative: Suicide thoughts, threats, attempts, or questions   Negative: Very strange, paranoid, or confused behavior    Protocols used: Substance Use and [id number]

## 2022-11-04 NOTE — TELEPHONE ENCOUNTER
Call pt to make sure she went to er.    Spoke to pt who stated she did not go to ER  She said she is scheduled w/ PM Monday   I asked if she wanted to f/u w/  and she said yes.   Since he doesn't have anything today I let her know to keep PM f/u on Monday, and that I will send this over to Dr. Hall to advise if she needs f/u in primary care as well.   I reminded her of 24/7 nurse line if she needs anything over the weekend.   Since she said she is still having symptoms I am routing to PCP as high priority

## 2022-11-08 ENCOUNTER — OFFICE VISIT (OUTPATIENT)
Dept: CARDIOLOGY | Facility: CLINIC | Age: 72
End: 2022-11-08
Payer: MEDICARE

## 2022-11-08 VITALS
WEIGHT: 138.75 LBS | BODY MASS INDEX: 26.2 KG/M2 | OXYGEN SATURATION: 99 % | SYSTOLIC BLOOD PRESSURE: 108 MMHG | HEIGHT: 61 IN | DIASTOLIC BLOOD PRESSURE: 68 MMHG | HEART RATE: 75 BPM

## 2022-11-08 DIAGNOSIS — I27.82 CHRONIC PULMONARY EMBOLISM WITHOUT ACUTE COR PULMONALE, UNSPECIFIED PULMONARY EMBOLISM TYPE: ICD-10-CM

## 2022-11-08 DIAGNOSIS — I70.0 ATHEROSCLEROSIS OF AORTA: Primary | ICD-10-CM

## 2022-11-08 DIAGNOSIS — Z86.718 HISTORY OF DVT OF LOWER EXTREMITY: ICD-10-CM

## 2022-11-08 PROCEDURE — 99999 PR PBB SHADOW E&M-EST. PATIENT-LVL III: CPT | Mod: PBBFAC,,, | Performed by: INTERNAL MEDICINE

## 2022-11-08 PROCEDURE — 99214 OFFICE O/P EST MOD 30 MIN: CPT | Mod: S$PBB,,, | Performed by: INTERNAL MEDICINE

## 2022-11-08 PROCEDURE — 99213 OFFICE O/P EST LOW 20 MIN: CPT | Mod: PBBFAC,PN | Performed by: INTERNAL MEDICINE

## 2022-11-08 PROCEDURE — 99214 PR OFFICE/OUTPT VISIT, EST, LEVL IV, 30-39 MIN: ICD-10-PCS | Mod: S$PBB,,, | Performed by: INTERNAL MEDICINE

## 2022-11-08 PROCEDURE — 99999 PR PBB SHADOW E&M-EST. PATIENT-LVL III: ICD-10-PCS | Mod: PBBFAC,,, | Performed by: INTERNAL MEDICINE

## 2022-11-08 NOTE — PROGRESS NOTES
Cardiology    11/8/2022  9:36 AM    Problem list  Patient Active Problem List   Diagnosis    Kidney stone    Herniated disc, cervical    Complete rotator cuff tear or rupture of right shoulder, not specified as traumatic    Shoulder arthritis    OAB (overactive bladder)    Body mass index (BMI) of 25.0 to 29.9    Atypical chest pain    Ineffective esophageal motility    SOB (shortness of breath)    Complete rotator cuff tear or rupture of left shoulder, not specified as traumatic    Impingement syndrome of left shoulder    Primary osteoarthritis of left shoulder    Labral tear of long head of biceps tendon, initial encounter    History of DVT of lower extremity    Chronic pulmonary embolism without acute cor pulmonale    Mixed hyperlipidemia    Nephrolithiasis    Tortuous aorta    Atherosclerosis of aorta    Current use of long term anticoagulation    History of iron deficiency anemia    Esophageal dysphagia    Epigastric abdominal pain       CC:  Follow-up    HPI:  She is here for follow-up her stress test was canceled because she could not lie flat.  She has chest pain when she lies flat.  She describes having chest pain when she lies flat and as a result, she has to sleep on her side wedge between 2 pillows.  She states that she turns a certain way, chest pain hurts worse.  She also has pain to her back.  The back pain is worse on palpation.  She has not tried muscle relaxant.  She has an appointment coming up for back pain.    Medications  Current Outpatient Medications   Medication Sig Dispense Refill    albuterol (PROVENTIL/VENTOLIN HFA) 90 mcg/actuation inhaler Inhale 2 puffs into the lungs every 4 (four) hours as needed for Wheezing. Rescue 1 Inhaler 0    atorvastatin (LIPITOR) 80 MG tablet TAKE 1 TABLET BY MOUTH EVERY DAY 90 tablet 3    cholecalciferol, vitamin D3, 1,250 mcg (50,000 unit) capsule Take 1 capsule (50,000 Units total) by mouth once a week. 12 capsule 3    fluticasone (FLONASE) 50  mcg/actuation nasal spray daily as needed.      mirabegron (MYRBETRIQ) 50 mg Tb24 Take 1 tablet (50 mg total) by mouth once daily. 30 tablet 11    omeprazole (PRILOSEC) 20 MG capsule TAKE 1 CAPSULE BY MOUTH TWICE A DAY BEFORE MEALS 180 capsule 1    oxybutynin (DITROPAN-XL) 10 MG 24 hr tablet Take 1 tablet (10 mg total) by mouth once daily. 30 tablet 11    pregabalin (LYRICA) 150 MG capsule Take 150 mg by mouth 2 (two) times daily.      tiZANidine (ZANAFLEX) 4 MG tablet TAKE 1 TABLET BY MOUTH AT BEDTIME AS NEEDED FOR MUSCLE RELAXER      traMADol (ULTRAM) 50 mg tablet Take 50 mg by mouth 2 (two) times daily as needed.  1    XARELTO 20 mg Tab TAKE 1 TABLET BY MOUTH EVERY DAY IN THE EVENING 90 tablet 3    HYDROcodone-acetaminophen (NORCO) 5-325 mg per tablet Take 1 tablet by mouth every 4 (four) hours as needed for Pain. (Patient not taking: Reported on 11/8/2022) 12 tablet 0    oxybutynin (DITROPAN-XL) 10 MG 24 hr tablet TAKE 1 TABLET BY MOUTH EVERY DAY (Patient not taking: Reported on 11/8/2022) 90 tablet 3     No current facility-administered medications for this visit.      Prior to Admission medications    Medication Sig Start Date End Date Taking? Authorizing Provider   albuterol (PROVENTIL/VENTOLIN HFA) 90 mcg/actuation inhaler Inhale 2 puffs into the lungs every 4 (four) hours as needed for Wheezing. Rescue 2/16/19  Yes Roxanne Ayoub NP   atorvastatin (LIPITOR) 80 MG tablet TAKE 1 TABLET BY MOUTH EVERY DAY 7/26/22  Yes Osbaldo Hall MD   cholecalciferol, vitamin D3, 1,250 mcg (50,000 unit) capsule Take 1 capsule (50,000 Units total) by mouth once a week. 6/7/22  Yes Osbaldo Hall MD   fluticasone (FLONASE) 50 mcg/actuation nasal spray daily as needed.   Yes Historical Provider   mirabegron (MYRBETRIQ) 50 mg Tb24 Take 1 tablet (50 mg total) by mouth once daily. 10/5/22 10/5/23 Yes Chantel Boyle NP   omeprazole (PRILOSEC) 20 MG capsule TAKE 1 CAPSULE BY MOUTH TWICE A DAY BEFORE MEALS 5/27/22  Yes  Osbaldo Hall MD   oxybutynin (DITROPAN-XL) 10 MG 24 hr tablet Take 1 tablet (10 mg total) by mouth once daily. 10/5/22 10/5/23 Yes Chantel Boyle NP   pregabalin (LYRICA) 150 MG capsule Take 150 mg by mouth 2 (two) times daily.   Yes Historical Provider   tiZANidine (ZANAFLEX) 4 MG tablet TAKE 1 TABLET BY MOUTH AT BEDTIME AS NEEDED FOR MUSCLE RELAXER 3/21/22  Yes Historical Provider   traMADol (ULTRAM) 50 mg tablet Take 50 mg by mouth 2 (two) times daily as needed. 10/26/18  Yes Historical Provider   XARELTO 20 mg Tab TAKE 1 TABLET BY MOUTH EVERY DAY IN THE EVENING 6/26/22  Yes Sotero Doty MD   HYDROcodone-acetaminophen (NORCO) 5-325 mg per tablet Take 1 tablet by mouth every 4 (four) hours as needed for Pain.  Patient not taking: Reported on 11/8/2022 8/1/22   Allie Zhao MD   oxybutynin (DITROPAN-XL) 10 MG 24 hr tablet TAKE 1 TABLET BY MOUTH EVERY DAY  Patient not taking: Reported on 11/8/2022 5/23/22   Jeremias Eric MD   rivaroxaban (XARELTO) 20 mg Tab Take 20 mg by mouth. 9/3/21 11/8/22  Historical Provider         History  Past Medical History:   Diagnosis Date    Anticoagulant long-term use     xarelto    Chronic back pain     Chronic neck pain     DVT (deep venous thrombosis)     Herniated disc, cervical     Kidney stone     Lumbar herniated disc     PE (pulmonary embolism)     2013    Pulmonary emboli     after surgery    Pulmonary embolism      Past Surgical History:   Procedure Laterality Date    ARTHROSCOPIC REPAIR OF ROTATOR CUFF OF SHOULDER Left 6/11/2019    Procedure: REPAIR, ROTATOR CUFF, ARTHROSCOPIC;  Surgeon: Sedrick Patel MD;  Location: [facility] OR;  Service: Orthopedics;  Laterality: Left;    ARTHROSCOPIC TENOTOMY OF BICEPS TENDON Left 6/11/2019    Procedure: TENOTOMY, BICEPS, ARTHROSCOPIC;  Surgeon: Sedrick Patel MD;  Location: [facility] OR;  Service: Orthopedics;  Laterality: Left;    ARTHROSCOPY OF SHOULDER WITH REMOVAL OF DISTAL CLAVICLE Left 6/11/2019    Procedure:  ARTHROSCOPY, SHOULDER, WITH DISTAL CLAVICLE EXCISION;  Surgeon: Sedrick Patel MD;  Location: [facility];  Service: Orthopedics;  Laterality: Left;    BREAST CYST EXCISION Left      SECTION      x1    COLONOSCOPY N/A 2018    Procedure: COLONOSCOPY;  Surgeon: Armand Foy MD;  Location: [facility] (4TH FLR);  Service: Endoscopy;  Laterality: N/A;    ESOPHAGEAL MANOMETRY WITH MEASUREMENT OF IMPEDANCE N/A 2018    Procedure: MANOMETRY, ESOPHAGEAL, WITH IMPEDANCE MEASUREMENT;  Surgeon: Armand Foy MD;  Location: [facility] (4TH FLR);  Service: Endoscopy;  Laterality: N/A;    ESOPHAGOGASTRODUODENOSCOPY N/A 2018    Procedure: EGD (ESOPHAGOGASTRODUODENOSCOPY);  Surgeon: Armand Foy MD;  Location: [facility] (4TH FLR);  Service: Endoscopy;  Laterality: N/A;  pt currently on Lovenox and Xarelto - ok per Dr. Doty to hold Plavix 2 days prior and Lovenox 24hr prior to case/see scanned media / for documentation of hold -- SM        ESOPHAGOGASTRODUODENOSCOPY N/A 3/28/2022    Procedure: EGD (ESOPHAGOGASTRODUODENOSCOPY);  Surgeon: Armand Foy MD;  Location: [facility] (2ND FLR);  Service: Endoscopy;  Laterality: N/A;  okay to hold Xarelto for 2 days per Dr. Doty with lovenox bridgings - see note on 3/21/22  2nd floor for ASAP availabilty  fully vaccinated - sm    EXTRACORPOREAL SHOCK WAVE LITHOTRIPSY Left 2021    Procedure: LITHOTRIPSY-EXTRACORPOREAL SHOCK WAVE;  Surgeon: Jeremias Eric MD;  Location: [facility];  Service: Urology;  Laterality: Left;    HERNIA REPAIR      umbilical    KIDNEY STONE SURGERY      ureteral stent    left knee surgery      SHOULDER ARTHROSCOPY Left 2019    Procedure: ARTHROSCOPY, SHOULDER;  Surgeon: Sedrick Patel MD;  Location: [facility];  Service: Orthopedics;  Laterality: Left;  BLOCK    TONSILLECTOMY      TOTAL SHOULDER ARTHROPLASTY Right      Social History     Socioeconomic History    Marital status:    Occupational History     Occupation: teacher      Comment:     Tobacco Use    Smoking status: Never    Smokeless tobacco: Never   Substance and Sexual Activity    Alcohol use: No    Drug use: No    Sexual activity: Not Currently     Partners: Male         Allergies  Review of patient's allergies indicates:  No Known Allergies      Review of Systems   Review of Systems   Constitutional: Negative for decreased appetite, fever and weight loss.   HENT:  Negative for congestion and nosebleeds.    Eyes:  Negative for double vision, vision loss in left eye, vision loss in right eye and visual disturbance.   Cardiovascular:  Negative for chest pain, claudication, cyanosis, dyspnea on exertion, irregular heartbeat, leg swelling, near-syncope, orthopnea, palpitations, paroxysmal nocturnal dyspnea and syncope.   Respiratory:  Negative for cough, hemoptysis, shortness of breath, sleep disturbances due to breathing, snoring, sputum production and wheezing.    Endocrine: Negative for cold intolerance and heat intolerance.   Skin:  Negative for nail changes and rash.   Musculoskeletal:  Negative for joint pain, muscle cramps, muscle weakness and myalgias.   Gastrointestinal:  Negative for change in bowel habit, heartburn, hematemesis, hematochezia, hemorrhoids and melena.   Neurological:  Negative for dizziness, focal weakness and headaches.       Physical Exam  Wt Readings from Last 1 Encounters:   11/08/22 62.9 kg (138 lb 12.5 oz)     BP Readings from Last 3 Encounters:   11/08/22 108/68   10/18/22 114/76   10/05/22 118/60     Pulse Readings from Last 1 Encounters:   11/08/22 75     Body mass index is 26.22 kg/m².    Physical Exam  Vitals reviewed.   Constitutional:       Appearance: She is well-developed.   Neck:      Vascular: No carotid bruit or JVD.   Cardiovascular:      Rate and Rhythm: Normal rate and regular rhythm.      Pulses:           Carotid pulses are 2+ on the right side and 2+ on the left side.       Radial pulses  are 2+ on the right side and 2+ on the left side.        Dorsalis pedis pulses are 2+ on the right side and 2+ on the left side.      Heart sounds: Normal heart sounds, S1 normal and S2 normal.   Abdominal:      General: Bowel sounds are normal.   Musculoskeletal:      Thoracic back: Tenderness present.   Neurological:      Mental Status: She is alert and oriented to person, place, and time.           Assessment  1. Atherosclerosis of aorta  Stable    2. Chronic pulmonary embolism without acute cor pulmonale, unspecified pulmonary embolism type  On anticoagulation    3. History of DVT of lower extremity  On anticoagulation        Plan and Discussion  Discussed that her reproducible and positional chest pain is not cardiac.  Okay to hold off on stress test.  Recommend to keep appointment with [facility] doctor.  She also has an appointment with GI to evaluate her odynophasia (she has been able to swallow soups and liquids).  Try muscle relaxant which she already has at home.    Follow Up  3 months      Sotero Doty MD, F.A.C.C, F.S.C.A.I.        40 minutes were spent in chart review, documentation and review of results, and evaluation, treatment, and counseling of patient on the same day of service.

## 2022-11-09 ENCOUNTER — OFFICE VISIT (OUTPATIENT)
Dept: GASTROENTEROLOGY | Facility: CLINIC | Age: 72
End: 2022-11-09
Payer: MEDICARE

## 2022-11-09 VITALS
DIASTOLIC BLOOD PRESSURE: 66 MMHG | BODY MASS INDEX: 25.21 KG/M2 | SYSTOLIC BLOOD PRESSURE: 104 MMHG | WEIGHT: 137 LBS | HEIGHT: 62 IN | HEART RATE: 96 BPM

## 2022-11-09 DIAGNOSIS — K22.4 INEFFECTIVE ESOPHAGEAL MOTILITY: ICD-10-CM

## 2022-11-09 DIAGNOSIS — R07.89 XYPHOIDALGIA: ICD-10-CM

## 2022-11-09 DIAGNOSIS — M54.9 BACK PAIN, UNSPECIFIED BACK LOCATION, UNSPECIFIED BACK PAIN LATERALITY, UNSPECIFIED CHRONICITY: ICD-10-CM

## 2022-11-09 DIAGNOSIS — Z79.01 CURRENT USE OF LONG TERM ANTICOAGULATION: ICD-10-CM

## 2022-11-09 DIAGNOSIS — Z86.2 HISTORY OF IRON DEFICIENCY ANEMIA: ICD-10-CM

## 2022-11-09 DIAGNOSIS — R13.19 ESOPHAGEAL DYSPHAGIA: ICD-10-CM

## 2022-11-09 DIAGNOSIS — R10.10 UPPER ABDOMINAL PAIN: Primary | ICD-10-CM

## 2022-11-09 PROCEDURE — 99999 PR PBB SHADOW E&M-EST. PATIENT-LVL III: CPT | Mod: PBBFAC,,, | Performed by: INTERNAL MEDICINE

## 2022-11-09 PROCEDURE — 99999 PR PBB SHADOW E&M-EST. PATIENT-LVL III: ICD-10-PCS | Mod: PBBFAC,,, | Performed by: INTERNAL MEDICINE

## 2022-11-09 PROCEDURE — 99213 OFFICE O/P EST LOW 20 MIN: CPT | Mod: S$PBB,,, | Performed by: INTERNAL MEDICINE

## 2022-11-09 PROCEDURE — 99213 OFFICE O/P EST LOW 20 MIN: CPT | Mod: PBBFAC | Performed by: INTERNAL MEDICINE

## 2022-11-09 PROCEDURE — 99213 PR OFFICE/OUTPT VISIT, EST, LEVL III, 20-29 MIN: ICD-10-PCS | Mod: S$PBB,,, | Performed by: INTERNAL MEDICINE

## 2022-11-09 RX ORDER — METHYLPREDNISOLONE 4 MG/1
TABLET ORAL
COMMUNITY
Start: 2022-08-22 | End: 2022-12-01

## 2022-11-09 NOTE — PROGRESS NOTES
[facility] Established Patient Visit    Reason for Visit:    Chief Complaint   Patient presents with    Follow-up       PCP: Osbaldo Hall      HPI:  Jasson Pineda is a 71 y.o. female here for follow-up of dysphagia, history of reflux, and epigastric abdominal pain.  I last saw her in  for the same symptoms.  She has a history of ineffective esophageal motility and a small hiatal hernia.  The dysphagia has been a long standing problem of hers.  This has been relatively unchanged.  Her upper abdominal pain has been worsening.  She describes it as constant in nature.  She has seen a couple of other physicians including a neurologist and neurosurgeon.  The pain is in the same location at the xiphoid area that radiates to the back.  Pain is constant throughout the day.  It worsens when lying on her stomach or on her side.  There is also some worsening with eating.  She is still on omeprazole 20 mg twice daily.          ROS:  Constitutional: No fevers, chills, No weight loss, normal appetite  GI: see HPI        PMHX:  has a past medical history of Anticoagulant long-term use, Chronic back pain, Chronic neck pain, DVT (deep venous thrombosis), Herniated disc, cervical, Kidney stone, Lumbar herniated disc, PE (pulmonary embolism), Pulmonary emboli, and Pulmonary embolism.    PSHX:  has a past surgical history that includes Kidney stone surgery; left knee surgery; Tonsillectomy;  section; Hernia repair; Total shoulder arthroplasty (Right); Breast cyst excision (Left); Esophagogastroduodenoscopy (N/A, 2018); Colonoscopy (N/A, 2018); Esophageal manometry with measurement of impedance (N/A, 2018); Shoulder arthroscopy (Left, 2019); Arthroscopic repair of rotator cuff of shoulder (Left, 2019); Arthroscopy of shoulder with removal of distal clavicle (Left, 2019); Arthroscopic tenotomy of biceps tendon (Left, 2019); Extracorporeal shock wave  lithotripsy (Left, 2/9/2021); and Esophagogastroduodenoscopy (N/A, 3/28/2022).    The patient's social and family histories were reviewed by me and updated in the appropriate section of the electronic medical record.    Review of patient's allergies indicates:  No Known Allergies    Prior to Admission medications    Medication Sig Start Date End Date Taking? Authorizing Provider   albuterol (PROVENTIL/VENTOLIN HFA) 90 mcg/actuation inhaler Inhale 2 puffs into the lungs every 4 (four) hours as needed for Wheezing. Rescue 2/16/19   Roxanne Ayoub NP   atorvastatin (LIPITOR) 80 MG tablet TAKE 1 TABLET BY MOUTH EVERY DAY 7/26/22   Osbaldo Hall MD   cholecalciferol, vitamin D3, 1,250 mcg (50,000 unit) capsule Take 1 capsule (50,000 Units total) by mouth once a week. 6/7/22   Osbaldo Hall MD   fluticasone (FLONASE) 50 mcg/actuation nasal spray daily as needed.    Historical Provider   HYDROcodone-acetaminophen (NORCO) 5-325 mg per tablet Take 1 tablet by mouth every 4 (four) hours as needed for Pain.  Patient not taking: Reported on 11/8/2022 8/1/22   Allie Zhao MD   methylPREDNISolone (MEDROL DOSEPACK) 4 mg tablet TAKE 6 TABLETS ON DAY 1 AS DIRECTED ON PACKAGE AND DECREASE BY 1 TAB EACH DAY FOR A TOTAL OF 6 DAYS 8/22/22   Historical Provider   mirabegron (MYRBETRIQ) 50 mg Tb24 Take 1 tablet (50 mg total) by mouth once daily. 10/5/22 10/5/23  Chantel Boyle NP   omeprazole (PRILOSEC) 20 MG capsule TAKE 1 CAPSULE BY MOUTH TWICE A DAY BEFORE MEALS 5/27/22   Osbaldo Hall MD   oxybutynin (DITROPAN-XL) 10 MG 24 hr tablet TAKE 1 TABLET BY MOUTH EVERY DAY  Patient not taking: Reported on 11/8/2022 5/23/22   Jeremias Eric MD   oxybutynin (DITROPAN-XL) 10 MG 24 hr tablet Take 1 tablet (10 mg total) by mouth once daily. 10/5/22 10/5/23  Chantel Boyle, ROCK   pregabalin (LYRICA) 150 MG capsule Take 150 mg by mouth 2 (two) times daily.    Historical Provider   tiZANidine (ZANAFLEX) 4 MG tablet TAKE 1  "TABLET BY MOUTH AT BEDTIME AS NEEDED FOR MUSCLE RELAXER 3/21/22   Historical Provider   traMADol (ULTRAM) 50 mg tablet Take 50 mg by mouth 2 (two) times daily as needed. 10/26/18   Historical Provider   XARELTO 20 mg Tab TAKE 1 TABLET BY MOUTH EVERY DAY IN THE EVENING 6/26/22   Sotero Doty MD         Objective Findings:  Vital Signs:  /66   Pulse 96   Ht 5' 2" (1.575 m)   Wt 62.1 kg (137 lb)   BMI 25.06 kg/m²  Body mass index is 25.06 kg/m².      Physical Exam:  General Appearance:  Well appearing in no acute distress, appears stated age  Abdominal exam:  Exquisite tenderness to superficial palpation over the xiphoid process, and no abdominal tenderness        Labs:  Lab Results   Component Value Date    WBC 5.29 08/01/2022    HGB 12.2 08/01/2022    HCT 38.2 08/01/2022    MCV 84 08/01/2022    RDW 15.7 (H) 08/01/2022     08/01/2022    GRAN 3.3 08/01/2022    GRAN 62.0 08/01/2022    LYMPH 1.6 08/01/2022    LYMPH 30.4 08/01/2022    MONO 0.3 08/01/2022    MONO 6.2 08/01/2022    EOS 0.0 08/01/2022    BASO 0.02 08/01/2022     Lab Results   Component Value Date     10/14/2022    K 3.6 10/14/2022     10/14/2022    CO2 26 10/14/2022    GLU 91 10/14/2022    BUN 13 10/14/2022    CREATININE 0.9 10/14/2022    CALCIUM 9.5 10/14/2022    PROT 6.6 10/14/2022    ALBUMIN 3.8 10/14/2022    BILITOT 0.5 10/14/2022    ALKPHOS 66 10/14/2022    AST 17 10/14/2022    ALT 15 10/14/2022                 Imaging:  CT of the abdomen with IV and oral contrast 09/08/2022:   FINDINGS:  There are mild dependent atelectatic changes within the lung bases.  The bones are intact.  There is no evidence for acute fracture or bone destruction.  The liver is normal in size and is homogeneous in density with no focal liver lesions identified.  Gallbladder is present and appears grossly unremarkable.  There is no evidence for intrahepatic or extrahepatic biliary dilatation.  The portal venous system is patent.  The spleen, " stomach, and pancreas appear grossly unremarkable.  The adrenal glands are not enlarged.  The kidneys appear normal in overall size and there is no evidence for abnormal renal masses or hydronephrosis.  There are nonobstructing calculi identified within the lower pole of the left kidney with 3 calculi present each measuring 2-3 mm in size.  The kidneys are noted to concentrate contrast symmetrically.  There is atherosclerotic calcification present within the abdominal aorta which tapers normally without aneurysmal dilatation.  Visualized loops of large and small bowel appear unremarkable.  There are colonic diverticula present without radiographic evidence for acute diverticulitis noting that the pelvis is not included on this examination.     Impression:  Nonobstructing left lower pole renal calculi each measuring 2-3 mm in size.  Colonic diverticulosis with no evidence for acute diverticulitis within the visualized colon.                CTA of the chest 08/01/2022:   FINDINGS:  Patient was administered 75 cc of Omnipaque 350 intravenously.  The arch and great vessels are unremarkable.  Pulmonary vessels are very well opacified and there is no evidence of pulmonary embolus.  Upper abdominal organs show nothing unusual.  No mediastinal, hilar or axillary adenopathy is seen.  The trachea and the bronchi are patent.  No pulmonary nodules, masses or infiltrates are seen.  The bones showed DJD.  There are few vertebral body hemangiomas.     Impression:  No evidence of pulmonary embolus or acute aortic syndrome.  Coronary artery calcifications.  The lungs are clear.                  Assessment:  Jasson Pineda is a 71 y.o. female here with:  1. Upper abdominal pain    2. Xyphoidalgia    3. Back pain, unspecified back location, unspecified back pain laterality, unspecified chronicity    4. Ineffective esophageal motility    5. Esophageal dysphagia    6. History of iron deficiency anemia    7. Current use of long  term anticoagulation      Her main complaint is worsening and ongoing pain in the upper abdominal area at the xiphoid process.  On physical exam, this area is exquisitely tender to superficial palpation.  The pain is constant in nature and also related to her body position.  While she does describe some worsening of the pain when eating, the pain is present even when not eating.  I do not feel that this pain is from the GI tract.  Its relationship to eating may be more due to muscular movements during that time.  Her dysphagia is an ongoing, chronic problem related to ineffective esophageal motility.  Upper endoscopy earlier this year was unrevealing.  A CTA of the chest as well as a CT of the abdomen with IV and oral contrast was unrevealing.  This pain would not be related to her hiatal hernia.  It is my feeling that this pain is likely musculoskeletal, or could be neuropathic in nature, but it is not GI in origin.    History of iron deficiency anemia with prior unremarkable EGD and colonoscopy.  Further evaluation of the small bowel would be indicated.  This is a chronic problem, likely benign in nature given the length of time it has been occurring.  I small-bowel evaluation is not urgent.  Her abdominal pain takes precedence.      Recommendations:  1. Follow-up with PCP regarding upper abdominal/xiphoid pain.  Consider referral to pain management or evaluation by Physical Medicine specialist.  2. Will eventually need a wireless capsule endoscopy to evaluate the small bowel for cause of iron deficiency anemia; however, I will defer this until our next visit so that she can address the pain.  Will discuss again at our next visit.  3. Continue omeprazole 20 mg twice daily      Follow-up with me in 3 months.            Armand Foy MD

## 2022-11-10 ENCOUNTER — TELEPHONE (OUTPATIENT)
Dept: INTERNAL MEDICINE | Facility: CLINIC | Age: 72
End: 2022-11-10
Payer: MEDICARE

## 2022-11-10 NOTE — TELEPHONE ENCOUNTER
Faxed [facility] medical clearance forms to [phone]# /office# [phone] ext. [phone]. I  Have attached a confirmation sheet to this fax located in my file cabinet.

## 2022-11-15 ENCOUNTER — HOSPITAL ENCOUNTER (EMERGENCY)
Facility: OTHER | Age: 72
Discharge: HOME | End: 2022-11-15
Attending: EMERGENCY MEDICINE
Payer: MEDICARE

## 2022-11-15 VITALS
RESPIRATION RATE: 18 BRPM | HEART RATE: 84 BPM | BODY MASS INDEX: 25.06 KG/M2 | WEIGHT: 137 LBS | TEMPERATURE: 98 F | SYSTOLIC BLOOD PRESSURE: 110 MMHG | OXYGEN SATURATION: 97 % | DIASTOLIC BLOOD PRESSURE: 66 MMHG

## 2022-11-15 DIAGNOSIS — K59.00 CONSTIPATION, UNSPECIFIED CONSTIPATION TYPE: Primary | ICD-10-CM

## 2022-11-15 DIAGNOSIS — N20.0 NEPHROLITHIASIS: ICD-10-CM

## 2022-11-15 LAB
ALBUMIN SERPL BCP-MCNC: 4 G/DL (ref 3.5–5.2)
ALP SERPL-CCNC: 65 U/L (ref 55–135)
ALT SERPL W/O P-5'-P-CCNC: 14 U/L (ref 10–44)
ANION GAP SERPL CALC-SCNC: 9 MMOL/L (ref 8–16)
AST SERPL-CCNC: 17 U/L (ref 10–40)
BASOPHILS # BLD AUTO: 0.01 K/UL (ref 0–0.2)
BASOPHILS NFR BLD: 0.1 % (ref 0–1.9)
BILIRUB SERPL-MCNC: 0.4 MG/DL (ref 0.1–1)
BILIRUB UR QL STRIP: NEGATIVE
BUN SERPL-MCNC: 9 MG/DL (ref 8–23)
CALCIUM SERPL-MCNC: 9.6 MG/DL (ref 8.7–10.5)
CHLORIDE SERPL-SCNC: 107 MMOL/L (ref 95–110)
CLARITY UR: CLEAR
CO2 SERPL-SCNC: 25 MMOL/L (ref 23–29)
COLOR UR: YELLOW
CREAT SERPL-MCNC: 0.8 MG/DL (ref 0.5–1.4)
DIFFERENTIAL METHOD: ABNORMAL
EOSINOPHIL # BLD AUTO: 0 K/UL (ref 0–0.5)
EOSINOPHIL NFR BLD: 0.4 % (ref 0–8)
ERYTHROCYTE [DISTWIDTH] IN BLOOD BY AUTOMATED COUNT: 15.9 % (ref 11.5–14.5)
EST. GFR  (NO RACE VARIABLE): >60 ML/MIN/1.73 M^2
GLUCOSE SERPL-MCNC: 83 MG/DL (ref 70–110)
GLUCOSE UR QL STRIP: NEGATIVE
HCT VFR BLD AUTO: 36.1 % (ref 37–48.5)
HGB BLD-MCNC: 11.5 G/DL (ref 12–16)
HGB UR QL STRIP: NEGATIVE
IMM GRANULOCYTES # BLD AUTO: 0.03 K/UL (ref 0–0.04)
IMM GRANULOCYTES NFR BLD AUTO: 0.4 % (ref 0–0.5)
KETONES UR QL STRIP: NEGATIVE
LEUKOCYTE ESTERASE UR QL STRIP: NEGATIVE
LIPASE SERPL-CCNC: 28 U/L (ref 4–60)
LYMPHOCYTES # BLD AUTO: 0.9 K/UL (ref 1–4.8)
LYMPHOCYTES NFR BLD: 11.3 % (ref 18–48)
MCH RBC QN AUTO: 26.6 PG (ref 27–31)
MCHC RBC AUTO-ENTMCNC: 31.9 G/DL (ref 32–36)
MCV RBC AUTO: 84 FL (ref 82–98)
MONOCYTES # BLD AUTO: 0.4 K/UL (ref 0.3–1)
MONOCYTES NFR BLD: 5.3 % (ref 4–15)
NEUTROPHILS # BLD AUTO: 6.9 K/UL (ref 1.8–7.7)
NEUTROPHILS NFR BLD: 82.5 % (ref 38–73)
NITRITE UR QL STRIP: NEGATIVE
NRBC BLD-RTO: 0 /100 WBC
PH UR STRIP: 7 [PH] (ref 5–8)
PLATELET # BLD AUTO: 278 K/UL (ref 150–450)
PMV BLD AUTO: 12.3 FL (ref 9.2–12.9)
POTASSIUM SERPL-SCNC: 3.7 MMOL/L (ref 3.5–5.1)
PROT SERPL-MCNC: 7.5 G/DL (ref 6–8.4)
PROT UR QL STRIP: NEGATIVE
RBC # BLD AUTO: 4.32 M/UL (ref 4–5.4)
SODIUM SERPL-SCNC: 141 MMOL/L (ref 136–145)
SP GR UR STRIP: 1.01 (ref 1–1.03)
URN SPEC COLLECT METH UR: NORMAL
UROBILINOGEN UR STRIP-ACNC: NEGATIVE EU/DL
WBC # BLD AUTO: 8.34 K/UL (ref 3.9–12.7)

## 2022-11-15 PROCEDURE — 83690 ASSAY OF LIPASE: CPT | Performed by: EMERGENCY MEDICINE

## 2022-11-15 PROCEDURE — 96375 TX/PRO/DX INJ NEW DRUG ADDON: CPT

## 2022-11-15 PROCEDURE — 96376 TX/PRO/DX INJ SAME DRUG ADON: CPT

## 2022-11-15 PROCEDURE — 85025 COMPLETE CBC W/AUTO DIFF WBC: CPT | Performed by: EMERGENCY MEDICINE

## 2022-11-15 PROCEDURE — 25500020 PHARM REV CODE 255: Performed by: EMERGENCY MEDICINE

## 2022-11-15 PROCEDURE — 80053 COMPREHEN METABOLIC PANEL: CPT | Performed by: EMERGENCY MEDICINE

## 2022-11-15 PROCEDURE — 99285 EMERGENCY DEPT VISIT HI MDM: CPT | Mod: 25

## 2022-11-15 PROCEDURE — 63600175 PHARM REV CODE 636 W HCPCS: Performed by: EMERGENCY MEDICINE

## 2022-11-15 PROCEDURE — 81003 URINALYSIS AUTO W/O SCOPE: CPT | Performed by: EMERGENCY MEDICINE

## 2022-11-15 PROCEDURE — 96374 THER/PROPH/DIAG INJ IV PUSH: CPT

## 2022-11-15 RX ORDER — MORPHINE SULFATE 4 MG/ML
4 INJECTION, SOLUTION INTRAMUSCULAR; INTRAVENOUS
Status: COMPLETED | OUTPATIENT
Start: 2022-11-15 | End: 2022-11-15

## 2022-11-15 RX ORDER — ONDANSETRON 2 MG/ML
4 INJECTION INTRAMUSCULAR; INTRAVENOUS
Status: COMPLETED | OUTPATIENT
Start: 2022-11-15 | End: 2022-11-15

## 2022-11-15 RX ORDER — DOCUSATE SODIUM 100 MG/1
100 CAPSULE, LIQUID FILLED ORAL 2 TIMES DAILY
Qty: 60 CAPSULE | Refills: 0 | Status: SHIPPED | OUTPATIENT
Start: 2022-11-15 | End: 2023-01-11

## 2022-11-15 RX ORDER — POLYETHYLENE GLYCOL 3350 17 G/17G
17 POWDER, FOR SOLUTION ORAL DAILY
Qty: 30 EACH | Refills: 0 | Status: SHIPPED | OUTPATIENT
Start: 2022-11-15 | End: 2023-01-11

## 2022-11-15 RX ADMIN — ONDANSETRON 4 MG: 2 INJECTION INTRAMUSCULAR; INTRAVENOUS at 04:11

## 2022-11-15 RX ADMIN — ONDANSETRON 4 MG: 2 INJECTION INTRAMUSCULAR; INTRAVENOUS at 12:11

## 2022-11-15 RX ADMIN — MORPHINE SULFATE 4 MG: 4 INJECTION, SOLUTION INTRAMUSCULAR; INTRAVENOUS at 04:11

## 2022-11-15 RX ADMIN — MORPHINE SULFATE 4 MG: 4 INJECTION, SOLUTION INTRAMUSCULAR; INTRAVENOUS at 01:11

## 2022-11-15 RX ADMIN — IOHEXOL 75 ML: 350 INJECTION, SOLUTION INTRAVENOUS at 03:11

## 2022-11-15 NOTE — ED TRIAGE NOTES
Pt presents to the ED w/ c/o LLQ pain while at doctors appointment today. Pt denies N/V/D. Hx of diverticulitis.

## 2022-11-15 NOTE — DISCHARGE INSTRUCTIONS
You were seen in the ER today for pain in your side. Your CT scan showed constipation and a stone in your kidney. There were no signs of diverticulitis. We discussed:    Rest and drink plenty of fluids  Take the constipation medications as prescribed  You can take Tylenol or Motrin over the counter as needed for pain    Follow-up with your PCP for a recheck in 48 hours unless you are feeling significantly better. Return to the ER for new or worsening symptoms at any time.

## 2022-11-15 NOTE — ED PROVIDER NOTES
"Encounter Date: 11/15/2022       History     Chief Complaint   Patient presents with    Abdominal Pain     LLQ abdominal pain x45 min. Hx of diverticulitis .     71F h/o DVT/PE on Xarelto, kidney stones, diverticulitis presents with LLQ pain. Started suddenly 2.5h ago and has been constant since then. Was maximal at onset but has "eased off" a little since then. Initially so severe she was doubled over and could not stand up straight. Pain located in LLQ, does not radiate. Felt well prior to onset. No nausea, vomiting, diarrhea, constipation, dysuria, hematuria, fever, chills. She hasn't taken any meds or tried other treatments.     Review of patient's allergies indicates:  No Known Allergies  Past Medical History:   Diagnosis Date    Anticoagulant long-term use     xarelto    Chronic back pain     Chronic neck pain     DVT (deep venous thrombosis)     Herniated disc, cervical     Kidney stone     Lumbar herniated disc     PE (pulmonary embolism)         Pulmonary emboli     after surgery    Pulmonary embolism      Past Surgical History:   Procedure Laterality Date    ARTHROSCOPIC REPAIR OF ROTATOR CUFF OF SHOULDER Left 2019    Procedure: REPAIR, ROTATOR CUFF, ARTHROSCOPIC;  Surgeon: Sedrick Patel MD;  Location: [facility];  Service: Orthopedics;  Laterality: Left;    ARTHROSCOPIC TENOTOMY OF BICEPS TENDON Left 2019    Procedure: TENOTOMY, BICEPS, ARTHROSCOPIC;  Surgeon: Sedrick Patel MD;  Location: [facility];  Service: Orthopedics;  Laterality: Left;    ARTHROSCOPY OF SHOULDER WITH REMOVAL OF DISTAL CLAVICLE Left 2019    Procedure: ARTHROSCOPY, SHOULDER, WITH DISTAL CLAVICLE EXCISION;  Surgeon: Sedrick Patel MD;  Location: [facility] OR;  Service: Orthopedics;  Laterality: Left;    BREAST CYST EXCISION Left      SECTION      x1    COLONOSCOPY N/A 2018    Procedure: COLONOSCOPY;  Surgeon: Armand Foy MD;  Location: [facility] ([address] [place] [address]);  Service: Endoscopy;  Laterality: N/A;    " ESOPHAGEAL MANOMETRY WITH MEASUREMENT OF IMPEDANCE N/A 7/25/2018    Procedure: MANOMETRY, ESOPHAGEAL, WITH IMPEDANCE MEASUREMENT;  Surgeon: Armand Foy MD;  Location: [facility] (4TH FLR);  Service: Endoscopy;  Laterality: N/A;    ESOPHAGOGASTRODUODENOSCOPY N/A 7/17/2018    Procedure: EGD (ESOPHAGOGASTRODUODENOSCOPY);  Surgeon: Armand Foy MD;  Location: [facility] (4TH FLR);  Service: Endoscopy;  Laterality: N/A;  pt currently on Lovenox and Xarelto - ok per Dr. Doty to hold Plavix 2 days prior and Lovenox 24hr prior to case/see scanned media 7/9/ for documentation of hold -- SM        ESOPHAGOGASTRODUODENOSCOPY N/A 3/28/2022    Procedure: EGD (ESOPHAGOGASTRODUODENOSCOPY);  Surgeon: Armand Foy MD;  Location: [facility] (2ND FLR);  Service: Endoscopy;  Laterality: N/A;  okay to hold Xarelto for 2 days per Dr. Doty with lovenox bridgings - see note on 3/21/22  2nd floor for ASAP availabilty  fully vaccinated - sm    EXTRACORPOREAL SHOCK WAVE LITHOTRIPSY Left 2/9/2021    Procedure: LITHOTRIPSY-EXTRACORPOREAL SHOCK WAVE;  Surgeon: Jeremias Eric MD;  Location: [facility];  Service: Urology;  Laterality: Left;    HERNIA REPAIR      umbilical    KIDNEY STONE SURGERY      ureteral stent    left knee surgery      SHOULDER ARTHROSCOPY Left 6/11/2019    Procedure: ARTHROSCOPY, SHOULDER;  Surgeon: Sedrick Patel MD;  Location: [facility];  Service: Orthopedics;  Laterality: Left;  BLOCK    TONSILLECTOMY      TOTAL SHOULDER ARTHROPLASTY Right      Family History   Problem Relation Age of Onset    No Known Problems Father     Colon cancer Mother 74    Ovarian cancer Mother     Liver cancer Sister     Diabetes Sister     Liver cancer Maternal Grandfather     Breast cancer Maternal Aunt 50        50s    Cervical cancer Maternal Aunt     Breast cancer Maternal Cousin 45    No Known Problems Daughter     No Known Problems Sister     No Known Problems Daughter     Esophageal cancer Neg Hx      Social History      Tobacco Use    Smoking status: Never    Smokeless tobacco: Never   Substance Use Topics    Alcohol use: No    Drug use: No     Review of Systems   Constitutional:  Negative for chills and fever.   Respiratory:  Negative for cough and shortness of breath.    Cardiovascular:  Negative for chest pain.   Gastrointestinal:  Positive for abdominal pain. Negative for blood in stool, constipation, diarrhea, nausea and vomiting.   Genitourinary:  Negative for dysuria and hematuria.   Musculoskeletal:  Negative for back pain and neck pain.   Skin:  Negative for rash.   Neurological:  Negative for syncope and headaches.   All other systems reviewed and are negative.    Physical Exam     Initial Vitals   BP Pulse Resp Temp SpO2   11/15/22 1241 11/15/22 1241 11/15/22 1243 11/15/22 1243 11/15/22 1241   113/71 75 18 98 °F (36.7 °C) 98 %      MAP       --                Physical Exam    Constitutional: She appears well-developed and well-nourished.   Lying on her side on the stretcher, uncomfortable but nontoxic   HENT:   Head: Normocephalic and atraumatic.   Eyes: EOM are normal.   Neck:   Normal range of motion.  Cardiovascular:  Normal rate and regular rhythm.           Pulmonary/Chest: Breath sounds normal. No respiratory distress.   Abdominal: Abdomen is soft. There is abdominal tenderness.   TTP LLQ with guarding   Musculoskeletal:      Cervical back: Normal range of motion.     Neurological: She is alert and oriented to person, place, and time.   Skin: Skin is warm and dry.       ED Course   Procedures  Labs Reviewed   CBC W/ AUTO DIFFERENTIAL - Abnormal; Notable for the following components:       Result Value    Hemoglobin 11.5 (*)     Hematocrit 36.1 (*)     MCH 26.6 (*)     MCHC 31.9 (*)     RDW 15.9 (*)     Lymph # 0.9 (*)     Gran % 82.5 (*)     Lymph % 11.3 (*)     All other components within normal limits   COMPREHENSIVE METABOLIC PANEL   LIPASE   URINALYSIS, REFLEX TO URINE CULTURE    Narrative:     Specimen  Source->Urine          Imaging Results              CT Abdomen Pelvis W Wo Contrast (Final result)  Result time 11/15/22 16:14:08      Final result by Raheem Gutierrez MD (11/15/22 16:14:08)                   Impression:      1. No acute abnormality.  2. Possible constipation.  3. Nonobstructing nephrolithiasis of the left kidney.  4. Small hiatal hernia.  5. Multilevel chronic degenerative changes of the lumbar spine.      Electronically signed by: Raheem Gutierrez  Date:    11/15/2022  Time:    16:14               Narrative:    EXAMINATION:  CT ABDOMEN PELVIS W WO CONTRAST    CLINICAL HISTORY:  LLQ abdominal pain;h/o diverticulitis and kidney stones - acute onset severe LLQ pain;    TECHNIQUE:  Low dose axial images, sagittal and coronal reformations were obtained from the lung bases to the pubic symphysis before and following the IV administration of 75 mL of Omnipaque 350 .  Oral contrast was not administered..    COMPARISON:  12/22/2020    FINDINGS:  Abdomen:    - Lung bases: No infiltrates and no nodules.    Small hiatal hernia.    - Liver: No focal mass.    - Gallbladder: No calcified gallstones.    - Bile Ducts: No evidence of intra or extra hepatic biliary ductal dilation.    - Spleen: Negative.    - Kidneys: Small cluster of nonobstructing stones at the lower pole the left kidney.  No stones on the right.  No hydronephrosis or hydroureter bilaterally.  No mass or perinephric stranding bilaterally.  The kidneys enhance normally bilaterally.    - Adrenals: Unremarkable.    - Pancreas: No mass or peripancreatic fat stranding.    - Retroperitoneum:  No significant adenopathy.    - Vascular: No abdominal aortic aneurysm.    - Abdominal wall:  Unremarkable.    The appendix is within normal limits.    Pelvis:    Urinary bladder is within normal limits.    The uterus is within normal limits.    No adnexal mass.    Bowel/Mesentery:    No evidence of bowel obstruction or inflammation.  Moderate retained feces in the  colon.  Few scattered diverticula with no evidence of acute diverticulitis.    Bones:  No acute osseous abnormality and no suspicious lytic or blastic lesion.    Severe disc space narrowing and vacuum disc phenomena at L5-S1.  Grade 1 retrolisthesis of L5 on S1.  Moderate to severe bilateral foraminal narrowing.    Mild diffuse disc protrusion at L4-5 with grade 1 spondylolisthesis of L4 on L5.  Moderate central canal narrowing.  Mild disc space narrowing and vacuum disc phenomena.    Minimal disc protrusion at L3-4.                                       Medications   morphine injection 4 mg (4 mg Intravenous Given 11/15/22 1300)   ondansetron injection 4 mg (4 mg Intravenous Given 11/15/22 1258)   morphine injection 4 mg (4 mg Intravenous Given 11/15/22 1601)   ondansetron injection 4 mg (4 mg Intravenous Given 11/15/22 1601)   iohexoL (OMNIPAQUE 350) injection 75 mL (75 mLs Intravenous Given 11/15/22 1548)     Medical Decision Making:   ED Management:  71F h/o DVT/PE on xarelto, diverticulitis, kidney stone p/w acute onset severe LLQ pain. No associated sx. TTP LLQ.    Ddx diverticulitis vs stone, r/o other GI pathology    Plan:  -IV Morphine, Zofran  -CBC, CMP, lipase, UA  -CT A/P with and without contrast    4:32pm  Labs unremarkable. CT shows no diverticulitis. Pt does have constipation and nonobstructing L nephrolithiasis. UA with no urine or infection. Pt reassessed, she is feeling better. Unclear what exactly is causing her symptoms - may be some cramping/gas r/t constipation. Not sure why the nonobstructing L renal calculus would be causing pain. Will DC home w miralax and colace, PO hydration, OTC pain meds, and PCP follow-up. Patient and daughter at bedside understand and agree with plan.                         Clinical Impression:   Final diagnoses:  [K59.00] Constipation, unspecified constipation type (Primary)  [N20.0] Nephrolithiasis      ED Disposition Condition    Discharge Stable          ED  Prescriptions       Medication Sig Dispense Start Date End Date Auth. Provider    polyethylene glycol (GLYCOLAX) 17 gram PwPk Take 17 g by mouth once daily. 30 each 11/15/2022 -- Chantel Mendez MD    docusate sodium (COLACE) 100 MG capsule Take 1 capsule (100 mg total) by mouth 2 (two) times daily. 60 capsule 11/15/2022 -- Chantel Mendez MD          Follow-up Information    None          Chantel Mendez MD  11/15/22 2004

## 2022-11-29 ENCOUNTER — PATIENT MESSAGE (OUTPATIENT)
Dept: INTERNAL MEDICINE | Facility: CLINIC | Age: 72
End: 2022-11-29
Payer: MEDICARE

## 2022-11-30 ENCOUNTER — PATIENT MESSAGE (OUTPATIENT)
Dept: INTERNAL MEDICINE | Facility: CLINIC | Age: 72
End: 2022-11-30
Payer: MEDICARE

## 2022-12-01 ENCOUNTER — TELEPHONE (OUTPATIENT)
Dept: INTERNAL MEDICINE | Facility: CLINIC | Age: 72
End: 2022-12-01

## 2022-12-01 ENCOUNTER — OFFICE VISIT (OUTPATIENT)
Dept: INTERNAL MEDICINE | Facility: CLINIC | Age: 72
End: 2022-12-01
Attending: INTERNAL MEDICINE
Payer: MEDICARE

## 2022-12-01 VITALS
SYSTOLIC BLOOD PRESSURE: 124 MMHG | WEIGHT: 140.63 LBS | HEART RATE: 76 BPM | OXYGEN SATURATION: 99 % | HEIGHT: 62 IN | DIASTOLIC BLOOD PRESSURE: 70 MMHG | BODY MASS INDEX: 25.88 KG/M2

## 2022-12-01 DIAGNOSIS — R07.89 XIPHOID PAIN: Primary | ICD-10-CM

## 2022-12-01 PROCEDURE — 99214 OFFICE O/P EST MOD 30 MIN: CPT | Mod: PBBFAC | Performed by: INTERNAL MEDICINE

## 2022-12-01 PROCEDURE — 99999 PR PBB SHADOW E&M-EST. PATIENT-LVL IV: ICD-10-PCS | Mod: PBBFAC,,, | Performed by: INTERNAL MEDICINE

## 2022-12-01 PROCEDURE — 99213 OFFICE O/P EST LOW 20 MIN: CPT | Mod: S$PBB,,, | Performed by: INTERNAL MEDICINE

## 2022-12-01 PROCEDURE — 99213 PR OFFICE/OUTPT VISIT, EST, LEVL III, 20-29 MIN: ICD-10-PCS | Mod: S$PBB,,, | Performed by: INTERNAL MEDICINE

## 2022-12-01 PROCEDURE — 99999 PR PBB SHADOW E&M-EST. PATIENT-LVL IV: CPT | Mod: PBBFAC,,, | Performed by: INTERNAL MEDICINE

## 2022-12-01 RX ORDER — METHYLPREDNISOLONE 4 MG/1
TABLET ORAL
Qty: 21 EACH | Refills: 0 | Status: SHIPPED | OUTPATIENT
Start: 2022-12-01 | End: 2022-12-22

## 2022-12-01 RX ORDER — LIDOCAINE 50 MG/G
1 PATCH TOPICAL
COMMUNITY
Start: 2022-11-26 | End: 2022-12-26

## 2022-12-01 NOTE — TELEPHONE ENCOUNTER
Spoke with pts daughter and explained to her she was seen today for xiphoid pain. I explained to her that he sent in a medrol pack for her and referred her to pain management and they will do imaging to pinpoint inflammation. She verbalized understanding, her mother was not able to relay that information to her.

## 2022-12-01 NOTE — TELEPHONE ENCOUNTER
----- Message from Tiffanie Contreras sent at 12/1/2022  3:10 PM CST -----  Regarding: Patient call back  Who called: Geri Pineda (daughter)    What is the request in detail: Patient is requesting a call back. She states her mother was not able to properly describe the dx she was given in the ER. She would like to speak with the staff so it can be correctly relayed to the provider. She states her xyfoid process is inflamed. She states she would like some imaging ordered so they can pinpoint the inflammation and get her some anti inflammatories.   Please advise.    Can the clinic reply by [facility]? No    Best call back number: [phone]    Additional Information: N/A

## 2022-12-01 NOTE — PROGRESS NOTES
"Subjective:       Patient ID: Jasson Pineda is a 72 y.o. female.    Chief Complaint: Chronic Pain and Chest Pain    Here for urgent visit    Pain persist. Constant in nature and worse when lying on her stomach or back, but not her side.       Review of Systems   Constitutional:  Negative for chills, fatigue, fever and unexpected weight change.   HENT:  Negative for ear pain, hearing loss, postnasal drip, tinnitus, trouble swallowing and voice change.    Respiratory:  Negative for cough, chest tightness, shortness of breath and wheezing.    Cardiovascular:  Positive for chest pain. Negative for palpitations and leg swelling.   Gastrointestinal:  Negative for abdominal pain, blood in stool, diarrhea, nausea and vomiting.   Endocrine: Negative for polydipsia, polyphagia and polyuria.   Genitourinary:  Negative for difficulty urinating, dysuria, hematuria and vaginal bleeding.   Skin:  Negative for rash.   Allergic/Immunologic: Negative for food allergies.   Neurological:  Negative for dizziness, numbness and headaches.   Hematological:  Does not bruise/bleed easily.   Psychiatric/Behavioral:  The patient is not nervous/anxious.      Objective:      Vitals:    12/01/22 1324   BP: 124/70   Pulse: 76   SpO2: 99%   Weight: 63.8 kg (140 lb 10.5 oz)   Height: 5' 2" (1.575 m)      Physical Exam  Constitutional:       General: She is not in acute distress.     Appearance: Normal appearance. She is well-developed. She is not diaphoretic.   HENT:      Head: Normocephalic and atraumatic.   Eyes:      General: No scleral icterus.        Right eye: No discharge.         Left eye: No discharge.      Conjunctiva/sclera: Conjunctivae normal.   Pulmonary:      Effort: Pulmonary effort is normal. No respiratory distress.   Chest:      Chest wall: Tenderness (xiphoid TTP) present. No mass or deformity.   Abdominal:      General: There is no distension.   Skin:     General: Skin is warm and dry.   Neurological:      Mental " Status: She is alert and oriented to person, place, and time.   Psychiatric:         Speech: Speech normal.       Assessment:       1. Xiphoid pain          Plan:       Jasson was seen today for chronic pain and chest pain.    Diagnoses and all orders for this visit:    Xiphoid pain  She does not recall the response to last medroldosepack but does recall taking. Let's try again and f/u with pain to see what magic they have for xiphoid pain???  -     Ambulatory referral/consult to Pain Clinic; Future  -     methylPREDNISolone (MEDROL DOSEPACK) 4 mg tablet; use as directed         Osbaldo Mendez MD  Internal Medicine-[facility]        Side effects of medication(s) were discussed in detail and patient voiced understanding.  Patient will call back for any issues or complications.

## 2022-12-02 ENCOUNTER — TELEPHONE (OUTPATIENT)
Dept: INTERNAL MEDICINE | Facility: CLINIC | Age: 72
End: 2022-12-02
Payer: MEDICARE

## 2022-12-07 ENCOUNTER — OFFICE VISIT (OUTPATIENT)
Dept: NEUROLOGY | Facility: CLINIC | Age: 72
End: 2022-12-07
Payer: MEDICARE

## 2022-12-07 DIAGNOSIS — E78.2 MIXED HYPERLIPIDEMIA: ICD-10-CM

## 2022-12-07 DIAGNOSIS — F41.9 ANXIETY: ICD-10-CM

## 2022-12-07 DIAGNOSIS — G89.29 OTHER CHRONIC PAIN: ICD-10-CM

## 2022-12-07 DIAGNOSIS — R41.89 SIGNS AND SYMPTOMS INVOLVING COGNITION: Primary | ICD-10-CM

## 2022-12-07 DIAGNOSIS — F51.01 PRIMARY INSOMNIA: ICD-10-CM

## 2022-12-07 PROCEDURE — 96116 NUBHVL XM PHYS/QHP 1ST HR: CPT | Mod: FQ,95,, | Performed by: PSYCHIATRY & NEUROLOGY

## 2022-12-07 PROCEDURE — 96121 NUBHVL XM PHY/QHP EA ADDL HR: CPT | Mod: FQ,95,, | Performed by: PSYCHIATRY & NEUROLOGY

## 2022-12-07 PROCEDURE — 99499 NO LOS: ICD-10-PCS | Mod: 95,,, | Performed by: PSYCHIATRY & NEUROLOGY

## 2022-12-07 PROCEDURE — 96121 PR NEUROBEHAVIORAL STAT EXAM, EA ADDTL HR: ICD-10-PCS | Mod: FQ,95,, | Performed by: PSYCHIATRY & NEUROLOGY

## 2022-12-07 PROCEDURE — 99499 UNLISTED E&M SERVICE: CPT | Mod: 95,,, | Performed by: PSYCHIATRY & NEUROLOGY

## 2022-12-07 PROCEDURE — 96116 PR NEUROBEHAVIORAL STATUS EXAM BY PSYCH/PHYS: ICD-10-PCS | Mod: FQ,95,, | Performed by: PSYCHIATRY & NEUROLOGY

## 2022-12-07 NOTE — PROGRESS NOTES
NEUROPSYCHOLOGY CONSULT  [facility]      Referral Information  Name: Jasson Pineda    : 1950  Age: 72 y.o.    Race: Black or     Gender: female     MRN: [id number]  Handedness: Right  Education: 16 years      Referring Provider:   Carola Hairston NP  Referral Reason/Medical Necessity: Ms. Pineda has a history of vascular risk factors, dysphagia, and memory loss. Neuropsychological evaluation was requested to assess current cognitive functioning, aid in differential diagnosis, and provide treatment recommendations.    Consent/Emergency Plan: The patient expressed an understanding of the purpose of the evaluation and consented to all procedures. I informed the patient of limits to confidentiality and discussed an emergency plan.   Procedures/Billing: Please see billing table at the end of this report.  Telemedicine:   Established Patient - Audio Only Telehealth Visit  The patient location is: [place]   The chief complaint leading to consultation is: memory loss   Visit type: Virtual visit with audio only (telephone)  Total time spent with patient: 50 min  The reason for the audio only service rather than synchronous audio and video virtual visit was related to technical difficulties or patient preference/necessity.  Each patient to whom I provide medical services by telemedicine is:  (1) informed of the relationship between the physician and patient and the respective role of any other health care provider with respect to management of the patient; and (2) notified that they may decline to receive medical services by telemedicine and may withdraw from such care at any time. Patient verbally consented to receive this service via voice-only telephone call.      SUMMARY    Ms. Pineda is a 72 y.o. Black or  female with a history of vascular risk factors, chronic pain, dysphagia, and memory loss presenting for evaluation of cognitive complaints. She reports mild  cognitive changes over the last 8 months that are gradually worsening. MRI is nonacute with mild microvascular changes. Reversible dementia labs are WNL. She is on a few anticholinergic and centrally acting medications (oxybutynin, tizanidine, tramadol, hydrocodone). Sleep is chronically poor. She was referred for a sleep study prior, but unable to complete due to pain preventing her from laying on her back. She reports some new, mild anxiety and intermittent depression surrounding her forgetfulness. She is functionally independent and fully oriented. No safety concerns.     She is scheduled for testing. Full report to follow.       IMPRESSIONS      1. Signs and symptoms involving cognition        2. Mixed hyperlipidemia        3. Other chronic pain        4. Primary insomnia        5. Anxiety            PLAN     Ms. Pineda is scheduled for testing on 12/13/22. Full report to follow.       Thank you for allowing me to participate in Ms. Pineda's care.  If you have any questions, please contact me.    Paty Osorio PsyD  Clinical Neuropsychologist  Department of Neurology  [facility]        SUBJECTIVE:     HISTORY OF PRESENT ILLNESS   Ms. Pineda is a 72 y.o. Black or  female with a history of HLD, chronic pain, dysphagia, anemia, overactive bladder, DVT and PE (2013) presenting for evaluation of mild memory changes noticed by family. She is a good historian today.     Cognitive Sxs:  Attention: Concentration seems ok. Loses her train of thought more frequently   Mental Speed: endorses slowed thinking   Memory: Has to think for a while to remember things. Usually comes to her eventually, but often cannot answer right away.   Language: Increased word finding   Visuospatial/Perceptual: Has to really think about where she is going sometimes when she's driving.   Executive Functioning: Planning, organizing, multitasking are ok.     Onset/Course: Ms. Espinozas symptoms were gradual in onset  around 8 months ago and are worsening. The pt denies any exacerbating or ameliorating factors. No waxing/waning of cognitive status.  Medication for Cognition: None    Neuropsychiatric Sxs:  Pt denied a history of any formal mental health diagnosis or treatment.     Self-Described Mood: Can get down when she can't remember.   Depressed Mood: Endorsed occasionally in the last few months. Otherwise, mood is pretty good.   Anxiety: Endorsed, doesn't think she used to be but is becoming one. Finds herself worrying more about her kids, her memory. Finds it more difficult to relax. Was always a very active person working multiple jobs, so care home was hard for her, finds herself with excess restlessness  Panic Attacks: Denied    Current Stressors: memory loss, chronic pain  SI/HI: Denied. Does have a fear of dying.   Psychiatric Hospitalization: Denied    Delusional/Paranoid Thinking: Denied  Hallucinations: Denied  Neurovegetative:  Sleep: decreased for the last couple of months  Total Hours/Night: 5   Pattern: falls asleep easily, sleeps through the night, and awakens early Dozes in her chair around 8:30 after taking pain meds. Wakes up around 10, watches TV, goes to bed around 11:30 or 12, up around 4  PETERSON: Never had a sleep study, but pt with risk factors including:, snoring, waking up with dry mouth , and age 50+  Acting out dreams: Denied  Daytime Naps: Denied  Appetite: normal but difficulty swallowing so doesn't eat as much   Energy: normal, notes she doesn't do much during the day, watches TV. But has the energy to do it if she wants to     Physical Sx:  Motor:  dysphagia. No tremor, weakness   Gait:  Reports poor balance. Not shuffling. Uses rollator. No falls.   Sensory: No change to taste, smell. Hearing and vision are getting a little worse.   Pain: Ms. Pineda described typical pain at a 10 on a scale of 1-10, with 10 being worst if she doesn't take meds. If she takes meds, it is lower, maybe an 8, this is  constant.     Current Functioning (I/ADLs):  ADLs: Independent   IADLs:  Finances: Independent, keeps track of all her bills. Daughter helps her if she falls short financially.   Medication Mgmt:Independent, uses a pillbox. Fills it every Sunday. Lately, has missed a few doses of newer medication.   Driving: Independent, has to concentrate more on where she's going but no accidents or speeding tickets. Usually drives only within the city.   Household Mgmt: Independent. Daughter gets groceries for her. Doesn't cook much, but no issues when she does.   Vocational:  Retired  Safety Concerns: None    RELEVANT HISTORY:  Prior Testing:  None    Neurologic History  Seizures: Denied  Stroke: Denied  TBI: Denied  Movement Disorder: Denied  Falls: Denied  Urinary Incontinence: Denied incontinence, but does have frequency   Chronic UTI's:  Denied  Prior Episode of Delirium:  Denied  Other neurologic history: Denied  Dysautonomia: dizziness upon standing, urinary symptoms, difficulty swallowing, and constipation (possibly med related)  Referral Diagnosis: R41.3 (ICD-10-CM) - Memory loss    Neurodiagnostics:  Results for orders placed or performed during the hospital encounter of 08/29/22   MRI Brain Without Contrast    Narrative    EXAMINATION:  MRI BRAIN WITHOUT CONTRAST    CLINICAL HISTORY:  Memory loss;.  Dysphagia, unspecified    TECHNIQUE:  Multiplanar multisequence MR imaging of the brain was performed without contrast.    COMPARISON:  None    FINDINGS:  Ventricles and sulci are normal in size for age without evidence of hydrocephalus. No acute extra-axial fluid collections.    No acute hemorrhage, edema or acute infarct.  Scattered foci of FLAIR signal hyperintensity in the periventricular and subcortical white matter.    Normal vascular flow voids are preserved.    Corpus callosum is intact.  Craniocervical junction is intact.    Mastoid air cells are clear.  Visualized paranasal sinuses are clear.      Impression    No  acute intracranial abnormality identified.    White matter changes are nonspecific, though can be seen with chronic microvascular ischemic change in this age group.  A demyelinating process could have a similar appearance in the appropriate setting.      Electronically signed by: Zarina Wade  Date:    08/29/2022  Time:    08:59       Pertinent Labs:  Lab Results   Component Value Date    DGWLEXLY18 575 05/18/2022     No results found for: VITAMINB1  Lab Results   Component Value Date    RPR Non-reactive 05/18/2022     Lab Results   Component Value Date    FOLATE 8.1 05/18/2022     Lab Results   Component Value Date    TSH 0.900 05/18/2022     Lab Results   Component Value Date    CALCIUM 9.6 11/15/2022      Lab Results   Component Value Date    HGBA1C 5.5 05/18/2022     No results found for: HIV1X2, [id number]        Current Outpatient Medications:     albuterol (PROVENTIL/VENTOLIN HFA) 90 mcg/actuation inhaler, Inhale 2 puffs into the lungs every 4 (four) hours as needed for Wheezing. Rescue, Disp: 1 Inhaler, Rfl: 0    atorvastatin (LIPITOR) 80 MG tablet, TAKE 1 TABLET BY MOUTH EVERY DAY, Disp: 90 tablet, Rfl: 3    cholecalciferol, vitamin D3, 1,250 mcg (50,000 unit) capsule, Take 1 capsule (50,000 Units total) by mouth once a week., Disp: 12 capsule, Rfl: 3    docusate sodium (COLACE) 100 MG capsule, Take 1 capsule (100 mg total) by mouth 2 (two) times daily., Disp: 60 capsule, Rfl: 0    fluticasone (FLONASE) 50 mcg/actuation nasal spray, daily as needed., Disp: , Rfl:     HYDROcodone-acetaminophen (NORCO) 5-325 mg per tablet, Take 1 tablet by mouth every 4 (four) hours as needed for Pain., Disp: 12 tablet, Rfl: 0    LIDOcaine (LIDODERM) 5 %, Place 1 patch onto the skin., Disp: , Rfl:     methylPREDNISolone (MEDROL DOSEPACK) 4 mg tablet, use as directed, Disp: 21 each, Rfl: 0    mirabegron (MYRBETRIQ) 50 mg Tb24, Take 1 tablet (50 mg total) by mouth once daily., Disp: 30 tablet, Rfl: 11    omeprazole (PRILOSEC)  20 MG capsule, TAKE 1 CAPSULE BY MOUTH TWICE DAILY BEFORE MEALS, Disp: 180 capsule, Rfl: 0    oxybutynin (DITROPAN-XL) 10 MG 24 hr tablet, TAKE 1 TABLET BY MOUTH EVERY DAY, Disp: 90 tablet, Rfl: 3    oxybutynin (DITROPAN-XL) 10 MG 24 hr tablet, Take 1 tablet (10 mg total) by mouth once daily., Disp: 30 tablet, Rfl: 11    polyethylene glycol (GLYCOLAX) 17 gram PwPk, Take 17 g by mouth once daily., Disp: 30 each, Rfl: 0    pregabalin (LYRICA) 150 MG capsule, Take 150 mg by mouth 2 (two) times daily., Disp: , Rfl:     tiZANidine (ZANAFLEX) 4 MG tablet, TAKE 1 TABLET BY MOUTH AT BEDTIME AS NEEDED FOR MUSCLE RELAXER, Disp: , Rfl:     traMADol (ULTRAM) 50 mg tablet, Take 50 mg by mouth 2 (two) times daily as needed., Disp: , Rfl: 1    XARELTO 20 mg Tab, TAKE 1 TABLET BY MOUTH EVERY DAY IN THE EVENING, Disp: 90 tablet, Rfl: 3    Medical history  Patient Active Problem List   Diagnosis    Kidney stone    Herniated disc, cervical    Complete rotator cuff tear or rupture of right shoulder, not specified as traumatic    Shoulder arthritis    OAB (overactive bladder)    Body mass index (BMI) of 25.0 to 29.9    Atypical chest pain    Ineffective esophageal motility    SOB (shortness of breath)    Complete rotator cuff tear or rupture of left shoulder, not specified as traumatic    Impingement syndrome of left shoulder    Primary osteoarthritis of left shoulder    Labral tear of long head of biceps tendon, initial encounter    History of DVT of lower extremity    Chronic pulmonary embolism without acute cor pulmonale    Mixed hyperlipidemia    Nephrolithiasis    Tortuous aorta    Atherosclerosis of aorta    Current use of long term anticoagulation    History of iron deficiency anemia    Esophageal dysphagia    Epigastric abdominal pain     Past Medical History:   Diagnosis Date    Anticoagulant long-term use     xarelto    Chronic back pain     Chronic neck pain     DVT (deep venous thrombosis)     Herniated disc, cervical      Kidney stone     Lumbar herniated disc     PE (pulmonary embolism)         Pulmonary emboli     after surgery    Pulmonary embolism      Past Surgical History:   Procedure Laterality Date    ARTHROSCOPIC REPAIR OF ROTATOR CUFF OF SHOULDER Left 2019    Procedure: REPAIR, ROTATOR CUFF, ARTHROSCOPIC;  Surgeon: Sedrick Patel MD;  Location: [facility] OR;  Service: Orthopedics;  Laterality: Left;    ARTHROSCOPIC TENOTOMY OF BICEPS TENDON Left 2019    Procedure: TENOTOMY, BICEPS, ARTHROSCOPIC;  Surgeon: Sedrick Patel MD;  Location: [facility] OR;  Service: Orthopedics;  Laterality: Left;    ARTHROSCOPY OF SHOULDER WITH REMOVAL OF DISTAL CLAVICLE Left 2019    Procedure: ARTHROSCOPY, SHOULDER, WITH DISTAL CLAVICLE EXCISION;  Surgeon: Sedrick Patel MD;  Location: [facility] OR;  Service: Orthopedics;  Laterality: Left;    BREAST CYST EXCISION Left      SECTION      x1    COLONOSCOPY N/A 2018    Procedure: COLONOSCOPY;  Surgeon: Armand Foy MD;  Location: [facility] R[facility] (4TH FLR);  Service: Endoscopy;  Laterality: N/A;    ESOPHAGEAL MANOMETRY WITH MEASUREMENT OF IMPEDANCE N/A 2018    Procedure: MANOMETRY, ESOPHAGEAL, WITH IMPEDANCE MEASUREMENT;  Surgeon: Armand Foy MD;  Location: [facility] (4TH FLR);  Service: Endoscopy;  Laterality: N/A;    ESOPHAGOGASTRODUODENOSCOPY N/A 2018    Procedure: EGD (ESOPHAGOGASTRODUODENOSCOPY);  Surgeon: Armand Foy MD;  Location: [facility] (4TH FLR);  Service: Endoscopy;  Laterality: N/A;  pt currently on Lovenox and Xarelto - ok per Dr. Doty to hold Plavix 2 days prior and Lovenox 24hr prior to case/see scanned media / for documentation of hold -- SM        ESOPHAGOGASTRODUODENOSCOPY N/A 3/28/2022    Procedure: EGD (ESOPHAGOGASTRODUODENOSCOPY);  Surgeon: Armand Foy MD;  Location: [facility] (2ND FLR);  Service: Endoscopy;  Laterality: N/A;  okay to hold Xarelto for 2 days per Dr. Doty with lovenox bridgings - see note on 3/21/22  2nd floor  for ASAP availabilty  fully vaccinated - sm    EXTRACORPOREAL SHOCK WAVE LITHOTRIPSY Left 2/9/2021    Procedure: LITHOTRIPSY-EXTRACORPOREAL SHOCK WAVE;  Surgeon: Jeremias Eric MD;  Location: [facility];  Service: Urology;  Laterality: Left;    HERNIA REPAIR      umbilical    KIDNEY STONE SURGERY      ureteral stent    left knee surgery      SHOULDER ARTHROSCOPY Left 6/11/2019    Procedure: ARTHROSCOPY, SHOULDER;  Surgeon: Sedrick Patel MD;  Location: [facility];  Service: Orthopedics;  Laterality: Left;  BLOCK    TONSILLECTOMY      TOTAL SHOULDER ARTHROPLASTY Right        Substance Use History:  Social History     Tobacco Use    Smoking status: Never    Smokeless tobacco: Never   Substance and Sexual Activity    Alcohol use: No    Drug use: No    Sexual activity: Not Currently     Partners: Male   Caffeine         None    Family History:  Family History   Problem Relation Age of Onset    No Known Problems Father     Colon cancer Mother 74    Ovarian cancer Mother     Liver cancer Sister     Diabetes Sister     Liver cancer Maternal Grandfather     Breast cancer Maternal Aunt 50        50s    Cervical cancer Maternal Aunt     Breast cancer Maternal Cousin 45    No Known Problems Daughter     No Known Problems Sister     No Known Problems Daughter     Esophageal cancer Neg Hx      Family Neurologic History: Grandmother had dementia in her 90's   Family Psychiatric History: Negative for heritable risk factors     Developmental/Academic Hx:  Developmental: Born [place] raised in [place].  Product of a normal pregnancy and delivery. No developmental delays. English is their only language.   Academic:  Learning Difficulties: A student. No history of formal learning disorder diagnosis. Never repeated a grade.  Attention Difficulties: Denied. Never diagnosed with or treated for ADHD.  Behavioral Difficulties: Denied  Educational Attainment: 16    Social/Occupational Hx:  Occupational:  Occupational Status: Retired  2016  Primary Occupation(s): , later became a    Social:  Resides: At home, youngest daughter and niece live with her   Current Relationship/Family Status:  x1, two daughters   Primary Source of Support:  Pt endorses adequate social support. Leans on her daughters and her niece. Oldest daughter calls her frequently, daughter lives with her.   Daily Activities: watching TV    MENTAL STATUS AND BEHAVIORAL OBSERVATIONS:  Appearance:  Not observed (telephone visit)  Behavior:   alert, calm, cooperative, and rapport easily established  Orientation:   Fully oriented  Sensory:   Hearing and vision adequate for virtual/telephone visit  Gait:   Not observed (virtual/telephone visit)   Psychomotor:  Not observed (telephone visit)  Speech:  Fluent and spontaneous. Normal volume, rate, pitch, tone, and prosody.  Language:  Receptive and expressive language appear intact. Comprehends conversational speech., No evidence of word-finding difficulties in conversational speech.  Mood:   euthymic  Affect:   mood-congruent  Thought Process: goal directed and linear  Thought Content: Denied current SI/HI. and No evidence of psychotic symptoms.  Memory:  recent and remote appear grossly intact  Attn/Concentration:  Grossly intact  Judgment/Insight: Grossly intact      BILLING INFORMATION:  Billing/Services Summary          Psychiatric Diagnostic Interview Base Code (56973)  Total Units: 0    Face-to-face Total Time: 0 min.         Neurobehavioral Status Exam Base Code (77928)   Total Units: 1 Add-on (62758)  Total Units: 1   Face-to-face 50 min.    Record Review, Integration, Report Generation 45 min.     Total Time: 95 min.    DOS is the date of the evaluation unless specified              This service was not originating from a related E/M service provided within the previous 7 days nor will  to an E/M service or procedure within the next 24 hours or my soonest available  appointment.  Prevailing standard of care was able to be met in this audio-only visit.

## 2022-12-13 ENCOUNTER — OFFICE VISIT (OUTPATIENT)
Dept: NEUROLOGY | Facility: CLINIC | Age: 72
End: 2022-12-13
Payer: MEDICARE

## 2022-12-13 DIAGNOSIS — G89.29 OTHER CHRONIC PAIN: ICD-10-CM

## 2022-12-13 DIAGNOSIS — R41.3 MEMORY LOSS: ICD-10-CM

## 2022-12-13 DIAGNOSIS — Z79.899 POLYPHARMACY: ICD-10-CM

## 2022-12-13 DIAGNOSIS — Z00.00 NORMAL NEUROPSYCHOLOGICAL EXAM: ICD-10-CM

## 2022-12-13 DIAGNOSIS — F51.01 PRIMARY INSOMNIA: Primary | ICD-10-CM

## 2022-12-13 DIAGNOSIS — F41.9 ANXIETY: ICD-10-CM

## 2022-12-13 PROCEDURE — 96138 PSYCL/NRPSYC TECH 1ST: CPT | Mod: ,,, | Performed by: PSYCHIATRY & NEUROLOGY

## 2022-12-13 PROCEDURE — 96133 NRPSYC TST EVAL PHYS/QHP EA: CPT | Mod: ,,, | Performed by: PSYCHIATRY & NEUROLOGY

## 2022-12-13 PROCEDURE — 99499 NO LOS: ICD-10-PCS | Mod: S$PBB,,, | Performed by: PSYCHIATRY & NEUROLOGY

## 2022-12-13 PROCEDURE — 96132 NRPSYC TST EVAL PHYS/QHP 1ST: CPT | Mod: ,,, | Performed by: PSYCHIATRY & NEUROLOGY

## 2022-12-13 PROCEDURE — 96138 PR PSYCH/NEUROPSYCH TEST ADMIN/SCORING, BY TECH, 2+ TESTS, 1ST 30 MIN: ICD-10-PCS | Mod: ,,, | Performed by: PSYCHIATRY & NEUROLOGY

## 2022-12-13 PROCEDURE — 96139 PSYCL/NRPSYC TST TECH EA: CPT | Mod: ,,, | Performed by: PSYCHIATRY & NEUROLOGY

## 2022-12-13 PROCEDURE — 99499 UNLISTED E&M SERVICE: CPT | Mod: S$PBB,,, | Performed by: PSYCHIATRY & NEUROLOGY

## 2022-12-13 PROCEDURE — 96139 PR PSYCH/NEUROPSYCH TEST ADMIN/SCORING, BY TECH, 2+ TESTS, EA ADDTL 30 MIN: ICD-10-PCS | Mod: ,,, | Performed by: PSYCHIATRY & NEUROLOGY

## 2022-12-13 PROCEDURE — 96132 PR NEUROPSYCHOLOGIC TEST EVAL SVCS, 1ST HR: ICD-10-PCS | Mod: ,,, | Performed by: PSYCHIATRY & NEUROLOGY

## 2022-12-13 PROCEDURE — 96133 PR NEUROPSYCHOLOGIC TEST EVAL SVCS, EA ADDTL HR: ICD-10-PCS | Mod: ,,, | Performed by: PSYCHIATRY & NEUROLOGY

## 2022-12-13 NOTE — PROGRESS NOTES
NEUROPSYCHOLOGY CONSULT  [facility]      Referral Information  Name: Jasson Pineda    : 1950  Age: 72 y.o.    Race: Black or     Gender: female     MRN: [id number]  Handedness: Right  Education: 16 years      Referring Provider:   Carola Hairston NP  Referral Reason/Medical Necessity: Ms. Pineda has a history of vascular risk factors, dysphagia, and memory loss. Neuropsychological evaluation was requested to assess current cognitive functioning, aid in differential diagnosis, and provide treatment recommendations.    Consent/Emergency Plan: The patient expressed an understanding of the purpose of the evaluation and consented to all procedures. I informed the patient of limits to confidentiality and discussed an emergency plan.   Procedures/Billing: Please see billing table at the end of this report.    SUMMARY    Ms. Pineda is a 72 y.o. Black or  female with a history of vascular risk factors, chronic pain, dysphagia, and memory loss presenting for evaluation of cognitive complaints. She reports mild cognitive changes over the last 8 months that are gradually worsening. MRI is nonacute with mild microvascular changes. Reversible dementia labs are WNL. She is on a few anticholinergic and centrally acting medications (oxybutynin, tizanidine, tramadol, hydrocodone). Sleep is chronically poor. She was referred for a sleep study prior, but unable to complete due to pain preventing her from laying on her back. She reports some new, mild anxiety and intermittent depression surrounding her forgetfulness. She is functionally independent and fully oriented. No safety concerns. Family history of dementia in her grandmother (onset in her 90's).     Testing found intact cognition with no areas of deficit, despite high levels of anxiety during the testing appointment. She made no errors when asked to fill a complex pill box with pretend medications. The only notable  finding was severe anxiety on a self-report mood screener, as well as mild depression symptoms.     She does not meet criteria for a cognitive diagnosis today. Encouragingly, I do not see strong evidence of an emerging neurodegenerative process, but we can certainly keep an eye on her and repeat testing in a few years. I do suspect interference from anxiety, polypharmacy, chronic insomnia, and chronic pain.     IMPRESSIONS      1. Primary insomnia        2. Anxiety        3. Other chronic pain        4. Polypharmacy        5. Normal neuropsychological exam        6. Memory loss  Ambulatory consult to Neuropsychology          PLAN     Ms. Pineda is scheduled for feedback. Will disucss results, recommendations, provide handouts (brain health, compensatory strategies, sleep hygiene, behavioral pain management strategies)  Consider optimization of medication regimen to reduce cognitive side effects  Will discuss referral to behavioral health for treatment of new anxiety - will also discuss self-management strategies   Pt encouraged to follow up with sleep clinic again to address insomnia  Repeat testing 1 to 2 years PRN      Thank you for allowing me to participate in Ms. Pineda's care.  If you have any questions, please contact me.    Paty Osorio PsyD  Clinical Neuropsychologist  Department of Neurology  [facility]        SUBJECTIVE:     HISTORY OF PRESENT ILLNESS   Ms. Pineda is a 72 y.o. Black or  female with a history of HLD, chronic pain, dysphagia, anemia, overactive bladder, DVT and PE (2013) presenting for evaluation of mild memory changes noticed by family. She is a good historian today.     Cognitive Sxs:  Attention: Concentration seems ok. Loses her train of thought more frequently   Mental Speed: endorses slowed thinking   Memory: Has to think for a while to remember things. Usually comes to her eventually, but often cannot answer right away.   Language: Increased word  finding   Visuospatial/Perceptual: Has to really think about where she is going sometimes when she's driving.   Executive Functioning: Planning, organizing, multitasking are ok.     Onset/Course: Ms. Espinozas symptoms were gradual in onset around 8 months ago and are worsening. The pt denies any exacerbating or ameliorating factors. No waxing/waning of cognitive status.  Medication for Cognition: None    Neuropsychiatric Sxs:  Pt denied a history of any formal mental health diagnosis or treatment.     Self-Described Mood: Can get down when she can't remember.   Depressed Mood: Endorsed occasionally in the last few months. Otherwise, mood is pretty good.   Anxiety: Endorsed, doesn't think she used to be but is becoming one. Finds herself worrying more about her kids, her memory. Finds it more difficult to relax. Was always a very active person working multiple jobs, so assisted was hard for her, finds herself with excess restlessness  Panic Attacks: Denied    Current Stressors: memory loss, chronic pain  SI/HI: Denied. Does have a fear of dying.   Psychiatric Hospitalization: Denied    Delusional/Paranoid Thinking: Denied  Hallucinations: Denied  Neurovegetative:  Sleep: decreased for the last couple of months  Total Hours/Night: 5   Pattern: falls asleep easily, sleeps through the night, and awakens early Dozes in her chair around 8:30 after taking pain meds. Wakes up around 10, watches TV, goes to bed around 11:30 or 12, up around 4  PETERSON: Never had a sleep study, but pt with risk factors including:, snoring, waking up with dry mouth , and age 50+  Acting out dreams: Denied  Daytime Naps: Denied  Appetite: normal but difficulty swallowing so doesn't eat as much   Energy: normal, notes she doesn't do much during the day, watches TV. But has the energy to do it if she wants to     Physical Sx:  Motor:  dysphagia. No tremor, weakness   Gait:  Reports poor balance. Not shuffling. Uses rollator. No falls.   Sensory: No  change to taste, smell. Hearing and vision are getting a little worse.   Pain: Ms. Pineda described typical pain at a 10 on a scale of 1-10, with 10 being worst if she doesn't take meds. If she takes meds, it is lower, maybe an 8, this is constant.     Current Functioning (I/ADLs):  ADLs: Independent   IADLs:  Finances: Independent, keeps track of all her bills. Daughter helps her if she falls short financially.   Medication Mgmt:Independent, uses a pillbox. Fills it every Sunday. Lately, has missed a few doses of newer medication.   Driving: Independent, has to concentrate more on where she's going but no accidents or speeding tickets. Usually drives only within the city.   Household Mgmt: Independent. Daughter gets groceries for her. Doesn't cook much, but no issues when she does.   Vocational:  Retired  Safety Concerns: None    RELEVANT HISTORY:  Prior Testing:  None    Neurologic History  Seizures: Denied  Stroke: Denied  TBI: Denied  Movement Disorder: Denied  Falls: Denied  Urinary Incontinence: Denied incontinence, but does have frequency   Chronic UTI's:  Denied  Prior Episode of Delirium:  Denied  Other neurologic history: Denied  Dysautonomia: dizziness upon standing, urinary symptoms, difficulty swallowing, and constipation (possibly med related)  Referral Diagnosis: R41.3 (ICD-10-CM) - Memory loss    Neurodiagnostics:  Results for orders placed or performed during the hospital encounter of 08/29/22   MRI Brain Without Contrast    Narrative    EXAMINATION:  MRI BRAIN WITHOUT CONTRAST    CLINICAL HISTORY:  Memory loss;.  Dysphagia, unspecified    TECHNIQUE:  Multiplanar multisequence MR imaging of the brain was performed without contrast.    COMPARISON:  None    FINDINGS:  Ventricles and sulci are normal in size for age without evidence of hydrocephalus. No acute extra-axial fluid collections.    No acute hemorrhage, edema or acute infarct.  Scattered foci of FLAIR signal hyperintensity in the  periventricular and subcortical white matter.    Normal vascular flow voids are preserved.    Corpus callosum is intact.  Craniocervical junction is intact.    Mastoid air cells are clear.  Visualized paranasal sinuses are clear.      Impression    No acute intracranial abnormality identified.    White matter changes are nonspecific, though can be seen with chronic microvascular ischemic change in this age group.  A demyelinating process could have a similar appearance in the appropriate setting.      Electronically signed by: Zarina Wade  Date:    08/29/2022  Time:    08:59       Pertinent Labs:  Lab Results   Component Value Date    [id number] 575 05/18/2022     No results found for: VITAMINB1  Lab Results   Component Value Date    RPR Non-reactive 05/18/2022     Lab Results   Component Value Date    FOLATE 8.1 05/18/2022     Lab Results   Component Value Date    TSH 0.900 05/18/2022     Lab Results   Component Value Date    CALCIUM 9.6 11/15/2022      Lab Results   Component Value Date    HGBA1C 5.5 05/18/2022     No results found for: HIV1X2, SET30GWYO        Current Outpatient Medications:     albuterol (PROVENTIL/VENTOLIN HFA) 90 mcg/actuation inhaler, Inhale 2 puffs into the lungs every 4 (four) hours as needed for Wheezing. Rescue, Disp: 1 Inhaler, Rfl: 0    atorvastatin (LIPITOR) 80 MG tablet, TAKE 1 TABLET BY MOUTH EVERY DAY, Disp: 90 tablet, Rfl: 3    cholecalciferol, vitamin D3, 1,250 mcg (50,000 unit) capsule, Take 1 capsule (50,000 Units total) by mouth once a week., Disp: 12 capsule, Rfl: 3    docusate sodium (COLACE) 100 MG capsule, Take 1 capsule (100 mg total) by mouth 2 (two) times daily., Disp: 60 capsule, Rfl: 0    fluticasone (FLONASE) 50 mcg/actuation nasal spray, daily as needed., Disp: , Rfl:     HYDROcodone-acetaminophen (NORCO) 5-325 mg per tablet, Take 1 tablet by mouth every 4 (four) hours as needed for Pain., Disp: 12 tablet, Rfl: 0    mirabegron (MYRBETRIQ) 50 mg Tb24, Take 1 tablet  (50 mg total) by mouth once daily., Disp: 30 tablet, Rfl: 11    omeprazole (PRILOSEC) 20 MG capsule, TAKE 1 CAPSULE BY MOUTH TWICE DAILY BEFORE MEALS, Disp: 180 capsule, Rfl: 0    oxybutynin (DITROPAN-XL) 10 MG 24 hr tablet, TAKE 1 TABLET BY MOUTH EVERY DAY, Disp: 90 tablet, Rfl: 3    oxybutynin (DITROPAN-XL) 10 MG 24 hr tablet, Take 1 tablet (10 mg total) by mouth once daily., Disp: 30 tablet, Rfl: 11    polyethylene glycol (GLYCOLAX) 17 gram PwPk, Take 17 g by mouth once daily., Disp: 30 each, Rfl: 0    pregabalin (LYRICA) 150 MG capsule, Take 150 mg by mouth 2 (two) times daily., Disp: , Rfl:     tiZANidine (ZANAFLEX) 4 MG tablet, TAKE 1 TABLET BY MOUTH AT BEDTIME AS NEEDED FOR MUSCLE RELAXER, Disp: , Rfl:     traMADol (ULTRAM) 50 mg tablet, Take 50 mg by mouth 2 (two) times daily as needed., Disp: , Rfl: 1    XARELTO 20 mg Tab, TAKE 1 TABLET BY MOUTH EVERY DAY IN THE EVENING, Disp: 90 tablet, Rfl: 3    Medical history  Patient Active Problem List   Diagnosis    Kidney stone    Herniated disc, cervical    Complete rotator cuff tear or rupture of right shoulder, not specified as traumatic    Shoulder arthritis    OAB (overactive bladder)    Body mass index (BMI) of 25.0 to 29.9    Atypical chest pain    Ineffective esophageal motility    SOB (shortness of breath)    Complete rotator cuff tear or rupture of left shoulder, not specified as traumatic    Impingement syndrome of left shoulder    Primary osteoarthritis of left shoulder    Labral tear of long head of biceps tendon, initial encounter    History of DVT of lower extremity    Chronic pulmonary embolism without acute cor pulmonale    Mixed hyperlipidemia    Nephrolithiasis    Tortuous aorta    Atherosclerosis of aorta    Current use of long term anticoagulation    History of iron deficiency anemia    Esophageal dysphagia    Epigastric abdominal pain     Past Medical History:   Diagnosis Date    Anticoagulant long-term use     xarelto    Chronic back pain      Chronic neck pain     DVT (deep venous thrombosis)     Herniated disc, cervical     Kidney stone     Lumbar herniated disc     PE (pulmonary embolism)         Pulmonary emboli     after surgery    Pulmonary embolism      Past Surgical History:   Procedure Laterality Date    ARTHROSCOPIC REPAIR OF ROTATOR CUFF OF SHOULDER Left 2019    Procedure: REPAIR, ROTATOR CUFF, ARTHROSCOPIC;  Surgeon: Sedrick Patel MD;  Location: [facility];  Service: Orthopedics;  Laterality: Left;    ARTHROSCOPIC TENOTOMY OF BICEPS TENDON Left 2019    Procedure: TENOTOMY, BICEPS, ARTHROSCOPIC;  Surgeon: Sedrick Patel MD;  Location: [facility] OR;  Service: Orthopedics;  Laterality: Left;    ARTHROSCOPY OF SHOULDER WITH REMOVAL OF DISTAL CLAVICLE Left 2019    Procedure: ARTHROSCOPY, SHOULDER, WITH DISTAL CLAVICLE EXCISION;  Surgeon: Sedrick Patel MD;  Location: [facility];  Service: Orthopedics;  Laterality: Left;    BREAST CYST EXCISION Left      SECTION      x1    COLONOSCOPY N/A 2018    Procedure: COLONOSCOPY;  Surgeon: Armand Foy MD;  Location: [facility] (4TH FLR);  Service: Endoscopy;  Laterality: N/A;    ESOPHAGEAL MANOMETRY WITH MEASUREMENT OF IMPEDANCE N/A 2018    Procedure: MANOMETRY, ESOPHAGEAL, WITH IMPEDANCE MEASUREMENT;  Surgeon: Armand Foy MD;  Location: [facility] ENDO (4TH FLR);  Service: Endoscopy;  Laterality: N/A;    ESOPHAGOGASTRODUODENOSCOPY N/A 2018    Procedure: EGD (ESOPHAGOGASTRODUODENOSCOPY);  Surgeon: Armand Foy MD;  Location: [facility] ENDO (4TH FLR);  Service: Endoscopy;  Laterality: N/A;  pt currently on Lovenox and Xarelto - ok per Dr. Doty to hold Plavix 2 days prior and Lovenox 24hr prior to case/see scanned media / for documentation of hold -- SM        ESOPHAGOGASTRODUODENOSCOPY N/A 3/28/2022    Procedure: EGD (ESOPHAGOGASTRODUODENOSCOPY);  Surgeon: Armand Foy MD;  Location: [facility] ENDO (2ND FLR);  Service: Endoscopy;  Laterality: N/A;  okay to hold  Xarelto for 2 days per Dr. Doty with lovenox bridgings - see note on 3/21/22  2nd floor for ASAP availabilty  fully vaccinated - sm    EXTRACORPOREAL SHOCK WAVE LITHOTRIPSY Left 2/9/2021    Procedure: LITHOTRIPSY-EXTRACORPOREAL SHOCK WAVE;  Surgeon: Jeremias Eric MD;  Location: [facility];  Service: Urology;  Laterality: Left;    HERNIA REPAIR      umbilical    KIDNEY STONE SURGERY      ureteral stent    left knee surgery      SHOULDER ARTHROSCOPY Left 6/11/2019    Procedure: ARTHROSCOPY, SHOULDER;  Surgeon: Sedrick Patel MD;  Location: [facility];  Service: Orthopedics;  Laterality: Left;  BLOCK    TONSILLECTOMY      TOTAL SHOULDER ARTHROPLASTY Right        Substance Use History:  Social History     Tobacco Use    Smoking status: Never    Smokeless tobacco: Never   Substance and Sexual Activity    Alcohol use: No    Drug use: No    Sexual activity: Not Currently     Partners: Male   Caffeine         None    Family History:  Family History   Problem Relation Age of Onset    No Known Problems Father     Colon cancer Mother 74    Ovarian cancer Mother     Liver cancer Sister     Diabetes Sister     Liver cancer Maternal Grandfather     Breast cancer Maternal Aunt 50        50s    Cervical cancer Maternal Aunt     Breast cancer Maternal Cousin 45    No Known Problems Daughter     No Known Problems Sister     No Known Problems Daughter     Esophageal cancer Neg Hx      Family Neurologic History: Grandmother had dementia in her 90's   Family Psychiatric History: Negative for heritable risk factors     Developmental/Academic Hx:  Developmental: Born and raised in [place].  Product of a normal pregnancy and delivery. No developmental delays. English is their only language.   Academic:  Learning Difficulties: A student. No history of formal learning disorder diagnosis. Never repeated a grade.  Attention Difficulties: Denied. Never diagnosed with or treated for ADHD.  Behavioral Difficulties: Denied  Educational  Attainment: 16    Social/Occupational Hx:  Occupational:  Occupational Status: Retired 2016  Primary Occupation(s): , later became a    Social:  Resides: At home, youngest daughter and niece live with her   Current Relationship/Family Status:  x1, two daughters   Primary Source of Support:  Pt endorses adequate social support. Leans on her daughters and her niece. Oldest daughter calls her frequently, daughter lives with her.   Daily Activities: watching TV      OBJECTIVE:       MENTAL STATUS AND BEHAVIORAL OBSERVATIONS:  Appearance:  Casually dressed and Well groomed  Behavior:   alert, calm, cooperative, rapport easily established, and Appropriate interpersonal skills. Good interpretation of nonverbal cues.   Orientation:   Fully oriented  Sensory:   Hearing and vision appeared adequate for testing purposes.  Gait:   Unremarkable and Pt ambulates independently.   Psychomotor:  Within Normal Limits  Speech:  Fluent and spontaneous. Normal volume, rate, pitch, tone, and prosody.  Language:  Receptive and expressive language appear intact. Comprehends conversational speech., No evidence of word-finding difficulties in conversational speech.  Mood:   Euthymic, occasionally anxious   Affect:   mood-congruent  Thought Process: goal directed and linear  Thought Content: Denied current SI/HI. and No evidence of psychotic symptoms.  Judgment/Insight: Grossly intact  Validity:  Performance on standalone and embedded performance validity measures all suggested adequate engagement. As a result, scores are likely a valid reflection of the pt's functioning.  Test Taking Bx:  Per psychometrist observations,  Mrs. Pnieda arrived to testing alone. She wore glasses and reported no problems with hearing. She appeared to doubt her abilities and underestimated her performance on tests as she expressed that she makes up/ misremembers stories/items during RBANS, despite providing  correct responses. Mrs. Pineda took 20 seconds to find 13 on [address].       PROCEDURES/TESTS ADMINISTERED:  In addition to performing a review of pertinent medical records, reviewing limits to confidentiality, conducting a clinical interview, and explaining procedures, the following measures were administered:   Mentor Cognitive Assessment (MOCA), Test of Premorbid Functioning (TOPF), Wechsler Adult Intelligence Scale - Fourth Edition (WAIS-IV), selected subtests, Neuropsychological Assessment Battery (NAB) Naming subtest, Controlled Oral Word Association Test (CFL/MOANS/MOAANS + ed, 2005), Animal Naming (Maira et al., 2004), Trail Making Test (MOANS/MOAANS + Ed, 2005), Stroop Color Word Test (MOANS/MOAANS + Ed, 2005), Arnulfo Complex Figure Test  (Copy Trial) , Repeatable Battery for the Assessment of Neuropsychological Status (RBANS: A, Update) with story and figure recognition (Julio et al, 2003  norms), Clock Drawing (Schcristinalen et al. 2006 norms), Generalized Anxiety Disorder-7 Item Scale (ASHLEY-7), Geriatric Depression Scale (GDS), and Pillbox Test . Manual norms were used unless otherwise indicated.     NEUROPSYCHOLOGICAL ASSESSMENT RESULTS:  The following should not be interpreted in isolation from the neuropsychological evaluation report.  Scores on stand-alone and embedded performance validity measures were WNL.      Raw Score Score Type Standardized Score %ile/CP Descriptor   ACS RDS 9 - - - -   RBANS EI 0 - -      PREMORBID FUNCTIONING Raw Score Score Type Standardized Score %ile/CP Descriptor   TOPF simple dem. eFSIQ - SS 97 42 Average   TOPF pred. eFSIQ - SS 91 27 Average   TOPF simple + pred. eFSIQ - SS 93 32 Average   COGNITIVE SCREENING Raw Score Score Type Standardized Score %ile/CP Descriptor   MoCA 20 - - - Impaired   Orientation - Place 2/2 - - - -   Orientation - Date 4/4 - - - -   RBANS         Immediate Memory -  53 Average   VS/Construction -  50 Average   Language -   61 Average   Attention -  73 Average   Delayed Memory - SS 80 9 Low Average   Total Scale - SS 94 34 Average   LANGUAGE FUNCTIONING Raw Score Score Type Standardized Score %ile/CP Descriptor   WAIS-IV Similarities 21 ss 9 37 Average   RBANS Naming 10 Tscore 53 62 Average   RBANS Semantic Fluency 15 Tscore 50 50 Average   TOPF Word Reading 28 SS 88 21 Low Average   NAB Naming 30 Tscore 57 76 High Average   FAS 47 ss 13 84 High Average   Animal Naming 11 Tscore 39 14 Low Average   VISUOSPATIAL FUNCTIONING Raw Score Score Type Standardized Score %ile/CP Descriptor   RBANS Line Orientation  11 Tscore 43 25 Average   RBANS Figure Copy 19 Tscore 57 76 High Average   RCFT Copy 34 - - >16 WNL   RCFT Time to Copy 160 - - >16 WNL   Clock Request 4 - - 48.9 Average   Clock Copy 5 - - 100 WNL   Clock Total 9 - - 53.5 Average   LEARNING & MEMORY Raw Score Score Type Standardized Score %ile/CP Descriptor   RBANS         Immediate Memory -  53 Average   Delayed Memory - SS 80 9 Low Average   List Learning 26 Tscore 53 62 Average   List Recall 0 Tscore 37 9 Low Average   List Recognition 20 Tscore 57 76 High Average   Story Memory 15 Tscore 50 50 Average   Story Recall 5 Tscore 47 38 Average   Story Recognition  10 - 0 27-46 Average   Figure Recall 3 Tscore 30 2 Below Average   Figure Recognition 1 - - - -   ATTENTION/WORKING MEMORY Raw Score Score Type Standardized Score %ile/CP Descriptor   WAIS-IV Digit Span 26 ss 11 63 Average         DS Forward 10 ss 10 50 Average         DS Backward 8 ss 10 50 Average         DS Sequence 8 ss 11 63 Average         Longest Forward 7 - - - -         Longest Backward 4 - - - -         Longest Sequence 6 - - - -   DVT Time 367 Tscore 58 79 High Average   DVT Errors 0 Tscore 73 99 Exceptionally High   RBANS Digit Span  10 Tscore 47 38 Average   MENTAL PROCESSING SPEED Raw Score Score Type Standardized Score %ile/CP Descriptor   RBANS Coding 47 Tscore 60 84 High Average   TMT A   24 ss 17 99 Exceptionally High   TMT A errors 0 - - - -   Stroop: Word Reading 96 ss 12 75 High Average   Stroop: Color Naming 76 ss 14 91 Above Average   EXECUTIVE FUNCTIONING Raw Score Score Type Standardized Score %ile/CP Descriptor   TMT B 83 ss 15 95 Above Average   TMT B errors 0 - - - -   Stroop: C/W 32 ss 13 84 High Average   Stroop: Interference -9 Tscore N/A N/A N/A   Clock Request 4 - - 48.9 Average   WAIS-IV Similarities 21 ss 9 37 Average   FUNCTIONAL  Raw Score Score Type Standardized Score %ile/CP Descriptor   Pillbox Test Errors 0 - - - WNL   MOOD & PERSONALITY Raw Score Score Type Standardized Score %ile/CP Descriptor   GDS-30 12 - - - Mild   ASHLEY-7 17 - - - Severe         Billing/Services Summary          Neurobehavioral Status Exam Base Code (87473)  Total Units: 0    Face-to-face Total Time: 0 min.         Professional Neuropsychological Testing Evaluation Services Base Code (26601)   Total Units: 1 Add-on (83989)  Total Units: 2   Referral review/initial test selection 15 min.    Intra-session Clinical Decision-Making          Tech consult/test review/modification 0 min.         Patient behavior management 0 min.    Face-to-face Interpretive Feedback 60 min.    Record Review/Integration/Report Generation 120 min.     Total Time: 195 min.         Test Administration by Psychologist Base Code (69128)   Total Units: 0 Add-on (39839)  Total Units: 0   Testing 0 min.    Scoring 0 min.     Total Time: 0 min.         Test Administration by Technician  Technician Name: Griselda Holloway Base Code (69885)   Total Units: 1 Add-on (62792)\  Total Units: 4   Face-to-Face Testin min.    Scoring 45 min.     Total Time: 149 min.    DOS is the date of the evaluation unless specified

## 2022-12-27 ENCOUNTER — PATIENT MESSAGE (OUTPATIENT)
Dept: UROLOGY | Facility: CLINIC | Age: 72
End: 2022-12-27
Payer: MEDICARE

## 2022-12-27 ENCOUNTER — PATIENT MESSAGE (OUTPATIENT)
Dept: CARDIOLOGY | Facility: CLINIC | Age: 72
End: 2022-12-27
Payer: MEDICARE

## 2023-01-10 ENCOUNTER — TELEPHONE (OUTPATIENT)
Dept: PAIN MEDICINE | Facility: CLINIC | Age: 73
End: 2023-01-10
Payer: MEDICARE

## 2023-01-10 NOTE — TELEPHONE ENCOUNTER
This message is for patient in regards to his/her appointment 01/11/23 at 8:00 am   With Dr. Bill Easley MD.      [facility] Policy: For the safety of all patients and staff members.   During this visit we're informing all patients and visitors to wear face mask at all time here at [facility].  If you have any questions or concerns please contact [phone]

## 2023-01-11 ENCOUNTER — OFFICE VISIT (OUTPATIENT)
Dept: PAIN MEDICINE | Facility: CLINIC | Age: 73
End: 2023-01-11
Attending: INTERNAL MEDICINE
Payer: MEDICARE

## 2023-01-11 ENCOUNTER — TELEPHONE (OUTPATIENT)
Dept: PAIN MEDICINE | Facility: CLINIC | Age: 73
End: 2023-01-11

## 2023-01-11 VITALS
HEART RATE: 75 BPM | SYSTOLIC BLOOD PRESSURE: 102 MMHG | BODY MASS INDEX: 25.15 KG/M2 | TEMPERATURE: 98 F | HEIGHT: 62 IN | RESPIRATION RATE: 19 BRPM | WEIGHT: 136.69 LBS | DIASTOLIC BLOOD PRESSURE: 64 MMHG

## 2023-01-11 DIAGNOSIS — M80.88XA OSTEOPOROTIC COMPRESSION FRACTURE OF VERTEBRA, INITIAL ENCOUNTER: Primary | ICD-10-CM

## 2023-01-11 DIAGNOSIS — R07.89 XIPHOID PAIN: Primary | ICD-10-CM

## 2023-01-11 DIAGNOSIS — M80.88XA OSTEOPOROTIC COMPRESSION FRACTURE OF VERTEBRA, INITIAL ENCOUNTER: ICD-10-CM

## 2023-01-11 DIAGNOSIS — R07.89 XIPHOID PAIN: ICD-10-CM

## 2023-01-11 PROCEDURE — 99205 OFFICE O/P NEW HI 60 MIN: CPT | Mod: S$PBB,,, | Performed by: ANESTHESIOLOGY

## 2023-01-11 PROCEDURE — 99215 OFFICE O/P EST HI 40 MIN: CPT | Mod: PBBFAC | Performed by: ANESTHESIOLOGY

## 2023-01-11 PROCEDURE — 99205 PR OFFICE/OUTPT VISIT, NEW, LEVL V, 60-74 MIN: ICD-10-PCS | Mod: S$PBB,,, | Performed by: ANESTHESIOLOGY

## 2023-01-11 PROCEDURE — 99999 PR PBB SHADOW E&M-EST. PATIENT-LVL V: CPT | Mod: PBBFAC,,, | Performed by: ANESTHESIOLOGY

## 2023-01-11 PROCEDURE — 99999 PR PBB SHADOW E&M-EST. PATIENT-LVL V: ICD-10-PCS | Mod: PBBFAC,,, | Performed by: ANESTHESIOLOGY

## 2023-01-11 NOTE — PROGRESS NOTES
Subjective:      Patient ID: Jasson Pineda is a 72 y.o. female.    Chief Complaint: Chest Pain and Back Pain    Referred by: Osbaldo Hall MD     HPI    Interventional Pain History  Jasson Pineda presents with chest pain (under her breast-bone) which radiates to the back.  She states that lying on her back or her stomach causes pain on her chest (she sleeps on her side supported by pillows).  She's been having this pain for at least 2 years and has seen multiple specialists including gastroenterology and neurosurgery.  She underwent a thoracic epidural steroid injection with Dr. Serra over a year ago without benefit in her symptoms.  She has had CTA chest and CT abdomen/pelvis which have not identified a culprit pathology.  She states that when the chest pain first started, it was associated only with eating, however at this point, the pain is constant. She takes Tramadol, Lyrica, and Zanaflex for the and helps for a little while.  She states she does not take Norco/Hydrocodone.      Please feel free to call with any further questions  Past Medical History:   Diagnosis Date    Anticoagulant long-term use     xarelto    Chronic back pain     Chronic neck pain     DVT (deep venous thrombosis)     Herniated disc, cervical     Kidney stone     Lumbar herniated disc     PE (pulmonary embolism)     2013    Pulmonary emboli     after surgery    Pulmonary embolism        Past Surgical History:   Procedure Laterality Date    ARTHROSCOPIC REPAIR OF ROTATOR CUFF OF SHOULDER Left 6/11/2019    Procedure: REPAIR, ROTATOR CUFF, ARTHROSCOPIC;  Surgeon: Sedrick Patel MD;  Location: [facility];  Service: Orthopedics;  Laterality: Left;    ARTHROSCOPIC TENOTOMY OF BICEPS TENDON Left 6/11/2019    Procedure: TENOTOMY, BICEPS, ARTHROSCOPIC;  Surgeon: Sedrick Patel MD;  Location: [facility];  Service: Orthopedics;  Laterality: Left;    ARTHROSCOPY OF SHOULDER WITH REMOVAL OF DISTAL CLAVICLE Left 6/11/2019     Procedure: ARTHROSCOPY, SHOULDER, WITH DISTAL CLAVICLE EXCISION;  Surgeon: Sedrick Patel MD;  Location: [facility] OR;  Service: Orthopedics;  Laterality: Left;    BREAST CYST EXCISION Left      SECTION      x1    COLONOSCOPY N/A 2018    Procedure: COLONOSCOPY;  Surgeon: Armand Foy MD;  Location: [facility] (4TH FLR);  Service: Endoscopy;  Laterality: N/A;    ESOPHAGEAL MANOMETRY WITH MEASUREMENT OF IMPEDANCE N/A 2018    Procedure: MANOMETRY, ESOPHAGEAL, WITH IMPEDANCE MEASUREMENT;  Surgeon: Armand Foy MD;  Location: [facility] (4TH FLR);  Service: Endoscopy;  Laterality: N/A;    ESOPHAGOGASTRODUODENOSCOPY N/A 2018    Procedure: EGD (ESOPHAGOGASTRODUODENOSCOPY);  Surgeon: Armand Foy MD;  Location: [facility] (4TH FLR);  Service: Endoscopy;  Laterality: N/A;  pt currently on Lovenox and Xarelto - ok per Dr. Doty to hold Plavix 2 days prior and Lovenox 24hr prior to case/see scanned media / for documentation of hold -- SM        ESOPHAGOGASTRODUODENOSCOPY N/A 3/28/2022    Procedure: EGD (ESOPHAGOGASTRODUODENOSCOPY);  Surgeon: Armand Foy MD;  Location: [facility] (2ND FLR);  Service: Endoscopy;  Laterality: N/A;  okay to hold Xarelto for 2 days per Dr. Doty with lovenox bridgings - see note on 3/21/22  2nd floor for ASAP availabilty  fully vaccinated - sm    EXTRACORPOREAL SHOCK WAVE LITHOTRIPSY Left 2021    Procedure: LITHOTRIPSY-EXTRACORPOREAL SHOCK WAVE;  Surgeon: Jeremias Eric MD;  Location: [facility];  Service: Urology;  Laterality: Left;    HERNIA REPAIR      umbilical    KIDNEY STONE SURGERY      ureteral stent    left knee surgery      SHOULDER ARTHROSCOPY Left 2019    Procedure: ARTHROSCOPY, SHOULDER;  Surgeon: Sedrick Patel MD;  Location: [facility];  Service: Orthopedics;  Laterality: Left;  BLOCK    TONSILLECTOMY      TOTAL SHOULDER ARTHROPLASTY Right        Review of patient's allergies indicates:  No Known Allergies    Current Outpatient  Medications   Medication Sig Dispense Refill    albuterol (PROVENTIL/VENTOLIN HFA) 90 mcg/actuation inhaler Inhale 2 puffs into the lungs every 4 (four) hours as needed for Wheezing. Rescue 1 Inhaler 0    atorvastatin (LIPITOR) 80 MG tablet TAKE 1 TABLET BY MOUTH EVERY DAY 90 tablet 3    cholecalciferol, vitamin D3, 1,250 mcg (50,000 unit) capsule Take 1 capsule (50,000 Units total) by mouth once a week. 12 capsule 3    fluticasone (FLONASE) 50 mcg/actuation nasal spray daily as needed.      omeprazole (PRILOSEC) 20 MG capsule TAKE 1 CAPSULE BY MOUTH TWICE DAILY BEFORE MEALS 180 capsule 0    oxybutynin (DITROPAN-XL) 10 MG 24 hr tablet TAKE 1 TABLET BY MOUTH EVERY DAY 90 tablet 3    pregabalin (LYRICA) 150 MG capsule Take 150 mg by mouth 2 (two) times daily.      tiZANidine (ZANAFLEX) 4 MG tablet TAKE 1 TABLET BY MOUTH AT BEDTIME AS NEEDED FOR MUSCLE RELAXER      traMADol (ULTRAM) 50 mg tablet Take 50 mg by mouth 2 (two) times daily as needed.  1    XARELTO 20 mg Tab TAKE 1 TABLET BY MOUTH EVERY DAY IN THE EVENING 90 tablet 3    docusate sodium (COLACE) 100 MG capsule Take 1 capsule (100 mg total) by mouth 2 (two) times daily. 60 capsule 0    HYDROcodone-acetaminophen (NORCO) 5-325 mg per tablet Take 1 tablet by mouth every 4 (four) hours as needed for Pain. 12 tablet 0    mirabegron (MYRBETRIQ) 50 mg Tb24 Take 1 tablet (50 mg total) by mouth once daily. 30 tablet 11    oxybutynin (DITROPAN-XL) 10 MG 24 hr tablet Take 1 tablet (10 mg total) by mouth once daily. 30 tablet 11    polyethylene glycol (GLYCOLAX) 17 gram PwPk Take 17 g by mouth once daily. 30 each 0     No current facility-administered medications for this visit.       Family History   Problem Relation Age of Onset    No Known Problems Father     Colon cancer Mother 74    Ovarian cancer Mother     Liver cancer Sister     Diabetes Sister     Liver cancer Maternal Grandfather     Breast cancer Maternal Aunt 50        50s    Cervical cancer Maternal Aunt      Breast cancer Maternal Cousin 45    No Known Problems Daughter     No Known Problems Sister     No Known Problems Daughter     Esophageal cancer Neg Hx        Social History     Socioeconomic History    Marital status:     Number of children: 2   Occupational History    Occupation: teacher      Comment:     Tobacco Use    Smoking status: Never    Smokeless tobacco: Never   Substance and Sexual Activity    Alcohol use: No    Drug use: No    Sexual activity: Not Currently     Partners: Male       Imaging:  CTA CHEST NON CORONARY     CLINICAL HISTORY:  Pulmonary embolism (PE) suspected, high prob;     FINDINGS:  Patient was administered 75 cc of Omnipaque 350 intravenously.     The arch and great vessels are unremarkable.  Pulmonary vessels are very well opacified and there is no evidence of pulmonary embolus.  Upper abdominal organs show nothing unusual.  No mediastinal, hilar or axillary adenopathy is seen.  The trachea and the bronchi are patent.  No pulmonary nodules, masses or infiltrates are seen.  The bones showed DJD.  There are few vertebral body hemangiomas.     Impression:     No evidence of pulmonary embolus or acute aortic syndrome.     Coronary artery calcifications.     The lungs are clear.        Electronically signed by: Luis Lowery MD  Date:                                            08/01/2022  Time:                                           12:44    XR THORACIC SPINE AP LATERAL     CLINICAL HISTORY:  Pain in thoracic spine     TECHNIQUE:  AP and lateral views of the thoracic spine were performed.     COMPARISON:  None     FINDINGS:  DJD.  Loss of height involving 1 or 2 of the midthoracic vertebral body noted.  No acute fracture or dislocation.  No bone destruction identified.  Mild scoliosis.     Impression:     See above        Electronically signed by: Gage Pal MD  Date:                                            08/17/2022  Time:                                            13:17    XR SCOLIOSIS COMPLETE     CLINICAL HISTORY:  Spondylolisthesis, cervical region     TECHNIQUE:  Total of 8 images.     COMPARISON:  Present exam interpreted after review of the thoracic spine exam of August 17, 2022.     FINDINGS:  Seven cervical segments, 12 rib-bearing thoracic vertebral segments, 5 non rib-bearing lumbar vertebral segments.  S-type curvature to the spine, to the left in the cervical and upper thoracic region, to the right in the mid and lower thoracic region and to the left in the lumbar region.  Right shoulder girdle prosthesis obscures evaluation of C4 through C7 vertebral segments on lateral image.  No definite acute fractures of cervical vertebral segments as visualized on AP images.  No acute fractures of thoracic or lumbar vertebral segments as seen on AP and lateral exams.  Multilevel cervical, thoracic, and lumbar anterolateral end plate osteophytes.  As before, loss of disc height involving 1 or 2 of midthoracic vertebral segments.  There is moderate to severe disc narrowing and vacuum phenomenon at L5-S1 and mild to moderate disc narrowing L3-L4 and L4-L5 levels.  There is lumbar facet arthropathic changes greatest lower 3 levels.  No definite spondylolysis or spondylolisthesis.  Partially visualized right shoulder girdle prosthesis hardware, visualized component in satisfactory position.  Intact right and left SI joints.  Pelvic densities likely on the basis of phleboliths unless concern for otherwise..     Impression:     As above.        Electronically signed by: Gerber Daniels  Date:                                            09/12/2022  Time:                                           09:07    CT ABDOMEN PELVIS W WO CONTRAST     CLINICAL HISTORY:  LLQ abdominal pain;h/o diverticulitis and kidney stones - acute onset severe LLQ pain;     TECHNIQUE:  Low dose axial images, sagittal and coronal reformations were obtained from the lung bases to the pubic symphysis  before and following the IV administration of 75 mL of Omnipaque 350 .  Oral contrast was not administered..     COMPARISON:  12/22/2020     FINDINGS:  Abdomen:     - Lung bases: No infiltrates and no nodules.     Small hiatal hernia.     - Liver: No focal mass.     - Gallbladder: No calcified gallstones.     - Bile Ducts: No evidence of intra or extra hepatic biliary ductal dilation.     - Spleen: Negative.     - Kidneys: Small cluster of nonobstructing stones at the lower pole the left kidney.  No stones on the right.  No hydronephrosis or hydroureter bilaterally.  No mass or perinephric stranding bilaterally.  The kidneys enhance normally bilaterally.     - Adrenals: Unremarkable.     - Pancreas: No mass or peripancreatic fat stranding.     - Retroperitoneum:  No significant adenopathy.     - Vascular: No abdominal aortic aneurysm.     - Abdominal wall:  Unremarkable.     The appendix is within normal limits.     Pelvis:     Urinary bladder is within normal limits.     The uterus is within normal limits.     No adnexal mass.     Bowel/Mesentery:     No evidence of bowel obstruction or inflammation.  Moderate retained feces in the colon.  Few scattered diverticula with no evidence of acute diverticulitis.     Bones:  No acute osseous abnormality and no suspicious lytic or blastic lesion.     Severe disc space narrowing and vacuum disc phenomena at L5-S1.  Grade 1 retrolisthesis of L5 on S1.  Moderate to severe bilateral foraminal narrowing.     Mild diffuse disc protrusion at L4-5 with grade 1 spondylolisthesis of L4 on L5.  Moderate central canal narrowing.  Mild disc space narrowing and vacuum disc phenomena.     Minimal disc protrusion at L3-4.     Impression:     1. No acute abnormality.  2. Possible constipation.  3. Nonobstructing nephrolithiasis of the left kidney.  4. Small hiatal hernia.  5. Multilevel chronic degenerative changes of the lumbar spine.        Electronically signed by: Raheem Sage  Date:  "                                           11/15/2022  Time:                                           16:14    Review of Systems   Constitutional: Positive for diaphoresis. Negative for chills and fever.   HENT:  Negative for sore throat.    Eyes:  Negative for blurred vision and double vision.   Cardiovascular:  Positive for chest pain. Negative for palpitations.   Respiratory:  Negative for shortness of breath.    Hematologic/Lymphatic: Bruises/bleeds easily (on xarelto).   Skin:  Negative for rash and skin cancer.   Musculoskeletal:  Positive for back pain. Negative for myalgias.   Gastrointestinal:  Negative for abdominal pain, constipation, heartburn, nausea and vomiting.   Genitourinary:  Negative for dysuria, hematuria and urgency.   Neurological:  Negative for headaches and weakness.   Psychiatric/Behavioral:  Negative for depression and substance abuse. The patient has insomnia.          Objective:   /64 (BP Location: Right arm, Patient Position: Sitting)   Pulse 75   Temp 98 °F (36.7 °C) (Oral)   Resp 19   Ht 5' 2" (1.575 m)   Wt 62 kg (136 lb 11 oz)   BMI 25.00 kg/m²   Pain Disability Index Review:  Last 3 PDI Scores 1/11/2023   Pain Disability Index (PDI) 19     Normocephalic.  Atraumatic.  Affect appropriate.  Breathing unlabored.  Extra ocular muscles intact.           General    Constitutional: She is oriented to person, place, and time. She appears well-developed and well-nourished.   HENT:   Head: Normocephalic and atraumatic.   Eyes: EOM are normal.   Cardiovascular:  Normal rate.            Pulmonary/Chest: Effort normal. She exhibits tenderness (at region of xyphoid).   Abdominal: There is no abdominal tenderness.   Neurological: She is alert and oriented to person, place, and time. She has normal reflexes.   Psychiatric: She has a normal mood and affect. Her behavior is normal. Judgment and thought content normal.             Muscle Strength   Right Lower Extremity   Hip Flexion: " 5/5   Hip Extensors: 5/5  Quadriceps:  5/5   Hamstrin/5   Anterior tibial:  5/5   Gastrocsoleus:  5/5   EHL:  5/5  Left Lower Extremity   Hip Flexion: 5/5   Hip Extensors: 5/5  Quadriceps:  5/5   Hamstrin/5   Anterior tibial:  5/5   Gastrocsoleus:  5/5   EHL:  5/5    Reflexes     Left Side  Biceps:  2+  Triceps:  2+  Brachioradialis:  2+  Achilles:  2+  Ankle Clonus:  absent  Quadriceps:  2+    Right Side   Biceps:  2+  Triceps:  2+  Brachioradialis:  2+  Achilles:  2+  Ankle Clonus:  absent  Quadriceps:  2+      Assessment:       Encounter Diagnoses   Name Primary?    Xiphoid pain     Osteoporotic compression fracture of vertebra, initial encounter Yes         Plan:   We discussed with the patient the assessment and recommendations. The following is the plan we agreed on:        Jasson was seen today for chest pain and back pain.    Diagnoses and all orders for this visit:    Osteoporotic compression fracture of vertebra, initial encounter  -     NM Bone Scan Spect; Future    Xiphoid pain  -     Ambulatory referral/consult to Pain Clinic     - schedule for NM bone scan to evaluate T spine compression fractures   - schedule for upper rectus sheath block under ultrasound   - if positive uptake on NM bone scan, we will consider her for T5, T6, and T7 kyphoplasty   - I have stressed the importance of physical activity and a home exercise plan to help with pain and improve health.   - Patient can continue with medications for now since they are providing benefits, using them appropriately, and without side effects.   - Counseled patient regarding the importance of activity modification and physical therapy.    Keegan Almonte MD [facility]    I have personally taken the history and examined this patient and agree with the fellow's note as stated above.

## 2023-01-12 ENCOUNTER — OFFICE VISIT (OUTPATIENT)
Dept: INTERNAL MEDICINE | Facility: CLINIC | Age: 73
End: 2023-01-12
Attending: INTERNAL MEDICINE
Payer: MEDICARE

## 2023-01-12 VITALS
DIASTOLIC BLOOD PRESSURE: 70 MMHG | BODY MASS INDEX: 25.15 KG/M2 | SYSTOLIC BLOOD PRESSURE: 110 MMHG | HEIGHT: 62 IN | WEIGHT: 136.69 LBS

## 2023-01-12 DIAGNOSIS — I27.82 CHRONIC PULMONARY EMBOLISM WITHOUT ACUTE COR PULMONALE, UNSPECIFIED PULMONARY EMBOLISM TYPE: ICD-10-CM

## 2023-01-12 DIAGNOSIS — I70.0 ATHEROSCLEROSIS OF AORTA: ICD-10-CM

## 2023-01-12 DIAGNOSIS — I77.1 TORTUOUS AORTA: ICD-10-CM

## 2023-01-12 DIAGNOSIS — Z12.31 ENCOUNTER FOR SCREENING MAMMOGRAM FOR MALIGNANT NEOPLASM OF BREAST: Primary | ICD-10-CM

## 2023-01-12 DIAGNOSIS — R07.89 ATYPICAL CHEST PAIN: ICD-10-CM

## 2023-01-12 DIAGNOSIS — E78.2 MIXED HYPERLIPIDEMIA: ICD-10-CM

## 2023-01-12 DIAGNOSIS — Z86.718 HISTORY OF DVT OF LOWER EXTREMITY: ICD-10-CM

## 2023-01-12 PROCEDURE — 99999 PR PBB SHADOW E&M-EST. PATIENT-LVL III: CPT | Mod: PBBFAC,,, | Performed by: INTERNAL MEDICINE

## 2023-01-12 PROCEDURE — 99214 PR OFFICE/OUTPT VISIT, EST, LEVL IV, 30-39 MIN: ICD-10-PCS | Mod: S$PBB,,, | Performed by: INTERNAL MEDICINE

## 2023-01-12 PROCEDURE — 99999 PR PBB SHADOW E&M-EST. PATIENT-LVL III: ICD-10-PCS | Mod: PBBFAC,,, | Performed by: INTERNAL MEDICINE

## 2023-01-12 PROCEDURE — 99213 OFFICE O/P EST LOW 20 MIN: CPT | Mod: PBBFAC | Performed by: INTERNAL MEDICINE

## 2023-01-12 PROCEDURE — 99214 OFFICE O/P EST MOD 30 MIN: CPT | Mod: S$PBB,,, | Performed by: INTERNAL MEDICINE

## 2023-01-12 NOTE — PROGRESS NOTES
"Subjective:       Patient ID: Jasson Pineda is a 72 y.o. female.    Chief Complaint: Follow-up    Here for routine f/u    ### atypical CP ###  Consistently occurs when she lies down on her back she get back pain penetrating to her chest. She can not sleep on her elft side due to shouilder pain. She sleep on her righ side. When she lies flat on her back she develops a sharp, intense pain that radiates to her chest. No associated PND, orthopnea, wheezing, SOB, LE edema.   Was seen by cardiology and had holter done 07/27/2021 that was normal. Does not repsond to albuterol.  Normal exercise stress test July 2018. Hx of recurrent PE.   -5/18/22 IM CV previously referred to back and spine as I suspect atypical, positional chest is thoracic or paraspinal.  Only members have told her she has been forgetful. Once she is prompted she is able to recall. She lives alone, often helped takes care of her knees, able to maintain her finances and pay bills without any difficulty, armani colon [place], Dr.. Capable of full ADLs. She takes tizanidine 4 mg q.h.s. along with tramadol 50 mg b.i.d. pregabalin 150 mg b.i.d. and oxybutynin 10 mg. These medications and their side effects were discussed in the context of forgetfulness.   -08/2022 Reports having recent injections with zero change in symptoms. This was approx one month prior.   -Seen by Dr. Easley and plan for rectus sheath injection and NM bone scan to evaluate thoracic Fx.     ### Nephrolithiasis -- OAB ###  Gross hematuria recurrent and urinary urgency.   12/22/20 CT urogram normal.   CTU shows 9mm non-obstructing left renal stone.   02/09/2021 Left ESWL   OAB She began ditropan 5 mg XL daily    5/18/22 oxybutynin has been increased to 10mg     ### Dysphagia ###  7/2018 normal EGD by Dr Foy at [facility]-  07/2018 manometry "Ineffective esophageal motility (IEM: 50% or more ineffective swallows with DCI less than 450 mmHg/sec/cm).  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 There is a lower esophageal Schatzki's ring.   10/2019 ENT Dr Burgos "Variably obstructive CP bar, but symptoms are primarily thoracic/epigastic."  12/2019 GI clinic visit "This could be functional.  Other considerations could be untreated reflux or even esophageal dysmotility."  She continues to have periodic dysphagia to solids requiring her to drink a glass of water to get down.  No hx of recurrent oral ulcers, skin changes, Hx of misscarriages, chronic loose stools or abdomina pains, diffuse arthralgias, Hx of pericarditis. She is on lexapro and lyrica. Mood improved an is interested in weaning from this.   1/15/21 IM CV no acute issues  3/28/2022 [facility] Dr [facility]. Normal aside from 1 cm type-I sliding hiatal hernia   06/2022 CV Dr Foy writes "Continue with current management and use of omeprazole 20 mg twice daily.  Will give more time for the medication to work.  No further intervention at this time.  Follow up with her pain specialist as planned. Follow-up with me in 2-3 months."  08/2022 We stopped her SSRI for >6 months and no change in GI symptoms          ### Recurrent PE ###  Anticoagulation managed by Dr shaikh in cardiology.  No bleeding complications.   All chronic symptoms of positional CP when she lies flat on her back, forgetfulness and anxiety, dysphagia to solids, SPICER (sedenatary).      Review of Systems   Constitutional:  Negative for chills, fatigue, fever and unexpected weight change.   HENT:  Negative for ear pain, hearing loss, postnasal drip, tinnitus, trouble swallowing and voice change.    Respiratory:  Negative for cough, chest tightness, shortness of breath and wheezing.    Cardiovascular:  Positive for chest pain. Negative for palpitations and leg swelling.   Gastrointestinal:  Negative for abdominal pain, blood in stool, diarrhea, nausea and vomiting.   Endocrine: Negative for polydipsia, polyphagia and polyuria.   Genitourinary:  Negative for " "difficulty urinating, dysuria, hematuria and vaginal bleeding.   Skin:  Negative for rash.   Allergic/Immunologic: Negative for food allergies.   Neurological:  Negative for dizziness, numbness and headaches.   Hematological:  Does not bruise/bleed easily.   Psychiatric/Behavioral:  The patient is not nervous/anxious.      Objective:      Vitals:    01/12/23 1443   BP: 110/70   BP Location: Left arm   Patient Position: Sitting   BP Method: Medium (Manual)   Weight: 62 kg (136 lb 11 oz)   Height: 5' 2" (1.575 m)      Physical Exam  Vitals and nursing note reviewed.   Constitutional:       General: She is not in acute distress.     Appearance: Normal appearance. She is well-developed.   HENT:      Head: Normocephalic and atraumatic.      Mouth/Throat:      Pharynx: No oropharyngeal exudate.   Eyes:      General: No scleral icterus.     Conjunctiva/sclera: Conjunctivae normal.      Pupils: Pupils are equal, round, and reactive to light.   Neck:      Thyroid: No thyromegaly.   Cardiovascular:      Rate and Rhythm: Normal rate and regular rhythm.      Heart sounds: Normal heart sounds. No murmur heard.  Pulmonary:      Effort: Pulmonary effort is normal.      Breath sounds: Normal breath sounds. No wheezing or rales.   Abdominal:      General: There is no distension.   Musculoskeletal:         General: No tenderness.   Lymphadenopathy:      Cervical: No cervical adenopathy.   Skin:     General: Skin is warm and dry.   Neurological:      Mental Status: She is alert and oriented to person, place, and time.   Psychiatric:         Behavior: Behavior normal.       Assessment:       1. Encounter for screening mammogram for malignant neoplasm of breast    2. Chronic pulmonary embolism without acute cor pulmonale, unspecified pulmonary embolism type    3. Tortuous aorta    4. Atherosclerosis of aorta    5. Atypical chest pain    6. Mixed hyperlipidemia    7. History of DVT of lower extremity        Plan:       Jasson was seen " today for follow-up.    Diagnoses and all orders for this visit:    Encounter for screening mammogram for malignant neoplasm of breast  -     Mammo Digital Screening Bilat w/ Neptali; Future    Chronic pulmonary embolism without acute cor pulmonale, unspecified pulmonary embolism type   Controlled and asymptomatic.  Continue current Rx regimen.    Tortuous aorta     Atherosclerosis of aorta   Tolerating statin. Continue this.     Atypical chest pain   F/u pain    Mixed hyperlipidemia   Tolerating statin. Continue this.     History of DVT of lower extremity       RTC in 6 months or sooner prn       Osbaldo Mendez MD  Internal Medicine-[facility]        Side effects of medication(s) were discussed in detail and patient voiced understanding.  Patient will call back for any issues or complications.

## 2023-01-19 ENCOUNTER — PATIENT MESSAGE (OUTPATIENT)
Dept: UROLOGY | Facility: CLINIC | Age: 73
End: 2023-01-19
Payer: MEDICARE

## 2023-01-19 DIAGNOSIS — N32.81 OAB (OVERACTIVE BLADDER): Primary | ICD-10-CM

## 2023-01-19 RX ORDER — VIBEGRON 75 MG/1
75 TABLET, FILM COATED ORAL DAILY
Qty: 30 TABLET | Refills: 11 | Status: SHIPPED | OUTPATIENT
Start: 2023-01-19 | End: 2023-03-01

## 2023-02-01 ENCOUNTER — IMMUNIZATION (OUTPATIENT)
Dept: INTERNAL MEDICINE | Facility: CLINIC | Age: 73
End: 2023-02-01
Payer: MEDICARE

## 2023-02-01 ENCOUNTER — OFFICE VISIT (OUTPATIENT)
Dept: GASTROENTEROLOGY | Facility: CLINIC | Age: 73
End: 2023-02-01
Payer: MEDICARE

## 2023-02-01 VITALS
HEIGHT: 62 IN | BODY MASS INDEX: 25.52 KG/M2 | WEIGHT: 138.69 LBS | DIASTOLIC BLOOD PRESSURE: 70 MMHG | SYSTOLIC BLOOD PRESSURE: 102 MMHG | HEART RATE: 78 BPM

## 2023-02-01 DIAGNOSIS — Z23 NEED FOR VACCINATION: Primary | ICD-10-CM

## 2023-02-01 DIAGNOSIS — D50.0 IRON DEFICIENCY ANEMIA DUE TO CHRONIC BLOOD LOSS: Primary | ICD-10-CM

## 2023-02-01 DIAGNOSIS — K22.4 INEFFECTIVE ESOPHAGEAL MOTILITY: ICD-10-CM

## 2023-02-01 DIAGNOSIS — Z79.01 ANTICOAGULATED: ICD-10-CM

## 2023-02-01 PROCEDURE — 99213 OFFICE O/P EST LOW 20 MIN: CPT | Mod: PBBFAC | Performed by: INTERNAL MEDICINE

## 2023-02-01 PROCEDURE — 99999 PR PBB SHADOW E&M-EST. PATIENT-LVL III: ICD-10-PCS | Mod: PBBFAC,,, | Performed by: INTERNAL MEDICINE

## 2023-02-01 PROCEDURE — 99213 OFFICE O/P EST LOW 20 MIN: CPT | Mod: S$PBB,,, | Performed by: INTERNAL MEDICINE

## 2023-02-01 PROCEDURE — 99213 PR OFFICE/OUTPT VISIT, EST, LEVL III, 20-29 MIN: ICD-10-PCS | Mod: S$PBB,,, | Performed by: INTERNAL MEDICINE

## 2023-02-01 PROCEDURE — 99999 PR PBB SHADOW E&M-EST. PATIENT-LVL III: CPT | Mod: PBBFAC,,, | Performed by: INTERNAL MEDICINE

## 2023-02-01 PROCEDURE — 91312 COVID-19, MRNA, LNP-S, BIVALENT BOOSTER, PF, 30 MCG/0.3 ML DOSE: CPT | Mod: PBBFAC

## 2023-02-01 PROCEDURE — 0124A COVID-19, MRNA, LNP-S, BIVALENT BOOSTER, PF, 30 MCG/0.3 ML DOSE: CPT | Mod: PBBFAC

## 2023-02-01 NOTE — PROGRESS NOTES
[facility] Established Patient Visit    Reason for Visit:    Chief Complaint   Patient presents with    Follow-up       PCP: Osbaldo Hall      HPI:  Jasson Pineda is a 72 y.o. female here for follow-up of iron deficiency anemia.  I saw her in 2022 for this as well as chronic upper abdominal pain, chronic dysphagia secondary to ineffective esophageal motility, and GERD.  At that time, I felt that her pain was not related to the GI tract.  She saw Pain Medicine, Dr. Easley, on 2023 with plans for an upper rectus sheath injection.  She still has the pain as of now.  She also continues to have ongoing chronic dysphagia issues.  She denies much regurgitation.  She continues to deny blood in the stool.  She is having regular bowel movements.  She takes her Xarelto every day.  She is on her omeprazole twice daily.  Is not taking any iron supplementation.  No new complaints today.          ROS:  Constitutional: No fevers, chills, No weight loss, normal appetite  GI: see HPI      PMHX:  has a past medical history of Anticoagulant long-term use, Chronic back pain, Chronic neck pain, DVT (deep venous thrombosis), Herniated disc, cervical, Kidney stone, Lumbar herniated disc, PE (pulmonary embolism), Pulmonary emboli, and Pulmonary embolism.    PSHX:  has a past surgical history that includes Kidney stone surgery; left knee surgery; Tonsillectomy;  section; Hernia repair; Total shoulder arthroplasty (Right); Breast cyst excision (Left); Esophagogastroduodenoscopy (N/A, 2018); Colonoscopy (N/A, 2018); Esophageal manometry with measurement of impedance (N/A, 2018); Shoulder arthroscopy (Left, 2019); Arthroscopic repair of rotator cuff of shoulder (Left, 2019); Arthroscopy of shoulder with removal of distal clavicle (Left, 2019); Arthroscopic tenotomy of biceps tendon (Left, 2019); Extracorporeal shock wave lithotripsy (Left, 2021);  "and Esophagogastroduodenoscopy (N/A, 3/28/2022).    The patient's social and family histories were reviewed by me and updated in the appropriate section of the electronic medical record.    Review of patient's allergies indicates:   Allergen Reactions    Sulfa (sulfonamide antibiotics) Rash       Prior to Admission medications    Medication Sig Start Date End Date Taking? Authorizing Provider   albuterol (PROVENTIL/VENTOLIN HFA) 90 mcg/actuation inhaler Inhale 2 puffs into the lungs every 4 (four) hours as needed for Wheezing. Rescue 2/16/19  Yes Roxanne Ayoub NP   atorvastatin (LIPITOR) 80 MG tablet TAKE 1 TABLET BY MOUTH EVERY DAY 7/26/22  Yes Osbaldo Hall MD   cholecalciferol, vitamin D3, 1,250 mcg (50,000 unit) capsule Take 1 capsule (50,000 Units total) by mouth once a week. 6/7/22  Yes Osbaldo Hall MD   fluticasone (FLONASE) 50 mcg/actuation nasal spray daily as needed.   Yes Historical Provider   mirabegron (MYRBETRIQ) 50 mg Tb24 Take 1 tablet (50 mg total) by mouth once daily. 1/19/23 1/19/24 Yes Chantel Boyle NP   omeprazole (PRILOSEC) 20 MG capsule TAKE 1 CAPSULE BY MOUTH TWICE DAILY BEFORE MEALS 11/23/22  Yes Edita Henry MD   pregabalin (LYRICA) 150 MG capsule Take 150 mg by mouth 2 (two) times daily.   Yes Historical Provider   tiZANidine (ZANAFLEX) 4 MG tablet TAKE 1 TABLET BY MOUTH AT BEDTIME AS NEEDED FOR MUSCLE RELAXER 3/21/22  Yes Historical Provider   traMADol (ULTRAM) 50 mg tablet Take 50 mg by mouth 2 (two) times daily as needed. 10/26/18  Yes Historical Provider   vibegron (GEMTESA) 75 mg Tab Take 75 mg by mouth once daily. 1/19/23 1/19/24 Yes Chantel Boyle NP   XARELTO 20 mg Tab TAKE 1 TABLET BY MOUTH EVERY DAY IN THE EVENING 6/26/22  Yes Sotero Doty MD         Objective Findings:  Vital Signs:  /70   Pulse 78   Ht 5' 2" (1.575 m)   Wt 62.9 kg (138 lb 10.7 oz)   BMI 25.36 kg/m²  Body mass index is 25.36 kg/m².      Physical Exam:  General Appearance:  " Well appearing in no acute distress, appears stated age          Labs:  Lab Results   Component Value Date    WBC 8.34 11/15/2022    HGB 11.5 (L) 11/15/2022    HCT 36.1 (L) 11/15/2022    MCV 84 11/15/2022    RDW 15.9 (H) 11/15/2022     11/15/2022    GRAN 6.9 11/15/2022    GRAN 82.5 (H) 11/15/2022    LYMPH 0.9 (L) 11/15/2022    LYMPH 11.3 (L) 11/15/2022    MONO 0.4 11/15/2022    MONO 5.3 11/15/2022    EOS 0.0 11/15/2022    BASO 0.01 11/15/2022     Lab Results   Component Value Date     11/15/2022    K 3.7 11/15/2022     11/15/2022    CO2 25 11/15/2022    GLU 83 11/15/2022    BUN 9 11/15/2022    CREATININE 0.8 11/15/2022    CALCIUM 9.6 11/15/2022    PROT 7.5 11/15/2022    ALBUMIN 4.0 11/15/2022    BILITOT 0.4 11/15/2022    ALKPHOS 65 11/15/2022    AST 17 11/15/2022    ALT 14 11/15/2022                   Assessment:  Jasson Pineda is a 72 y.o. female here with:  1. Iron deficiency anemia due to chronic blood loss    2. Anticoagulated    3. Ineffective esophageal motility      Chronic iron deficiency anemia without signs of overt GI bleeding and no evidence of a source of bleeding found during EGD and colonoscopy.  Evaluation of the small bowel has not yet been done.      Recommendations:  Will make plans for wireless capsule endoscopy to complete assessment for iron deficiency anemia.  We discussed the procedure in detail, and written consent obtained.       Follow-up pending results of the capsule camera.      Order summary:  Orders Placed This Encounter    Capsule Video Endoscopy           Armand Foy MD

## 2023-02-07 ENCOUNTER — PES CALL (OUTPATIENT)
Dept: ADMINISTRATIVE | Facility: CLINIC | Age: 73
End: 2023-02-07
Payer: MEDICARE

## 2023-02-07 ENCOUNTER — OFFICE VISIT (OUTPATIENT)
Dept: CARDIOLOGY | Facility: CLINIC | Age: 73
End: 2023-02-07
Payer: MEDICARE

## 2023-02-07 VITALS
BODY MASS INDEX: 25.48 KG/M2 | SYSTOLIC BLOOD PRESSURE: 110 MMHG | HEART RATE: 104 BPM | WEIGHT: 138.44 LBS | HEIGHT: 62 IN | DIASTOLIC BLOOD PRESSURE: 68 MMHG

## 2023-02-07 DIAGNOSIS — R07.89 ATYPICAL CHEST PAIN: ICD-10-CM

## 2023-02-07 DIAGNOSIS — E78.2 MIXED HYPERLIPIDEMIA: ICD-10-CM

## 2023-02-07 DIAGNOSIS — I70.0 ATHEROSCLEROSIS OF AORTA: Primary | ICD-10-CM

## 2023-02-07 DIAGNOSIS — I27.82 CHRONIC PULMONARY EMBOLISM WITHOUT ACUTE COR PULMONALE, UNSPECIFIED PULMONARY EMBOLISM TYPE: ICD-10-CM

## 2023-02-07 DIAGNOSIS — I26.99 PULMONARY EMBOLISM, UNSPECIFIED CHRONICITY, UNSPECIFIED PULMONARY EMBOLISM TYPE, UNSPECIFIED WHETHER ACUTE COR PULMONALE PRESENT: ICD-10-CM

## 2023-02-07 DIAGNOSIS — Z79.01 CURRENT USE OF LONG TERM ANTICOAGULATION: ICD-10-CM

## 2023-02-07 PROCEDURE — 99999 PR PBB SHADOW E&M-EST. PATIENT-LVL III: ICD-10-PCS | Mod: PBBFAC,,, | Performed by: INTERNAL MEDICINE

## 2023-02-07 PROCEDURE — 93005 ELECTROCARDIOGRAM TRACING: CPT | Mod: PBBFAC,PN | Performed by: INTERNAL MEDICINE

## 2023-02-07 PROCEDURE — 93010 ELECTROCARDIOGRAM REPORT: CPT | Mod: S$PBB,,, | Performed by: INTERNAL MEDICINE

## 2023-02-07 PROCEDURE — 99999 PR PBB SHADOW E&M-EST. PATIENT-LVL III: CPT | Mod: PBBFAC,,, | Performed by: INTERNAL MEDICINE

## 2023-02-07 PROCEDURE — 99214 PR OFFICE/OUTPT VISIT, EST, LEVL IV, 30-39 MIN: ICD-10-PCS | Mod: S$PBB,,, | Performed by: INTERNAL MEDICINE

## 2023-02-07 PROCEDURE — 99214 OFFICE O/P EST MOD 30 MIN: CPT | Mod: S$PBB,,, | Performed by: INTERNAL MEDICINE

## 2023-02-07 PROCEDURE — 99213 OFFICE O/P EST LOW 20 MIN: CPT | Mod: PBBFAC,PN | Performed by: INTERNAL MEDICINE

## 2023-02-07 PROCEDURE — 93010 EKG 12-LEAD: ICD-10-PCS | Mod: S$PBB,,, | Performed by: INTERNAL MEDICINE

## 2023-02-07 RX ORDER — RIVAROXABAN 20 MG/1
20 TABLET, FILM COATED ORAL NIGHTLY
Qty: 90 TABLET | Refills: 3 | Status: SHIPPED | OUTPATIENT
Start: 2023-02-07 | End: 2023-05-17 | Stop reason: SDUPTHER

## 2023-02-07 NOTE — PROGRESS NOTES
Cardiology    2/7/2023  9:43 AM    Problem list  Patient Active Problem List   Diagnosis    Kidney stone    Herniated disc, cervical    Complete rotator cuff tear or rupture of right shoulder, not specified as traumatic    Shoulder arthritis    OAB (overactive bladder)    Body mass index (BMI) of 25.0 to 29.9    Atypical chest pain    Ineffective esophageal motility    SOB (shortness of breath)    Complete rotator cuff tear or rupture of left shoulder, not specified as traumatic    Impingement syndrome of left shoulder    Primary osteoarthritis of left shoulder    Labral tear of long head of biceps tendon, initial encounter    History of DVT of lower extremity    Chronic pulmonary embolism without acute cor pulmonale    Mixed hyperlipidemia    Nephrolithiasis    Tortuous aorta    Atherosclerosis of aorta    Current use of long term anticoagulation    History of iron deficiency anemia    Esophageal dysphagia    Epigastric abdominal pain       CC:  F/u    HPI:  She is doing well from a cardiac standpoint.  She saw her GI doctor and will be getting a video capsule endoscopy done to evaluate iron deficiency anemia.  She will also get a rectus sheath injection for her pain.  She is tolerating medications.  She denies any bleeding.    Medications  Current Outpatient Medications   Medication Sig Dispense Refill    albuterol (PROVENTIL/VENTOLIN HFA) 90 mcg/actuation inhaler Inhale 2 puffs into the lungs every 4 (four) hours as needed for Wheezing. Rescue 1 Inhaler 0    atorvastatin (LIPITOR) 80 MG tablet TAKE 1 TABLET BY MOUTH EVERY DAY 90 tablet 3    cholecalciferol, vitamin D3, 1,250 mcg (50,000 unit) capsule Take 1 capsule (50,000 Units total) by mouth once a week. 12 capsule 3    fluticasone (FLONASE) 50 mcg/actuation nasal spray daily as needed.      mirabegron (MYRBETRIQ) 50 mg Tb24 Take 1 tablet (50 mg total) by mouth once daily. 30 tablet 11    omeprazole (PRILOSEC) 20 MG capsule TAKE 1 CAPSULE BY MOUTH  TWICE DAILY BEFORE MEALS 180 capsule 0    pregabalin (LYRICA) 150 MG capsule Take 150 mg by mouth 2 (two) times daily.      tiZANidine (ZANAFLEX) 4 MG tablet TAKE 1 TABLET BY MOUTH AT BEDTIME AS NEEDED FOR MUSCLE RELAXER      traMADol (ULTRAM) 50 mg tablet Take 50 mg by mouth 2 (two) times daily as needed.  1    vibegron (GEMTESA) 75 mg Tab Take 75 mg by mouth once daily. 30 tablet 11    XARELTO 20 mg Tab TAKE 1 TABLET BY MOUTH EVERY DAY IN THE EVENING 90 tablet 3     No current facility-administered medications for this visit.      Prior to Admission medications    Medication Sig Start Date End Date Taking? Authorizing Provider   albuterol (PROVENTIL/VENTOLIN HFA) 90 mcg/actuation inhaler Inhale 2 puffs into the lungs every 4 (four) hours as needed for Wheezing. Rescue 2/16/19  Yes Roxanne Ayoub NP   atorvastatin (LIPITOR) 80 MG tablet TAKE 1 TABLET BY MOUTH EVERY DAY 7/26/22  Yes Osbaldo Hall MD   cholecalciferol, vitamin D3, 1,250 mcg (50,000 unit) capsule Take 1 capsule (50,000 Units total) by mouth once a week. 6/7/22  Yes Osbaldo Hall MD   fluticasone (FLONASE) 50 mcg/actuation nasal spray daily as needed.   Yes Historical Provider   mirabegron (MYRBETRIQ) 50 mg Tb24 Take 1 tablet (50 mg total) by mouth once daily. 1/19/23 1/19/24 Yes Chantel Boyle NP   omeprazole (PRILOSEC) 20 MG capsule TAKE 1 CAPSULE BY MOUTH TWICE DAILY BEFORE MEALS 11/23/22  Yes Edita Henry MD   pregabalin (LYRICA) 150 MG capsule Take 150 mg by mouth 2 (two) times daily.   Yes Historical Provider   tiZANidine (ZANAFLEX) 4 MG tablet TAKE 1 TABLET BY MOUTH AT BEDTIME AS NEEDED FOR MUSCLE RELAXER 3/21/22  Yes Historical Provider   traMADol (ULTRAM) 50 mg tablet Take 50 mg by mouth 2 (two) times daily as needed. 10/26/18  Yes Historical Provider   vibegron (GEMTESA) 75 mg Tab Take 75 mg by mouth once daily. 1/19/23 1/19/24 Yes Chantel Boyle, [place]   XARELTO 20 mg Tab TAKE 1 TABLET BY MOUTH EVERY DAY IN THE EVENING  22  Yes Sotero Doty MD         History  Past Medical History:   Diagnosis Date    Anticoagulant long-term use     xarelto    Chronic back pain     Chronic neck pain     DVT (deep venous thrombosis)     Herniated disc, cervical     Kidney stone     Lumbar herniated disc     PE (pulmonary embolism)         Pulmonary emboli     after surgery    Pulmonary embolism      Past Surgical History:   Procedure Laterality Date    ARTHROSCOPIC REPAIR OF ROTATOR CUFF OF SHOULDER Left 2019    Procedure: REPAIR, ROTATOR CUFF, ARTHROSCOPIC;  Surgeon: Sedrick Patel MD;  Location: [facility];  Service: Orthopedics;  Laterality: Left;    ARTHROSCOPIC TENOTOMY OF BICEPS TENDON Left 2019    Procedure: TENOTOMY, BICEPS, ARTHROSCOPIC;  Surgeon: Sedrick Patel MD;  Location: [facility] OR;  Service: Orthopedics;  Laterality: Left;    ARTHROSCOPY OF SHOULDER WITH REMOVAL OF DISTAL CLAVICLE Left 2019    Procedure: ARTHROSCOPY, SHOULDER, WITH DISTAL CLAVICLE EXCISION;  Surgeon: Sedrick Patel MD;  Location: [facility];  Service: Orthopedics;  Laterality: Left;    BREAST CYST EXCISION Left      SECTION      x1    COLONOSCOPY N/A 2018    Procedure: COLONOSCOPY;  Surgeon: Armand Foy MD;  [facility]  Service: Endoscopy;  Laterality: N/A;    ESOPHAGEAL MANOMETRY WITH MEASUREMENT OF IMPEDANCE N/A 2018    Procedure: MANOMETRY, ESOPHAGEAL, WITH IMPEDANCE MEASUREMENT;  Surgeon: Armand Foy MD;  Location: [facility] (4TH FLR);  Service: Endoscopy;  Laterality: N/A;    ESOPHAGOGASTRODUODENOSCOPY N/A 2018    Procedure: EGD (ESOPHAGOGASTRODUODENOSCOPY);  Surgeon: Armand Foy MD;  [facility]  Service: Endoscopy;  Laterality: N/A;  pt currently on Lovenox and Xarelto - ok per Dr. Doty to hold Plavix 2 days prior and Lovenox 24hr prior to case/see scanned media / for documentation of hold -- SM        ESOPHAGOGASTRODUODENOSCOPY N/A 3/28/2022     Procedure: EGD (ESOPHAGOGASTRODUODENOSCOPY);  Surgeon: Armand Foy MD;  Location: [facility] ([address] [place] [address]);  Service: Endoscopy;  Laterality: N/A;  okay to hold Xarelto for 2 days per Dr. Doty with lovenox bridgings - see note on 3/21/22  2nd floor for ASAP availabilty  fully vaccinated - sm    EXTRACORPOREAL SHOCK WAVE LITHOTRIPSY Left 2/9/2021    Procedure: LITHOTRIPSY-EXTRACORPOREAL SHOCK WAVE;  Surgeon: Jeremias Eric MD;  Location: [facility];  Service: Urology;  Laterality: Left;    HERNIA REPAIR      umbilical    KIDNEY STONE SURGERY      ureteral stent    left knee surgery      SHOULDER ARTHROSCOPY Left 6/11/2019    Procedure: ARTHROSCOPY, SHOULDER;  Surgeon: Sedrick Patel MD;  Location: [facility];  Service: Orthopedics;  Laterality: Left;  BLOCK    TONSILLECTOMY      TOTAL SHOULDER ARTHROPLASTY Right      Social History     Socioeconomic History    Marital status:     Number of children: 2   Occupational History    Occupation: teacher      Comment:     Tobacco Use    Smoking status: Never    Smokeless tobacco: Never   Substance and Sexual Activity    Alcohol use: No    Drug use: No    Sexual activity: Not Currently     Partners: Male         Allergies  Review of patient's allergies indicates:   Allergen Reactions    Sulfa (sulfonamide antibiotics) Rash         Review of Systems   Review of Systems   Constitutional: Negative for decreased appetite, fever and weight loss.   HENT:  Negative for congestion and nosebleeds.    Eyes:  Negative for double vision, vision loss in left eye, vision loss in right eye and visual disturbance.   Cardiovascular:  Negative for chest pain, claudication, cyanosis, dyspnea on exertion, irregular heartbeat, leg swelling, near-syncope, orthopnea, palpitations, paroxysmal nocturnal dyspnea and syncope.   Respiratory:  Negative for cough, hemoptysis, shortness of breath, sleep disturbances due to breathing, snoring, sputum production and wheezing.     Endocrine: Negative for cold intolerance and heat intolerance.   Skin:  Negative for nail changes and rash.   Musculoskeletal:  Negative for joint pain, muscle cramps, muscle weakness and myalgias.   Gastrointestinal:  Negative for change in bowel habit, heartburn, hematemesis, hematochezia, hemorrhoids and melena.   Neurological:  Negative for dizziness, focal weakness and headaches.       Physical Exam  Wt Readings from Last 1 Encounters:   02/07/23 62.8 kg (138 lb 7.2 oz)     BP Readings from Last 3 Encounters:   02/07/23 110/68   02/01/23 102/70   01/12/23 110/70     Pulse Readings from Last 1 Encounters:   02/07/23 104     Body mass index is 25.32 kg/m².    Physical Exam  Vitals reviewed.   Constitutional:       Appearance: She is well-developed.   Neck:      Vascular: No carotid bruit or JVD.   Cardiovascular:      Rate and Rhythm: Normal rate and regular rhythm.      Pulses:           Carotid pulses are 2+ on the right side and 2+ on the left side.       Radial pulses are 2+ on the right side and 2+ on the left side.        Dorsalis pedis pulses are 2+ on the right side and 2+ on the left side.      Heart sounds: Normal heart sounds, S1 normal and S2 normal.   Abdominal:      General: Bowel sounds are normal.   Musculoskeletal:      Thoracic back: Tenderness present.   Neurological:      Mental Status: She is alert and oriented to person, place, and time.           Assessment  1. Atherosclerosis of aorta  Stable    2. Mixed hyperlipidemia  Stable    3. Current use of long term anticoagulation  Stable    4. Chronic pulmonary embolism without acute cor pulmonale, unspecified pulmonary embolism type  On anticoagulation    5. Atypical chest pain  Normal angiogram        Plan and Discussion  Continue current meds.  Discussed her EKG today shows normal sinus rhythm, rate 73.    Follow Up  3-4 months      Sotero Doty MD, F.A.C.C, F.S.C.A.I.        30 minutes were spent in chart review, documentation and  review of results, and evaluation, treatment, and counseling of patient on the same day of service.    Disclaimer: This document was created using voice recognition software ([employer]). Although it may be edited, this document may contain errors related to incorrect recognition of the spoken word. Please call the physician if clarification is needed.

## 2023-02-15 ENCOUNTER — HOSPITAL ENCOUNTER (OUTPATIENT)
Dept: RADIOLOGY | Facility: OTHER | Age: 73
Discharge: HOME | End: 2023-02-15
Attending: ANESTHESIOLOGY
Payer: MEDICARE

## 2023-02-15 DIAGNOSIS — M80.88XA OSTEOPOROTIC COMPRESSION FRACTURE OF VERTEBRA, INITIAL ENCOUNTER: ICD-10-CM

## 2023-02-15 PROCEDURE — 78803 RP LOCLZJ TUM SPECT 1 AREA: CPT | Mod: 26,,, | Performed by: RADIOLOGY

## 2023-02-15 PROCEDURE — 78803 RP LOCLZJ TUM SPECT 1 AREA: CPT | Mod: TC

## 2023-02-15 PROCEDURE — 78803 NM BONE SCAN SPECT: ICD-10-PCS | Mod: 26,,, | Performed by: RADIOLOGY

## 2023-02-23 ENCOUNTER — PATIENT MESSAGE (OUTPATIENT)
Dept: PAIN MEDICINE | Facility: CLINIC | Age: 73
End: 2023-02-23
Payer: MEDICARE

## 2023-03-01 ENCOUNTER — PATIENT OUTREACH (OUTPATIENT)
Dept: ADMINISTRATIVE | Facility: HOSPITAL | Age: 73
End: 2023-03-01
Payer: MEDICARE

## 2023-03-01 ENCOUNTER — PATIENT MESSAGE (OUTPATIENT)
Dept: ADMINISTRATIVE | Facility: HOSPITAL | Age: 73
End: 2023-03-01
Payer: MEDICARE

## 2023-03-01 ENCOUNTER — PROCEDURE VISIT (OUTPATIENT)
Dept: PAIN MEDICINE | Facility: CLINIC | Age: 73
End: 2023-03-01
Attending: ANESTHESIOLOGY
Payer: MEDICARE

## 2023-03-01 VITALS
BODY MASS INDEX: 25.15 KG/M2 | HEART RATE: 76 BPM | DIASTOLIC BLOOD PRESSURE: 69 MMHG | HEIGHT: 62 IN | WEIGHT: 136.69 LBS | RESPIRATION RATE: 18 BRPM | SYSTOLIC BLOOD PRESSURE: 116 MMHG

## 2023-03-01 DIAGNOSIS — M71.9 CERVICOTHORACIC INTERSPINOUS BURSITIS: ICD-10-CM

## 2023-03-01 DIAGNOSIS — R10.13 EPIGASTRIC ABDOMINAL PAIN: Primary | ICD-10-CM

## 2023-03-01 PROCEDURE — 20550 NJX 1 TENDON SHEATH/LIGAMENT: CPT | Mod: S$PBB,GC,, | Performed by: ANESTHESIOLOGY

## 2023-03-01 PROCEDURE — 20550 PR INJECT TENDON SHEATH/LIGAMENT: ICD-10-PCS | Mod: S$PBB,GC,, | Performed by: ANESTHESIOLOGY

## 2023-03-01 PROCEDURE — 20600 DRAIN/INJ JOINT/BURSA W/O US: CPT | Mod: PBBFAC,GC | Performed by: ANESTHESIOLOGY

## 2023-03-01 PROCEDURE — 20605 PR DRAIN/INJECT INTERMEDIATE JOINT/BURSA: ICD-10-PCS | Mod: S$PBB,59,GC, | Performed by: ANESTHESIOLOGY

## 2023-03-01 PROCEDURE — 76942 ECHO GUIDE FOR BIOPSY: CPT | Mod: 26,S$PBB,GC, | Performed by: ANESTHESIOLOGY

## 2023-03-01 PROCEDURE — 20605 DRAIN/INJ JOINT/BURSA W/O US: CPT | Mod: S$PBB,59,GC, | Performed by: ANESTHESIOLOGY

## 2023-03-01 PROCEDURE — 64450 NJX AA&/STRD OTHER PN/BRANCH: CPT | Mod: PBBFAC,GC | Performed by: ANESTHESIOLOGY

## 2023-03-01 PROCEDURE — 76942 PR U/S GUIDANCE FOR NEEDLE GUIDANCE: ICD-10-PCS | Mod: 26,S$PBB,GC, | Performed by: ANESTHESIOLOGY

## 2023-03-01 RX ORDER — TRIAMCINOLONE ACETONIDE 40 MG/ML
40 INJECTION, SUSPENSION INTRA-ARTICULAR; INTRAMUSCULAR
Status: COMPLETED | OUTPATIENT
Start: 2023-03-01 | End: 2023-03-01

## 2023-03-01 RX ADMIN — TRIAMCINOLONE ACETONIDE 40 MG: 40 INJECTION, SUSPENSION INTRA-ARTICULAR; INTRAMUSCULAR at 09:03

## 2023-03-01 NOTE — PROCEDURES
Xyphoid and Interspinous ligament T6-T7 Steroid Injection under Ultrasound Guidance    The procedure, risks, benefits, and options were discussed with the patient. There are no contraindications to the procedure. The patent expressed understanding and agreed to the procedure. Informed written consent was obtained prior to the start of the procedure and can be found in the patient's chart.    PATIENT NAME: Jasson Pineda   MRN: [id number]     DATE OF PROCEDURE: 03/01/2023    PROCEDURE:  Rectus sheath block under Ultrasound Guidance  Interspinous Ligament bursa injection    PRE-OP DIAGNOSIS:   1. Epigastric abdominal pain  triamcinolone acetonide injection 40 mg      2. Cervicothoracic interspinous bursitis  triamcinolone acetonide injection 40 mg           POST-OP DIAGNOSIS: Same    PHYSICIAN: ANJUM Easley MD    ASSISTANTS: Erwin Will Md [employer] Pain Fellow      MEDICATIONS INJECTED: Preservative-free Kenalog 40mg with 0.50ml of 3 ml Bupivacine 0.50%     LOCAL ANESTHETIC INJECTED: Xylocaine 2%     SEDATION: None    ESTIMATED BLOOD LOSS: None    COMPLICATIONS: None    TECHNIQUE: Time-out was performed to identify the patient and procedure to be performed. With the patient laying in a supine position, the surgical area was prepped and draped in the usual sterile fashion using ChloraPrep . The area overlying the xyphoid was determined under ultrasound guidance. Skin anesthesia was achieved by injecting Lidocaine 2% over the injection sites. A 27- 1 inch needle, was then advanced under ultrasound guidance. Once the needle tip was in the xyphoid, and there was no blood aspiration, 4 ml of the medication mixture listed above was injected slowly under ultrasound visualization.. A sterile dressing was applied. No specimens collected. The patient tolerated the procedure well.    Interspinous Injection:   TECHNIQUE: Time-out was performed to identify the patient and procedure to be performed. With the patient sitting position,  the surgical area was prepped and draped in the usual sterile fashion using ChloraPrep. The area overlying the interspinous process was determined under ultrasound guidance. Skin anesthesia was achieved by injecting Lidocaine 2% over the injection sites. A 27- 1 inch needle, was then advanced under ultrasound guidance. Once the needle tip was in the interspinous ligament, and there was no blood aspiration, 4 ml of the medication mixture listed above was injected slowly under ultrasound visualization. A sterile dressing was applied. No specimens collected. The patient tolerated the procedure well.    The patient was monitored after the procedure in the recovery area. They were given post-procedure and discharge instructions to follow at home. The patient was discharged in a stable condition.    I reviewed and edited the fellow's note. I conducted my own interview and physical examination. I agree with the findings. I was present and supervising all critical portions of the procedure.    Erwin Ventura MD

## 2023-03-15 ENCOUNTER — OFFICE VISIT (OUTPATIENT)
Dept: INTERNAL MEDICINE | Facility: CLINIC | Age: 73
End: 2023-03-15
Payer: MEDICARE

## 2023-03-15 VITALS
OXYGEN SATURATION: 99 % | HEART RATE: 73 BPM | WEIGHT: 138.13 LBS | SYSTOLIC BLOOD PRESSURE: 108 MMHG | BODY MASS INDEX: 26.08 KG/M2 | DIASTOLIC BLOOD PRESSURE: 64 MMHG | HEIGHT: 61 IN

## 2023-03-15 DIAGNOSIS — R07.89 ATYPICAL CHEST PAIN: ICD-10-CM

## 2023-03-15 DIAGNOSIS — N32.81 OAB (OVERACTIVE BLADDER): ICD-10-CM

## 2023-03-15 DIAGNOSIS — M19.012 PRIMARY OSTEOARTHRITIS OF LEFT SHOULDER: ICD-10-CM

## 2023-03-15 DIAGNOSIS — H91.90 HEARING LOSS, UNSPECIFIED HEARING LOSS TYPE, UNSPECIFIED LATERALITY: ICD-10-CM

## 2023-03-15 DIAGNOSIS — R13.19 ESOPHAGEAL DYSPHAGIA: ICD-10-CM

## 2023-03-15 DIAGNOSIS — R06.02 SOB (SHORTNESS OF BREATH): ICD-10-CM

## 2023-03-15 DIAGNOSIS — N20.0 NEPHROLITHIASIS: ICD-10-CM

## 2023-03-15 DIAGNOSIS — M75.122 COMPLETE TEAR OF LEFT ROTATOR CUFF, UNSPECIFIED WHETHER TRAUMATIC: ICD-10-CM

## 2023-03-15 DIAGNOSIS — Z86.718 HISTORY OF DVT OF LOWER EXTREMITY: ICD-10-CM

## 2023-03-15 DIAGNOSIS — R10.13 EPIGASTRIC ABDOMINAL PAIN: ICD-10-CM

## 2023-03-15 DIAGNOSIS — I70.0 ATHEROSCLEROSIS OF AORTA: ICD-10-CM

## 2023-03-15 DIAGNOSIS — N20.0 KIDNEY STONE: ICD-10-CM

## 2023-03-15 DIAGNOSIS — M75.121 COMPLETE TEAR OF RIGHT ROTATOR CUFF, UNSPECIFIED WHETHER TRAUMATIC: ICD-10-CM

## 2023-03-15 DIAGNOSIS — M19.019 SHOULDER ARTHRITIS: ICD-10-CM

## 2023-03-15 DIAGNOSIS — K22.4 INEFFECTIVE ESOPHAGEAL MOTILITY: ICD-10-CM

## 2023-03-15 DIAGNOSIS — Z86.2 HISTORY OF IRON DEFICIENCY ANEMIA: ICD-10-CM

## 2023-03-15 DIAGNOSIS — S46.119A LABRAL TEAR OF LONG HEAD OF BICEPS TENDON, INITIAL ENCOUNTER: ICD-10-CM

## 2023-03-15 DIAGNOSIS — M50.20 HERNIATED DISC, CERVICAL: ICD-10-CM

## 2023-03-15 DIAGNOSIS — E78.2 MIXED HYPERLIPIDEMIA: ICD-10-CM

## 2023-03-15 DIAGNOSIS — Z79.01 CURRENT USE OF LONG TERM ANTICOAGULATION: ICD-10-CM

## 2023-03-15 DIAGNOSIS — M75.42 IMPINGEMENT SYNDROME OF LEFT SHOULDER: ICD-10-CM

## 2023-03-15 DIAGNOSIS — I27.82 CHRONIC PULMONARY EMBOLISM WITHOUT ACUTE COR PULMONALE, UNSPECIFIED PULMONARY EMBOLISM TYPE: ICD-10-CM

## 2023-03-15 DIAGNOSIS — Z00.00 ENCOUNTER FOR PREVENTIVE HEALTH EXAMINATION: Primary | ICD-10-CM

## 2023-03-15 DIAGNOSIS — I77.1 TORTUOUS AORTA: ICD-10-CM

## 2023-03-15 PROCEDURE — G0439 PR MEDICARE ANNUAL WELLNESS SUBSEQUENT VISIT: ICD-10-PCS | Mod: ,,, | Performed by: NURSE PRACTITIONER

## 2023-03-15 PROCEDURE — 99999 PR PBB SHADOW E&M-EST. PATIENT-LVL V: ICD-10-PCS | Mod: PBBFAC,,, | Performed by: NURSE PRACTITIONER

## 2023-03-15 PROCEDURE — G0439 PPPS, SUBSEQ VISIT: HCPCS | Mod: ,,, | Performed by: NURSE PRACTITIONER

## 2023-03-15 PROCEDURE — 99999 PR PBB SHADOW E&M-EST. PATIENT-LVL V: CPT | Mod: PBBFAC,,, | Performed by: NURSE PRACTITIONER

## 2023-03-15 PROCEDURE — 99215 OFFICE O/P EST HI 40 MIN: CPT | Mod: PBBFAC | Performed by: NURSE PRACTITIONER

## 2023-03-15 NOTE — PROGRESS NOTES
"  Jasson Pineda presented for a  Medicare AWV and comprehensive Health Risk Assessment today. The following components were reviewed and updated:    Medical history  Family History  Social history  Allergies and Current Medications  Health Risk Assessment  Health Maintenance  Care Team         ** See Completed Assessments for Annual Wellness Visit within the encounter summary.**         The following assessments were completed:  Living Situation  CAGE  Depression Screening  Timed Get Up and Go  Whisper Test  Cognitive Function Screening  Nutrition Screening  ADL Screening  PAQ Screening          Vitals:    03/15/23 1425   BP: 108/64   BP Location: Left arm   Patient Position: Sitting   Pulse: 73   SpO2: 99%   Weight: 62.6 kg (138 lb 1.9 oz)   Height: 5' 1" (1.549 m)     Body mass index is 26.1 kg/m².    Physical Exam  Vitals reviewed.   Constitutional:       Appearance: She is well-developed.   HENT:      Head: Normocephalic and atraumatic. Not macrocephalic and not microcephalic. No raccoon eyes, Marie's sign, abrasion, contusion, right periorbital erythema, left periorbital erythema or laceration. Hair is normal.      Right Ear: No decreased hearing noted. No laceration, drainage, swelling or tenderness. No middle ear effusion. No foreign body. No mastoid tenderness. No hemotympanum. Tympanic membrane is not injected, scarred, perforated, erythematous, retracted or bulging. Tympanic membrane has normal mobility.      Left Ear: No decreased hearing noted. No laceration, drainage, swelling or tenderness.  No middle ear effusion. No foreign body. No mastoid tenderness. No hemotympanum. Tympanic membrane is not injected, scarred, perforated, erythematous, retracted or bulging. Tympanic membrane has normal mobility.      Nose: Nose normal. No nasal deformity, laceration or mucosal edema.      Mouth/Throat:      Pharynx: Uvula midline.   Eyes:      General: Lids are normal. No scleral icterus.     Conjunctiva/sclera: " Conjunctivae normal.   Neck:      Thyroid: No thyroid mass or thyromegaly.      Trachea: Trachea normal.   Cardiovascular:      Rate and Rhythm: Normal rate and regular rhythm.   Pulmonary:      Effort: Pulmonary effort is normal. No respiratory distress.      Breath sounds: Normal breath sounds.   Abdominal:      Palpations: Abdomen is soft.   Musculoskeletal:         General: Normal range of motion.      Cervical back: Neck supple. No edema or erythema. No spinous process tenderness or muscular tenderness. Normal range of motion.   Lymphadenopathy:      Head:      Right side of head: No submental, submandibular, tonsillar, preauricular or posterior auricular adenopathy.      Left side of head: No submental, submandibular, tonsillar, preauricular, posterior auricular or occipital adenopathy.   Skin:     General: Skin is warm and dry.   Neurological:      Mental Status: She is alert and oriented to person, place, and time.      Cranial Nerves: No cranial nerve deficit.      Sensory: No sensory deficit.   Psychiatric:         Behavior: Behavior normal.         Thought Content: Thought content normal.         Judgment: Judgment normal.           Diagnoses and health risks identified today and associated recommendations/orders:    1. Encounter for preventive health examination  Annual Health Risk Assessment (HRA) visit today.  Counseling and referral of health maintenance and preventative health measures performed.  Patient given annual wellness paperwork to take home.  Encouraged to return in 1 year for subsequent HRA visit.   Patient on chronic opioids. Risk factors reviewed for any misuse and/or abuse. Pain assessed during visit. Patient being treated and followed by PCP.    2. Atherosclerosis of aorta  Chronic. Stable. Continue current treatment plan as previously prescribed by PCP.    3. Tortuous aorta  Chronic. Stable. Continue current treatment plan as previously prescribed by PCP.    4. Mixed  hyperlipidemia  Chronic. Stable. Continue current treatment plan as previously prescribed by PCP.    5. Kidney stone  Chronic. Stable. Continue current treatment plan as previously prescribed by PCP.    6. Nephrolithiasis  Chronic. Stable. Continue current treatment plan as previously prescribed by PCP.    7. OAB (overactive bladder)  Chronic. Stable. Continue current treatment plan as previously prescribed by PCP.    8. Chronic pulmonary embolism without acute cor pulmonale, unspecified pulmonary embolism type  Chronic. Stable. Continue current treatment plan as previously prescribed by PCP.    9. History of DVT of lower extremity  Chronic. Stable. Continue current treatment plan as previously prescribed by PCP.    10. Current use of long term anticoagulation  Chronic. Stable. Continue current treatment plan as previously prescribed by PCP.    11. History of iron deficiency anemia  Chronic. Stable. Continue current treatment plan as previously prescribed by PCP.    12. Epigastric abdominal pain  Chronic. Stable. Continue current treatment plan as previously prescribed by PCP.    13. Esophageal dysphagia  Chronic. Stable. Continue current treatment plan as previously prescribed by PCP.    14. Ineffective esophageal motility  Chronic. Stable. Continue current treatment plan as previously prescribed by PCP.    15. Complete tear of left rotator cuff, unspecified whether traumatic  Chronic. Stable. Continue current treatment plan as previously prescribed by PCP.    16. Complete tear of right rotator cuff, unspecified whether traumatic  Chronic. Stable. Continue current treatment plan as previously prescribed by PCP.    17. Impingement syndrome of left shoulder  Chronic. Stable. Continue current treatment plan as previously prescribed by PCP.    18. Primary osteoarthritis of left shoulder  Chronic. Stable. Continue current treatment plan as previously prescribed by PCP.    19. Shoulder arthritis  Chronic. Stable.  Continue current treatment plan as previously prescribed by PCP.    20. Atypical chest pain  Chronic. Stable. Continue current treatment plan as previously prescribed by PCP.    21. Labral tear of long head of biceps tendon, initial encounter  Chronic. Stable. Continue current treatment plan as previously prescribed by PCP.    22. SOB (shortness of breath)  Chronic. Stable. Continue current treatment plan as previously prescribed by PCP.    23. Herniated disc, cervical  Chronic. Stable. Continue current treatment plan as previously prescribed by PCP.      Provided Luellen with a 5-10 year written screening schedule and personal prevention plan. Recommendations were developed using the USPSTF age appropriate recommendations. Education, counseling, and referrals were provided as needed. After Visit Summary printed and given to patient which includes a list of additional screenings\tests needed.      I offered to discuss end of life issues, including information on how to make advance directives that the patient could use to name someone who would make medical decisions on their behalf if they became too ill to make themselves.    ___Patient declined  _X_Patient is interested, I provided paper work and offered to discuss.    No follow-ups on file.    ELISHA Mcclendon offered to discuss advanced care planning, including how to pick a person who would make decisions for you if you were unable to make them for yourself, called a health care power of , and what kind of decisions you might make such as use of life sustaining treatments such as ventilators and tube feeding when faced with a life limiting illness recorded on a living will that they will need to know. (How you want to be cared for as you near the end of your natural life)     X Patient is interested in learning more about how to make advanced directives.  I provided them paperwork and offered to discuss this with them.

## 2023-03-15 NOTE — PATIENT INSTRUCTIONS
Counseling and Referral of Other Preventative  (Italic type indicates deductible and co-insurance are waived)    Patient Name: Jasson Pineda  Today's Date: 3/15/2023    Health Maintenance       Date Due Completion Date    Shingles Vaccine (3 of 3) 12/09/2020 10/14/2020    DEXA Scan 07/31/2022 7/31/2019    Mammogram 02/16/2023 2/16/2022    Override on 6/1/2015: Done    Influenza Vaccine (1) 06/30/2023 (Originally 9/1/2022) 11/4/2020    High Dose Statin 03/01/2024 3/1/2023    TETANUS VACCINE 06/26/2027 6/26/2017    Lipid Panel 10/14/2027 10/14/2022    Colorectal Cancer Screening 07/17/2028 1/20/2020        No orders of the defined types were placed in this encounter.    The following information is provided to all patients.  This information is to help you find resources for any of the problems found today that may be affecting your health:                Living healthy guide: www.[facility].[place].gov      Understanding Diabetes: www.diabetes.org      Eating healthy: www.cdc.gov/healthyweight      CDC home safety checklist: www.cdc.gov/steadi/patient.html      Agency on Aging: www.goea.[place].gov      Alcoholics anonymous (AA): www.aa.org      Physical Activity: www.mikey.nih.gov/fr5wjxi      Tobacco use: www.quitwithusla.org

## 2023-03-17 ENCOUNTER — PATIENT MESSAGE (OUTPATIENT)
Dept: UROLOGY | Facility: CLINIC | Age: 73
End: 2023-03-17
Payer: MEDICARE

## 2023-03-24 ENCOUNTER — PATIENT MESSAGE (OUTPATIENT)
Dept: CARDIOLOGY | Facility: CLINIC | Age: 73
End: 2023-03-24
Payer: MEDICARE

## 2023-03-24 RX ORDER — ENOXAPARIN SODIUM 100 MG/ML
1 INJECTION SUBCUTANEOUS 2 TIMES DAILY
Qty: 10 ML | Refills: 0 | Status: SHIPPED | OUTPATIENT
Start: 2023-03-24 | End: 2023-07-18 | Stop reason: SDUPTHER

## 2023-03-31 ENCOUNTER — TELEPHONE (OUTPATIENT)
Dept: PAIN MEDICINE | Facility: CLINIC | Age: 73
End: 2023-03-31
Payer: MEDICARE

## 2023-03-31 NOTE — TELEPHONE ENCOUNTER
Staff contacted and spoke with patient in regards to confirming her schedule 820a appt on Monday 4/3 with NP Concha Walker.      Pt verbalized understanding and has confirmed appt.

## 2023-04-03 ENCOUNTER — OFFICE VISIT (OUTPATIENT)
Dept: PAIN MEDICINE | Facility: CLINIC | Age: 73
End: 2023-04-03
Payer: MEDICARE

## 2023-04-03 VITALS
HEART RATE: 72 BPM | SYSTOLIC BLOOD PRESSURE: 108 MMHG | BODY MASS INDEX: 24.97 KG/M2 | WEIGHT: 132.25 LBS | TEMPERATURE: 98 F | RESPIRATION RATE: 18 BRPM | DIASTOLIC BLOOD PRESSURE: 73 MMHG | HEIGHT: 61 IN

## 2023-04-03 DIAGNOSIS — R10.13 EPIGASTRIC ABDOMINAL PAIN: ICD-10-CM

## 2023-04-03 DIAGNOSIS — M54.6 THORACIC SPINE PAIN: ICD-10-CM

## 2023-04-03 DIAGNOSIS — R07.89 ATYPICAL CHEST PAIN: Primary | ICD-10-CM

## 2023-04-03 PROCEDURE — 99999 PR PBB SHADOW E&M-EST. PATIENT-LVL III: CPT | Mod: PBBFAC,,, | Performed by: NURSE PRACTITIONER

## 2023-04-03 PROCEDURE — 99999 PR PBB SHADOW E&M-EST. PATIENT-LVL III: ICD-10-PCS | Mod: PBBFAC,,, | Performed by: NURSE PRACTITIONER

## 2023-04-03 PROCEDURE — 99213 PR OFFICE/OUTPT VISIT, EST, LEVL III, 20-29 MIN: ICD-10-PCS | Mod: S$PBB,,, | Performed by: NURSE PRACTITIONER

## 2023-04-03 PROCEDURE — 99213 OFFICE O/P EST LOW 20 MIN: CPT | Mod: PBBFAC | Performed by: NURSE PRACTITIONER

## 2023-04-03 PROCEDURE — 99213 OFFICE O/P EST LOW 20 MIN: CPT | Mod: S$PBB,,, | Performed by: NURSE PRACTITIONER

## 2023-04-03 NOTE — PROGRESS NOTES
Chronic patient Established Note (Follow up visit)      SUBJECTIVE:    Jasson Pineda presents to the clinic for a follow-up appointment for chest and mid back pain. She is s/p rectus sheath block on 3/1/2023. She reports 100% relief for 2 days. Her pain then returned to baseline. She continues to report pain that begins under her breast bone that radiates into the back. She describes this pain as sharp. Her pain is worse with eating and laying on her back/stomach. She did have a recent lumbar WILIAN outside of [facility] with some benefit. She is taking Lyrica, Zanaflex, and Tramadol. She denies any other health changes. Her pain today is 8/10.    HPI:  Jasson Pineda presents with chest pain (under her breast-bone) which radiates to the back.  She states that lying on her back or her stomach causes pain on her chest (she sleeps on her side supported by pillows).  She's been having this pain for at least 2 years and has seen multiple specialists including gastroenterology and neurosurgery.  She underwent a thoracic epidural steroid injection with Dr. Serra over a year ago without benefit in her symptoms.  She has had CTA chest and CT abdomen/pelvis which have not identified a culprit pathology.  She states that when the chest pain first started, it was associated only with eating, however at this point, the pain is constant. She takes Tramadol, Lyrica, and Zanaflex for the and helps for a little while.  She states she does not take Norco/Hydrocodone.     Pain Disability Index Review:  Last 3 PDI Scores 4/3/2023 3/1/2023 1/11/2023   Pain Disability Index (PDI) 48 40 19       Pain Medications:  Lyrica  Zanaflex  Tramadol    Opioid Contract: not applicable     report:  Reviewed and consistent with medication use as prescribed.    Pain Procedures:   3/1/2023- Rectus sheath block    Physical Therapy/Home Exercise: yes    Imaging:   MRI Thoracic Spine 1/14/2022 (care everywhere):  FINDINGS:   The  prevertebral soft tissues are normal. Exaggerated kyphotic curvature of the thoracic spine from C7-T7. Individual vertebral height is maintained. Marrow signal is within normal limits. The spinal cord is normal in appearance.     At C7-T1 there is moderate disc height loss with minor bulging of the posterior annulus. There is moderate bilateral facet arthropathy. This results in mild bilateral neural foraminal stenosis without spinal canal stenosis.     At T1-T2 there is moderate to severe disc height loss with grade 1 anterolisthesis (2 mm). There is severe bilateral facet arthropathy. This results in mild bilateral neural foraminal stenosis and minimal spinal canal stenosis.     At T2-T3 there is moderate to severe disc height loss with a small broad-based posterior disc bulge. There is severe bilateral facet arthropathy with slight grade 1 anterolisthesis at this level (2 mm). There is a small broad-based posterior disc bulge. This results in mild bilateral neural foraminal stenosis without spinal canal stenosis.     At T3-T4 there is moderate to severe disc height loss with mild bilateral facet arthropathy. The spinal canal and neural foramen are patent.     At T4-T5 there is moderate to severe disc height loss with a small broad-based posterior disc bulge slightly more eccentric to the neural foramina. There is mild facet arthropathy. This results in mild bilateral neural foraminal stenosis and minimal spinal canal stenosis.     At T5-T6 the disc demonstrates moderate to severe disc height loss with a small broad-based posterior disc bulge slightly more eccentric to the neural foramina. There is no facet arthropathy. The spinal canal is patent with mild bilateral neural foraminal stenosis.     At T6-T7 the disc demonstrates severe disc height loss with minor bulging the posterior annulus of the level of the neural foramina. There is no facet arthropathy. There is mild bilateral neural foraminal stenosis without  spinal canal stenosis.     At T7-T8 there is moderate to severe disc height loss with minor bulging the posterior annulus at the level of the neural foramina. There is mild bilateral facet arthropathy. There is mild bilateral neural foraminal stenosis without spinal canal stenosis.     At T8-T9 there is moderate disc height loss with a small broad-based posterior disc bulge somewhat more eccentric to the neural foramina. There is no facet arthropathy or spinal canal stenosis. There is mild bilateral neural foraminal stenosis.     At T9-T10 there is moderate disc height loss with a small broad-based posterior disc bulge. There is mild bilateral facet arthropathy. This results in mild bilateral neural foraminal stenosis without spinal canal stenosis. There appears to be a vertebral body hemangioma at T10.     At T10-T11 there is moderate disc height loss with a small broad-based posterior disc bulge. There is mild bilateral facet arthropathy. This results in mild right greater than left neural foraminal stenosis with minimal spinal canal stenosis. There is a vertebral body hemangioma at T11.     At T11-T12 the disc shows moderate disc height loss with minor bulging of the posterior annulus. There is no facet arthropathy or spinal canal/neural foraminal stenosis.     At T12-L1 the disc shows a relative normal disc height, signal intensity and posterior contour without spinal canal stenosis or neural foraminal stenosis    IMPRESSION:  1.No acute osseous abnormality in the thoracic spine.   2. Exaggerated kyphotic curvature of the upper thoracic spine with slight grade 1 anterolisthesis of T1 on T2 and T2 on T3 (2 mm).   3. Degenerative change as outlined in detail above most pronounced in the upper thoracic spine without significant spinal canal or neural foraminal stenosis.     Allergies:   Review of patient's allergies indicates:   Allergen Reactions    Sulfa (sulfonamide antibiotics) Rash       Current Medications:    Current Outpatient Medications   Medication Sig Dispense Refill    atorvastatin (LIPITOR) 80 MG tablet TAKE 1 TABLET BY MOUTH EVERY DAY 90 tablet 3    cholecalciferol, vitamin D3, 1,250 mcg (50,000 unit) capsule Take 1 capsule (50,000 Units total) by mouth once a week. 12 capsule 3    enoxaparin (LOVENOX) 60 mg/0.6 mL Syrg Inject 0.6 mLs (60 mg total) into the skin 2 (two) times a day. 10 mL 0    omeprazole (PRILOSEC) 20 MG capsule TAKE 1 CAPSULE BY MOUTH TWICE DAILY BEFORE MEALS 180 capsule 0    pregabalin (LYRICA) 150 MG capsule Take 150 mg by mouth 2 (two) times daily.      tiZANidine (ZANAFLEX) 4 MG tablet TAKE 1 TABLET BY MOUTH AT BEDTIME AS NEEDED FOR MUSCLE RELAXER      traMADol (ULTRAM) 50 mg tablet Take 50 mg by mouth 2 (two) times daily as needed.  1    XARELTO 20 mg Tab Take 1 tablet (20 mg total) by mouth every evening. 90 tablet 3     No current facility-administered medications for this visit.       REVIEW OF SYSTEMS:    GENERAL:  No weight loss, malaise or fevers.  HEENT:  Negative for frequent or significant headaches.  NECK:  Negative for lumps, goiter, pain and significant neck swelling.  RESPIRATORY:  Negative for cough, wheezing or shortness of breath.  CARDIOVASCULAR:  Negative for chest pain, leg swelling or palpitations.  GI:  Negative for abdominal discomfort, blood in stools or black stools or change in bowel habits.  MUSCULOSKELETAL:  See HPI.  SKIN:  Negative for lesions, rash, and itching.  PSYCH:  Negative for sleep disturbance, mood disorder and recent psychosocial stressors.  HEMATOLOGY/LYMPHOLOGY:  Negative for swollen nodes. Hx of DVT, on Xarelto.   NEURO:   No history of headaches, syncope, paralysis, seizures or tremors.  All other reviewed and negative other than HPI.    Past Medical History:  Past Medical History:   Diagnosis Date    Anticoagulant long-term use     xarelto    Chronic back pain     Chronic neck pain     DVT (deep venous thrombosis)     Herniated disc, cervical      Kidney stone     Lumbar herniated disc     PE (pulmonary embolism)         Pulmonary emboli     after surgery    Pulmonary embolism        Past Surgical History:  Past Surgical History:   Procedure Laterality Date    ARTHROSCOPIC REPAIR OF ROTATOR CUFF OF SHOULDER Left 2019    Procedure: REPAIR, ROTATOR CUFF, ARTHROSCOPIC;  Surgeon: Sedrick Patel MD;  Location: [facility];  Service: Orthopedics;  Laterality: Left;    ARTHROSCOPIC TENOTOMY OF BICEPS TENDON Left 2019    Procedure: TENOTOMY, BICEPS, ARTHROSCOPIC;  Surgeon: Sedrick Patel MD;  Location: [facility] OR;  Service: Orthopedics;  Laterality: Left;    ARTHROSCOPY OF SHOULDER WITH REMOVAL OF DISTAL CLAVICLE Left 2019    Procedure: ARTHROSCOPY, SHOULDER, WITH DISTAL CLAVICLE EXCISION;  Surgeon: Sedrick Patel MD;  Location: [facility] OR;  Service: Orthopedics;  Laterality: Left;    BREAST CYST EXCISION Left      SECTION      x1    COLONOSCOPY N/A 2018    Procedure: COLONOSCOPY;  Surgeon: Armand Foy MD;  Location: [facility] (4TH FLR);  Service: Endoscopy;  Laterality: N/A;    ESOPHAGEAL MANOMETRY WITH MEASUREMENT OF IMPEDANCE N/A 2018    Procedure: MANOMETRY, ESOPHAGEAL, WITH IMPEDANCE MEASUREMENT;  Surgeon: Armand Foy MD;  Location: [facility] (4TH FLR);  Service: Endoscopy;  Laterality: N/A;    ESOPHAGOGASTRODUODENOSCOPY N/A 2018    Procedure: EGD (ESOPHAGOGASTRODUODENOSCOPY);  Surgeon: Armand Foy MD;  Location: [facility] ENDO (4TH FLR);  Service: Endoscopy;  Laterality: N/A;  pt currently on Lovenox and Xarelto - ok per Dr. Doty to hold Plavix 2 days prior and Lovenox 24hr prior to case/see scanned media / for documentation of hold -- SM        ESOPHAGOGASTRODUODENOSCOPY N/A 2022    Procedure: EGD (ESOPHAGOGASTRODUODENOSCOPY);  Surgeon: Armand Foy MD;  Location: [facility] ENDO (2ND FLR);  Service: Endoscopy;  Laterality: N/A;  okay to hold Xarelto for 2 days per Dr. Doty with lovenox  bridgings - see note on 3/21/22  2nd floor for ASAP availabilty  fully vaccinated - sm    EXTRACORPOREAL SHOCK WAVE LITHOTRIPSY Left 02/09/2021    Procedure: LITHOTRIPSY-EXTRACORPOREAL SHOCK WAVE;  Surgeon: Jeremias Eric MD;  Location: [facility];  Service: Urology;  Laterality: Left;    HERNIA REPAIR      umbilical    KIDNEY STONE SURGERY      ureteral stent    left knee surgery      SHOULDER ARTHROSCOPY Left 06/11/2019    Procedure: ARTHROSCOPY, SHOULDER;  Surgeon: Sedrick Patel MD;  Location: [facility];  Service: Orthopedics;  Laterality: Left;  BLOCK    TONSILLECTOMY      TOTAL SHOULDER ARTHROPLASTY Right        Family History:  Family History   Problem Relation Age of Onset    Colon cancer Mother 74    Ovarian cancer Mother     No Known Problems Father     Liver cancer Sister     Diabetes Sister     Arthritis Sister     Lupus Sister     Lupus Daughter     No Known Problems Daughter     Breast cancer Maternal Aunt 50        50s    Cervical cancer Maternal Aunt     Liver cancer Maternal Grandfather     Breast cancer Maternal Cousin 45    Esophageal cancer Neg Hx        Social History:  Social History     Socioeconomic History    Marital status:     Number of children: 2   Occupational History    Occupation: teacher      Comment:     Tobacco Use    Smoking status: Never    Smokeless tobacco: Never   Substance and Sexual Activity    Alcohol use: No    Drug use: No    Sexual activity: Not Currently     Partners: Male     Social Determinants of Health     Financial Resource Strain: Low Risk     Difficulty of Paying Living Expenses: Not hard at all   Food Insecurity: No Food Insecurity    Worried About Running Out of Food in the Last Year: Never true    Ran Out of Food in the Last Year: Never true   Transportation Needs: No Transportation Needs    Lack of Transportation (Medical): No    Lack of Transportation (Non-Medical): No   Physical Activity: Inactive    Days of Exercise per  "Week: 0 days    Minutes of Exercise per Session: 0 min   Stress: Stress Concern Present    Feeling of Stress : To some extent   Social Connections: Moderately Integrated    Frequency of Communication with Friends and Family: More than three times a week    Frequency of Social Gatherings with Friends and Family: Never    Attends Hinduism Services: More than 4 times per year    Active Member of Clubs or Organizations: Yes    Attends Club or Organization Meetings: Never    Marital Status:    Housing Stability: Unknown    Unable to Pay for Housing in the Last Year: No    Unstable Housing in the Last Year: No       OBJECTIVE:    /73   Pulse 72   Temp 98.2 °F (36.8 °C)   Resp 18   Ht 5' 1" (1.549 m)   Wt 60 kg (132 lb 4.4 oz)   BMI 24.99 kg/m²     PHYSICAL EXAMINATION:    General appearance: Well appearing, in no acute distress, alert and oriented x3.  Psych:  Mood and affect appropriate.  Skin: Skin color, texture, turgor normal, no rashes or lesions, in both upper and lower body.  Head/face:  Atraumatic, normocephalic. No palpable lymph nodes  Cor: RRR  Pulm: Symmetric chest rise, no respiratory distress noted.   Back: Straight leg raising in the sitting position is negative to radicular pain. There is pain to palpation over the thoracic spine. Full ROM without pain.   Extremities: No deformities, edema, or skin discoloration. Good capillary refill.  Musculoskeletal: Bilateral lower extremity strength is normal and symmetric.  No atrophy or tone abnormalities are noted.  Neuro:  No loss of sensation is noted.  Gait: Normal.    ASSESSMENT: 72 y.o. year old female with back pain, consistent with the followin. Atypical chest pain        2. Epigastric abdominal pain        3. Thoracic spine pain              PLAN:     - Previous imaging was reviewed and discussed with the patient today.    - She is s/p rectus sheath block with short term relief.     - Consider thoracic WILIAN in the future, will " discuss with Dr. Easley.     - I have stressed the importance of physical activity and a home exercise plan to help with pain and improve health.    - Patient can continue with medications for now since they are providing benefits, using them appropriately, and without side effects.    - RTC PRN, I will call her with plan.     The above plan and management options were discussed at length with patient. Patient is in agreement with the above and verbalized understanding.    Concha Walker  04/03/2023

## 2023-04-19 ENCOUNTER — HOSPITAL ENCOUNTER (OUTPATIENT)
Dept: RADIOLOGY | Facility: OTHER | Age: 73
Discharge: HOME | End: 2023-04-19
Attending: INTERNAL MEDICINE
Payer: MEDICARE

## 2023-04-19 DIAGNOSIS — Z12.31 ENCOUNTER FOR SCREENING MAMMOGRAM FOR MALIGNANT NEOPLASM OF BREAST: ICD-10-CM

## 2023-04-19 PROCEDURE — 77067 SCR MAMMO BI INCL CAD: CPT | Mod: 26,GA,, | Performed by: RADIOLOGY

## 2023-04-19 PROCEDURE — 77067 MAMMO DIGITAL SCREENING BILAT WITH TOMO: ICD-10-PCS | Mod: 26,GA,, | Performed by: RADIOLOGY

## 2023-04-19 PROCEDURE — 77067 SCR MAMMO BI INCL CAD: CPT | Mod: GA,TC

## 2023-04-19 PROCEDURE — 77063 BREAST TOMOSYNTHESIS BI: CPT | Mod: 26,GA,, | Performed by: RADIOLOGY

## 2023-04-19 PROCEDURE — 77063 MAMMO DIGITAL SCREENING BILAT WITH TOMO: ICD-10-PCS | Mod: 26,GA,, | Performed by: RADIOLOGY

## 2023-05-17 ENCOUNTER — OFFICE VISIT (OUTPATIENT)
Dept: CARDIOLOGY | Facility: CLINIC | Age: 73
End: 2023-05-17
Payer: MEDICARE

## 2023-05-17 VITALS
BODY MASS INDEX: 25.2 KG/M2 | HEART RATE: 73 BPM | SYSTOLIC BLOOD PRESSURE: 116 MMHG | OXYGEN SATURATION: 98 % | DIASTOLIC BLOOD PRESSURE: 78 MMHG | WEIGHT: 133.38 LBS

## 2023-05-17 DIAGNOSIS — I27.82 CHRONIC PULMONARY EMBOLISM WITHOUT ACUTE COR PULMONALE, UNSPECIFIED PULMONARY EMBOLISM TYPE: ICD-10-CM

## 2023-05-17 DIAGNOSIS — I77.1 TORTUOUS AORTA: ICD-10-CM

## 2023-05-17 DIAGNOSIS — E78.2 MIXED HYPERLIPIDEMIA: ICD-10-CM

## 2023-05-17 DIAGNOSIS — I26.99 PULMONARY EMBOLISM, UNSPECIFIED CHRONICITY, UNSPECIFIED PULMONARY EMBOLISM TYPE, UNSPECIFIED WHETHER ACUTE COR PULMONALE PRESENT: ICD-10-CM

## 2023-05-17 DIAGNOSIS — I70.0 ATHEROSCLEROSIS OF AORTA: Primary | ICD-10-CM

## 2023-05-17 PROCEDURE — 99999 PR PBB SHADOW E&M-EST. PATIENT-LVL III: ICD-10-PCS | Mod: PBBFAC,,, | Performed by: INTERNAL MEDICINE

## 2023-05-17 PROCEDURE — 99213 OFFICE O/P EST LOW 20 MIN: CPT | Mod: PBBFAC,PN | Performed by: INTERNAL MEDICINE

## 2023-05-17 PROCEDURE — 99214 OFFICE O/P EST MOD 30 MIN: CPT | Mod: S$PBB,,, | Performed by: INTERNAL MEDICINE

## 2023-05-17 PROCEDURE — 99214 PR OFFICE/OUTPT VISIT, EST, LEVL IV, 30-39 MIN: ICD-10-PCS | Mod: S$PBB,,, | Performed by: INTERNAL MEDICINE

## 2023-05-17 PROCEDURE — 99999 PR PBB SHADOW E&M-EST. PATIENT-LVL III: CPT | Mod: PBBFAC,,, | Performed by: INTERNAL MEDICINE

## 2023-05-17 RX ORDER — RIVAROXABAN 20 MG/1
20 TABLET, FILM COATED ORAL NIGHTLY
Qty: 90 TABLET | Refills: 3 | Status: SHIPPED | OUTPATIENT
Start: 2023-05-17

## 2023-05-17 NOTE — PROGRESS NOTES
Cardiology    5/17/2023  9:15 AM    Problem list  Patient Active Problem List   Diagnosis    Kidney stone    Herniated disc, cervical    Complete rotator cuff tear or rupture of right shoulder, not specified as traumatic    Shoulder arthritis    OAB (overactive bladder)    Body mass index (BMI) of 25.0 to 29.9    Atypical chest pain    Ineffective esophageal motility    SOB (shortness of breath)    Complete rotator cuff tear or rupture of left shoulder, not specified as traumatic    Impingement syndrome of left shoulder    Primary osteoarthritis of left shoulder    Labral tear of long head of biceps tendon, initial encounter    History of DVT of lower extremity    Chronic pulmonary embolism without acute cor pulmonale    Mixed hyperlipidemia    Nephrolithiasis    Tortuous aorta    Atherosclerosis of aorta    Current use of long term anticoagulation    History of iron deficiency anemia    Esophageal dysphagia    Epigastric abdominal pain       CC:  Follow-up    HPI:  She is doing well from a cardiac standpoint.  She denies any angina, dyspnea on exertion, palpitation or syncope.  She denies any bleeding.  She is scheduled to see her PCP next week and will be getting blood work done.    Medications  Current Outpatient Medications   Medication Sig Dispense Refill    atorvastatin (LIPITOR) 80 MG tablet TAKE 1 TABLET BY MOUTH EVERY DAY 90 tablet 3    cholecalciferol, vitamin D3, 1,250 mcg (50,000 unit) capsule Take 1 capsule (50,000 Units total) by mouth once a week. 12 capsule 3    omeprazole (PRILOSEC) 20 MG capsule TAKE 1 CAPSULE BY MOUTH TWICE DAILY BEFORE MEALS 180 capsule 0    pregabalin (LYRICA) 150 MG capsule Take 150 mg by mouth 2 (two) times daily.      tiZANidine (ZANAFLEX) 4 MG tablet TAKE 1 TABLET BY MOUTH AT BEDTIME AS NEEDED FOR MUSCLE RELAXER      traMADol (ULTRAM) 50 mg tablet Take 50 mg by mouth 2 (two) times daily as needed.  1    XARELTO 20 mg Tab Take 1 tablet (20 mg total) by mouth every  evening. 90 tablet 3    enoxaparin (LOVENOX) 60 mg/0.6 mL Syrg Inject 0.6 mLs (60 mg total) into the skin 2 (two) times a day. (Patient not taking: Reported on 5/17/2023) 10 mL 0     No current facility-administered medications for this visit.      Prior to Admission medications    Medication Sig Start Date End Date Taking? Authorizing Provider   atorvastatin (LIPITOR) 80 MG tablet TAKE 1 TABLET BY MOUTH EVERY DAY 7/26/22  Yes Osbaldo Hall MD   cholecalciferol, vitamin D3, 1,250 mcg (50,000 unit) capsule Take 1 capsule (50,000 Units total) by mouth once a week. 6/7/22  Yes Osbaldo Hall MD   omeprazole (PRILOSEC) 20 MG capsule TAKE 1 CAPSULE BY MOUTH TWICE DAILY BEFORE MEALS 11/23/22  Yes Edita Henry MD   pregabalin (LYRICA) 150 MG capsule Take 150 mg by mouth 2 (two) times daily.   Yes Historical Provider   tiZANidine (ZANAFLEX) 4 MG tablet TAKE 1 TABLET BY MOUTH AT BEDTIME AS NEEDED FOR MUSCLE RELAXER 3/21/22  Yes Historical Provider   traMADol (ULTRAM) 50 mg tablet Take 50 mg by mouth 2 (two) times daily as needed. 10/26/18  Yes Historical Provider   XARELTO 20 mg Tab Take 1 tablet (20 mg total) by mouth every evening. 2/7/23  Yes Sotero Doty MD   enoxaparin (LOVENOX) 60 mg/0.6 mL Syrg Inject 0.6 mLs (60 mg total) into the skin 2 (two) times a day.  Patient not taking: Reported on 5/17/2023 3/24/23   Sotero Doty MD         History  Past Medical History:   Diagnosis Date    Anticoagulant long-term use     xarelto    Chronic back pain     Chronic neck pain     DVT (deep venous thrombosis)     Herniated disc, cervical     Kidney stone     Lumbar herniated disc     PE (pulmonary embolism)     2013    Pulmonary emboli     after surgery    Pulmonary embolism      Past Surgical History:   Procedure Laterality Date    ARTHROSCOPIC REPAIR OF ROTATOR CUFF OF SHOULDER Left 06/11/2019    Procedure: REPAIR, ROTATOR CUFF, ARTHROSCOPIC;  Surgeon: Sedrick Patel MD;  Location: [facility];  Service:  Orthopedics;  Laterality: Left;    ARTHROSCOPIC TENOTOMY OF BICEPS TENDON Left 2019    Procedure: TENOTOMY, BICEPS, ARTHROSCOPIC;  Surgeon: Sedrick Patel MD;  Location: [facility] OR;  Service: Orthopedics;  Laterality: Left;    ARTHROSCOPY OF SHOULDER WITH REMOVAL OF DISTAL CLAVICLE Left 2019    Procedure: ARTHROSCOPY, SHOULDER, WITH DISTAL CLAVICLE EXCISION;  Surgeon: Sedrick Patel MD;  Location: [facility] OR;  Service: Orthopedics;  Laterality: Left;    BREAST CYST EXCISION Left      SECTION      x1    COLONOSCOPY N/A 2018    Procedure: COLONOSCOPY;  Surgeon: Armand Foy MD;  Location: [facility] ENDO (4TH FLR);  Service: Endoscopy;  Laterality: N/A;    ESOPHAGEAL MANOMETRY WITH MEASUREMENT OF IMPEDANCE N/A 2018    Procedure: MANOMETRY, ESOPHAGEAL, WITH IMPEDANCE MEASUREMENT;  Surgeon: Armand Foy MD;  Location: [facility] ENDO (4TH FLR);  Service: Endoscopy;  Laterality: N/A;    ESOPHAGOGASTRODUODENOSCOPY N/A 2018    Procedure: EGD (ESOPHAGOGASTRODUODENOSCOPY);  Surgeon: Armand Foy MD;  Location: [facility] ENDO (4TH FLR);  Service: Endoscopy;  Laterality: N/A;  pt currently on Lovenox and Xarelto - ok per Dr. Doty to hold Plavix 2 days prior and Lovenox 24hr prior to case/see scanned media / for documentation of hold -- SM        ESOPHAGOGASTRODUODENOSCOPY N/A 2022    Procedure: EGD (ESOPHAGOGASTRODUODENOSCOPY);  Surgeon: Armand Foy MD;  Location: [facility] ENDO (2ND FLR);  Service: Endoscopy;  Laterality: N/A;  okay to hold Xarelto for 2 days per Dr. Doty with lovenox bridgings - see note on 3/21/22  2nd floor for ASAP availabilty  fully vaccinated - sm    EXTRACORPOREAL SHOCK WAVE LITHOTRIPSY Left 2021    Procedure: LITHOTRIPSY-EXTRACORPOREAL SHOCK WAVE;  Surgeon: Jeremias Eric MD;  Location: [facility];  Service: Urology;  Laterality: Left;    HERNIA REPAIR      umbilical    KIDNEY STONE SURGERY      ureteral stent    left knee surgery      SHOULDER  ARTHROSCOPY Left 06/11/2019    Procedure: ARTHROSCOPY, SHOULDER;  Surgeon: Sedrick Patel MD;  Location: [facility];  Service: Orthopedics;  Laterality: Left;  BLOCK    TONSILLECTOMY      TOTAL SHOULDER ARTHROPLASTY Right      Social History     Socioeconomic History    Marital status:     Number of children: 2   Occupational History    Occupation: teacher      Comment:     Tobacco Use    Smoking status: Never    Smokeless tobacco: Never   Substance and Sexual Activity    Alcohol use: No    Drug use: No    Sexual activity: Not Currently     Partners: Male     Social Determinants of Health     Financial Resource Strain: Low Risk     Difficulty of Paying Living Expenses: Not hard at all   Food Insecurity: No Food Insecurity    Worried About Running Out of Food in the Last Year: Never true    Ran Out of Food in the Last Year: Never true   Transportation Needs: No Transportation Needs    Lack of Transportation (Medical): No    Lack of Transportation (Non-Medical): No   Physical Activity: Inactive    Days of Exercise per Week: 0 days    Minutes of Exercise per Session: 0 min   Stress: Stress Concern Present    Feeling of Stress : To some extent   Social Connections: Moderately Integrated    Frequency of Communication with Friends and Family: More than three times a week    Frequency of Social Gatherings with Friends and Family: Never    Attends Church Services: More than 4 times per year    Active Member of Clubs or Organizations: Yes    Attends Club or Organization Meetings: Never    Marital Status:    Housing Stability: Unknown    Unable to Pay for Housing in the Last Year: No    Unstable Housing in the Last Year: No         Allergies  Review of patient's allergies indicates:   Allergen Reactions    Sulfa (sulfonamide antibiotics) Rash         Review of Systems   Review of Systems   Constitutional: Negative for decreased appetite, fever and weight loss.   HENT:  Negative for  congestion and nosebleeds.    Eyes:  Negative for double vision, vision loss in left eye, vision loss in right eye and visual disturbance.   Cardiovascular:  Negative for chest pain, claudication, cyanosis, dyspnea on exertion, irregular heartbeat, leg swelling, near-syncope, orthopnea, palpitations, paroxysmal nocturnal dyspnea and syncope.   Respiratory:  Negative for cough, hemoptysis, shortness of breath, sleep disturbances due to breathing, snoring, sputum production and wheezing.    Endocrine: Negative for cold intolerance and heat intolerance.   Skin:  Negative for nail changes and rash.   Musculoskeletal:  Negative for joint pain, muscle cramps, muscle weakness and myalgias.   Gastrointestinal:  Negative for change in bowel habit, heartburn, hematemesis, hematochezia, hemorrhoids and melena.   Neurological:  Negative for dizziness, focal weakness and headaches.       Physical Exam  Wt Readings from Last 1 Encounters:   05/17/23 60.5 kg (133 lb 6.1 oz)     BP Readings from Last 3 Encounters:   05/17/23 116/78   04/03/23 108/73   03/15/23 108/64     Pulse Readings from Last 1 Encounters:   05/17/23 73     Body mass index is 25.2 kg/m².    Physical Exam  Vitals reviewed.   Constitutional:       Appearance: She is well-developed.   Neck:      Vascular: No carotid bruit or JVD.   Cardiovascular:      Rate and Rhythm: Normal rate and regular rhythm.      Pulses:           Carotid pulses are 2+ on the right side and 2+ on the left side.       Radial pulses are 2+ on the right side and 2+ on the left side.        Dorsalis pedis pulses are 2+ on the right side and 2+ on the left side.      Heart sounds: Normal heart sounds, S1 normal and S2 normal.   Abdominal:      General: Bowel sounds are normal.   Musculoskeletal:      Thoracic back: Tenderness present.   Neurological:      Mental Status: She is alert and oriented to person, place, and time.           Assessment  1. Atherosclerosis of aorta  Stable    2. Mixed  hyperlipidemia  On statin    3. Tortuous aorta  Stable    4.  Chronic pulmonary embolism requiring lifelong oral anticoagulation        Plan and Discussion  Continue current medications.  She will be getting labs with her PCP appointment next week.    Follow Up  6 months      Sotero Doty MD, F.A.C.C, F.S.C.A.I.        30 minutes were spent in chart review, documentation and review of results, and evaluation, treatment, and counseling of patient on the same day of service.    Disclaimer: This document was created using voice recognition software ([employer]). Although it may be edited, this document may contain errors related to incorrect recognition of the spoken word. Please call the physician if clarification is needed.

## 2023-05-22 ENCOUNTER — PATIENT MESSAGE (OUTPATIENT)
Dept: GASTROENTEROLOGY | Facility: CLINIC | Age: 73
End: 2023-05-22
Payer: MEDICARE

## 2023-05-22 DIAGNOSIS — R13.19 ESOPHAGEAL DYSPHAGIA: ICD-10-CM

## 2023-05-22 RX ORDER — OMEPRAZOLE 20 MG/1
CAPSULE, DELAYED RELEASE ORAL
Qty: 180 CAPSULE | Refills: 2 | Status: SHIPPED | OUTPATIENT
Start: 2023-05-22 | End: 2023-06-27 | Stop reason: SDUPTHER

## 2023-05-24 NOTE — TELEPHONE ENCOUNTER
Spoke with patient.  Scheduled for VCE on 6/14 at 7:30.  Prep instructions reviewed with patient over the phone and patient expressed understanding.   Confirmation along with written prep instructions mailed to patient.   Meliza

## 2023-05-25 ENCOUNTER — TELEPHONE (OUTPATIENT)
Dept: PAIN MEDICINE | Facility: CLINIC | Age: 73
End: 2023-05-25
Payer: MEDICARE

## 2023-05-25 ENCOUNTER — OFFICE VISIT (OUTPATIENT)
Dept: INTERNAL MEDICINE | Facility: CLINIC | Age: 73
End: 2023-05-25
Attending: INTERNAL MEDICINE
Payer: MEDICARE

## 2023-05-25 VITALS
BODY MASS INDEX: 25.39 KG/M2 | DIASTOLIC BLOOD PRESSURE: 64 MMHG | HEART RATE: 70 BPM | SYSTOLIC BLOOD PRESSURE: 130 MMHG | OXYGEN SATURATION: 98 % | HEIGHT: 61 IN | WEIGHT: 134.5 LBS

## 2023-05-25 DIAGNOSIS — Z86.718 HISTORY OF DVT OF LOWER EXTREMITY: ICD-10-CM

## 2023-05-25 DIAGNOSIS — M19.012 PRIMARY OSTEOARTHRITIS OF LEFT SHOULDER: Primary | ICD-10-CM

## 2023-05-25 DIAGNOSIS — Z86.2 HISTORY OF IRON DEFICIENCY ANEMIA: ICD-10-CM

## 2023-05-25 DIAGNOSIS — R20.8 OTHER DISTURBANCES OF SKIN SENSATION: ICD-10-CM

## 2023-05-25 DIAGNOSIS — N95.9 UNSPECIFIED MENOPAUSAL AND PERIMENOPAUSAL DISORDER: ICD-10-CM

## 2023-05-25 DIAGNOSIS — I70.0 ATHEROSCLEROSIS OF AORTA: ICD-10-CM

## 2023-05-25 DIAGNOSIS — N32.81 OAB (OVERACTIVE BLADDER): ICD-10-CM

## 2023-05-25 DIAGNOSIS — Z79.899 OTHER LONG TERM (CURRENT) DRUG THERAPY: ICD-10-CM

## 2023-05-25 DIAGNOSIS — Z78.0 POST-MENOPAUSAL: ICD-10-CM

## 2023-05-25 DIAGNOSIS — I77.1 TORTUOUS AORTA: ICD-10-CM

## 2023-05-25 DIAGNOSIS — M54.6 THORACIC SPINE PAIN: Primary | ICD-10-CM

## 2023-05-25 PROCEDURE — 99214 OFFICE O/P EST MOD 30 MIN: CPT | Mod: S$PBB,,, | Performed by: INTERNAL MEDICINE

## 2023-05-25 PROCEDURE — 99999 PR PBB SHADOW E&M-EST. PATIENT-LVL III: ICD-10-PCS | Mod: PBBFAC,,, | Performed by: INTERNAL MEDICINE

## 2023-05-25 PROCEDURE — 99214 PR OFFICE/OUTPT VISIT, EST, LEVL IV, 30-39 MIN: ICD-10-PCS | Mod: S$PBB,,, | Performed by: INTERNAL MEDICINE

## 2023-05-25 PROCEDURE — 99213 OFFICE O/P EST LOW 20 MIN: CPT | Mod: PBBFAC | Performed by: INTERNAL MEDICINE

## 2023-05-25 PROCEDURE — 99999 PR PBB SHADOW E&M-EST. PATIENT-LVL III: CPT | Mod: PBBFAC,,, | Performed by: INTERNAL MEDICINE

## 2023-05-25 NOTE — PROGRESS NOTES
"Subjective:       Patient ID: Jasson Pineda is a 72 y.o. female.    Chief Complaint: Back Pain and Chest Pain    Here for routine f/u    ### atypical CP ###  Consistently occurs when she lies down on her back she get back pain penetrating to her chest. She can not sleep on her elft side due to shouilder pain. She sleep on her righ side. When she lies flat on her back she develops a sharp, intense pain that radiates to her chest. No associated PND, orthopnea, wheezing, SOB, LE edema.   Was seen by cardiology and had holter done 07/27/2021 that was normal. Does not repsond to albuterol.  Normal exercise stress test July 2018. Hx of recurrent PE.   -5/18/22 IM CV previously referred to back and spine as I suspect atypical, positional chest is thoracic or paraspinal.  Only members have told her she has been forgetful. Once she is prompted she is able to recall. She lives alone, often helped takes care of her knees, able to maintain her finances and pay bills without any difficulty, armani [place], Dr.. Capable of full ADLs. She takes tizanidine 4 mg q.h.s. along with tramadol 50 mg b.i.d. pregabalin 150 mg b.i.d. and oxybutynin 10 mg. These medications and their side effects were discussed in the context of forgetfulness.   -08/2022 Reports having recent injections with zero change in symptoms. This was approx one month prior.   -She is s/p rectus sheath block on 3/1/2023. She reports 100% relief for 2 days. Her pain then returned to baseline. SPECt scan was normal       ### Nephrolithiasis -- OAB ###  Gross hematuria recurrent and urinary urgency.   12/22/20 CT urogram normal.   CTU shows 9mm non-obstructing left renal stone.   02/09/2021 Left ESWL   OAB She began ditropan 5 mg XL daily    5/18/22 oxybutynin has been increased to 10mg     ### Dysphagia ###  7/2018 normal EGD by Dr Foy at [facility] [place]-  07/2018 manometry "Ineffective esophageal motility (IEM: 50% or more ineffective swallows with DCI less than 450 " "mmHg/sec/cm).  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 There is a lower esophageal Schatzki's ring.   10/2019 ENT Dr Burgos "Variably obstructive CP bar, but symptoms are primarily thoracic/epigastic."  12/2019 GI clinic visit "This could be functional.  Other considerations could be untreated reflux or even esophageal dysmotility."  She continues to have periodic dysphagia to solids requiring her to drink a glass of water to get down.  No hx of recurrent oral ulcers, skin changes, Hx of misscarriages, chronic loose stools or abdomina pains, diffuse arthralgias, Hx of pericarditis. She is on lexapro and lyrica. Mood improved an is interested in weaning from this.   1/15/21 IM CV no acute issues  3/28/2022 EGD by Dr Foy at [facility] [place]. Normal aside from 1 cm type-I sliding hiatal hernia   06/2022 CV Dr Foy writes "Continue with current management and use of omeprazole 20 mg twice daily.  Will give more time for the medication to work.  No further intervention at this time.  Follow up with her pain specialist as planned. Follow-up with me in 2-3 months."  08/2022 We stopped her SSRI for >6 months and no change in GI symptoms  5/25/23 continues to endorse GE junction dysphagia with pain. Has resorted to eating only soups and jello for past 2 weeks.  Plan for video pill endocsopy           ### Recurrent PE ###  Anticoagulation managed by Dr shaikh in cardiology.  No bleeding complications.   All chronic symptoms of positional CP when she lies flat on her back, forgetfulness and anxiety, dysphagia to solids, SPICER (sedenatary).  She is s/p rectus sheath block with short term relief.              Review of Systems   Constitutional:  Negative for chills, fatigue, fever and unexpected weight change.   HENT:  Negative for ear pain, hearing loss, postnasal drip, tinnitus, trouble swallowing and voice change.    Respiratory:  Negative for cough, chest tightness, shortness of breath and wheezing.  " "  Cardiovascular:  Positive for chest pain. Negative for palpitations and leg swelling.   Gastrointestinal:  Negative for abdominal pain, blood in stool, diarrhea, nausea and vomiting.   Endocrine: Negative for polydipsia, polyphagia and polyuria.   Genitourinary:  Negative for difficulty urinating, dysuria, hematuria and vaginal bleeding.   Skin:  Negative for rash.   Allergic/Immunologic: Negative for food allergies.   Neurological:  Negative for dizziness, numbness and headaches.   Hematological:  Does not bruise/bleed easily.   Psychiatric/Behavioral:  The patient is not nervous/anxious.      Objective:      Vitals:    05/25/23 1501   BP: 130/64   Pulse: 70   SpO2: 98%   Weight: 61 kg (134 lb 7.7 oz)   Height: 5' 1" (1.549 m)      Physical Exam  Vitals and nursing note reviewed.   Constitutional:       General: She is not in acute distress.     Appearance: Normal appearance. She is well-developed.   HENT:      Head: Normocephalic and atraumatic.      Mouth/Throat:      Pharynx: No oropharyngeal exudate.   Eyes:      General: No scleral icterus.     Conjunctiva/sclera: Conjunctivae normal.      Pupils: Pupils are equal, round, and reactive to light.   Neck:      Thyroid: No thyromegaly.   Cardiovascular:      Rate and Rhythm: Normal rate and regular rhythm.      Heart sounds: Normal heart sounds. No murmur heard.  Pulmonary:      Effort: Pulmonary effort is normal.      Breath sounds: Normal breath sounds. No wheezing or rales.   Abdominal:      General: There is no distension.   Musculoskeletal:         General: No tenderness.   Lymphadenopathy:      Cervical: No cervical adenopathy.   Skin:     General: Skin is warm and dry.   Neurological:      Mental Status: She is alert and oriented to person, place, and time.   Psychiatric:         Behavior: Behavior normal.       Assessment:       1. Primary osteoarthritis of left shoulder    2. OAB (overactive bladder)    3. History of DVT of lower extremity    4. " Tortuous aorta    5. History of iron deficiency anemia    6. Atherosclerosis of aorta    7. Post-menopausal    8. Unspecified menopausal and perimenopausal disorder    9. Other disturbances of skin sensation    10. Other long term (current) drug therapy        Plan:       Jasson was seen today for back pain and chest pain.    Diagnoses and all orders for this visit:    Primary osteoarthritis of left shoulder  -     DXA Bone Density Axial Skeleton 1 or more sites; Future    OAB (overactive bladder)  -     Comprehensive Metabolic Panel; Future    History of DVT of lower extremity    Tortuous aorta    History of iron deficiency anemia  -     CBC Auto Differential; Future  -     Reticulocytes; Future  -     Vitamin B12; Future  -     Ferritin; Future  -     Iron and TIBC; Future    Atherosclerosis of aorta  -     Comprehensive Metabolic Panel; Future    Post-menopausal  -     Vitamin D; Future    Unspecified menopausal and perimenopausal disorder  -     DXA Bone Density Axial Skeleton 1 or more sites; Future    Other disturbances of skin sensation  -     Vitamin B12; Future    Other long term (current) drug therapy  -     Vitamin D; Future           Osbaldo Mendez MD  Internal Medicine-[facility]        Side effects of medication(s) were discussed in detail and patient voiced understanding.  Patient will call back for any issues or complications.

## 2023-05-30 ENCOUNTER — HOSPITAL ENCOUNTER (OUTPATIENT)
Dept: RADIOLOGY | Facility: OTHER | Age: 73
Discharge: HOME | End: 2023-05-30
Attending: INTERNAL MEDICINE
Payer: MEDICARE

## 2023-05-30 DIAGNOSIS — M19.012 PRIMARY OSTEOARTHRITIS OF LEFT SHOULDER: ICD-10-CM

## 2023-05-30 DIAGNOSIS — N95.9 UNSPECIFIED MENOPAUSAL AND PERIMENOPAUSAL DISORDER: ICD-10-CM

## 2023-05-30 PROCEDURE — 77080 DXA BONE DENSITY AXIAL: CPT | Mod: 26,,, | Performed by: RADIOLOGY

## 2023-05-30 PROCEDURE — 77080 DXA BONE DENSITY AXIAL SKELETON 1 OR MORE SITES: ICD-10-PCS | Mod: 26,,, | Performed by: RADIOLOGY

## 2023-05-30 PROCEDURE — 77080 DXA BONE DENSITY AXIAL: CPT | Mod: TC

## 2023-05-30 NOTE — PROGRESS NOTES
Your DXA, or bone scan, is consistent with osteopenia.  This means the density of your bones is mildly lower than normal.  You do not have osteoporosis, which is more severe.    I recommend taking over the counter calcium and vitamin D, such as citracal-D or os-messi-D, one tablet daily. I also recommend weight bearing exercises, for example resistance training, jogging, jumping, and/or walking, for 30 minutes a day at least 3 days a week. Also if you are a smoker, continuing to smoke will only accelerate your bone loss.

## 2023-06-05 ENCOUNTER — TELEPHONE (OUTPATIENT)
Dept: PAIN MEDICINE | Facility: CLINIC | Age: 73
End: 2023-06-05
Payer: MEDICARE

## 2023-06-05 NOTE — TELEPHONE ENCOUNTER
Phoned patient and discussed procedure details. She verbalized understanding and would like to proceed.

## 2023-06-05 NOTE — TELEPHONE ENCOUNTER
----- Message from Daisy Logan sent at 5/29/2023  3:19 PM CDT -----  Called pt. To scheduled T6-T7 Koki with Dr. Easley, pt. States she don't know anything about it, please call pt. To explain.

## 2023-06-07 ENCOUNTER — TELEPHONE (OUTPATIENT)
Dept: PAIN MEDICINE | Facility: OTHER | Age: 73
End: 2023-06-07
Payer: MEDICARE

## 2023-06-07 NOTE — TELEPHONE ENCOUNTER
----- Message from Concha Walker NP sent at 6/5/2023  3:24 PM CDT -----  Spoke with her and went over procedure details. She is now ready to schedule.   ----- Message -----  From: Daisy Ford  Sent: 5/29/2023   3:20 PM CDT  To: Concha Walker NP    Called pt. To scheduled T6-T7 Koki with Dr. Easley, pt. States she don't know anything about it, please call pt. To explain.

## 2023-06-08 ENCOUNTER — PATIENT MESSAGE (OUTPATIENT)
Dept: PAIN MEDICINE | Facility: OTHER | Age: 73
End: 2023-06-08
Payer: MEDICARE

## 2023-06-09 ENCOUNTER — PATIENT MESSAGE (OUTPATIENT)
Dept: CARDIOLOGY | Facility: CLINIC | Age: 73
End: 2023-06-09
Payer: MEDICARE

## 2023-06-14 ENCOUNTER — TELEPHONE (OUTPATIENT)
Dept: GASTROENTEROLOGY | Facility: CLINIC | Age: 73
End: 2023-06-14
Payer: MEDICARE

## 2023-06-14 ENCOUNTER — CLINICAL SUPPORT (OUTPATIENT)
Dept: GASTROENTEROLOGY | Facility: CLINIC | Age: 73
End: 2023-06-14
Payer: MEDICARE

## 2023-06-14 DIAGNOSIS — D50.0 IRON DEFICIENCY ANEMIA DUE TO CHRONIC BLOOD LOSS: ICD-10-CM

## 2023-06-14 NOTE — TELEPHONE ENCOUNTER
Instructions for the day reviewed with patient.  All questions answered to her satisfaction.   Patient swallowed capsule without incident.   Meliza

## 2023-06-15 ENCOUNTER — TELEPHONE (OUTPATIENT)
Dept: ADMINISTRATIVE | Facility: OTHER | Age: 73
End: 2023-06-15
Payer: MEDICARE

## 2023-06-16 PROCEDURE — 91110 PR GI TRACT CAPSULE ENDOSCOPY: ICD-10-PCS | Mod: 26,GC,, | Performed by: INTERNAL MEDICINE

## 2023-06-16 PROCEDURE — 91110 GI TRC IMG INTRAL ESOPH-ILE: CPT | Mod: 26,GC,, | Performed by: INTERNAL MEDICINE

## 2023-06-19 ENCOUNTER — CLINICAL SUPPORT (OUTPATIENT)
Dept: AUDIOLOGY | Facility: CLINIC | Age: 73
End: 2023-06-19
Payer: MEDICARE

## 2023-06-19 ENCOUNTER — APPOINTMENT (OUTPATIENT)
Dept: AUDIOLOGY | Facility: CLINIC | Age: 73
End: 2023-06-19
Payer: MEDICARE

## 2023-06-19 ENCOUNTER — OFFICE VISIT (OUTPATIENT)
Dept: OTOLARYNGOLOGY | Facility: CLINIC | Age: 73
End: 2023-06-19
Payer: MEDICARE

## 2023-06-19 VITALS — BODY MASS INDEX: 25.58 KG/M2 | WEIGHT: 135.38 LBS

## 2023-06-19 DIAGNOSIS — R42 DIZZINESS: ICD-10-CM

## 2023-06-19 DIAGNOSIS — H90.3 SENSORINEURAL HEARING LOSS (SNHL), BILATERAL: Primary | ICD-10-CM

## 2023-06-19 DIAGNOSIS — H91.90 HEARING LOSS, UNSPECIFIED HEARING LOSS TYPE, UNSPECIFIED LATERALITY: ICD-10-CM

## 2023-06-19 PROCEDURE — 99999 PR PBB SHADOW E&M-EST. PATIENT-LVL III: CPT | Mod: PBBFAC,,, | Performed by: OTOLARYNGOLOGY

## 2023-06-19 PROCEDURE — 99203 PR OFFICE/OUTPT VISIT, NEW, LEVL III, 30-44 MIN: ICD-10-PCS | Mod: S$PBB,,, | Performed by: OTOLARYNGOLOGY

## 2023-06-19 PROCEDURE — 92557 COMPREHENSIVE HEARING TEST: CPT | Mod: PBBFAC

## 2023-06-19 PROCEDURE — 92557 COMPREHENSIVE HEARING TEST: CPT | Mod: PBBFAC | Performed by: OTOLARYNGOLOGY

## 2023-06-19 PROCEDURE — 99213 OFFICE O/P EST LOW 20 MIN: CPT | Mod: PBBFAC | Performed by: OTOLARYNGOLOGY

## 2023-06-19 PROCEDURE — 92567 TYMPANOMETRY: CPT | Mod: PBBFAC | Performed by: OTOLARYNGOLOGY

## 2023-06-19 PROCEDURE — 92567 TYMPANOMETRY: CPT | Mod: PBBFAC

## 2023-06-19 PROCEDURE — 99999 PR PBB SHADOW E&M-EST. PATIENT-LVL III: ICD-10-PCS | Mod: PBBFAC,,, | Performed by: OTOLARYNGOLOGY

## 2023-06-19 PROCEDURE — 99203 OFFICE O/P NEW LOW 30 MIN: CPT | Mod: S$PBB,,, | Performed by: OTOLARYNGOLOGY

## 2023-06-19 NOTE — PROGRESS NOTES
"Subjective     Patient ID: Jasson Pineda is a 72 y.o. female.    Chief Complaint: Hearing Loss (Patient stated,"it's been about 1-2 years when I have noticed.")    HPI: Hx of Asael Prog HL.    No sog Fam Hx.    Trouble in noise.    ? No prior tests.    Past Medical History: Patient has a past medical history of Anticoagulant long-term use, Chronic back pain, Chronic neck pain, DVT (deep venous thrombosis), Herniated disc, cervical, Kidney stone, Lumbar herniated disc, PE (pulmonary embolism), Pulmonary emboli, and Pulmonary embolism.    Past Surgical History: Patient has a past surgical history that includes Kidney stone surgery; left knee surgery; Tonsillectomy;  section; Hernia repair; Total shoulder arthroplasty (Right); Breast cyst excision (Left); Esophagogastroduodenoscopy (N/A, 2018); Colonoscopy (N/A, 2018); Esophageal manometry with measurement of impedance (N/A, 2018); Shoulder arthroscopy (Left, 2019); Arthroscopic repair of rotator cuff of shoulder (Left, 2019); Arthroscopy of shoulder with removal of distal clavicle (Left, 2019); Arthroscopic tenotomy of biceps tendon (Left, 2019); Extracorporeal shock wave lithotripsy (Left, 2021); and Esophagogastroduodenoscopy (N/A, 2022).    Social History: Patient reports that she has never smoked. She has never used smokeless tobacco. She reports that she does not drink alcohol and does not use drugs.    Family History: family history includes Arthritis in her sister; Breast cancer (age of onset: 45) in her maternal cousin; Breast cancer (age of onset: 50) in her maternal aunt; Cervical cancer in her maternal aunt; Colon cancer (age of onset: 74) in her mother; Diabetes in her sister; Liver cancer in her maternal grandfather and sister; Lupus in her daughter and sister; No Known Problems in her daughter and father; Ovarian cancer in her mother.    Medications:   Current Outpatient Medications "   Medication Sig    cholecalciferol, vitamin D3, 1,250 mcg (50,000 unit) capsule Take 1 capsule (50,000 Units total) by mouth once a week.    enoxaparin (LOVENOX) 60 mg/0.6 mL Syrg Inject 0.6 mLs (60 mg total) into the skin 2 (two) times a day.    omeprazole (PRILOSEC) 20 MG capsule TAKE 1 CAPSULE BY MOUTH TWICE DAILY BEFORE MEALS    pregabalin (LYRICA) 150 MG capsule Take 150 mg by mouth 2 (two) times daily.    tiZANidine (ZANAFLEX) 4 MG tablet TAKE 1 TABLET BY MOUTH AT BEDTIME AS NEEDED FOR MUSCLE RELAXER    traMADol (ULTRAM) 50 mg tablet Take 50 mg by mouth 2 (two) times daily as needed.    XARELTO 20 mg Tab Take 1 tablet (20 mg total) by mouth every evening.    atorvastatin (LIPITOR) 80 MG tablet TAKE 1 TABLET BY MOUTH EVERY DAY (Patient not taking: Reported on 2023)     No current facility-administered medications for this visit.       Allergies: Patient is allergic to sulfa (sulfonamide antibiotics).    Past Medical History: Patient has a past medical history of Anticoagulant long-term use, Chronic back pain, Chronic neck pain, DVT (deep venous thrombosis), Herniated disc, cervical, Kidney stone, Lumbar herniated disc, PE (pulmonary embolism), Pulmonary emboli, and Pulmonary embolism.    Past Surgical History: Patient has a past surgical history that includes Kidney stone surgery; left knee surgery; Tonsillectomy;  section; Hernia repair; Total shoulder arthroplasty (Right); Breast cyst excision (Left); Esophagogastroduodenoscopy (N/A, 2018); Colonoscopy (N/A, 2018); Esophageal manometry with measurement of impedance (N/A, 2018); Shoulder arthroscopy (Left, 2019); Arthroscopic repair of rotator cuff of shoulder (Left, 2019); Arthroscopy of shoulder with removal of distal clavicle (Left, 2019); Arthroscopic tenotomy of biceps tendon (Left, 2019); Extracorporeal shock wave lithotripsy (Left, 2021); and Esophagogastroduodenoscopy (N/A,  03/28/2022).    Social History: Patient reports that she has never smoked. She has never used smokeless tobacco. She reports that she does not drink alcohol and does not use drugs.    Family History: family history includes Arthritis in her sister; Breast cancer (age of onset: 45) in her maternal cousin; Breast cancer (age of onset: 50) in her maternal aunt; Cervical cancer in her maternal aunt; Colon cancer (age of onset: 74) in her mother; Diabetes in her sister; Liver cancer in her maternal grandfather and sister; Lupus in her daughter and sister; No Known Problems in her daughter and father; Ovarian cancer in her mother.    Medications:   Current Outpatient Medications   Medication Sig    cholecalciferol, vitamin D3, 1,250 mcg (50,000 unit) capsule Take 1 capsule (50,000 Units total) by mouth once a week.    enoxaparin (LOVENOX) 60 mg/0.6 mL Syrg Inject 0.6 mLs (60 mg total) into the skin 2 (two) times a day.    omeprazole (PRILOSEC) 20 MG capsule TAKE 1 CAPSULE BY MOUTH TWICE DAILY BEFORE MEALS    pregabalin (LYRICA) 150 MG capsule Take 150 mg by mouth 2 (two) times daily.    tiZANidine (ZANAFLEX) 4 MG tablet TAKE 1 TABLET BY MOUTH AT BEDTIME AS NEEDED FOR MUSCLE RELAXER    traMADol (ULTRAM) 50 mg tablet Take 50 mg by mouth 2 (two) times daily as needed.    XARELTO 20 mg Tab Take 1 tablet (20 mg total) by mouth every evening.    atorvastatin (LIPITOR) 80 MG tablet TAKE 1 TABLET BY MOUTH EVERY DAY (Patient not taking: Reported on 6/19/2023)     No current facility-administered medications for this visit.       Allergies: Patient is allergic to sulfa (sulfonamide antibiotics).    Review of Systems   Constitutional:  Negative for appetite change, chills, fatigue and fever.   HENT:  Positive for hearing loss. Negative for nasal congestion, ear discharge, facial swelling, postnasal drip, rhinorrhea, sore throat, tinnitus and trouble swallowing.    Eyes:  Negative for photophobia, pain, discharge, redness, itching  and visual disturbance.   Respiratory:  Negative for apnea, cough, choking, chest tightness, shortness of breath, wheezing and stridor.    Cardiovascular:  Negative for chest pain and palpitations.   Gastrointestinal:  Negative for abdominal pain, constipation, diarrhea, nausea and vomiting.   Musculoskeletal:  Negative for arthralgias, gait problem, joint swelling, myalgias, neck pain and neck stiffness.   Integumentary:  Negative for color change, pallor, rash and wound.   Neurological:  Negative for dizziness, tremors, seizures, syncope, facial asymmetry, speech difficulty, weakness, light-headedness, numbness and headaches.   Hematological:  Negative for adenopathy. Does not bruise/bleed easily.   Psychiatric/Behavioral:  Negative for agitation, behavioral problems, confusion, decreased concentration, dysphoric mood, hallucinations, sleep disturbance and suicidal ideas. The patient is not nervous/anxious and is not hyperactive.         Objective     Physical Exam  Vitals and nursing note reviewed.   Constitutional:       General: She is not in acute distress.     Appearance: Normal appearance. She is well-developed. She is not ill-appearing, toxic-appearing or diaphoretic.   HENT:      Head: Normocephalic and atraumatic. Not macrocephalic and not microcephalic. No raccoon eyes, Marie's sign, abrasion, contusion, right periorbital erythema, left periorbital erythema or laceration. Hair is normal.      Right Ear: Ear canal normal. Decreased hearing noted. No laceration, drainage, swelling or tenderness. No middle ear effusion. No foreign body. No mastoid tenderness. No hemotympanum. Tympanic membrane is not injected, scarred, perforated, erythematous, retracted or bulging. Tympanic membrane has normal mobility.      Left Ear: Ear canal normal. Decreased hearing noted. No laceration, drainage, swelling or tenderness.  No middle ear effusion. No foreign body. No mastoid tenderness. No hemotympanum. Tympanic  membrane is not injected, scarred, perforated, erythematous, retracted or bulging. Tympanic membrane has normal mobility.      Nose: No nasal deformity, septal deviation, laceration, mucosal edema or rhinorrhea.      Right Sinus: No maxillary sinus tenderness.      Left Sinus: No maxillary sinus tenderness.   Eyes:      General: Lids are normal.      Conjunctiva/sclera: Conjunctivae normal.      Pupils: Pupils are equal, round, and reactive to light.   Neck:      Thyroid: No thyroid mass or thyromegaly.      Vascular: No JVD.      Trachea: No tracheal tenderness or tracheal deviation.   Cardiovascular:      Rate and Rhythm: Normal rate and regular rhythm.   Pulmonary:      Effort: Pulmonary effort is normal. No tachypnea, bradypnea, accessory muscle usage or respiratory distress.      Breath sounds: No stridor.   Abdominal:      Palpations: Abdomen is soft.   Musculoskeletal:         General: Normal range of motion.      Cervical back: Normal range of motion and neck supple. No edema, erythema or rigidity. No muscular tenderness. Normal range of motion.   Lymphadenopathy:      Head:      Right side of head: No submental, submandibular, tonsillar, preauricular or posterior auricular adenopathy.      Left side of head: No submental, submandibular, tonsillar, preauricular or posterior auricular adenopathy.      Cervical: No cervical adenopathy.      Right cervical: No superficial, deep or posterior cervical adenopathy.     Left cervical: No superficial, deep or posterior cervical adenopathy.   Skin:     General: Skin is warm and dry.      Coloration: Skin is not pale.      Findings: No abrasion, bruising, burn, ecchymosis, erythema, laceration, lesion or rash.   Neurological:      Mental Status: She is alert and oriented to person, place, and time.      Cranial Nerves: No cranial nerve deficit.      Sensory: No sensory deficit.      Motor: No tremor, atrophy, abnormal muscle tone or seizure activity.   Psychiatric:          Speech: She is communicative. Speech is not rapid and pressured or slurred.         Behavior: Behavior normal. Behavior is cooperative.         Thought Content: Thought content normal.         Judgment: Judgment normal.            Assessment and Plan     1. Hearing loss, unspecified hearing loss type, unspecified laterality  -     Ambulatory referral/consult to ENT        Patient to see Audiology for hearing aid evaluation.    F/U prn         No follow-ups on file.

## 2023-06-19 NOTE — PROGRESS NOTES
Jasson Pineda, a 72 y.o. female, was seen today in the clinic for an audiologic evaluation.  Ms. Pineda reported difficulty hearing.  She also reported she experiences an off-balance dizziness when she gets up from sitting or laying down.  The patient denied aural fullness, otalgia, and tinnitus.    Tympanometry revealed Type A in the right ear and Type A in the left ear.  Audiogram results revealed a normal sloping to moderately-severe sensorineural hearing loss (SNHL), bilaterally.  Speech reception thresholds were noted at 25 dB in the right ear and 35 dB in the left ear.  Speech discrimination scores were 96% in the right ear and 92% in the left ear.    I advised the patient to contact her insurance to inquire if she has hearing aid coverage and if she does, where she can use it, since we do not accept insurance for hearing aids at our clinic.  I advised the patient that she is also more than welcome to schedule a hearing aid consult at our clinic.     Recommendations:  Otologic evaluation  Hearing protection when in noise  Hearing aid consultation  Annual audiogram or sooner if change in hearing is perceived

## 2023-06-19 NOTE — PROGRESS NOTES
Jasson Pineda, a 72 y.o. female, was seen today in the clinic for an audiologic evaluation.  The patient's main complaint was difficulty hearing.  Ms. Pineda reported she experiences an off-balance dizziness whenever she gets up from standing or laying down.  The patient denied aural fullness, otalgia, and tinnitus.    Tympanometry revealed Type A in the right ear and Type A in the left ear.  Audiogram results revealed a normal sloping to moderately-severe sensorineural hearing loss (SNHL), bilaterally.  Speech reception thresholds were noted at 25 dB in the right ear and 35 dB in the left ear.  Speech discrimination scores were 96% in the right ear and 92% in the left ear.    I advised the patient to contact her insurance to inquire if she has any hearing aid coverage and if she does, where she can use it since we do not accept insurance for hearing aids at our clinic.  I advised the patient that she may also schedule a hearing aid consult at our clinic.     Recommendations:  Otologic evaluation  Hearing protection when in noise  Hearing aid consultation  Annual audiogram or sooner if change in hearing is perceived

## 2023-06-27 ENCOUNTER — PATIENT MESSAGE (OUTPATIENT)
Dept: INTERNAL MEDICINE | Facility: CLINIC | Age: 73
End: 2023-06-27
Payer: MEDICARE

## 2023-06-27 DIAGNOSIS — R13.19 ESOPHAGEAL DYSPHAGIA: ICD-10-CM

## 2023-06-27 RX ORDER — OMEPRAZOLE 20 MG/1
20 CAPSULE, DELAYED RELEASE ORAL
Qty: 180 CAPSULE | Refills: 3 | Status: SHIPPED | OUTPATIENT
Start: 2023-06-27 | End: 2024-01-11 | Stop reason: SDUPTHER

## 2023-06-27 NOTE — TELEPHONE ENCOUNTER
Refill Decision Note   Jasson Pineda  is requesting a refill authorization.  Brief Assessment and Rationale for Refill:  Approve     Medication Therapy Plan:       Medication Reconciliation Completed: No   Comments:     No Care Gaps recommended.     Note composed:3:31 PM 06/27/2023

## 2023-06-27 NOTE — TELEPHONE ENCOUNTER
No care due was identified.  [facility] Embedded Care Due Messages. Reference number: [id number].   6/27/2023 2:16:12 PM CDT

## 2023-07-17 ENCOUNTER — PATIENT MESSAGE (OUTPATIENT)
Dept: CARDIOLOGY | Facility: CLINIC | Age: 73
End: 2023-07-17
Payer: MEDICARE

## 2023-07-17 DIAGNOSIS — I27.82 CHRONIC PULMONARY EMBOLISM WITHOUT ACUTE COR PULMONALE, UNSPECIFIED PULMONARY EMBOLISM TYPE: Primary | ICD-10-CM

## 2023-07-19 RX ORDER — ENOXAPARIN SODIUM 100 MG/ML
1 INJECTION SUBCUTANEOUS 2 TIMES DAILY
Qty: 4 EACH | Refills: 1 | Status: SHIPPED | OUTPATIENT
Start: 2023-07-19 | End: 2024-04-01

## 2023-07-24 ENCOUNTER — HOSPITAL ENCOUNTER (OUTPATIENT)
Facility: OTHER | Age: 73
Discharge: HOME | End: 2023-07-24
Attending: ANESTHESIOLOGY | Admitting: ANESTHESIOLOGY
Payer: MEDICARE

## 2023-07-24 VITALS
WEIGHT: 135 LBS | BODY MASS INDEX: 25.49 KG/M2 | SYSTOLIC BLOOD PRESSURE: 112 MMHG | TEMPERATURE: 98 F | HEIGHT: 61 IN | DIASTOLIC BLOOD PRESSURE: 59 MMHG | OXYGEN SATURATION: 98 % | RESPIRATION RATE: 16 BRPM | HEART RATE: 68 BPM

## 2023-07-24 DIAGNOSIS — G89.29 CHRONIC PAIN: ICD-10-CM

## 2023-07-24 DIAGNOSIS — R07.89 ATYPICAL CHEST PAIN: Primary | ICD-10-CM

## 2023-07-24 DIAGNOSIS — M54.6 CHRONIC THORACIC SPINE PAIN: ICD-10-CM

## 2023-07-24 DIAGNOSIS — G89.29 CHRONIC THORACIC SPINE PAIN: ICD-10-CM

## 2023-07-24 PROCEDURE — 25000003 PHARM REV CODE 250: Performed by: STUDENT IN AN ORGANIZED HEALTH CARE EDUCATION/TRAINING PROGRAM

## 2023-07-24 PROCEDURE — 63600175 PHARM REV CODE 636 W HCPCS: Performed by: ANESTHESIOLOGY

## 2023-07-24 PROCEDURE — 64479 NJX AA&/STRD TFRM EPI C/T 1: CPT | Mod: 50 | Performed by: ANESTHESIOLOGY

## 2023-07-24 PROCEDURE — 25000003 PHARM REV CODE 250: Performed by: ANESTHESIOLOGY

## 2023-07-24 PROCEDURE — 64479 NJX AA&/STRD TFRM EPI C/T 1: CPT | Mod: 50,,, | Performed by: ANESTHESIOLOGY

## 2023-07-24 PROCEDURE — 64479 PR INJECT ANES/STEROID FORAMEN CERV/THORACIC W IMG GUIDE ,1 LEVEL: ICD-10-PCS | Mod: 50,,, | Performed by: ANESTHESIOLOGY

## 2023-07-24 PROCEDURE — 25500020 PHARM REV CODE 255: Performed by: ANESTHESIOLOGY

## 2023-07-24 RX ORDER — DEXAMETHASONE SODIUM PHOSPHATE 10 MG/ML
INJECTION INTRAMUSCULAR; INTRAVENOUS
Status: DISCONTINUED | OUTPATIENT
Start: 2023-07-24 | End: 2023-07-24 | Stop reason: HOSPADM

## 2023-07-24 RX ORDER — SODIUM CHLORIDE 9 MG/ML
INJECTION, SOLUTION INTRAVENOUS CONTINUOUS
Status: DISCONTINUED | OUTPATIENT
Start: 2023-07-24 | End: 2023-07-24 | Stop reason: HOSPADM

## 2023-07-24 RX ORDER — LIDOCAINE HYDROCHLORIDE 20 MG/ML
INJECTION, SOLUTION INFILTRATION; PERINEURAL
Status: DISCONTINUED | OUTPATIENT
Start: 2023-07-24 | End: 2023-07-24 | Stop reason: HOSPADM

## 2023-07-24 RX ORDER — FENTANYL CITRATE 50 UG/ML
INJECTION, SOLUTION INTRAMUSCULAR; INTRAVENOUS
Status: DISCONTINUED | OUTPATIENT
Start: 2023-07-24 | End: 2023-07-24 | Stop reason: HOSPADM

## 2023-07-24 RX ORDER — MIDAZOLAM HYDROCHLORIDE 1 MG/ML
INJECTION INTRAMUSCULAR; INTRAVENOUS
Status: DISCONTINUED | OUTPATIENT
Start: 2023-07-24 | End: 2023-07-24 | Stop reason: HOSPADM

## 2023-07-24 RX ORDER — LIDOCAINE HYDROCHLORIDE 10 MG/ML
INJECTION, SOLUTION EPIDURAL; INFILTRATION; INTRACAUDAL; PERINEURAL
Status: DISCONTINUED | OUTPATIENT
Start: 2023-07-24 | End: 2023-07-24 | Stop reason: HOSPADM

## 2023-07-24 NOTE — DISCHARGE INSTRUCTIONS

## 2023-07-24 NOTE — OP NOTE
Thoracic Transforaminal Epidural Steroid Injection  Bilateral  T6/7 under Fluoroscopic Guidance    The procedure, risks, benefits, and options were discussed with the patient. There are no contraindications to the procedure. The patent expressed understanding and agreed to the procedure. Informed written consent was obtained prior to the start of the procedure and can be found in the patient's chart.    PATIENT NAME: Jasson Pineda   MRN: [id number]     DATE OF PROCEDURE: 07/24/2023    PROCEDURE: Thoracic Transforaminal Epidural Steroid Injection Bilateral  T6/7 under Fluoroscopic Guidance    PRE-OP DIAGNOSIS: Thoracic spine pain [M54.6] Thoracic radiculopathy [M54.14]    POST-OP DIAGNOSIS: Same    PHYSICIAN: Bill Easley MD    ASSISTANTS: Miguel Aguilera, M.D. [facility] Fellow  Armand Shearer MD Fellow       MEDICATIONS INJECTED: Preservative-free Decadron 10mg with 1cc of Lidocaine 1% MPF     LOCAL ANESTHETIC INJECTED: Xylocaine 2%     SEDATION: Versed 2mg and Fentanyl 50mcg                                                                                                                                                                                     Conscious sedation ordered by M.D. Patient re-evaluation prior to administration of conscious sedation. No changes noted in patient's status from initial evaluation. The patient's vital signs were monitored by RN and patient remained hemodynamically stable throughout the procedure.    Event Time In   Sedation Start 1356   Sedation End 1408       ESTIMATED BLOOD LOSS: None    COMPLICATIONS: None    TECHNIQUE: Time-out was performed to identify the patient and procedure to be performed. With the patient laying in the prone position, the surgical area was prepped and draped in the usual sterile fashion using ChloraPrep and a fenestrated drape. The levels were determined under fluoroscopy guidance. Skin anesthesia was achieved by injecting Lidocaine 2% over the  injection sites. The transforaminal spaces were then approached with a 25 gauge, 3.5 inch spinal quinke needle that was introduced under fluoroscopic guidance in the AP and Lateral views. Once the needle tip was in the area of the transforaminal space, and there was no blood, CSF or paraesthesias, contrast dye Omnipaque (300mg/mL) was injected to confirm placement and there was no vascular runoff. Fluoroscopic imaging in the AP and lateral views revealed a clear outline of the spinal nerve with proximal spread of agent through the neural foramen into the epidural space. 1.5 mL of the medication mixture listed above was injected slowly. Displacement of the radio opaque contrast after injection of the medication confirmed that the medication went into the area of the transforaminal spaces. The needles were removed and bleeding was nil. A sterile dressing was applied. No specimens collected. The patient tolerated the procedure well.     PAIN BEFORE THE PROCEDURE: 7-10/10    PAIN AFTER THE PROCEDURE: 5/10    The patient was monitored after the procedure in the recovery area. They were given post-procedure and discharge instructions to follow at home. The patient was discharged in a stable condition.    Maicol Ramires M.D. [facility]      I reviewed and edited the fellow's note. I conducted my own interview and physical examination. I agree with the findings. I was present and supervising all critical portions of the procedure.

## 2023-07-24 NOTE — DISCHARGE SUMMARY
Discharge Note  Short Stay      SUMMARY     Admit Date: 7/24/2023    Attending Physician: VALERIE FRITZ      Discharge Physician: VALERIE FRITZ      Discharge Date: 7/24/2023 2:13 PM    Procedure(s) (LRB):  TRANSFORAMINAL EPIDURAL INJECTION, T6-T7 clear to hold Xarelto 2 days (Bilateral)    Final Diagnosis: Thoracic spine pain [M54.6]    Disposition: Home or self care    Patient Instructions:   Current Discharge Medication List        CONTINUE these medications which have NOT CHANGED    Details   atorvastatin (LIPITOR) 80 MG tablet TAKE 1 TABLET BY MOUTH EVERY DAY  Qty: 90 tablet, Refills: 3    Associated Diagnoses: Chest pain, unspecified type; Gastroesophageal reflux disease; Dysphagia, unspecified type; Other specified coagulation defects      cholecalciferol, vitamin D3, 1,250 mcg (50,000 unit) capsule Take 1 capsule (50,000 Units total) by mouth once a week.  Qty: 12 capsule, Refills: 3      enoxaparin (LOVENOX) 60 mg/0.6 mL Syrg Inject 0.6 mLs (60 mg total) into the skin 2 (two) times daily.  Qty: 4 each, Refills: 1    Associated Diagnoses: Chronic pulmonary embolism without acute cor pulmonale, unspecified pulmonary embolism type      omeprazole (PRILOSEC) 20 MG capsule Take 1 capsule (20 mg total) by mouth 2 (two) times daily before meals.  Qty: 180 capsule, Refills: 3    Associated Diagnoses: Esophageal dysphagia      pregabalin (LYRICA) 150 MG capsule Take 150 mg by mouth 2 (two) times daily.      tiZANidine (ZANAFLEX) 4 MG tablet TAKE 1 TABLET BY MOUTH AT BEDTIME AS NEEDED FOR MUSCLE RELAXER      traMADol (ULTRAM) 50 mg tablet Take 50 mg by mouth 2 (two) times daily as needed.  Refills: 1      XARELTO 20 mg Tab Take 1 tablet (20 mg total) by mouth every evening.  Qty: 90 tablet, Refills: 3    Associated Diagnoses: Pulmonary embolism, unspecified chronicity, unspecified pulmonary embolism type, unspecified whether acute cor pulmonale present                 Discharge Diagnosis: Thoracic spine pain  [M54.6]  Condition on Discharge: Stable with no complications to procedure   Diet on Discharge: Same as before.  Activity: as per instruction sheet.  Discharge to: Home with a responsible adult.  Follow up: 2-4 weeks       Please call my office or pager at [phone] if experienced any weakness or loss of sensation, fever > 101.5, pain uncontrolled with oral medications, persistent nausea/vomiting/or diarrhea, redness or drainage from the incisions, or any other worrisome concerns. If physician on call was not reached or could not communicate with our office for any reason please go to the nearest emergency department

## 2023-07-24 NOTE — H&P
HPI  Patient presenting for Procedure(s) (LRB):  INJECTION, STEROID, EPIDURAL, T6-T7 clear to hold Xarelto 2 days (N/A)     Patient on Anti-coagulation No    No health changes since previous encounter    Past Medical History:   Diagnosis Date    Anticoagulant long-term use     xarelto    Chronic back pain     Chronic neck pain     DVT (deep venous thrombosis)     Herniated disc, cervical     Kidney stone     Lumbar herniated disc     PE (pulmonary embolism)         Pulmonary emboli     after surgery    Pulmonary embolism      Past Surgical History:   Procedure Laterality Date    ARTHROSCOPIC REPAIR OF ROTATOR CUFF OF SHOULDER Left 2019    Procedure: REPAIR, ROTATOR CUFF, ARTHROSCOPIC;  Surgeon: Sedrick Patel MD;  Location: [facility];  Service: Orthopedics;  Laterality: Left;    ARTHROSCOPIC TENOTOMY OF BICEPS TENDON Left 2019    Procedure: TENOTOMY, BICEPS, ARTHROSCOPIC;  Surgeon: Sedrick Patel MD;  Location: [facility];  Service: Orthopedics;  Laterality: Left;    ARTHROSCOPY OF SHOULDER WITH REMOVAL OF DISTAL CLAVICLE Left 2019    Procedure: ARTHROSCOPY, SHOULDER, WITH DISTAL CLAVICLE EXCISION;  Surgeon: Sedrick Patel MD;  Location: [facility];  Service: Orthopedics;  Laterality: Left;    BREAST CYST EXCISION Left      SECTION      x1    COLONOSCOPY N/A 2018    Procedure: COLONOSCOPY;  Surgeon: Armand Foy MD;  Location: [facility] (4TH FLR);  Service: Endoscopy;  Laterality: N/A;    ESOPHAGEAL MANOMETRY WITH MEASUREMENT OF IMPEDANCE N/A 2018    Procedure: MANOMETRY, ESOPHAGEAL, WITH IMPEDANCE MEASUREMENT;  Surgeon: Armand Foy MD;  Location: [facility] (4TH FLR);  Service: Endoscopy;  Laterality: N/A;    ESOPHAGOGASTRODUODENOSCOPY N/A 2018    Procedure: EGD (ESOPHAGOGASTRODUODENOSCOPY);  Surgeon: Armand Foy MD;  Location: [facility] (4TH FLR);  Service: Endoscopy;  Laterality: N/A;  pt currently on Lovenox and Xarelto - ok per Dr. Doty to hold Plavix  2 days prior and Lovenox 24hr prior to case/see scanned media 7/9/ for documentation of hold -- SM        ESOPHAGOGASTRODUODENOSCOPY N/A 03/28/2022    Procedure: EGD (ESOPHAGOGASTRODUODENOSCOPY);  Surgeon: Armand Foy MD;  Location: [facility] (2ND FLR);  Service: Endoscopy;  Laterality: N/A;  okay to hold Xarelto for 2 days per Dr. Doty with lovenox bridgings - see note on 3/21/22  2nd floor for ASAP availabilty  fully vaccinated - sm    EXTRACORPOREAL SHOCK WAVE LITHOTRIPSY Left 02/09/2021    Procedure: LITHOTRIPSY-EXTRACORPOREAL SHOCK WAVE;  Surgeon: Jeremias Eric MD;  Location: [facility];  Service: Urology;  Laterality: Left;    HERNIA REPAIR      umbilical    KIDNEY STONE SURGERY      ureteral stent    left knee surgery      SHOULDER ARTHROSCOPY Left 06/11/2019    Procedure: ARTHROSCOPY, SHOULDER;  Surgeon: Sedrick Patel MD;  Location: [facility];  Service: Orthopedics;  Laterality: Left;  BLOCK    TONSILLECTOMY      TOTAL SHOULDER ARTHROPLASTY Right      Review of patient's allergies indicates:   Allergen Reactions    Sulfa (sulfonamide antibiotics) Rash      Current Facility-Administered Medications   Medication    0.9%  NaCl infusion       PMHx, PSHx, Allergies, Medications reviewed in epic    ROS negative except pain complaints in HPI    OBJECTIVE:    Breastfeeding No     PHYSICAL EXAMINATION:    GENERAL: Well appearing, in no acute distress, alert and oriented x3.  PSYCH:  Mood and affect appropriate.  SKIN: Skin color, texture, turgor normal, no rashes or lesions which will impact the procedure.  CV: RRR with palpation of the radial artery.  PULM: No evidence of respiratory difficulty, symmetric chest rise. Clear to auscultation.  NEURO: Cranial nerves grossly intact.    Plan:    Proceed with procedure as planned Procedure(s) (LRB):  INJECTION, STEROID, EPIDURAL, T6-T7 clear to hold Xarelto 2 days (N/A)    Sedrick Middleton  07/24/2023

## 2023-07-30 ENCOUNTER — PATIENT MESSAGE (OUTPATIENT)
Dept: INTERNAL MEDICINE | Facility: CLINIC | Age: 73
End: 2023-07-30
Payer: MEDICARE

## 2023-08-15 ENCOUNTER — OFFICE VISIT (OUTPATIENT)
Dept: PAIN MEDICINE | Facility: CLINIC | Age: 73
End: 2023-08-15
Payer: MEDICARE

## 2023-08-15 VITALS
OXYGEN SATURATION: 100 % | TEMPERATURE: 97 F | RESPIRATION RATE: 18 BRPM | DIASTOLIC BLOOD PRESSURE: 64 MMHG | WEIGHT: 130.75 LBS | HEIGHT: 61 IN | HEART RATE: 81 BPM | SYSTOLIC BLOOD PRESSURE: 100 MMHG | BODY MASS INDEX: 24.69 KG/M2

## 2023-08-15 DIAGNOSIS — M54.6 CHRONIC THORACIC SPINE PAIN: ICD-10-CM

## 2023-08-15 DIAGNOSIS — R10.13 EPIGASTRIC ABDOMINAL PAIN: ICD-10-CM

## 2023-08-15 DIAGNOSIS — G89.29 CHRONIC THORACIC SPINE PAIN: ICD-10-CM

## 2023-08-15 DIAGNOSIS — R07.89 ATYPICAL CHEST PAIN: Primary | ICD-10-CM

## 2023-08-15 PROCEDURE — 99213 OFFICE O/P EST LOW 20 MIN: CPT | Mod: PBBFAC | Performed by: NURSE PRACTITIONER

## 2023-08-15 PROCEDURE — 99213 PR OFFICE/OUTPT VISIT, EST, LEVL III, 20-29 MIN: ICD-10-PCS | Mod: S$PBB,,, | Performed by: NURSE PRACTITIONER

## 2023-08-15 PROCEDURE — 99999 PR PBB SHADOW E&M-EST. PATIENT-LVL III: CPT | Mod: PBBFAC,,, | Performed by: NURSE PRACTITIONER

## 2023-08-15 PROCEDURE — 99213 OFFICE O/P EST LOW 20 MIN: CPT | Mod: S$PBB,,, | Performed by: NURSE PRACTITIONER

## 2023-08-15 PROCEDURE — 99999 PR PBB SHADOW E&M-EST. PATIENT-LVL III: ICD-10-PCS | Mod: PBBFAC,,, | Performed by: NURSE PRACTITIONER

## 2023-08-15 NOTE — PROGRESS NOTES
Chronic patient Established Note (Follow up visit)      SUBJECTIVE:    Interval History 8/15/2023:  The patient returns to clinic today for follow up of chest and mid back pain. She is s/p bilateral T6/7 TF WILIAN on 7/24/2023. She reports no relief of her pain. She continues to report chest pain that begins under her breast bone that radiates through her to her back. She describes this pain as sharp. Her pain is worse with eating, laying on her back and stomach. She has tried 2 ESIs with limited relief. She is taking Lyrica, Zanaflex, and Tramadol. She denies any other health changes. Her pain today is 6/10.    Interval History 4/3/2023:  Jasson Pineda presents to the clinic for a follow-up appointment for chest and mid back pain. She is s/p rectus sheath block on 3/1/2023. She reports 100% relief for 2 days. Her pain then returned to baseline. She continues to report pain that begins under her breast bone that radiates into the back. She describes this pain as sharp. Her pain is worse with eating and laying on her back/stomach. She did have a recent lumbar WILIAN outside of [facility] with some benefit. She is taking Lyrica, Zanaflex, and Tramadol. She denies any other health changes. Her pain today is 8/10.    HPI:  Jasson Pineda presents with chest pain (under her breast-bone) which radiates to the back.  She states that lying on her back or her stomach causes pain on her chest (she sleeps on her side supported by pillows).  She's been having this pain for at least 2 years and has seen multiple specialists including gastroenterology and neurosurgery.  She underwent a thoracic epidural steroid injection with Dr. Serra over a year ago without benefit in her symptoms.  She has had CTA chest and CT abdomen/pelvis which have not identified a culprit pathology.  She states that when the chest pain first started, it was associated only with eating, however at this point, the pain is constant. She takes  Tramadol, Lyrica, and Zanaflex for the and helps for a little while.  She states she does not take Norco/Hydrocodone.     Pain Disability Index Review:  Last 3 PDI Scores 8/15/2023 4/3/2023 3/1/2023   Pain Disability Index (PDI) 37 48 40       Pain Medications:  Lyrica  Zanaflex  Tramadol    Opioid Contract: not applicable     report:  Reviewed and consistent with medication use as prescribed.    Pain Procedures:   3/1/2023- Rectus sheath block  7/24/2023- Bilateral T6/7 TF WILIAN- no relief    Physical Therapy/Home Exercise: yes    Imaging:   MRI Thoracic Spine 1/14/2022 (care everywhere):  FINDINGS:   The prevertebral soft tissues are normal. Exaggerated kyphotic curvature of the thoracic spine from C7-T7. Individual vertebral height is maintained. Marrow signal is within normal limits. The spinal cord is normal in appearance.     At C7-T1 there is moderate disc height loss with minor bulging of the posterior annulus. There is moderate bilateral facet arthropathy. This results in mild bilateral neural foraminal stenosis without spinal canal stenosis.     At T1-T2 there is moderate to severe disc height loss with grade 1 anterolisthesis (2 mm). There is severe bilateral facet arthropathy. This results in mild bilateral neural foraminal stenosis and minimal spinal canal stenosis.     At T2-T3 there is moderate to severe disc height loss with a small broad-based posterior disc bulge. There is severe bilateral facet arthropathy with slight grade 1 anterolisthesis at this level (2 mm). There is a small broad-based posterior disc bulge. This results in mild bilateral neural foraminal stenosis without spinal canal stenosis.     At T3-T4 there is moderate to severe disc height loss with mild bilateral facet arthropathy. The spinal canal and neural foramen are patent.     At T4-T5 there is moderate to severe disc height loss with a small broad-based posterior disc bulge slightly more eccentric to the neural foramina. There  is mild facet arthropathy. This results in mild bilateral neural foraminal stenosis and minimal spinal canal stenosis.     At T5-T6 the disc demonstrates moderate to severe disc height loss with a small broad-based posterior disc bulge slightly more eccentric to the neural foramina. There is no facet arthropathy. The spinal canal is patent with mild bilateral neural foraminal stenosis.     At T6-T7 the disc demonstrates severe disc height loss with minor bulging the posterior annulus of the level of the neural foramina. There is no facet arthropathy. There is mild bilateral neural foraminal stenosis without spinal canal stenosis.     At T7-T8 there is moderate to severe disc height loss with minor bulging the posterior annulus at the level of the neural foramina. There is mild bilateral facet arthropathy. There is mild bilateral neural foraminal stenosis without spinal canal stenosis.     At T8-T9 there is moderate disc height loss with a small broad-based posterior disc bulge somewhat more eccentric to the neural foramina. There is no facet arthropathy or spinal canal stenosis. There is mild bilateral neural foraminal stenosis.     At T9-T10 there is moderate disc height loss with a small broad-based posterior disc bulge. There is mild bilateral facet arthropathy. This results in mild bilateral neural foraminal stenosis without spinal canal stenosis. There appears to be a vertebral body hemangioma at T10.     At T10-T11 there is moderate disc height loss with a small broad-based posterior disc bulge. There is mild bilateral facet arthropathy. This results in mild right greater than left neural foraminal stenosis with minimal spinal canal stenosis. There is a vertebral body hemangioma at T11.     At T11-T12 the disc shows moderate disc height loss with minor bulging of the posterior annulus. There is no facet arthropathy or spinal canal/neural foraminal stenosis.     At T12-L1 the disc shows a relative normal disc  height, signal intensity and posterior contour without spinal canal stenosis or neural foraminal stenosis    IMPRESSION:  1.No acute osseous abnormality in the thoracic spine.   2. Exaggerated kyphotic curvature of the upper thoracic spine with slight grade 1 anterolisthesis of T1 on T2 and T2 on T3 (2 mm).   3. Degenerative change as outlined in detail above most pronounced in the upper thoracic spine without significant spinal canal or neural foraminal stenosis.     Allergies:   Review of patient's allergies indicates:   Allergen Reactions    Sulfa (sulfonamide antibiotics) Rash       Current Medications:   Current Outpatient Medications   Medication Sig Dispense Refill    atorvastatin (LIPITOR) 80 MG tablet TAKE 1 TABLET BY MOUTH EVERY DAY 90 tablet 3    omeprazole (PRILOSEC) 20 MG capsule Take 1 capsule (20 mg total) by mouth 2 (two) times daily before meals. 180 capsule 3    pregabalin (LYRICA) 150 MG capsule Take 150 mg by mouth 2 (two) times daily.      tiZANidine (ZANAFLEX) 4 MG tablet TAKE 1 TABLET BY MOUTH AT BEDTIME AS NEEDED FOR MUSCLE RELAXER      traMADol (ULTRAM) 50 mg tablet Take 50 mg by mouth 2 (two) times daily as needed.  1    XARELTO 20 mg Tab Take 1 tablet (20 mg total) by mouth every evening. 90 tablet 3    cholecalciferol, vitamin D3, 1,250 mcg (50,000 unit) capsule Take 1 capsule (50,000 Units total) by mouth once a week. (Patient not taking: Reported on 8/15/2023) 12 capsule 3    enoxaparin (LOVENOX) 60 mg/0.6 mL Syrg Inject 0.6 mLs (60 mg total) into the skin 2 (two) times daily. (Patient not taking: Reported on 8/15/2023) 4 each 1     No current facility-administered medications for this visit.       REVIEW OF SYSTEMS:    GENERAL:  No weight loss, malaise or fevers.  HEENT:  Negative for frequent or significant headaches.  NECK:  Negative for lumps, goiter, pain and significant neck swelling.  RESPIRATORY:  Negative for cough, wheezing or shortness of breath.  CARDIOVASCULAR:  Negative  for chest pain, leg swelling or palpitations.  GI:  Negative for abdominal discomfort, blood in stools or black stools or change in bowel habits.  MUSCULOSKELETAL:  See HPI.  SKIN:  Negative for lesions, rash, and itching.  PSYCH:  Negative for sleep disturbance, mood disorder and recent psychosocial stressors.  HEMATOLOGY/LYMPHOLOGY:  Negative for swollen nodes. Hx of DVT, on Xarelto.   NEURO:   No history of headaches, syncope, paralysis, seizures or tremors.  All other reviewed and negative other than HPI.    Past Medical History:  Past Medical History:   Diagnosis Date    Anticoagulant long-term use     xarelto    Chronic back pain     Chronic neck pain     DVT (deep venous thrombosis)     Herniated disc, cervical     Kidney stone     Lumbar herniated disc     PE (pulmonary embolism)         Pulmonary emboli     after surgery    Pulmonary embolism        Past Surgical History:  Past Surgical History:   Procedure Laterality Date    ARTHROSCOPIC REPAIR OF ROTATOR CUFF OF SHOULDER Left 2019    Procedure: REPAIR, ROTATOR CUFF, ARTHROSCOPIC;  Surgeon: Sedrick Patel MD;  Location: [facility];  Service: Orthopedics;  Laterality: Left;    ARTHROSCOPIC TENOTOMY OF BICEPS TENDON Left 2019    Procedure: TENOTOMY, BICEPS, ARTHROSCOPIC;  Surgeon: Sedrick Patel MD;  Location: [facility];  Service: Orthopedics;  Laterality: Left;    ARTHROSCOPY OF SHOULDER WITH REMOVAL OF DISTAL CLAVICLE Left 2019    Procedure: ARTHROSCOPY, SHOULDER, WITH DISTAL CLAVICLE EXCISION;  Surgeon: Sedrick Patel MD;  Location: [facility];  Service: Orthopedics;  Laterality: Left;    BREAST CYST EXCISION Left      SECTION      x1    COLONOSCOPY N/A 2018    Procedure: COLONOSCOPY;  Surgeon: Armand Foy MD;  Location: [address];  Service: Endoscopy;  Laterality: N/A;    EPIDURAL STEROID INJECTION Bilateral 2023    Procedure: TRANSFORAMINAL EPIDURAL INJECTION, T6-T7 clear to hold Xarelto 2 days;   Surgeon: Bill Easley MD;  Location: [facility] [place], operated by [facility];  Service: Pain Management;  Laterality: Bilateral;    ESOPHAGEAL MANOMETRY WITH MEASUREMENT OF IMPEDANCE N/A 07/25/2018    Procedure: MANOMETRY, ESOPHAGEAL, WITH IMPEDANCE MEASUREMENT;  Surgeon: Armand Foy MD;  Location: [facility] ENDO (4TH FLR);  Service: Endoscopy;  Laterality: N/A;    ESOPHAGOGASTRODUODENOSCOPY N/A 07/17/2018    Procedure: EGD (ESOPHAGOGASTRODUODENOSCOPY);  Surgeon: Armand Foy MD;  Location: [facility] ENDO (4TH FLR);  Service: Endoscopy;  Laterality: N/A;  pt currently on Lovenox and Xarelto - ok per Dr. Doty to hold Plavix 2 days prior and Lovenox 24hr prior to case/see scanned media 7/9/ for documentation of hold -- SM        ESOPHAGOGASTRODUODENOSCOPY N/A 03/28/2022    Procedure: EGD (ESOPHAGOGASTRODUODENOSCOPY);  Surgeon: Armand Foy MD;  Location: [facility] ENDO (2ND FLR);  Service: Endoscopy;  Laterality: N/A;  okay to hold Xarelto for 2 days per Dr. Doty with lovenox bridgings - see note on 3/21/22  2nd floor for ASAP availabilty  fully vaccinated - sm    EXTRACORPOREAL SHOCK WAVE LITHOTRIPSY Left 02/09/2021    Procedure: LITHOTRIPSY-EXTRACORPOREAL SHOCK WAVE;  Surgeon: Jeremias Eric MD;  Location: [facility];  Service: Urology;  Laterality: Left;    HERNIA REPAIR      umbilical    KIDNEY STONE SURGERY      ureteral stent    left knee surgery      SHOULDER ARTHROSCOPY Left 06/11/2019    Procedure: ARTHROSCOPY, SHOULDER;  Surgeon: Sedrick Patel MD;  Location: [facility];  Service: Orthopedics;  Laterality: Left;  BLOCK    TONSILLECTOMY      TOTAL SHOULDER ARTHROPLASTY Right        Family History:  Family History   Problem Relation Age of Onset    Colon cancer Mother 74    Ovarian cancer Mother     No Known Problems Father     Liver cancer Sister     Diabetes Sister     Arthritis Sister     Lupus Sister     Lupus Daughter     No Known Problems Daughter     Breast cancer Maternal Aunt 50        50s    Cervical cancer Maternal Aunt      Liver cancer Maternal Grandfather     Breast cancer Maternal Cousin 45    Esophageal cancer Neg Hx        Social History:  Social History     Socioeconomic History    Marital status:     Number of children: 2   Occupational History    Occupation: teacher      Comment:     Tobacco Use    Smoking status: Never    Smokeless tobacco: Never   Substance and Sexual Activity    Alcohol use: No    Drug use: No    Sexual activity: Not Currently     Partners: Male     Social Determinants of Health     Financial Resource Strain: Low Risk  (3/15/2023)    Overall Financial Resource Strain (CARDIA)     Difficulty of Paying Living Expenses: Not hard at all   Food Insecurity: No Food Insecurity (3/15/2023)    Hunger Vital Sign     Worried About Running Out of Food in the Last Year: Never true     Ran Out of Food in the Last Year: Never true   Transportation Needs: No Transportation Needs (3/15/2023)    PRAPARE - Transportation     Lack of Transportation (Medical): No     Lack of Transportation (Non-Medical): No   Physical Activity: Inactive (3/15/2023)    Exercise Vital Sign     Days of Exercise per Week: 0 days     Minutes of Exercise per Session: 0 min   Stress: Stress Concern Present (3/15/2023)    [employer] - Occupational Stress Questionnaire     Feeling of Stress : To some extent   Social Connections: Moderately Integrated (3/15/2023)    Social Connection and Isolation Panel [NHANES]     Frequency of Communication with Friends and Family: More than three times a week     Frequency of Social Gatherings with Friends and Family: Never     Attends [employer] Services: More than 4 times per year     Active Member of Clubs or Organizations: Yes     Attends Club or Organization Meetings: Never     Marital Status:    Housing Stability: Unknown (3/15/2023)    Housing Stability Vital Sign     Unable to Pay for Housing in the Last Year: No     Unstable Housing in the  "Last Year: No       OBJECTIVE:    /64   Pulse 81   Temp 96.9 °F (36.1 °C) (Oral)   Resp 18   Ht 5' 1" (1.549 m)   Wt 59.3 kg (130 lb 11.7 oz)   SpO2 100%   BMI 24.70 kg/m²     PHYSICAL EXAMINATION:    General appearance: Well appearing, in no acute distress, alert and oriented x3.  Psych:  Mood and affect appropriate.  Skin: Skin color, texture, turgor normal, no rashes or lesions, in both upper and lower body.  Head/face:  Atraumatic, normocephalic. No palpable lymph nodes  Cor: RRR  Pulm: Symmetric chest rise, no respiratory distress noted.   Back: Straight leg raising in the sitting position is negative to radicular pain.   Extremities: No deformities, edema, or skin discoloration. Good capillary refill.  Musculoskeletal:  No atrophy or tone abnormalities are noted.  Neuro:  No loss of sensation is noted.  Gait: Normal.    ASSESSMENT: 72 y.o. year old female with back pain, consistent with the followin. Atypical chest pain        2. Chronic thoracic spine pain        3. Epigastric abdominal pain                PLAN:     - Previous imaging reviewed today.    - She is s/p bilateral T6/7 TF WILIAN without relief.     - Discussed case with Dr. Easley who did not recommend further injections at this time.     - Discussed follow up with GI.     - I have stressed the importance of physical activity and a home exercise plan to help with pain and improve health.    - Trial Lidocaine patches topically to painful area.     - Patient can continue with medications for now since they are providing benefits, using them appropriately, and without side effects.    - RTC PRN.     - Dr. Easley was consulted on the patient and agrees with this plan.    The above plan and management options were discussed at length with patient. Patient is in agreement with the above and verbalized understanding.    Concha Walker  08/15/2023      "

## 2023-11-21 ENCOUNTER — OFFICE VISIT (OUTPATIENT)
Dept: CARDIOLOGY | Facility: CLINIC | Age: 73
End: 2023-11-21
Payer: MEDICARE

## 2023-11-21 VITALS
OXYGEN SATURATION: 98 % | DIASTOLIC BLOOD PRESSURE: 60 MMHG | WEIGHT: 131.31 LBS | BODY MASS INDEX: 24.81 KG/M2 | HEART RATE: 80 BPM | SYSTOLIC BLOOD PRESSURE: 104 MMHG

## 2023-11-21 DIAGNOSIS — E78.2 MIXED HYPERLIPIDEMIA: Primary | ICD-10-CM

## 2023-11-21 DIAGNOSIS — I70.0 ATHEROSCLEROSIS OF AORTA: ICD-10-CM

## 2023-11-21 DIAGNOSIS — I27.82 CHRONIC PULMONARY EMBOLISM WITHOUT ACUTE COR PULMONALE, UNSPECIFIED PULMONARY EMBOLISM TYPE: ICD-10-CM

## 2023-11-21 PROCEDURE — 99999 PR PBB SHADOW E&M-EST. PATIENT-LVL III: CPT | Mod: PBBFAC,,, | Performed by: INTERNAL MEDICINE

## 2023-11-21 PROCEDURE — 99214 PR OFFICE/OUTPT VISIT, EST, LEVL IV, 30-39 MIN: ICD-10-PCS | Mod: S$PBB,,, | Performed by: INTERNAL MEDICINE

## 2023-11-21 PROCEDURE — 99214 OFFICE O/P EST MOD 30 MIN: CPT | Mod: S$PBB,,, | Performed by: INTERNAL MEDICINE

## 2023-11-21 PROCEDURE — 93005 ELECTROCARDIOGRAM TRACING: CPT | Mod: PBBFAC,PN | Performed by: INTERNAL MEDICINE

## 2023-11-21 PROCEDURE — 93010 EKG 12-LEAD: ICD-10-PCS | Mod: S$PBB,,, | Performed by: INTERNAL MEDICINE

## 2023-11-21 PROCEDURE — 99999 PR PBB SHADOW E&M-EST. PATIENT-LVL III: ICD-10-PCS | Mod: PBBFAC,,, | Performed by: INTERNAL MEDICINE

## 2023-11-21 PROCEDURE — 93010 ELECTROCARDIOGRAM REPORT: CPT | Mod: S$PBB,,, | Performed by: INTERNAL MEDICINE

## 2023-11-21 PROCEDURE — 99213 OFFICE O/P EST LOW 20 MIN: CPT | Mod: PBBFAC,PN | Performed by: INTERNAL MEDICINE

## 2023-11-21 NOTE — PROGRESS NOTES
Cardiology    11/21/2023  9:35 AM    Problem list  Patient Active Problem List   Diagnosis    Kidney stone    Herniated disc, cervical    Complete rotator cuff tear or rupture of right shoulder, not specified as traumatic    Shoulder arthritis    OAB (overactive bladder)    Body mass index (BMI) of 25.0 to 29.9    Atypical chest pain    Ineffective esophageal motility    SOB (shortness of breath)    Complete rotator cuff tear or rupture of left shoulder, not specified as traumatic    Impingement syndrome of left shoulder    Primary osteoarthritis of left shoulder    Labral tear of long head of biceps tendon, initial encounter    History of DVT of lower extremity    Chronic pulmonary embolism without acute cor pulmonale    Mixed hyperlipidemia    Nephrolithiasis    Tortuous aorta    Atherosclerosis of aorta    Current use of long term anticoagulation    History of iron deficiency anemia    Esophageal dysphagia    Epigastric abdominal pain       CC:  F/u    HPI:  Patient has no cardiac complaints.  She denies any angina, dyspnea on exertion, palpitation or syncope.  She is still dealing with the dysphagia.  She denies any bleeding.  She had labs done in May.    Medications  Current Outpatient Medications   Medication Sig Dispense Refill    atorvastatin (LIPITOR) 80 MG tablet TAKE 1 TABLET BY MOUTH EVERY DAY 90 tablet 3    omeprazole (PRILOSEC) 20 MG capsule Take 1 capsule (20 mg total) by mouth 2 (two) times daily before meals. 180 capsule 3    pregabalin (LYRICA) 150 MG capsule Take 150 mg by mouth 2 (two) times daily.      tiZANidine (ZANAFLEX) 4 MG tablet TAKE 1 TABLET BY MOUTH AT BEDTIME AS NEEDED FOR MUSCLE RELAXER      traMADol (ULTRAM) 50 mg tablet Take 50 mg by mouth 2 (two) times daily as needed.  1    XARELTO 20 mg Tab Take 1 tablet (20 mg total) by mouth every evening. 90 tablet 3    cholecalciferol, vitamin D3, 1,250 mcg (50,000 unit) capsule Take 1 capsule (50,000 Units total) by mouth once a  week. (Patient not taking: Reported on 8/15/2023) 12 capsule 3    enoxaparin (LOVENOX) 60 mg/0.6 mL Syrg Inject 0.6 mLs (60 mg total) into the skin 2 (two) times daily. (Patient not taking: Reported on 8/15/2023) 4 each 1     No current facility-administered medications for this visit.      Prior to Admission medications    Medication Sig Start Date End Date Taking? Authorizing Provider   atorvastatin (LIPITOR) 80 MG tablet TAKE 1 TABLET BY MOUTH EVERY DAY 7/26/22  Yes Osbaldo Hall MD   omeprazole (PRILOSEC) 20 MG capsule Take 1 capsule (20 mg total) by mouth 2 (two) times daily before meals. 6/27/23  Yes Osbaldo Hall MD   pregabalin (LYRICA) 150 MG capsule Take 150 mg by mouth 2 (two) times daily.   Yes Provider, Historical   tiZANidine (ZANAFLEX) 4 MG tablet TAKE 1 TABLET BY MOUTH AT BEDTIME AS NEEDED FOR MUSCLE RELAXER 3/21/22  Yes Provider, Historical   traMADol (ULTRAM) 50 mg tablet Take 50 mg by mouth 2 (two) times daily as needed. 10/26/18  Yes Provider, Historical   XARELTO 20 mg Tab Take 1 tablet (20 mg total) by mouth every evening. 5/17/23  Yes Sotero Doty MD   cholecalciferol, vitamin D3, 1,250 mcg (50,000 unit) capsule Take 1 capsule (50,000 Units total) by mouth once a week.  Patient not taking: Reported on 8/15/2023 6/7/22   Osbaldo Hall MD   enoxaparin (LOVENOX) 60 mg/0.6 mL Syrg Inject 0.6 mLs (60 mg total) into the skin 2 (two) times daily.  Patient not taking: Reported on 8/15/2023 7/19/23   Sotero Doty MD         History  Past Medical History:   Diagnosis Date    Anticoagulant long-term use     xarelto    Chronic back pain     Chronic neck pain     DVT (deep venous thrombosis)     Herniated disc, cervical     Kidney stone     Lumbar herniated disc     PE (pulmonary embolism)     2013    Pulmonary emboli     after surgery    Pulmonary embolism      Past Surgical History:   Procedure Laterality Date    ARTHROSCOPIC REPAIR OF ROTATOR CUFF OF SHOULDER Left  2019    Procedure: REPAIR, ROTATOR CUFF, ARTHROSCOPIC;  Surgeon: Sedrick Patel MD;  Location: [facility] OR;  Service: Orthopedics;  Laterality: Left;    ARTHROSCOPIC TENOTOMY OF BICEPS TENDON Left 2019    Procedure: TENOTOMY, BICEPS, ARTHROSCOPIC;  Surgeon: Sedrick Patel MD;  Location: [facility] OR;  Service: Orthopedics;  Laterality: Left;    ARTHROSCOPY OF SHOULDER WITH REMOVAL OF DISTAL CLAVICLE Left 2019    Procedure: ARTHROSCOPY, SHOULDER, WITH DISTAL CLAVICLE EXCISION;  Surgeon: Sedrick Patel MD;  Location: [facility] OR;  Service: Orthopedics;  Laterality: Left;    BREAST CYST EXCISION Left      SECTION      x1    COLONOSCOPY N/A 2018    Procedure: COLONOSCOPY;  Surgeon: Armand Foy MD;  Location: [facility] RANDI (4TH FLR);  Service: Endoscopy;  Laterality: N/A;    EPIDURAL STEROID INJECTION Bilateral 2023    Procedure: TRANSFORAMINAL EPIDURAL INJECTION, T6-T7 clear to hold Xarelto 2 days;  Surgeon: Bill Easley MD;  Location: [facility] PAIN MGT;  Service: Pain Management;  Laterality: Bilateral;    ESOPHAGEAL MANOMETRY WITH MEASUREMENT OF IMPEDANCE N/A 2018    Procedure: MANOMETRY, ESOPHAGEAL, WITH IMPEDANCE MEASUREMENT;  Surgeon: Armand Foy MD;  Location: [facility] R[facility]I (4TH FLR);  Service: Endoscopy;  Laterality: N/A;    ESOPHAGOGASTRODUODENOSCOPY N/A 2018    Procedure: EGD (ESOPHAGOGASTRODUODENOSCOPY);  Surgeon: Armand Foy MD;  Location: [facility] (4TH FLR);  Service: Endoscopy;  Laterality: N/A;  pt currently on Lovenox and Xarelto - ok per Dr. Doty to hold Plavix 2 days prior and Lovenox 24hr prior to case/see scanned media / for documentation of hold -- SM        ESOPHAGOGASTRODUODENOSCOPY N/A 2022    Procedure: EGD (ESOPHAGOGASTRODUODENOSCOPY);  Surgeon: Armand Foy MD;  Location: [facility] (2ND FLR);  Service: Endoscopy;  Laterality: N/A;  okay to hold Xarelto for 2 days per Dr. Doty with lovenox bridgings - see note on 3/21/22  2nd floor  for ASAP availabilty  fully vaccinated - sm    EXTRACORPOREAL SHOCK WAVE LITHOTRIPSY Left 02/09/2021    Procedure: LITHOTRIPSY-EXTRACORPOREAL SHOCK WAVE;  Surgeon: Jeremias Eric MD;  Location: [facility];  Service: Urology;  Laterality: Left;    HERNIA REPAIR      umbilical    KIDNEY STONE SURGERY      ureteral stent    left knee surgery      SHOULDER ARTHROSCOPY Left 06/11/2019    Procedure: ARTHROSCOPY, SHOULDER;  Surgeon: Sedrick Patel MD;  Location: [facility];  Service: Orthopedics;  Laterality: Left;  BLOCK    TONSILLECTOMY      TOTAL SHOULDER ARTHROPLASTY Right      Social History     Socioeconomic History    Marital status:     Number of children: 2   Occupational History    Occupation: teacher      Comment:     Tobacco Use    Smoking status: Never    Smokeless tobacco: Never   Substance and Sexual Activity    Alcohol use: No    Drug use: No    Sexual activity: Not Currently     Partners: Male     Social Determinants of Health     Financial Resource Strain: Low Risk  (3/15/2023)    Overall Financial Resource Strain (CARDIA)     Difficulty of Paying Living Expenses: Not hard at all   Food Insecurity: No Food Insecurity (3/15/2023)    Hunger Vital Sign     Worried About Running Out of Food in the Last Year: Never true     Ran Out of Food in the Last Year: Never true   Transportation Needs: No Transportation Needs (3/15/2023)    PRAPARE - Transportation     Lack of Transportation (Medical): No     Lack of Transportation (Non-Medical): No   Physical Activity: Inactive (3/15/2023)    Exercise Vital Sign     Days of Exercise per Week: 0 days     Minutes of Exercise per Session: 0 min   Stress: Stress Concern Present (3/15/2023)    [employer] - Occupational Stress Questionnaire     Feeling of Stress : To some extent   Social Connections: Moderately Integrated (3/15/2023)    Social Connection and Isolation Panel [NHANES]     Frequency of Communication  with Friends and Family: More than three times a week     Frequency of Social Gatherings with Friends and Family: Never     Attends Judaism Services: More than 4 times per year     Active Member of Clubs or Organizations: Yes     Attends Club or Organization Meetings: Never     Marital Status:    Housing Stability: Unknown (3/15/2023)    Housing Stability Vital Sign     Unable to Pay for Housing in the Last Year: No     Unstable Housing in the Last Year: No         Allergies  Review of patient's allergies indicates:   Allergen Reactions    Sulfa (sulfonamide antibiotics) Rash         Review of Systems   Review of Systems   Constitutional: Negative for decreased appetite, fever and weight loss.   HENT:  Negative for congestion and nosebleeds.    Eyes:  Negative for double vision, vision loss in left eye, vision loss in right eye and visual disturbance.   Cardiovascular:  Negative for chest pain, claudication, cyanosis, dyspnea on exertion, irregular heartbeat, leg swelling, near-syncope, orthopnea, palpitations, paroxysmal nocturnal dyspnea and syncope.   Respiratory:  Negative for cough, hemoptysis, shortness of breath, sleep disturbances due to breathing, snoring, sputum production and wheezing.    Endocrine: Negative for cold intolerance and heat intolerance.   Skin:  Negative for nail changes and rash.   Musculoskeletal:  Negative for joint pain, muscle cramps, muscle weakness and myalgias.   Gastrointestinal:  Negative for change in bowel habit, heartburn, hematemesis, hematochezia, hemorrhoids and melena.   Neurological:  Negative for dizziness, focal weakness and headaches.         Physical Exam  Wt Readings from Last 1 Encounters:   11/21/23 59.5 kg (131 lb 4.5 oz)     BP Readings from Last 3 Encounters:   11/21/23 104/60   08/15/23 100/64   07/24/23 (!) 112/59     Pulse Readings from Last 1 Encounters:   11/21/23 80     Body mass index is 24.81 kg/m².    Physical Exam  Vitals reviewed.    Constitutional:       Appearance: She is well-developed.   Neck:      Vascular: No carotid bruit or JVD.   Cardiovascular:      Rate and Rhythm: Normal rate and regular rhythm.      Pulses:           Carotid pulses are 2+ on the right side and 2+ on the left side.       Radial pulses are 2+ on the right side and 2+ on the left side.        Dorsalis pedis pulses are 2+ on the right side and 2+ on the left side.      Heart sounds: Normal heart sounds, S1 normal and S2 normal.   Abdominal:      General: Bowel sounds are normal.   Musculoskeletal:      Thoracic back: Tenderness present.   Neurological:      Mental Status: She is alert and oriented to person, place, and time.             Assessment  1. Mixed hyperlipidemia  stable    2. Atherosclerosis of aorta  stable    3. Chronic pulmonary embolism without acute cor pulmonale, unspecified pulmonary embolism type  Stable on OAC        Plan and Discussion  Discussed her EKG today which showed normal sinus rhythm rate of 77.  Continue current medications.    Follow Up  6 months      Sotero Doty MD, F.A.C.C, F.S.C.A.I.        30 minutes were spent in chart review, documentation and review of results, and evaluation, treatment, and counseling of patient on the same day of service.    Disclaimer: This document was created using voice recognition software ([employer]). Although it may be edited, this document may contain errors related to incorrect recognition of the spoken word. Please call the physician if clarification is needed.

## 2024-01-04 ENCOUNTER — OFFICE VISIT (OUTPATIENT)
Dept: URGENT CARE | Facility: CLINIC | Age: 74
End: 2024-01-04
Payer: MEDICARE

## 2024-01-04 VITALS
HEART RATE: 90 BPM | HEIGHT: 61 IN | DIASTOLIC BLOOD PRESSURE: 85 MMHG | WEIGHT: 131 LBS | OXYGEN SATURATION: 98 % | TEMPERATURE: 98 F | SYSTOLIC BLOOD PRESSURE: 134 MMHG | RESPIRATION RATE: 16 BRPM | BODY MASS INDEX: 24.73 KG/M2

## 2024-01-04 DIAGNOSIS — N32.81 OAB (OVERACTIVE BLADDER): ICD-10-CM

## 2024-01-04 DIAGNOSIS — J34.89 SINUS PRESSURE: ICD-10-CM

## 2024-01-04 DIAGNOSIS — B34.9 VIRAL SYNDROME: Primary | ICD-10-CM

## 2024-01-04 DIAGNOSIS — R07.89 TENDERNESS OF CHEST WALL: ICD-10-CM

## 2024-01-04 LAB
CTP QC/QA: YES
SARS-COV-2 AG RESP QL IA.RAPID: NEGATIVE

## 2024-01-04 PROCEDURE — 71046 X-RAY EXAM CHEST 2 VIEWS: CPT | Mod: S$GLB,,, | Performed by: RADIOLOGY

## 2024-01-04 PROCEDURE — 87811 SARS-COV-2 COVID19 W/OPTIC: CPT | Mod: QW,S$GLB,, | Performed by: FAMILY MEDICINE

## 2024-01-04 PROCEDURE — 99214 OFFICE O/P EST MOD 30 MIN: CPT | Mod: S$GLB,,, | Performed by: FAMILY MEDICINE

## 2024-01-04 NOTE — PROGRESS NOTES
"Subjective:      Patient ID: Jasson Pineda is a 73 y.o. female.    Vitals:  height is 5' 1" (1.549 m) and weight is 59.4 kg (131 lb). Her temporal temperature is 97.6 °F (36.4 °C). Her blood pressure is 134/85 and her pulse is 90. Her respiration is 16 and oxygen saturation is 98%.     Chief Complaint: Sinus Problem    73 female c/o headache, body chills since 12/18, so about 2.5 weeks. Patient reports that she has weakness and fatigue. Denies chest pain or shortness a breath.  She does some lower chest wall tenderness (over her lower sternum) for longer than 1 year.  Some decreased appetite with this recent illness.  Past few days with nasal drainage, although without color.  Infrequent cough.  She has been taking Mucinex throughout this time.  Last night with some loose stool, otherwise no diarrhea.  No nausea or vomiting.  Has appointment in place with her PCP for 1/11.  She states she has felt feverish, but has not taken her temperature.  Has thermometer at home    Sinus Problem  This is a new problem. The current episode started 1 to 4 weeks ago. The problem is unchanged. There has been no fever. Her pain is at a severity of 0/10. She is experiencing no pain. Associated symptoms include chills, congestion, coughing and headaches. Pertinent negatives include no diaphoresis, hoarse voice, neck pain, shortness of breath, sinus pressure, sneezing, sore throat or swollen glands. Past treatments include nothing. The treatment provided no relief.       Constitution: Positive for chills. Negative for sweating.   HENT:  Positive for congestion. Negative for sinus pressure and sore throat.    Neck: Negative for neck pain.   Respiratory:  Positive for cough. Negative for shortness of breath.    Allergic/Immunologic: Negative for sneezing.   Neurological:  Positive for headaches.      Objective:     Physical Exam   Constitutional: She is oriented to person, place, and time. She appears well-developed.  Non-toxic " appearance. She does not appear ill. No distress.   HENT:   Head: Normocephalic and atraumatic.   Ears:   Right Ear: Tympanic membrane and external ear normal.   Left Ear: Tympanic membrane and external ear normal.   Mouth/Throat: No oropharyngeal exudate or posterior oropharyngeal erythema.      Comments: Sinuses nontender  Cardiovascular: Normal rate and regular rhythm.      Comments: Point tenderness elicited over lower sternum.  She reports this being present for longer than 1 year.   Pulmonary/Chest: Effort normal. No stridor. No respiratory distress. She has no wheezes. She has no rhonchi. She has no rales.   Abdominal: There is no rebound, no guarding, no left CVA tenderness and no right CVA tenderness.      Comments: Abdomen with slightly hyperactive bowel sounds.  Soft and nontender.   Neurological: She is alert and oriented to person, place, and time.   Skin: Skin is not diaphoretic.   Psychiatric: Her behavior is normal. Thought content normal.   Nursing note and vitals reviewed.    Results for orders placed or performed in visit on 01/04/24   SARS Coronavirus 2 Antigen, POCT Manual Read   Result Value Ref Range    SARS Coronavirus 2 Antigen Negative Negative     Acceptable Yes         Assessment:     1. Viral syndrome    2. Sinus pressure    3. Tenderness of chest wall      I have reviewed the patient's previous visits, labs, and images to look for any trends or previous treatments as part of this assessment.   Plan:       Viral syndrome    Sinus pressure  -     SARS Coronavirus 2 Antigen, POCT Manual Read    Tenderness of chest wall  -     XR CHEST PA AND LATERAL; Future; Expected date: 01/04/2024    I encourage you to keep your plans for follow-up with your primary care provider next week.  Further evaluation may be in order if you are not improving.    Make sure that you follow up with your primary care doctor in the next 2-5 days if needed .  Go to or return to Urgent Care if signs or  symptoms change and certainly if you have worsening and/or severe symptoms go to the nearest emergency department for further evaluation.

## 2024-01-04 NOTE — PATIENT INSTRUCTIONS
I encourage you to keep your plans for follow-up with your primary care provider next week.  Further evaluation may be in order if you are not improving.    Make sure that you follow up with your primary care doctor in the next 2-5 days if needed .  Go to or return to Urgent Care if signs or symptoms change and certainly if you have worsening and/or severe symptoms go to the nearest emergency department for further evaluation.

## 2024-01-05 RX ORDER — OXYBUTYNIN CHLORIDE 10 MG/1
10 TABLET, EXTENDED RELEASE ORAL
Qty: 90 TABLET | Refills: 3 | Status: SHIPPED | OUTPATIENT
Start: 2024-01-05

## 2024-01-11 ENCOUNTER — OFFICE VISIT (OUTPATIENT)
Dept: INTERNAL MEDICINE | Facility: CLINIC | Age: 74
End: 2024-01-11
Attending: INTERNAL MEDICINE
Payer: MEDICARE

## 2024-01-11 ENCOUNTER — LAB VISIT (OUTPATIENT)
Dept: LAB | Facility: OTHER | Age: 74
End: 2024-01-11
Attending: INTERNAL MEDICINE
Payer: MEDICARE

## 2024-01-11 VITALS
HEIGHT: 61 IN | DIASTOLIC BLOOD PRESSURE: 72 MMHG | WEIGHT: 132.94 LBS | OXYGEN SATURATION: 99 % | BODY MASS INDEX: 25.1 KG/M2 | SYSTOLIC BLOOD PRESSURE: 110 MMHG | HEART RATE: 88 BPM

## 2024-01-11 DIAGNOSIS — R13.19 ESOPHAGEAL DYSPHAGIA: ICD-10-CM

## 2024-01-11 DIAGNOSIS — K22.4 INEFFECTIVE ESOPHAGEAL MOTILITY: ICD-10-CM

## 2024-01-11 DIAGNOSIS — R13.19 OTHER DYSPHAGIA: ICD-10-CM

## 2024-01-11 DIAGNOSIS — R93.3 ABNORMAL FINDINGS ON DIAGNOSTIC IMAGING OF OTHER PARTS OF DIGESTIVE TRACT: ICD-10-CM

## 2024-01-11 DIAGNOSIS — Z86.2 HISTORY OF IRON DEFICIENCY ANEMIA: ICD-10-CM

## 2024-01-11 DIAGNOSIS — R20.8 OTHER DISTURBANCES OF SKIN SENSATION: ICD-10-CM

## 2024-01-11 DIAGNOSIS — M50.20 HERNIATED DISC, CERVICAL: ICD-10-CM

## 2024-01-11 DIAGNOSIS — R79.9 ABNORMAL FINDING OF BLOOD CHEMISTRY, UNSPECIFIED: ICD-10-CM

## 2024-01-11 DIAGNOSIS — R10.13 EPIGASTRIC ABDOMINAL PAIN: ICD-10-CM

## 2024-01-11 DIAGNOSIS — Z00.00 ANNUAL PHYSICAL EXAM: Primary | ICD-10-CM

## 2024-01-11 DIAGNOSIS — D50.9 IRON DEFICIENCY ANEMIA, UNSPECIFIED IRON DEFICIENCY ANEMIA TYPE: ICD-10-CM

## 2024-01-11 DIAGNOSIS — I27.82 CHRONIC PULMONARY EMBOLISM WITHOUT ACUTE COR PULMONALE, UNSPECIFIED PULMONARY EMBOLISM TYPE: ICD-10-CM

## 2024-01-11 DIAGNOSIS — Z00.00 ANNUAL PHYSICAL EXAM: ICD-10-CM

## 2024-01-11 DIAGNOSIS — Z86.718 HISTORY OF DVT OF LOWER EXTREMITY: ICD-10-CM

## 2024-01-11 LAB
ALBUMIN SERPL BCP-MCNC: 4.2 G/DL (ref 3.5–5.2)
ALP SERPL-CCNC: 59 U/L (ref 55–135)
ALT SERPL W/O P-5'-P-CCNC: 9 U/L (ref 10–44)
ANION GAP SERPL CALC-SCNC: 6 MMOL/L (ref 8–16)
AST SERPL-CCNC: 15 U/L (ref 10–40)
BASOPHILS # BLD AUTO: 0.01 K/UL (ref 0–0.2)
BASOPHILS NFR BLD: 0.2 % (ref 0–1.9)
BILIRUB SERPL-MCNC: 0.3 MG/DL (ref 0.1–1)
BUN SERPL-MCNC: 11 MG/DL (ref 8–23)
CALCIUM SERPL-MCNC: 9.8 MG/DL (ref 8.7–10.5)
CHLORIDE SERPL-SCNC: 104 MMOL/L (ref 95–110)
CHOLEST SERPL-MCNC: 303 MG/DL (ref 120–199)
CHOLEST/HDLC SERPL: 3.9 {RATIO} (ref 2–5)
CO2 SERPL-SCNC: 30 MMOL/L (ref 23–29)
CREAT SERPL-MCNC: 0.8 MG/DL (ref 0.5–1.4)
DIFFERENTIAL METHOD BLD: ABNORMAL
EOSINOPHIL # BLD AUTO: 0.1 K/UL (ref 0–0.5)
EOSINOPHIL NFR BLD: 2 % (ref 0–8)
ERYTHROCYTE [DISTWIDTH] IN BLOOD BY AUTOMATED COUNT: 15.7 % (ref 11.5–14.5)
EST. GFR  (NO RACE VARIABLE): >60 ML/MIN/1.73 M^2
FERRITIN SERPL-MCNC: 33 NG/ML (ref 20–300)
GLUCOSE SERPL-MCNC: 80 MG/DL (ref 70–110)
HCT VFR BLD AUTO: 35.1 % (ref 37–48.5)
HDLC SERPL-MCNC: 77 MG/DL (ref 40–75)
HDLC SERPL: 25.4 % (ref 20–50)
HGB BLD-MCNC: 11 G/DL (ref 12–16)
IMM GRANULOCYTES # BLD AUTO: 0.01 K/UL (ref 0–0.04)
IMM GRANULOCYTES NFR BLD AUTO: 0.2 % (ref 0–0.5)
IRON SERPL-MCNC: 48 UG/DL (ref 30–160)
LDH SERPL L TO P-CCNC: 193 U/L (ref 110–260)
LDLC SERPL CALC-MCNC: 191.2 MG/DL (ref 63–159)
LYMPHOCYTES # BLD AUTO: 2.2 K/UL (ref 1–4.8)
LYMPHOCYTES NFR BLD: 44 % (ref 18–48)
MCH RBC QN AUTO: 26.2 PG (ref 27–31)
MCHC RBC AUTO-ENTMCNC: 31.3 G/DL (ref 32–36)
MCV RBC AUTO: 84 FL (ref 82–98)
MONOCYTES # BLD AUTO: 0.4 K/UL (ref 0.3–1)
MONOCYTES NFR BLD: 8.5 % (ref 4–15)
NEUTROPHILS # BLD AUTO: 2.2 K/UL (ref 1.8–7.7)
NEUTROPHILS NFR BLD: 45.1 % (ref 38–73)
NONHDLC SERPL-MCNC: 226 MG/DL
NRBC BLD-RTO: 0 /100 WBC
PATH REV BLD -IMP: NORMAL
PLATELET # BLD AUTO: 297 K/UL (ref 150–450)
PMV BLD AUTO: 10.6 FL (ref 9.2–12.9)
POTASSIUM SERPL-SCNC: 4.2 MMOL/L (ref 3.5–5.1)
PROT SERPL-MCNC: 7.8 G/DL (ref 6–8.4)
RBC # BLD AUTO: 4.2 M/UL (ref 4–5.4)
RETICS/RBC NFR AUTO: 1.1 % (ref 0.5–2.5)
SATURATED IRON: 11 % (ref 20–50)
SODIUM SERPL-SCNC: 140 MMOL/L (ref 136–145)
TOTAL IRON BINDING CAPACITY: 447 UG/DL (ref 250–450)
TRANSFERRIN SERPL-MCNC: 302 MG/DL (ref 200–375)
TRIGL SERPL-MCNC: 174 MG/DL (ref 30–150)
TSH SERPL DL<=0.005 MIU/L-ACNC: 0.55 UIU/ML (ref 0.4–4)
VIT B12 SERPL-MCNC: 670 PG/ML (ref 210–950)
WBC # BLD AUTO: 4.93 K/UL (ref 3.9–12.7)

## 2024-01-11 PROCEDURE — 83010 ASSAY OF HAPTOGLOBIN QUANT: CPT | Performed by: INTERNAL MEDICINE

## 2024-01-11 PROCEDURE — 80053 COMPREHEN METABOLIC PANEL: CPT | Performed by: INTERNAL MEDICINE

## 2024-01-11 PROCEDURE — 85025 COMPLETE CBC W/AUTO DIFF WBC: CPT | Performed by: INTERNAL MEDICINE

## 2024-01-11 PROCEDURE — 83540 ASSAY OF IRON: CPT | Performed by: INTERNAL MEDICINE

## 2024-01-11 PROCEDURE — 83020 HEMOGLOBIN ELECTROPHORESIS: CPT | Performed by: INTERNAL MEDICINE

## 2024-01-11 PROCEDURE — 82728 ASSAY OF FERRITIN: CPT | Performed by: INTERNAL MEDICINE

## 2024-01-11 PROCEDURE — 36415 COLL VENOUS BLD VENIPUNCTURE: CPT | Performed by: INTERNAL MEDICINE

## 2024-01-11 PROCEDURE — 83615 LACTATE (LD) (LDH) ENZYME: CPT | Performed by: INTERNAL MEDICINE

## 2024-01-11 PROCEDURE — 84443 ASSAY THYROID STIM HORMONE: CPT | Performed by: INTERNAL MEDICINE

## 2024-01-11 PROCEDURE — 99999 PR PBB SHADOW E&M-EST. PATIENT-LVL III: CPT | Mod: PBBFAC,,, | Performed by: INTERNAL MEDICINE

## 2024-01-11 PROCEDURE — 85045 AUTOMATED RETICULOCYTE COUNT: CPT | Performed by: INTERNAL MEDICINE

## 2024-01-11 PROCEDURE — 82728 ASSAY OF FERRITIN: CPT | Mod: 91 | Performed by: INTERNAL MEDICINE

## 2024-01-11 PROCEDURE — 80061 LIPID PANEL: CPT | Performed by: INTERNAL MEDICINE

## 2024-01-11 PROCEDURE — 99215 OFFICE O/P EST HI 40 MIN: CPT | Mod: S$PBB,,, | Performed by: INTERNAL MEDICINE

## 2024-01-11 PROCEDURE — 83036 HEMOGLOBIN GLYCOSYLATED A1C: CPT | Performed by: INTERNAL MEDICINE

## 2024-01-11 PROCEDURE — 99213 OFFICE O/P EST LOW 20 MIN: CPT | Mod: PBBFAC | Performed by: INTERNAL MEDICINE

## 2024-01-11 PROCEDURE — 85060 BLOOD SMEAR INTERPRETATION: CPT | Mod: ,,, | Performed by: STUDENT IN AN ORGANIZED HEALTH CARE EDUCATION/TRAINING PROGRAM

## 2024-01-11 PROCEDURE — 82607 VITAMIN B-12: CPT | Performed by: INTERNAL MEDICINE

## 2024-01-11 RX ORDER — FERROUS SULFATE 325(65) MG
325 TABLET ORAL
Qty: 180 TABLET | Refills: 2 | Status: SHIPPED | OUTPATIENT
Start: 2024-01-11

## 2024-01-11 RX ORDER — TRIAMCINOLONE ACETONIDE 5 MG/G
CREAM TOPICAL 2 TIMES DAILY
Qty: 15 G | Refills: 1 | Status: SHIPPED | OUTPATIENT
Start: 2024-01-11

## 2024-01-11 RX ORDER — OMEPRAZOLE 20 MG/1
20 CAPSULE, DELAYED RELEASE ORAL
Qty: 180 CAPSULE | Refills: 3 | Status: SHIPPED | OUTPATIENT
Start: 2024-01-11

## 2024-01-11 NOTE — Clinical Note
Pt previously had 100% relief of pain for 2 days after rectus sheath block last year. Is this something she can repeat? thanks

## 2024-01-11 NOTE — PROGRESS NOTES
"Subjective:       Patient ID: Jasson Pineda is a 73 y.o. female.    Chief Complaint: Back Pain (And itching)    Here for annual exam    Pain and burning in back for past 2-3 weeks. Can not tell if rash is present. No f/c. No trauma. No bleeding.    Atypical CP persists. Had relief with rectus sheath block.       ### atypical CP ###  Consistently occurs when she lies down on her back she get back pain penetrating to her chest. She can not sleep on her elft side due to shouilder pain. She sleep on her righ side. When she lies flat on her back she develops a sharp, intense pain that radiates to her chest. No associated PND, orthopnea, wheezing, SOB, LE edema.   Was seen by cardiology and had holter done 07/27/2021 that was normal. Does not repsond to albuterol.  Normal exercise stress test July 2018. Hx of recurrent PE.   -5/18/22 IM CV previously referred to back and spine as I suspect atypical, positional chest is thoracic or paraspinal.  -08/2022 Reports having recent injections with zero change in symptoms. This was approx one month prior.   -She is s/p rectus sheath block on 3/1/2023. She reports 100% relief for 2 days. Her pain then returned to baseline. SPECT scan was normal   -trans foraminal WILIAN T6-T7 done 7/24/23- zero relief of pain.      ### Nephrolithiasis -- OAB ###  -Gross hematuria recurrent and urinary urgency.   -12/22/20 CT urogram normal. CTU shows 9mm non-obstructing left renal stone.   -02/09/2021 Left ESWL   -OAB She began ditropan 5 mg XL daily    -5/18/22 oxybutynin has been increased to 10mg     ### Dysphagia ###  -7/2018 normal EGD by Dr Foy at [facility] [place]  -07/2018 manometry "Ineffective esophageal motility (IEM: 50% or more ineffective swallows with DCI less than 450 mmHg/sec/cm).  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 There is a lower esophageal Schatzki's ring.   -10/2019 ENT Dr Burgos "Variably obstructive CP bar, but symptoms are primarily " "thoracic/epigastic."  -12/2019 GI clinic visit "This could be functional.  Other considerations could be untreated reflux or even esophageal dysmotility."  She continues to have periodic dysphagia to solids requiring her to drink a glass of water to get down.  No hx of recurrent oral ulcers, skin changes, Hx of misscarriages, chronic loose stools or abdomina pains, diffuse arthralgias, Hx of pericarditis. She is on lexapro and lyrica. Mood improved an is interested in weaning from this.   -1/15/21 IM CV no acute issues  -3/28/2022 EGD by Dr Foy at [facility] [place]. Normal aside from 1 cm type-I sliding hiatal hernia   -06/2022 CV Dr Foy writes "Continue with current management and use of omeprazole 20 mg twice daily.  Will give more time for the medication to work.  No further intervention at this time.  Follow up with her pain specialist as planned. Follow-up with me in 2-3 months."  -08/2022 We stopped her SSRI for >6 months and no change in GI symptoms  -5/25/23 continues to endorse GE junction dysphagia with pain. Has resorted to eating only soups and jello for past 2 weeks.  -Plan for video pill endocsopy      Family members have told her she has been forgetful. Once she is prompted she is able to recall. She lives alone, often helped takes care of her knees, able to maintain her finances and pay bills without any difficulty, cook, clean, Dex, . Capable of full ADLs. She takes tizanidine 4 mg q.h.s. along with tramadol 50 mg b.i.d. pregabalin 150 mg b.i.d. and oxybutynin 10 mg. These medications and their side effects were discussed in the context of forgetfulness.      ### Recurrent PE ###  Anticoagulation managed by Dr shaikh in cardiology.  No bleeding complications.   All chronic symptoms of positional CP when she lies flat on her back, forgetfulness and anxiety, dysphagia to solids, SPICRE (sedenatary).  She is s/p rectus sheath block with short term relief.   osteopenia.      Review of Systems   Constitutional:  " "Negative for chills, fatigue, fever and unexpected weight change.   HENT:  Negative for ear pain, hearing loss, postnasal drip, tinnitus, trouble swallowing and voice change.    Respiratory:  Negative for cough, chest tightness, shortness of breath and wheezing.    Cardiovascular:  Negative for chest pain, palpitations and leg swelling.   Gastrointestinal:  Negative for abdominal pain, blood in stool, diarrhea, nausea and vomiting.   Endocrine: Negative for polydipsia, polyphagia and polyuria.   Genitourinary:  Negative for difficulty urinating, dysuria, hematuria and vaginal bleeding.   Skin:  Negative for rash.   Allergic/Immunologic: Negative for food allergies.   Neurological:  Negative for dizziness, numbness and headaches.   Hematological:  Does not bruise/bleed easily.   Psychiatric/Behavioral:  The patient is not nervous/anxious.        Objective:      Vitals:    01/11/24 1525   BP: 110/72   BP Location: Left arm   Patient Position: Sitting   Pulse: 88   SpO2: 99%   Weight: 60.3 kg (132 lb 15 oz)   Height: 5' 1" (1.549 m)      Physical Exam  Vitals and nursing note reviewed.   Constitutional:       General: She is not in acute distress.     Appearance: Normal appearance. She is well-developed.   HENT:      Head: Normocephalic and atraumatic.      Mouth/Throat:      Pharynx: No oropharyngeal exudate.   Eyes:      General: No scleral icterus.     Conjunctiva/sclera: Conjunctivae normal.      Pupils: Pupils are equal, round, and reactive to light.   Neck:      Thyroid: No thyromegaly.   Cardiovascular:      Rate and Rhythm: Normal rate and regular rhythm.      Heart sounds: Normal heart sounds. No murmur heard.  Pulmonary:      Effort: Pulmonary effort is normal.      Breath sounds: Normal breath sounds. No wheezing or rales.   Abdominal:      General: There is no distension.   Musculoskeletal:         General: No tenderness.      Comments: Lower Ext: SLR (-) bilaterally. Sensation to light touch intact and " symmetric. Distal and proximal strength intact and symmetric.     Lymphadenopathy:      Cervical: No cervical adenopathy.   Skin:     General: Skin is warm and dry.   Neurological:      Mental Status: She is alert and oriented to person, place, and time.   Psychiatric:         Behavior: Behavior normal.         Assessment:       1. Annual physical exam    2. Iron deficiency anemia, unspecified iron deficiency anemia type    3. Herniated disc, cervical    4. Ineffective esophageal motility    5. History of DVT of lower extremity    6. History of iron deficiency anemia    7. Epigastric abdominal pain    8. Abnormal findings on diagnostic imaging of other parts of digestive tract    9. Other dysphagia    10. Abnormal finding of blood chemistry, unspecified    11. Other disturbances of skin sensation    12. Esophageal dysphagia    13. Chronic pulmonary embolism without acute cor pulmonale, unspecified pulmonary embolism type        Plan:       Jasson was seen today for back pain.    Diagnoses and all orders for this visit:    Annual physical exam  -     Comprehensive Metabolic Panel; Future  -     Lipid Panel; Future  -     TSH; Future  -     CBC Auto Differential; Future  -     Hemoglobin A1C; Future  -     CBC Auto Differential; Future    Iron deficiency anemia, unspecified iron deficiency anemia type  -     Reticulocytes; Future  -     Vitamin B12; Future  -     Ferritin; Future  -     Iron and TIBC; Future  -     Lactate Dehydrogenase; Future  -     Haptoglobin; Future  -     Thalasseima and Hemoglobinopathy Eval; Future  -     Pathologist Interpretation Differential; Future    Herniated disc, cervical    Ineffective esophageal motility   F/u pain MD. No acute changes.     History of DVT of lower extremity    History of iron deficiency anemia   -     ferrous sulfate (FEOSOL) 325 mg (65 mg iron) Tab tablet; Take 1 tablet (325 mg total) by mouth daily with breakfast.  Epigastric abdominal pain  -     CV Ultrasound  Mesenteric Arterial (Cupid Only); Future    Abnormal findings on diagnostic imaging of other parts of digestive tract  -     Lipid Panel; Future    Other dysphagia  -     TSH; Future    Abnormal finding of blood chemistry, unspecified  -     CBC Auto Differential; Future  -     Hemoglobin A1C; Future  -     CBC Auto Differential; Future    Other disturbances of skin sensation  -     Vitamin B12; Future    Esophageal dysphagia  -     omeprazole (PRILOSEC) 20 MG capsule; Take 1 capsule (20 mg total) by mouth 2 (two) times daily before meals.    Chronic pulmonary embolism without acute cor pulmonale, unspecified pulmonary embolism type    Other orders  Back symptoms most likely dermatitis. Office and Emergency Department prompts discussed.    -     triamcinolone acetonide 0.5% (KENALOG) 0.5 % Crea; Apply topically 2 (two) times daily.      >40  minutes were spent today reviewing patient chart.  Much of today's visit was spent discussing with patient my chart review, their concerns, health maintenance, my assessment, and recommendations.      Osbaldo Mendez MD  [facility]        Side effects of medication(s) were discussed in detail and patient voiced understanding.  Patient will call back for any issues or complications.

## 2024-01-12 LAB
ESTIMATED AVG GLUCOSE: 103 MG/DL (ref 68–131)
HAPTOGLOB SERPL-MCNC: 130 MG/DL (ref 30–250)
HBA1C MFR BLD: 5.2 % (ref 4–5.6)

## 2024-01-15 LAB
FERRITIN SERPL-MCNC: 34 MCG/L (ref 11–328)
HGB A MFR BLD ELPH: 97.4 % (ref 95.8–98)
HGB A2 MFR BLD: 2.6 % (ref 2–3.3)
HGB A2+XXX MFR BLD ELPH: NORMAL %
HGB F MFR BLD: 0 % (ref 0–0.9)
HGB XXX MFR BLD ELPH: NORMAL %
HPLC HB VARIANT: NORMAL
PATH REV BLD -IMP: NORMAL
PROVIDER SIGNING NAME: NORMAL

## 2024-01-16 LAB — PATH REV BLD -IMP: NORMAL

## 2024-03-07 NOTE — [employer]OVATION PATIENT INSTRUCTIONS
Discharge Summary/Instructions for after Video Capsule Endoscopy  Patient Name: Jasson Pineda  Patient MRN: [id number]  Patient YOB: 1950 Wednesday, June 14, 2023  Armand Foy MD  This is a 72 year old female.  1.  Do Not eat or drink anything for 1 hour.  Try sips of water first.  If   tolerated, resume your regular diet or one recommended by your physician.  2.  Do not drive, operate machinery, make critical decisions, or do   activities that require coordination or balance for 24 hours.  3.   You may experience a sore throat for 24 to 48 hours.  You may use   throat lozenges or gargle with warm salt water to relieve the discomfort.  4.  Because air was put into your stomach during the procedure, you may   experience some belching.  5.  Do not use any medication containing aspirin for 10 days.  6.  Go directly to the emergency room if you notice any of the following:   Chills and/or fever over 101 F   Persistent vomiting or vomiting with blood/nasal regurgitation   Severe abdominal pain, other than gas cramps   Severe chest pain   Black, tarry stools     Your doctor recommends these additional instructions:  Return to your GI clinic as needed.  If you have any questions or problems, please call your physician.  EMERGENCY PHONE NUMBER: [phone]  LAB RESULTS: [phone]  Armand Foy MD  3/7/2024 4:34:57 PM  This report has been verified and signed electronically.  Dear patient,  As a result of recent federal legislation (The Federal Cures Act), you may   receive lab or pathology results from your procedure in your [facility]   account before your physician is able to contact you. Your physician or   their representative will relay the results to you with their   recommendations at their soonest availability.  Thank you,  PROVATION

## 2024-03-11 ENCOUNTER — HOSPITAL ENCOUNTER (OUTPATIENT)
Dept: RADIOLOGY | Facility: OTHER | Age: 74
Discharge: HOME | End: 2024-03-11
Attending: INTERNAL MEDICINE
Payer: MEDICARE

## 2024-03-11 DIAGNOSIS — R10.13 EPIGASTRIC ABDOMINAL PAIN: ICD-10-CM

## 2024-03-11 PROCEDURE — 93976 VASCULAR STUDY: CPT | Mod: 26,,, | Performed by: RADIOLOGY

## 2024-03-11 PROCEDURE — 76775 US EXAM ABDO BACK WALL LIM: CPT | Mod: TC

## 2024-03-11 PROCEDURE — 93976 VASCULAR STUDY: CPT | Mod: TC

## 2024-03-11 PROCEDURE — 76775 US EXAM ABDO BACK WALL LIM: CPT | Mod: 26,59,, | Performed by: RADIOLOGY

## 2024-03-26 DIAGNOSIS — Z00.00 ENCOUNTER FOR MEDICARE ANNUAL WELLNESS EXAM: ICD-10-CM

## 2024-04-01 ENCOUNTER — OFFICE VISIT (OUTPATIENT)
Dept: INTERNAL MEDICINE | Facility: CLINIC | Age: 74
End: 2024-04-01
Attending: INTERNAL MEDICINE
Payer: MEDICARE

## 2024-04-01 VITALS
WEIGHT: 132.94 LBS | HEART RATE: 88 BPM | BODY MASS INDEX: 25.1 KG/M2 | DIASTOLIC BLOOD PRESSURE: 72 MMHG | HEIGHT: 61 IN | SYSTOLIC BLOOD PRESSURE: 110 MMHG

## 2024-04-01 DIAGNOSIS — R07.89 ATYPICAL CHEST PAIN: ICD-10-CM

## 2024-04-01 DIAGNOSIS — D50.9 IRON DEFICIENCY ANEMIA, UNSPECIFIED IRON DEFICIENCY ANEMIA TYPE: Primary | ICD-10-CM

## 2024-04-01 DIAGNOSIS — R10.13 DYSPEPSIA: ICD-10-CM

## 2024-04-01 PROCEDURE — 99214 OFFICE O/P EST MOD 30 MIN: CPT | Mod: 95,,, | Performed by: INTERNAL MEDICINE

## 2024-04-01 NOTE — PROGRESS NOTES
Subjective:       Patient ID: Jasson Pineda is a 73 y.o. female.    Chief Complaint: Follow-up    Here for routine f/u  She continues to report an abdominal sensation in a different location that occurs consisently with  PO intake. She has another pain that is consistently positional and the only thing that has paused this was rectus sheath injection. No change        Erwin Pederson MD    ### atypical CP ###  Consistently occurs when she lies down on her back she get back pain penetrating to her chest. She can not sleep on her elft side due to shouilder pain. She sleep on her righ side. When she lies flat on her back she develops a sharp, intense pain that radiates to her chest. No associated PND, orthopnea, wheezing, SOB, LE edema.   Was seen by cardiology and had holter done 07/27/2021 that was normal. Does not repsond to albuterol.  Normal exercise stress test July 2018. Hx of recurrent PE.   -5/18/22 IM CV previously referred to back and spine as I suspect atypical, positional chest is thoracic or paraspinal.  Only members have told her she has been forgetful. Once she is prompted she is able to recall. She lives alone, often helped takes care of her knees, able to maintain her finances and pay bills without any difficulty, armani colon [place], Dr.. Capable of full ADLs. She takes tizanidine 4 mg q.h.s. along with tramadol 50 mg b.i.d. pregabalin 150 mg b.i.d. and oxybutynin 10 mg. These medications and their side effects were discussed in the context of forgetfulness.   -08/2022 Reports having recent injections with zero change in symptoms. This was approx one month prior.   -She is s/p rectus sheath block on 3/1/2023. She reports 100% relief for 2 days. Her pain then returned to baseline. SPECt scan was normal        ### Nephrolithiasis -- OAB ###  Gross hematuria recurrent and urinary urgency.   12/22/20 CT urogram normal.   CTU shows 9mm non-obstructing left renal stone.   02/09/2021 Left  "ESWL   OAB She began ditropan 5 mg XL daily    5/18/22 oxybutynin has been increased to 10mg     ### Dysphagia ###  7/2018 normal EGD by Dr Foy at [facility] [place]-  07/2018 manometry "Ineffective esophageal motility (IEM: 50% or more ineffective swallows with DCI less than 450 mmHg/sec/cm).  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 There is a lower esophageal Schatzki's ring.   10/2019 ENT Dr Burgos "Variably obstructive CP bar, but symptoms are primarily thoracic/epigastic."  12/2019 GI clinic visit "This could be functional.  Other considerations could be untreated reflux or even esophageal dysmotility."  She continues to have periodic dysphagia to solids requiring her to drink a glass of water to get down.  No hx of recurrent oral ulcers, skin changes, Hx of misscarriages, chronic loose stools or abdomina pains, diffuse arthralgias, Hx of pericarditis. She is on lexapro and lyrica. Mood improved an is interested in weaning from this.   1/15/21 IM CV no acute issues  3/28/2022 EGD by Dr Foy at [facility] [place]. Normal aside from 1 cm type-I sliding hiatal hernia   06/2022 CV Dr Foy writes "Continue with current management and use of omeprazole 20 mg twice daily.  Will give more time for the medication to work.  No further intervention at this time.  Follow up with her pain specialist as planned. Follow-up with me in 2-3 months."  08/2022 We stopped her SSRI for >6 months and no change in GI symptoms  5/25/23 continues to endorse GE junction dysphagia with pain. Has resorted to eating only soups and jello for past 2 weeks.  Plan for video pill endocsopy           ### Recurrent PE ###  Anticoagulation managed by Dr shaikh in cardiology.  No bleeding complications.   All chronic symptoms of positional CP when she lies flat on her back, forgetfulness and anxiety, dysphagia to solids, SPICER (sedenatary).  She is s/p rectus sheath block with short term relief.        osteopenia    Exaggerated kyphotic " "curvature of the upper thoracic spine with slight grade 1 anterolisthesis of T1 on T2 and T2 on T3          Review of Systems   Constitutional:  Positive for unexpected weight change. Negative for activity change.   HENT:  Positive for hearing loss and trouble swallowing. Negative for rhinorrhea.    Eyes:  Positive for visual disturbance. Negative for discharge.   Respiratory:  Positive for chest tightness. Negative for wheezing.    Cardiovascular:  Positive for chest pain and palpitations.   Gastrointestinal:  Negative for blood in stool, constipation, diarrhea and vomiting.   Endocrine: Positive for polyuria. Negative for polydipsia.   Genitourinary:  Negative for difficulty urinating, dysuria, hematuria and menstrual problem.   Musculoskeletal:  Positive for arthralgias and neck pain. Negative for joint swelling.   Neurological:  Positive for weakness and headaches.   Psychiatric/Behavioral:  Positive for confusion and dysphoric mood.        Objective:      Vitals:    04/01/24 1531   BP: 110/72   Pulse: 88   Weight: 60.3 kg (132 lb 15 oz)   Height: 5' 1" (1.549 m)      Physical Exam  Constitutional:       General: She is not in acute distress.     Appearance: Normal appearance. She is well-developed. She is not diaphoretic.   HENT:      Head: Atraumatic.   Eyes:      General: No scleral icterus.        Right eye: No discharge.         Left eye: No discharge.      Conjunctiva/sclera: Conjunctivae normal.   Neck:      Thyroid: No thyromegaly.   Pulmonary:      Effort: Pulmonary effort is normal. No tachypnea, accessory muscle usage or respiratory distress.      Breath sounds: Normal breath sounds.   Skin:     Coloration: Skin is not pale.   Neurological:      Mental Status: She is alert and oriented to person, place, and time.   Psychiatric:         Speech: Speech normal.         Behavior: Behavior normal.         Assessment:       1. Iron deficiency anemia, unspecified iron deficiency anemia type    2. Dyspepsia  "   3. Atypical chest pain        Plan:       Jasson was seen today for follow-up.    Diagnoses and all orders for this visit:    Iron deficiency anemia, unspecified iron deficiency anemia type  -     FERRITIN; Future  -     IRON AND TIBC; Future  -     CBC Auto Differential; Future    Dyspepsia   Trial of IBGard and f/u GI    Atypical chest pain   Rec return to pain and request repeat rectis sheath injection. Pain MD barry Mendez MD  Internal Medicine-[facility]        Side effects of medication(s) were discussed in detail and patient voiced understanding.  Patient will call back for any issues or complications.

## 2024-04-01 NOTE — Clinical Note
Thoughts on repeat rectus sheath injection as she got 100% relief temporarily? Had done in the past good, relief then then a separate locale was pursued. Thanks.

## 2024-04-11 ENCOUNTER — OFFICE VISIT (OUTPATIENT)
Dept: INTERNAL MEDICINE | Facility: CLINIC | Age: 74
End: 2024-04-11
Payer: MEDICARE

## 2024-04-11 VITALS
HEART RATE: 91 BPM | OXYGEN SATURATION: 98 % | BODY MASS INDEX: 23.66 KG/M2 | SYSTOLIC BLOOD PRESSURE: 100 MMHG | WEIGHT: 125.31 LBS | DIASTOLIC BLOOD PRESSURE: 78 MMHG | HEIGHT: 61 IN

## 2024-04-11 DIAGNOSIS — R07.89 MUSCULOSKELETAL CHEST PAIN: Primary | ICD-10-CM

## 2024-04-11 PROCEDURE — 99213 OFFICE O/P EST LOW 20 MIN: CPT | Mod: S$PBB,,, | Performed by: STUDENT IN AN ORGANIZED HEALTH CARE EDUCATION/TRAINING PROGRAM

## 2024-04-11 PROCEDURE — 99999 PR PBB SHADOW E&M-EST. PATIENT-LVL III: CPT | Mod: PBBFAC,,, | Performed by: STUDENT IN AN ORGANIZED HEALTH CARE EDUCATION/TRAINING PROGRAM

## 2024-04-11 PROCEDURE — 99213 OFFICE O/P EST LOW 20 MIN: CPT | Mod: PBBFAC | Performed by: STUDENT IN AN ORGANIZED HEALTH CARE EDUCATION/TRAINING PROGRAM

## 2024-04-11 RX ORDER — DICLOFENAC SODIUM 10 MG/G
2 GEL TOPICAL 4 TIMES DAILY PRN
Qty: 450 G | Refills: 0 | Status: SHIPPED | OUTPATIENT
Start: 2024-04-11

## 2024-04-11 NOTE — PROGRESS NOTES
Subjective:       Patient ID: Jasson Pineda is a 73 y.o. female.    Chief Complaint: Cyst (In chest.)    Cyst  This is a new problem. The current episode started in the past 7 days (2 days ago.). The problem occurs constantly. The problem has been unchanged. Pertinent negatives include no abdominal pain, anorexia, chest pain, congestion, coughing, fever, myalgias, nausea, sore throat or vomiting. The symptoms are aggravated by bending and twisting. She has tried nothing for the symptoms.     2 days ago while sitting at work, pain in mid-chest xyphoid process region. Pain has been constant for last 2 days. Has not taken any medications for this. Pain is not related to exertion. Tender to palpation. She notes worse with twisting upper torso. She denies any recent, falls, MVA, trauma, injury mechanisms, denies any preceeding symptoms prior to this.     All of your core healthy metrics are met.      Social History     Social History Narrative    Not on file       Family History   Problem Relation Age of Onset    Colon cancer Mother 74    Ovarian cancer Mother     No Known Problems Father     Liver cancer Sister     Diabetes Sister     Arthritis Sister     Lupus Sister     Lupus Daughter     No Known Problems Daughter     Breast cancer Maternal Aunt 50        50s    Cervical cancer Maternal Aunt     Liver cancer Maternal Grandfather     Breast cancer Maternal Cousin 45    Esophageal cancer Neg Hx        Current Outpatient Medications:     atorvastatin (LIPITOR) 80 MG tablet, TAKE 1 TABLET BY MOUTH EVERY DAY, Disp: 90 tablet, Rfl: 3    cholecalciferol, vitamin D3, 1,250 mcg (50,000 unit) capsule, Take 1 capsule (50,000 Units total) by mouth once a week. (Patient taking differently: Take 50,000 Units by mouth once a week. PRN), Disp: 12 capsule, Rfl: 3    ferrous sulfate (FEOSOL) 325 mg (65 mg iron) Tab tablet, Take 1 tablet (325 mg total) by mouth daily with breakfast., Disp: 180 tablet, Rfl: 2    omeprazole  "(PRILOSEC) 20 MG capsule, Take 1 capsule (20 mg total) by mouth 2 (two) times daily before meals., Disp: 180 capsule, Rfl: 3    oxybutynin (DITROPAN-XL) 10 MG 24 hr tablet, TAKE 1 TABLET BY MOUTH EVERY DAY, Disp: 90 tablet, Rfl: 3    pregabalin (LYRICA) 150 MG capsule, Take 150 mg by mouth 2 (two) times daily., Disp: , Rfl:     tiZANidine (ZANAFLEX) 4 MG tablet, TAKE 1 TABLET BY MOUTH AT BEDTIME AS NEEDED FOR MUSCLE RELAXER, Disp: , Rfl:     traMADol (ULTRAM) 50 mg tablet, Take 50 mg by mouth 2 (two) times daily as needed., Disp: , Rfl: 1    triamcinolone acetonide 0.5% (KENALOG) 0.5 % Crea, Apply topically 2 (two) times daily., Disp: 15 g, Rfl: 1    XARELTO 20 mg Tab, Take 1 tablet (20 mg total) by mouth every evening., Disp: 90 tablet, Rfl: 3    diclofenac sodium (VOLTAREN) 1 % Gel, Apply 2 g topically 4 (four) times daily as needed (MSK CP of midsternum)., Disp: 450 g, Rfl: 0    Review of Systems   Constitutional:  Negative for fever.   HENT:  Negative for congestion, postnasal drip, rhinorrhea, sinus pressure and sore throat.    Respiratory:  Negative for cough, chest tightness and shortness of breath.    Cardiovascular:  Negative for chest pain.   Gastrointestinal:  Negative for abdominal pain, anorexia, nausea and vomiting.   Musculoskeletal:  Negative for myalgias.       Objective:   /78   Pulse 91   Ht 5' 1" (1.549 m)   Wt 56.8 kg (125 lb 5.3 oz)   SpO2 98%   BMI 23.68 kg/m²      Physical Exam  Vitals and nursing note reviewed.   Constitutional:       General: She is not in acute distress.     Appearance: Normal appearance. She is not ill-appearing, toxic-appearing or diaphoretic.   Eyes:      General:         Right eye: No discharge.         Left eye: No discharge.      Conjunctiva/sclera: Conjunctivae normal.   Cardiovascular:      Rate and Rhythm: Normal rate and regular rhythm.   Pulmonary:      Effort: Pulmonary effort is normal. No respiratory distress.      Breath sounds: Normal breath " sounds. No wheezing.   Musculoskeletal:      Comments: Tenderness to palpation of xyphoid process region.     Neurological:      Mental Status: She is alert.   Psychiatric:         Behavior: Behavior normal.         Assessment & Plan   1. Musculoskeletal chest pain  -Reproducible pain on palpation of xiphoid process. I have recommended OTC tylenol prn, and  trial of topical therapy and heating/ice for pain and discomfort. I have advised f/u in 1-2 weeks if symptoms do not improve or resolve.   - diclofenac sodium (VOLTAREN) 1 % Gel; Apply 2 g topically 4 (four) times daily as needed (MSK CP of midsternum).  Dispense: 450 g; Refill: 0    Follow up if symptoms worsen or fail to improve.    Disclaimer:  This note may have been prepared using voice recognition software, it may have not been extensively proofed, as such there could be errors within the text such as sound alike errors.

## 2024-04-15 ENCOUNTER — PATIENT MESSAGE (OUTPATIENT)
Dept: INTERNAL MEDICINE | Facility: CLINIC | Age: 74
End: 2024-04-15
Payer: MEDICARE

## 2024-06-04 ENCOUNTER — OFFICE VISIT (OUTPATIENT)
Dept: CARDIOLOGY | Facility: CLINIC | Age: 74
End: 2024-06-04
Payer: MEDICARE

## 2024-06-04 VITALS
BODY MASS INDEX: 23.7 KG/M2 | DIASTOLIC BLOOD PRESSURE: 66 MMHG | WEIGHT: 125.44 LBS | HEART RATE: 70 BPM | OXYGEN SATURATION: 98 % | SYSTOLIC BLOOD PRESSURE: 106 MMHG

## 2024-06-04 DIAGNOSIS — I27.82 CHRONIC PULMONARY EMBOLISM WITHOUT ACUTE COR PULMONALE, UNSPECIFIED PULMONARY EMBOLISM TYPE: ICD-10-CM

## 2024-06-04 DIAGNOSIS — E78.2 MIXED HYPERLIPIDEMIA: Primary | ICD-10-CM

## 2024-06-04 DIAGNOSIS — I70.0 ATHEROSCLEROSIS OF AORTA: ICD-10-CM

## 2024-06-04 DIAGNOSIS — I77.1 TORTUOUS AORTA: ICD-10-CM

## 2024-06-04 PROCEDURE — 99215 OFFICE O/P EST HI 40 MIN: CPT | Mod: S$PBB,,, | Performed by: INTERNAL MEDICINE

## 2024-06-04 PROCEDURE — 99999 PR PBB SHADOW E&M-EST. PATIENT-LVL III: CPT | Mod: PBBFAC,,, | Performed by: INTERNAL MEDICINE

## 2024-06-04 PROCEDURE — 99213 OFFICE O/P EST LOW 20 MIN: CPT | Mod: PBBFAC,PN | Performed by: INTERNAL MEDICINE

## 2024-06-04 NOTE — PROGRESS NOTES
Cardiology    6/4/2024  8:45 AM    Problem list  Patient Active Problem List   Diagnosis    Kidney stone    Herniated disc, cervical    Complete rotator cuff tear or rupture of right shoulder, not specified as traumatic    Shoulder arthritis    OAB (overactive bladder)    Body mass index (BMI) of 25.0 to 29.9    Atypical chest pain    Ineffective esophageal motility    SOB (shortness of breath)    Complete rotator cuff tear or rupture of left shoulder, not specified as traumatic    Impingement syndrome of left shoulder    Primary osteoarthritis of left shoulder    Labral tear of long head of biceps tendon, initial encounter    History of DVT of lower extremity    Chronic pulmonary embolism without acute cor pulmonale    Mixed hyperlipidemia    Nephrolithiasis    Tortuous aorta    Atherosclerosis of aorta    Current use of long term anticoagulation    History of iron deficiency anemia    Esophageal dysphagia    Epigastric abdominal pain       CC:  F/u    HPI:  Patient is here for follow-up.  Since our last visit, she has had 3 ER visit at outside facility at [facility] and then [facility].  She has been having dysphagia and odynophagia.  She describes going to the emergency room and then checking herself out and drove herself to [facility] where she was admitted.  She describes undergoing endoscopy and dilation because she had a ring.  She is now able to tolerate smoothies.  She can only tolerate soups.  She has been compliant with her medications including Xarelto.  She denies any bleeding.  She denies any exertional chest pain.    Medications  Current Outpatient Medications   Medication Sig Dispense Refill    atorvastatin (LIPITOR) 80 MG tablet TAKE 1 TABLET BY MOUTH EVERY DAY 90 tablet 3    diclofenac sodium (VOLTAREN) 1 % Gel Apply 2 g topically 4 (four) times daily as needed (MSK CP of midsternum). 450 g 0    ferrous sulfate (FEOSOL) 325 mg (65 mg iron) Tab tablet Take 1 tablet (325 mg total) by mouth daily with  breakfast. 180 tablet 2    omeprazole (PRILOSEC) 20 MG capsule Take 1 capsule (20 mg total) by mouth 2 (two) times daily before meals. 180 capsule 3    oxybutynin (DITROPAN-XL) 10 MG 24 hr tablet TAKE 1 TABLET BY MOUTH EVERY DAY 90 tablet 3    pregabalin (LYRICA) 150 MG capsule Take 150 mg by mouth 2 (two) times daily.      tiZANidine (ZANAFLEX) 4 MG tablet TAKE 1 TABLET BY MOUTH AT BEDTIME AS NEEDED FOR MUSCLE RELAXER      traMADol (ULTRAM) 50 mg tablet Take 50 mg by mouth 2 (two) times daily as needed.  1    triamcinolone acetonide 0.5% (KENALOG) 0.5 % Crea Apply topically 2 (two) times daily. 15 g 1    XARELTO 20 mg Tab Take 1 tablet (20 mg total) by mouth every evening. 90 tablet 3    cholecalciferol, vitamin D3, 1,250 mcg (50,000 unit) capsule Take 1 capsule (50,000 Units total) by mouth once a week. (Patient not taking: Reported on 6/4/2024) 12 capsule 3     No current facility-administered medications for this visit.      Prior to Admission medications    Medication Sig Start Date End Date Taking? Authorizing Provider   atorvastatin (LIPITOR) 80 MG tablet TAKE 1 TABLET BY MOUTH EVERY DAY 7/26/22  Yes Osbaldo Hall MD   diclofenac sodium (VOLTAREN) 1 % Gel Apply 2 g topically 4 (four) times daily as needed (MSK CP of midsternum). 4/11/24  Yes Fazal Lafleur MD   ferrous sulfate (FEOSOL) 325 mg (65 mg iron) Tab tablet Take 1 tablet (325 mg total) by mouth daily with breakfast. 1/11/24  Yes Osbaldo Hall MD   omeprazole (PRILOSEC) 20 MG capsule Take 1 capsule (20 mg total) by mouth 2 (two) times daily before meals. 1/11/24  Yes Osbaldo Hall MD   oxybutynin (DITROPAN-XL) 10 MG 24 hr tablet TAKE 1 TABLET BY MOUTH EVERY DAY 1/5/24  Yes Chantel Boyle NP   pregabalin (LYRICA) 150 MG capsule Take 150 mg by mouth 2 (two) times daily.   Yes Provider, Historical   tiZANidine (ZANAFLEX) 4 MG tablet TAKE 1 TABLET BY MOUTH AT BEDTIME AS NEEDED FOR MUSCLE RELAXER 3/21/22  Yes Provider, Historical    traMADol (ULTRAM) 50 mg tablet Take 50 mg by mouth 2 (two) times daily as needed. 10/26/18  Yes Provider, Historical   triamcinolone acetonide 0.5% (KENALOG) 0.5 % Crea Apply topically 2 (two) times daily. 24  Yes Osbaldo Hall MD   XARELTO 20 mg Tab Take 1 tablet (20 mg total) by mouth every evening. 23  Yes Sotero Doty MD   cholecalciferol, vitamin D3, 1,250 mcg (50,000 unit) capsule Take 1 capsule (50,000 Units total) by mouth once a week.  Patient not taking: Reported on 2024   Osbaldo Hall MD         History  Past Medical History:   Diagnosis Date    Anticoagulant long-term use     xarelto    Chronic back pain     Chronic neck pain     DVT (deep venous thrombosis)     Herniated disc, cervical     Kidney stone     Lumbar herniated disc     PE (pulmonary embolism)         Pulmonary emboli     after surgery    Pulmonary embolism      Past Surgical History:   Procedure Laterality Date    ARTHROSCOPIC REPAIR OF ROTATOR CUFF OF SHOULDER Left 2019    Procedure: REPAIR, ROTATOR CUFF, ARTHROSCOPIC;  Surgeon: Sedrick Patel MD;  Location: [facility];  Service: Orthopedics;  Laterality: Left;    ARTHROSCOPIC TENOTOMY OF BICEPS TENDON Left 2019    Procedure: TENOTOMY, BICEPS, ARTHROSCOPIC;  Surgeon: Sedrick Patel MD;  Location: [facility];  Service: Orthopedics;  Laterality: Left;    ARTHROSCOPY OF SHOULDER WITH REMOVAL OF DISTAL CLAVICLE Left 2019    Procedure: ARTHROSCOPY, SHOULDER, WITH DISTAL CLAVICLE EXCISION;  Surgeon: eSdrick Patel MD;  Location: [facility] OR;  Service: Orthopedics;  Laterality: Left;    BREAST CYST EXCISION Left      SECTION      x1    COLONOSCOPY N/A 2018    Procedure: COLONOSCOPY;  Surgeon: Armand Foy MD;  Location: [address]);  Service: Endoscopy;  Laterality: N/A;    EPIDURAL STEROID INJECTION Bilateral 2023    Procedure: TRANSFORAMINAL EPIDURAL INJECTION, T6-T7 clear to hold Xarelto 2 days;   Surgeon: Bill Easley MD;  Location: [facility] PAIN MGT;  Service: Pain Management;  Laterality: Bilateral;    ESOPHAGEAL MANOMETRY WITH MEASUREMENT OF IMPEDANCE N/A 07/25/2018    Procedure: MANOMETRY, ESOPHAGEAL, WITH IMPEDANCE MEASUREMENT;  Surgeon: Armand Foy MD;  Location: [facility] ENDO (4TH FLR);  Service: Endoscopy;  Laterality: N/A;    ESOPHAGOGASTRODUODENOSCOPY N/A 07/17/2018    Procedure: EGD (ESOPHAGOGASTRODUODENOSCOPY);  Surgeon: Armand Foy MD;  Location: [facility] ENDO (4TH FLR);  Service: Endoscopy;  Laterality: N/A;  pt currently on Lovenox and Xarelto - ok per Dr. Doty to hold Plavix 2 days prior and Lovenox 24hr prior to case/see scanned media 7/9/ for documentation of hold -- SM        ESOPHAGOGASTRODUODENOSCOPY N/A 03/28/2022    Procedure: EGD (ESOPHAGOGASTRODUODENOSCOPY);  Surgeon: Armand Foy MD;  Location: [facility] ENDO (2ND FLR);  Service: Endoscopy;  Laterality: N/A;  okay to hold Xarelto for 2 days per Dr. Doty with lovenox bridgings - see note on 3/21/22  2nd floor for ASAP availabilty  fully vaccinated - sm    EXTRACORPOREAL SHOCK WAVE LITHOTRIPSY Left 02/09/2021    Procedure: LITHOTRIPSY-EXTRACORPOREAL SHOCK WAVE;  Surgeon: Jeremias Eric MD;  Location: [facility];  Service: Urology;  Laterality: Left;    HERNIA REPAIR      umbilical    KIDNEY STONE SURGERY      ureteral stent    left knee surgery      SHOULDER ARTHROSCOPY Left 06/11/2019    Procedure: ARTHROSCOPY, SHOULDER;  Surgeon: Sedrick Patel MD;  Location: [facility];  Service: Orthopedics;  Laterality: Left;  BLOCK    TONSILLECTOMY      TOTAL SHOULDER ARTHROPLASTY Right      Social History     Socioeconomic History    Marital status:     Number of children: 2   Occupational History    Occupation: teacher      Comment:     Tobacco Use    Smoking status: Never    Smokeless tobacco: Never   Substance and Sexual Activity    Alcohol use: No    Drug use: No    Sexual activity: Not Currently      Partners: Male     Social Determinants of Health     Financial Resource Strain: High Risk (4/1/2024)    Overall Financial Resource Strain (CARDIA)     Difficulty of Paying Living Expenses: Hard   Food Insecurity: No Food Insecurity (5/20/2024)    Received from [facility]    Hunger Vital Sign     Worried About Running Out of Food in the Last Year: Never true     Ran Out of Food in the Last Year: Never true   Recent Concern: Food Insecurity - Food Insecurity Present (4/1/2024)    Hunger Vital Sign     Worried About Running Out of Food in the Last Year: Often true     Ran Out of Food in the Last Year: Often true   Transportation Needs: No Transportation Needs (5/20/2024)    Received from [facility]    PRAPARE - Transportation     Lack of Transportation (Medical): No     Lack of Transportation (Non-Medical): No   Physical Activity: Sufficiently Active (4/1/2024)    Exercise Vital Sign     Days of Exercise per Week: 5 days     Minutes of Exercise per Session: 60 min   Stress: Stress Concern Present (4/1/2024)    [employer] [place] [employer] - Occupational Stress Questionnaire     Feeling of Stress : To some extent   Housing Stability: High Risk (4/1/2024)    Housing Stability Vital Sign     Unable to Pay for Housing in the Last Year: Yes     Number of Places Lived in the Last Year: 1     Unstable Housing in the Last Year: No         Allergies  Review of patient's allergies indicates:   Allergen Reactions    Sulfa (sulfonamide antibiotics) Rash         Review of Systems   Review of Systems   Constitutional: Negative for decreased appetite, fever and weight loss.   HENT:  Negative for congestion and nosebleeds.    Eyes:  Negative for double vision, vision loss in left eye, vision loss in right eye and visual disturbance.   Cardiovascular:  Negative for chest pain, claudication, cyanosis, dyspnea on exertion, irregular heartbeat, leg swelling, near-syncope, orthopnea, palpitations, paroxysmal nocturnal dyspnea  and syncope.   Respiratory:  Negative for cough, hemoptysis, shortness of breath, sleep disturbances due to breathing, snoring, sputum production and wheezing.    Endocrine: Negative for cold intolerance and heat intolerance.   Skin:  Negative for nail changes and rash.   Musculoskeletal:  Negative for joint pain, muscle cramps, muscle weakness and myalgias.   Gastrointestinal:  Negative for change in bowel habit, heartburn, hematemesis, hematochezia, hemorrhoids and melena.   Neurological:  Negative for dizziness, focal weakness and headaches.         Physical Exam  Wt Readings from Last 1 Encounters:   06/04/24 56.9 kg (125 lb 7.1 oz)     BP Readings from Last 3 Encounters:   06/04/24 106/66   04/11/24 100/78   04/01/24 110/72     Pulse Readings from Last 1 Encounters:   06/04/24 70     Body mass index is 23.7 kg/m².    Physical Exam  Vitals reviewed.   Constitutional:       Appearance: She is well-developed.   Neck:      Vascular: No carotid bruit or JVD.   Cardiovascular:      Rate and Rhythm: Normal rate and regular rhythm.      Pulses:           Carotid pulses are 2+ on the right side and 2+ on the left side.       Radial pulses are 2+ on the right side and 2+ on the left side.        Dorsalis pedis pulses are 2+ on the right side and 2+ on the left side.      Heart sounds: Normal heart sounds, S1 normal and S2 normal.   Abdominal:      General: Bowel sounds are normal.   Musculoskeletal:      Thoracic back: Tenderness present.   Neurological:      Mental Status: She is alert and oriented to person, place, and time.             Assessment  1. Mixed hyperlipidemia  Stable on statin.  Continue current treatment and monitor    2. Tortuous aorta  Stable.    3. Atherosclerosis of aorta  On statin.    4. Chronic pulmonary embolism without acute cor pulmonale, unspecified pulmonary embolism type  On Xarelto.        Plan and Discussion  Reviewed her 3 ER visits from outside facilities ([facility], [facility]).  She is currently  being treated for dysphagia and odynophagia.  Recommend to follow up with GI.  Reminded her that she needs to continue on Xarelto and may not interrupt anticoagulation given her recurrent venous thromboembolism/pulmonary embolism.  If she needs to stop Xarelto, she would need Lovenox bridging.    Follow Up  6 months      Sotero Doty MD, F.A.C.C, F.S.C.A.I.      Total professional time spent for the encounter: 40 minutes  Time was spent preparing to see the patient, reviewing results of prior testing, obtaining and/or reviewing separately obtained history, performing a medically appropriate examination and interview, counseling and educating the patient/family, ordering medications/tests/procedures, referring and communicating with other health care professionals, documenting clinical information in the electronic health record, and independently interpreting results.    Disclaimer: This document was created using voice recognition software ([employer]). Although it may be edited, this document may contain errors related to incorrect recognition of the spoken word. Please call the physician if clarification is needed.

## 2024-06-19 DIAGNOSIS — I26.99 PULMONARY EMBOLISM, UNSPECIFIED CHRONICITY, UNSPECIFIED PULMONARY EMBOLISM TYPE, UNSPECIFIED WHETHER ACUTE COR PULMONALE PRESENT: ICD-10-CM

## 2024-06-20 RX ORDER — RIVAROXABAN 20 MG/1
20 TABLET, FILM COATED ORAL NIGHTLY
Qty: 30 TABLET | Refills: 3 | Status: SHIPPED | OUTPATIENT
Start: 2024-06-20

## 2024-06-28 ENCOUNTER — OFFICE VISIT (OUTPATIENT)
Dept: GASTROENTEROLOGY | Facility: CLINIC | Age: 74
End: 2024-06-28
Payer: MEDICARE

## 2024-06-28 ENCOUNTER — TELEPHONE (OUTPATIENT)
Dept: ENDOSCOPY | Facility: HOSPITAL | Age: 74
End: 2024-06-28
Payer: MEDICARE

## 2024-06-28 VITALS
DIASTOLIC BLOOD PRESSURE: 71 MMHG | SYSTOLIC BLOOD PRESSURE: 112 MMHG | HEIGHT: 65 IN | BODY MASS INDEX: 20.32 KG/M2 | HEART RATE: 65 BPM | WEIGHT: 121.94 LBS

## 2024-06-28 VITALS — HEIGHT: 61 IN | WEIGHT: 121 LBS | BODY MASS INDEX: 22.84 KG/M2

## 2024-06-28 DIAGNOSIS — R93.3 ABNORMAL ESOPHAGRAM: ICD-10-CM

## 2024-06-28 DIAGNOSIS — Z79.01 CURRENT USE OF LONG TERM ANTICOAGULATION: ICD-10-CM

## 2024-06-28 DIAGNOSIS — R10.13 EPIGASTRIC ABDOMINAL PAIN: ICD-10-CM

## 2024-06-28 DIAGNOSIS — R13.10 DYSPHAGIA, UNSPECIFIED TYPE: Primary | ICD-10-CM

## 2024-06-28 DIAGNOSIS — R07.89 XYPHOIDALGIA: ICD-10-CM

## 2024-06-28 DIAGNOSIS — K22.4 INEFFECTIVE ESOPHAGEAL MOTILITY: ICD-10-CM

## 2024-06-28 DIAGNOSIS — R63.4 UNINTENDED WEIGHT LOSS: ICD-10-CM

## 2024-06-28 DIAGNOSIS — R13.19 ESOPHAGEAL DYSPHAGIA: Primary | ICD-10-CM

## 2024-06-28 DIAGNOSIS — K22.2 LOWER ESOPHAGEAL RING (SCHATZKI): ICD-10-CM

## 2024-06-28 PROCEDURE — 99999 PR PBB SHADOW E&M-EST. PATIENT-LVL III: CPT | Mod: PBBFAC,,, | Performed by: INTERNAL MEDICINE

## 2024-06-28 PROCEDURE — 99213 OFFICE O/P EST LOW 20 MIN: CPT | Mod: PBBFAC | Performed by: INTERNAL MEDICINE

## 2024-06-28 NOTE — PROGRESS NOTES
[facility] Established Patient Visit    Reason for Visit:    Chief Complaint   Patient presents with    Dysphagia       PCP: Osbaldo Hall      HPI:  Jasson Pienda is a 73 y.o. female here for follow-up of here for follow-up of chronic dysphagia with documented ineffective esophageal motility and chronic, recurrent abdominal pain.  I last saw her in February of 2023 for iron deficiency anemia following which she had a wireless capsule endoscopy done 06/14/2023 with normal small bowel findings.  There was appropriate small-bowel transit time of 3 hours 28 minutes.  Her blood counts have been relatively stable.  Her main complaint today is dysphagia.  This has worsened over time.  She has no longer able to tolerate solid food.  When she ate solid food, she would have to regurgitate it for relief.  Her diet consists of mostly liquids now.  She is lost about 15-20 lb in last few months.  She denies painful swallowing.  She is still on omeprazole 20 mg daily.  She initially went to [facility] Emergency room on May 19th complaining of worsened symptoms.  She was discharged, but felt she needed further evaluation.  She presented to [facility] that same day where she was admitted overnight for observation.  She had an EGD 05/20/2024 that noted a tortuous esophagus and a Schatzki's ring that was disrupted with biopsy forceps.  Biopsies were taken throughout the upper esophagus, lower esophagus, stomach, and duodenal.  All tests were within normal limits with biopsies of the stomach revealing chronic inactive gastritis without H pylori.  She did not have any relief following that EGD.  She has not taking any other over-the-counter supplements or medications for stomach.    She continues to have the same lower chest and upper abdominal pain as before.  She had a rectus sheath injection last year which did help for a short period of time.          ROS:  Constitutional: No fevers, chills  GI:  see HPI        PMHX:  has a past medical history of Anticoagulant long-term use, Chronic back pain, Chronic neck pain, DVT (deep venous thrombosis), Herniated disc, cervical, Kidney stone, Lumbar herniated disc, PE (pulmonary embolism), Pulmonary emboli, and Pulmonary embolism.    PSHX:  has a past surgical history that includes Kidney stone surgery; left knee surgery; Tonsillectomy;  section; Hernia repair; Total shoulder arthroplasty (Right); Breast cyst excision (Left); Esophagogastroduodenoscopy (N/A, 2018); Colonoscopy (N/A, 2018); Esophageal manometry with measurement of impedance (N/A, 2018); Shoulder arthroscopy (Left, 2019); Arthroscopic repair of rotator cuff of shoulder (Left, 2019); Arthroscopy of shoulder with removal of distal clavicle (Left, 2019); Arthroscopic tenotomy of biceps tendon (Left, 2019); Extracorporeal shock wave lithotripsy (Left, 2021); Esophagogastroduodenoscopy (N/A, 2022); and Epidural steroid injection (Bilateral, 2023).    The patient's social and family histories were reviewed by me and updated in the appropriate section of the electronic medical record.    Review of patient's allergies indicates:   Allergen Reactions    Sulfa (sulfonamide antibiotics) Rash       Prior to Admission medications    Medication Sig Start Date End Date Taking? Authorizing Provider   atorvastatin (LIPITOR) 80 MG tablet TAKE 1 TABLET BY MOUTH EVERY DAY 22   Osbaldo Hall MD   cholecalciferol, vitamin D3, 1,250 mcg (50,000 unit) capsule Take 1 capsule (50,000 Units total) by mouth once a week.  Patient not taking: Reported on 2024   Osbaldo Hall MD   diclofenac sodium (VOLTAREN) 1 % Gel Apply 2 g topically 4 (four) times daily as needed (MSK CP of midsternum). 24   Fazal Lafleur MD   ferrous sulfate (FEOSOL) 325 mg (65 mg iron) Tab tablet Take 1 tablet (325 mg total) by mouth daily with breakfast. 24    Osbaldo Hall MD   omeprazole (PRILOSEC) 20 MG capsule Take 1 capsule (20 mg total) by mouth 2 (two) times daily before meals. 1/11/24   Osbaldo Hall MD   oxybutynin (DITROPAN-XL) 10 MG 24 hr tablet TAKE 1 TABLET BY MOUTH EVERY DAY 1/5/24   Chantel Boyle NP   pregabalin (LYRICA) 150 MG capsule Take 150 mg by mouth 2 (two) times daily.    Provider, Historical   tiZANidine (ZANAFLEX) 4 MG tablet TAKE 1 TABLET BY MOUTH AT BEDTIME AS NEEDED FOR MUSCLE RELAXER 3/21/22   Provider, Historical   traMADol (ULTRAM) 50 mg tablet Take 50 mg by mouth 2 (two) times daily as needed. 10/26/18   Provider, Historical   triamcinolone acetonide 0.5% (KENALOG) 0.5 % Crea Apply topically 2 (two) times daily. 1/11/24   Osbaldo Hall MD   XARELTO 20 mg Tab TAKE 1 TABLET BY MOUTH EVERY DAY IN THE EVENING 6/20/24   Sotero Doty MD         Objective Findings:  Vital Signs:  There were no vitals taken for this visit. There is no height or weight on file to calculate BMI.      Physical Exam:  General Appearance:  Well appearing in no acute distress, appears stated age  Head:  Normocephalic, atraumatic  Eyes:  No scleral icterus or pallor, EOMI          Labs:  Lab Results   Component Value Date    WBC 4.93 01/11/2024    HGB 11.0 (L) 01/11/2024    HCT 35.1 (L) 01/11/2024    MCV 84 01/11/2024    RDW 15.7 (H) 01/11/2024     01/11/2024    GRAN 2.2 01/11/2024    GRAN 45.1 01/11/2024    LYMPH 2.2 01/11/2024    LYMPH 44.0 01/11/2024    MONO 0.4 01/11/2024    MONO 8.5 01/11/2024    EOS 0.1 01/11/2024    BASO 0.01 01/11/2024     Lab Results   Component Value Date     01/11/2024    K 4.2 01/11/2024     01/11/2024    CO2 30 (H) 01/11/2024    GLU 80 01/11/2024    BUN 11 01/11/2024    CREATININE 0.8 01/11/2024    CALCIUM 9.8 01/11/2024    PROT 7.8 01/11/2024    ALBUMIN 4.2 01/11/2024    BILITOT 0.3 01/11/2024    ALKPHOS 59 01/11/2024    AST 15 01/11/2024    ALT 9 (L) 01/11/2024             Imaging:  CT  angio of the chest, abdomen, and pelvis 05/1924:   Findings:   The ascending thoracic aortic arch has a diameter of 3.5 cm. The immediate descending thoracic aortic arch has a diameter of 2.0 cm. There is no evidence of thoracic aortic aneurysm or dissection. Great vessel origins are unremarkable. The distal thoracic aorta has a diameter of 2.2 cm at the aortic hiatus with the diaphragm. The infrarenal aorta tapers normally with scattered plaque in the visceral artery branch vessels are patent. Single renal arteries bilaterally appear patent. Aortic bifurcation, iliac bifurcation, common iliacs and external iliac arteries show no significant stenosis or occlusion. Common femoral arteries bifurcate without visualized stenosis.   Source images chest:   No axillary, supraclavicular, mediastinal, hilar lymphadenopathy or or masses are observed.   Central vasculature are unremarkable. Central airways unremarkable. The heart is not enlarged.   Lungs are well inflated and clear without nodule mass infiltrate or effusion.   The bony sternum appears intact. No soft tissue masses of the anterior chest wall are observed.   Abdomen pelvis: Liver gallbladder spleen pancreas adrenal glands and kidneys show no solid mass lesions. Small and large bowel loops are notable for a few scattered diverticula of the sigmoid colon and descending colon without evidence of diverticulitis. No free fluid or free air seen. Uterus and adnexa are unremarkable.   Bony structures of the chest abdomen and pelvis document scattered degenerative changes.   IMPRESSION:   Impression: No evidence of thoracic or abdominal aortic aneurysm or dissection. Peripheral arterial disease without significant stenotic or occlusive disease. No visualized active chest disease. Source images of the abdomen and pelvis show incidental findings as discussed.         Ultrasound mesenteric ischemia study 03/11/2024:   FINDINGS:  The aorta demonstrates a normal caliber  throughout. There is calcified atherosclerotic plaque.  The iliac arteries are normal in caliber without evidence of aneurysm.  The celiac, SMA, and TOM arteries are visualized.  The velocity in the celiac artery is 110 cm/sec resulting in a celiac to aortic ratio of 1.8.  The velocity in the superior mesenteric artery is 123 cm/sec resulting in a SMA to aortic velocity 2.5.  The velocity in the inferior mesenteric artery is 155 cm/sec resulting in a TOM to aortic ratio 2.5.  Impression:  No evidence of hemodynamically significant stenosis.        Esophagram 05/31/2019:   FINDINGS:  Swallowing: Marked cricopharyngeal bar, which may be associated with long-term reflux.  Possible esophageal web in the anterior portion best seen on lateral images.  Esophagus: Tertiary contractions which likely indicate esophageal dysmotility.  Small hiatal hernia.  Gastroesophageal reflux: None observed.  Other findings: None.  Impression:  Esophageal dysmotility.  Small hiatal hernia.  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                Assessment:  Jasson Pineda is a 73 y.o. female here with:  1. Esophageal dysphagia    2. Ineffective esophageal motility    3. Unintended weight loss    4. Abnormal esophagram    5. Epigastric abdominal pain    6. Xyphoidalgia    7. Current use of long term anticoagulation    8. Lower esophageal ring (Schatzki)      Her main complaint days esophageal dysphagia and weight loss.  The dysphagia has worsened in the past several months.  She is unable to tolerate solid food.  Last esophageal manometry study 6 years ago indicated ineffective esophageal motility.  Recent EGD on May 20th done at [facility] indicated a now tortuous esophagus as well as a Schatzki's ring that was disrupted with biopsy forceps.  Unfortunately, she had no relief from that test.  Biopsies of the esophagus stomach and duodenum were unrevealing for significant cause of symptoms or pathology.  Chronic  inactive gastritis was seen, but I doubt is related to her current complaints.  I am concerned about progression of her esophageal dysmotility.  She had an esophagram in the past that did show abnormality, but a barium tablet does not appear to have been done at that time.    She continues to have lower chest and upper abdominal pain that seems more consistent with a musculoskeletal nature or xiphoidalgia.  She did have short term relief from a rectus sheath injection last year.  EGD, CT angiography imaging, and mesenteric ultrasound all unrevealing for a cause.      Recommendations:  We will revisit evaluation for esophageal dysmotility to see if there has been progression a previously noted abnormalities.  I recommend high-resolution esophageal manometry study in the near future.  Requesting to scheduling team.  Esophagram may need to be revisited as well, this time with a barium tablet.  Continue omeprazole for now.  She may consider revisiting rectus sheath injections with a pain specialist.  Addition of a TCA, such as amitriptyline, is another option.      Follow-up pending above          Armand Foy MD      Visit time 40 minutes with more than 50% of time spent reviewing outside records and discussing further evaluation.

## 2024-06-28 NOTE — PROGRESS NOTES
"GENERAL GI PATIENT INTAKE:    COVID symptoms in the last 7 days (runny nose, sore throat, congestion, cough, fever): No  PCP: Osbaldo Hall  If not PCP-  number given to establish [phone]: N/A    ALLERGIES REVIEWED:  Yes    CHIEF COMPLAINT:    Chief Complaint   Patient presents with    Dysphagia       VITAL SIGNS:  /71   Pulse 65   Ht 5' 5" (1.651 m)   Wt 55.3 kg (121 lb 14.6 oz)   BMI 20.29 kg/m²      Change in medical, surgical, family or social history: No      REVIEWED MEDICATION LIST RECONCILED INCLUDING ABOVE MEDS:  Yes     "

## 2024-06-28 NOTE — TELEPHONE ENCOUNTER
"----- Message from Armand Foy MD sent at 6/28/2024 10:28 AM CDT -----  Regarding: Esophageal manometry  Procedure: Esophageal  manometry     Diagnosis: Dysphagia, ineffective esophageal motility     Procedure Timing: Within 4 weeks (Urgent)    #If within 4 weeks selected, please yolanda as high priority#    #If greater than 12 weeks, please select "5-12 weeks" and delay sending until 3 months prior to requested date#     Location: Any Site    Additional Scheduling Information: Blood thinners    Prep Specifications:N/A    Is the patient taking a GLP-1 Agonist:no    Have you attached a patient to this message: yes  "

## 2024-06-28 NOTE — TELEPHONE ENCOUNTER
Esophageal Manometry (Motility) with Impedance Instructions    Date of procedure: 7/31/24 Arrive at: 7:00am   Location of Department:   [facility] [address] [place] [address]  Take the [facility] Elevators to 4th Floor Endoscopy Lab      How to prep:      Day Before Procedure 7/30/24    You may have a light evening meal.   No solid food after 7:00 pm.   You may have clear liquids until midnight. See below for list.       Day of the Procedure 7/31/24           What You CANNOT do:   Do not drink milk or anything colored red.  Do not drink alcohol.  No gum chewing or candy morning of procedure.    Liquids That Are OK to Drink:   Water  Sports drinks (Gatorade, Power-Aid)  Coffee or tea (no cream or nondairy creamer)  Clear juices without pulp (apple, white grape)  Gelatin desserts (no fruit or toppings)  Clear soda (sprite, coke, ginger ale)  Chicken broth (until 12 midnight the night before procedure)      Comments:

## 2024-06-28 NOTE — TELEPHONE ENCOUNTER
Spoke to Jasson to schedule procedure(s) Esophageal Manometry       Physician to perform procedure(s) Dr. JEREMY Gooden  Date of Procedure (s) 7/31/24  Arrival Time 7:00 AM  Time of Procedure(s) 8:00 AM   Location of Procedure(s) [place] 4th Floor  Type of Rx Prep sent to patient: Other  Instructions provided to patient via Copy in hand    Patient was informed on the following information and verbalized understanding. Screening questionnaire reviewed with patient and complete. No ride arrangements are required for this procedure.   Appointment details are tentative, especially check-in time. Patient will receive a prep-op call 7 days prior to confirm check-in time for procedure. If applicable the patient should contact their pharmacy to verify Rx for procedure prep is ready for pick-up. Patient was advised to call the scheduling department at [phone] if pharmacy states no Rx is available. Patient was advised to call the endoscopy scheduling department if any questions or concerns arise.       Endoscopy Scheduling Department

## 2024-07-24 ENCOUNTER — TELEPHONE (OUTPATIENT)
Dept: ENDOSCOPY | Facility: HOSPITAL | Age: 74
End: 2024-07-24
Payer: MEDICARE

## 2024-07-24 NOTE — TELEPHONE ENCOUNTER
Contacted Pt for Endoscopy precall to confirm scheduled procedure(s) Esophageal Manometry on 7/31/24. Pt did not answer. Unable to leave voicemail for pt to call Endoscopy Scheduling due to full mailbox

## 2024-07-29 ENCOUNTER — TELEPHONE (OUTPATIENT)
Dept: ENDOSCOPY | Facility: HOSPITAL | Age: 74
End: 2024-07-29
Payer: MEDICARE

## 2024-07-29 NOTE — TELEPHONE ENCOUNTER
Spoke to patient for precall to confirm scheduled procedure(s) Esophageal Manometry       Date of Procedure (s)  verified 7/31/24  Arrival Time 7:00 AM verified.  Location of Procedure(s) [place] 4th Floor verified.    NPO status reinforced. Ok to continue clear liquids up until 4 hours prior to the Endoscopy procedure.     Pt confirmed receipt of Rx prep and prep instructions.  Instructions provided to patient via Copy in hand  Pt confirmed ride home after procedure.   If procedure requires anesthesia, a responsible adult needs to be present to accompany the patient home, patient cannot drive after receiving anesthesia.    Patient was informed on the following information and verbalized understanding. Precall Screening questionnaire reviewed  and completed with patient. Appointment details are tentative, including check-in time.  If you begin taking any blood thinning medications, injectable weight loss/diabetes medications (other than insulin) , or Adipex (Phentermine) please contact the endoscopy scheduling department listed below as soon as possible.  Patient was advised to call the endoscopy scheduling department if any questions or concerns arise. Pt verbalized understanding.     [facility] Endoscopy Scheduling Department

## 2024-07-31 ENCOUNTER — HOSPITAL ENCOUNTER (OUTPATIENT)
Facility: HOSPITAL | Age: 74
Discharge: HOME | End: 2024-07-31
Attending: INTERNAL MEDICINE | Admitting: INTERNAL MEDICINE
Payer: MEDICARE

## 2024-07-31 VITALS
RESPIRATION RATE: 16 BRPM | HEART RATE: 66 BPM | HEIGHT: 61 IN | WEIGHT: 115 LBS | TEMPERATURE: 98 F | DIASTOLIC BLOOD PRESSURE: 64 MMHG | BODY MASS INDEX: 21.71 KG/M2 | SYSTOLIC BLOOD PRESSURE: 107 MMHG | OXYGEN SATURATION: 98 %

## 2024-07-31 DIAGNOSIS — R13.10 DYSPHAGIA: ICD-10-CM

## 2024-07-31 PROCEDURE — 91010 ESOPHAGUS MOTILITY STUDY: CPT | Mod: TC | Performed by: INTERNAL MEDICINE

## 2024-07-31 PROCEDURE — 91037 ESOPH IMPED FUNCTION TEST: CPT | Mod: TC | Performed by: INTERNAL MEDICINE

## 2024-07-31 PROCEDURE — 91037 ESOPH IMPED FUNCTION TEST: CPT | Mod: 26,,, | Performed by: INTERNAL MEDICINE

## 2024-07-31 PROCEDURE — 25000003 PHARM REV CODE 250: Performed by: INTERNAL MEDICINE

## 2024-07-31 PROCEDURE — 91010 ESOPHAGUS MOTILITY STUDY: CPT | Mod: 26,,, | Performed by: INTERNAL MEDICINE

## 2024-07-31 RX ORDER — LIDOCAINE HYDROCHLORIDE 20 MG/ML
JELLY TOPICAL ONCE
Status: COMPLETED | OUTPATIENT
Start: 2024-07-31 | End: 2024-07-31

## 2024-07-31 RX ADMIN — LIDOCAINE HYDROCHLORIDE 10 ML: 20 JELLY TOPICAL at 08:07

## 2024-08-05 ENCOUNTER — TELEPHONE (OUTPATIENT)
Dept: GASTROENTEROLOGY | Facility: CLINIC | Age: 74
End: 2024-08-05
Payer: MEDICARE

## 2024-08-05 DIAGNOSIS — R13.19 ESOPHAGEAL DYSPHAGIA: Primary | ICD-10-CM

## 2024-08-06 ENCOUNTER — TELEPHONE (OUTPATIENT)
Dept: GASTROENTEROLOGY | Facility: CLINIC | Age: 74
End: 2024-08-06
Payer: MEDICARE

## 2024-08-12 ENCOUNTER — HOSPITAL ENCOUNTER (OUTPATIENT)
Dept: RADIOLOGY | Facility: HOSPITAL | Age: 74
Discharge: HOME | End: 2024-08-12
Attending: INTERNAL MEDICINE
Payer: MEDICARE

## 2024-08-12 DIAGNOSIS — R13.19 ESOPHAGEAL DYSPHAGIA: ICD-10-CM

## 2024-08-12 PROCEDURE — 74220 X-RAY XM ESOPHAGUS 1CNTRST: CPT | Mod: TC

## 2024-08-12 PROCEDURE — 74220 X-RAY XM ESOPHAGUS 1CNTRST: CPT | Mod: 26,,, | Performed by: STUDENT IN AN ORGANIZED HEALTH CARE EDUCATION/TRAINING PROGRAM

## 2024-08-12 PROCEDURE — A9698 NON-RAD CONTRAST MATERIALNOC: HCPCS | Performed by: INTERNAL MEDICINE

## 2024-08-12 PROCEDURE — 25500020 PHARM REV CODE 255: Performed by: INTERNAL MEDICINE

## 2024-08-12 RX ADMIN — BARIUM SULFATE 150 ML: 0.6 SUSPENSION ORAL at 10:08

## 2024-09-05 ENCOUNTER — TELEPHONE (OUTPATIENT)
Dept: GASTROENTEROLOGY | Facility: CLINIC | Age: 74
End: 2024-09-05
Payer: MEDICARE

## 2024-09-05 NOTE — TELEPHONE ENCOUNTER
Patient requesting results:    Spoke w/pt, pt would like to know results of FL Esophagram With Barium Tablet.     Pt reports symptoms knot near chest which makes it hard to eat and in pain.   (Pain level 1-10) currently 7.   Pt reports only able to eat soft food and liquids.     Informed pt that I will send a message to Dr. Foy to advise.     Pt verbalize understand and thank me.     ----- Message from Milena Marks sent at 9/5/2024  4:15 PM CDT -----  Type:   Appointment Request      Name of Caller:pt  When is the first available appointment?n/a  Symptoms:follow up for results  Best Call Back Number: [phone]  Additional Information:

## 2024-09-19 ENCOUNTER — OFFICE VISIT (OUTPATIENT)
Dept: OTOLARYNGOLOGY | Facility: CLINIC | Age: 74
End: 2024-09-19
Payer: MEDICARE

## 2024-09-19 VITALS
WEIGHT: 126.75 LBS | BODY MASS INDEX: 23.93 KG/M2 | HEIGHT: 61 IN | SYSTOLIC BLOOD PRESSURE: 107 MMHG | HEART RATE: 79 BPM | DIASTOLIC BLOOD PRESSURE: 68 MMHG

## 2024-09-19 DIAGNOSIS — R13.10 DYSPHAGIA, UNSPECIFIED TYPE: ICD-10-CM

## 2024-09-19 DIAGNOSIS — J39.2 CRICOPHARYNGEAL HYPERTROPHY: ICD-10-CM

## 2024-09-19 DIAGNOSIS — R13.19 ESOPHAGEAL DYSPHAGIA: Primary | ICD-10-CM

## 2024-09-19 DIAGNOSIS — R93.3 ABNORMAL ESOPHAGRAM: ICD-10-CM

## 2024-09-19 PROCEDURE — 99213 OFFICE O/P EST LOW 20 MIN: CPT | Mod: PBBFAC | Performed by: NURSE PRACTITIONER

## 2024-09-19 PROCEDURE — 99999 PR PBB SHADOW E&M-EST. PATIENT-LVL III: CPT | Mod: PBBFAC,,, | Performed by: NURSE PRACTITIONER

## 2024-09-19 PROCEDURE — 31575 DIAGNOSTIC LARYNGOSCOPY: CPT | Mod: PBBFAC | Performed by: NURSE PRACTITIONER

## 2024-09-19 NOTE — ASSESSMENT & PLAN NOTE
Laryngoscopy normal. She will follow up with Dr. Burgos to discuss possible treatment options for her CP bar. Questions answered.

## 2024-09-19 NOTE — PROGRESS NOTES
Subjective     Patient ID: Jasson Pineda is a 73 y.o. female.    Chief Complaint: dysphagia    HPI    Jasson Pineda is a 73 year old female who was referred by Dr. Foy for dysphagia. She reports a nearly 1 year history of dysphagia to solids. Solid foods will get stuck in her esophagus. This is very painful. She has started a liquid only diet because of this. She is losing weight due to her difficulty eating. There is no coughing or choking when eating. She does not regurgitate food/liquids. There is no heartburn or reflux.     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    Past Medical History:   Diagnosis Date    Anticoagulant long-term use     xarelto    Chronic back pain     Chronic neck pain     DVT (deep venous thrombosis)     Herniated disc, cervical     Kidney stone     Lumbar herniated disc     PE (pulmonary embolism)         Pulmonary emboli     after surgery    Pulmonary embolism        Past Surgical History:   Procedure Laterality Date    ARTHROSCOPIC REPAIR OF ROTATOR CUFF OF SHOULDER Left 2019    Procedure: REPAIR, ROTATOR CUFF, ARTHROSCOPIC;  Surgeon: Sedrick Patel MD;  Location: [facility];  Service: Orthopedics;  Laterality: Left;    ARTHROSCOPIC TENOTOMY OF BICEPS TENDON Left 2019    Procedure: TENOTOMY, BICEPS, ARTHROSCOPIC;  Surgeon: Sedrick Patel MD;  Location: [facility];  Service: Orthopedics;  Laterality: Left;    ARTHROSCOPY OF SHOULDER WITH REMOVAL OF DISTAL CLAVICLE Left 2019    Procedure: ARTHROSCOPY, SHOULDER, WITH DISTAL CLAVICLE EXCISION;  Surgeon: Sedrick Patel MD;  Location: [facility] [place] OR;  Service: Orthopedics;  Laterality: Left;    BREAST CYST EXCISION Left      SECTION      x1    COLONOSCOPY N/A 2018    Procedure: COLONOSCOPY;  Surgeon: Armand Foy MD;  [facility]  Service: Endoscopy;  Laterality: N/A;    EPIDURAL STEROID INJECTION  Bilateral 7/24/2023    Procedure: TRANSFORAMINAL EPIDURAL INJECTION, T6-T7 clear to hold Xarelto 2 days;  Surgeon: Bill Easley MD;  Location: [facility] PAIN MGT;  Service: Pain Management;  Laterality: Bilateral;    ESOPHAGEAL MANOMETRY WITH MEASUREMENT OF IMPEDANCE N/A 07/25/2018    Procedure: MANOMETRY, ESOPHAGEAL, WITH IMPEDANCE MEASUREMENT;  Surgeon: Armand Foy MD;  Location: [facility] ENDO (4TH FLR);  Service: Endoscopy;  Laterality: N/A;    ESOPHAGEAL MANOMETRY WITH MEASUREMENT OF IMPEDANCE N/A 7/31/2024    Procedure: MANOMETRY, ESOPHAGUS, WITH IMPEDANCE MEASUREMENT;  Surgeon: Armand Foy MD;  Location: [facility] ENDO (4TH FLR);  Service: Endoscopy;  Laterality: N/A;  referral dr foy/ prep inst given in office / xarelto  7/29-pre call complete-GT  Dr. Gooden on vacation, Dr. Rhoades to read-KPvt    ESOPHAGOGASTRODUODENOSCOPY N/A 07/17/2018    Procedure: EGD (ESOPHAGOGASTRODUODENOSCOPY);  Surgeon: Armand Foy MD;  Location: [facility] ENDO (4TH FLR);  Service: Endoscopy;  Laterality: N/A;  pt currently on Lovenox and Xarelto - ok per Dr. Doty to hold Plavix 2 days prior and Lovenox 24hr prior to case/see scanned media 7/9/ for documentation of hold -- SM        ESOPHAGOGASTRODUODENOSCOPY N/A 03/28/2022    Procedure: EGD (ESOPHAGOGASTRODUODENOSCOPY);  Surgeon: Armand Foy MD;  Location: [facility] ENDO (2ND FLR);  Service: Endoscopy;  Laterality: N/A;  okay to hold Xarelto for 2 days per Dr. Doty with lovenox bridgings - see note on 3/21/22  2nd floor for ASAP availabilty  fully vaccinated - sm    EXTRACORPOREAL SHOCK WAVE LITHOTRIPSY Left 02/09/2021    Procedure: LITHOTRIPSY-EXTRACORPOREAL SHOCK WAVE;  Surgeon: Jermeias Eric MD;  Location: [facility] OR;  Service: Urology;  Laterality: Left;    HERNIA REPAIR      umbilical    KIDNEY STONE SURGERY      ureteral stent    left knee surgery      SHOULDER ARTHROSCOPY Left 06/11/2019    Procedure: ARTHROSCOPY, SHOULDER;  Surgeon: Sedrick Patel MD;  Location: [facility] OR;   Service: Orthopedics;  Laterality: Left;  BLOCK    TONSILLECTOMY      TOTAL SHOULDER ARTHROPLASTY Right          Current Outpatient Medications:     diclofenac sodium (VOLTAREN) 1 % Gel, Apply 2 g topically 4 (four) times daily as needed (MSK CP of midsternum)., Disp: 450 g, Rfl: 0    ferrous sulfate (FEOSOL) 325 mg (65 mg iron) Tab tablet, Take 1 tablet (325 mg total) by mouth daily with breakfast., Disp: 180 tablet, Rfl: 2    omeprazole (PRILOSEC) 20 MG capsule, Take 1 capsule (20 mg total) by mouth 2 (two) times daily before meals., Disp: 180 capsule, Rfl: 3    oxybutynin (DITROPAN-XL) 10 MG 24 hr tablet, TAKE 1 TABLET BY MOUTH EVERY DAY, Disp: 90 tablet, Rfl: 3    pregabalin (LYRICA) 150 MG capsule, Take 150 mg by mouth 2 (two) times daily., Disp: , Rfl:     tiZANidine (ZANAFLEX) 4 MG tablet, TAKE 1 TABLET BY MOUTH AT BEDTIME AS NEEDED FOR MUSCLE RELAXER, Disp: , Rfl:     traMADol (ULTRAM) 50 mg tablet, Take 50 mg by mouth 2 (two) times daily as needed., Disp: , Rfl: 1    triamcinolone acetonide 0.5% (KENALOG) 0.5 % Crea, Apply topically 2 (two) times daily., Disp: 15 g, Rfl: 1    XARELTO 20 mg Tab, TAKE 1 TABLET BY MOUTH EVERY DAY IN THE EVENING, Disp: 30 tablet, Rfl: 3    atorvastatin (LIPITOR) 80 MG tablet, TAKE 1 TABLET BY MOUTH EVERY DAY (Patient not taking: Reported on 9/19/2024), Disp: 90 tablet, Rfl: 3    cholecalciferol, vitamin D3, 1,250 mcg (50,000 unit) capsule, Take 1 capsule (50,000 Units total) by mouth once a week. (Patient not taking: Reported on 6/4/2024), Disp: 12 capsule, Rfl: 3    Review of patient's allergies indicates:   Allergen Reactions    Sulfa (sulfonamide antibiotics) Rash       Social History     Socioeconomic History    Marital status:     Number of children: 2   Occupational History    Occupation: teacher      Comment:     Tobacco Use    Smoking status: Never    Smokeless tobacco: Never   Substance and Sexual Activity    Alcohol use: No    Drug use:  No    Sexual activity: Not Currently     Partners: Male     Social Determinants of Health     Financial Resource Strain: High Risk (4/1/2024)    Overall Financial Resource Strain (CARDIA)     Difficulty of Paying Living Expenses: Hard   Food Insecurity: No Food Insecurity (5/20/2024)    Received from [facility]    Hunger Vital Sign     Worried About Running Out of Food in the Last Year: Never true     Ran Out of Food in the Last Year: Never true   Recent Concern: Food Insecurity - Food Insecurity Present (4/1/2024)    Hunger Vital Sign     Worried About Running Out of Food in the Last Year: Often true     Ran Out of Food in the Last Year: Often true   Transportation Needs: No Transportation Needs (5/20/2024)    Received from [facility]    PRAPARE - Transportation     Lack of Transportation (Medical): No     Lack of Transportation (Non-Medical): No   Physical Activity: Sufficiently Active (4/1/2024)    Exercise Vital Sign     Days of Exercise per Week: 5 days     Minutes of Exercise per Session: 60 min   Stress: Stress Concern Present (4/1/2024)    Citizen of [place] of Occupational Health - Occupational Stress Questionnaire     Feeling of Stress : To some extent   Housing Stability: High Risk (4/1/2024)    Housing Stability Vital Sign     Unable to Pay for Housing in the Last Year: Yes     Number of Places Lived in the Last Year: 1     Unstable Housing in the Last Year: No       Family History   Problem Relation Name Age of Onset    Colon cancer Mother  74    Ovarian cancer Mother      No Known Problems Father      Liver cancer Sister Fabby     Diabetes Sister Fabby     Arthritis Sister Brandie     Lupus Sister Brandie     Lupus Daughter Dana     No Known Problems Daughter Geri     Breast cancer Maternal Aunt  50        50s    Cervical cancer Maternal Aunt      Liver cancer Maternal Grandfather      Breast cancer Maternal Cousin  45    Esophageal cancer Neg Hx           Review of Systems   Constitutional:   Negative for appetite change, chills, diaphoresis, fatigue, fever and unexpected weight change.   HENT:  Positive for trouble swallowing. Negative for nasal congestion, dental problem, drooling, ear discharge, ear pain, facial swelling, hearing loss, mouth sores, nosebleeds, postnasal drip, rhinorrhea, sinus pressure/congestion, sneezing, sore throat, tinnitus and voice change.    Eyes:  Negative for pain, discharge, redness and itching.   Respiratory:  Negative for cough and shortness of breath.    Cardiovascular:  Negative for chest pain.   Gastrointestinal:  Negative for abdominal distention, abdominal pain, diarrhea, nausea and vomiting.   Endocrine: Negative for cold intolerance and heat intolerance.   Genitourinary:  Negative for difficulty urinating.   Musculoskeletal:  Negative for neck pain and neck stiffness.   Integumentary:  Negative for rash.   Neurological:  Negative for dizziness, weakness and headaches.   Hematological:  Negative for adenopathy.          Objective     Physical Exam  Vitals reviewed.   Constitutional:       General: She is not in acute distress.     Appearance: Normal appearance. She is well-developed. She is not ill-appearing or diaphoretic.   HENT:      Head: Normocephalic and atraumatic.      Jaw: No trismus.      Right Ear: Hearing and external ear normal.      Left Ear: Hearing and external ear normal.      Nose: Nose normal. No nasal deformity, mucosal edema or rhinorrhea.      Right Sinus: No maxillary sinus tenderness or frontal sinus tenderness.      Left Sinus: No maxillary sinus tenderness or frontal sinus tenderness.      Mouth/Throat:      Lips: Pink. No lesions.      Mouth: Mucous membranes are not pale, dry and not cyanotic. No oral lesions.      Dentition: Normal dentition. Does not have dentures. No dental caries.      Tongue: No lesions. Tongue does not deviate from midline.      Palate: No mass and lesions.      Pharynx: Oropharynx is clear. Uvula midline. No  pharyngeal swelling, oropharyngeal exudate, posterior oropharyngeal erythema or uvula swelling.      Tonsils: No tonsillar abscesses.      Comments: Procedure: Flexible laryngoscopy  In order to fully examine the upper aerodigestive tract, including the larynx, in a patient with a hyperactive gag reflex, flexible endoscopy is required.  After explaining the procedure and obtaining verbal consent, a timeout was performed with the patient's participation according to the universal protocol. Both nasal cavities were anesthetized with 4% Xylocaine spray mixed with Dev-Synephrine. The flexible laryngoscope (#[id number]) was inserted into the nasal cavity and advanced to visualize the nasal cavity, nasopharynx, the posterior oropharynx, hypopharynx, and the endolarynx with the above findings noted. The scope was removed and the procedure terminated. The patient tolerated this procedure well without apparent complication.      FINDINGS  Nasopharynx - the torus is clear. There are no lesions of the posterior wall.   Oropharynx - no lesions of the tongue base. There is no obvious fullness or asymmetry.  Hypopharynx - there are no lesions of the pyriform sinuses or postcricoid region   Larynx - there are no lesions of the supraglottic or glottic larynx. Vocal fold mobility is normal with complete closure.     Eyes:      General: No scleral icterus.        Right eye: No discharge.         Left eye: No discharge.      Conjunctiva/sclera: Conjunctivae normal.   Neck:      Thyroid: No thyroid mass or thyromegaly.      Vascular: No JVD.      Trachea: Trachea and phonation normal. No tracheal tenderness or tracheal deviation.      Comments: Salivary glands - there are no lesions or asymmetric findings in the submandibular or parotid glands    Cardiovascular:      Rate and Rhythm: Normal rate.   Pulmonary:      Effort: Pulmonary effort is normal. No respiratory distress.      Breath sounds: No stridor.   Musculoskeletal:      Cervical  back: Normal range of motion and neck supple.   Lymphadenopathy:      Head:      Right side of head: No submental, submandibular, tonsillar or preauricular adenopathy.      Left side of head: No submental, submandibular, tonsillar or preauricular adenopathy.      Cervical: No cervical adenopathy.   Skin:     General: Skin is warm and dry.      Coloration: Skin is not pale.      Findings: No erythema or rash.   Neurological:      General: No focal deficit present.      Mental Status: She is alert and oriented to person, place, and time.      Cranial Nerves: No cranial nerve deficit.   Psychiatric:         Mood and Affect: Mood normal.         Behavior: Behavior normal. Behavior is cooperative.         Thought Content: Thought content normal.          Assessment and Plan     1. Esophageal dysphagia  Assessment & Plan:  Laryngoscopy normal. She will follow up with Dr. Burgos to discuss possible treatment options for her CP bar. Questions answered.      2. Abnormal esophagram  -     Ambulatory referral/consult to ENT    3. Dysphagia, unspecified type  -     Ambulatory referral/consult to ENT  -     Cancel: FL Esophagram Complete; Future; Expected date: 09/19/2024    4. Cricopharyngeal hypertrophy  -     Ambulatory referral/consult to ENT      No follow-ups on file.

## 2024-09-19 NOTE — Clinical Note
This nice lady has a CP bar seen on esophagram. After she left I saw tht you saw her back in 2019. Her dysphagia is worse and she is on a liquid only diet. My exam was normal. She has a recent esophagram (showed CP bar) and a normal manometry (but she was only able to do liquids during the test). I told her I would talk to you and see if this was something you could treat.  Thanks! Riana

## 2024-10-12 DIAGNOSIS — I26.99 PULMONARY EMBOLISM, UNSPECIFIED CHRONICITY, UNSPECIFIED PULMONARY EMBOLISM TYPE, UNSPECIFIED WHETHER ACUTE COR PULMONALE PRESENT: ICD-10-CM

## 2024-10-14 RX ORDER — RIVAROXABAN 20 MG/1
20 TABLET, FILM COATED ORAL NIGHTLY
Qty: 30 TABLET | Refills: 3 | Status: SHIPPED | OUTPATIENT
Start: 2024-10-14

## 2024-10-21 DIAGNOSIS — N32.81 OAB (OVERACTIVE BLADDER): ICD-10-CM

## 2024-10-21 RX ORDER — OXYBUTYNIN CHLORIDE 10 MG/1
10 TABLET, EXTENDED RELEASE ORAL
Qty: 90 TABLET | Refills: 3 | OUTPATIENT
Start: 2024-10-21

## 2024-10-23 ENCOUNTER — TELEPHONE (OUTPATIENT)
Dept: OTOLARYNGOLOGY | Facility: CLINIC | Age: 74
End: 2024-10-23
Payer: MEDICARE

## 2024-10-23 ENCOUNTER — OFFICE VISIT (OUTPATIENT)
Dept: OTOLARYNGOLOGY | Facility: CLINIC | Age: 74
End: 2024-10-23
Payer: MEDICARE

## 2024-10-23 ENCOUNTER — TELEPHONE (OUTPATIENT)
Dept: CARDIOLOGY | Facility: CLINIC | Age: 74
End: 2024-10-23
Payer: MEDICARE

## 2024-10-23 VITALS — DIASTOLIC BLOOD PRESSURE: 66 MMHG | HEART RATE: 69 BPM | SYSTOLIC BLOOD PRESSURE: 101 MMHG

## 2024-10-23 DIAGNOSIS — R13.10 DYSPHAGIA, UNSPECIFIED TYPE: ICD-10-CM

## 2024-10-23 DIAGNOSIS — J39.2 CRICOPHARYNGEAL HYPERTROPHY: Primary | ICD-10-CM

## 2024-10-23 DIAGNOSIS — R13.10 DYSPHAGIA, UNSPECIFIED TYPE: Primary | ICD-10-CM

## 2024-10-23 DIAGNOSIS — J39.2 CRICOPHARYNGEAL HYPERTROPHY: ICD-10-CM

## 2024-10-23 PROCEDURE — 99999 PR PBB SHADOW E&M-EST. PATIENT-LVL III: CPT | Mod: PBBFAC,,, | Performed by: OTOLARYNGOLOGY

## 2024-10-23 PROCEDURE — 99214 OFFICE O/P EST MOD 30 MIN: CPT | Mod: S$PBB,,, | Performed by: OTOLARYNGOLOGY

## 2024-10-23 PROCEDURE — 99213 OFFICE O/P EST LOW 20 MIN: CPT | Mod: PBBFAC | Performed by: OTOLARYNGOLOGY

## 2024-10-23 NOTE — TELEPHONE ENCOUNTER
Called pt to reschedule 12/4/24 appt with Dr. Doty. No answer and unable to leave a voicemail. Will reach out to pt at a later time.

## 2024-10-23 NOTE — PROGRESS NOTES
"[facility]  Department of Otorhinolaryngology and Communication Sciences    Subjective:      Jasson Pineda is a 73 y.o. female who presents for re-evaluation of dysphagia.    I met her several years ago regarding this symptom.  Symptoms include obstructive dysphagia to large solid food boluses, localized both to the level of the throat as well as to the substernal region.  After swallowing a solid food bolus, she experiences significant cramping substernal chest pain radiating to the back.  Because of the substernal/thoracic focused of her symptoms previously, UES directed intervention was deferred.  Further her dysphagia was determined to be multifactorial, including esophageal dysmotility, cricopharyngeal dysfunction, a small anterio esophageal web, and a hiatal hernia   Prior workup:  5/31/2019 esophagram; Esophageal dysmotility.  Small hiatal hernia.  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  7/17/2018 EGD: normal  HRM 7/25/2018: Ineffective esophageal motility   (IEM: 50% or more ineffective swallows with DCI less than 450 mmHg/sec/cm).    She continues to experience these symptoms.  She restricts her diet to soft foods/liquids.  She continues to experience substernal discomfort radiating to the back if she swallows solid foods.  This is it is far more intrusive then the pharyngeal component of her symptoms.    Most recent testing:  EGD 05/20/2024 at [facility]: "tortuous esophagus and a Schatzki's ring that was disrupted with biopsy forceps"   HRM 7/31/2024: normal  Last EGD 3/28/2022: Normal esophagus. 1 cm type-I sliding hiatal hernia. Esophagram 8/12/2024: Small hiatal hernia.  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  Stable smooth posterior pharyngeal wall impression at C4-C5, likely related to underlying cervical spine degenerative changes.  Stable tiny esophageal web at C5.      The patient's medications, allergies, " past medical, surgical, social and family histories were reviewed and updated as appropriate.    A detailed review of systems was obtained with pertinent positives as per the above HPI, and otherwise negative.      Objective:     /66 (BP Location: Left arm, Patient Position: Sitting)   Pulse 69      Constitutional: comfortable, well dressed  Psychiatric: appropriate affect  Respiratory: comfortably breathing, symmetric chest rise, no stridor  Voice: mild variable roughness  Head: normocephalic  Eyes: conjunctivae and sclerae clear  Indirect laryngoscopy is limited due to gag    Normal laryngoscopy with MB 09/19/2024, normal laryngoscopy with me 10/02/2019    Data Reviewed  See HPI      Assessment:     Jasson Pineda is a 73 y.o. female with chronic multifactorial dysphagia, with contributions from esophageal dysmotility, cricopharyngeal dysfunction, a small anterio esophageal web, and a hiatal hernia      Plan:     I demonstrated her floor copy to her on the computer monitor.  I counseled her regarding the complex, multifactorial nature of her condition.    Treatment which I offered her included upper esophageal dilation.  I spoke with her regarding the risks and benefits thereof, with risks including but not limited to damage to oral structures, recurrence, need for additional procedures, pharyngo esophageal leak/fistula/mediastinitis, risks of general anesthesia.  I counseled her that I would not expect such a procedure to benefit her thoracic/substernal symptoms in any way.  Rather, these would be better assessed/managed by the gastroenterology and/or forget surgery teams.  I gave her the opportunity to ask questions, and answered all them to her satisfaction.  She wishes to proceed.  We will arrange this in the coming weeks, with preoperative testing triage to be completed by the anesthesiology team.    All questions were answered, and the patient is in agreement with the plan.    James CLEMENS  SHANNON Burgos.  [facility]  Department of Otorhinolaryngology and Communication Sciences

## 2024-10-24 ENCOUNTER — TELEPHONE (OUTPATIENT)
Dept: CARDIOLOGY | Facility: CLINIC | Age: 74
End: 2024-10-24
Payer: MEDICARE

## 2024-10-24 NOTE — TELEPHONE ENCOUNTER
----- Message from Med Assistant [place] sent at 10/23/2024  3:44 PM CDT -----  Contact: [phone]    ----- Message -----  From: Dia Gutiérrez  Sent: 10/23/2024   3:35 PM CDT  To: Lalitha Bey Staff    Type: Returning a call    Who left a message?Laura Hines, RN    When did the practice call?Today    Does patient know what this is regarding:    Would the patient rather a call back or a response via [facility]? Call back     Please call pt and advise

## 2024-10-24 NOTE — TELEPHONE ENCOUNTER
Spoke to pt regarding rescheduling follow up appointment with Dr. Doty to November 20, 2024 at 9:20 am. Pt verbalized understanding.

## 2024-10-28 DIAGNOSIS — I26.99 PULMONARY EMBOLISM, UNSPECIFIED CHRONICITY, UNSPECIFIED PULMONARY EMBOLISM TYPE, UNSPECIFIED WHETHER ACUTE COR PULMONALE PRESENT: ICD-10-CM

## 2024-10-28 RX ORDER — RIVAROXABAN 20 MG/1
20 TABLET, FILM COATED ORAL NIGHTLY
Qty: 30 TABLET | Refills: 6 | Status: SHIPPED | OUTPATIENT
Start: 2024-10-28

## 2024-11-11 ENCOUNTER — TELEPHONE (OUTPATIENT)
Dept: INTERNAL MEDICINE | Facility: CLINIC | Age: 74
End: 2024-11-11
Payer: MEDICARE

## 2024-11-11 NOTE — TELEPHONE ENCOUNTER
----- Message from Alba sent at 11/11/2024  8:32 AM CST -----  Regarding: Pt called to request a work in appt due to being referred back to Dr. Hall by the specialist that he sent the provider to and she would like a call back this morning  Type:  Appointment Request    Name of Caller: Pt called to request a work in appt due to being referred back to Dr. Hall by the specialist that he sent the provider to and she would like a call back this morning  When is the first available appointment? 1/8/25  Symptoms: follow up  Best Call Back Number: [phone]

## 2024-11-14 ENCOUNTER — TELEPHONE (OUTPATIENT)
Dept: PREADMISSION TESTING | Facility: HOSPITAL | Age: 74
End: 2024-11-14
Payer: MEDICARE

## 2024-11-14 DIAGNOSIS — Z01.818 PRE-OP TESTING: Primary | ICD-10-CM

## 2024-11-14 NOTE — ANESTHESIA PAT ROS NOTE
11/14/2024  Jasson Pineda is a 73 y.o., female.      Pre-op Assessment          Review of Systems  Anesthesia Hx:   History of prior surgery of interest to airway management or planning:  Previous anesthesia: General EGD (ESOPHAGOGASTRODUODENOSCOPY) (Abdomen) 3/28/22 with general anesthesia.       Airway issues documented on chart review include laryngeal mask airway used     Denies Family Hx of Anesthesia complications.   Personal Hx of Anesthesia complications       Pulmonary/Ventilatory Issues  Pulmonary Embolism: pulmonary embolism             Social:  Non-Smoker, No Alcohol Use       Hematology/Oncology:    Oncology Normal    -- Anemia:                                  EENT/Dental:   DYSPHAGIA. CRICOPHARYNGEAL HYPERTROPHY          Cardiovascular:            Denies Angina.     hyperlipidemia       Functional Capacity good / => 4 METS        Deep Venous Thrombosis (DVT), Hx of DVT                  Pulmonary:       Denies Shortness of breath.  Denies Recent URI.              Pulmonary Embolism, post-operative      Renal/:   renal calculi               Hepatic/GI:    Hiatal Hernia, GERD                Musculoskeletal:  Arthritis               Neurological:  Neurology Normal                                      Endocrine:  Endocrine Normal            Psych:  Psychiatric Normal                         Anesthesia Assessment: Preoperative EQUATION    Planned Procedure: Procedure(s) (LRB):  DILATION, ESOPHAGUS (N/A)  Requested Anesthesia Type:General  Surgeon: James Burgos MD  Service: ENT  Known or anticipated Date of Surgery:11/27/2024    Surgeon notes: reviewed    Electronic QUestionnaire Assessment completed via nurse interview with patient.        Triage considerations:     The patient has no apparent active cardiac condition (No unstable coronary Syndrome such as severe unstable angina or  recent [<1 month] myocardial infarction, decompensated CHF, severe valvular   disease or significant arrhythmia)    Previous anesthesia records:LMA General and No problems    Intubation  Performed by: Bibi Ellison CRNA  Authorized by: Yohannes Rendon MD      Intubation:     Induction:  Intravenous    Intubated:  Postinduction    Mask Ventilation:  N/a    Attempts:  1    Attempted By:  CRNA (LMA placed by TAMMI Ellison CRNA)    Method of Intubation:  Other (see comments) (Manual placement.)    Difficult Airway Encountered?: No      Complications:  None    Airway Device:  Supraglottic airway/LMA    Airway Device Size:  3.5    Style/Cuff Inflation:  Cuffed    Inflation Amount (mL):  4    Secured at:  The lips    Placement Verified By:  Capnometry    Complicating Factors:  Obesity and short neck    Findings Post-Intubation:  BS equal bilateral    Airway     TM distance: >3 FB  Neck ROM: full  Mallampati: II     Last PCP note: 6-12 months ago , within [facility]   Subspecialty notes: Cardiology: General, ENT, Gastroenterology    Other important co-morbidities: GERD, HLD, and HX OF POSTOP PULMONARY EMBOLISM       Tests already available:  No recent tests.             Instructions given. (See in Nurse's note)    Optimization:  Anesthesia Preop Clinic Assessment  NOT Indicated           Sub-specialist consult indicated:   CARDIOLOGY FOR MEDICATION INSTRUCTIONS       Plan:    Testing:  Hematology Profile        Consultation: PATIENT'S CARDIOLOGIST FOR MEDICATION INSTRUCTIONS     Patient  has previously scheduled Medical Appointment: 11/18, 11/20    Navigation: Tests Scheduled.              Consults scheduled.             Results will be tracked by Preop Clinic.    Plan and Discussion  Discussed EKG today which showed normal sinus rhythm rate of 66.  Patient may proceed with esophageal dilation and back injection.  Instructions to hold Xarelto and Lovenox bridging given.  Handicap license Plate formed provided for her.     Sotero  MARYBEL Doty MD, F.A.C.C, F.S.MANUELA.A.I.

## 2024-11-15 ENCOUNTER — TELEPHONE (OUTPATIENT)
Dept: OTOLARYNGOLOGY | Facility: CLINIC | Age: 74
End: 2024-11-15
Payer: MEDICARE

## 2024-11-15 NOTE — TELEPHONE ENCOUNTER
----- Message from MAUREEN Sanchez sent at 11/15/2024 11:17 AM CST -----  Regarding: FW: cancel surgery  Contact: [phone]    ----- Message -----  From: Quyen Bowens  Sent: 11/15/2024  11:06 AM CST  To: Aubrey MERCADO Staff  Subject: cancel surgery                                   Pt is calling to speak with someone in provider office in regards to canceling her surgery with the provider on 11/27 pt  is asking for a return call please call pt at   [phone]

## 2024-11-18 ENCOUNTER — OFFICE VISIT (OUTPATIENT)
Dept: INTERNAL MEDICINE | Facility: CLINIC | Age: 74
End: 2024-11-18
Attending: INTERNAL MEDICINE
Payer: MEDICARE

## 2024-11-18 VITALS
HEIGHT: 61 IN | SYSTOLIC BLOOD PRESSURE: 110 MMHG | WEIGHT: 130.75 LBS | HEART RATE: 77 BPM | BODY MASS INDEX: 24.69 KG/M2 | OXYGEN SATURATION: 99 % | DIASTOLIC BLOOD PRESSURE: 70 MMHG

## 2024-11-18 DIAGNOSIS — R13.19 ESOPHAGEAL DYSPHAGIA: ICD-10-CM

## 2024-11-18 DIAGNOSIS — D68.8 OTHER SPECIFIED COAGULATION DEFECTS: ICD-10-CM

## 2024-11-18 DIAGNOSIS — R07.9 CHEST PAIN, UNSPECIFIED TYPE: ICD-10-CM

## 2024-11-18 DIAGNOSIS — K21.9 GASTROESOPHAGEAL REFLUX DISEASE: ICD-10-CM

## 2024-11-18 DIAGNOSIS — R13.10 DYSPHAGIA, UNSPECIFIED TYPE: ICD-10-CM

## 2024-11-18 PROCEDURE — 99213 OFFICE O/P EST LOW 20 MIN: CPT | Mod: PBBFAC | Performed by: INTERNAL MEDICINE

## 2024-11-18 PROCEDURE — 99999 PR PBB SHADOW E&M-EST. PATIENT-LVL III: CPT | Mod: PBBFAC,,, | Performed by: INTERNAL MEDICINE

## 2024-11-18 PROCEDURE — 99214 OFFICE O/P EST MOD 30 MIN: CPT | Mod: S$PBB,,, | Performed by: INTERNAL MEDICINE

## 2024-11-18 NOTE — TELEPHONE ENCOUNTER
Attempted to call pt to make her aware that her procedure will be placed on hold until she is ready to reschedule.  No answer and unable to leave a voicemail.

## 2024-11-18 NOTE — Clinical Note
Pt a little confused. She is not certain if upcoming dilation is still scheduled. Please contact pt. Thanks so much.

## 2024-11-20 ENCOUNTER — OFFICE VISIT (OUTPATIENT)
Dept: CARDIOLOGY | Facility: CLINIC | Age: 74
End: 2024-11-20
Payer: MEDICARE

## 2024-11-20 VITALS
WEIGHT: 130.19 LBS | BODY MASS INDEX: 24.6 KG/M2 | OXYGEN SATURATION: 98 % | SYSTOLIC BLOOD PRESSURE: 122 MMHG | DIASTOLIC BLOOD PRESSURE: 68 MMHG | HEART RATE: 57 BPM

## 2024-11-20 DIAGNOSIS — I27.82 CHRONIC PULMONARY EMBOLISM WITHOUT ACUTE COR PULMONALE, UNSPECIFIED PULMONARY EMBOLISM TYPE: ICD-10-CM

## 2024-11-20 DIAGNOSIS — E78.2 MIXED HYPERLIPIDEMIA: Primary | ICD-10-CM

## 2024-11-20 DIAGNOSIS — I70.0 ATHEROSCLEROSIS OF AORTA: ICD-10-CM

## 2024-11-20 DIAGNOSIS — Z79.01 CURRENT USE OF LONG TERM ANTICOAGULATION: ICD-10-CM

## 2024-11-20 PROCEDURE — 99213 OFFICE O/P EST LOW 20 MIN: CPT | Mod: PBBFAC,PN | Performed by: INTERNAL MEDICINE

## 2024-11-20 PROCEDURE — 99999 PR PBB SHADOW E&M-EST. PATIENT-LVL III: CPT | Mod: PBBFAC,,, | Performed by: INTERNAL MEDICINE

## 2024-11-20 PROCEDURE — 99214 OFFICE O/P EST MOD 30 MIN: CPT | Mod: S$PBB,,, | Performed by: INTERNAL MEDICINE

## 2024-11-20 RX ORDER — ENOXAPARIN SODIUM 100 MG/ML
1 INJECTION SUBCUTANEOUS 2 TIMES DAILY
Qty: 10 EACH | Refills: 1 | Status: SHIPPED | OUTPATIENT
Start: 2024-11-20

## 2024-11-20 NOTE — PATIENT INSTRUCTIONS
Last dose of Xarelto will be Saturday night, November 23, 2024. Start Lovenox injections twice daily on Sunday. Hold Tuesday and Wednesday morning dose of Lovenox. Resume Xarelto Wednesday night if okay with surgeon.

## 2024-11-20 NOTE — PROGRESS NOTES
Cardiology    11/20/2024  9:39 AM    Problem list  Patient Active Problem List   Diagnosis    Kidney stone    Herniated disc, cervical    Complete rotator cuff tear or rupture of right shoulder, not specified as traumatic    Shoulder arthritis    OAB (overactive bladder)    Body mass index (BMI) of 25.0 to 29.9    Atypical chest pain    Ineffective esophageal motility    SOB (shortness of breath)    Complete rotator cuff tear or rupture of left shoulder, not specified as traumatic    Impingement syndrome of left shoulder    Primary osteoarthritis of left shoulder    Labral tear of long head of biceps tendon, initial encounter    History of DVT of lower extremity    Chronic pulmonary embolism without acute cor pulmonale    Mixed hyperlipidemia    Nephrolithiasis    Tortuous aorta    Atherosclerosis of aorta    Current use of long term anticoagulation    History of iron deficiency anemia    Esophageal dysphagia    Epigastric abdominal pain    Lower esophageal ring (Schatzki)    Xyphoidalgia    Unintended weight loss       CC:  Follow-up    HPI:  She is here for follow-up.  She is scheduled for back injection and esophageal dilation next week.  She denies any chest pain, shortness breath, palpitation.  She denies any bleeding.    Medications  Current Outpatient Medications   Medication Sig Dispense Refill    atorvastatin (LIPITOR) 80 MG tablet TAKE 1 TABLET BY MOUTH EVERY DAY 90 tablet 3    diclofenac sodium (VOLTAREN) 1 % Gel Apply 2 g topically 4 (four) times daily as needed (MSK CP of midsternum). 450 g 0    omeprazole (PRILOSEC) 20 MG capsule Take 1 capsule (20 mg total) by mouth 2 (two) times daily before meals. 180 capsule 3    oxybutynin (DITROPAN-XL) 10 MG 24 hr tablet TAKE 1 TABLET BY MOUTH EVERY DAY 90 tablet 3    pregabalin (LYRICA) 150 MG capsule Take 150 mg by mouth 2 (two) times daily.      tiZANidine (ZANAFLEX) 4 MG tablet TAKE 1 TABLET BY MOUTH AT BEDTIME AS NEEDED FOR MUSCLE RELAXER       traMADol (ULTRAM) 50 mg tablet Take 50 mg by mouth 2 (two) times daily as needed.  1    XARELTO 20 mg Tab Take 1 tablet (20 mg total) by mouth every evening. 30 tablet 6    cholecalciferol, vitamin D3, 1,250 mcg (50,000 unit) capsule Take 1 capsule (50,000 Units total) by mouth once a week. (Patient not taking: Reported on 11/20/2024) 12 capsule 3    ferrous sulfate (FEOSOL) 325 mg (65 mg iron) Tab tablet Take 1 tablet (325 mg total) by mouth daily with breakfast. (Patient not taking: Reported on 11/20/2024) 180 tablet 2    triamcinolone acetonide 0.5% (KENALOG) 0.5 % Crea Apply topically 2 (two) times daily. (Patient not taking: Reported on 11/20/2024) 15 g 1     No current facility-administered medications for this visit.      Prior to Admission medications    Medication Sig Start Date End Date Taking? Authorizing Provider   atorvastatin (LIPITOR) 80 MG tablet TAKE 1 TABLET BY MOUTH EVERY DAY 7/26/22  Yes Osbaldo Hall MD   diclofenac sodium (VOLTAREN) 1 % Gel Apply 2 g topically 4 (four) times daily as needed (MSK CP of midsternum). 4/11/24  Yes Fazal Lafleur MD   omeprazole (PRILOSEC) 20 MG capsule Take 1 capsule (20 mg total) by mouth 2 (two) times daily before meals. 1/11/24  Yes Osbaldo Hall MD   oxybutynin (DITROPAN-XL) 10 MG 24 hr tablet TAKE 1 TABLET BY MOUTH EVERY DAY 1/5/24  Yes Chantel Boyle NP   pregabalin (LYRICA) 150 MG capsule Take 150 mg by mouth 2 (two) times daily.   Yes Provider, Historical   tiZANidine (ZANAFLEX) 4 MG tablet TAKE 1 TABLET BY MOUTH AT BEDTIME AS NEEDED FOR MUSCLE RELAXER 3/21/22  Yes Provider, Historical   traMADol (ULTRAM) 50 mg tablet Take 50 mg by mouth 2 (two) times daily as needed. 10/26/18  Yes Provider, Historical   XARELTO 20 mg Tab Take 1 tablet (20 mg total) by mouth every evening. 10/28/24  Yes Sotero Doty MD   cholecalciferol, vitamin D3, 1,250 mcg (50,000 unit) capsule Take 1 capsule (50,000 Units total) by mouth once a week.  Patient not  taking: Reported on 2024   Osbaldo Hall MD   ferrous sulfate (FEOSOL) 325 mg (65 mg iron) Tab tablet Take 1 tablet (325 mg total) by mouth daily with breakfast.  Patient not taking: Reported on 2024   Osbaldo Hall MD   triamcinolone acetonide 0.5% (KENALOG) 0.5 % Crea Apply topically 2 (two) times daily.  Patient not taking: Reported on 2024   Osbaldo Hall MD         History  Past Medical History:   Diagnosis Date    Anticoagulant long-term use     xarelto    Chronic back pain     Chronic neck pain     DVT (deep venous thrombosis)     Herniated disc, cervical     Kidney stone     Lumbar herniated disc     PE (pulmonary embolism)         Pulmonary emboli     after surgery    Pulmonary embolism      Past Surgical History:   Procedure Laterality Date    ARTHROSCOPIC REPAIR OF ROTATOR CUFF OF SHOULDER Left 2019    Procedure: REPAIR, ROTATOR CUFF, ARTHROSCOPIC;  Surgeon: Sedrick Patel MD;  Location: [facility] OR;  Service: Orthopedics;  Laterality: Left;    ARTHROSCOPIC TENOTOMY OF BICEPS TENDON Left 2019    Procedure: TENOTOMY, BICEPS, ARTHROSCOPIC;  Surgeon: Sedrick Patel MD;  Location: [facility] OR;  Service: Orthopedics;  Laterality: Left;    ARTHROSCOPY OF SHOULDER WITH REMOVAL OF DISTAL CLAVICLE Left 2019    Procedure: ARTHROSCOPY, SHOULDER, WITH DISTAL CLAVICLE EXCISION;  Surgeon: Sedrick Patel MD;  Location: [facility] OR;  Service: Orthopedics;  Laterality: Left;    BREAST CYST EXCISION Left      SECTION      x1    COLONOSCOPY N/A 2018    Procedure: COLONOSCOPY;  Surgeon: Armand Foy MD;  [facility]  Service: Endoscopy;  Laterality: N/A;    EPIDURAL STEROID INJECTION Bilateral 2023    Procedure: TRANSFORAMINAL EPIDURAL INJECTION, T6-T7 clear to hold Xarelto 2 days;  Surgeon: Bill Easley MD;  Location: [facility] PAIN MGT;  Service: Pain Management;  Laterality: Bilateral;    ESOPHAGEAL MANOMETRY  WITH MEASUREMENT OF IMPEDANCE N/A 07/25/2018    Procedure: MANOMETRY, ESOPHAGEAL, WITH IMPEDANCE MEASUREMENT;  Surgeon: Armand Foy MD;  Location: [facility] (4TH FLR);  Service: Endoscopy;  Laterality: N/A;    ESOPHAGEAL MANOMETRY WITH MEASUREMENT OF IMPEDANCE N/A 7/31/2024    Procedure: MANOMETRY, ESOPHAGUS, WITH IMPEDANCE MEASUREMENT;  Surgeon: Armand Foy MD;  Location: [facility] ENDO (4TH FLR);  Service: Endoscopy;  Laterality: N/A;  referral dr foy/ prep inst given in office / xarelto  7/29-pre call complete-GT  Dr. Gooden on vacation, Dr. Rhoades to read-KPvt    ESOPHAGOGASTRODUODENOSCOPY N/A 07/17/2018    Procedure: EGD (ESOPHAGOGASTRODUODENOSCOPY);  Surgeon: Armand Foy MD;  Location: [facility] RANDI (4TH FLR);  Service: Endoscopy;  Laterality: N/A;  pt currently on Lovenox and Xarelto - ok per Dr. Doty to hold Plavix 2 days prior and Lovenox 24hr prior to case/see scanned media 7/9/ for documentation of hold -- SM        ESOPHAGOGASTRODUODENOSCOPY N/A 03/28/2022    Procedure: EGD (ESOPHAGOGASTRODUODENOSCOPY);  Surgeon: Armand Foy MD;  Location: [facility] (2ND FLR);  Service: Endoscopy;  Laterality: N/A;  okay to hold Xarelto for 2 days per Dr. Doty with lovenox bridgings - see note on 3/21/22  2nd floor for ASAP availabilty  fully vaccinated - sm    EXTRACORPOREAL SHOCK WAVE LITHOTRIPSY Left 02/09/2021    Procedure: LITHOTRIPSY-EXTRACORPOREAL SHOCK WAVE;  Surgeon: Jeremias Eric MD;  Location: [facility];  Service: Urology;  Laterality: Left;    HERNIA REPAIR      umbilical    KIDNEY STONE SURGERY      ureteral stent    left knee surgery      SHOULDER ARTHROSCOPY Left 06/11/2019    Procedure: ARTHROSCOPY, SHOULDER;  Surgeon: Sedrick Patel MD;  Location: [facility];  Service: Orthopedics;  Laterality: Left;  BLOCK    TONSILLECTOMY      TOTAL SHOULDER ARTHROPLASTY Right      Social History     Socioeconomic History    Marital status:     Number of children: 2   Occupational History     Occupation: teacher      Comment:     Tobacco Use    Smoking status: Never    Smokeless tobacco: Never   Substance and Sexual Activity    Alcohol use: No    Drug use: No    Sexual activity: Not Currently     Partners: Male     Social Drivers of Health     Financial Resource Strain: High Risk (4/1/2024)    Overall Financial Resource Strain (CARDIA)     Difficulty of Paying Living Expenses: Hard   Food Insecurity: No Food Insecurity (5/20/2024)    Received from [facility]    Hunger Vital Sign     Worried About Running Out of Food in the Last Year: Never true     Ran Out of Food in the Last Year: Never true   Recent Concern: Food Insecurity - Food Insecurity Present (4/1/2024)    Hunger Vital Sign     Worried About Running Out of Food in the Last Year: Often true     Ran Out of Food in the Last Year: Often true   Transportation Needs: No Transportation Needs (5/20/2024)    Received from [facility]    PRAPARE - Transportation     Lack of Transportation (Medical): No     Lack of Transportation (Non-Medical): No   Physical Activity: Sufficiently Active (4/1/2024)    Exercise Vital Sign     Days of Exercise per Week: 5 days     Minutes of Exercise per Session: 60 min   Stress: Stress Concern Present (4/1/2024)    [employer] - Occupational Stress Questionnaire     Feeling of Stress : To some extent   Housing Stability: High Risk (4/1/2024)    Housing Stability Vital Sign     Unable to Pay for Housing in the Last Year: Yes     Number of Places Lived in the Last Year: 1     Unstable Housing in the Last Year: No         Allergies  Review of patient's allergies indicates:   Allergen Reactions    Sulfa (sulfonamide antibiotics) Rash         Review of Systems   Review of Systems   Constitutional: Negative for decreased appetite, fever and weight loss.   HENT:  Negative for congestion and nosebleeds.    Eyes:  Negative for double vision, vision loss in left eye, vision loss in  right eye and visual disturbance.   Cardiovascular:  Negative for chest pain, claudication, cyanosis, dyspnea on exertion, irregular heartbeat, leg swelling, near-syncope, orthopnea, palpitations, paroxysmal nocturnal dyspnea and syncope.   Respiratory:  Negative for cough, hemoptysis, shortness of breath, sleep disturbances due to breathing, snoring, sputum production and wheezing.    Endocrine: Negative for cold intolerance and heat intolerance.   Skin:  Negative for nail changes and rash.   Musculoskeletal:  Negative for joint pain, muscle cramps, muscle weakness and myalgias.   Gastrointestinal:  Negative for change in bowel habit, heartburn, hematemesis, hematochezia, hemorrhoids and melena.   Neurological:  Negative for dizziness, focal weakness and headaches.         Physical Exam  Wt Readings from Last 1 Encounters:   11/20/24 59.1 kg (130 lb 2.9 oz)     BP Readings from Last 3 Encounters:   11/20/24 122/68   11/18/24 110/70   10/23/24 101/66     Pulse Readings from Last 1 Encounters:   11/20/24 (!) 57     Body mass index is 24.6 kg/m².    Physical Exam  Vitals reviewed.   Constitutional:       Appearance: She is well-developed.   Neck:      Vascular: No carotid bruit or JVD.   Cardiovascular:      Rate and Rhythm: Normal rate and regular rhythm.      Pulses:           Carotid pulses are 2+ on the right side and 2+ on the left side.       Radial pulses are 2+ on the right side and 2+ on the left side.        Dorsalis pedis pulses are 2+ on the right side and 2+ on the left side.      Heart sounds: Normal heart sounds, S1 normal and S2 normal.   Abdominal:      General: Bowel sounds are normal.   Musculoskeletal:      Thoracic back: Tenderness present.   Neurological:      Mental Status: She is alert and oriented to person, place, and time.             Assessment  1. Mixed hyperlipidemia (Primary)  Stable.  On statin.  Continue medications and monitor    2. Atherosclerosis of aorta  Stable.    3. Chronic  pulmonary embolism without acute cor pulmonale, unspecified pulmonary embolism type  Stable    4. Current use of long term anticoagulation  Stable        Plan and Discussion  Discussed EKG today which showed normal sinus rhythm rate of 66.  Patient may proceed with esophageal dilation and back injection.  Instructions to hold Xarelto and Lovenox bridging given.  Handicap license Plate formed provided for her.    Follow Up  6 months      Sotero Doty MD, F.A.C.C, F.S.C.A.I.      Total professional time spent for the encounter: 30 minutes  Time was spent preparing to see the patient, reviewing results of prior testing, obtaining and/or reviewing separately obtained history, performing a medically appropriate examination and interview, counseling and educating the patient/family, ordering medications/tests/procedures, referring and communicating with other health care professionals, documenting clinical information in the electronic health record, and independently interpreting results.    Disclaimer: This document was created using voice recognition software (M*Modal Fluency Direct). Although it may be edited, this document may contain errors related to incorrect recognition of the spoken word. Please call the physician if clarification is needed.

## 2024-11-21 ENCOUNTER — TELEPHONE (OUTPATIENT)
Dept: OTOLARYNGOLOGY | Facility: CLINIC | Age: 74
End: 2024-11-21
Payer: MEDICARE

## 2024-11-21 NOTE — TELEPHONE ENCOUNTER
Called patient and will put her back onto the surgery schedule for 11/27.  She asked that I call her daughter, Geri, to confirm the details with her.  No answer and her mailbox was full.

## 2024-11-21 NOTE — TELEPHONE ENCOUNTER
----- Message from MAUREEN Sanchez sent at 11/21/2024  9:23 AM CST -----  Apolinar Mayers,  Is there still room on that date?  ----- Message -----  From: Janice Samaniego RN  Sent: 11/21/2024   9:17 AM CST  To: James Burgos MD; Aubrey MERCADO Staff    Patient would like to keep her 11/27 surgery date with Dr. Burgos for esophagus dilation. She has been cleared by cardiology and will complete lovenox bridge for surgery. Instructions reviewed with patient.     Thanks,   MAUREEN Camacho

## 2024-11-22 ENCOUNTER — LAB VISIT (OUTPATIENT)
Dept: LAB | Facility: OTHER | Age: 74
End: 2024-11-22
Attending: INTERNAL MEDICINE
Payer: MEDICARE

## 2024-11-22 DIAGNOSIS — Z01.818 PRE-OP TESTING: ICD-10-CM

## 2024-11-22 DIAGNOSIS — Z79.01 CURRENT USE OF LONG TERM ANTICOAGULATION: ICD-10-CM

## 2024-11-22 DIAGNOSIS — D50.9 IRON DEFICIENCY ANEMIA, UNSPECIFIED IRON DEFICIENCY ANEMIA TYPE: ICD-10-CM

## 2024-11-22 LAB
ALBUMIN SERPL BCP-MCNC: 4.2 G/DL (ref 3.5–5.2)
ALP SERPL-CCNC: 52 U/L (ref 40–150)
ALT SERPL W/O P-5'-P-CCNC: 13 U/L (ref 10–44)
ANION GAP SERPL CALC-SCNC: 9 MMOL/L (ref 8–16)
AST SERPL-CCNC: 16 U/L (ref 10–40)
BASOPHILS # BLD AUTO: 0.01 K/UL (ref 0–0.2)
BASOPHILS NFR BLD: 0.2 % (ref 0–1.9)
BILIRUB SERPL-MCNC: 0.6 MG/DL (ref 0.1–1)
BUN SERPL-MCNC: 11 MG/DL (ref 8–23)
CALCIUM SERPL-MCNC: 10.5 MG/DL (ref 8.7–10.5)
CHLORIDE SERPL-SCNC: 109 MMOL/L (ref 95–110)
CO2 SERPL-SCNC: 25 MMOL/L (ref 23–29)
CREAT SERPL-MCNC: 0.9 MG/DL (ref 0.5–1.4)
DIFFERENTIAL METHOD BLD: ABNORMAL
EOSINOPHIL # BLD AUTO: 0 K/UL (ref 0–0.5)
EOSINOPHIL NFR BLD: 0.7 % (ref 0–8)
ERYTHROCYTE [DISTWIDTH] IN BLOOD BY AUTOMATED COUNT: 16 % (ref 11.5–14.5)
ERYTHROCYTE [DISTWIDTH] IN BLOOD BY AUTOMATED COUNT: 16 % (ref 11.5–14.5)
EST. GFR  (NO RACE VARIABLE): >60 ML/MIN/1.73 M^2
FERRITIN SERPL-MCNC: 56 NG/ML (ref 20–300)
GLUCOSE SERPL-MCNC: 91 MG/DL (ref 70–110)
HCT VFR BLD AUTO: 38.6 % (ref 37–48.5)
HCT VFR BLD AUTO: 38.6 % (ref 37–48.5)
HGB BLD-MCNC: 12.4 G/DL (ref 12–16)
HGB BLD-MCNC: 12.4 G/DL (ref 12–16)
IMM GRANULOCYTES # BLD AUTO: 0.01 K/UL (ref 0–0.04)
IMM GRANULOCYTES NFR BLD AUTO: 0.2 % (ref 0–0.5)
IRON SERPL-MCNC: 128 UG/DL (ref 30–160)
LYMPHOCYTES # BLD AUTO: 1.4 K/UL (ref 1–4.8)
LYMPHOCYTES NFR BLD: 32.2 % (ref 18–48)
MCH RBC QN AUTO: 26.9 PG (ref 27–31)
MCH RBC QN AUTO: 26.9 PG (ref 27–31)
MCHC RBC AUTO-ENTMCNC: 32.1 G/DL (ref 32–36)
MCHC RBC AUTO-ENTMCNC: 32.1 G/DL (ref 32–36)
MCV RBC AUTO: 84 FL (ref 82–98)
MCV RBC AUTO: 84 FL (ref 82–98)
MONOCYTES # BLD AUTO: 0.4 K/UL (ref 0.3–1)
MONOCYTES NFR BLD: 8.2 % (ref 4–15)
NEUTROPHILS # BLD AUTO: 2.5 K/UL (ref 1.8–7.7)
NEUTROPHILS NFR BLD: 58.5 % (ref 38–73)
NRBC BLD-RTO: 0 /100 WBC
PLATELET # BLD AUTO: 283 K/UL (ref 150–450)
PLATELET # BLD AUTO: 283 K/UL (ref 150–450)
PMV BLD AUTO: 10.1 FL (ref 9.2–12.9)
PMV BLD AUTO: 10.1 FL (ref 9.2–12.9)
POTASSIUM SERPL-SCNC: 4 MMOL/L (ref 3.5–5.1)
PROT SERPL-MCNC: 7.8 G/DL (ref 6–8.4)
RBC # BLD AUTO: 4.61 M/UL (ref 4–5.4)
RBC # BLD AUTO: 4.61 M/UL (ref 4–5.4)
SATURATED IRON: 36 % (ref 20–50)
SODIUM SERPL-SCNC: 143 MMOL/L (ref 136–145)
TOTAL IRON BINDING CAPACITY: 358 UG/DL (ref 250–450)
TRANSFERRIN SERPL-MCNC: 242 MG/DL (ref 200–375)
WBC # BLD AUTO: 4.26 K/UL (ref 3.9–12.7)
WBC # BLD AUTO: 4.26 K/UL (ref 3.9–12.7)

## 2024-11-22 PROCEDURE — 83540 ASSAY OF IRON: CPT | Performed by: INTERNAL MEDICINE

## 2024-11-22 PROCEDURE — 80053 COMPREHEN METABOLIC PANEL: CPT | Performed by: INTERNAL MEDICINE

## 2024-11-22 PROCEDURE — 36415 COLL VENOUS BLD VENIPUNCTURE: CPT | Performed by: INTERNAL MEDICINE

## 2024-11-22 PROCEDURE — 85025 COMPLETE CBC W/AUTO DIFF WBC: CPT | Performed by: INTERNAL MEDICINE

## 2024-11-22 PROCEDURE — 82728 ASSAY OF FERRITIN: CPT | Performed by: INTERNAL MEDICINE

## 2024-11-25 ENCOUNTER — TELEPHONE (OUTPATIENT)
Dept: CARDIOLOGY | Facility: CLINIC | Age: 74
End: 2024-11-25
Payer: MEDICARE

## 2024-11-25 NOTE — TELEPHONE ENCOUNTER
Patient calling regarding her Xerelto and surgery. Gave her instructions. Hold for 2 days before and need to bridge.

## 2024-11-25 NOTE — PROGRESS NOTES
Please let pt know anemia and iron counts both improving. Please continue current regimen.    Respectfully,  Osbaldo Mendez

## 2024-11-25 NOTE — TELEPHONE ENCOUNTER
"----- Message from Aurora sent at 11/25/2024  2:28 PM CST -----  Regarding: Medical Advice  "Type:  Patient Call Back    Who Called:PT    What is the reqeust in detail:Requesting call back in regards to her blood thinners. Pt is scheduled fopr procedure on 11/27. Please advise    Can the clinic reply by [facility]?no    Best Call Back Number: [phone]      Additional Information:Pty would like an call back to discuss what she needs to do  "

## 2024-11-25 NOTE — H&P
"[facility] - Surgery ([facility])  Otorhinolaryngology-Head & Neck Surgery  History & Physical    Patient Name: Jasson Pineda  MRN: [id number]  Admission Date: 11/27/2024    Subjective:     History of Present Illness:  74 y.o. female presents with dysphagia.     I met her several years ago regarding this symptom.  Symptoms include obstructive dysphagia to large solid food boluses, localized both to the level of the throat as well as to the substernal region.  After swallowing a solid food bolus, she experiences significant cramping substernal chest pain radiating to the back.  Because of the substernal/thoracic focused of her symptoms previously, UES directed intervention was deferred.  Further her dysphagia was determined to be multifactorial, including esophageal dysmotility, cricopharyngeal dysfunction, a small anterio esophageal web, and a hiatal hernia   Prior workup:  5/31/2019 esophagram; Esophageal dysmotility.  Small hiatal hernia.  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  7/17/2018 EGD: normal  HRM 7/25/2018: Ineffective esophageal motility   (IEM: 50% or more ineffective swallows with DCI less than 450 mmHg/sec/cm).     She continues to experience these symptoms.  She restricts her diet to soft foods/liquids.  She continues to experience substernal discomfort radiating to the back if she swallows solid foods.  This is it is far more intrusive then the pharyngeal component of her symptoms.     Most recent testing:  EGD 05/20/2024 at [facility]: "tortuous esophagus and a Schatzki's ring that was disrupted with biopsy forceps"   HRM 7/31/2024: normal  Last EGD 3/28/2022: Normal esophagus. 1 cm type-I sliding hiatal hernia. Esophagram 8/12/2024: Small hiatal hernia.  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  Stable smooth posterior pharyngeal wall impression at C4-C5, likely related to underlying cervical spine degenerative changes.  Stable " tiny esophageal web at C5.      11/27/2024: Presents today for scheduled surgery. See prior clinical notes for further details. Patient/family questions addressed. Patient wishes to proceed with surgery.     Discussed we will not be addressing the soft tissue at her sternum, voiced understanding.     No current facility-administered medications on file prior to encounter.     Current Outpatient Medications on File Prior to Encounter   Medication Sig    pregabalin (LYRICA) 150 MG capsule Take 150 mg by mouth 2 (two) times daily.    traMADol (ULTRAM) 50 mg tablet Take 50 mg by mouth 2 (two) times daily as needed.    atorvastatin (LIPITOR) 80 MG tablet TAKE 1 TABLET BY MOUTH EVERY DAY    cholecalciferol, vitamin D3, 1,250 mcg (50,000 unit) capsule Take 1 capsule (50,000 Units total) by mouth once a week. (Patient not taking: Reported on 11/20/2024)    diclofenac sodium (VOLTAREN) 1 % Gel Apply 2 g topically 4 (four) times daily as needed (MSK CP of midsternum).    ferrous sulfate (FEOSOL) 325 mg (65 mg iron) Tab tablet Take 1 tablet (325 mg total) by mouth daily with breakfast. (Patient not taking: Reported on 11/20/2024)    omeprazole (PRILOSEC) 20 MG capsule Take 1 capsule (20 mg total) by mouth 2 (two) times daily before meals.    oxybutynin (DITROPAN-XL) 10 MG 24 hr tablet TAKE 1 TABLET BY MOUTH EVERY DAY    tiZANidine (ZANAFLEX) 4 MG tablet TAKE 1 TABLET BY MOUTH AT BEDTIME AS NEEDED FOR MUSCLE RELAXER    triamcinolone acetonide 0.5% (KENALOG) 0.5 % Crea Apply topically 2 (two) times daily. (Patient not taking: Reported on 11/18/2024)       Review of patient's allergies indicates:   Allergen Reactions    Sulfa (sulfonamide antibiotics) Rash       Past Medical History:   Diagnosis Date    Anticoagulant long-term use     xarelto    Chronic back pain     Chronic neck pain     DVT (deep venous thrombosis)     Herniated disc, cervical     Kidney stone     Lumbar herniated disc     PE (pulmonary embolism)     2013     Pulmonary emboli     after surgery    Pulmonary embolism      Past Surgical History:   Procedure Laterality Date    ARTHROSCOPIC REPAIR OF ROTATOR CUFF OF SHOULDER Left 2019    Procedure: REPAIR, ROTATOR CUFF, ARTHROSCOPIC;  Surgeon: Sedrick Patel MD;  Location: [facility] OR;  Service: Orthopedics;  Laterality: Left;    ARTHROSCOPIC TENOTOMY OF BICEPS TENDON Left 2019    Procedure: TENOTOMY, BICEPS, ARTHROSCOPIC;  Surgeon: Sedrick Patel MD;  Location: [facility] OR;  Service: Orthopedics;  Laterality: Left;    ARTHROSCOPY OF SHOULDER WITH REMOVAL OF DISTAL CLAVICLE Left 2019    Procedure: ARTHROSCOPY, SHOULDER, WITH DISTAL CLAVICLE EXCISION;  Surgeon: Sedrick Patel MD;  Location: [facility] OR;  Service: Orthopedics;  Laterality: Left;    BREAST CYST EXCISION Left      SECTION      x1    COLONOSCOPY N/A 2018    Procedure: COLONOSCOPY;  Surgeon: Armand Foy MD;  [address]  Service: Endoscopy;  Laterality: N/A;    EPIDURAL STEROID INJECTION Bilateral 2023    Procedure: TRANSFORAMINAL EPIDURAL INJECTION, T6-T7 clear to hold Xarelto 2 days;  Surgeon: Bill Easley MD;  Location: [facility] PAIN MGT;  Service: Pain Management;  Laterality: Bilateral;    ESOPHAGEAL MANOMETRY WITH MEASUREMENT OF IMPEDANCE N/A 2018    Procedure: MANOMETRY, ESOPHAGEAL, WITH IMPEDANCE MEASUREMENT;  Surgeon: Armand Foy MD;  Location: [facility] ([facility] FLR);  Service: Endoscopy;  Laterality: N/A;    ESOPHAGEAL MANOMETRY WITH MEASUREMENT OF IMPEDANCE N/A 2024    Procedure: MANOMETRY, ESOPHAGUS, WITH IMPEDANCE MEASUREMENT;  Surgeon: Armand Foy MD;  Location: [facility] ([facility] FLR);  Service: Endoscopy;  Laterality: N/A;  referral dr foy/ prep inst given in office / xarelto  -pre call complete-GT  Dr. Gooden on vacation, Dr. Rhoades to read-KPvt    ESOPHAGOGASTRODUODENOSCOPY N/A 2018    Procedure: EGD (ESOPHAGOGASTRODUODENOSCOPY);  Surgeon: Armand Foy MD;  Location:  [facility] (4TH FLR);  Service: Endoscopy;  Laterality: N/A;  pt currently on Lovenox and Xarelto - ok per Dr. Doty to hold Plavix 2 days prior and Lovenox 24hr prior to case/see scanned media 7/9/ for documentation of hold -- SM        ESOPHAGOGASTRODUODENOSCOPY N/A 03/28/2022    Procedure: EGD (ESOPHAGOGASTRODUODENOSCOPY);  Surgeon: Armand Foy MD;  Location: [facility] ENDO (2ND FLR);  Service: Endoscopy;  Laterality: N/A;  okay to hold Xarelto for 2 days per Dr. Doty with lovenox bridgings - see note on 3/21/22  2nd floor for ASAP availabilty  fully vaccinated - sm    EXTRACORPOREAL SHOCK WAVE LITHOTRIPSY Left 02/09/2021    Procedure: LITHOTRIPSY-EXTRACORPOREAL SHOCK WAVE;  Surgeon: Jeremias Eric MD;  Location: [facility];  Service: Urology;  Laterality: Left;    HERNIA REPAIR      umbilical    KIDNEY STONE SURGERY      ureteral stent    left knee surgery      SHOULDER ARTHROSCOPY Left 06/11/2019    Procedure: ARTHROSCOPY, SHOULDER;  Surgeon: Sedrick Patel MD;  Location: [facility];  Service: Orthopedics;  Laterality: Left;  BLOCK    TONSILLECTOMY      TOTAL SHOULDER ARTHROPLASTY Right      Family History       Problem Relation (Age of Onset)    Arthritis Sister    Breast cancer Maternal Aunt (50), Maternal Cousin (45)    Cervical cancer Maternal Aunt    Colon cancer Mother (74)    Diabetes Sister    Liver cancer Sister, Maternal Grandfather    Lupus Sister, Daughter    No Known Problems Father, Daughter    Ovarian cancer Mother          Tobacco Use    Smoking status: Never    Smokeless tobacco: Never   Substance and Sexual Activity    Alcohol use: No    Drug use: No    Sexual activity: Not Currently     Partners: Male     Review of Systems  ENT-focused review of systems performed with pertinent positives as noted in HPI  Objective:     Vital Signs (Most Recent):  Temp: 98.2 °F (36.8 °C) (11/27/24 0630)  Pulse: 63 (11/27/24 0630)  Resp: 16 (11/27/24 0630)  BP: (!) 117/59 (11/27/24 0630)  SpO2: 100 %  (11/27/24 0630) Vital Signs (24h Range):  Temp:  [98.2 °F (36.8 °C)] 98.2 °F (36.8 °C)  Pulse:  [63] 63  Resp:  [16] 16  SpO2:  [100 %] 100 %  BP: (117)/(59) 117/59     Weight: 59 kg (130 lb)  Body mass index is 24.56 kg/m².      NAD  Awake and alert  Euphonic   Neck soft, not TTP, normal ROM  Normal WOB, no stridor or stertor      Assessment/Plan:   Jasson Pineda is a 74 y.o. female with:  Chronic multifactorial dysphagia     The patient has been examined and the H&P has been reviewed. I concur with the findings as noted above and no significant changes have occurred since most recent clinical visit.  Surgery risks, benefits and alternative options discussed and understood by patient/family.    Proceed to OR for surgery DILATION, ESOPHAGUS (N/A)     - Consent obtained  - Postoperative instructions/medications reviewed  - Follow-up will be coordinated with ENT clinic       Dickson Serrano MD  Otorhinolaryngology-Head & Neck Surgery  [facility] - Surgery (2nd Fl)

## 2024-11-26 ENCOUNTER — ANESTHESIA EVENT (OUTPATIENT)
Dept: SURGERY | Facility: HOSPITAL | Age: 74
End: 2024-11-26
Payer: MEDICARE

## 2024-11-26 ENCOUNTER — TELEPHONE (OUTPATIENT)
Dept: INTERNAL MEDICINE | Facility: CLINIC | Age: 74
End: 2024-11-26
Payer: MEDICARE

## 2024-11-26 NOTE — TELEPHONE ENCOUNTER
----- Message from Osbaldo Mendez MD sent at 11/25/2024 12:48 PM CST -----  Please let pt know anemia and iron counts both improving. Please continue current regimen.    Respectfully,  Osbaldo Mendez     No risk alerts present

## 2024-11-27 ENCOUNTER — HOSPITAL ENCOUNTER (OUTPATIENT)
Facility: HOSPITAL | Age: 74
Discharge: HOME | End: 2024-11-27
Attending: OTOLARYNGOLOGY | Admitting: OTOLARYNGOLOGY
Payer: MEDICARE

## 2024-11-27 ENCOUNTER — ANESTHESIA (OUTPATIENT)
Dept: SURGERY | Facility: HOSPITAL | Age: 74
End: 2024-11-27
Payer: MEDICARE

## 2024-11-27 VITALS
HEART RATE: 75 BPM | TEMPERATURE: 98 F | OXYGEN SATURATION: 99 % | RESPIRATION RATE: 21 BRPM | WEIGHT: 130 LBS | BODY MASS INDEX: 24.55 KG/M2 | HEIGHT: 61 IN | SYSTOLIC BLOOD PRESSURE: 113 MMHG | DIASTOLIC BLOOD PRESSURE: 62 MMHG

## 2024-11-27 DIAGNOSIS — R13.10 DYSPHAGIA: ICD-10-CM

## 2024-11-27 DIAGNOSIS — R13.19 ESOPHAGEAL DYSPHAGIA: Primary | ICD-10-CM

## 2024-11-27 PROCEDURE — 37000008 HC ANESTHESIA 1ST 15 MINUTES: Performed by: OTOLARYNGOLOGY

## 2024-11-27 PROCEDURE — 63600175 PHARM REV CODE 636 W HCPCS

## 2024-11-27 PROCEDURE — 63600175 PHARM REV CODE 636 W HCPCS: Performed by: STUDENT IN AN ORGANIZED HEALTH CARE EDUCATION/TRAINING PROGRAM

## 2024-11-27 PROCEDURE — 27201423 OPTIME MED/SURG SUP & DEVICES STERILE SUPPLY: Performed by: OTOLARYNGOLOGY

## 2024-11-27 PROCEDURE — 25000003 PHARM REV CODE 250: Performed by: STUDENT IN AN ORGANIZED HEALTH CARE EDUCATION/TRAINING PROGRAM

## 2024-11-27 PROCEDURE — 63600175 PHARM REV CODE 636 W HCPCS: Performed by: OTOLARYNGOLOGY

## 2024-11-27 PROCEDURE — 71000044 HC DOSC ROUTINE RECOVERY FIRST HOUR: Performed by: OTOLARYNGOLOGY

## 2024-11-27 PROCEDURE — 36000705 HC OR TIME LEV I EA ADD 15 MIN: Performed by: OTOLARYNGOLOGY

## 2024-11-27 PROCEDURE — 37000009 HC ANESTHESIA EA ADD 15 MINS: Performed by: OTOLARYNGOLOGY

## 2024-11-27 PROCEDURE — 63600175 PHARM REV CODE 636 W HCPCS: Performed by: ANESTHESIOLOGY

## 2024-11-27 PROCEDURE — 43195 ESOPHAGOSCOPY RIGID BALLOON: CPT | Mod: ,,, | Performed by: OTOLARYNGOLOGY

## 2024-11-27 PROCEDURE — C1725 CATH, TRANSLUMIN NON-LASER: HCPCS | Performed by: OTOLARYNGOLOGY

## 2024-11-27 PROCEDURE — 71000015 HC POSTOP RECOV 1ST HR: Performed by: OTOLARYNGOLOGY

## 2024-11-27 PROCEDURE — 36000704 HC OR TIME LEV I 1ST 15 MIN: Performed by: OTOLARYNGOLOGY

## 2024-11-27 PROCEDURE — 71000016 HC POSTOP RECOV ADDL HR: Performed by: OTOLARYNGOLOGY

## 2024-11-27 PROCEDURE — 25000003 PHARM REV CODE 250: Performed by: OTOLARYNGOLOGY

## 2024-11-27 RX ORDER — GLUCAGON 1 MG
1 KIT INJECTION
Status: DISCONTINUED | OUTPATIENT
Start: 2024-11-27 | End: 2024-11-27 | Stop reason: HOSPADM

## 2024-11-27 RX ORDER — FENTANYL CITRATE 50 UG/ML
25 INJECTION, SOLUTION INTRAMUSCULAR; INTRAVENOUS EVERY 5 MIN PRN
Status: DISCONTINUED | OUTPATIENT
Start: 2024-11-27 | End: 2024-11-27 | Stop reason: HOSPADM

## 2024-11-27 RX ORDER — ONDANSETRON HYDROCHLORIDE 2 MG/ML
INJECTION, SOLUTION INTRAVENOUS
Status: DISCONTINUED | OUTPATIENT
Start: 2024-11-27 | End: 2024-11-27

## 2024-11-27 RX ORDER — FENTANYL CITRATE 50 UG/ML
INJECTION, SOLUTION INTRAMUSCULAR; INTRAVENOUS
Status: COMPLETED
Start: 2024-11-27 | End: 2024-11-27

## 2024-11-27 RX ORDER — HYDROCODONE BITARTRATE AND ACETAMINOPHEN 5; 325 MG/1; MG/1
1 TABLET ORAL EVERY 6 HOURS PRN
Status: COMPLETED | OUTPATIENT
Start: 2024-11-27 | End: 2024-11-27

## 2024-11-27 RX ORDER — PROPOFOL 10 MG/ML
VIAL (ML) INTRAVENOUS
Status: DISCONTINUED | OUTPATIENT
Start: 2024-11-27 | End: 2024-11-27

## 2024-11-27 RX ORDER — HYDROCODONE BITARTRATE AND ACETAMINOPHEN 5; 325 MG/1; MG/1
1 TABLET ORAL EVERY 6 HOURS PRN
Qty: 5 TABLET | Refills: 0 | Status: SHIPPED | OUTPATIENT
Start: 2024-11-27

## 2024-11-27 RX ORDER — HALOPERIDOL 5 MG/ML
0.5 INJECTION INTRAMUSCULAR EVERY 10 MIN PRN
Status: DISCONTINUED | OUTPATIENT
Start: 2024-11-27 | End: 2024-11-27 | Stop reason: HOSPADM

## 2024-11-27 RX ORDER — FENTANYL CITRATE 50 UG/ML
INJECTION, SOLUTION INTRAMUSCULAR; INTRAVENOUS
Status: DISCONTINUED | OUTPATIENT
Start: 2024-11-27 | End: 2024-11-27

## 2024-11-27 RX ORDER — ROCURONIUM BROMIDE 10 MG/ML
INJECTION, SOLUTION INTRAVENOUS
Status: DISCONTINUED | OUTPATIENT
Start: 2024-11-27 | End: 2024-11-27

## 2024-11-27 RX ORDER — HYDROMORPHONE HYDROCHLORIDE 1 MG/ML
0.2 INJECTION, SOLUTION INTRAMUSCULAR; INTRAVENOUS; SUBCUTANEOUS EVERY 5 MIN PRN
Status: DISCONTINUED | OUTPATIENT
Start: 2024-11-27 | End: 2024-11-27 | Stop reason: HOSPADM

## 2024-11-27 RX ORDER — LIDOCAINE HYDROCHLORIDE 10 MG/ML
1 INJECTION, SOLUTION EPIDURAL; INFILTRATION; INTRACAUDAL; PERINEURAL ONCE AS NEEDED
Status: DISCONTINUED | OUTPATIENT
Start: 2024-11-27 | End: 2024-11-27 | Stop reason: HOSPADM

## 2024-11-27 RX ORDER — EPINEPHRINE 1 MG/ML
INJECTION, SOLUTION, CONCENTRATE INTRAVENOUS
Status: DISCONTINUED | OUTPATIENT
Start: 2024-11-27 | End: 2024-11-27 | Stop reason: HOSPADM

## 2024-11-27 RX ORDER — HYDROMORPHONE HYDROCHLORIDE 1 MG/ML
INJECTION, SOLUTION INTRAMUSCULAR; INTRAVENOUS; SUBCUTANEOUS
Status: COMPLETED
Start: 2024-11-27 | End: 2024-11-27

## 2024-11-27 RX ORDER — LIDOCAINE HYDROCHLORIDE 20 MG/ML
INJECTION INTRAVENOUS
Status: DISCONTINUED | OUTPATIENT
Start: 2024-11-27 | End: 2024-11-27

## 2024-11-27 RX ORDER — DEXAMETHASONE SODIUM PHOSPHATE 4 MG/ML
INJECTION, SOLUTION INTRA-ARTICULAR; INTRALESIONAL; INTRAMUSCULAR; INTRAVENOUS; SOFT TISSUE
Status: DISCONTINUED | OUTPATIENT
Start: 2024-11-27 | End: 2024-11-27

## 2024-11-27 RX ORDER — LIDOCAINE HYDROCHLORIDE 40 MG/ML
INJECTION, SOLUTION RETROBULBAR
Status: DISCONTINUED | OUTPATIENT
Start: 2024-11-27 | End: 2024-11-27 | Stop reason: HOSPADM

## 2024-11-27 RX ADMIN — HYDROMORPHONE HYDROCHLORIDE 0.2 MG: 1 INJECTION, SOLUTION INTRAMUSCULAR; INTRAVENOUS; SUBCUTANEOUS at 09:11

## 2024-11-27 RX ADMIN — LIDOCAINE HYDROCHLORIDE 100 MG: 20 INJECTION INTRAVENOUS at 07:11

## 2024-11-27 RX ADMIN — FENTANYL CITRATE 25 MCG: 50 INJECTION, SOLUTION INTRAMUSCULAR; INTRAVENOUS at 10:11

## 2024-11-27 RX ADMIN — HYDROCODONE BITARTRATE AND ACETAMINOPHEN 1 TABLET: 5; 325 TABLET ORAL at 09:11

## 2024-11-27 RX ADMIN — SODIUM CHLORIDE: 9 INJECTION, SOLUTION INTRAVENOUS at 08:11

## 2024-11-27 RX ADMIN — FENTANYL CITRATE 50 MCG: 50 INJECTION, SOLUTION INTRAMUSCULAR; INTRAVENOUS at 08:11

## 2024-11-27 RX ADMIN — SUGAMMADEX 200 MG: 100 INJECTION, SOLUTION INTRAVENOUS at 08:11

## 2024-11-27 RX ADMIN — ROCURONIUM BROMIDE 50 MG: 10 INJECTION, SOLUTION INTRAVENOUS at 08:11

## 2024-11-27 RX ADMIN — PROPOFOL 20 MG: 10 INJECTION, EMULSION INTRAVENOUS at 08:11

## 2024-11-27 RX ADMIN — PROPOFOL 140 MG: 10 INJECTION, EMULSION INTRAVENOUS at 08:11

## 2024-11-27 RX ADMIN — ONDANSETRON 4 MG: 2 INJECTION INTRAMUSCULAR; INTRAVENOUS at 07:11

## 2024-11-27 RX ADMIN — DEXAMETHASONE SODIUM PHOSPHATE 8 MG: 4 INJECTION, SOLUTION INTRAMUSCULAR; INTRAVENOUS at 08:11

## 2024-11-27 RX ADMIN — GLYCOPYRROLATE 0.2 MG: 0.2 INJECTION, SOLUTION INTRAMUSCULAR; INTRAVENOUS at 07:11

## 2024-11-27 NOTE — TRANSFER OF CARE
"Anesthesia Transfer of Care Note    Patient: Jasson Pineda    Procedure(s) Performed: Procedure(s) (LRB):  DILATION, ESOPHAGUS (N/A)    Patient location: PACU    Anesthesia Type: general    Transport from OR: Transported from OR on 6-10 L/min O2 by face mask with adequate spontaneous ventilation    Post pain: adequate analgesia    Post assessment: no apparent anesthetic complications    Post vital signs: stable    Level of consciousness: lethargic    Nausea/Vomiting: no nausea/vomiting    Complications: none    Transfer of care protocol was followed      Last vitals: Visit Vitals  /66 (Patient Position: Lying)   Pulse 96   Temp 36.2 °C (97.2 °F)   Resp 16   Ht 5' 1" (1.549 m)   Wt 59 kg (130 lb)   SpO2 99%   Breastfeeding No   BMI 24.56 kg/m²     "

## 2024-11-27 NOTE — ANESTHESIA POSTPROCEDURE EVALUATION
Anesthesia Post Evaluation    Patient: Jasson Pineda    Procedure(s) Performed: Procedure(s) (LRB):  DILATION, ESOPHAGUS (N/A)    Final Anesthesia Type: general      Patient location during evaluation: PACU  Patient participation: Yes- Able to Participate  Level of consciousness: awake and alert  Post-procedure vital signs: reviewed and stable  Pain management: adequate  Airway patency: patent    PONV status at discharge: No PONV  Anesthetic complications: no      Cardiovascular status: blood pressure returned to baseline  Respiratory status: unassisted  Hydration status: euvolemic  Follow-up not needed.              Vitals Value Taken Time   /62 11/27/24 1102   Temp 36.7 °C (98.1 °F) 11/27/24 0845   Pulse 76 11/27/24 1114   Resp 22 11/27/24 1112   SpO2 99 % 11/27/24 1114   Vitals shown include unfiled device data.      No case tracking events are documented in the log.      Pain/Riley Score: Pain Rating Prior to Med Admin: 7 (11/27/2024 10:49 AM)  Pain Rating Post Med Admin: 4 (pt states pain is tolerable and she is ready for discharge to home) (11/27/2024 11:10 AM)  Riley Score: 10 (11/27/2024  9:15 AM)

## 2024-11-27 NOTE — OP NOTE
DATE OF SERVICE:  11/27/2024    PRE-OPERATIVE DIAGNOSIS:  Cricopharyngeal dysfunction    POST-OPERATIVE DIAGNOSIS:  Cricopharyngeal dysfunction    PROCEDURE(S):  Rigid esophagoscopy with balloon dilation of pharyngo esophageal segment to 23 mm    SURGEON: James Burgos M.D.    ASSISTANT:  Dickson Serrano M.D.    ANESTHESIA: General.    BLOOD LOSS: Less than 5 mL.    SPECIMENS: None.    COMPLICATIONS: None.    FINDINGS:  Uneventful dilation to 23 mm.  Following dilation, significant retrograde flow of frothy salivary secretions noted from distal esophagus    CONDITION: Stable.    INDICATIONS FOR PROCEDURE:  This is a lady with chronic multifactorial dysphagia, including contributions from cricopharyngeal dysfunction.  She presents for endoscopic dilation of the pharyngo esophageal segment in hopes of improvement her pharyngeal dysphagia symptoms.  I spoke with her regarding the risks and benefits thereof, with risks including but not limited to damage to oral structures, recurrence, need for additional procedures, pharyngo esophageal leak/fistula/mediastinitis, risks of general anesthesia.  I counseled her that I would not expect such a procedure to benefit her thoracic/substernal symptoms in any way.  Rather, these would be better assessed/managed by the gastroenterology and/or forget surgery teams.  I gave her the opportunity to ask questions, and answered all them to her satisfaction.  She wishes to proceed.  Informed consent was obtained.    PROCEDURE IN DETAIL:   The patient was positively identified in the preoperative holding area, then was brought to the operating room and placed in the flat supine position. General endotracheal anesthesia was obtained using a 5-5 endotracheal tube which was secured to the left lower lip. The eyes were protected with moist sponges. The teeth and/or upper alveolar ridge was protected using a reinforced mouthguard and/or moist sponges, as appropriate. A final timeout was  performed for verification purposes. The Dedo laryngoscope was inserted into the oral cavity and was utilized to expose the esophageal introitus.  There were no mucosal abnormalities. The patient was suspended from the [place].   Next, attention was turned to performing surgical intervention as indicated.   Under the guidance of maximum magnification of a 0 degree Harris una telescope connected to a video monitor, 23 mm esophageal dilation catheter was positioned across the upper esophageal sphincter, and the balloon was slowly inflated to its working pressure of 5 atmospheres for duration of 1 minute.  The balloon was deflated and was removed.  After deflating the balloon, retrograde flow of frothy salivary secretions into the pharynx was noted, consistent with distal esophageal dysfunction.  The pharyngo esophageal segment was closely inspected.  There was a minor laceration anteriorly at the 10 o'clock position.  With this, the procedure was brought to completion. The larynx was topically anesthetized with 3 mL of 4% lidocaine. All equipment was removed. The patient was turned back over to the anesthesiology team for awakening and extubation, which were uneventful. The patient was escorted to the recovery room in good condition. The patient tolerated the procedure well without complications. All needle, sponge, and instrument counts were correct at the completion of the case.    ATTESTATION:  As the attending of record, I was present and participated in all portions of this procedure.

## 2024-11-27 NOTE — BRIEF OP NOTE
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 Discussed with patient/family preop.    Discharge Procedure Orders   Diet Adult Regular   Order Comments: As tolerated; start with clear liquids and progress as tolerated     Other restrictions (specify):   Order Comments: Voice use as outlined in postoperative discharge instructions - usually voice rest x24h then minimal voice use until follow up appointment or otherwise instructed     No driving until:   Order Comments: No driving 24hr following surgery and no driving while on narcotic pain medication (if  prescribed)     No dressing needed     Notify your health care provider if you experience any of the following:  temperature >100.4     Notify your health care provider if you experience any of the following:  severe uncontrolled pain     Notify your health care provider if you experience any of the following:  difficulty breathing or increased cough   Order Comments: Swelling around neck causing difficulty breathing     Notify your health care provider if you experience any of the following:   Order Comments: Any persistent bleeding from nose or mouth (or trach site if applicable), should report to nearest ED/call ambulance       TIME SPENT ON DISCHARGE: 35 minutes

## 2024-11-27 NOTE — ANESTHESIA PREPROCEDURE EVALUATION
[facility]  Anesthesia Pre-Operative Evaluation         Patient Name/: Jasson Pineda, 1950  MRN: [id number]    SUBJECTIVE:     Pre-operative evaluation for Procedure(s) (LRB):  DILATION, ESOPHAGUS (N/A)     2024    Jasson Pineda is a 74 y.o. female     Patient now presents for the above procedure(s).    ________________________________________  Results for orders placed in visit on 21    Echo    Interpretation Summary  · The left ventricle is normal in size with concentric remodeling and normal systolic function.  · Normal central venous pressure (3 mmHg).  · The estimated PA systolic pressure is 27 mmHg.  · The estimated ejection fraction is 65%.  · Normal left ventricular diastolic function.  · Normal right ventricular size with normal right ventricular systolic function.    ________________________________________    LDA:        Drips:       Patient Active Problem List   Diagnosis    Kidney stone    Herniated disc, cervical    Complete rotator cuff tear or rupture of right shoulder, not specified as traumatic    Shoulder arthritis    OAB (overactive bladder)    Body mass index (BMI) of 25.0 to 29.9    Atypical chest pain    Ineffective esophageal motility    SOB (shortness of breath)    Complete rotator cuff tear or rupture of left shoulder, not specified as traumatic    Impingement syndrome of left shoulder    Primary osteoarthritis of left shoulder    Labral tear of long head of biceps tendon, initial encounter    History of DVT of lower extremity    Chronic pulmonary embolism without acute cor pulmonale    Mixed hyperlipidemia    Nephrolithiasis    Tortuous aorta    Atherosclerosis of aorta    Current use of long term anticoagulation    History of iron deficiency anemia    Esophageal dysphagia    Epigastric abdominal pain    Lower esophageal ring (Schatzki)    Xyphoidalgia    Unintended weight loss       Review of patient's allergies indicates:   Allergen  Reactions    Sulfa (sulfonamide antibiotics) Rash       Current Inpatient Medications:       No current facility-administered medications on file prior to encounter.     Current Outpatient Medications on File Prior to Encounter   Medication Sig Dispense Refill    atorvastatin (LIPITOR) 80 MG tablet TAKE 1 TABLET BY MOUTH EVERY DAY 90 tablet 3    cholecalciferol, vitamin D3, 1,250 mcg (50,000 unit) capsule Take 1 capsule (50,000 Units total) by mouth once a week. (Patient not taking: Reported on 11/20/2024) 12 capsule 3    diclofenac sodium (VOLTAREN) 1 % Gel Apply 2 g topically 4 (four) times daily as needed (MSK CP of midern). 450 g 0    ferrous sulfate (FEOSOL) 325 mg (65 mg iron) Tab tablet Take 1 tablet (325 mg total) by mouth daily with breakfast. (Patient not taking: Reported on 11/20/2024) 180 tablet 2    omeprazole (PRILOSEC) 20 MG capsule Take 1 capsule (20 mg total) by mouth 2 (two) times daily before meals. 180 capsule 3    oxybutynin (DITROPAN-XL) 10 MG 24 hr tablet TAKE 1 TABLET BY MOUTH EVERY DAY 90 tablet 3    pregabalin (LYRICA) 150 MG capsule Take 150 mg by mouth 2 (two) times daily.      tiZANidine (ZANAFLEX) 4 MG tablet TAKE 1 TABLET BY MOUTH AT BEDTIME AS NEEDED FOR MUSCLE RELAXER      traMADol (ULTRAM) 50 mg tablet Take 50 mg by mouth 2 (two) times daily as needed.  1    triamcinolone acetonide 0.5% (KENALOG) 0.5 % Crea Apply topically 2 (two) times daily. (Patient not taking: Reported on 11/20/2024) 15 g 1       Past Surgical History:   Procedure Laterality Date    ARTHROSCOPIC REPAIR OF ROTATOR CUFF OF SHOULDER Left 06/11/2019    Procedure: REPAIR, ROTATOR CUFF, ARTHROSCOPIC;  Surgeon: Sedrick Patel MD;  Location: [facility] OR;  Service: Orthopedics;  Laterality: Left;    ARTHROSCOPIC TENOTOMY OF BICEPS TENDON Left 06/11/2019    Procedure: TENOTOMY, BICEPS, ARTHROSCOPIC;  Surgeon: Sedrick Patel MD;  Location: [facility] OR;  Service: Orthopedics;  Laterality: Left;    ARTHROSCOPY OF SHOULDER  WITH REMOVAL OF DISTAL CLAVICLE Left 2019    Procedure: ARTHROSCOPY, SHOULDER, WITH DISTAL CLAVICLE EXCISION;  Surgeon: Sedrick Patel MD;  Location: [facility] OR;  Service: Orthopedics;  Laterality: Left;    BREAST CYST EXCISION Left      SECTION      x1    COLONOSCOPY N/A 2018    Procedure: COLONOSCOPY;  Surgeon: Armand Foy MD;  Location: [facility] (4TH FLR);  Service: Endoscopy;  Laterality: N/A;    EPIDURAL STEROID INJECTION Bilateral 2023    Procedure: TRANSFORAMINAL EPIDURAL INJECTION, T6-T7 clear to hold Xarelto 2 days;  Surgeon: Bill Easley MD;  Location: [facility] PAIN MGT;  Service: Pain Management;  Laterality: Bilateral;    ESOPHAGEAL MANOMETRY WITH MEASUREMENT OF IMPEDANCE N/A 2018    Procedure: MANOMETRY, ESOPHAGEAL, WITH IMPEDANCE MEASUREMENT;  Surgeon: Armand Foy MD;  Location: [facility] (4TH FLR);  Service: Endoscopy;  Laterality: N/A;    ESOPHAGEAL MANOMETRY WITH MEASUREMENT OF IMPEDANCE N/A 2024    Procedure: MANOMETRY, ESOPHAGUS, WITH IMPEDANCE MEASUREMENT;  Surgeon: Armand Foy MD;  Location: [facility] (4TH FLR);  Service: Endoscopy;  Laterality: N/A;  referral dr foy/ prep inst given in office / xarelto  -pre call complete-GT  Dr. Gooden on vacation, Dr. Rhoades to read-KPvt    ESOPHAGOGASTRODUODENOSCOPY N/A 2018    Procedure: EGD (ESOPHAGOGASTRODUODENOSCOPY);  Surgeon: Armand Foy MD;  Location: [facility] (4TH FLR);  Service: Endoscopy;  Laterality: N/A;  pt currently on Lovenox and Xarelto - ok per Dr. Doty to hold Plavix 2 days prior and Lovenox 24hr prior to case/see scanned media / for documentation of hold -- SM        ESOPHAGOGASTRODUODENOSCOPY N/A 2022    Procedure: EGD (ESOPHAGOGASTRODUODENOSCOPY);  Surgeon: Armand Foy MD;  Location: [facility] (2ND FLR);  Service: Endoscopy;  Laterality: N/A;  okay to hold Xarelto for 2 days per Dr. Doty with lovenox bridgings - see note on 3/21/22  2nd floor for ASAP  availabilty  fully vaccinated - sm    EXTRACORPOREAL SHOCK WAVE LITHOTRIPSY Left 02/09/2021    Procedure: LITHOTRIPSY-EXTRACORPOREAL SHOCK WAVE;  Surgeon: Jeremias Eric MD;  Location: [facility];  Service: Urology;  Laterality: Left;    HERNIA REPAIR      umbilical    KIDNEY STONE SURGERY      ureteral stent    left knee surgery      SHOULDER ARTHROSCOPY Left 06/11/2019    Procedure: ARTHROSCOPY, SHOULDER;  Surgeon: Sedrick Patel MD;  Location: [facility];  Service: Orthopedics;  Laterality: Left;  BLOCK    TONSILLECTOMY      TOTAL SHOULDER ARTHROPLASTY Right        Social History:  Tobacco Use: Low Risk  (11/20/2024)    Patient History     Smoking Tobacco Use: Never     Smokeless Tobacco Use: Never     Passive Exposure: Not on file       Alcohol Use: Not At Risk (4/1/2024)    AUDIT-C     Frequency of Alcohol Consumption: Never     Average Number of Drinks: Patient does not drink     Frequency of Binge Drinking: Never       OBJECTIVE:     Vital Signs Range:  BMI Readings from Last 1 Encounters:   11/20/24 24.60 kg/m²               Significant Labs:        Component Value Date/Time    WBC 4.26 11/22/2024 0830    WBC 4.26 11/22/2024 0830    HGB 12.4 11/22/2024 0830    HGB 12.4 11/22/2024 0830    HCT 38.6 11/22/2024 0830    HCT 38.6 11/22/2024 0830     11/22/2024 0830     11/22/2024 0830     11/22/2024 0830    K 4.0 11/22/2024 0830     11/22/2024 0830    CO2 25 11/22/2024 0830    GLU 91 11/22/2024 0830    BUN 11 11/22/2024 0830    CREATININE 0.9 11/22/2024 0830    MG 2.0 04/07/2019 0642    CALCIUM 10.5 11/22/2024 0830    ALBUMIN 4.2 11/22/2024 0830    PROT 7.8 11/22/2024 0830    ALKPHOS 52 11/22/2024 0830    BILITOT 0.6 11/22/2024 0830    AST 16 11/22/2024 0830    ALT 13 11/22/2024 0830    INR 1.1 08/01/2022 1012    HGBA1C 5.2 01/11/2024 1617        Please see Results Review for additional labs.     Diagnostic Studies: No relevant studies.    EKG:   Results for orders placed or performed in  visit on 11/21/23   EKG 12-lead    Collection Time: 11/21/23  9:38 AM    Narrative    Test Reason : I27.82,    Vent. Rate : 077 BPM     Atrial Rate : 077 BPM     P-R Int : 140 ms          QRS Dur : 074 ms      QT Int : 382 ms       P-R-T Axes : 074 075 061 degrees     QTc Int : 432 ms    Normal sinus rhythm  Normal ECG  When compared with ECG of 07-FEB-2023 09:29,  No significant change was found  Confirmed by Sotero Doty MD ([id number]) on 12/4/2023 11:25:26 AM    Referred By:             Confirmed By:Sotero Doty MD       ECHO:  See subjective, if available.      ASSESSMENT/PLAN:                                                                                                                  11/27/2024  Jasson Pineda is a 74 y.o., female.      Pre-op Assessment          Review of Systems  Social:  Non-Smoker       Hematology/Oncology:                   Hematology Comments: Hx of DVT (deep venous thrombosis)                    Cardiovascular:                hyperlipidemia                               Renal/:   renal calculi               Musculoskeletal:         Spine Disorders: lumbar and cervical Degenerative disease and Disc disease               Physical Exam  General: Well nourished    Airway:  Mallampati: I / I  Mouth Opening: Normal  TM Distance: Normal  Tongue: Normal  Neck ROM: Normal ROM    Dental:  Intact        Anesthesia Plan  Type of Anesthesia, risks & benefits discussed:    Anesthesia Type: Gen ETT  Intra-op Monitoring Plan: Standard ASA Monitors  Post Op Pain Control Plan: multimodal analgesia  Induction:  IV  Informed Consent: Informed consent signed with the Patient representative and all parties understand the risks and agree with anesthesia plan.  All questions answered.   ASA Score: 3  Day of Surgery Review of History & Physical: H&P Update referred to the surgeon/provider.    Ready For Surgery From Anesthesia Perspective.     .

## 2024-11-27 NOTE — ANESTHESIA PROCEDURE NOTES
Intubation    Date/Time: 11/27/2024 8:09 AM    Performed by: Kuldeep Harris CRNA  Authorized by: Nixon Grant MD    Intubation:     Induction:  Intravenous    Intubated:  Postinduction    Mask Ventilation:  Easy mask    Attempts:  2    Attempted By:  Student    Method of Intubation:  Direct    Blade:  Mullen 2    Laryngeal View Grade: Grade III - only epiglottis visible      Attempted By (2nd Attempt):  CRNA    Method of Intubation (2nd Attempt):  Video laryngoscopy    Blade (2nd Attempt):  Fernández 3    Laryngeal View Grade (2nd Attempt): Grade I - full view of cords      Difficult Airway Encountered?: No      Complications:  None    Airway Device:  Oral endotracheal tube    Airway Device Size:  5.5    Style/Cuff Inflation:  Cuffed (inflated to minimal occlusive pressure)    Tube secured:  20    Secured at:  The lips    Placement Verified By:  Capnometry    Complicating Factors:  None    Findings Post-Intubation:  BS equal bilateral

## 2024-11-27 NOTE — DISCHARGE INSTRUCTIONS
MICROLARYNGOSCOPY    Description  Laryngoscopy is a procedure in which the surgeon closely examines the larynx (voice box). The key instrument for laryngoscopy is the laryngoscope, which is a hollow metal tube inserted into the mouth. Microlaryngoscopy entails--at minimum--a magnified examination of the larynx using a microscope and/or telescopes. This is performed in the operating room. This allows the surgeon to achieve a diagnosis and also to perform precise treatment for problems on the vocal folds (vocal cords) or other parts of the larynx. Additional interventions may be performed in conjunction with microlaryngoscopy. Common interventions include but are not limited to  Biopsy  Removal of abnormal tissue (polyps, cysts, nodules, leukoplakia)  Resection of cancer  Laser treatment of abnormal tissue (papilloma, leukoplakia)  Vocal fold injection augmentation  Injection of medication (steroid, Avastin)    Instructions: before the procedure  NPO after midnight: This is a medical expression for nothing to eat or drink after midnight. It is important to refrain from eating or drinking anything after midnight the night before your surgery.   Please refrain from taking any anti-platelet medications such as aspirin; ibuprofen (Advil, Motrin); Aleve; or Plavix (clopidogrel) for 7 days prior to the procedure.  If you usually take Coumadin (warfarin), you may need to stop using this a few days prior to the injection.   If you are any type of blood thinning (anti-platelet or anti-coagulant) medication and it is not clear what you should do, please clarify this with your surgeon ahead of time. In some cases we rely on other physicians such as cardiologists or hematologists to help with these decisions.  Diabetes precautions: If you are usually take oral diabetes medications (such as metformin), refrain from taking your morning dose the day of the procedure and use sliding-scale insulin for blood sugar  control.  On the morning of your procedure, it is OK to take your other regular morning medications with a small sip of water. It is particularly important to take any medications that treat heart problems (such as blood pressure, heart rate, heart rhythm) and lung problems  (asthma, COPD). Otherwise, please remember: nothing to eat or drink after midnight.      What to expect during the procedure  Microlaryngoscopy is performed in the operating room while you are asleep under general anesthesia. In most instances, you can expect to be under general anesthesia for approximately 1 to 1 ½ hours.  Nevertheless, the duration of the procedure varies depending on the indication for surgery, intraoperative findings, and other patient-specific/anatomic issues. Our primary concerns are your safety and comfort. Our secondary goal is to provide you with the best possible surgical treatment for your problem. Your surgery will take as long as necessary to accomplish these goals.    What to expect afterwards  The laryngoscope is inserted through the mouth and presses on the tongue. The most common postoperative issues patients encounter are related to the laryngoscope being positioned in the mouth. The extent of these issues is related to patient-specific anatomic issues, the indications for surgery, and the duration of the procedure.    Pain. We will do everything we can to make sure you are as comfortable as possible. Most patients experience very little discomfort after this surgery. Nevertheless, you should expect some soreness in the mouth and throat. Because the ear and the throat share the same sensory nerve, you may also experience some discomfort in the ear. The discomfort is usually worst for the first 48-72 hours and usually resolves within a week. You will be prescribed pain medication, but most patients are sufficiently comfortable with plain Tylenol as needed.    Laceration. Some patients may notice a small cut in  the tongue or in another part of the mouth or throat. This may result in a minor about of blood-tinged saliva for the first 24 hours. This usually heals on its own over the course of a week.    Other tongue problems. As the scope presses down on the tongue, the taste buds get compressed. In addition, sometimes the nerves to the tongue also get compressed. As a result, some patients notice a disturbance in taste, numbness of the tongue, or (even more rarely) weakness of the tongue after surgery. Although sometimes it may take several weeks to months for the problem to completely go away, the tongue problems are temporary in almost every instance.    Tooth problems. Reinforced mouth guards are placed on the teeth to protect them during the surgery and we give a great deal of care and attention to minimizing any pressure on the teeth. However, a chipped or cracked tooth, loss of a tooth, and/or other tooth irregularities are rare but well-recognized complications of laryngoscopy.    Jaw problems. You may experience some pain in the jaw joints (in front of the ears). Even less frequent would be dislocation of the joint. This usually occurs in patients who already have jaw problems.    Instructions: after the procedure  Please take the prescribed pain medication as directed. If you are still having discomfort after the prescription runs out, or if you would rather not take a prescription narcotic pain medicine, you may instead take plain acetaminophen (Tylenol) as directed on the packaging.  Voice rest. In many instances, we will recommend COMPLETE VOICE REST until your follow-up visit with your surgeon. This means COMPLETE SILENCE - NO TALKING, NO COUGHING, NO WHISPERING. Please see additional information on how to manage complete voice rest. Even if voice rest is not prescribed, for the first week, you should avoid speaking over heavy background noise or in a very loud voice.   Please call our office at [phone]  if  You have a temperature above 101°F  You develop Increasing pain not relieved by medications  You have any other questions or concerns  Please go immediately to the nearest emergency room if you are experiencing  Shortness of breath  Difficulty breathing  Severe bleeding

## 2024-11-27 NOTE — PLAN OF CARE
Instructions reviewed with patient and daughter. All questions answered. Pt states readiness for discharge.

## 2024-12-08 ENCOUNTER — HOSPITAL ENCOUNTER (OUTPATIENT)
Facility: HOSPITAL | Age: 74
Discharge: HOME | End: 2024-12-09
Attending: EMERGENCY MEDICINE | Admitting: STUDENT IN AN ORGANIZED HEALTH CARE EDUCATION/TRAINING PROGRAM
Payer: MEDICARE

## 2024-12-08 DIAGNOSIS — R07.9 CHEST PAIN, UNSPECIFIED TYPE: ICD-10-CM

## 2024-12-08 DIAGNOSIS — M94.0 ACUTE COSTOCHONDRITIS: ICD-10-CM

## 2024-12-08 DIAGNOSIS — R07.89 CHEST DISCOMFORT: ICD-10-CM

## 2024-12-08 DIAGNOSIS — R07.9 CHEST PAIN: Primary | ICD-10-CM

## 2024-12-08 PROBLEM — Z87.19 H/O GASTROESOPHAGEAL REFLUX (GERD): Status: ACTIVE | Noted: 2020-01-20

## 2024-12-08 LAB
ABO + RH BLD: NORMAL
ALBUMIN SERPL BCP-MCNC: 4.4 G/DL (ref 3.5–5.2)
ALP SERPL-CCNC: 56 U/L (ref 40–150)
ALT SERPL W/O P-5'-P-CCNC: 14 U/L (ref 10–44)
ANION GAP SERPL CALC-SCNC: 12 MMOL/L (ref 8–16)
APTT PPP: 28.8 SEC (ref 21–32)
AST SERPL-CCNC: 15 U/L (ref 10–40)
BACTERIA #/AREA URNS HPF: NORMAL /HPF
BASOPHILS # BLD AUTO: 0.02 K/UL (ref 0–0.2)
BASOPHILS NFR BLD: 0.4 % (ref 0–1.9)
BILIRUB SERPL-MCNC: 0.6 MG/DL (ref 0.1–1)
BILIRUB UR QL STRIP: NEGATIVE
BLD GP AB SCN CELLS X3 SERPL QL: NORMAL
BNP SERPL-MCNC: 20 PG/ML (ref 0–99)
BUN SERPL-MCNC: 11 MG/DL (ref 8–23)
CALCIUM SERPL-MCNC: 10.4 MG/DL (ref 8.7–10.5)
CHLORIDE SERPL-SCNC: 107 MMOL/L (ref 95–110)
CK SERPL-CCNC: 64 U/L (ref 20–180)
CLARITY UR: CLEAR
CO2 SERPL-SCNC: 22 MMOL/L (ref 23–29)
COLOR UR: COLORLESS
CREAT SERPL-MCNC: 0.9 MG/DL (ref 0.5–1.4)
DIFFERENTIAL METHOD BLD: ABNORMAL
EOSINOPHIL # BLD AUTO: 0 K/UL (ref 0–0.5)
EOSINOPHIL NFR BLD: 0.6 % (ref 0–8)
ERYTHROCYTE [DISTWIDTH] IN BLOOD BY AUTOMATED COUNT: 15.9 % (ref 11.5–14.5)
EST. GFR  (NO RACE VARIABLE): >60 ML/MIN/1.73 M^2
GLUCOSE SERPL-MCNC: 87 MG/DL (ref 70–110)
GLUCOSE UR QL STRIP: NEGATIVE
HCT VFR BLD AUTO: 38.9 % (ref 37–48.5)
HGB BLD-MCNC: 12.3 G/DL (ref 12–16)
HGB UR QL STRIP: ABNORMAL
IMM GRANULOCYTES # BLD AUTO: 0.01 K/UL (ref 0–0.04)
IMM GRANULOCYTES NFR BLD AUTO: 0.2 % (ref 0–0.5)
INFLUENZA A, MOLECULAR: NEGATIVE
INFLUENZA B, MOLECULAR: NEGATIVE
INR PPP: 1 (ref 0.8–1.2)
KETONES UR QL STRIP: NEGATIVE
LEUKOCYTE ESTERASE UR QL STRIP: ABNORMAL
LIPASE SERPL-CCNC: 31 U/L (ref 4–60)
LYMPHOCYTES # BLD AUTO: 1.5 K/UL (ref 1–4.8)
LYMPHOCYTES NFR BLD: 28.7 % (ref 18–48)
MAGNESIUM SERPL-MCNC: 2.1 MG/DL (ref 1.6–2.6)
MCH RBC QN AUTO: 26.7 PG (ref 27–31)
MCHC RBC AUTO-ENTMCNC: 31.6 G/DL (ref 32–36)
MCV RBC AUTO: 85 FL (ref 82–98)
MICROSCOPIC COMMENT: NORMAL
MONOCYTES # BLD AUTO: 0.4 K/UL (ref 0.3–1)
MONOCYTES NFR BLD: 7.3 % (ref 4–15)
NEUTROPHILS # BLD AUTO: 3.3 K/UL (ref 1.8–7.7)
NEUTROPHILS NFR BLD: 62.8 % (ref 38–73)
NITRITE UR QL STRIP: NEGATIVE
NRBC BLD-RTO: 0 /100 WBC
OHS QRS DURATION: 66 MS
OHS QTC CALCULATION: 399 MS
PH UR STRIP: 7 [PH] (ref 5–8)
PLATELET # BLD AUTO: 319 K/UL (ref 150–450)
PMV BLD AUTO: 9.9 FL (ref 9.2–12.9)
POTASSIUM SERPL-SCNC: 4.5 MMOL/L (ref 3.5–5.1)
PROT SERPL-MCNC: 7.9 G/DL (ref 6–8.4)
PROT UR QL STRIP: NEGATIVE
PROTHROMBIN TIME: 11.1 SEC (ref 9–12.5)
RBC # BLD AUTO: 4.6 M/UL (ref 4–5.4)
RBC #/AREA URNS HPF: 2 /HPF (ref 0–4)
SARS-COV-2 RDRP RESP QL NAA+PROBE: NEGATIVE
SODIUM SERPL-SCNC: 141 MMOL/L (ref 136–145)
SP GR UR STRIP: 1 (ref 1–1.03)
SPECIMEN OUTDATE: NORMAL
SPECIMEN SOURCE: NORMAL
SQUAMOUS #/AREA URNS HPF: 3 /HPF
TROPONIN I SERPL DL<=0.01 NG/ML-MCNC: <0.006 NG/ML (ref 0–0.03)
TROPONIN I SERPL DL<=0.01 NG/ML-MCNC: <0.006 NG/ML (ref 0–0.03)
TSH SERPL DL<=0.005 MIU/L-ACNC: 0.42 UIU/ML (ref 0.4–4)
URN SPEC COLLECT METH UR: ABNORMAL
UROBILINOGEN UR STRIP-ACNC: NEGATIVE EU/DL
WBC # BLD AUTO: 5.2 K/UL (ref 3.9–12.7)
WBC #/AREA URNS HPF: 2 /HPF (ref 0–5)

## 2024-12-08 PROCEDURE — 87635 SARS-COV-2 COVID-19 AMP PRB: CPT | Performed by: EMERGENCY MEDICINE

## 2024-12-08 PROCEDURE — 83880 ASSAY OF NATRIURETIC PEPTIDE: CPT | Performed by: EMERGENCY MEDICINE

## 2024-12-08 PROCEDURE — 84443 ASSAY THYROID STIM HORMONE: CPT | Performed by: EMERGENCY MEDICINE

## 2024-12-08 PROCEDURE — G0378 HOSPITAL OBSERVATION PER HR: HCPCS

## 2024-12-08 PROCEDURE — 25500020 PHARM REV CODE 255

## 2024-12-08 PROCEDURE — 99285 EMERGENCY DEPT VISIT HI MDM: CPT | Mod: 25

## 2024-12-08 PROCEDURE — 83735 ASSAY OF MAGNESIUM: CPT | Performed by: EMERGENCY MEDICINE

## 2024-12-08 PROCEDURE — 82550 ASSAY OF CK (CPK): CPT | Performed by: EMERGENCY MEDICINE

## 2024-12-08 PROCEDURE — 25000003 PHARM REV CODE 250: Performed by: EMERGENCY MEDICINE

## 2024-12-08 PROCEDURE — 85730 THROMBOPLASTIN TIME PARTIAL: CPT | Performed by: EMERGENCY MEDICINE

## 2024-12-08 PROCEDURE — 86901 BLOOD TYPING SEROLOGIC RH(D): CPT | Performed by: EMERGENCY MEDICINE

## 2024-12-08 PROCEDURE — 80053 COMPREHEN METABOLIC PANEL: CPT | Performed by: EMERGENCY MEDICINE

## 2024-12-08 PROCEDURE — 25000003 PHARM REV CODE 250: Performed by: STUDENT IN AN ORGANIZED HEALTH CARE EDUCATION/TRAINING PROGRAM

## 2024-12-08 PROCEDURE — 81000 URINALYSIS NONAUTO W/SCOPE: CPT | Performed by: EMERGENCY MEDICINE

## 2024-12-08 PROCEDURE — 36415 COLL VENOUS BLD VENIPUNCTURE: CPT | Performed by: EMERGENCY MEDICINE

## 2024-12-08 PROCEDURE — 85610 PROTHROMBIN TIME: CPT | Performed by: EMERGENCY MEDICINE

## 2024-12-08 PROCEDURE — 94760 N-INVAS EAR/PLS OXIMETRY 1: CPT

## 2024-12-08 PROCEDURE — 93010 ELECTROCARDIOGRAM REPORT: CPT | Mod: ,,, | Performed by: INTERNAL MEDICINE

## 2024-12-08 PROCEDURE — 84484 ASSAY OF TROPONIN QUANT: CPT | Performed by: EMERGENCY MEDICINE

## 2024-12-08 PROCEDURE — 87502 INFLUENZA DNA AMP PROBE: CPT | Performed by: EMERGENCY MEDICINE

## 2024-12-08 PROCEDURE — 93005 ELECTROCARDIOGRAM TRACING: CPT

## 2024-12-08 PROCEDURE — 83690 ASSAY OF LIPASE: CPT | Performed by: EMERGENCY MEDICINE

## 2024-12-08 PROCEDURE — 85025 COMPLETE CBC W/AUTO DIFF WBC: CPT | Performed by: EMERGENCY MEDICINE

## 2024-12-08 RX ORDER — ASPIRIN 325 MG
325 TABLET ORAL
Status: COMPLETED | OUTPATIENT
Start: 2024-12-08 | End: 2024-12-08

## 2024-12-08 RX ORDER — ACETAMINOPHEN 500 MG
1000 TABLET ORAL ONCE
Status: COMPLETED | OUTPATIENT
Start: 2024-12-08 | End: 2024-12-08

## 2024-12-08 RX ORDER — TALC
6 POWDER (GRAM) TOPICAL NIGHTLY PRN
Status: DISCONTINUED | OUTPATIENT
Start: 2024-12-08 | End: 2024-12-09 | Stop reason: HOSPADM

## 2024-12-08 RX ORDER — LANOLIN ALCOHOL/MO/W.PET/CERES
800 CREAM (GRAM) TOPICAL
Status: DISCONTINUED | OUTPATIENT
Start: 2024-12-08 | End: 2024-12-09 | Stop reason: HOSPADM

## 2024-12-08 RX ORDER — AMOXICILLIN 250 MG
1 CAPSULE ORAL DAILY PRN
Status: DISCONTINUED | OUTPATIENT
Start: 2024-12-08 | End: 2024-12-09 | Stop reason: HOSPADM

## 2024-12-08 RX ORDER — PREGABALIN 75 MG/1
150 CAPSULE ORAL 2 TIMES DAILY
Status: DISCONTINUED | OUTPATIENT
Start: 2024-12-08 | End: 2024-12-09 | Stop reason: HOSPADM

## 2024-12-08 RX ORDER — GLUCAGON 1 MG
1 KIT INJECTION
Status: DISCONTINUED | OUTPATIENT
Start: 2024-12-08 | End: 2024-12-09 | Stop reason: HOSPADM

## 2024-12-08 RX ORDER — NALOXONE HCL 0.4 MG/ML
0.02 VIAL (ML) INJECTION
Status: DISCONTINUED | OUTPATIENT
Start: 2024-12-08 | End: 2024-12-09 | Stop reason: HOSPADM

## 2024-12-08 RX ORDER — ACETAMINOPHEN 325 MG/1
650 TABLET ORAL EVERY 8 HOURS PRN
Status: DISCONTINUED | OUTPATIENT
Start: 2024-12-08 | End: 2024-12-08

## 2024-12-08 RX ORDER — TRAMADOL HYDROCHLORIDE 50 MG/1
50 TABLET ORAL 2 TIMES DAILY PRN
Status: DISCONTINUED | OUTPATIENT
Start: 2024-12-08 | End: 2024-12-09 | Stop reason: HOSPADM

## 2024-12-08 RX ORDER — SODIUM,POTASSIUM PHOSPHATES 280-250MG
2 POWDER IN PACKET (EA) ORAL
Status: DISCONTINUED | OUTPATIENT
Start: 2024-12-08 | End: 2024-12-09 | Stop reason: HOSPADM

## 2024-12-08 RX ORDER — IBUPROFEN 200 MG
24 TABLET ORAL
Status: DISCONTINUED | OUTPATIENT
Start: 2024-12-08 | End: 2024-12-09 | Stop reason: HOSPADM

## 2024-12-08 RX ORDER — DICLOFENAC SODIUM 10 MG/G
2 GEL TOPICAL 4 TIMES DAILY PRN
Status: DISCONTINUED | OUTPATIENT
Start: 2024-12-08 | End: 2024-12-09

## 2024-12-08 RX ORDER — LANOLIN ALCOHOL/MO/W.PET/CERES
1 CREAM (GRAM) TOPICAL DAILY
Status: DISCONTINUED | OUTPATIENT
Start: 2024-12-09 | End: 2024-12-09 | Stop reason: HOSPADM

## 2024-12-08 RX ORDER — ACETAMINOPHEN 325 MG/1
650 TABLET ORAL EVERY 4 HOURS PRN
Status: DISCONTINUED | OUTPATIENT
Start: 2024-12-08 | End: 2024-12-08

## 2024-12-08 RX ORDER — SODIUM CHLORIDE 0.9 % (FLUSH) 0.9 %
3 SYRINGE (ML) INJECTION EVERY 12 HOURS PRN
Status: DISCONTINUED | OUTPATIENT
Start: 2024-12-08 | End: 2024-12-09 | Stop reason: HOSPADM

## 2024-12-08 RX ORDER — ACETAMINOPHEN 500 MG
1000 TABLET ORAL EVERY 8 HOURS
Status: DISCONTINUED | OUTPATIENT
Start: 2024-12-08 | End: 2024-12-09 | Stop reason: HOSPADM

## 2024-12-08 RX ORDER — ONDANSETRON HYDROCHLORIDE 2 MG/ML
4 INJECTION, SOLUTION INTRAVENOUS EVERY 6 HOURS PRN
Status: DISCONTINUED | OUTPATIENT
Start: 2024-12-08 | End: 2024-12-09 | Stop reason: HOSPADM

## 2024-12-08 RX ORDER — LIDOCAINE 50 MG/G
1 PATCH TOPICAL
Status: DISCONTINUED | OUTPATIENT
Start: 2024-12-08 | End: 2024-12-09 | Stop reason: HOSPADM

## 2024-12-08 RX ORDER — TIZANIDINE 4 MG/1
4 TABLET ORAL NIGHTLY PRN
Status: DISCONTINUED | OUTPATIENT
Start: 2024-12-08 | End: 2024-12-09 | Stop reason: HOSPADM

## 2024-12-08 RX ORDER — IBUPROFEN 200 MG
16 TABLET ORAL
Status: DISCONTINUED | OUTPATIENT
Start: 2024-12-08 | End: 2024-12-09 | Stop reason: HOSPADM

## 2024-12-08 RX ORDER — ALUMINUM HYDROXIDE, MAGNESIUM HYDROXIDE, AND SIMETHICONE 1200; 120; 1200 MG/30ML; MG/30ML; MG/30ML
30 SUSPENSION ORAL 4 TIMES DAILY PRN
Status: DISCONTINUED | OUTPATIENT
Start: 2024-12-08 | End: 2024-12-09 | Stop reason: HOSPADM

## 2024-12-08 RX ADMIN — PREGABALIN 150 MG: 75 CAPSULE ORAL at 08:12

## 2024-12-08 RX ADMIN — RIVAROXABAN 20 MG: 20 TABLET, FILM COATED ORAL at 08:12

## 2024-12-08 RX ADMIN — ASPIRIN 325 MG: 325 TABLET ORAL at 01:12

## 2024-12-08 RX ADMIN — TRAMADOL HYDROCHLORIDE 50 MG: 50 TABLET, COATED ORAL at 08:12

## 2024-12-08 RX ADMIN — ACETAMINOPHEN 1000 MG: 500 TABLET ORAL at 07:12

## 2024-12-08 RX ADMIN — IOHEXOL 75 ML: 350 INJECTION, SOLUTION INTRAVENOUS at 09:12

## 2024-12-08 RX ADMIN — LIDOCAINE 5% 1 PATCH: 700 PATCH TOPICAL at 01:12

## 2024-12-08 RX ADMIN — ACETAMINOPHEN 1000 MG: 500 TABLET ORAL at 01:12

## 2024-12-08 NOTE — HPI
Ms. Pineda is a 74-year-old female with a past medical history significant for hyperlipidemia, pulmonary emboli, esophageal dysmotility with dilatation, and kidney stones who presents for a 2 day history of atypical chest pain and shortness of breath.  History provided to ED physician by patient's daughter who stated patient has had decreased oral intake and decreased activity over the last 2 days as well.  At the time of my interview, patient was alone in the room but was able to adequately answer questions.  She has pain over her sternum, particularly with palpation over a nodule located at the lower aspect of the sternum.  She also has pain over her rear lower left ribs.  The pain is more intense when lying on her back to the point of shortness of breath from the pain that makes her roll onto her side to alleviate.  Seems to be more likely costochondritis than cardiac in origin.  Labs unremarkable, troponin x2 normal.  EKG normal.  CTA chest and CT abdomen/pelvis unremarkable.  4 mm pulmonary nodule noted.  Patient is on chronic anticoagulation with Xarelto, which makes treating costochondritis difficult secondary to increased bleeding risk with NSAIDs.  We will admit for observation, possible stress test and echocardiogram, as well as pain control with high-dose Tylenol, lidocaine patches.

## 2024-12-08 NOTE — ASSESSMENT & PLAN NOTE
Pain to palpation over xiphoid area of sternum, as well as left lower back ribs  Suspect costochondritis   Less likely cardiac in origin   Troponin negative, EKG normal, labs unremarkable   May benefit from stress test and echocardiogram   Chronically anticoagulated with Xarelto, avoid NSAIDs  Starting Tylenol 1 g q.8 hours scheduled   Lidocaine patches   Aspercreme application

## 2024-12-08 NOTE — ED PROVIDER NOTES
Encounter Date: 12/8/2024       History     Chief Complaint   Patient presents with    Chest Pain     X 2 days       74-year-old female with a history of hyperlipidemia, pulmonary emboli, esophageal dysmotility, kidney stones presents complaining of chest pain.  The patient has been having intermittent chest pain over the past 2 days.  She is a poor historian due to dementia which is chronic and ongoing with no sudden change in her mental status as per her daughter with whom she lives.  Her daughter has noticed that she has had a decreased activity level over the past 2 days and has been complaining of chest pain more frequently.  She does have a history of what has been described as atypical chest pain in the past.  Her daughter states that it has not limited her activity level as have current symptoms.  She reported feeling short of breath and somewhat lightheaded but denies syncope or near-syncope.  Has had some mild epigastric pain as well.  The patient states she does not feel well in general.  Pain has radiated into her back at times.  Denies any lower extremity pain or swelling.  Has been compliant with medications.  Denies any other problems or complaints.        Review of patient's allergies indicates:   Allergen Reactions    Sulfa (sulfonamide antibiotics) Rash     Past Medical History:   Diagnosis Date    Anticoagulant long-term use     xarelto    Chronic back pain     Chronic neck pain     DVT (deep venous thrombosis)     Herniated disc, cervical     Kidney stone     Lumbar herniated disc     PE (pulmonary embolism)     2013    Pulmonary emboli     after surgery    Pulmonary embolism      Past Surgical History:   Procedure Laterality Date    ARTHROSCOPIC REPAIR OF ROTATOR CUFF OF SHOULDER Left 06/11/2019    Procedure: REPAIR, ROTATOR CUFF, ARTHROSCOPIC;  Surgeon: Sedrick Patel MD;  Location: [facility];  Service: Orthopedics;  Laterality: Left;    ARTHROSCOPIC TENOTOMY OF BICEPS TENDON Left 06/11/2019     Procedure: TENOTOMY, BICEPS, ARTHROSCOPIC;  Surgeon: Sedrick Patel MD;  Location: [facility] OR;  Service: Orthopedics;  Laterality: Left;    ARTHROSCOPY OF SHOULDER WITH REMOVAL OF DISTAL CLAVICLE Left 2019    Procedure: ARTHROSCOPY, SHOULDER, WITH DISTAL CLAVICLE EXCISION;  Surgeon: Sedrick Patel MD;  Location: [facility] OR;  Service: Orthopedics;  Laterality: Left;    BREAST CYST EXCISION Left      SECTION      x1    COLONOSCOPY N/A 2018    Procedure: COLONOSCOPY;  Surgeon: Armand Foy MD;  Location: [facility] RANDI (4TH FLR);  Service: Endoscopy;  Laterality: N/A;    EPIDURAL STEROID INJECTION Bilateral 2023    Procedure: TRANSFORAMINAL EPIDURAL INJECTION, T6-T7 clear to hold Xarelto 2 days;  Surgeon: Bill Easley MD;  Location: [facility] PAIN MGT;  Service: Pain Management;  Laterality: Bilateral;    ESOPHAGEAL DILATION N/A 2024    Procedure: DILATION, ESOPHAGUS;  Surgeon: James Burgos MD;  Location: [facility] 2ND FLR;  Service: ENT;  Laterality: N/A;  INFINITY BALLOONS    ESOPHAGEAL MANOMETRY WITH MEASUREMENT OF IMPEDANCE N/A 2018    Procedure: MANOMETRY, ESOPHAGEAL, WITH IMPEDANCE MEASUREMENT;  Surgeon: Armand Foy MD;  Location: [facility] ENDO (4TH FLR);  Service: Endoscopy;  Laterality: N/A;    ESOPHAGEAL MANOMETRY WITH MEASUREMENT OF IMPEDANCE N/A 2024    Procedure: MANOMETRY, ESOPHAGUS, WITH IMPEDANCE MEASUREMENT;  Surgeon: Armand Foy MD;  Location: [facility] RANDI (4TH FLR);  Service: Endoscopy;  Laterality: N/A;  referral dr foy/ prep inst given in office / xarelto  -pre call complete-GT  Dr. Gooden on vacation, Dr. Rhoades to read-KPvt    ESOPHAGOGASTRODUODENOSCOPY N/A 2018    Procedure: EGD (ESOPHAGOGASTRODUODENOSCOPY);  Surgeon: Armand Foy MD;  Location: [facility] ENDO (4TH FLR);  Service: Endoscopy;  Laterality: N/A;  pt currently on Lovenox and Xarelto - ok per Dr. Doty to hold Plavix 2 days prior and Lovenox 24hr prior to case/see scanned media /  for documentation of hold -- SM        ESOPHAGOGASTRODUODENOSCOPY N/A 03/28/2022    Procedure: EGD (ESOPHAGOGASTRODUODENOSCOPY);  Surgeon: Armand Foy MD;  Location: [facility] ([address] [place] [address]);  Service: Endoscopy;  Laterality: N/A;  okay to hold Xarelto for 2 days per Dr. Doty with lovenox bridgings - see note on 3/21/22  2nd floor for ASAP availabilty  fully vaccinated - sm    EXTRACORPOREAL SHOCK WAVE LITHOTRIPSY Left 02/09/2021    Procedure: LITHOTRIPSY-EXTRACORPOREAL SHOCK WAVE;  Surgeon: Jeremias Eric MD;  Location: [facility];  Service: Urology;  Laterality: Left;    HERNIA REPAIR      umbilical    KIDNEY STONE SURGERY      ureteral stent    left knee surgery      SHOULDER ARTHROSCOPY Left 06/11/2019    Procedure: ARTHROSCOPY, SHOULDER;  Surgeon: Sedrick Patel MD;  Location: [facility];  Service: Orthopedics;  Laterality: Left;  BLOCK    TONSILLECTOMY      TOTAL SHOULDER ARTHROPLASTY Right      Family History   Problem Relation Name Age of Onset    Colon cancer Mother  74    Ovarian cancer Mother      No Known Problems Father      Liver cancer Sister Fabby     Diabetes Sister Fabby     Arthritis Sister Brandie     Lupus Sister Brandie     Lupus Daughter Dana     No Known Problems Daughter Geri     Breast cancer Maternal Aunt  50        50s    Cervical cancer Maternal Aunt      Liver cancer Maternal Grandfather      Breast cancer Maternal Cousin  45    Esophageal cancer Neg Hx       Social History     Tobacco Use    Smoking status: Never    Smokeless tobacco: Never   Substance Use Topics    Alcohol use: No    Drug use: No     Review of Systems   Unable to perform ROS: Dementia       Physical Exam     Initial Vitals   BP Pulse Resp Temp SpO2   12/08/24 0706 12/08/24 0706 12/08/24 0706 12/08/24 0706 12/08/24 0732   117/67 72 18 97.7 °F (36.5 °C) 97 %      MAP       --                Physical Exam    Nursing note and vitals reviewed.  Constitutional: She is cooperative. She does not appear ill. No distress.    HENT:   Head: Normocephalic and atraumatic.   Nose: Nose normal. Mouth/Throat: Uvula is midline, oropharynx is clear and moist and mucous membranes are normal. No oropharyngeal exudate, posterior oropharyngeal edema or posterior oropharyngeal erythema.   Eyes: Conjunctivae, EOM and lids are normal. Pupils are equal, round, and reactive to light. No scleral icterus.   Neck: Trachea normal and phonation normal. Neck supple. No stridor present. No JVD present.   Normal range of motion.   Full passive range of motion without pain.     Cardiovascular:  Normal rate, regular rhythm, normal heart sounds, intact distal pulses and normal pulses.     Exam reveals no gallop, no distant heart sounds, no friction rub and no decreased pulses.       No murmur heard.  Pulmonary/Chest: Effort normal and breath sounds normal. No respiratory distress. She has no wheezes. She has no rhonchi. She has no rales.   Abdominal: Abdomen is soft. Bowel sounds are normal. She exhibits no distension and no mass. There is no abdominal tenderness.   No right CVA tenderness.  No left CVA tenderness. There is no rigidity.   Musculoskeletal:         General: No tenderness or edema. Normal range of motion.      Right hand: Normal. Normal capillary refill. Normal pulse.      Left hand: Normal. Normal sensation. Normal capillary refill. Normal pulse.      Cervical back: Normal, full passive range of motion without pain, normal range of motion and neck supple. No bony tenderness. No pain with movement or spinous process tenderness. Normal range of motion.      Thoracic back: Normal. No tenderness. Normal range of motion.      Lumbar back: Normal. No tenderness. Normal range of motion.      Right lower leg: Normal. No swelling or tenderness.      Left lower leg: Normal. No swelling or tenderness.      Right foot: Normal. Normal capillary refill. Normal pulse.      Left foot: Normal. Normal capillary refill. Normal pulse.      Comments: Pulses are 2+  throughout, cap refill is less than 2 sec throughout, extremities are nontender throughout with full range of motion. There is no spinal tenderness to palpation.     Neurological: She is alert and oriented to person, place, and time. She has normal strength. No cranial nerve deficit or sensory deficit. Coordination and gait normal.   No focal deficits.   Skin: Skin is warm, dry and intact. Capillary refill takes less than 2 seconds. No ecchymosis, no petechiae and no rash noted. No erythema. No pallor.   Psychiatric: She has a normal mood and affect. Her speech is normal and behavior is normal.         ED Course   Procedures  Labs Reviewed   URINALYSIS, REFLEX TO URINE CULTURE - Abnormal       Result Value    Specimen UA Urine, Clean Catch      Color, UA Colorless (*)     Appearance, UA Clear      pH, UA 7.0      Specific Gravity, UA 1.005      Protein, UA Negative      Glucose, UA Negative      Ketones, UA Negative      Bilirubin (UA) Negative      Occult Blood UA Trace (*)     Nitrite, UA Negative      Urobilinogen, UA Negative      Leukocytes, UA Trace (*)     Narrative:     Specimen Source->Urine   CBC W/ AUTO DIFFERENTIAL - Abnormal    WBC 5.20      RBC 4.60      Hemoglobin 12.3      Hematocrit 38.9      MCV 85      MCH 26.7 (*)     MCHC 31.6 (*)     RDW 15.9 (*)     Platelets 319      MPV 9.9      Immature Granulocytes 0.2      Gran # (ANC) 3.3      Immature Grans (Abs) 0.01      Lymph # 1.5      Mono # 0.4      Eos # 0.0      Baso # 0.02      nRBC 0      Gran % 62.8      Lymph % 28.7      Mono % 7.3      Eosinophil % 0.6      Basophil % 0.4      Differential Method Automated     COMPREHENSIVE METABOLIC PANEL - Abnormal    Sodium 141      Potassium 4.5      Chloride 107      CO2 22 (*)     Glucose 87      BUN 11      Creatinine 0.9      Calcium 10.4      Total Protein 7.9      Albumin 4.4      Total Bilirubin 0.6      Alkaline Phosphatase 56      AST 15      ALT 14      eGFR >60      Anion Gap 12     INFLUENZA  A & B BY MOLECULAR    Influenza A, Molecular Negative      Influenza B, Molecular Negative      Flu A & B Source Nasal swab     LIPASE    Lipase 31     CK    CPK 64     MAGNESIUM    Magnesium 2.1     TSH    TSH 0.422     TROPONIN I    Troponin I <0.006     B-TYPE NATRIURETIC PEPTIDE    BNP 20     PROTIME-INR    Prothrombin Time 11.1      INR 1.0     APTT    aPTT 28.8     SARS-COV-2 RNA AMPLIFICATION, QUAL    SARS-CoV-2 RNA, Amplification, Qual Negative     URINALYSIS MICROSCOPIC    RBC, UA 2      WBC, UA 2      Bacteria Rare      Squam Epithel, UA 3      Microscopic Comment SEE COMMENT      Narrative:     Specimen Source->Urine   TROPONIN I    Troponin I <0.006     TYPE & SCREEN    Group & Rh O POS      Indirect Maribell NEG      Specimen Outdate 12/11/2024 23:59            Imaging Results              CT Abdomen Pelvis With IV Contrast NO Oral Contrast (Edited Result - FINAL)  Result time 12/08/24 11:08:28      Addendum (preliminary) 1 of 1 by William Hopkins MD (12/08/24 11:08:28)      Typographical error.  Prostate was mentioned on this female patient which is in error.  The uterus is normal in size without evidence of any focal abnormality.  No adnexal mass identified.  No change to the impression of the report.      Electronically signed by: William Hopkins  Date:    12/08/2024  Time:    11:08                 Final result by William Hopkins MD (12/08/24 10:32:08)                   Impression:      1. No acute findings within the abdomen or pelvis.  2. Nonobstructing left-sided nephrolithiasis redemonstrated.  3. Mild diverticulosis coli.      Electronically signed by: William Hopkins  Date:    12/08/2024  Time:    10:32               Narrative:    EXAMINATION:  CT ABDOMEN PELVIS WITH IV CONTRAST    CLINICAL HISTORY:  Epigastric pain;    TECHNIQUE:  Low dose axial images, sagittal and coronal reformations were obtained from the lung bases to the pubic symphysis following the IV administration of 75 mL of Omnipaque 350 and  ml of  barium sulfate oral contrast    COMPARISON:  Concurrently performed CTA chest 12/08/2024.  CT of the abdomen/pelvis 05/19/2024 (report only).  CT of the abdomen/pelvis 11/15/2022..    FINDINGS:  Lower thorax: Visualized portions of the heart are within normal limits.    Lung bases:   No consolidation or pleural effusion.    Liver: Liver is normal in size and contour.  Probable small focus of focal fatty infiltration near the fissure anteriorly within the left hepatic lobe.  No enhancing lesions.    Gallbladder: No calcified gallstones. No pericholecystic inflammatory change.    Bile Ducts: No evidence of intra or extra hepatic biliary ductal dilation.    Pancreas: No discrete mass or peripancreatic fat stranding.    Stomach: No significant CT abnormality.    Spleen: Normal in size and attenuation.  No focal lesion.    Adrenals: Adrenal glands appear within normal limits.    Kidneys: Kidneys are normal in size and enhance appropriately. No enhancing mass or hydronephrosis. Two nonobstructing left-sided stones redemonstrated measuring 5 mm and 4 mm respectively.    Bladder: No abnormal bladder wall thickening.    Reproductive: Prostate is mildly enlarged measuring 4.8 cm.    Bowel/Mesentery: There are a few scattered colonic diverticula without evidence of diverticulitis.  Moderate stool within the colon.  No evidence of bowel obstruction or inflammation.  No ascites or pneumoperitoneum.  No evidence of appendicitis.    Lymph nodes: No pathologically enlarged lymph nodes.    Vascular: Scattered atherosclerotic changes.  No abdominal aortic aneurysm.    Abdominal Wall/Soft Tissues: No significant abnormalities.    Bones: No fractures or bony destructive changes.  Severe disc height loss at L5-S1 redemonstrated as well as grade 1 retrolisthesis of L5 on S1.  Incidental T10 vertebral body hemangioma.                                       CTA Chest Non-Coronary (PE Studies) (Final result)  Result time 12/08/24 10:17:30       Final result by William Hopkins MD (12/08/24 10:17:30)                   Impression:      1. No evidence of a pulmonary embolism.  2. Lungs are overall clear again demonstrating a stable incidental 4 mm right upper lobe pulmonary nodule.      Electronically signed by: William Hopkins  Date:    12/08/2024  Time:    10:17               Narrative:    EXAMINATION:  CTA CHEST NON CORONARY (PE STUDIES)    CLINICAL HISTORY:  Pulmonary embolism (PE) suspected, high prob;    TECHNIQUE:  Low dose axial images, sagittal and coronal reformations were obtained from the thoracic inlet to the lung bases following the IV administration of 75 mL of Omnipaque 350.  Contrast timing was optimized to evaluate the pulmonary arteries.  MIP images were performed.    COMPARISON:  CTA chest 08/01/2022.    FINDINGS:  There is prominent metallic artifact from the patient's right shoulder arthroplasty.    Pulmonary Arteries: This study is adequate for the evaluation of pulmonary thromboembolism.  No evidence of pulmonary embolism to the level of the subsegmental arteries bilaterally.    Soft tissues of the neck:  Visualized portion of the thyroid is normal in appearance.    Thoracic aorta:  Normal in caliber and contour.  No evidence of dissection.    Heart: No cardiomegaly.  No pericardial effusion.    Lymph nodes:  No evidence of mediastinal, hilar, or axillary lymphadenopathy.    Trachea and bronchi: Patent.    Lungs:  Lungs are clear.  No focal consolidation.  No pleural effusion.  No pneumothorax.  Stable 4 mm pulmonary nodule within the right upper lobe.    Limited evaluation of the upper abdomen:  No significant abnormality.    Osseous structures:  No evidence of fractures or suspicious osseous lesions.    Other: Please see separately dictated CT of the abdomen/pelvis for additional findings including nonobstructing left-sided nephrolithiasis.                                       X-Ray Chest PA And Lateral (Final result)  Result time  12/08/24 08:24:50      Final result by William Hopkins MD (12/08/24 08:24:50)                   Impression:      No acute abnormality.      Electronically signed by: William Hopkins  Date:    12/08/2024  Time:    08:24               Narrative:    EXAMINATION:  XR CHEST PA AND LATERAL    CLINICAL HISTORY:  Chest Pain;    TECHNIQUE:  PA and lateral views of the chest were performed.    COMPARISON:  Chest x-ray 01/04/2024.    FINDINGS:  Cardiac silhouette is within normal limits.  Aortic knob calcifications are noted.  Lungs are symmetrically expanded and clear.  No focal consolidation, pneumothorax, or pleural effusion.    Partially imaged right shoulder arthroplasty changes.  Degenerative changes of the spine.  Broad dextrocurvature.  No acute bony process identified.                                       Medications   aspirin tablet 325 mg (has no administration in time range)   iohexoL (OMNIPAQUE 350) 350 mg iodine/mL injection (75 mLs Intravenous Given 12/8/24 0944)     Medical Decision Making    Additional MDM:   Heart Score:    History:          Moderately suspicious.  ECG:             Normal  Age:               >65 years  Risk factors: >= 3 risk factors or history of atherosclerotic disease  Troponin:       Less than or equal to normal limit  Heart Score = 5                                       Clinical Impression:  Final diagnoses:  [R07.89] Chest discomfort  [R07.9] Chest pain

## 2024-12-08 NOTE — PLAN OF CARE
[facility] ED  Initial Discharge Assessment       Primary Care Provider: Osbaldo Hall MD    Admission Diagnosis: Chest discomfort [R07.89]    Admission Date: 12/8/2024  Expected Discharge Date: 12/10/2024    Transition of Care Barriers: None    Payor: MEDICARE / Plan: MEDICARE PART A & B / Product Type: Government /     Extended Emergency Contact Information  Primary Emergency Contact: Geri Pineda  Address: [address] [place] [address] [facility]  Home Phone: [phone]  Mobile Phone: [phone]  Relation: Daughter  Secondary Emergency Contact: Payton Hines   [facility]  Home Phone: [phone]  Mobile Phone: [phone]  Relation: Daughter    Discharge Plan A: Home with family  Discharge Plan B: Home with family      [id number] - [place], [place] - [address]  [address]  [place] [address]  Phone: [phone] Fax: [phone]    [id number] - [place], [place] - [address] AT [facility] [address]  [address]  [place] [address]  Phone: [phone] Fax: [phone]    Spoke with patient and her daughter payton in the ER. Per payton, patient has a history of short term memory loss, so she will require reinforcement with any education. Patient lives with her daughter Geri who is her caregiver. Patient uses a rollator as needed at home and doesn't receive any home services. One of patients daughters will provide transportation at discharge. CM will follow.           Initial Assessment (most recent)       Adult Discharge Assessment - 12/08/24 1500          Discharge Assessment    Assessment Type Discharge Planning Assessment     Confirmed/corrected address, phone number and insurance Yes     Source of Information patient;family     People in Home child(grzegorz), adult     Do you expect to return to your current living situation? Yes     Do you have help at home or someone to help you  manage your care at home? Yes     Current cognitive status: Alert/Oriented     Walking or Climbing Stairs Difficulty no     Dressing/Bathing Difficulty no     Equipment Currently Used at Home rollator     Readmission within 30 days? No     Patient currently being followed by outpatient case management? No     Do you currently have service(s) that help you manage your care at home? No     Do you take prescription medications? Yes     Do you have prescription coverage? Yes     Do you have any problems affording any of your prescribed medications? No     Is the patient taking medications as prescribed? yes     Who is going to help you get home at discharge? Daughter- Geri or Dana     How do you get to doctors appointments? family or friend will provide     Are you on dialysis? No     Do you take coumadin? Yes     Who monitors your labs? Xarelto     Discharge Plan A Home with family     Discharge Plan B Home with family     DME Needed Upon Discharge  none     Discharge Plan discussed with: Patient;Adult children     Transition of Care Barriers None        Physical Activity    On average, how many days per week do you engage in moderate to strenuous exercise (like a brisk walk)? 0 days     On average, how many minutes do you engage in exercise at this level? 0 min        Financial Resource Strain    How hard is it for you to pay for the very basics like food, housing, medical care, and heating? Not hard at all        Housing Stability    In the last 12 months, was there a time when you were not able to pay the mortgage or rent on time? No     At any time in the past 12 months, were you homeless or living in a shelter (including now)? No        Transportation Needs    Has the lack of transportation kept you from medical appointments, meetings, work or from getting things needed for daily living? No        Food Insecurity    Within the past 12 months, you worried that your food would run out before you got the money  to buy more. Never true     Within the past 12 months, the food you bought just didn't last and you didn't have money to get more. Never true        Stress    Do you feel stress - tense, restless, nervous, or anxious, or unable to sleep at night because your mind is troubled all the time - these days? Rather much   Per patients daughter, she in general worries about everthing.       Social Isolation    How often do you feel lonely or isolated from those around you?  Never        Alcohol Use    Q1: How often do you have a drink containing alcohol? Never     Q2: How many drinks containing alcohol do you have on a typical day when you are drinking? Patient does not drink     Q3: How often do you have six or more drinks on one occasion? Never        Utilities    In the past 12 months has the electric, gas, oil, or water company threatened to shut off services in your home? No        Health Literacy    How often do you need to have someone help you when you read instructions, pamphlets, or other written material from your doctor or pharmacy? Always

## 2024-12-08 NOTE — SUBJECTIVE & OBJECTIVE
Past Medical History:   Diagnosis Date    Anticoagulant long-term use     xarelto    Chronic back pain     Chronic neck pain     DVT (deep venous thrombosis)     Herniated disc, cervical     Kidney stone     Lumbar herniated disc     PE (pulmonary embolism)         Pulmonary emboli     after surgery    Pulmonary embolism        Past Surgical History:   Procedure Laterality Date    ARTHROSCOPIC REPAIR OF ROTATOR CUFF OF SHOULDER Left 2019    Procedure: REPAIR, ROTATOR CUFF, ARTHROSCOPIC;  Surgeon: Sedrick Patel MD;  Location: [facility] OR;  Service: Orthopedics;  Laterality: Left;    ARTHROSCOPIC TENOTOMY OF BICEPS TENDON Left 2019    Procedure: TENOTOMY, BICEPS, ARTHROSCOPIC;  Surgeon: Sedrick Patel MD;  Location: [facility] OR;  Service: Orthopedics;  Laterality: Left;    ARTHROSCOPY OF SHOULDER WITH REMOVAL OF DISTAL CLAVICLE Left 2019    Procedure: ARTHROSCOPY, SHOULDER, WITH DISTAL CLAVICLE EXCISION;  Surgeon: Sedrick Patel MD;  Location: [facility];  Service: Orthopedics;  Laterality: Left;    BREAST CYST EXCISION Left      SECTION      x1    COLONOSCOPY N/A 2018    Procedure: COLONOSCOPY;  Surgeon: Armand Foy MD;  Location: [facility] (4TH FLR);  Service: Endoscopy;  Laterality: N/A;    EPIDURAL STEROID INJECTION Bilateral 2023    Procedure: TRANSFORAMINAL EPIDURAL INJECTION, T6-T7 clear to hold Xarelto 2 days;  Surgeon: Bill Easley MD;  Location: [facility] PAIN MGT;  Service: Pain Management;  Laterality: Bilateral;    ESOPHAGEAL DILATION N/A 2024    Procedure: DILATION, ESOPHAGUS;  Surgeon: James Burgos MD;  Location: [facility] OR 2ND FLR;  Service: ENT;  Laterality: N/A;  INFINITY BALLOONS    ESOPHAGEAL MANOMETRY WITH MEASUREMENT OF IMPEDANCE N/A 2018    Procedure: MANOMETRY, ESOPHAGEAL, WITH IMPEDANCE MEASUREMENT;  Surgeon: Armand Foy MD;  Location: [facility] ENDO (4TH FLR);  Service: Endoscopy;  Laterality: N/A;    ESOPHAGEAL MANOMETRY WITH MEASUREMENT  OF IMPEDANCE N/A 7/31/2024    Procedure: MANOMETRY, ESOPHAGUS, WITH IMPEDANCE MEASUREMENT;  Surgeon: Armand Foy MD;  Location: [facility] ENDO (4TH FLR);  Service: Endoscopy;  Laterality: N/A;  referral dr foy/ prep inst given in office / xarelto  7/29-pre call complete-GT  Dr. Gooden on vacation, Dr. Rhoades to read-KPvt    ESOPHAGOGASTRODUODENOSCOPY N/A 07/17/2018    Procedure: EGD (ESOPHAGOGASTRODUODENOSCOPY);  Surgeon: Armand Foy MD;  Location: [facility] ENDO (4TH FLR);  Service: Endoscopy;  Laterality: N/A;  pt currently on Lovenox and Xarelto - ok per Dr. Doty to hold Plavix 2 days prior and Lovenox 24hr prior to case/see scanned media 7/9/ for documentation of hold -- SM        ESOPHAGOGASTRODUODENOSCOPY N/A 03/28/2022    Procedure: EGD (ESOPHAGOGASTRODUODENOSCOPY);  Surgeon: Armand Foy MD;  Location: [facility] RANDI (2ND FLR);  Service: Endoscopy;  Laterality: N/A;  okay to hold Xarelto for 2 days per Dr. Doty with lovenox bridgings - see note on 3/21/22  2nd floor for ASAP availabilty  fully vaccinated - sm    EXTRACORPOREAL SHOCK WAVE LITHOTRIPSY Left 02/09/2021    Procedure: LITHOTRIPSY-EXTRACORPOREAL SHOCK WAVE;  Surgeon: Jeremias Eric MD;  Location: [facility];  Service: Urology;  Laterality: Left;    HERNIA REPAIR      umbilical    KIDNEY STONE SURGERY      ureteral stent    left knee surgery      SHOULDER ARTHROSCOPY Left 06/11/2019    Procedure: ARTHROSCOPY, SHOULDER;  Surgeon: Sedrick Patel MD;  Location: [facility];  Service: Orthopedics;  Laterality: Left;  BLOCK    TONSILLECTOMY      TOTAL SHOULDER ARTHROPLASTY Right        Review of patient's allergies indicates:   Allergen Reactions    Sulfa (sulfonamide antibiotics) Rash       No current facility-administered medications on file prior to encounter.     Current Outpatient Medications on File Prior to Encounter   Medication Sig    ferrous sulfate (FEOSOL) 325 mg (65 mg iron) Tab tablet Take 1 tablet (325 mg total) by mouth daily with  breakfast.    omeprazole (PRILOSEC) 20 MG capsule Take 1 capsule (20 mg total) by mouth 2 (two) times daily before meals. (Patient taking differently: Take 20 mg by mouth once daily.)    oxybutynin (DITROPAN-XL) 10 MG 24 hr tablet TAKE 1 TABLET BY MOUTH EVERY DAY (Patient taking differently: Take 10 mg by mouth once daily.)    pregabalin (LYRICA) 150 MG capsule Take 150 mg by mouth 2 (two) times daily.    tiZANidine (ZANAFLEX) 4 MG tablet Take 4 mg by mouth nightly as needed (Muscle Spasms).    traMADol (ULTRAM) 50 mg tablet Take 50 mg by mouth 2 (two) times daily as needed for Pain.    XARELTO 20 mg Tab Take 1 tablet (20 mg total) by mouth every evening.    enoxaparin (LOVENOX) 60 mg/0.6 mL Syrg Inject 0.6 mLs (60 mg total) into the skin 2 (two) times daily. (Patient not taking: Reported on 12/8/2024)    HYDROcodone-acetaminophen (NORCO) 5-325 mg per tablet Take 1 tablet by mouth every 6 (six) hours as needed for Pain (refractory to over the counter medication). Note this medication contains tylenol (acetaminophen) do not exceed >3000mg total tylenol in 24 hour period. (Patient not taking: Reported on 12/8/2024)    [DISCONTINUED] atorvastatin (LIPITOR) 80 MG tablet TAKE 1 TABLET BY MOUTH EVERY DAY    [DISCONTINUED] cholecalciferol, vitamin D3, 1,250 mcg (50,000 unit) capsule Take 1 capsule (50,000 Units total) by mouth once a week. (Patient not taking: Reported on 11/20/2024)    [DISCONTINUED] diclofenac sodium (VOLTAREN) 1 % Gel Apply 2 g topically 4 (four) times daily as needed (MSK CP of midsternum). (Patient not taking: Reported on 12/8/2024)    [DISCONTINUED] triamcinolone acetonide 0.5% (KENALOG) 0.5 % Crea Apply topically 2 (two) times daily. (Patient not taking: Reported on 11/18/2024)     Family History       Problem Relation (Age of Onset)    Arthritis Sister    Breast cancer Maternal Aunt (50), Maternal Cousin (45)    Cervical cancer Maternal Aunt    Colon cancer Mother (74)    Diabetes Sister    Liver  cancer Sister, Maternal Grandfather    Lupus Sister, Daughter    No Known Problems Father, Daughter    Ovarian cancer Mother          Tobacco Use    Smoking status: Never    Smokeless tobacco: Never   Substance and Sexual Activity    Alcohol use: No    Drug use: No    Sexual activity: Not Currently     Partners: Male     Review of Systems   Cardiovascular:  Positive for chest pain.   Musculoskeletal:  Positive for back pain.   All other systems reviewed and are negative.    Objective:     Vital Signs (Most Recent):  Temp: 97.7 °F (36.5 °C) (12/08/24 0706)  Pulse: 67 (12/08/24 1102)  Resp: 18 (12/08/24 0706)  BP: 114/66 (12/08/24 1102)  SpO2: 96 % (12/08/24 1102) Vital Signs (24h Range):  Temp:  [97.7 °F (36.5 °C)] 97.7 °F (36.5 °C)  Pulse:  [60-72] 67  Resp:  [18] 18  SpO2:  [96 %-97 %] 96 %  BP: (102-127)/(58-67) 114/66     Weight: 59 kg (130 lb)  Body mass index is 24.56 kg/m².     Physical Exam  Vitals and nursing note reviewed.   Constitutional:       Appearance: She is well-developed. She is not ill-appearing or toxic-appearing.   HENT:      Head: Normocephalic and atraumatic.      Right Ear: External ear normal.      Left Ear: External ear normal.      Nose: Nose normal.   Eyes:      Extraocular Movements: EOM normal.      Conjunctiva/sclera: Conjunctivae normal.      Pupils: Pupils are equal, round, and reactive to light.   Cardiovascular:      Rate and Rhythm: Normal rate and regular rhythm.      Heart sounds: Normal heart sounds.   Pulmonary:      Effort: Pulmonary effort is normal.      Breath sounds: Normal breath sounds.   Abdominal:      General: Bowel sounds are normal.      Palpations: Abdomen is soft.   Musculoskeletal:         General: Tenderness present. Normal range of motion.      Cervical back: Normal range of motion and neck supple.   Skin:     General: Skin is dry.      Capillary Refill: Capillary refill takes 2 to 3 seconds.   Neurological:      Mental Status: She is alert and oriented to  person, place, and time.   Psychiatric:         Behavior: Behavior normal.         Thought Content: Thought content normal.         Judgment: Judgment normal.              CRANIAL NERVES     CN III, IV, VI   Pupils are equal, round, and reactive to light.  Extraocular motions are normal.        Significant Labs: All pertinent labs within the past 24 hours have been reviewed.  CBC:   Recent Labs   Lab 12/08/24  0811   WBC 5.20   HGB 12.3   HCT 38.9        CMP:   Recent Labs   Lab 12/08/24  0811      K 4.5      CO2 22*   GLU 87   BUN 11   CREATININE 0.9   CALCIUM 10.4   PROT 7.9   ALBUMIN 4.4   BILITOT 0.6   ALKPHOS 56   AST 15   ALT 14   ANIONGAP 12     Cardiac Markers:   Recent Labs   Lab 12/08/24  0811   BNP 20       Significant Imaging: I have reviewed all pertinent imaging results/findings within the past 24 hours.  Imaging Results              CT Abdomen Pelvis With IV Contrast NO Oral Contrast (Edited Result - FINAL)  Result time 12/08/24 11:08:28      Addendum (preliminary) 1 of 1 by William Hopkins MD (12/08/24 11:08:28)      Typographical error.  Prostate was mentioned on this female patient which is in error.  The uterus is normal in size without evidence of any focal abnormality.  No adnexal mass identified.  No change to the impression of the report.      Electronically signed by: William Hopkins  Date:    12/08/2024  Time:    11:08                 Final result by William Hopkins MD (12/08/24 10:32:08)                   Impression:      1. No acute findings within the abdomen or pelvis.  2. Nonobstructing left-sided nephrolithiasis redemonstrated.  3. Mild diverticulosis coli.      Electronically signed by: William Hopkins  Date:    12/08/2024  Time:    10:32               Narrative:    EXAMINATION:  CT ABDOMEN PELVIS WITH IV CONTRAST    CLINICAL HISTORY:  Epigastric pain;    TECHNIQUE:  Low dose axial images, sagittal and coronal reformations were obtained from the lung bases to the pubic symphysis  following the IV administration of 75 mL of Omnipaque 350 and  ml of barium sulfate oral contrast    COMPARISON:  Concurrently performed CTA chest 12/08/2024.  CT of the abdomen/pelvis 05/19/2024 (report only).  CT of the abdomen/pelvis 11/15/2022..    FINDINGS:  Lower thorax: Visualized portions of the heart are within normal limits.    Lung bases:   No consolidation or pleural effusion.    Liver: Liver is normal in size and contour.  Probable small focus of focal fatty infiltration near the fissure anteriorly within the left hepatic lobe.  No enhancing lesions.    Gallbladder: No calcified gallstones. No pericholecystic inflammatory change.    Bile Ducts: No evidence of intra or extra hepatic biliary ductal dilation.    Pancreas: No discrete mass or peripancreatic fat stranding.    Stomach: No significant CT abnormality.    Spleen: Normal in size and attenuation.  No focal lesion.    Adrenals: Adrenal glands appear within normal limits.    Kidneys: Kidneys are normal in size and enhance appropriately. No enhancing mass or hydronephrosis. Two nonobstructing left-sided stones redemonstrated measuring 5 mm and 4 mm respectively.    Bladder: No abnormal bladder wall thickening.    Reproductive: Prostate is mildly enlarged measuring 4.8 cm.    Bowel/Mesentery: There are a few scattered colonic diverticula without evidence of diverticulitis.  Moderate stool within the colon.  No evidence of bowel obstruction or inflammation.  No ascites or pneumoperitoneum.  No evidence of appendicitis.    Lymph nodes: No pathologically enlarged lymph nodes.    Vascular: Scattered atherosclerotic changes.  No abdominal aortic aneurysm.    Abdominal Wall/Soft Tissues: No significant abnormalities.    Bones: No fractures or bony destructive changes.  Severe disc height loss at L5-S1 redemonstrated as well as grade 1 retrolisthesis of L5 on S1.  Incidental T10 vertebral body hemangioma.                                       CTA Chest  Non-Coronary (PE Studies) (Final result)  Result time 12/08/24 10:17:30      Final result by William Hopkins MD (12/08/24 10:17:30)                   Impression:      1. No evidence of a pulmonary embolism.  2. Lungs are overall clear again demonstrating a stable incidental 4 mm right upper lobe pulmonary nodule.      Electronically signed by: William Hopkins  Date:    12/08/2024  Time:    10:17               Narrative:    EXAMINATION:  CTA CHEST NON CORONARY (PE STUDIES)    CLINICAL HISTORY:  Pulmonary embolism (PE) suspected, high prob;    TECHNIQUE:  Low dose axial images, sagittal and coronal reformations were obtained from the thoracic inlet to the lung bases following the IV administration of 75 mL of Omnipaque 350.  Contrast timing was optimized to evaluate the pulmonary arteries.  MIP images were performed.    COMPARISON:  CTA chest 08/01/2022.    FINDINGS:  There is prominent metallic artifact from the patient's right shoulder arthroplasty.    Pulmonary Arteries: This study is adequate for the evaluation of pulmonary thromboembolism.  No evidence of pulmonary embolism to the level of the subsegmental arteries bilaterally.    Soft tissues of the neck:  Visualized portion of the thyroid is normal in appearance.    Thoracic aorta:  Normal in caliber and contour.  No evidence of dissection.    Heart: No cardiomegaly.  No pericardial effusion.    Lymph nodes:  No evidence of mediastinal, hilar, or axillary lymphadenopathy.    Trachea and bronchi: Patent.    Lungs:  Lungs are clear.  No focal consolidation.  No pleural effusion.  No pneumothorax.  Stable 4 mm pulmonary nodule within the right upper lobe.    Limited evaluation of the upper abdomen:  No significant abnormality.    Osseous structures:  No evidence of fractures or suspicious osseous lesions.    Other: Please see separately dictated CT of the abdomen/pelvis for additional findings including nonobstructing left-sided nephrolithiasis.                                        X-Ray Chest PA And Lateral (Final result)  Result time 12/08/24 08:24:50      Final result by William Hopkins MD (12/08/24 08:24:50)                   Impression:      No acute abnormality.      Electronically signed by: William Hopkins  Date:    12/08/2024  Time:    08:24               Narrative:    EXAMINATION:  XR CHEST PA AND LATERAL    CLINICAL HISTORY:  Chest Pain;    TECHNIQUE:  PA and lateral views of the chest were performed.    COMPARISON:  Chest x-ray 01/04/2024.    FINDINGS:  Cardiac silhouette is within normal limits.  Aortic knob calcifications are noted.  Lungs are symmetrically expanded and clear.  No focal consolidation, pneumothorax, or pleural effusion.    Partially imaged right shoulder arthroplasty changes.  Degenerative changes of the spine.  Broad dextrocurvature.  No acute bony process identified.

## 2024-12-08 NOTE — H&P
[facility]  History & Physical    Patient Name: Jasson Pineda  MRN: [id number]  Patient Class: OP- Observation  Admission Date: 12/8/2024  Attending Physician: Chris Waldron DO  Primary Care Provider: Osbaldo Hall MD         Patient information was obtained from patient and ER records.     Subjective:     Principal Problem:Atypical chest pain    Chief Complaint:   Chief Complaint   Patient presents with    Chest Pain     X 2 days         HPI: Ms. Pineda is a 74-year-old female with a past medical history significant for hyperlipidemia, pulmonary emboli, esophageal dysmotility with dilatation, and kidney stones who presents for a 2 day history of atypical chest pain and shortness of breath.  History provided to ED physician by patient's daughter who stated patient has had decreased oral intake and decreased activity over the last 2 days as well.  At the time of my interview, patient was alone in the room but was able to adequately answer questions.  She has pain over her sternum, particularly with palpation over a nodule located at the lower aspect of the sternum.  She also has pain over her rear lower left ribs.  The pain is more intense when lying on her back to the point of shortness of breath from the pain that makes her roll onto her side to alleviate.  Seems to be more likely costochondritis than cardiac in origin.  Labs unremarkable, troponin x2 normal.  EKG normal.  CTA chest and CT abdomen/pelvis unremarkable.  4 mm pulmonary nodule noted.  Patient is on chronic anticoagulation with Xarelto, which makes treating costochondritis difficult secondary to increased bleeding risk with NSAIDs.  We will admit for observation, possible stress test and echocardiogram, as well as pain control with high-dose Tylenol, lidocaine patches.    Past Medical History:   Diagnosis Date    Anticoagulant long-term use     xarelto    Chronic back pain     Chronic neck pain      Unable to leave a message.   Note was faxed to Dr Iliana Leija by Cynthia Donohue. DVT (deep venous thrombosis)     Herniated disc, cervical     Kidney stone     Lumbar herniated disc     PE (pulmonary embolism)         Pulmonary emboli     after surgery    Pulmonary embolism        Past Surgical History:   Procedure Laterality Date    ARTHROSCOPIC REPAIR OF ROTATOR CUFF OF SHOULDER Left 2019    Procedure: REPAIR, ROTATOR CUFF, ARTHROSCOPIC;  Surgeon: Sedrick Patel MD;  Location: [facility];  Service: Orthopedics;  Laterality: Left;    ARTHROSCOPIC TENOTOMY OF BICEPS TENDON Left 2019    Procedure: TENOTOMY, BICEPS, ARTHROSCOPIC;  Surgeon: Sedrick Patel MD;  Location: [facility] OR;  Service: Orthopedics;  Laterality: Left;    ARTHROSCOPY OF SHOULDER WITH REMOVAL OF DISTAL CLAVICLE Left 2019    Procedure: ARTHROSCOPY, SHOULDER, WITH DISTAL CLAVICLE EXCISION;  Surgeon: Sedrick Patel MD;  Location: [facility];  Service: Orthopedics;  Laterality: Left;    BREAST CYST EXCISION Left      SECTION      x1    COLONOSCOPY N/A 2018    Procedure: COLONOSCOPY;  Surgeon: Armand Foy MD;  Location: [facility] (4TH FLR);  Service: Endoscopy;  Laterality: N/A;    EPIDURAL STEROID INJECTION Bilateral 2023    Procedure: TRANSFORAMINAL EPIDURAL INJECTION, T6-T7 clear to hold Xarelto 2 days;  Surgeon: Bill Easley MD;  Location: [facility] PAIN MGT;  Service: Pain Management;  Laterality: Bilateral;    ESOPHAGEAL DILATION N/A 2024    Procedure: DILATION, ESOPHAGUS;  Surgeon: James Burgos MD;  Location: [facility] 2ND FLR;  Service: ENT;  Laterality: N/A;  INFINITY BALLOONS    ESOPHAGEAL MANOMETRY WITH MEASUREMENT OF IMPEDANCE N/A 2018    Procedure: MANOMETRY, ESOPHAGEAL, WITH IMPEDANCE MEASUREMENT;  Surgeon: Armand Foy MD;  Location: [facility] ENDO (4TH FLR);  Service: Endoscopy;  Laterality: N/A;    ESOPHAGEAL MANOMETRY WITH MEASUREMENT OF IMPEDANCE N/A 2024    Procedure: MANOMETRY, ESOPHAGUS, WITH IMPEDANCE MEASUREMENT;  Surgeon: Armand Foy MD;   Location: [facility] (4TH FLR);  Service: Endoscopy;  Laterality: N/A;  referral dr foy/ prep inst given in office / xarelto  7/29-pre call complete-GT  Dr. Gooden on vacation, Dr. Rhoades to read-KPvt    ESOPHAGOGASTRODUODENOSCOPY N/A 07/17/2018    Procedure: EGD (ESOPHAGOGASTRODUODENOSCOPY);  Surgeon: Armand Foy MD;  Location: [facility] (4TH FLR);  Service: Endoscopy;  Laterality: N/A;  pt currently on Lovenox and Xarelto - ok per Dr. Doty to hold Plavix 2 days prior and Lovenox 24hr prior to case/see scanned media 7/9/ for documentation of hold -- SM        ESOPHAGOGASTRODUODENOSCOPY N/A 03/28/2022    Procedure: EGD (ESOPHAGOGASTRODUODENOSCOPY);  Surgeon: Armand Foy MD;  Location: [facility] (2ND FLR);  Service: Endoscopy;  Laterality: N/A;  okay to hold Xarelto for 2 days per Dr. Doty with lovenox bridgings - see note on 3/21/22  2nd floor for ASAP availabilty  fully vaccinated - sm    EXTRACORPOREAL SHOCK WAVE LITHOTRIPSY Left 02/09/2021    Procedure: LITHOTRIPSY-EXTRACORPOREAL SHOCK WAVE;  Surgeon: Jeremias Eric MD;  Location: [facility];  Service: Urology;  Laterality: Left;    HERNIA REPAIR      umbilical    KIDNEY STONE SURGERY      ureteral stent    left knee surgery      SHOULDER ARTHROSCOPY Left 06/11/2019    Procedure: ARTHROSCOPY, SHOULDER;  Surgeon: Sedrick Patel MD;  Location: [facility];  Service: Orthopedics;  Laterality: Left;  BLOCK    TONSILLECTOMY      TOTAL SHOULDER ARTHROPLASTY Right        Review of patient's allergies indicates:   Allergen Reactions    Sulfa (sulfonamide antibiotics) Rash       No current facility-administered medications on file prior to encounter.     Current Outpatient Medications on File Prior to Encounter   Medication Sig    ferrous sulfate (FEOSOL) 325 mg (65 mg iron) Tab tablet Take 1 tablet (325 mg total) by mouth daily with breakfast.    omeprazole (PRILOSEC) 20 MG capsule Take 1 capsule (20 mg total) by mouth 2 (two) times daily before meals. (Patient  taking differently: Take 20 mg by mouth once daily.)    oxybutynin (DITROPAN-XL) 10 MG 24 hr tablet TAKE 1 TABLET BY MOUTH EVERY DAY (Patient taking differently: Take 10 mg by mouth once daily.)    pregabalin (LYRICA) 150 MG capsule Take 150 mg by mouth 2 (two) times daily.    tiZANidine (ZANAFLEX) 4 MG tablet Take 4 mg by mouth nightly as needed (Muscle Spasms).    traMADol (ULTRAM) 50 mg tablet Take 50 mg by mouth 2 (two) times daily as needed for Pain.    XARELTO 20 mg Tab Take 1 tablet (20 mg total) by mouth every evening.    enoxaparin (LOVENOX) 60 mg/0.6 mL Syrg Inject 0.6 mLs (60 mg total) into the skin 2 (two) times daily. (Patient not taking: Reported on 12/8/2024)    HYDROcodone-acetaminophen (NORCO) 5-325 mg per tablet Take 1 tablet by mouth every 6 (six) hours as needed for Pain (refractory to over the counter medication). Note this medication contains tylenol (acetaminophen) do not exceed >3000mg total tylenol in 24 hour period. (Patient not taking: Reported on 12/8/2024)    [DISCONTINUED] atorvastatin (LIPITOR) 80 MG tablet TAKE 1 TABLET BY MOUTH EVERY DAY    [DISCONTINUED] cholecalciferol, vitamin D3, 1,250 mcg (50,000 unit) capsule Take 1 capsule (50,000 Units total) by mouth once a week. (Patient not taking: Reported on 11/20/2024)    [DISCONTINUED] diclofenac sodium (VOLTAREN) 1 % Gel Apply 2 g topically 4 (four) times daily as needed (MSK CP of midsternum). (Patient not taking: Reported on 12/8/2024)    [DISCONTINUED] triamcinolone acetonide 0.5% (KENALOG) 0.5 % Crea Apply topically 2 (two) times daily. (Patient not taking: Reported on 11/18/2024)     Family History       Problem Relation (Age of Onset)    Arthritis Sister    Breast cancer Maternal Aunt (50), Maternal Cousin (45)    Cervical cancer Maternal Aunt    Colon cancer Mother (74)    Diabetes Sister    Liver cancer Sister, Maternal Grandfather    Lupus Sister, Daughter    No Known Problems Father, Daughter    Ovarian cancer Mother           Tobacco Use    Smoking status: Never    Smokeless tobacco: Never   Substance and Sexual Activity    Alcohol use: No    Drug use: No    Sexual activity: Not Currently     Partners: Male     Review of Systems   Cardiovascular:  Positive for chest pain.   Musculoskeletal:  Positive for back pain.   All other systems reviewed and are negative.    Objective:     Vital Signs (Most Recent):  Temp: 97.7 °F (36.5 °C) (12/08/24 0706)  Pulse: 67 (12/08/24 1102)  Resp: 18 (12/08/24 0706)  BP: 114/66 (12/08/24 1102)  SpO2: 96 % (12/08/24 1102) Vital Signs (24h Range):  Temp:  [97.7 °F (36.5 °C)] 97.7 °F (36.5 °C)  Pulse:  [60-72] 67  Resp:  [18] 18  SpO2:  [96 %-97 %] 96 %  BP: (102-127)/(58-67) 114/66     Weight: 59 kg (130 lb)  Body mass index is 24.56 kg/m².     Physical Exam  Vitals and nursing note reviewed.   Constitutional:       Appearance: She is well-developed. She is not ill-appearing or toxic-appearing.   HENT:      Head: Normocephalic and atraumatic.      Right Ear: External ear normal.      Left Ear: External ear normal.      Nose: Nose normal.   Eyes:      Extraocular Movements: EOM normal.      Conjunctiva/sclera: Conjunctivae normal.      Pupils: Pupils are equal, round, and reactive to light.   Cardiovascular:      Rate and Rhythm: Normal rate and regular rhythm.      Heart sounds: Normal heart sounds.   Pulmonary:      Effort: Pulmonary effort is normal.      Breath sounds: Normal breath sounds.   Abdominal:      General: Bowel sounds are normal.      Palpations: Abdomen is soft.   Musculoskeletal:         General: Tenderness present. Normal range of motion.      Cervical back: Normal range of motion and neck supple.   Skin:     General: Skin is dry.      Capillary Refill: Capillary refill takes 2 to 3 seconds.   Neurological:      Mental Status: She is alert and oriented to person, place, and time.   Psychiatric:         Behavior: Behavior normal.         Thought Content: Thought content normal.          Judgment: Judgment normal.              CRANIAL NERVES     CN III, IV, VI   Pupils are equal, round, and reactive to light.  Extraocular motions are normal.        Significant Labs: All pertinent labs within the past 24 hours have been reviewed.  CBC:   Recent Labs   Lab 12/08/24  0811   WBC 5.20   HGB 12.3   HCT 38.9        CMP:   Recent Labs   Lab 12/08/24  0811      K 4.5      CO2 22*   GLU 87   BUN 11   CREATININE 0.9   CALCIUM 10.4   PROT 7.9   ALBUMIN 4.4   BILITOT 0.6   ALKPHOS 56   AST 15   ALT 14   ANIONGAP 12     Cardiac Markers:   Recent Labs   Lab 12/08/24  0811   BNP 20       Significant Imaging: I have reviewed all pertinent imaging results/findings within the past 24 hours.  Imaging Results              CT Abdomen Pelvis With IV Contrast NO Oral Contrast (Edited Result - FINAL)  Result time 12/08/24 11:08:28      Addendum (preliminary) 1 of 1 by William Hopkins MD (12/08/24 11:08:28)      Typographical error.  Prostate was mentioned on this female patient which is in error.  The uterus is normal in size without evidence of any focal abnormality.  No adnexal mass identified.  No change to the impression of the report.      Electronically signed by: William Hopkins  Date:    12/08/2024  Time:    11:08                 Final result by William Hopkins MD (12/08/24 10:32:08)                   Impression:      1. No acute findings within the abdomen or pelvis.  2. Nonobstructing left-sided nephrolithiasis redemonstrated.  3. Mild diverticulosis coli.      Electronically signed by: William Hopkins  Date:    12/08/2024  Time:    10:32               Narrative:    EXAMINATION:  CT ABDOMEN PELVIS WITH IV CONTRAST    CLINICAL HISTORY:  Epigastric pain;    TECHNIQUE:  Low dose axial images, sagittal and coronal reformations were obtained from the lung bases to the pubic symphysis following the IV administration of 75 mL of Omnipaque 350 and  ml of barium sulfate oral contrast    COMPARISON:  Concurrently  performed CTA chest 12/08/2024.  CT of the abdomen/pelvis 05/19/2024 (report only).  CT of the abdomen/pelvis 11/15/2022..    FINDINGS:  Lower thorax: Visualized portions of the heart are within normal limits.    Lung bases:   No consolidation or pleural effusion.    Liver: Liver is normal in size and contour.  Probable small focus of focal fatty infiltration near the fissure anteriorly within the left hepatic lobe.  No enhancing lesions.    Gallbladder: No calcified gallstones. No pericholecystic inflammatory change.    Bile Ducts: No evidence of intra or extra hepatic biliary ductal dilation.    Pancreas: No discrete mass or peripancreatic fat stranding.    Stomach: No significant CT abnormality.    Spleen: Normal in size and attenuation.  No focal lesion.    Adrenals: Adrenal glands appear within normal limits.    Kidneys: Kidneys are normal in size and enhance appropriately. No enhancing mass or hydronephrosis. Two nonobstructing left-sided stones redemonstrated measuring 5 mm and 4 mm respectively.    Bladder: No abnormal bladder wall thickening.    Reproductive: Prostate is mildly enlarged measuring 4.8 cm.    Bowel/Mesentery: There are a few scattered colonic diverticula without evidence of diverticulitis.  Moderate stool within the colon.  No evidence of bowel obstruction or inflammation.  No ascites or pneumoperitoneum.  No evidence of appendicitis.    Lymph nodes: No pathologically enlarged lymph nodes.    Vascular: Scattered atherosclerotic changes.  No abdominal aortic aneurysm.    Abdominal Wall/Soft Tissues: No significant abnormalities.    Bones: No fractures or bony destructive changes.  Severe disc height loss at L5-S1 redemonstrated as well as grade 1 retrolisthesis of L5 on S1.  Incidental T10 vertebral body hemangioma.                                       CTA Chest Non-Coronary (PE Studies) (Final result)  Result time 12/08/24 10:17:30      Final result by William Hopkins MD (12/08/24 10:17:30)                    Impression:      1. No evidence of a pulmonary embolism.  2. Lungs are overall clear again demonstrating a stable incidental 4 mm right upper lobe pulmonary nodule.      Electronically signed by: William Hopkins  Date:    12/08/2024  Time:    10:17               Narrative:    EXAMINATION:  CTA CHEST NON CORONARY (PE STUDIES)    CLINICAL HISTORY:  Pulmonary embolism (PE) suspected, high prob;    TECHNIQUE:  Low dose axial images, sagittal and coronal reformations were obtained from the thoracic inlet to the lung bases following the IV administration of 75 mL of Omnipaque 350.  Contrast timing was optimized to evaluate the pulmonary arteries.  MIP images were performed.    COMPARISON:  CTA chest 08/01/2022.    FINDINGS:  There is prominent metallic artifact from the patient's right shoulder arthroplasty.    Pulmonary Arteries: This study is adequate for the evaluation of pulmonary thromboembolism.  No evidence of pulmonary embolism to the level of the subsegmental arteries bilaterally.    Soft tissues of the neck:  Visualized portion of the thyroid is normal in appearance.    Thoracic aorta:  Normal in caliber and contour.  No evidence of dissection.    Heart: No cardiomegaly.  No pericardial effusion.    Lymph nodes:  No evidence of mediastinal, hilar, or axillary lymphadenopathy.    Trachea and bronchi: Patent.    Lungs:  Lungs are clear.  No focal consolidation.  No pleural effusion.  No pneumothorax.  Stable 4 mm pulmonary nodule within the right upper lobe.    Limited evaluation of the upper abdomen:  No significant abnormality.    Osseous structures:  No evidence of fractures or suspicious osseous lesions.    Other: Please see separately dictated CT of the abdomen/pelvis for additional findings including nonobstructing left-sided nephrolithiasis.                                       X-Ray Chest PA And Lateral (Final result)  Result time 12/08/24 08:24:50      Final result by William Hopkins MD (12/08/24  08:24:50)                   Impression:      No acute abnormality.      Electronically signed by: William Hopkins  Date:    12/08/2024  Time:    08:24               Narrative:    EXAMINATION:  XR CHEST PA AND LATERAL    CLINICAL HISTORY:  Chest Pain;    TECHNIQUE:  PA and lateral views of the chest were performed.    COMPARISON:  Chest x-ray 01/04/2024.    FINDINGS:  Cardiac silhouette is within normal limits.  Aortic knob calcifications are noted.  Lungs are symmetrically expanded and clear.  No focal consolidation, pneumothorax, or pleural effusion.    Partially imaged right shoulder arthroplasty changes.  Degenerative changes of the spine.  Broad dextrocurvature.  No acute bony process identified.                                      Assessment/Plan:     * Atypical chest pain    Pain to palpation over xiphoid area of sternum, as well as left lower back ribs  Suspect costochondritis   Less likely cardiac in origin   Troponin negative, EKG normal, labs unremarkable   May benefit from stress test and echocardiogram   Chronically anticoagulated with Xarelto, avoid NSAIDs  Starting Tylenol 1 g q.8 hours scheduled   Lidocaine patches   Aspercreme application    Chronic pulmonary embolism without acute cor pulmonale  Known chronic problem   Continue home Xarelto      Ineffective esophageal motility    Known chronic problem      VTE Risk Mitigation (From admission, onward)           Ordered     Reason for No Pharmacological VTE Prophylaxis  Once        Question:  Reasons:  Answer:  Already adequately anticoagulated on oral Anticoagulants    12/08/24 1153     IP VTE HIGH RISK PATIENT  Once         12/08/24 1153     Place sequential compression device  Until discontinued         12/08/24 1153                       On 12/08/2024, patient should be placed in hospital observation services under my care.             Chris Waldron DO  Department of Hospital Medicine  [facility]

## 2024-12-08 NOTE — ED NOTES
Patient ambulated back to room from restroom without difficulty. Gait is steady. Primary nurse, MAUREEN Torres is at the bedside to give patient and daughter an update. No other needs or questions at this time.

## 2024-12-09 ENCOUNTER — CLINICAL SUPPORT (OUTPATIENT)
Dept: CARDIOLOGY | Facility: HOSPITAL | Age: 74
End: 2024-12-09
Attending: STUDENT IN AN ORGANIZED HEALTH CARE EDUCATION/TRAINING PROGRAM
Payer: MEDICARE

## 2024-12-09 VITALS
RESPIRATION RATE: 18 BRPM | WEIGHT: 130 LBS | BODY MASS INDEX: 24.55 KG/M2 | OXYGEN SATURATION: 96 % | SYSTOLIC BLOOD PRESSURE: 103 MMHG | HEART RATE: 62 BPM | DIASTOLIC BLOOD PRESSURE: 56 MMHG | TEMPERATURE: 98 F | HEIGHT: 61 IN

## 2024-12-09 LAB
ALBUMIN SERPL BCP-MCNC: 4 G/DL (ref 3.5–5.2)
ALP SERPL-CCNC: 52 U/L (ref 40–150)
ALT SERPL W/O P-5'-P-CCNC: 14 U/L (ref 10–44)
ANION GAP SERPL CALC-SCNC: 11 MMOL/L (ref 8–16)
AORTIC ROOT ANNULUS: 2.74 CM
AORTIC VALVE CUSP SEPERATION: 1.79 CM
APICAL FOUR CHAMBER EJECTION FRACTION: 54 %
APICAL TWO CHAMBER EJECTION FRACTION: 57 %
ASCENDING AORTA: 2.4 CM
AST SERPL-CCNC: 14 U/L (ref 10–40)
AV INDEX (PROSTH): 0.78
AV MEAN GRADIENT: 3.2 MMHG
AV PEAK GRADIENT: 5.8 MMHG
AV VALVE AREA BY VELOCITY RATIO: 2.4 CM²
AV VALVE AREA: 2.4 CM²
AV VELOCITY RATIO: 0.75
BASOPHILS # BLD AUTO: 0.02 K/UL (ref 0–0.2)
BASOPHILS NFR BLD: 0.4 % (ref 0–1.9)
BILIRUB SERPL-MCNC: 0.6 MG/DL (ref 0.1–1)
BSA FOR ECHO PROCEDURE: 1.59 M2
BUN SERPL-MCNC: 13 MG/DL (ref 8–23)
CALCIUM SERPL-MCNC: 9.8 MG/DL (ref 8.7–10.5)
CHLORIDE SERPL-SCNC: 108 MMOL/L (ref 95–110)
CO2 SERPL-SCNC: 23 MMOL/L (ref 23–29)
CREAT SERPL-MCNC: 0.9 MG/DL (ref 0.5–1.4)
CV ECHO LV RWT: 0.38 CM
CV PHARM DOSE: 0.4 MG
CV STRESS BASE HR: 65 BPM
DIASTOLIC BLOOD PRESSURE: 67 MMHG
DIFFERENTIAL METHOD BLD: ABNORMAL
DOP CALC AO PEAK VEL: 1.2 M/S
DOP CALC AO VTI: 25.1 CM
DOP CALC LVOT AREA: 3.1 CM2
DOP CALC LVOT DIAMETER: 2 CM
DOP CALC LVOT PEAK VEL: 0.9 M/S
DOP CALC LVOT STROKE VOLUME: 61.2 CM3
DOP CALC MV VTI: 21.3 CM
DOP CALCLVOT PEAK VEL VTI: 19.5 CM
E WAVE DECELERATION TIME: 276.54 MSEC
E/A RATIO: 1
E/E' RATIO: 6.8 M/S
ECHO LV POSTERIOR WALL: 0.7 CM (ref 0.6–1.1)
EJECTION FRACTION: 55 %
EOSINOPHIL # BLD AUTO: 0.1 K/UL (ref 0–0.5)
EOSINOPHIL NFR BLD: 1.4 % (ref 0–8)
ERYTHROCYTE [DISTWIDTH] IN BLOOD BY AUTOMATED COUNT: 16 % (ref 11.5–14.5)
EST. GFR  (NO RACE VARIABLE): >60 ML/MIN/1.73 M^2
FRACTIONAL SHORTENING: 43.2 % (ref 28–44)
GLUCOSE SERPL-MCNC: 82 MG/DL (ref 70–110)
HCT VFR BLD AUTO: 38.1 % (ref 37–48.5)
HGB BLD-MCNC: 12.2 G/DL (ref 12–16)
IMM GRANULOCYTES # BLD AUTO: 0.02 K/UL (ref 0–0.04)
IMM GRANULOCYTES NFR BLD AUTO: 0.4 % (ref 0–0.5)
INTERVENTRICULAR SEPTUM: 0.9 CM (ref 0.6–1.1)
LA MAJOR: 3.83 CM
LA MINOR: 4.35 CM
LEFT ATRIUM AREA SYSTOLIC (APICAL 2 CHAMBER): 14.42 CM2
LEFT ATRIUM AREA SYSTOLIC (APICAL 4 CHAMBER): 11.24 CM2
LEFT ATRIUM SIZE: 2.72 CM
LEFT ATRIUM VOLUME INDEX MOD: 21 ML/M2
LEFT ATRIUM VOLUME MOD: 32.97 ML
LEFT INTERNAL DIMENSION IN SYSTOLE: 2.1 CM (ref 2.1–4)
LEFT VENTRICLE DIASTOLIC VOLUME INDEX: 37.5 ML/M2
LEFT VENTRICLE DIASTOLIC VOLUME: 58.88 ML
LEFT VENTRICLE END DIASTOLIC VOLUME APICAL 2 CHAMBER: 38.06 ML
LEFT VENTRICLE END DIASTOLIC VOLUME APICAL 4 CHAMBER: 41.57 ML
LEFT VENTRICLE END SYSTOLIC VOLUME APICAL 2 CHAMBER: 34.91 ML
LEFT VENTRICLE END SYSTOLIC VOLUME APICAL 4 CHAMBER: 26.11 ML
LEFT VENTRICLE MASS INDEX: 52.4 G/M2
LEFT VENTRICLE SYSTOLIC VOLUME INDEX: 9 ML/M2
LEFT VENTRICLE SYSTOLIC VOLUME: 14.11 ML
LEFT VENTRICULAR INTERNAL DIMENSION IN DIASTOLE: 3.7 CM (ref 3.5–6)
LEFT VENTRICULAR MASS: 82.3 G
LV LATERAL E/E' RATIO: 6.18 M/S
LV SEPTAL E/E' RATIO: 7.56 M/S
LVED V (TEICH): 58.88 ML
LVES V (TEICH): 14.11 ML
LVOT MG: 1.69 MMHG
LVOT MV: 0.59 CM/S
LYMPHOCYTES # BLD AUTO: 2.5 K/UL (ref 1–4.8)
LYMPHOCYTES NFR BLD: 48.7 % (ref 18–48)
MAGNESIUM SERPL-MCNC: 2.2 MG/DL (ref 1.6–2.6)
MCH RBC QN AUTO: 27.4 PG (ref 27–31)
MCHC RBC AUTO-ENTMCNC: 32 G/DL (ref 32–36)
MCV RBC AUTO: 86 FL (ref 82–98)
MONOCYTES # BLD AUTO: 0.5 K/UL (ref 0.3–1)
MONOCYTES NFR BLD: 8.9 % (ref 4–15)
MV MEAN GRADIENT: 1 MMHG
MV PEAK A VEL: 0.68 M/S
MV PEAK E VEL: 0.68 M/S
MV PEAK GRADIENT: 3 MMHG
MV STENOSIS PRESSURE HALF TIME: 72.75 MS
MV VALVE AREA BY CONTINUITY EQUATION: 2.87 CM2
MV VALVE AREA P 1/2 METHOD: 3.02 CM2
NEUTROPHILS # BLD AUTO: 2.1 K/UL (ref 1.8–7.7)
NEUTROPHILS NFR BLD: 40.2 % (ref 38–73)
NRBC BLD-RTO: 0 /100 WBC
OHS CV CPX 1 MINUTE RECOVERY HEART RATE: 108 BPM
OHS CV CPX 85 PERCENT MAX PREDICTED HEART RATE MALE: 123
OHS CV CPX MAX PREDICTED HEART RATE: 145
OHS CV CPX PATIENT IS FEMALE: 1
OHS CV CPX PATIENT IS MALE: 0
OHS CV CPX PEAK DIASTOLIC BLOOD PRESSURE: 79 MMHG
OHS CV CPX PEAK HEAR RATE: 120 BPM
OHS CV CPX PEAK RATE PRESSURE PRODUCT: NORMAL
OHS CV CPX PEAK SYSTOLIC BLOOD PRESSURE: 124 MMHG
OHS CV CPX PERCENT MAX PREDICTED HEART RATE ACHIEVED: 86
OHS CV CPX RATE PRESSURE PRODUCT PRESENTING: 6955
OHS LV EJECTION FRACTION SIMPSONS BIPLANE MOD: 55 %
PISA MRMAX VEL: 4.96 M/S
PISA TR MAX VEL: 2.15 M/S
PLATELET # BLD AUTO: 312 K/UL (ref 150–450)
PMV BLD AUTO: 10 FL (ref 9.2–12.9)
POTASSIUM SERPL-SCNC: 4.2 MMOL/L (ref 3.5–5.1)
PROT SERPL-MCNC: 7.3 G/DL (ref 6–8.4)
PV MV: 0.7 M/S
PV PEAK GRADIENT: 4 MMHG
PV PEAK VELOCITY: 0.94 M/S
RA MAJOR: 4 CM
RA PRESSURE ESTIMATED: 3 MMHG
RA VOL SYS: 22.98 ML
RBC # BLD AUTO: 4.45 M/UL (ref 4–5.4)
RIGHT ATRIAL AREA: 10.2 CM2
RIGHT ATRIUM VOLUME AREA LENGTH APICAL 4 CHAMBER: 21.4 ML
RV TB RVSP: 5 MMHG
RV TISSUE DOPPLER FREE WALL SYSTOLIC VELOCITY 1 (APICAL 4 CHAMBER VIEW): 12.65 CM/S
SODIUM SERPL-SCNC: 142 MMOL/L (ref 136–145)
STJ: 2.58 CM
SYSTOLIC BLOOD PRESSURE: 107 MMHG
TDI LATERAL: 0.11 M/S
TDI SEPTAL: 0.09 M/S
TDI: 0.1 M/S
TR MAX PG: 18 MMHG
TRICUSPID ANNULAR PLANE SYSTOLIC EXCURSION: 2.27 CM
TV REST PULMONARY ARTERY PRESSURE: 21 MMHG
WBC # BLD AUTO: 5.15 K/UL (ref 3.9–12.7)
Z-SCORE OF LEFT VENTRICULAR DIMENSION IN END DIASTOLE: -1.98
Z-SCORE OF LEFT VENTRICULAR DIMENSION IN END SYSTOLE: -2.27

## 2024-12-09 PROCEDURE — A9502 TC99M TETROFOSMIN: HCPCS | Performed by: STUDENT IN AN ORGANIZED HEALTH CARE EDUCATION/TRAINING PROGRAM

## 2024-12-09 PROCEDURE — 80053 COMPREHEN METABOLIC PANEL: CPT | Performed by: STUDENT IN AN ORGANIZED HEALTH CARE EDUCATION/TRAINING PROGRAM

## 2024-12-09 PROCEDURE — 93018 CV STRESS TEST I&R ONLY: CPT | Mod: ,,, | Performed by: GENERAL PRACTICE

## 2024-12-09 PROCEDURE — 83735 ASSAY OF MAGNESIUM: CPT | Performed by: STUDENT IN AN ORGANIZED HEALTH CARE EDUCATION/TRAINING PROGRAM

## 2024-12-09 PROCEDURE — 93017 CV STRESS TEST TRACING ONLY: CPT

## 2024-12-09 PROCEDURE — G0378 HOSPITAL OBSERVATION PER HR: HCPCS

## 2024-12-09 PROCEDURE — 93016 CV STRESS TEST SUPVJ ONLY: CPT | Mod: ,,, | Performed by: GENERAL PRACTICE

## 2024-12-09 PROCEDURE — 94761 N-INVAS EAR/PLS OXIMETRY MLT: CPT

## 2024-12-09 PROCEDURE — 25000003 PHARM REV CODE 250: Performed by: STUDENT IN AN ORGANIZED HEALTH CARE EDUCATION/TRAINING PROGRAM

## 2024-12-09 PROCEDURE — 63600175 PHARM REV CODE 636 W HCPCS: Performed by: STUDENT IN AN ORGANIZED HEALTH CARE EDUCATION/TRAINING PROGRAM

## 2024-12-09 PROCEDURE — 85025 COMPLETE CBC W/AUTO DIFF WBC: CPT | Performed by: STUDENT IN AN ORGANIZED HEALTH CARE EDUCATION/TRAINING PROGRAM

## 2024-12-09 PROCEDURE — 36415 COLL VENOUS BLD VENIPUNCTURE: CPT | Performed by: STUDENT IN AN ORGANIZED HEALTH CARE EDUCATION/TRAINING PROGRAM

## 2024-12-09 RX ORDER — AMINOPHYLLINE 25 MG/ML
50 INJECTION, SOLUTION INTRAVENOUS
Status: COMPLETED | OUTPATIENT
Start: 2024-12-09 | End: 2024-12-09

## 2024-12-09 RX ORDER — REGADENOSON 0.08 MG/ML
0.4 INJECTION, SOLUTION INTRAVENOUS
Status: COMPLETED | OUTPATIENT
Start: 2024-12-09 | End: 2024-12-09

## 2024-12-09 RX ADMIN — REGADENOSON 0.4 MG: 0.08 INJECTION, SOLUTION INTRAVENOUS at 11:12

## 2024-12-09 RX ADMIN — TETROFOSMIN 10.1 MILLICURIE: 0.23 INJECTION, POWDER, LYOPHILIZED, FOR SOLUTION INTRAVENOUS at 10:12

## 2024-12-09 RX ADMIN — ACETAMINOPHEN 1000 MG: 500 TABLET ORAL at 05:12

## 2024-12-09 RX ADMIN — AMINOPHYLLINE 50 MG: 25 INJECTION, SOLUTION INTRAVENOUS at 11:12

## 2024-12-09 RX ADMIN — TETROFOSMIN 24.5 MILLICURIE: 0.23 INJECTION, POWDER, LYOPHILIZED, FOR SOLUTION INTRAVENOUS at 11:12

## 2024-12-09 RX ADMIN — FERROUS SULFATE TAB 325 MG (65 MG ELEMENTAL FE) 1 EACH: 325 (65 FE) TAB at 09:12

## 2024-12-09 RX ADMIN — PREGABALIN 150 MG: 75 CAPSULE ORAL at 09:12

## 2024-12-09 NOTE — PLAN OF CARE
Problem: Adult Inpatient Plan of Care  Goal: Plan of Care Review  Outcome: Progressing     Problem: Adult Inpatient Plan of Care  Goal: Optimal Comfort and Wellbeing  Outcome: Progressing     Problem: Chest Pain  Goal: Resolution of Chest Pain Symptoms  Outcome: Progressing     Problem: Adult Inpatient Plan of Care  Goal: Readiness for Transition of Care      Outcome: Progressing     Problem: Fall Injury Risk  Goal: Absence of Fall and Fall-Related Injury  Outcome: Progressing

## 2024-12-09 NOTE — HOSPITAL COURSE
Patient's pain was controlled with Tylenol.  Labs unremarkable, troponin negative, echocardiogram normal, nuclear medicine stress test normal.  Patient has remained hemodynamically stable, and is okay for discharge at this time.  She was counseled on using Tylenol to control her costochondritis pain, and to avoid NSAIDs secondary to her blood thinner use.  Patient will follow up outpatient with the primary care physician within 1 week.      Physical Exam:  General- Patient alert and oriented x3 in NAD  HEENT- PERRLA, EOMI, OP clear, MMM  Neck- No JVD, Lymphadenopathy, Thyromegaly  CV- Regular rate and rhythm, No Murmur/lesly/rubs  Resp- Lungs CTA Bilaterally, No increased WOB  GI- Non tender/non-distended, BS normoactive x4 quads, no HSM  Extrem- No cyanosis, clubbing, edema.   Skin-  No masses, rashes or lesions noted on cursory skin exam.

## 2024-12-09 NOTE — CARE UPDATE
12/09/24 1445   Patient Assessment/Suction   Level of Consciousness (AVPU) alert   Respiratory Effort Normal;Unlabored   Expansion/Accessory Muscles/Retractions no use of accessory muscles;no retractions;expansion symmetric   Rhythm/Pattern, Respiratory unlabored;pattern regular;depth regular;no shortness of breath reported   PRE-TX-O2   Device (Oxygen Therapy) room air   SpO2 98 %   Pulse Oximetry Type Intermittent   $ Pulse Oximetry - Multiple Charge Pulse Oximetry - Multiple   Pulse 74   Resp 18

## 2024-12-09 NOTE — PROGRESS NOTES
Patient discharged home, discharge teaching provided to patient and daughter. No complaints or concerns voiced.

## 2024-12-09 NOTE — DISCHARGE SUMMARY
[facility] Medicine  Discharge Summary      Patient Name: Jasson Pineda  MRN: [id number]  RENNY: [id number]  Patient Class: OP- Observation  Admission Date: 12/8/2024  Hospital Length of Stay: 0 days  Discharge Date and Time:  12/09/2024 3:11 PM  Attending Physician: Chris Waldron DO   Discharging Provider: Chris Waldron DO  Primary Care Provider: Osbaldo Hall MD    Primary Care Team: Networked reference to record PCT     HPI:   Ms. Pineda is a 74-year-old female with a past medical history significant for hyperlipidemia, pulmonary emboli, esophageal dysmotility with dilatation, and kidney stones who presents for a 2 day history of atypical chest pain and shortness of breath.  History provided to ED physician by patient's daughter who stated patient has had decreased oral intake and decreased activity over the last 2 days as well.  At the time of my interview, patient was alone in the room but was able to adequately answer questions.  She has pain over her sternum, particularly with palpation over a nodule located at the lower aspect of the sternum.  She also has pain over her rear lower left ribs.  The pain is more intense when lying on her back to the point of shortness of breath from the pain that makes her roll onto her side to alleviate.  Seems to be more likely costochondritis than cardiac in origin.  Labs unremarkable, troponin x2 normal.  EKG normal.  CTA chest and CT abdomen/pelvis unremarkable.  4 mm pulmonary nodule noted.  Patient is on chronic anticoagulation with Xarelto, which makes treating costochondritis difficult secondary to increased bleeding risk with NSAIDs.  We will admit for observation, possible stress test and echocardiogram, as well as pain control with high-dose Tylenol, lidocaine patches.    * No surgery found *      Hospital Course:   Patient's pain was controlled with Tylenol.  Labs unremarkable, troponin negative, echocardiogram  normal, nuclear medicine stress test normal.  Patient has remained hemodynamically stable, and is okay for discharge at this time.  She was counseled on using Tylenol to control her costochondritis pain, and to avoid NSAIDs secondary to her blood thinner use.  Patient will follow up outpatient with the primary care physician within 1 week.      Physical Exam:  General- Patient alert and oriented x3 in NAD  HEENT- PERRLA, EOMI, OP clear, MMM  Neck- No JVD, Lymphadenopathy, Thyromegaly  CV- Regular rate and rhythm, No Murmur/lesly/rubs  Resp- Lungs CTA Bilaterally, No increased WOB  GI- Non tender/non-distended, BS normoactive x4 quads, no HSM  Extrem- No cyanosis, clubbing, edema.   Skin-  No masses, rashes or lesions noted on cursory skin exam.       Goals of Care Treatment Preferences:  Code Status: Full Code      SDOH Screening:  The patient was screened for utility difficulties, food insecurity, transport difficulties, housing insecurity, and interpersonal safety and there were no concerns identified this admission.     Consults:     * Atypical chest pain    Pain to palpation over xiphoid area of sternum, as well as left lower back ribs  Suspect costochondritis   Less likely cardiac in origin   Troponin negative, EKG normal, labs unremarkable   May benefit from stress test and echocardiogram   Chronically anticoagulated with Xarelto, avoid NSAIDs  Starting Tylenol 1 g q.8 hours scheduled   Lidocaine patches   Aspercreme application    Chronic pulmonary embolism without acute cor pulmonale  Known chronic problem   Continue home Xarelto      Ineffective esophageal motility    Known chronic problem      Final Active Diagnoses:    Diagnosis Date Noted POA    PRINCIPAL PROBLEM:  Atypical chest pain [R07.89] 04/10/2018 Yes    Ineffective esophageal motility [K22.4] 06/22/2018 Yes    Chronic pulmonary embolism without acute cor pulmonale [I27.82] 01/10/2017 Yes      Problems Resolved During this Admission:        Discharged Condition: good    Disposition: Home or Self Care    Follow Up:   Follow-up Information       Osbaldo Hall MD Follow up in 1 week(s).    Specialty: Internal Medicine  Contact information:  [address]  [address]  [facility] [address]  [phone]                           Patient Instructions:      Ambulatory referral/consult to Outpatient Case Management   Referral Priority: Routine Referral Type: Consultation   Referral Reason: Specialty Services Required   Number of Visits Requested: 1     Notify your health care provider if you experience any of the following:  increased confusion or weakness     Notify your health care provider if you experience any of the following:  persistent dizziness, light-headedness, or visual disturbances     Notify your health care provider if you experience any of the following:  worsening rash     Notify your health care provider if you experience any of the following:  severe persistent headache     Notify your health care provider if you experience any of the following:  difficulty breathing or increased cough     Notify your health care provider if you experience any of the following:  redness, tenderness, or signs of infection (pain, swelling, redness, odor or green/yellow discharge around incision site)     Notify your health care provider if you experience any of the following:  severe uncontrolled pain     Notify your health care provider if you experience any of the following:  persistent nausea and vomiting or diarrhea     Notify your health care provider if you experience any of the following:  temperature >100.4     Activity as tolerated       Significant Diagnostic Studies: Labs: CMP   Recent Labs   Lab 12/08/24  0811 12/09/24  0352    142   K 4.5 4.2    108   CO2 22* 23   GLU 87 82   BUN 11 13   CREATININE 0.9 0.9   CALCIUM 10.4 9.8   PROT 7.9 7.3   ALBUMIN 4.4 4.0   BILITOT 0.6 0.6   ALKPHOS 56 52   AST 15 14   ALT 14 14    ANIONGAP 12 11   , CBC   Recent Labs   Lab 12/08/24  0811 12/09/24  0352   WBC 5.20 5.15   HGB 12.3 12.2   HCT 38.9 38.1    312   , and Troponin   Recent Labs   Lab 12/08/24  0811 12/08/24  1019   TROPONINI <0.006 <0.006       Pending Diagnostic Studies:       None           Imaging Results              CT Abdomen Pelvis With IV Contrast NO Oral Contrast (Edited Result - FINAL)  Result time 12/08/24 11:08:28      Addendum (preliminary) 1 of 1 by William Hopkins MD (12/08/24 11:08:28)      Typographical error.  Prostate was mentioned on this female patient which is in error.  The uterus is normal in size without evidence of any focal abnormality.  No adnexal mass identified.  No change to the impression of the report.      Electronically signed by: William Hopkins  Date:    12/08/2024  Time:    11:08                 Final result by William Hopkins MD (12/08/24 10:32:08)                   Impression:      1. No acute findings within the abdomen or pelvis.  2. Nonobstructing left-sided nephrolithiasis redemonstrated.  3. Mild diverticulosis coli.      Electronically signed by: William Hopkins  Date:    12/08/2024  Time:    10:32               Narrative:    EXAMINATION:  CT ABDOMEN PELVIS WITH IV CONTRAST    CLINICAL HISTORY:  Epigastric pain;    TECHNIQUE:  Low dose axial images, sagittal and coronal reformations were obtained from the lung bases to the pubic symphysis following the IV administration of 75 mL of Omnipaque 350 and  ml of barium sulfate oral contrast    COMPARISON:  Concurrently performed CTA chest 12/08/2024.  CT of the abdomen/pelvis 05/19/2024 (report only).  CT of the abdomen/pelvis 11/15/2022..    FINDINGS:  Lower thorax: Visualized portions of the heart are within normal limits.    Lung bases:   No consolidation or pleural effusion.    Liver: Liver is normal in size and contour.  Probable small focus of focal fatty infiltration near the fissure anteriorly within the left hepatic lobe.  No enhancing  lesions.    Gallbladder: No calcified gallstones. No pericholecystic inflammatory change.    Bile Ducts: No evidence of intra or extra hepatic biliary ductal dilation.    Pancreas: No discrete mass or peripancreatic fat stranding.    Stomach: No significant CT abnormality.    Spleen: Normal in size and attenuation.  No focal lesion.    Adrenals: Adrenal glands appear within normal limits.    Kidneys: Kidneys are normal in size and enhance appropriately. No enhancing mass or hydronephrosis. Two nonobstructing left-sided stones redemonstrated measuring 5 mm and 4 mm respectively.    Bladder: No abnormal bladder wall thickening.    Reproductive: Prostate is mildly enlarged measuring 4.8 cm.    Bowel/Mesentery: There are a few scattered colonic diverticula without evidence of diverticulitis.  Moderate stool within the colon.  No evidence of bowel obstruction or inflammation.  No ascites or pneumoperitoneum.  No evidence of appendicitis.    Lymph nodes: No pathologically enlarged lymph nodes.    Vascular: Scattered atherosclerotic changes.  No abdominal aortic aneurysm.    Abdominal Wall/Soft Tissues: No significant abnormalities.    Bones: No fractures or bony destructive changes.  Severe disc height loss at L5-S1 redemonstrated as well as grade 1 retrolisthesis of L5 on S1.  Incidental T10 vertebral body hemangioma.                                       CTA Chest Non-Coronary (PE Studies) (Final result)  Result time 12/08/24 10:17:30      Final result by William Hopkins MD (12/08/24 10:17:30)                   Impression:      1. No evidence of a pulmonary embolism.  2. Lungs are overall clear again demonstrating a stable incidental 4 mm right upper lobe pulmonary nodule.      Electronically signed by: William Hopkins  Date:    12/08/2024  Time:    10:17               Narrative:    EXAMINATION:  CTA CHEST NON CORONARY (PE STUDIES)    CLINICAL HISTORY:  Pulmonary embolism (PE) suspected, high prob;    TECHNIQUE:  Low dose axial  images, sagittal and coronal reformations were obtained from the thoracic inlet to the lung bases following the IV administration of 75 mL of Omnipaque 350.  Contrast timing was optimized to evaluate the pulmonary arteries.  MIP images were performed.    COMPARISON:  CTA chest 08/01/2022.    FINDINGS:  There is prominent metallic artifact from the patient's right shoulder arthroplasty.    Pulmonary Arteries: This study is adequate for the evaluation of pulmonary thromboembolism.  No evidence of pulmonary embolism to the level of the subsegmental arteries bilaterally.    Soft tissues of the neck:  Visualized portion of the thyroid is normal in appearance.    Thoracic aorta:  Normal in caliber and contour.  No evidence of dissection.    Heart: No cardiomegaly.  No pericardial effusion.    Lymph nodes:  No evidence of mediastinal, hilar, or axillary lymphadenopathy.    Trachea and bronchi: Patent.    Lungs:  Lungs are clear.  No focal consolidation.  No pleural effusion.  No pneumothorax.  Stable 4 mm pulmonary nodule within the right upper lobe.    Limited evaluation of the upper abdomen:  No significant abnormality.    Osseous structures:  No evidence of fractures or suspicious osseous lesions.    Other: Please see separately dictated CT of the abdomen/pelvis for additional findings including nonobstructing left-sided nephrolithiasis.                                       X-Ray Chest PA And Lateral (Final result)  Result time 12/08/24 08:24:50      Final result by William Hopkins MD (12/08/24 08:24:50)                   Impression:      No acute abnormality.      Electronically signed by: William Hopkins  Date:    12/08/2024  Time:    08:24               Narrative:    EXAMINATION:  XR CHEST PA AND LATERAL    CLINICAL HISTORY:  Chest Pain;    TECHNIQUE:  PA and lateral views of the chest were performed.    COMPARISON:  Chest x-ray 01/04/2024.    FINDINGS:  Cardiac silhouette is within normal limits.  Aortic knob  calcifications are noted.  Lungs are symmetrically expanded and clear.  No focal consolidation, pneumothorax, or pleural effusion.    Partially imaged right shoulder arthroplasty changes.  Degenerative changes of the spine.  Broad dextrocurvature.  No acute bony process identified.                                    NM Myocardial Perfusion Spect Multi Pharmacologic [[id number]] Resulted: 12/09/24 1333   Order Status: Completed Updated: 12/09/24 1335   Narrative:     EXAMINATION:  NM MYOCARDIAL PERFUSION SPECT MULTI PHARM    CLINICAL HISTORY:  Chest pain (Ped 0-18y); Chest pain, unspecified    TECHNIQUE:  Radiopharmaceutical: 10.1 mCi Technetium 99m Myoview utilized for rest imaging. 24.5 mCi Technetium 99m Myoview utilized for stress imaging.    0.4 mg Lexiscan administered for pharmacologic stress.    COMPARISON:  None    FINDINGS:  Tomographic images reveal uniform radiopharmaceutical distribution throughout the left ventricular myocardium on stress and rest images. No reversible perfusion defect to suggest myocardial ischemia.    Gated SPECT images show normal left ventricular wall motion. Calculated left ventricular ejection fraction is 90 %.   Impression:       1. No scintigraphic evidence of left ventricular myocardial ischemia.    2. Normal left ventricular wall motion.    3. Calculated left ventricular ejection fraction of 90 %.      Electronically signed by: Caden Hopkins  Date: 12/09/2024  Time: 13:33         Transthoracic echocardiogram:       Left Ventricle: The left ventricle is normal in size. Normal wall thickness. There is normal systolic function. Ejection fraction is approximately 55%. There is normal diastolic function.    Right Ventricle: Normal right ventricular cavity size. Wall thickness is normal. Systolic function is normal.    Left Atrium: Left atrium is mildly dilated.    Right Atrium: Right atrium is mildly dilated.    Mitral Valve: There is mild regurgitation with a centrally directed  jet.    Tricuspid Valve: There is mild regurgitation.    IVC/SVC: Normal venous pressure at 3 mmHg.    The estimated pulmonary artery systolic pressure is 21 mmHg.    Medications:  Reconciled Home Medications:      Medication List        CONTINUE taking these medications      ferrous sulfate 325 mg (65 mg iron) Tab tablet  Commonly known as: FEOSOL  Take 1 tablet (325 mg total) by mouth daily with breakfast.     omeprazole 20 MG capsule  Commonly known as: PRILOSEC  Take 1 capsule (20 mg total) by mouth 2 (two) times daily before meals.     oxybutynin 10 MG 24 hr tablet  Commonly known as: DITROPAN-XL  TAKE 1 TABLET BY MOUTH EVERY DAY     pregabalin 150 MG capsule  Commonly known as: LYRICA  Take 150 mg by mouth 2 (two) times daily.     tiZANidine 4 MG tablet  Commonly known as: ZANAFLEX  Take 4 mg by mouth nightly as needed (Muscle Spasms).     traMADoL 50 mg tablet  Commonly known as: ULTRAM  Take 50 mg by mouth 2 (two) times daily as needed for Pain.     XARELTO 20 mg Tab  Generic drug: rivaroxaban  Take 1 tablet (20 mg total) by mouth every evening.            STOP taking these medications      enoxaparin 60 mg/0.6 mL Syrg  Commonly known as: LOVENOX     HYDROcodone-acetaminophen 5-325 mg per tablet  Commonly known as: NORCO              Indwelling Lines/Drains at time of discharge:   Lines/Drains/Airways       None                   Time spent on the discharge of patient: 35 minutes         Chris Waldron DO  Department of Hospital Medicine  [facility], later [facility]/S[facility]

## 2024-12-11 ENCOUNTER — PATIENT OUTREACH (OUTPATIENT)
Dept: ADMINISTRATIVE | Facility: CLINIC | Age: 74
End: 2024-12-11
Payer: MEDICARE

## 2024-12-11 ENCOUNTER — OUTPATIENT CASE MANAGEMENT (OUTPATIENT)
Dept: ADMINISTRATIVE | Facility: OTHER | Age: 74
End: 2024-12-11
Payer: MEDICARE

## 2024-12-11 NOTE — LETTER
December 11, 2024             Dear Jasson,    Welcome to [facility] Complex Care Management Program.  It was a pleasure talking with you today.  My name is Fartun Pichardo, and I look forward to being your Care Manager.  My goal is to help you function at the healthiest and highest level possible.  You can contact me directly at [phone].    As an [facility] patient, some of the services we may be able to provide include:     Development of an individualized care plan with a Registered Nurse   Connection with a   Connection with available resources and services    Coordinate communication among your care team members   Provide coaching and education   Help you understand your doctors treatment plan  Help you obtain information about your insurance coverage.     All services provided by [facility]s Complex Care Managers and other care team members are coordinated with and communicated to your primary care team.      As part of your enrollment, you will be receiving education materials and more information about these services in your [facility] account, by phone or through the mail.  If you do not wish to participate or receive information, please contact our office at [phone].      Sincerely,    Fartun Pichardo RN  [facility]   Out-patient RN Complex Care Manager

## 2024-12-11 NOTE — PROGRESS NOTES
C3 nurse spoke with Jasson Pineda for a TCC post hospital discharge follow up call. The patient has a scheduled HOSFU appointment with Jeannette Rosario MD on 12/24/24 @ 8642.

## 2024-12-11 NOTE — PROGRESS NOTES
Outpatient Care Management  Initial Patient Assessment    Patient: Jasson Pineda  MRN: [id number]  Date of Service: 12/11/2024  Completed by: Charlene Pichardo RN  Referral Date: 12/09/2024  Date of Eligibility: 12/9/2024  Program:   High Risk  Status: Ongoing  Effective Dates: 12/11/2024 - present  Responsible Staff: No information to display        Reason for Visit   Patient presents with    OPCM Enrollment Call    Nursing Assessment       Brief Summary:  Jasson Pineda was referred by Dr. Chris Waldron for chest pain. Patient qualifies for program based on risk score of 81.4%.   Active problem list, medical, surgical and social history reviewed. Active comorbidities include HLD, DVT, PE, MARIA DEL CARMEN, esophageal dysphagia, GERD, kidney stones. Areas of need identified by patient include HLD.     Next steps:   Follow up if received educational materials  Educate on cholesterol types  Educate on dietary fats  Educate on heart healthy diet    Disability Status  Is the patient alert and oriented (person, place, time, and situation)?: Alert and oriented x 4  Hearing Difficulty or Deaf: yes  Hearing Management: other  Visual Difficulty or Blind: yes  Visual and Hearing Needs Conclusion: hearing difficulties where family repeats things and wears glasses all the time.  Vision Management: Other  Difficulty Concentrating, Remembering or Making Decisions: yes  Concentration Management: patient writes things down on her calendar and has daughters remind her of things  Communication Difficulty: no  Eating/Swallowing Difficulty: yes  Eating/Swallowing: swallowing solid food  Eating/Swallowing Management: has trouble with foods going down, eats soft foods  Walking or Climbing Stairs Difficulty: yes  Walking or Climbing Stairs: ambulation difficulty, requires equipment  Mobility Management: uses a rollator for ambulating longer distances  Dressing/Bathing Difficulty: no  Toileting : Independent  Continence : Continence -  Not a problem  Difficulty Managing Errands Independently: no  Equipment Currently Used at Home: rollator  ADL Conclusion Statement: Very independent with ADL's and also drives to and from work.  Service Animal Required: no  Change in Functional Status Since Onset of Current Illness/Injury: no        Spiritual Beliefs  Spiritual, Cultural Beliefs, Worship Practices, Values that Affect Care: no      Social History     Socioeconomic History    Marital status:     Number of children: 2   Occupational History    Occupation: teacher      Comment:     Tobacco Use    Smoking status: Never    Smokeless tobacco: Never   Substance and Sexual Activity    Alcohol use: No    Drug use: No    Sexual activity: Not Currently     Partners: Male     Social Drivers of Health     Financial Resource Strain: Low Risk  (12/8/2024)    Overall Financial Resource Strain (CARDIA)     Difficulty of Paying Living Expenses: Not hard at all   Food Insecurity: No Food Insecurity (12/8/2024)    Hunger Vital Sign     Worried About Running Out of Food in the Last Year: Never true     Ran Out of Food in the Last Year: Never true   Transportation Needs: No Transportation Needs (12/8/2024)    TRANSPORTATION NEEDS     Transportation : No   Physical Activity: Inactive (12/8/2024)    Exercise Vital Sign     Days of Exercise per Week: 0 days     Minutes of Exercise per Session: 0 min   Stress: Stress Concern Present (12/8/2024)    [employer] - Occupational Stress Questionnaire     Feeling of Stress : Rather much   Housing Stability: Low Risk  (12/8/2024)    Housing Stability Vital Sign     Unable to Pay for Housing in the Last Year: No     Homeless in the Last Year: No       Roles and Relationships  Primary Source of Support/Comfort: child(grzegorz)  Name of Support/Comfort Primary Source: Geri and Dana  Primary Roles/Responsibilities: wage earner, part-time  Secondary Source of Support/Comfort:  sibling(s)  Name of Support/Comfort Secondary Source: Brandie      Advance Directives (For Healthcare)  Advance Directive  (If Adv Dir status is received, view document under Adv Dir in header or Chart Review Media tab): Patient has advance directive, copy not received. (Requested to bring copy to PCP's office for next visit.)        Patient Reported Insurance  Verified current insurance plan:: Medicare; Blue Cross            12/11/2024     1:50 PM 11/18/2024     3:26 PM 4/1/2024     3:28 PM 1/11/2024     3:26 PM 8/15/2023     9:26 AM 5/25/2023     3:01 PM 4/3/2023     8:31 AM   Depression Patient Health Questionnaire   Over the last two weeks how often have you been bothered by little interest or pleasure in doing things Not at all Not at all Not at all Not at all Not at all Several days Several days   Over the last two weeks how often have you been bothered by feeling down, depressed or hopeless Not at all Not at all Several days Not at all Not at all Several days Several days   PHQ-2 Total Score 0 0 1 0 0 2 2       Learning Assessment       12/11/2024 1415 [facility] (12/11/2024 - Present)   Created by Charlene Pichardo, RN -  (Nurse) Status: Complete                 PRIMARY LEARNER     Primary Learner Name:  Jasson Pineda DB - 12/11/2024 1415    Relationship:  Patient DB - 12/11/2024 1415    Does the primary learner have any barriers to learning?:  No Barriers DB - 12/11/2024 1415    What is the preferred language of the primary learner?:  English DB - 12/11/2024 1415    Is an  required?:  No DB - 12/11/2024 1415    How does the primary learner prefer to learn new concepts?:  Listening, Reading DB - 12/11/2024 1415    How often do you need to have someone help you read instructions, pamphlets, or written material from your doctor or pharmacy?:  Never DB - 12/11/2024 1415        CO-LEARNER #1     No question answered        CO-LEARNER #2     No question answered        SPECIAL  TOPICS     No question answered        ANSWERED BY:     No question answered        Comments         Edit History       Charlene Pichardo, RN -  (Nurse)   12/11/2024 7617

## 2024-12-18 ENCOUNTER — TELEPHONE (OUTPATIENT)
Dept: INTERNAL MEDICINE | Facility: CLINIC | Age: 74
End: 2024-12-18
Payer: MEDICARE

## 2024-12-18 ENCOUNTER — OUTPATIENT CASE MANAGEMENT (OUTPATIENT)
Dept: ADMINISTRATIVE | Facility: OTHER | Age: 74
End: 2024-12-18
Payer: MEDICARE

## 2024-12-18 DIAGNOSIS — R13.19 ESOPHAGEAL DYSPHAGIA: Primary | ICD-10-CM

## 2024-12-18 DIAGNOSIS — E78.2 MIXED HYPERLIPIDEMIA: ICD-10-CM

## 2024-12-18 NOTE — TELEPHONE ENCOUNTER
----- Message from  Charlene sent at 12/18/2024  3:06 PM CST -----  Regarding: Lipid Profile  Good afternoon,    [facility] and wanted to learn more about her hyperlipidemia.  I noted that her last Lipid profile drawn was on 1/11/2024 with her cholesterol, LDL and triglycerides all elevated.  Is it possible to have another one drawn in January to see where her cholesterol stands?  I realize she has had difficulty with her diet this year because of her esophagus which is why she is curious if her cholesterol is still high.  Please advise.    Thank you,  Fartun Pichardo RN  RN Case Manager  Outpatient Care Management  [facility]  [phone]  [email or web address]

## 2024-12-18 NOTE — PROGRESS NOTES
Outpatient Care Management  Plan of Care Follow Up Visit    Patient: Jasson Pineda  MRN: [id number]  Date of Service: 12/18/2024  Completed by: Charlene Pichardo RN  Referral Date: 12/09/2024    Reason for Visit   Patient presents with    OPCM RN Follow Up Call       Brief Summary: Patient states she eats [employer] chicken noodle soup and drinks smoothies.  She states that is all she can tolerate because of the esophagus problems she had.  She states she does not cook at all, just heats and eats her soups.  She states she does not know the difference in types of cholesterol.  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    -CM explained about saturated and trans fats and where to find them on the nutritional label.  CM explained that she should keep the saturated fats to no more than 10 gms per day and trans fats to none per day.    -CM explained that the good cholesterol is the HDL and the bad cholesterol is the LDL.  CM also explained that she has not had a cholesterol level drawn since 1/2024 but it was elevated then.  BONILLA sent a message to Dr. Hall asking if she should have her lipid panel redrawn in 1/2025 to see if her cholesterol is still elevated since she is only eating chicken noodle soup and smoothies because of her esophagus problems.    Next steps:   Follow up if received educational materials  Follow up on cholesterol types  Follow up on dietary fats  Follow up on heart healthy diet

## 2024-12-20 ENCOUNTER — OUTPATIENT CASE MANAGEMENT (OUTPATIENT)
Dept: ADMINISTRATIVE | Facility: OTHER | Age: 74
End: 2024-12-20
Payer: MEDICARE

## 2024-12-20 NOTE — PROGRESS NOTES
Outpatient Care Management   - Patient Assessment    Patient: Jasson Pineda  MRN:  [id number]  Date of Service:  12/20/2024  Completed by:  Nancy Davidson LMSW  Referral Date: 12/09/2024    Reason for Visit   Patient presents with    Social Work Assessment     12/20/2024       Brief Summary:  received a referral from OPCM MAUREEN Pichardo for the following HR SW psychosocial needs food and utility resources.   completed social assessment with patient via telephone. Patient lives with her daughter and her niece. Her daughter is a  who works from home. Patient is independent with ADL's and with bathing and dressing. Patient has gone back to work part time. She stated she is a . Patient reported she went back to work for supplemental income. Patient still drives herself. Patient reported her daughter does the cooking and cleaning in the home. Her daughter also does the grocery shopping. Patient stated she uses a pill box to organize her medications. No one in the home receives SNAP benefits due to being over income. Patient is interested in food resources for future use. Currently, she depends on her daughter to help with groceries. She denied any difficulty with affording groceries or utilities at this time.  and patient discussed patient may not qualify for assistance with some programs but she may qualify in the future, therefore  will mail her any food resources found. Care plan was created in collaboration with patient/caregiver input.   completed the SDOH questionnaire.

## 2024-12-22 RX ORDER — OMEPRAZOLE 20 MG/1
20 CAPSULE, DELAYED RELEASE ORAL
Qty: 180 CAPSULE | Refills: 3 | Status: SHIPPED | OUTPATIENT
Start: 2024-12-22

## 2024-12-22 RX ORDER — ATORVASTATIN CALCIUM 80 MG/1
80 TABLET, FILM COATED ORAL DAILY
Qty: 90 TABLET | Refills: 3 | Status: SHIPPED | OUTPATIENT
Start: 2024-12-22

## 2024-12-22 RX ORDER — ASPIRIN 325 MG
50000 TABLET, DELAYED RELEASE (ENTERIC COATED) ORAL WEEKLY
Qty: 12 CAPSULE | Refills: 3 | Status: SHIPPED | OUTPATIENT
Start: 2024-12-22

## 2024-12-22 RX ORDER — FERROUS SULFATE 325(65) MG
325 TABLET ORAL
Qty: 180 TABLET | Refills: 2 | Status: SHIPPED | OUTPATIENT
Start: 2024-12-22

## 2024-12-22 NOTE — PROGRESS NOTES
"Subjective:       Patient ID: Jasson Pineda is a 74 y.o. female.    Chief Complaint: memory follow up and esophagus pain     ### atypical CP ###  Consistently occurs when she lies down on her back she get back pain penetrating to her chest. She can not sleep on her elft side due to shouilder pain. She sleep on her righ side. When she lies flat on her back she develops a sharp, intense pain that radiates to her chest. No associated PND, orthopnea, wheezing, SOB, LE edema.   Was seen by cardiology and had holter done 07/27/2021 that was normal. Does not repsond to albuterol.  Normal exercise stress test July 2018. Hx of recurrent PE.   -5/18/22 IM CV previously referred to back and spine as I suspect atypical, positional chest is thoracic or paraspinal.  Only members have told her she has been forgetful. Once she is prompted she is able to recall. She lives alone, often helped takes care of her knees, able to maintain her finances and pay bills without any difficulty, armani [place], Dr.. Capable of full ADLs. She takes tizanidine 4 mg q.h.s. along with tramadol 50 mg b.i.d. pregabalin 150 mg b.i.d. and oxybutynin 10 mg. These medications and their side effects were discussed in the context of forgetfulness.   -08/2022 Reports having recent injections with zero change in symptoms. This was approx one month prior.   -She is s/p rectus sheath block on 3/1/2023. She reports 100% relief for 2 days. Her pain then returned to baseline. SPECt scan was normal        ### Nephrolithiasis -- OAB ###  Gross hematuria recurrent and urinary urgency.   12/22/20 CT urogram normal.   CTU shows 9mm non-obstructing left renal stone.   02/09/2021 Left ESWL   OAB She began ditropan 5 mg XL daily    5/18/22 oxybutynin has been increased to 10mg     ### Dysphagia ###  7/2018 normal EGD by Dr Foy at [facility]-  07/2018 manometry "Ineffective esophageal motility (IEM: 50% or more ineffective swallows with DCI less than 450 " "mmHg/sec/cm).  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 There is a lower esophageal Schatzki's ring.   10/2019 ENT Dr Burgos "Variably obstructive CP bar, but symptoms are primarily thoracic/epigastic."  12/2019 GI clinic visit "This could be functional.  Other considerations could be untreated reflux or even esophageal dysmotility."  She continues to have periodic dysphagia to solids requiring her to drink a glass of water to get down.  No hx of recurrent oral ulcers, skin changes, Hx of misscarriages, chronic loose stools or abdomina pains, diffuse arthralgias, Hx of pericarditis. She is on lexapro and lyrica. Mood improved an is interested in weaning from this.   1/15/21 IM CV no acute issues  3[place]28[place]2022 [place] [facility] [place] aside from 1 cm type-I sliding hiatal hernia   06/2022 CV Dr Foy writes "Continue with current management and use of omeprazole 20 mg twice daily.  Will give more time for the medication to work.  No further intervention at this time.  Follow up with her pain specialist as planned. Follow-up with me in 2-3 months."  08/2022 We stopped her SSRI for >6 months and no change in GI symptoms  5/25/23 continues to endorse GE junction dysphagia with pain. Has resorted to eating only soups and jello for past 2 weeks.  Plan for video pill endocsopy           ### Recurrent PE ###  Anticoagulation managed by Dr shaikh in cardiology.  No bleeding complications.   All chronic symptoms of positional CP when she lies flat on her back, forgetfulness and anxiety, dysphagia to solids, SPICER (sedenatary).  She is s/p rectus sheath block with short term relief.         osteopenia     Exaggerated kyphotic curvature of the upper thoracic spine with slight grade 1 anterolisthesis of T1 on T2 and T2 on T3        History of Present Illness              Review of Systems   Constitutional:  Negative for chills, fatigue, fever and unexpected weight change.   HENT:  Negative for " "ear pain, hearing loss, postnasal drip, tinnitus, trouble swallowing and voice change.    Respiratory:  Negative for cough, chest tightness, shortness of breath and wheezing.    Cardiovascular:  Positive for chest pain. Negative for palpitations and leg swelling.   Gastrointestinal:  Negative for abdominal pain, blood in stool, diarrhea, nausea and vomiting.   Endocrine: Negative for polydipsia, polyphagia and polyuria.   Genitourinary:  Negative for difficulty urinating, dysuria, hematuria and vaginal bleeding.   Skin:  Negative for rash.   Allergic/Immunologic: Negative for food allergies.   Neurological:  Negative for dizziness, numbness and headaches.   Hematological:  Does not bruise/bleed easily.   Psychiatric/Behavioral:  The patient is not nervous/anxious.        Objective:      Vitals:    11/18/24 1529   BP: 110/70   BP Location: Right arm   Patient Position: Sitting   Pulse: 77   SpO2: 99%   Weight: 59.3 kg (130 lb 11.7 oz)   Height: 5' 1" (1.549 m)      Physical Exam  Vitals and nursing note reviewed.   Constitutional:       General: She is not in acute distress.     Appearance: Normal appearance. She is well-developed.   HENT:      Head: Normocephalic and atraumatic.      Mouth/Throat:      Pharynx: No oropharyngeal exudate.   Eyes:      General: No scleral icterus.     Conjunctiva/sclera: Conjunctivae normal.      Pupils: Pupils are equal, round, and reactive to light.   Neck:      Thyroid: No thyromegaly.   Cardiovascular:      Rate and Rhythm: Normal rate and regular rhythm.      Heart sounds: Normal heart sounds. No murmur heard.  Pulmonary:      Effort: Pulmonary effort is normal.      Breath sounds: Normal breath sounds. No wheezing or rales.   Abdominal:      General: There is no distension.   Musculoskeletal:         General: No tenderness.   Lymphadenopathy:      Cervical: No cervical adenopathy.   Skin:     General: Skin is warm and dry.   Neurological:      Mental Status: She is alert and " oriented to person, place, and time.   Psychiatric:         Behavior: Behavior normal.         Assessment:       1. Chest pain, unspecified type    2. Gastroesophageal reflux disease    3. Dysphagia, unspecified type    4. Other specified coagulation defects    5. Esophageal dysphagia        Plan:       Jasson was seen today for memory follow up and esophagus pain .    Diagnoses and all orders for this visit:    Chest pain, unspecified type  -     atorvastatin (LIPITOR) 80 MG tablet; Take 1 tablet (80 mg total) by mouth once daily.    Gastroesophageal reflux disease  -     atorvastatin (LIPITOR) 80 MG tablet; Take 1 tablet (80 mg total) by mouth once daily.    Dysphagia, unspecified type  -     atorvastatin (LIPITOR) 80 MG tablet; Take 1 tablet (80 mg total) by mouth once daily.    Other specified coagulation defects  -     atorvastatin (LIPITOR) 80 MG tablet; Take 1 tablet (80 mg total) by mouth once daily.    Esophageal dysphagia  -     omeprazole (PRILOSEC) 20 MG capsule; Take 1 capsule (20 mg total) by mouth 2 (two) times daily before meals.    Other orders  -     cholecalciferol, vitamin D3, 1,250 mcg (50,000 unit) capsule; Take 1 capsule (50,000 Units total) by mouth once a week.  -     ferrous sulfate (FEOSOL) 325 mg (65 mg iron) Tab tablet; Take 1 tablet (325 mg total) by mouth daily with breakfast.           Assessment & Plan              This note was generated with the assistance of ambient listening technology. Verbal consent was obtained by the patient and accompanying visitor(s) for the recording of patient appointment to facilitate this note. I attest to having reviewed and edited the generated note for accuracy, though some syntax or spelling errors may persist. Please contact the author of this note for any clarification.      Osbaldo Mendez MD  Internal Medicine-[facility]        Side effects of medication(s) were discussed in detail and patient voiced understanding.  Patient will call back  for any issues or complications.

## 2024-12-24 ENCOUNTER — OFFICE VISIT (OUTPATIENT)
Dept: INTERNAL MEDICINE | Facility: CLINIC | Age: 74
End: 2024-12-24
Payer: MEDICARE

## 2024-12-24 VITALS
HEART RATE: 68 BPM | DIASTOLIC BLOOD PRESSURE: 69 MMHG | SYSTOLIC BLOOD PRESSURE: 107 MMHG | BODY MASS INDEX: 24.69 KG/M2 | OXYGEN SATURATION: 99 % | HEIGHT: 61 IN | WEIGHT: 130.75 LBS

## 2024-12-24 DIAGNOSIS — Z09 HOSPITAL DISCHARGE FOLLOW-UP: Primary | ICD-10-CM

## 2024-12-24 DIAGNOSIS — R13.10 DYSPHAGIA, UNSPECIFIED TYPE: ICD-10-CM

## 2024-12-24 DIAGNOSIS — K22.2 SCHATZKI'S RING: ICD-10-CM

## 2024-12-24 PROCEDURE — 99999 PR PBB SHADOW E&M-EST. PATIENT-LVL V: CPT | Mod: PBBFAC,,, | Performed by: STUDENT IN AN ORGANIZED HEALTH CARE EDUCATION/TRAINING PROGRAM

## 2024-12-24 PROCEDURE — 99215 OFFICE O/P EST HI 40 MIN: CPT | Mod: PBBFAC | Performed by: STUDENT IN AN ORGANIZED HEALTH CARE EDUCATION/TRAINING PROGRAM

## 2024-12-24 RX ORDER — SUCRALFATE 1 G/1
1 TABLET ORAL
Qty: 56 TABLET | Refills: 0 | Status: SHIPPED | OUTPATIENT
Start: 2024-12-24 | End: 2025-01-07

## 2024-12-24 RX ORDER — PANTOPRAZOLE SODIUM 40 MG/1
40 TABLET, DELAYED RELEASE ORAL DAILY
Qty: 90 TABLET | Refills: 0 | Status: SHIPPED | OUTPATIENT
Start: 2024-12-24

## 2024-12-24 NOTE — PATIENT INSTRUCTIONS
Start pantoprazole daily for symptoms for 3 months. Stop omeprazole during that time.  If symptoms improved may restart omeprazole once completed pantoprazole.   Take sucralfate 3 times daily before meals and once at night before bed for 14 days.   Schedule swallow study we ordered today.   Call to schedule with Dr. Shaw for GI.     Dr. Shaw  [address]  [place] [address]  Phone  [phone]

## 2024-12-24 NOTE — PROGRESS NOTES
Subjective:       Patient ID: Jasson Pineda is a 74 y.o. female.    Chief Complaint: Hospital discharge follow-up [Z09]    Patient is known to me, PCP is Andrea. Here today for the following:    History of Present Illness    CHIEF COMPLAINT:  Jasson presents today for follow-up after hospitalization for chest pain and difficulty swallowing.    HISTORY OF PRESENT ILLNESS:  She reports chest pain described as pressure sensation, which is exacerbated by eating and food passage. She has concurrent difficulty swallowing and is unable to consume regular food, particularly meats and solid foods. She avoids bread due to swallowing concerns. She maintains nutrition through consumption of homemade soups, smoothies, water, and soft vegetables. Endorses bowel movements have been regular. Denies hematochezia, melena, or BRBPR.     PREVIOUS DIAGNOSTIC STUDIES:  EGD from May 2024 revealed a Schatzki's ring and tortuous esophagus. CTs of abdomen, pelvis without significant findings and CTA chest a small non-concerning lung nodule. CMP, CBC, troponin grossly unremarkable. Underwent stress testing with negative result.     MEDICATIONS:  She takes Omeprazole daily.      ROS:  ENT: +difficulty swallowing  Cardiovascular: +chest pain         Admission Date: 12/8/2024  Discharge Date and Time:  12/09/2024 3:11 PM    Wt Readings from Last 10 Encounters:   12/24/24 59.3 kg (130 lb 11.7 oz)   12/09/24 59 kg (130 lb)   11/27/24 59 kg (130 lb)   11/20/24 59.1 kg (130 lb 2.9 oz)   11/18/24 59.3 kg (130 lb 11.7 oz)   09/19/24 57.5 kg (126 lb 12.2 oz)   07/31/24 52.2 kg (115 lb)   06/28/24 54.9 kg (121 lb)   06/28/24 55.3 kg (121 lb 14.6 oz)   06/04/24 56.9 kg (125 lb 7.1 oz)       Transitional Care Note    Family and/or Caretaker present at visit?  No.  Diagnostic tests reviewed/disposition: I have reviewed all completed as well as pending diagnostic tests at the time of discharge.  Disease/illness education: yes  Home  "health/community services discussion/referrals: Patient does not have home health established from hospital visit.  They do not need home health.  If needed, we will set up home health for the patient.   Establishment or re-establishment of referral orders for community resources: No other necessary community resources.   Discussion with other health care providers: No discussion with other health care providers necessary.         Current Outpatient Medications   Medication Instructions    atorvastatin (LIPITOR) 80 mg, Oral, Daily    cholecalciferol (vitamin D3) 50,000 Units, Oral, Weekly    ferrous sulfate (FEOSOL) 325 mg, Oral, With breakfast    omeprazole (PRILOSEC) 20 mg, Oral, 2 times daily before meals    oxybutynin (DITROPAN-XL) 10 mg, Oral    pantoprazole (PROTONIX) 40 mg, Oral, Daily    pregabalin (LYRICA) 150 mg, 2 times daily    sucralfate (CARAFATE) 1 g, Oral, Before meals & nightly    tiZANidine (ZANAFLEX) 4 mg, Nightly PRN    traMADoL (ULTRAM) 50 mg, 2 times daily PRN    XARELTO 20 mg, Oral, Nightly     Objective:      Vitals:    12/24/24 1123   BP: 107/69   Pulse: 68   SpO2: 99%   Weight: 59.3 kg (130 lb 11.7 oz)   Height: 5' 1" (1.549 m)   PainSc: 10-Worst pain ever   PainLoc: Hip     Body mass index is 24.7 kg/m².    Physical Exam  Vitals reviewed.   Constitutional:       General: She is not in acute distress.     Appearance: Normal appearance. She is not ill-appearing or diaphoretic.   HENT:      Head: Normocephalic and atraumatic.   Eyes:      Conjunctiva/sclera: Conjunctivae normal.   Cardiovascular:      Rate and Rhythm: Normal rate and regular rhythm.      Heart sounds: Normal heart sounds. No murmur heard.     No friction rub. No gallop.   Pulmonary:      Effort: Pulmonary effort is normal. No respiratory distress.      Breath sounds: Normal breath sounds. No stridor. No wheezing, rhonchi or rales.   Abdominal:      General: Bowel sounds are normal. There is no distension.      Palpations: " Abdomen is soft. There is no mass.      Tenderness: There is abdominal tenderness in the epigastric area. There is no guarding or rebound.   Skin:     General: Skin is warm and dry.      Comments: Color WNL   Neurological:      Mental Status: She is alert. Mental status is at baseline.   Psychiatric:         Mood and Affect: Mood normal.         Behavior: Behavior normal.         Assessment:       1. Hospital discharge follow-up    2. Dysphagia, unspecified type    3. Schatzki's ring        Plan:   Hospital discharge follow-up    Dysphagia, unspecified type  -     Fl Modified Barium Swallow Speech; Future; Expected date: 12/24/2024  -     SLP video swallow; Future; Expected date: 12/24/2024  -     Ambulatory referral/consult to Gastroenterology; Future; Expected date: 12/31/2024  -     pantoprazole (PROTONIX) 40 MG tablet; Take 1 tablet (40 mg total) by mouth once daily.  Dispense: 90 tablet; Refill: 0  -     sucralfate (CARAFATE) 1 gram tablet; Take 1 tablet (1 g total) by mouth 4 (four) times daily before meals and nightly. for 14 days  Dispense: 56 tablet; Refill: 0    Schatzki's ring  -     Ambulatory referral/consult to Gastroenterology; Future; Expected date: 12/31/2024      Assessment & Plan    IMPRESSION:  - Reviewed recent ER visit and cardiac testing results, which were unremarkable  - Assessed chronic chest pain and dysphagia, suspecting potential GI etiology given normal cardiac workup  - Considered possibility of musculoskeletal cause for chest pain, but focused on GI symptoms  - Reviewed previous EGD findings, including tortuous esophagus and Schatzki's ring  - Will escalate acid suppression therapy and add mucosal protectant to address potential gastric irritation  - Ordered swallow study to further evaluate dysphagia    GASTRO-ESOPHAGEAL REFLUX DISEASE WITH ESOPHAGITIS:  - Discussed the purpose of Sucralfate.  - Discontinued Omeprazole for now.  - Started Pantoprazole 40mg daily for 3 months.  -  Started Sucralfate 1 tablet before meals (3 times daily) and at bedtime for 1-2 weeks.  - Return to Omeprazole after completing 3-month course of Pantoprazole.  - Referred to Dr. Teri Shaw (Gastroenterologist) for follow-up on previous findings and ongoing symptoms.    DYSPHAGIA:  - Swallow study ordered to evaluate dysphagia.    FEEDING DIFFICULTIES:  - Luellen to gradually reintroduce bland, non-spicy, non-greasy foods.  - Luellen can try eating items like mashed potatoes, vegetables, and rice if not lactose intolerant.  - Recommend consuming simple foods without much seasoning.    GENERAL ASSESSMENT AND FOLLOW-UP:  - Explained that chest pain can have various causes, including cardiac, pulmonary, GI, and musculoskeletal origins.  - Follow up with results of swallow study.  - Contact the office if symptoms worsen or new concerns arise.           Jeannette Rosario MD  12/24/2024

## 2024-12-30 ENCOUNTER — OUTPATIENT CASE MANAGEMENT (OUTPATIENT)
Dept: ADMINISTRATIVE | Facility: OTHER | Age: 74
End: 2024-12-30
Payer: MEDICARE

## 2024-12-30 ENCOUNTER — OFFICE VISIT (OUTPATIENT)
Dept: OTOLARYNGOLOGY | Facility: CLINIC | Age: 74
End: 2024-12-30
Payer: MEDICARE

## 2024-12-30 VITALS
WEIGHT: 132.06 LBS | SYSTOLIC BLOOD PRESSURE: 114 MMHG | DIASTOLIC BLOOD PRESSURE: 68 MMHG | BODY MASS INDEX: 24.95 KG/M2

## 2024-12-30 DIAGNOSIS — R13.10 DYSPHAGIA, UNSPECIFIED TYPE: Primary | ICD-10-CM

## 2024-12-30 PROCEDURE — 99999 PR PBB SHADOW E&M-EST. PATIENT-LVL III: CPT | Mod: PBBFAC,,, | Performed by: NURSE PRACTITIONER

## 2024-12-30 PROCEDURE — 99213 OFFICE O/P EST LOW 20 MIN: CPT | Mod: S$PBB,,, | Performed by: NURSE PRACTITIONER

## 2024-12-30 PROCEDURE — 99213 OFFICE O/P EST LOW 20 MIN: CPT | Mod: PBBFAC | Performed by: NURSE PRACTITIONER

## 2024-12-30 NOTE — PROGRESS NOTES
"[facility]  Department of Otorhinolaryngology and Communication Sciences    Subjective:      Jasson Pineda is a 74 y.o. female who returns for follow up. She is 1 month s/p rigid esophagoscopy with balloon dilation of pharyngo esophageal segment to 23 mm. She has had mild improvement of her dysphagia. She continues to have thoracic symptoms. She went to the ER for this last week. She has not followed up with GI.    I met her several years ago regarding this symptom.  Symptoms include obstructive dysphagia to large solid food boluses, localized both to the level of the throat as well as to the substernal region.  After swallowing a solid food bolus, she experiences significant cramping substernal chest pain radiating to the back.  Because of the substernal/thoracic focused of her symptoms previously, UES directed intervention was deferred.  Further her dysphagia was determined to be multifactorial, including esophageal dysmotility, cricopharyngeal dysfunction, a small anterio esophageal web, and a hiatal hernia   Prior workup:  5/31/2019 esophagram; Esophageal dysmotility.  Small hiatal hernia.  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  7/17/2018 EGD: normal  HRM 7/25/2018: Ineffective esophageal motility   (IEM: 50% or more ineffective swallows with DCI less than 450 mmHg/sec/cm).    She continues to experience these symptoms.  She restricts her diet to soft foods/liquids.  She continues to experience substernal discomfort radiating to the back if she swallows solid foods.  This is it is far more intrusive then the pharyngeal component of her symptoms.    Most recent testing:  EGD 05/20/2024 at [facility]: "tortuous esophagus and a Schatzki's ring that was disrupted with biopsy forceps"   HRM 7/31/2024: normal  Last EGD 3/28/2022: Normal esophagus. 1 cm type-I sliding hiatal hernia. Esophagram 8/12/2024: Small hiatal hernia.  Stable cricopharyngeal bar at C4-C5 with increased " contrast stasis superior to the CP bar.  Stable smooth posterior pharyngeal wall impression at C4-C5, likely related to underlying cervical spine degenerative changes.  Stable tiny esophageal web at C5.      The patient's medications, allergies, past medical, surgical, social and family histories were reviewed and updated as appropriate.    A detailed review of systems was obtained with pertinent positives as per the above HPI, and otherwise negative.      Objective:     /68   Wt 59.9 kg (132 lb 0.9 oz)   BMI 24.95 kg/m²      Constitutional: comfortable, well dressed  Psychiatric: appropriate affect  Respiratory: comfortably breathing, symmetric chest rise, no stridor  Voice: mild variable roughness  Head: normocephalic  Eyes: conjunctivae and sclerae clear  Indirect laryngoscopy is limited due to gag    Normal laryngoscopy with MB 09/19/2024, normal laryngoscopy with me 10/02/2019    Data Reviewed  See HPI      Assessment:     Jasson Pineda is a 74 y.o. female with chronic multifactorial dysphagia, with contributions from esophageal dysmotility, cricopharyngeal dysfunction, a small anterio esophageal web, and a hiatal hernia      Plan:   I counseled her regarding the complex, multifactorial nature of her condition. Will repeat esophagram. Follow up with GI. RTC prn.

## 2024-12-31 NOTE — PROGRESS NOTES
Outpatient Care Management   - Care Plan Follow Up    Patient: Jasson Pineda  MRN:  [id number]  Date of Service:  12/30/2024  Completed by:  Nancy Davidson LMSW  Referral Date: 12/09/2024    Reason for Visit   Patient presents with    OPCM SW Follow Up Call     12/30/2024       Brief Summary:  followed up with patient via telephone. Patient has not received food or utility resources just yet. Patient stated things are going well at home patient has food.  has mailed [employer], [employer], [employer], [facility] food pantry, [employer], community fridges, [employer] [place] food [employer], and [employer] information.    Complex Care Plan     Care plan was discussed and completed today with input from patient and/or caregiver.    Patient Instructions     No follow-ups on file.

## 2025-01-02 ENCOUNTER — OUTPATIENT CASE MANAGEMENT (OUTPATIENT)
Dept: ADMINISTRATIVE | Facility: OTHER | Age: 75
End: 2025-01-02
Payer: MEDICARE

## 2025-01-02 ENCOUNTER — TELEPHONE (OUTPATIENT)
Dept: GASTROENTEROLOGY | Facility: CLINIC | Age: 75
End: 2025-01-02
Payer: MEDICARE

## 2025-01-02 NOTE — LETTER
January 2, 2025             Dear Jasson,    Welcome to [facility].  It was a pleasure talking with you today.  My name is Fartun Pichardo, and I look forward to being your Care Manager.  My goal is to help you function at the healthiest and highest level possible.  You can contact me directly at [phone].    As an [facility] patient, some of the services we may be able to provide include:     Development of an individualized care plan with a Registered Nurse   Connection with a   Connection with available resources and services    Coordinate communication among your care team members   Provide coaching and education   Help you understand your doctors treatment plan  Help you obtain information about your insurance coverage.     All services provided by [facility] Complex Care Managers and other care team members are coordinated with and communicated to your primary care team.      As part of your enrollment, you will be receiving education materials and more information about these services in your [facility] account, by phone or through the mail.  If you do not wish to participate or receive information, please contact our office at [phone].      Sincerely,    Fartun Pichardo RN  [facility]   Out-patient RN Complex Care Manager

## 2025-01-02 NOTE — TELEPHONE ENCOUNTER
----- Message from Robson sent at 1/2/2025  1:34 PM CST -----  Regarding: appt access  Requesting Appt  Patient is requesting an appointment. No appt is available in Epic. Caller would like for a nurse/MA to call to schedule an appointment.  Name of Caller: Fartun   Appointment Access: esophogeal stricture   Symptoms: trouble swallowing   Call Back Number: [phone]  Additional Information:

## 2025-01-02 NOTE — PROGRESS NOTES
Outpatient Care Management  Plan of Care Follow Up Visit    Patient: Jasson Pineda  MRN: [id number]  Date of Service: 01/02/2025  Completed by: Charlene Pichardo RN  Referral Date: 12/09/2024    Reason for Visit   Patient presents with    OPCM RN Follow Up Call       Brief Summary: Patient states she is still having problems swallowing food so she is still eating soups and drinking smoothies.  She states her daughter bought her some Naked juices to drink.  The only thing she can tolerate eating is baked fish and baked chicken wing every great now and then in small pieces.  She states her daughter has not been buying as many can soups and has been making fresh vegetable soup for the patient.  She states she does not remember about saturated or trans fats.  She states she did not receive her educational materials yet.    -CM commended patient for eating fresh fish and soups instead of canned soup or processed frozen fish.  -CM reviewed that saturated fats and trans fats are the bad fats.  She should avoid trans fats altogether and limit saturated fats to 10-11 per day.    -CM reviewed where to find the saturated and trans fats on a nutritional label.    -CM remailed welcome letter and educational materials to patient's home.     SDOH completed by Nancy Davidson LMSW and reviewed by this CM.  Resources sent to patient for food and utilities.      Next steps:   Follow up if received educational materials  Follow up on cholesterol types  Follow up on saturated and trans fats  Follow up if reading nutritional labels  Follow up on what patient is eating  Follow up on heart healthy diet  Educate on coping skills (PLB and GI)

## 2025-01-06 ENCOUNTER — HOSPITAL ENCOUNTER (OUTPATIENT)
Dept: RADIOLOGY | Facility: OTHER | Age: 75
Discharge: HOME | End: 2025-01-06
Attending: NURSE PRACTITIONER
Payer: MEDICARE

## 2025-01-06 DIAGNOSIS — R13.10 DYSPHAGIA, UNSPECIFIED TYPE: ICD-10-CM

## 2025-01-06 PROCEDURE — 74220 X-RAY XM ESOPHAGUS 1CNTRST: CPT | Mod: 26,,, | Performed by: RADIOLOGY

## 2025-01-06 PROCEDURE — 25500020 PHARM REV CODE 255: Performed by: NURSE PRACTITIONER

## 2025-01-06 PROCEDURE — A9698 NON-RAD CONTRAST MATERIALNOC: HCPCS | Performed by: NURSE PRACTITIONER

## 2025-01-06 PROCEDURE — 74220 X-RAY XM ESOPHAGUS 1CNTRST: CPT | Mod: TC

## 2025-01-06 RX ADMIN — BARIUM SULFATE 100 ML: 0.6 SUSPENSION ORAL at 09:01

## 2025-01-08 ENCOUNTER — TELEPHONE (OUTPATIENT)
Dept: SPEECH THERAPY | Facility: HOSPITAL | Age: 75
End: 2025-01-08
Payer: MEDICARE

## 2025-01-13 ENCOUNTER — OUTPATIENT CASE MANAGEMENT (OUTPATIENT)
Dept: ADMINISTRATIVE | Facility: OTHER | Age: 75
End: 2025-01-13
Payer: MEDICARE

## 2025-01-13 NOTE — LETTER
Jasson Pineda  [address] [facility] [address] [facility] [address]      Dear Jasson Pineda,     I am writing from the Outpatient Care Management Department at [facility]. I have been unsuccessful at reaching you since we spoke on 12/31/2024.  I hope you found the assistance previously provided to you helpful.     Please contact me at [phone] from 8:00AM to 4:30 PM on Monday thru Friday.     As you know, [facility] also has a program with a nurse available 24/7 to answer questions or provide medical advice.  [facility] can be reached at [phone].    Sincerely,       Nancy Davidson LMSW  Outpatient Care Management

## 2025-01-21 NOTE — PROGRESS NOTES
Outpatient Care Management   - Care Plan Follow Up    Patient: Jasson Pineda  MRN:  [id number]  Date of Service:  1/21/2025  Completed by:  Nancy Davidson LMSW  Referral Date: 12/09/2024    Reason for Visit   Patient presents with    Other     1/13/2025  1st attempt to complete Follow-Up for Outpatient Care Management, left message. Attempt letter sent.        OPCM SW Follow Up Call     1/21/2025       Brief Summary:  followed up with patient via telephone. Patient stated things are going well at home. Patient stated she does not believe she received the mailed resources on utility assistance and food resources. She did receive the information from OPCM RN.  will re mail information to patient.    Complex Care Plan     Care plan was discussed and completed today with input from patient and/or caregiver.    Patient Instructions     No follow-ups on file.

## 2025-01-23 ENCOUNTER — OUTPATIENT CASE MANAGEMENT (OUTPATIENT)
Dept: ADMINISTRATIVE | Facility: OTHER | Age: 75
End: 2025-01-23
Payer: MEDICARE

## 2025-01-24 ENCOUNTER — TELEPHONE (OUTPATIENT)
Dept: GASTROENTEROLOGY | Facility: CLINIC | Age: 75
End: 2025-01-24
Payer: MEDICARE

## 2025-01-24 NOTE — TELEPHONE ENCOUNTER
----- Message from Karmen sent at 1/24/2025  1:07 PM CST -----  Contact: pt @ [phone]  NABOR ZEPEDA calling regarding Appointment Access  (message) for #pt is calling to get f/u appt, asking for call back

## 2025-01-24 NOTE — TELEPHONE ENCOUNTER
I think this has become more complex and she would benefit from a consultation with Dr. Varela for dysphagia.  Will you please let her know and help arrange an appointment with Dr. Varela.

## 2025-02-05 ENCOUNTER — OUTPATIENT CASE MANAGEMENT (OUTPATIENT)
Dept: ADMINISTRATIVE | Facility: OTHER | Age: 75
End: 2025-02-05
Payer: MEDICARE

## 2025-02-05 NOTE — PROGRESS NOTES
Outpatient Care Management   - Care Plan Follow Up    Patient: Jasson Pineda  MRN:  [id number]  Date of Service:  2/5/2025  Completed by:  Nancy Davidson LMSW  Referral Date: 12/09/2024    Reason for Visit   Patient presents with    Goals Complete     2/5/2025       Brief Summary:  followed up with patient via telephone. Patient reported she did receive the mailed resources on Power to care, COA, Top Box, and LIHEAP. She said she is going to review it some more. Patient reported she was at work at the time of the call. No other SDOH needs voiced at this time.   encouraged patient to reach back out if any other needs arise. Case closed, notified OPCM RN.    Complex Care Plan     Care plan was discussed and completed today with input from patient and/or caregiver.    Patient Instructions     No follow-ups on file.

## 2025-02-13 ENCOUNTER — OUTPATIENT CASE MANAGEMENT (OUTPATIENT)
Dept: ADMINISTRATIVE | Facility: OTHER | Age: 75
End: 2025-02-13
Payer: MEDICARE

## 2025-02-13 NOTE — PROGRESS NOTES
Outpatient Care Management  Plan of Care Follow Up Visit    Patient: Jasson Pineda  MRN: [id number]  Date of Service: 02/13/2025  Completed by: Charlene Pichardo RN  Referral Date: 12/09/2024    Reason for Visit   Patient presents with    OPCM RN Follow Up Call       Brief Summary: Patient states she is still not eating yet.  She states she is still drinking smoothies with protein powder, fresh bananas, and frozen strawberries.  She states she does not remember the different types of cholesterol or the bad fats that she should avoid.  She states she makes homemade vegetable soup with a soup bunch and eats this also.  She states the signs and symptoms of VTE that she knows is a knot in the leg or arm.  She states if she thinks she has a blood clot then she will go to the hospital.  Patient states she remembers pursed lip breathing and does it to calm herself down a lot.  She states she does not remember the guided imagery.  She states she is looking forward to seeing her granddaughter march in parades with the dance team at her school.  She states she is a senior and it's her last year she will be able to watch her.     -CM educated patient that if she thinks she has a VTE then she should contact her PCP, Dr. Hall, and he can order an US to see if there is a VTE That needs to be treated.    -CM encouraged patient to try other flavors of smoothies, like peanut butter and chocolate syrup to make one that tastes like peanut butter cup.  -CM reviewed the S&S of VTE including pain or tenderness in an extremity not caused by injury; swelling of leg or arm; skin of extremity that is warm to touch, red or discolored (pale, blue).   -CM encouraged patient to go and enjoy the parade with her granddaughter to help with relieving some stress.  -CM commended patient for using the pursed lip breathing and encouraged her to continue to do this for her stress and anxiety but to also try the guided imagery.  -CM reviewed  guided imagery with patient instructing him/her to imagine his/herself in his/her favorite  place and to imagine all the sights, sounds, feelings and smells in that place.  CM instructed him/her to  close his/her eyes and to concentrate on putting themself in that place.    Next steps:   Follow up if read educational materials  Follow up on cholesterol types  Follow up on saturated and trans fats  Follow up if eating foods yet  Follow up if still drinking smoothies  Follow up on heart healthy diet  Follow up on coping skills (PLB and GI)

## 2025-02-19 ENCOUNTER — OFFICE VISIT (OUTPATIENT)
Dept: INTERNAL MEDICINE | Facility: CLINIC | Age: 75
End: 2025-02-19
Attending: INTERNAL MEDICINE
Payer: MEDICARE

## 2025-02-19 VITALS
SYSTOLIC BLOOD PRESSURE: 102 MMHG | HEIGHT: 61 IN | OXYGEN SATURATION: 98 % | BODY MASS INDEX: 23.22 KG/M2 | HEART RATE: 108 BPM | DIASTOLIC BLOOD PRESSURE: 60 MMHG | WEIGHT: 123 LBS

## 2025-02-19 DIAGNOSIS — I27.82 OTHER CHRONIC PULMONARY EMBOLISM WITHOUT ACUTE COR PULMONALE: ICD-10-CM

## 2025-02-19 DIAGNOSIS — D68.8 OTHER SPECIFIED COAGULATION DEFECTS: ICD-10-CM

## 2025-02-19 DIAGNOSIS — R13.10 DYSPHAGIA, UNSPECIFIED TYPE: ICD-10-CM

## 2025-02-19 DIAGNOSIS — R13.19 ESOPHAGEAL DYSPHAGIA: ICD-10-CM

## 2025-02-19 DIAGNOSIS — R07.9 CHEST PAIN, UNSPECIFIED TYPE: ICD-10-CM

## 2025-02-19 DIAGNOSIS — K21.9 GASTROESOPHAGEAL REFLUX DISEASE: ICD-10-CM

## 2025-02-19 PROCEDURE — 99999 PR PBB SHADOW E&M-EST. PATIENT-LVL III: CPT | Mod: PBBFAC,,, | Performed by: INTERNAL MEDICINE

## 2025-02-19 PROCEDURE — 99213 OFFICE O/P EST LOW 20 MIN: CPT | Mod: PBBFAC | Performed by: INTERNAL MEDICINE

## 2025-02-19 PROCEDURE — 99214 OFFICE O/P EST MOD 30 MIN: CPT | Mod: S$PBB,,, | Performed by: INTERNAL MEDICINE

## 2025-02-19 PROCEDURE — G2211 COMPLEX E/M VISIT ADD ON: HCPCS | Mod: S$PBB,,, | Performed by: INTERNAL MEDICINE

## 2025-02-19 RX ORDER — ASPIRIN 325 MG
50000 TABLET, DELAYED RELEASE (ENTERIC COATED) ORAL WEEKLY
Qty: 12 CAPSULE | Refills: 3 | Status: SHIPPED | OUTPATIENT
Start: 2025-02-19

## 2025-02-19 RX ORDER — PANTOPRAZOLE SODIUM 40 MG/1
40 TABLET, DELAYED RELEASE ORAL DAILY
Qty: 90 TABLET | Refills: 0 | Status: SHIPPED | OUTPATIENT
Start: 2025-02-19

## 2025-02-19 RX ORDER — PANTOPRAZOLE SODIUM 40 MG/1
40 TABLET, DELAYED RELEASE ORAL
Qty: 90 TABLET | Refills: 0 | OUTPATIENT
Start: 2025-02-19

## 2025-02-19 RX ORDER — FERROUS SULFATE 325(65) MG
325 TABLET ORAL
Qty: 180 TABLET | Refills: 2 | Status: SHIPPED | OUTPATIENT
Start: 2025-02-19

## 2025-02-19 RX ORDER — ATORVASTATIN CALCIUM 80 MG/1
80 TABLET, FILM COATED ORAL DAILY
Qty: 90 TABLET | Refills: 3 | Status: SHIPPED | OUTPATIENT
Start: 2025-02-19

## 2025-02-19 RX ORDER — OMEPRAZOLE 20 MG/1
20 CAPSULE, DELAYED RELEASE ORAL
Qty: 180 CAPSULE | Refills: 3 | Status: SHIPPED | OUTPATIENT
Start: 2025-02-19

## 2025-02-19 NOTE — TELEPHONE ENCOUNTER
Care Due:                  Date            Visit Type   Department     Provider  --------------------------------------------------------------------------------                                [facility] INTERNAL  Last Visit: 12-      FOLLOW UP    MEDICINE       Jeannette Rosario  Next Visit: None Scheduled  None         None Found                                                            Last  Test          Frequency    Reason                     Performed    Due Date  --------------------------------------------------------------------------------    Lipid Panel.  12 months..  atorvastatin.............  01- 01-    Vitamin D...  12 months..  cholecalciferol,.........  05- 05-    [employer] Embedded Care Due Messages. Reference number: [id number].   2/19/2025 1:46:11 PM CST

## 2025-02-24 DIAGNOSIS — Z00.00 ENCOUNTER FOR MEDICARE ANNUAL WELLNESS EXAM: ICD-10-CM

## 2025-03-07 ENCOUNTER — HOSPITAL ENCOUNTER (EMERGENCY)
Facility: HOSPITAL | Age: 75
Discharge: HOME | End: 2025-03-07
Attending: EMERGENCY MEDICINE
Payer: MEDICARE

## 2025-03-07 VITALS
WEIGHT: 123 LBS | SYSTOLIC BLOOD PRESSURE: 114 MMHG | OXYGEN SATURATION: 100 % | HEART RATE: 78 BPM | HEIGHT: 61 IN | BODY MASS INDEX: 23.22 KG/M2 | DIASTOLIC BLOOD PRESSURE: 64 MMHG | TEMPERATURE: 99 F | RESPIRATION RATE: 18 BRPM

## 2025-03-07 DIAGNOSIS — K20.90 ESOPHAGITIS: ICD-10-CM

## 2025-03-07 DIAGNOSIS — R10.13 EPIGASTRIC PAIN: ICD-10-CM

## 2025-03-07 DIAGNOSIS — K29.70 GASTRITIS, PRESENCE OF BLEEDING UNSPECIFIED, UNSPECIFIED CHRONICITY, UNSPECIFIED GASTRITIS TYPE: Primary | ICD-10-CM

## 2025-03-07 LAB
ALBUMIN SERPL BCP-MCNC: 4 G/DL (ref 3.5–5.2)
ALP SERPL-CCNC: 57 U/L (ref 40–150)
ALT SERPL W/O P-5'-P-CCNC: 17 U/L (ref 10–44)
ANION GAP SERPL CALC-SCNC: 10 MMOL/L (ref 8–16)
AST SERPL-CCNC: 14 U/L (ref 10–40)
BASOPHILS # BLD AUTO: 0.01 K/UL (ref 0–0.2)
BASOPHILS NFR BLD: 0.1 % (ref 0–1.9)
BILIRUB SERPL-MCNC: 0.8 MG/DL (ref 0.1–1)
BUN SERPL-MCNC: 17 MG/DL (ref 8–23)
CALCIUM SERPL-MCNC: 10 MG/DL (ref 8.7–10.5)
CHLORIDE SERPL-SCNC: 107 MMOL/L (ref 95–110)
CO2 SERPL-SCNC: 21 MMOL/L (ref 23–29)
CREAT SERPL-MCNC: 1 MG/DL (ref 0.5–1.4)
DIFFERENTIAL METHOD BLD: ABNORMAL
EOSINOPHIL # BLD AUTO: 0 K/UL (ref 0–0.5)
EOSINOPHIL NFR BLD: 0.3 % (ref 0–8)
ERYTHROCYTE [DISTWIDTH] IN BLOOD BY AUTOMATED COUNT: 14.9 % (ref 11.5–14.5)
EST. GFR  (NO RACE VARIABLE): 59 ML/MIN/1.73 M^2
GLUCOSE SERPL-MCNC: 83 MG/DL (ref 70–110)
HCT VFR BLD AUTO: 42.7 % (ref 37–48.5)
HCV AB SERPL QL IA: NEGATIVE
HGB BLD-MCNC: 13.9 G/DL (ref 12–16)
HIV 1+2 AB+HIV1 P24 AG SERPL QL IA: NEGATIVE
IMM GRANULOCYTES # BLD AUTO: 0.03 K/UL (ref 0–0.04)
IMM GRANULOCYTES NFR BLD AUTO: 0.4 % (ref 0–0.5)
LIPASE SERPL-CCNC: 23 U/L (ref 4–60)
LYMPHOCYTES # BLD AUTO: 1.2 K/UL (ref 1–4.8)
LYMPHOCYTES NFR BLD: 17.5 % (ref 18–48)
MCH RBC QN AUTO: 27.7 PG (ref 27–31)
MCHC RBC AUTO-ENTMCNC: 32.6 G/DL (ref 32–36)
MCV RBC AUTO: 85 FL (ref 82–98)
MONOCYTES # BLD AUTO: 0.5 K/UL (ref 0.3–1)
MONOCYTES NFR BLD: 7.2 % (ref 4–15)
NEUTROPHILS # BLD AUTO: 5.3 K/UL (ref 1.8–7.7)
NEUTROPHILS NFR BLD: 74.5 % (ref 38–73)
NRBC BLD-RTO: 0 /100 WBC
PLATELET # BLD AUTO: 264 K/UL (ref 150–450)
PMV BLD AUTO: 9.7 FL (ref 9.2–12.9)
POTASSIUM SERPL-SCNC: 3.8 MMOL/L (ref 3.5–5.1)
PROT SERPL-MCNC: 7.9 G/DL (ref 6–8.4)
RBC # BLD AUTO: 5.01 M/UL (ref 4–5.4)
SODIUM SERPL-SCNC: 138 MMOL/L (ref 136–145)
TROPONIN I SERPL DL<=0.01 NG/ML-MCNC: 0.01 NG/ML (ref 0–0.03)
WBC # BLD AUTO: 7.1 K/UL (ref 3.9–12.7)

## 2025-03-07 PROCEDURE — 85025 COMPLETE CBC W/AUTO DIFF WBC: CPT | Performed by: PHYSICIAN ASSISTANT

## 2025-03-07 PROCEDURE — 96361 HYDRATE IV INFUSION ADD-ON: CPT

## 2025-03-07 PROCEDURE — 36415 COLL VENOUS BLD VENIPUNCTURE: CPT | Performed by: PHYSICIAN ASSISTANT

## 2025-03-07 PROCEDURE — 25000003 PHARM REV CODE 250: Performed by: PHYSICIAN ASSISTANT

## 2025-03-07 PROCEDURE — 63600175 PHARM REV CODE 636 W HCPCS: Performed by: PHYSICIAN ASSISTANT

## 2025-03-07 PROCEDURE — 86803 HEPATITIS C AB TEST: CPT | Performed by: EMERGENCY MEDICINE

## 2025-03-07 PROCEDURE — 83690 ASSAY OF LIPASE: CPT | Performed by: PHYSICIAN ASSISTANT

## 2025-03-07 PROCEDURE — 99285 EMERGENCY DEPT VISIT HI MDM: CPT | Mod: 25

## 2025-03-07 PROCEDURE — 96374 THER/PROPH/DIAG INJ IV PUSH: CPT

## 2025-03-07 PROCEDURE — 84484 ASSAY OF TROPONIN QUANT: CPT | Performed by: PHYSICIAN ASSISTANT

## 2025-03-07 PROCEDURE — 25500020 PHARM REV CODE 255

## 2025-03-07 PROCEDURE — 93005 ELECTROCARDIOGRAM TRACING: CPT

## 2025-03-07 PROCEDURE — 87389 HIV-1 AG W/HIV-1&-2 AB AG IA: CPT | Performed by: EMERGENCY MEDICINE

## 2025-03-07 PROCEDURE — 96375 TX/PRO/DX INJ NEW DRUG ADDON: CPT

## 2025-03-07 PROCEDURE — 80053 COMPREHEN METABOLIC PANEL: CPT | Performed by: PHYSICIAN ASSISTANT

## 2025-03-07 RX ORDER — SUCRALFATE 1 G/10ML
1 SUSPENSION ORAL EVERY 6 HOURS PRN
Qty: 414 ML | Refills: 0 | Status: SHIPPED | OUTPATIENT
Start: 2025-03-07

## 2025-03-07 RX ORDER — PANTOPRAZOLE SODIUM 40 MG/10ML
40 INJECTION, POWDER, LYOPHILIZED, FOR SOLUTION INTRAVENOUS
Status: COMPLETED | OUTPATIENT
Start: 2025-03-07 | End: 2025-03-07

## 2025-03-07 RX ORDER — MORPHINE SULFATE 2 MG/ML
2 INJECTION, SOLUTION INTRAMUSCULAR; INTRAVENOUS
Refills: 0 | Status: COMPLETED | OUTPATIENT
Start: 2025-03-07 | End: 2025-03-07

## 2025-03-07 RX ORDER — ONDANSETRON HYDROCHLORIDE 2 MG/ML
4 INJECTION, SOLUTION INTRAVENOUS
Status: COMPLETED | OUTPATIENT
Start: 2025-03-07 | End: 2025-03-07

## 2025-03-07 RX ORDER — FAMOTIDINE 10 MG/ML
20 INJECTION, SOLUTION INTRAVENOUS
Status: COMPLETED | OUTPATIENT
Start: 2025-03-07 | End: 2025-03-07

## 2025-03-07 RX ORDER — SUCRALFATE 1 G/10ML
1 SUSPENSION ORAL ONCE
Status: COMPLETED | OUTPATIENT
Start: 2025-03-07 | End: 2025-03-07

## 2025-03-07 RX ORDER — ONDANSETRON 4 MG/1
4 TABLET, ORALLY DISINTEGRATING ORAL EVERY 6 HOURS PRN
Qty: 20 TABLET | Refills: 0 | Status: SHIPPED | OUTPATIENT
Start: 2025-03-07

## 2025-03-07 RX ADMIN — ONDANSETRON 4 MG: 2 INJECTION INTRAMUSCULAR; INTRAVENOUS at 10:03

## 2025-03-07 RX ADMIN — IOHEXOL 75 ML: 350 INJECTION, SOLUTION INTRAVENOUS at 10:03

## 2025-03-07 RX ADMIN — SODIUM CHLORIDE 500 ML: 9 INJECTION, SOLUTION INTRAVENOUS at 10:03

## 2025-03-07 RX ADMIN — PANTOPRAZOLE SODIUM 40 MG: 40 INJECTION, POWDER, FOR SOLUTION INTRAVENOUS at 10:03

## 2025-03-07 RX ADMIN — FAMOTIDINE 20 MG: 10 INJECTION, SOLUTION INTRAVENOUS at 10:03

## 2025-03-07 RX ADMIN — MORPHINE SULFATE 2 MG: 2 INJECTION, SOLUTION INTRAMUSCULAR; INTRAVENOUS at 12:03

## 2025-03-07 RX ADMIN — SUCRALFATE ORAL SUSPENSION 1 G: 1 SUSPENSION ORAL at 12:03

## 2025-03-07 NOTE — ED PROVIDER NOTES
"Encounter Date: 3/7/2025       History     Chief Complaint   Patient presents with    Abdominal Pain     " Hernia problem "     Jasson Pineda is a 74 y.o. female presenting for evaluation of upper abdominal pain, persistent and worsening over the last couple of days.  Patient states she has a history of similar symptoms and has been diagnosed with a hiatal hernia.  She has taken regularly prescribed medication at home, including Protonix, with little improvement.  No fever, no chills.  No nausea or vomiting.  No diarrhea.  She has been trying to get in with gastroenterology and has not had an upper endoscopy recently.  She does have a history of previous esophageal dilatation.  No chest pain or palpitations.  She has a past medical history of Anticoagulant long-term use, Chronic back pain, Chronic neck pain, DVT (deep venous thrombosis), Herniated disc, cervical, Kidney stone, Lumbar herniated disc, PE (pulmonary embolism), Pulmonary emboli, and Pulmonary embolism.      The history is provided by the patient.     Review of patient's allergies indicates:   Allergen Reactions    Sulfa (sulfonamide antibiotics) Rash     Past Medical History:   Diagnosis Date    Anticoagulant long-term use     xarelto    Chronic back pain     Chronic neck pain     DVT (deep venous thrombosis)     Herniated disc, cervical     Kidney stone     Lumbar herniated disc     PE (pulmonary embolism)     2013    Pulmonary emboli     after surgery    Pulmonary embolism      Past Surgical History:   Procedure Laterality Date    ARTHROSCOPIC REPAIR OF ROTATOR CUFF OF SHOULDER Left 06/11/2019    Procedure: REPAIR, ROTATOR CUFF, ARTHROSCOPIC;  Surgeon: Sedrick Patel MD;  Location: [facility] OR;  Service: Orthopedics;  Laterality: Left;    ARTHROSCOPIC TENOTOMY OF BICEPS TENDON Left 06/11/2019    Procedure: TENOTOMY, BICEPS, ARTHROSCOPIC;  Surgeon: Sedrick Patel MD;  Location: [facility] OR;  Service: Orthopedics;  Laterality: Left;    ARTHROSCOPY " OF SHOULDER WITH REMOVAL OF DISTAL CLAVICLE Left 2019    Procedure: ARTHROSCOPY, SHOULDER, WITH DISTAL CLAVICLE EXCISION;  Surgeon: Sedrick Patel MD;  Location: [facility] OR;  Service: Orthopedics;  Laterality: Left;    BREAST CYST EXCISION Left      SECTION      x1    COLONOSCOPY N/A 2018    Procedure: COLONOSCOPY;  Surgeon: Armand Foy MD;  Location: [facility] (4TH FLR);  Service: Endoscopy;  Laterality: N/A;    EPIDURAL STEROID INJECTION Bilateral 2023    Procedure: TRANSFORAMINAL EPIDURAL INJECTION, T6-T7 clear to hold Xarelto 2 days;  Surgeon: Bill Easley MD;  Location: [facility] PAIN MGT;  Service: Pain Management;  Laterality: Bilateral;    ESOPHAGEAL DILATION N/A 2024    Procedure: DILATION, ESOPHAGUS;  Surgeon: James Burgos MD;  Location: [facility] 2ND FLR;  Service: ENT;  Laterality: N/A;  INFINITY BALLOONS    ESOPHAGEAL MANOMETRY WITH MEASUREMENT OF IMPEDANCE N/A 2018    Procedure: MANOMETRY, ESOPHAGEAL, WITH IMPEDANCE MEASUREMENT;  Surgeon: Armand Foy MD;  Location: [facility] (4TH FLR);  Service: Endoscopy;  Laterality: N/A;    ESOPHAGEAL MANOMETRY WITH MEASUREMENT OF IMPEDANCE N/A 2024    Procedure: MANOMETRY, ESOPHAGUS, WITH IMPEDANCE MEASUREMENT;  Surgeon: Armand Foy MD;  Location: [facility] (4TH FLR);  Service: Endoscopy;  Laterality: N/A;  referral dr foy/ prep inst given in office / xarelto  -pre call complete-GT  Dr. Gooden on vacation, Dr. Rhoades to read-vt    ESOPHAGOGASTRODUODENOSCOPY N/A 2018    Procedure: EGD (ESOPHAGOGASTRODUODENOSCOPY);  Surgeon: Armand Foy MD;  Location: [facility] ENDO (4TH FLR);  Service: Endoscopy;  Laterality: N/A;  pt currently on Lovenox and Xarelto - ok per Dr. Doty to hold Plavix 2 days prior and Lovenox 24hr prior to case/see scanned media / for documentation of hold -- SM        ESOPHAGOGASTRODUODENOSCOPY N/A 2022    Procedure: EGD (ESOPHAGOGASTRODUODENOSCOPY);  Surgeon: Armand LOPEZ  MD Law;  Location: [facility] ENDO (2ND FLR);  Service: Endoscopy;  Laterality: N/A;  okay to hold Xarelto for 2 days per Dr. Doty with lovenox bridgings - see note on 3/21/22  2nd floor for ASAP availabilty  fully vaccinated - sm    EXTRACORPOREAL SHOCK WAVE LITHOTRIPSY Left 02/09/2021    Procedure: LITHOTRIPSY-EXTRACORPOREAL SHOCK WAVE;  Surgeon: Jeremias Eric MD;  Location: [facility] OR;  Service: Urology;  Laterality: Left;    HERNIA REPAIR      umbilical    KIDNEY STONE SURGERY      ureteral stent    left knee surgery      SHOULDER ARTHROSCOPY Left 06/11/2019    Procedure: ARTHROSCOPY, SHOULDER;  Surgeon: Sedrick Patel MD;  Location: [facility] OR;  Service: Orthopedics;  Laterality: Left;  BLOCK    TONSILLECTOMY      TOTAL SHOULDER ARTHROPLASTY Right      Family History   Problem Relation Name Age of Onset    Colon cancer Mother  74    Ovarian cancer Mother      No Known Problems Father      Liver cancer Sister Fabby     Diabetes Sister Fabby     Arthritis Sister Brandie     Lupus Sister Brandie     Lupus Daughter Dana     No Known Problems Daughter Geri     Breast cancer Maternal Aunt  50        50s    Cervical cancer Maternal Aunt      Liver cancer Maternal Grandfather      Breast cancer Maternal Cousin  45    Esophageal cancer Neg Hx       Social History[1]  Review of Systems   Constitutional:  Negative for chills and fever.   Respiratory:  Negative for cough, chest tightness, shortness of breath and wheezing.    Cardiovascular:  Negative for chest pain and palpitations.   Gastrointestinal:  Positive for abdominal pain. Negative for constipation, diarrhea, nausea and vomiting.   Genitourinary:  Negative for dysuria and hematuria.   Musculoskeletal:  Negative for arthralgias, back pain, joint swelling, myalgias, neck pain and neck stiffness.   Skin:  Negative for color change, pallor, rash and wound.   Neurological:  Negative for weakness and numbness.   Hematological:  Does not bruise/bleed easily.        Physical Exam     Initial Vitals [03/07/25 0901]   BP Pulse Resp Temp SpO2   (!) 109/55 91 20 98.7 °F (37.1 °C) 96 %      MAP       --         Physical Exam    Nursing note and vitals reviewed.  Constitutional: She appears well-developed and well-nourished. She is not diaphoretic. No distress.   HENT:   Head: Normocephalic and atraumatic. Mouth/Throat: Oropharynx is clear and moist.   Eyes: Conjunctivae are normal.   Neck: Neck supple.   Normal range of motion.  Cardiovascular:  Normal rate, regular rhythm, normal heart sounds and intact distal pulses.     Exam reveals no gallop and no friction rub.       No murmur heard.  Pulmonary/Chest: Breath sounds normal. No respiratory distress. She has no wheezes. She has no rhonchi. She has no rales.   Abdominal: Abdomen is soft. She exhibits no distension and no mass. There is abdominal tenderness.   Tenderness to palpation noted to epigastric region.   Musculoskeletal:         General: No tenderness or edema. Normal range of motion.      Cervical back: Normal range of motion and neck supple.     Neurological: She is alert and oriented to person, place, and time. She has normal strength. No sensory deficit.   Skin: Skin is warm and dry. No rash and no abscess noted. No erythema.   Psychiatric: She has a normal mood and affect.         ED Course   Procedures  Labs Reviewed   CBC W/ AUTO DIFFERENTIAL - Abnormal       Result Value    WBC 7.10      RBC 5.01      Hemoglobin 13.9      Hematocrit 42.7      MCV 85      MCH 27.7      MCHC 32.6      RDW 14.9 (*)     Platelets 264      MPV 9.7      Immature Granulocytes 0.4      Gran # (ANC) 5.3      Immature Grans (Abs) 0.03      Lymph # 1.2      Mono # 0.5      Eos # 0.0      Baso # 0.01      nRBC 0      Gran % 74.5 (*)     Lymph % 17.5 (*)     Mono % 7.2      Eosinophil % 0.3      Basophil % 0.1      Differential Method Automated     COMPREHENSIVE METABOLIC PANEL - Abnormal    Sodium 138      Potassium 3.8      Chloride 107       CO2 21 (*)     Glucose 83      BUN 17      Creatinine 1.0      Calcium 10.0      Total Protein 7.9      Albumin 4.0      Total Bilirubin 0.8      Alkaline Phosphatase 57      AST 14      ALT 17      eGFR 59 (*)     Anion Gap 10     HEPATITIS C ANTIBODY    Hepatitis C Ab Negative      Narrative:     Release to patient->Immediate   HIV 1 / 2 ANTIBODY    HIV 1/2 Ag/Ab Negative      Narrative:     Release to patient->Immediate   LIPASE    Lipase 23     TROPONIN I    Troponin I 0.006            Imaging Results              CT Abdomen Pelvis With IV Contrast NO Oral Contrast (Final result)  Result time 03/07/25 11:31:40      Final result by Kenn Kelley Jr., MD (03/07/25 11:31:40)                   Impression:      2 left intrarenal stones measuring 5 mm and 6 mm without hydronephrosis.  Mild diverticulosis coli.  Otherwise negative CT of the abdomen and pelvis.      Electronically signed by: Kenn Kelley MD  Date:    03/07/2025  Time:    11:31               Narrative:    EXAMINATION:  CT ABDOMEN PELVIS WITH IV CONTRAST    CLINICAL HISTORY:  Epigastric pain;hx hiatal hernia;    TECHNIQUE:  Low dose axial images, sagittal and coronal reformations were obtained from the lung bases to the pubic symphysis following the IV administration of 75 mL of Omnipaque 350    COMPARISON:  CT abdomen pelvis of December 8, 2024    FINDINGS:  The liver is of normal size contour and CT density without a focal mass.  The gallbladder is of normal size without CT evidence of stone.  The pancreas is of normal contour and CT density without edema or mass.  The spleen is small and of normal CT density.    The adrenal glands are not enlarged.  The kidneys are of normal size contour and contrast enhancement.  There are 2 stones identified in the lower pole calyx of the left kidney measuring 6 mm and 5 mm unchanged from the prior study.  Stones in the right kidney are not seen.  Hydronephrosis is not identified and the ureters follow  a normal course down to the bladder without dilation or distal stone.  The abdominal aorta and inferior vena cava are of normal caliber.    The stomach is of normal configuration.  Small bowel dilatation or air-fluid levels are not seen.  The colon appears of normal configuration without distention or mass.  Few small diverticuli are noted in the descending and sigmoid colon.  Free fluid or free air is not seen.  A short normal appendix is noted.    The  bladder is of normal contour without focal mass.  The uterus is not enlarged.  Free fluid in the pelvis is not seen.                                       Medications   sodium chloride 0.9% bolus 500 mL 500 mL (0 mLs Intravenous Stopped 3/7/25 1107)   famotidine (PF) injection 20 mg (20 mg Intravenous Given 3/7/25 1007)   ondansetron injection 4 mg (4 mg Intravenous Given 3/7/25 1007)   pantoprazole injection 40 mg (40 mg Intravenous Given 3/7/25 1007)   iohexoL (OMNIPAQUE 350) 350 mg iodine/mL injection (75 mLs Intravenous Given 3/7/25 1058)   sucralfate 100 mg/mL suspension 1 g (1 g Oral Given 3/7/25 1211)   morphine injection 2 mg (2 mg Intravenous Given 3/7/25 1211)     Medical Decision Making  Differential diagnosis:  Gastritis  SBO  Hiatal hernia  Esophageal narrowing  ACS    Pt emergently evaluated here in the ED.    EKG shows normal sinus rhythm with a rate of 73.  No evidence for acute arrhythmia or ischemia.  Labs are stable without leukocytosis.  Normal electrolytes, renal function, LFTs.  Normal lipase.  Troponin negative.  CT abdomen and pelvis shows no evidence for acute intra-abdominal processes.  She has noticed mild symptomatic improvement with medications given here in the emergency department.  Symptoms likely related to underlying gastritis and/or esophagitis, given the location of the pain.  She is given a referral to Gastroenterology here on the [facility].  She is encouraged to continue taking her medication as previously prescribed at home.   She voices understanding is agreeable with the plan.  She is given specific return precautions.    Amount and/or Complexity of Data Reviewed  Labs: ordered. Decision-making details documented in ED Course.  Radiology: ordered. Decision-making details documented in ED Course.  ECG/medicine tests: ordered. Decision-making details documented in ED Course.    Risk  OTC drugs.  Prescription drug management.              Attending Attestation:     Physician Attestation Statement for NP/PA:       Other NP/PA Attestation Additions:    History of Present Illness: 74-year-old female presented for upper abdominal pain.    Medical Decision Making: Initial differential diagnosis included but not limited to gastritis, esophagitis, and hiatal hernia.  I am in agreement with the physician assistant's  assessment, treatment, and plan of care.                                    Clinical Impression:  Final diagnoses:  [R10.13] Epigastric pain  [K29.70] Gastritis, presence of bleeding unspecified, unspecified chronicity, unspecified gastritis type (Primary)  [K20.90] Esophagitis          ED Disposition Condition    Discharge Stable          ED Prescriptions       Medication Sig Dispense Start Date End Date Auth. Provider    sucralfate (CARAFATE) 100 mg/mL suspension Take 10 mLs (1 g total) by mouth every 6 (six) hours as needed. 414 mL 3/7/2025 -- Kaur Patton PA-C    ondansetron (ZOFRAN-ODT) 4 MG TbDL Take 1 tablet (4 mg total) by mouth every 6 (six) hours as needed (nausea and vomiting). 20 tablet 3/7/2025 -- Kaur Patton PA-C          Follow-up Information       Follow up With Specialties Details Why Contact Info Additional Information    [facility] -  Emergency Medicine  As needed, If symptoms worsen [address] [place] [address] Dr [facility] [address] 1st floor    Maria C Cole MD Gastroenterology, Internal Medicine Schedule an appointment as soon as possible for a visit  for gastroenterology  evaluation [address] [facility]  [address]  [facility] [address]  [phone]       Praneeth Jarrell III, MD Gastroenterology Schedule an appointment as soon as possible for a visit  for gastroenterology evaluation [address] [facility] [address]  [phone]                Kaur Patton PA-C  03/07/25 1519       Kaur Patton PA-C  03/07/25 1519         [1]   Social History  Tobacco Use    Smoking status: Never    Smokeless tobacco: Never   Substance Use Topics    Alcohol use: No    Drug use: No        Rogre Cabral MD  03/07/25 1523

## 2025-03-13 ENCOUNTER — OUTPATIENT CASE MANAGEMENT (OUTPATIENT)
Dept: ADMINISTRATIVE | Facility: OTHER | Age: 75
End: 2025-03-13
Payer: MEDICARE

## 2025-03-13 NOTE — LETTER
Jasson Pineda  [address] [address]      Dear Jasson Pineda,     I am your nurse with [facility]s Outpatient Care Management Department. I was unsuccessful in reaching you today. At your earliest convenience, I would like to discuss your healthcare progress.      Please contact me at [phone] from 8:00AM to 4:30 PM on Monday thru Friday.     As you know, [facility] is a program offered to you through [facility] where a nurse is available 24/7 to answer questions or provide medical advice, their number is [phone].    Thanks,    Fartun Pichardo RN  Outpatient Care Management

## 2025-03-25 NOTE — PROGRESS NOTES
Subjective:       Patient ID: Jasson Pineda is a 74 y.o. female.    Chief Complaint: Lump in between breast  (Painful to touch and migrate to pt back. )      Chronic positional pains persist.     08/05/24 Normal esophageal manometry study    ### atypical CP ###  Consistently occurs when she lies down on her back she get back pain penetrating to her chest. She can not sleep on her elft side due to shouilder pain. She sleep on her righ side. When she lies flat on her back she develops a sharp, intense pain that radiates to her chest. No associated PND, orthopnea, wheezing, SOB, LE edema.   Was seen by cardiology and had holter done 07/27/2021 that was normal. Does not repsond to albuterol.  Normal exercise stress test July 2018. Hx of recurrent PE.   -5/18/22 IM CV previously referred to back and spine as I suspect atypical, positional chest is thoracic or paraspinal.  Only members have told her she has been forgetful. Once she is prompted she is able to recall. She lives alone, often helped takes care of her knees, able to maintain her finances and pay bills without any difficulty, armani [place], Dr.. Capable of full ADLs. She takes tizanidine 4 mg q.h.s. along with tramadol 50 mg b.i.d. pregabalin 150 mg b.i.d. and oxybutynin 10 mg. These medications and their side effects were discussed in the context of forgetfulness.   -08/2022 Reports having recent injections with zero change in symptoms. This was approx one month prior.   -She is s/p rectus sheath block on 3/1/2023. She reports 100% relief for 2 days. Her pain then returned to baseline. SPECt scan was normal        ### Nephrolithiasis -- OAB ###  Gross hematuria recurrent and urinary urgency.   12/22/20 CT urogram normal.   CTU shows 9mm non-obstructing left renal stone.   02/09/2021 Left ESWL   OAB She began ditropan 5 mg XL daily    5/18/22 oxybutynin has been increased to 10mg     ### Dysphagia ###  7/2018 normal EGD by Dr Foy at [facility] [place]-  07/2018  "manometry "Ineffective esophageal motility (IEM: 50% or more ineffective swallows with DCI less than 450 mmHg/sec/cm).  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 There is a lower esophageal Schatzki's ring.   10/2019 ENT Dr Burgos "Variably obstructive CP bar, but symptoms are primarily thoracic/epigastic."  12/2019 GI clinic visit "This could be functional.  Other considerations could be untreated reflux or even esophageal dysmotility."  She continues to have periodic dysphagia to solids requiring her to drink a glass of water to get down.  No hx of recurrent oral ulcers, skin changes, Hx of misscarriages, chronic loose stools or abdomina pains, diffuse arthralgias, Hx of pericarditis. She is on lexapro and lyrica. Mood improved an is interested in weaning from this.   1/15/21 IM CV no acute issues  3[place]28[place]20[place] [facility] [place] aside from 1 cm type-I sliding hiatal hernia   06/2022 CV Dr Foy writes "Continue with current management and use of omeprazole 20 mg twice daily.  Will give more time for the medication to work.  No further intervention at this time.  Follow up with her pain specialist as planned. Follow-up with me in 2-3 months."  08/2022 We stopped her SSRI for >6 months and no change in GI symptoms  5/25/23 continues to endorse GE junction dysphagia with pain. Has resorted to eating only soups and jello for past 2 weeks.  Plan for video pill endocsopy           ### Recurrent PE ###  Anticoagulation managed by Dr shaikh in cardiology.  No bleeding complications.   All chronic symptoms of positional CP when she lies flat on her back, forgetfulness and anxiety, dysphagia to solids, SPICER (sedenatary).  She is s/p rectus sheath block with short term relief.         osteopenia        History of Present Illness              Review of Systems   Constitutional:  Negative for chills, fatigue, fever and unexpected weight change.   HENT:  Negative for ear pain, hearing " "loss, postnasal drip, tinnitus, trouble swallowing and voice change.    Respiratory:  Negative for cough, chest tightness, shortness of breath and wheezing.    Cardiovascular:  Negative for palpitations and leg swelling.   Gastrointestinal:  Negative for abdominal pain, blood in stool, diarrhea, nausea and vomiting.   Endocrine: Negative for polydipsia, polyphagia and polyuria.   Genitourinary:  Negative for difficulty urinating, dysuria, hematuria and vaginal bleeding.   Skin:  Negative for rash.   Allergic/Immunologic: Negative for food allergies.   Neurological:  Negative for dizziness, numbness and headaches.   Hematological:  Does not bruise/bleed easily.   Psychiatric/Behavioral:  The patient is not nervous/anxious.        Objective:      Vitals:    02/19/25 1556   BP: 102/60   BP Location: Left arm   Patient Position: Sitting   Pulse: 108   SpO2: 98%   Weight: 55.8 kg (123 lb 0.3 oz)   Height: 5' 1" (1.549 m)      Physical Exam  Vitals and nursing note reviewed.   Constitutional:       General: She is not in acute distress.     Appearance: Normal appearance. She is well-developed.   HENT:      Head: Normocephalic and atraumatic.      Mouth/Throat:      Pharynx: No oropharyngeal exudate.   Eyes:      General: No scleral icterus.     Conjunctiva/sclera: Conjunctivae normal.      Pupils: Pupils are equal, round, and reactive to light.   Neck:      Thyroid: No thyromegaly.   Cardiovascular:      Rate and Rhythm: Normal rate and regular rhythm.      Heart sounds: Normal heart sounds. No murmur heard.  Pulmonary:      Effort: Pulmonary effort is normal.      Breath sounds: Normal breath sounds. No wheezing or rales.   Abdominal:      General: There is no distension.   Musculoskeletal:         General: No tenderness.   Lymphadenopathy:      Cervical: No cervical adenopathy.   Skin:     General: Skin is warm and dry.   Neurological:      Mental Status: She is alert and oriented to person, place, and time. "   Psychiatric:         Behavior: Behavior normal.         Assessment:       1. Chest pain, unspecified type    2. Gastroesophageal reflux disease    3. Dysphagia, unspecified type    4. Other specified coagulation defects    5. Esophageal dysphagia    6. Other chronic pulmonary embolism without acute cor pulmonale        Plan:       Jasson was seen today for lump in between breast .    Diagnoses and all orders for this visit:    Chest pain, unspecified type   Chronic and unchanged,   -     atorvastatin (LIPITOR) 80 MG tablet; Take 1 tablet (80 mg total) by mouth once daily.    Gastroesophageal reflux disease  -     atorvastatin (LIPITOR) 80 MG tablet; Take 1 tablet (80 mg total) by mouth once daily.    Dysphagia, unspecified type  -     atorvastatin (LIPITOR) 80 MG tablet; Take 1 tablet (80 mg total) by mouth once daily.  -     pantoprazole (PROTONIX) 40 MG tablet; Take 1 tablet (40 mg total) by mouth once daily.    Other specified coagulation defects  -     atorvastatin (LIPITOR) 80 MG tablet; Take 1 tablet (80 mg total) by mouth once daily.    Esophageal dysphagia  -     omeprazole (PRILOSEC) 20 MG capsule; Take 1 capsule (20 mg total) by mouth 2 (two) times daily before meals.    Other chronic pulmonary embolism without acute cor pulmonale    Other orders  -     cholecalciferol, vitamin D3, 1,250 mcg (50,000 unit) capsule; Take 1 capsule (50,000 Units total) by mouth once a week.  -     ferrous sulfate (FEOSOL) 325 mg (65 mg iron) Tab tablet; Take 1 tablet (325 mg total) by mouth daily with breakfast.       Visit today is associated with current or anticipated ongoing medical care related to this patient's single serious condition/complex condition of Recurrent PE. The patient will return to see me as these issues will be followed longitudinally.      Assessment & Plan              This note was generated with the assistance of ambient listening technology. Verbal consent was obtained by the patient and  accompanying visitor(s) for the recording of patient appointment to facilitate this note. I attest to having reviewed and edited the generated note for accuracy, though some syntax or spelling errors may persist. Please contact the author of this note for any clarification.    Visit today is associated with current or anticipated ongoing medical care related to this patient's single serious condition/complex condition of recurrent PE. The patient will return to see me as these issues will be followed longitudinally.      Osbaldo Mendez MD  Internal Medicine-[facility]        Side effects of medication(s) were discussed in detail and patient voiced understanding.  Patient will call back for any issues or complications.

## 2025-04-11 ENCOUNTER — OUTPATIENT CASE MANAGEMENT (OUTPATIENT)
Dept: ADMINISTRATIVE | Facility: OTHER | Age: 75
End: 2025-04-11
Payer: MEDICARE

## 2025-04-11 NOTE — PROGRESS NOTES
Outpatient Care Management  Plan of Care Follow Up Visit    Patient: Jasson Pineda  MRN: [id number]  Date of Service: 04/11/2025  Completed by: Charlene Pichardo RN  Referral Date: 12/09/2024    Reason for Visit   Patient presents with    Case Closure       Brief Summary: 4/11/2025  Case closure, unable to reach patient.

## 2025-05-27 ENCOUNTER — OFFICE VISIT (OUTPATIENT)
Dept: CARDIOLOGY | Facility: CLINIC | Age: 75
End: 2025-05-27
Payer: MEDICARE

## 2025-05-27 VITALS
OXYGEN SATURATION: 98 % | SYSTOLIC BLOOD PRESSURE: 110 MMHG | WEIGHT: 137.56 LBS | DIASTOLIC BLOOD PRESSURE: 66 MMHG | BODY MASS INDEX: 25.99 KG/M2 | HEART RATE: 75 BPM

## 2025-05-27 DIAGNOSIS — E78.2 MIXED HYPERLIPIDEMIA: Primary | ICD-10-CM

## 2025-05-27 DIAGNOSIS — I27.82 CHRONIC PULMONARY EMBOLISM WITHOUT ACUTE COR PULMONALE, UNSPECIFIED PULMONARY EMBOLISM TYPE: ICD-10-CM

## 2025-05-27 DIAGNOSIS — I70.0 ATHEROSCLEROSIS OF AORTA: ICD-10-CM

## 2025-05-27 DIAGNOSIS — Z79.01 CURRENT USE OF LONG TERM ANTICOAGULATION: ICD-10-CM

## 2025-05-27 DIAGNOSIS — I77.1 TORTUOUS AORTA: ICD-10-CM

## 2025-05-27 PROCEDURE — 99213 OFFICE O/P EST LOW 20 MIN: CPT | Mod: PBBFAC,PN | Performed by: INTERNAL MEDICINE

## 2025-05-27 PROCEDURE — 99999 PR PBB SHADOW E&M-EST. PATIENT-LVL III: CPT | Mod: PBBFAC,,, | Performed by: INTERNAL MEDICINE

## 2025-05-27 PROCEDURE — 99214 OFFICE O/P EST MOD 30 MIN: CPT | Mod: S$PBB,,, | Performed by: INTERNAL MEDICINE

## 2025-05-27 NOTE — PROGRESS NOTES
Cardiology    5/27/2025  9:40 AM    Problem list  Problem List[1]    History of Present Illness    Ms. Pineda presents today for follow up She reports no improvement in symptoms following esophageal dilation procedure. She has not followed up with specialist due to scheduling constraints. She reports memory difficulties, noting she has to make conscious effort to think and recall information. She continues Xarelto and denies any bleeding problems.           Medications  Current Medications[2]   Prior to Admission medications    Medication Sig Start Date End Date Taking? Authorizing Provider   atorvastatin (LIPITOR) 80 MG tablet Take 1 tablet (80 mg total) by mouth once daily. 2/19/25  Yes Osbaldo Hall MD   cholecalciferol, vitamin D3, 1,250 mcg (50,000 unit) capsule Take 1 capsule (50,000 Units total) by mouth once a week. 2/19/25  Yes Osbaldo Hall MD   ferrous sulfate (FEOSOL) 325 mg (65 mg iron) Tab tablet Take 1 tablet (325 mg total) by mouth daily with breakfast. 2/19/25  Yes Osbaldo Hall MD   omeprazole (PRILOSEC) 20 MG capsule Take 1 capsule (20 mg total) by mouth 2 (two) times daily before meals. 2/19/25  Yes Osbaldo Hall MD   ondansetron (ZOFRAN-ODT) 4 MG TbDL Take 1 tablet (4 mg total) by mouth every 6 (six) hours as needed (nausea and vomiting). 3/7/25  Yes Kaur Patton PA-C   oxybutynin (DITROPAN-XL) 10 MG 24 hr tablet TAKE 1 TABLET BY MOUTH EVERY DAY 1/5/24  Yes Chantel Boyle NP   pantoprazole (PROTONIX) 40 MG tablet Take 1 tablet (40 mg total) by mouth once daily. 2/19/25  Yes Osbaldo Hall MD   pregabalin (LYRICA) 150 MG capsule Take 150 mg by mouth 2 (two) times daily.   Yes Provider, Historical   sucralfate (CARAFATE) 100 mg/mL suspension Take 10 mLs (1 g total) by mouth every 6 (six) hours as needed. 3/7/25  Yes Kaur Patton PA-C   tiZANidine (ZANAFLEX) 4 MG tablet Take 4 mg by mouth nightly as needed (Muscle Spasms). 3/21/22  Yes  Provider, Historical   traMADol (ULTRAM) 50 mg tablet Take 50 mg by mouth 2 (two) times daily as needed for Pain. 10/26/18  Yes Provider, Historical   XARELTO 20 mg Tab Take 1 tablet (20 mg total) by mouth every evening. 10/28/24  Yes Sotero Doty MD         History  Past Medical History:   Diagnosis Date    Anticoagulant long-term use     xarelto    Chronic back pain     Chronic neck pain     DVT (deep venous thrombosis)     Herniated disc, cervical     Kidney stone     Lumbar herniated disc     PE (pulmonary embolism)         Pulmonary emboli     after surgery    Pulmonary embolism      Past Surgical History:   Procedure Laterality Date    ARTHROSCOPIC REPAIR OF ROTATOR CUFF OF SHOULDER Left 2019    Procedure: REPAIR, ROTATOR CUFF, ARTHROSCOPIC;  Surgeon: Sedrick Patel MD;  Location: [facility];  Service: Orthopedics;  Laterality: Left;    ARTHROSCOPIC TENOTOMY OF BICEPS TENDON Left 2019    Procedure: TENOTOMY, BICEPS, ARTHROSCOPIC;  Surgeon: Sedrick Patel MD;  Location: [facility];  Service: Orthopedics;  Laterality: Left;    ARTHROSCOPY OF SHOULDER WITH REMOVAL OF DISTAL CLAVICLE Left 2019    Procedure: ARTHROSCOPY, SHOULDER, WITH DISTAL CLAVICLE EXCISION;  Surgeon: Sedrick Patel MD;  Location: [facility];  Service: Orthopedics;  Laterality: Left;    BREAST CYST EXCISION Left      SECTION      x1    COLONOSCOPY N/A 2018    Procedure: COLONOSCOPY;  Surgeon: Armand Foy MD;  Location: [facility] ENDO (4TH FLR);  Service: Endoscopy;  Laterality: N/A;    EPIDURAL STEROID INJECTION Bilateral 2023    Procedure: TRANSFORAMINAL EPIDURAL INJECTION, T6-T7 clear to hold Xarelto 2 days;  Surgeon: Bill Easley MD;  Location: [facility] [place] [facility] MGT;  Service: Pain Management;  Laterality: Bilateral;    ESOPHAGEAL DILATION N/A 2024    Procedure: DILATION, ESOPHAGUS;  Surgeon: James Burgos MD;  Location: [facility] OR 2ND FLR;  Service: ENT;  Laterality: N/A;  INFINITY BALLOONS     ESOPHAGEAL MANOMETRY WITH MEASUREMENT OF IMPEDANCE N/A 07/25/2018    Procedure: MANOMETRY, ESOPHAGEAL, WITH IMPEDANCE MEASUREMENT;  Surgeon: Armand Foy MD;  Location: [facility] ENDO (4TH FLR);  Service: Endoscopy;  Laterality: N/A;    ESOPHAGEAL MANOMETRY WITH MEASUREMENT OF IMPEDANCE N/A 7/31/2024    Procedure: MANOMETRY, ESOPHAGUS, WITH IMPEDANCE MEASUREMENT;  Surgeon: Armand Foy MD;  Location: [facility] ENDO (4TH FLR);  Service: Endoscopy;  Laterality: N/A;  referral dr foy/ prep inst given in office / xarelto  7/29-pre call complete-GT  Dr. Gooden on vacation, Dr. Rhoades to read-KPvt    ESOPHAGOGASTRODUODENOSCOPY N/A 07/17/2018    Procedure: EGD (ESOPHAGOGASTRODUODENOSCOPY);  Surgeon: Armand Foy MD;  Location: [facility] (4TH FLR);  Service: Endoscopy;  Laterality: N/A;  pt currently on Lovenox and Xarelto - ok per Dr. Doty to hold Plavix 2 days prior and Lovenox 24hr prior to case/see scanned media 7/9/ for documentation of hold -- SM        ESOPHAGOGASTRODUODENOSCOPY N/A 03/28/2022    Procedure: EGD (ESOPHAGOGASTRODUODENOSCOPY);  Surgeon: Armand Foy MD;  Location: [facility] (2ND FLR);  Service: Endoscopy;  Laterality: N/A;  okay to hold Xarelto for 2 days per Dr. Doty with lovenox bridgings - see note on 3/21/22  2nd floor for ASAP availabilty  fully vaccinated - sm    EXTRACORPOREAL SHOCK WAVE LITHOTRIPSY Left 02/09/2021    Procedure: LITHOTRIPSY-EXTRACORPOREAL SHOCK WAVE;  Surgeon: Jeremias Eric MD;  Location: [facility] OR;  Service: Urology;  Laterality: Left;    HERNIA REPAIR      umbilical    KIDNEY STONE SURGERY      ureteral stent    left knee surgery      SHOULDER ARTHROSCOPY Left 06/11/2019    Procedure: ARTHROSCOPY, SHOULDER;  Surgeon: Sedrick Patel MD;  Location: [facility] OR;  Service: Orthopedics;  Laterality: Left;  BLOCK    TONSILLECTOMY      TOTAL SHOULDER ARTHROPLASTY Right      Social History[3]      Allergies  Review of patient's allergies indicates:   Allergen Reactions     Sulfa (sulfonamide antibiotics) Rash         Review of Systems   Review of Systems   Constitutional: Negative for decreased appetite, fever and weight loss.   HENT:  Negative for congestion and nosebleeds.    Eyes:  Negative for double vision, vision loss in left eye, vision loss in right eye and visual disturbance.   Cardiovascular:  Negative for chest pain, claudication, cyanosis, dyspnea on exertion, irregular heartbeat, leg swelling, near-syncope, orthopnea, palpitations, paroxysmal nocturnal dyspnea and syncope.   Respiratory:  Negative for cough, hemoptysis, shortness of breath, sleep disturbances due to breathing, snoring, sputum production and wheezing.    Endocrine: Negative for cold intolerance and heat intolerance.   Skin:  Negative for nail changes and rash.   Musculoskeletal:  Negative for joint pain, muscle cramps, muscle weakness and myalgias.   Gastrointestinal:  Negative for change in bowel habit, heartburn, hematemesis, hematochezia, hemorrhoids and melena.   Neurological:  Negative for dizziness, focal weakness and headaches.         Physical Exam  Wt Readings from Last 1 Encounters:   05/27/25 62.4 kg (137 lb 9.1 oz)     BP Readings from Last 3 Encounters:   05/27/25 110/66   03/07/25 114/64   02/19/25 102/60     Pulse Readings from Last 1 Encounters:   05/27/25 75     Body mass index is 25.99 kg/m².    Physical Exam  Vitals reviewed.   Constitutional:       Appearance: She is well-developed.   Neck:      Vascular: No carotid bruit or JVD.   Cardiovascular:      Rate and Rhythm: Normal rate and regular rhythm.      Pulses:           Carotid pulses are 2+ on the right side and 2+ on the left side.       Radial pulses are 2+ on the right side and 2+ on the left side.        Dorsalis pedis pulses are 2+ on the right side and 2+ on the left side.      Heart sounds: Normal heart sounds, S1 normal and S2 normal.   Abdominal:      General: Bowel sounds are normal.   Musculoskeletal:      Thoracic back:  Tenderness present.   Neurological:      Mental Status: She is alert and oriented to person, place, and time.             Assessment  1. Mixed hyperlipidemia (Primary)  Stable on atorvastatin 80 mg, continue medications and monitor    2. Atherosclerosis of aorta  Stable    3. Tortuous aorta  Stable    4. Current use of long term anticoagulation  Stable, continue current Xarelto monitor    5. Chronic pulmonary embolism without acute cor pulmonale, unspecified pulmonary embolism type  Stable.  Continue current Xarelto and monitor        Plan and Discussion  She is stable from a cardiac standpoint.  Blood pressure is well controlled.  She is tolerating Xarelto.  She had labs done in March showed normal CBC and CMP.  Recommend to continue with current medications.    Follow Up  Six-month        Sotero Doty MD, F.A.C.C, F.S.C.A.I.      Total professional time spent for the encounter: 30 minutes  Time was spent preparing to see the patient, reviewing results of prior testing, obtaining and/or reviewing separately obtained history, performing a medically appropriate examination and interview, counseling and educating the patient/family, ordering medications/tests/procedures, referring and communicating with other health care professionals, documenting clinical information in the electronic health record, and independently interpreting results.    This note was generated with the assistance of ambient listening technology. Verbal consent was obtained by the patient and accompanying visitor(s) for the recording of patient appointment to facilitate this note. I attest to having reviewed and edited the generated note for accuracy, though some syntax or spelling errors may persist. Please contact the author of this note for any clarification.           [1]   Patient Active Problem List  Diagnosis    Kidney stone    Herniated disc, cervical    Complete rotator cuff tear or rupture of right shoulder, not specified as  traumatic    Shoulder arthritis    OAB (overactive bladder)    Body mass index (BMI) of 25.0 to 29.9    Atypical chest pain    Ineffective esophageal motility    SOB (shortness of breath)    Complete rotator cuff tear or rupture of left shoulder, not specified as traumatic    Impingement syndrome of left shoulder    Primary osteoarthritis of left shoulder    Labral tear of long head of biceps tendon, initial encounter    History of DVT of lower extremity    Chronic pulmonary embolism without acute cor pulmonale    Mixed hyperlipidemia    Nephrolithiasis    Tortuous aorta    Atherosclerosis of aorta    Current use of long term anticoagulation    History of iron deficiency anemia    Esophageal dysphagia    Epigastric abdominal pain    Lower esophageal ring (Schatzki)    Xyphoidalgia    Unintended weight loss    H/O gastroesophageal reflux (GERD)   [2]   Current Outpatient Medications   Medication Sig Dispense Refill    atorvastatin (LIPITOR) 80 MG tablet Take 1 tablet (80 mg total) by mouth once daily. 90 tablet 3    cholecalciferol, vitamin D3, 1,250 mcg (50,000 unit) capsule Take 1 capsule (50,000 Units total) by mouth once a week. 12 capsule 3    ferrous sulfate (FEOSOL) 325 mg (65 mg iron) Tab tablet Take 1 tablet (325 mg total) by mouth daily with breakfast. 180 tablet 2    omeprazole (PRILOSEC) 20 MG capsule Take 1 capsule (20 mg total) by mouth 2 (two) times daily before meals. 180 capsule 3    ondansetron (ZOFRAN-ODT) 4 MG TbDL Take 1 tablet (4 mg total) by mouth every 6 (six) hours as needed (nausea and vomiting). 20 tablet 0    oxybutynin (DITROPAN-XL) 10 MG 24 hr tablet TAKE 1 TABLET BY MOUTH EVERY DAY 90 tablet 3    pantoprazole (PROTONIX) 40 MG tablet Take 1 tablet (40 mg total) by mouth once daily. 90 tablet 0    pregabalin (LYRICA) 150 MG capsule Take 150 mg by mouth 2 (two) times daily.      sucralfate (CARAFATE) 100 mg/mL suspension Take 10 mLs (1 g total) by mouth every 6 (six) hours as needed. 414  mL 0    tiZANidine (ZANAFLEX) 4 MG tablet Take 4 mg by mouth nightly as needed (Muscle Spasms).      traMADol (ULTRAM) 50 mg tablet Take 50 mg by mouth 2 (two) times daily as needed for Pain.  1    XARELTO 20 mg Tab Take 1 tablet (20 mg total) by mouth every evening. 30 tablet 6     No current facility-administered medications for this visit.   [3]   Social History  Socioeconomic History    Marital status:     Number of children: 2   Occupational History    Occupation: teacher      Comment:     Tobacco Use    Smoking status: Never    Smokeless tobacco: Never   Substance and Sexual Activity    Alcohol use: No    Drug use: No    Sexual activity: Not Currently     Partners: Male     Social Drivers of Health     Financial Resource Strain: Low Risk  (12/20/2024)    Overall Financial Resource Strain (CARDIA)     Difficulty of Paying Living Expenses: Not very hard   Food Insecurity: No Food Insecurity (12/20/2024)    Hunger Vital Sign     Worried About Running Out of Food in the Last Year: Never true     Ran Out of Food in the Last Year: Never true   Transportation Needs: No Transportation Needs (12/20/2024)    TRANSPORTATION NEEDS     Transportation : No   Physical Activity: Inactive (12/20/2024)    Exercise Vital Sign     Days of Exercise per Week: 0 days     Minutes of Exercise per Session: 0 min   Stress: No Stress Concern Present (12/20/2024)    [employer] - Occupational Stress Questionnaire     Feeling of Stress : Only a little   Recent Concern: Stress - Stress Concern Present (12/8/2024)    [employer] [place] - Occupational Stress Questionnaire     Feeling of Stress : Rather much   Housing Stability: Unknown (12/20/2024)    Housing Stability Vital Sign     Unable to Pay for Housing in the Last Year: No     Homeless in the Last Year: No

## 2025-06-20 ENCOUNTER — TELEPHONE (OUTPATIENT)
Dept: ENDOSCOPY | Facility: HOSPITAL | Age: 75
End: 2025-06-20
Payer: MEDICARE

## 2025-06-20 ENCOUNTER — OFFICE VISIT (OUTPATIENT)
Dept: GASTROENTEROLOGY | Facility: CLINIC | Age: 75
End: 2025-06-20
Payer: MEDICARE

## 2025-06-20 VITALS
WEIGHT: 134.25 LBS | DIASTOLIC BLOOD PRESSURE: 71 MMHG | HEIGHT: 61 IN | SYSTOLIC BLOOD PRESSURE: 121 MMHG | HEIGHT: 61 IN | BODY MASS INDEX: 25.34 KG/M2 | HEART RATE: 84 BPM | WEIGHT: 134.25 LBS | BODY MASS INDEX: 25.34 KG/M2

## 2025-06-20 DIAGNOSIS — K29.70 GASTRITIS, PRESENCE OF BLEEDING UNSPECIFIED, UNSPECIFIED CHRONICITY, UNSPECIFIED GASTRITIS TYPE: ICD-10-CM

## 2025-06-20 DIAGNOSIS — K20.90 ESOPHAGITIS: ICD-10-CM

## 2025-06-20 DIAGNOSIS — R13.10 DYSPHAGIA, UNSPECIFIED TYPE: Primary | ICD-10-CM

## 2025-06-20 DIAGNOSIS — R10.13 EPIGASTRIC PAIN: ICD-10-CM

## 2025-06-20 PROCEDURE — 99999 PR PBB SHADOW E&M-EST. PATIENT-LVL III: CPT | Mod: PBBFAC,,, | Performed by: INTERNAL MEDICINE

## 2025-06-20 PROCEDURE — 99213 OFFICE O/P EST LOW 20 MIN: CPT | Mod: PBBFAC | Performed by: INTERNAL MEDICINE

## 2025-06-20 NOTE — TELEPHONE ENCOUNTER
----- Message from Johnna Varela MD sent at 6/20/2025  1:17 PM CDT -----  Regarding: EGD with Endoflip and Dilation, she is ok for 4th floor  [facility] PROCEDURE ORDERS    CLEARANCE FOR PROCEDURES:  [ ] Not needed       PROCEDURES  [ ] EGD with Endoflip and dilation      FLOOR:  [ ] 4th Floor or 2nd    PREP  [ ] Standard Prep      MEDICATIONS    Anticoagulation/antiplt agents:   [ ]  Yes-Xarelto    ORDER OF TESTING:  Day 1: EGD with Endoflip and dilation  Day 2:  Day 3:  Day 4:      week after:     [ ] No special requirements - pt lives locally     SCHEDULING PRIORITY  Routine    URGENCY     [x] Medically NOT Urgent      DIAGNOSIS   [ ] Dysphagia        PHYSICIAN  [ ]  Ok with Dr. Varela

## 2025-06-20 NOTE — PROGRESS NOTES
"[facility]    Referral from Dr. Foy    CC: Dysphagia    HPI 74 y.o. female who presents for evaluation of chronic, daily, solid food dysphagia with associated chest discomfort and weight loss.  She mainly drinks smoothies and eats soups but recently she has tried to incorporate more solids.    She denies GERD symptoms.  She reports she has not tried a PPI for her chest discomfort.      A previous EGD in 2022 was unrevealing for a cause for her symptoms.  Esophageal manometry in 2024 was normal.    Past Medical History  Past Medical History:   Diagnosis Date    Anticoagulant long-term use     xarelto    Chronic back pain     Chronic neck pain     DVT (deep venous thrombosis)     Herniated disc, cervical     Kidney stone     Lumbar herniated disc     PE (pulmonary embolism)     2013    Pulmonary emboli     after surgery    Pulmonary embolism          Physical Examination  /71   Pulse 84   Ht 5' 1" (1.549 m)   Wt 60.9 kg (134 lb 4.2 oz)   BMI 25.37 kg/m²   General appearance: alert, cooperative, no distress  HENT: Normocephalic, atraumatic, neck symmetrical, no nasal discharge   Abdomen: soft, non-tender; non distended, no rebound or guarding  Neurologic: Alert and oriented X 3,moving all four extremities, intact sensation to light touch  Labs:  Lab Results   Component Value Date    WBC 7.10 03/07/2025    HGB 13.9 03/07/2025    HCT 42.7 03/07/2025    MCV 85 03/07/2025     03/07/2025         CMP  Sodium   Date Value Ref Range Status   03/07/2025 138 136 - 145 mmol/L Final     Potassium   Date Value Ref Range Status   03/07/2025 3.8 3.5 - 5.1 mmol/L Final     Chloride   Date Value Ref Range Status   03/07/2025 107 95 - 110 mmol/L Final     CO2   Date Value Ref Range Status   03/07/2025 21 (L) 23 - 29 mmol/L Final     Glucose   Date Value Ref Range Status   03/07/2025 83 70 - 110 mg/dL Final     BUN   Date Value Ref Range Status   03/07/2025 17 8 - 23 mg/dL Final     Creatinine   Date " Value Ref Range Status   03/07/2025 1.0 0.5 - 1.4 mg/dL Final     Calcium   Date Value Ref Range Status   03/07/2025 10.0 8.7 - 10.5 mg/dL Final     Total Protein   Date Value Ref Range Status   03/07/2025 7.9 6.0 - 8.4 g/dL Final     Albumin   Date Value Ref Range Status   03/07/2025 4.0 3.5 - 5.2 g/dL Final     Total Bilirubin   Date Value Ref Range Status   03/07/2025 0.8 0.1 - 1.0 mg/dL Final     Comment:     For infants and newborns, interpretation of results should be based  on gestational age, weight and in agreement with clinical  observations.    Premature Infant recommended reference ranges:  Up to 24 hours.............<8.0 mg/dL  Up to 48 hours............<12.0 mg/dL  3-5 days..................<15.0 mg/dL  6-29 days.................<15.0 mg/dL       Alkaline Phosphatase   Date Value Ref Range Status   03/07/2025 57 40 - 150 U/L Final     AST   Date Value Ref Range Status   03/07/2025 14 10 - 40 U/L Final     ALT   Date Value Ref Range Status   03/07/2025 17 10 - 44 U/L Final     Anion Gap   Date Value Ref Range Status   03/07/2025 10 8 - 16 mmol/L Final     eGFR   Date Value Ref Range Status   03/07/2025 59 (A) >60 mL/min/1.73 m^2 Final         Assessment:   The patient is a 73 yo female with solid food dysphagia.  Previous EGD and esophageal manometry were unrevealing for a cause.  She also has chronic epigastric/chest discomfort.    Plan:  -Schedule EGD with Endoflip and dilation.  -If no improvement with dilation then we will try a trial of a PPI.  -We could consider repeating her esophageal manometry in the future to see if anything has changed.  -Hold xarelto for her procedure.    Johnna Varela MD

## 2025-06-20 NOTE — TELEPHONE ENCOUNTER
----- Message from Med Assistant Peters sent at 2025  1:43 PM CDT -----  Regardin/9 BT  The patient is currently under an internal PCP, yandy Rodriguez. Is patient okay to hold blood thinner Xarelto (rivaroxaban) for their upcoming scheduled Upper Endoscopy (EGD) on 25.

## 2025-06-20 NOTE — TELEPHONE ENCOUNTER
Patient is scheduled for a Upper Endoscopy (EGD) on 7/9/25 with Dr. MARYBEL Varela  Referral for procedure from [facility]

## 2025-06-23 ENCOUNTER — E-CONSULT (OUTPATIENT)
Dept: CARDIOLOGY | Facility: OTHER | Age: 75
End: 2025-06-23
Payer: MEDICARE

## 2025-06-23 DIAGNOSIS — I26.99 PULMONARY EMBOLISM, UNSPECIFIED CHRONICITY, UNSPECIFIED PULMONARY EMBOLISM TYPE, UNSPECIFIED WHETHER ACUTE COR PULMONALE PRESENT: Primary | ICD-10-CM

## 2025-06-23 NOTE — CONSULTS
[facility] CARDIOLOGY  Response for E-Consult     Patient Name: Jasson Pineda  MRN: [id number]  Primary Care Provider: Osbaldo Hall MD   Requesting Provider: Nancy Grubbs NP  Consults    Recommendation:  Patient may hold Xarelto 2 days prior to endoscopy.  We will arrange for Lovenox bridging.    Additional future steps to consider: none    Total time of Consultation: 5 minute    I did not speak to the requesting provider verbally about this.     *This eConsult is based on the clinical data available to me and is furnished without benefit of a physical examination. The eConsult will need to be interpreted in light of any clinical issues or changes in patient status not available to me at the time of filing this eConsults. Significant changes in patient condition or level of acuity should result in immediate formal consultation and reevaluation. Please alert me if you have further questions.    Thank you for this eConsult referral.     Sotero Doty MD  [facility] CARDIOLOGY

## 2025-06-23 NOTE — TELEPHONE ENCOUNTER
LM for pt to call the office regarding stopping Xarelto and bridging with Lovenox for upcoming procedure.

## 2025-06-23 NOTE — TELEPHONE ENCOUNTER
Spoke to pt. Will call her back on Wednesday to discuss Lovenox bridging when she can write the instructions down. She verbalized understanding.

## 2025-06-27 RX ORDER — ENOXAPARIN SODIUM 100 MG/ML
60 INJECTION SUBCUTANEOUS 2 TIMES DAILY
Qty: 4 EACH | Refills: 1 | Status: SHIPPED | OUTPATIENT
Start: 2025-06-27

## 2025-06-27 NOTE — TELEPHONE ENCOUNTER
Spoke to pt. EGD scheduled on July 9. Instructed pt last dose of Xarelto will be the evening of July 6. Pt will take Lovenox twice daily on July 7 and 8. No Lovenox the morning of the procedure. Pt will check with GI MD regarding when to restart Xarelto, hopefully the night of the procedure. She will let the office know if she is not cleared to start Xarelto the night of the procedure. Pt verbalized understanding of instructions.

## 2025-07-09 ENCOUNTER — ANESTHESIA (OUTPATIENT)
Dept: ENDOSCOPY | Facility: HOSPITAL | Age: 75
End: 2025-07-09
Payer: MEDICARE

## 2025-07-09 ENCOUNTER — ANESTHESIA EVENT (OUTPATIENT)
Dept: ENDOSCOPY | Facility: HOSPITAL | Age: 75
End: 2025-07-09
Payer: MEDICARE

## 2025-07-09 ENCOUNTER — HOSPITAL ENCOUNTER (OUTPATIENT)
Facility: HOSPITAL | Age: 75
Discharge: HOME | End: 2025-07-09
Attending: INTERNAL MEDICINE | Admitting: INTERNAL MEDICINE
Payer: MEDICARE

## 2025-07-09 VITALS
TEMPERATURE: 98 F | OXYGEN SATURATION: 97 % | BODY MASS INDEX: 24.39 KG/M2 | DIASTOLIC BLOOD PRESSURE: 57 MMHG | RESPIRATION RATE: 18 BRPM | HEART RATE: 77 BPM | HEIGHT: 61 IN | WEIGHT: 129.19 LBS | SYSTOLIC BLOOD PRESSURE: 101 MMHG

## 2025-07-09 DIAGNOSIS — R13.19 ESOPHAGEAL DYSPHAGIA: Primary | ICD-10-CM

## 2025-07-09 DIAGNOSIS — R13.10 DYSPHAGIA, UNSPECIFIED TYPE: ICD-10-CM

## 2025-07-09 DIAGNOSIS — R13.10 DYSPHAGIA: ICD-10-CM

## 2025-07-09 DIAGNOSIS — R07.9 CHEST PAIN: ICD-10-CM

## 2025-07-09 LAB
OHS QRS DURATION: 74 MS
OHS QTC CALCULATION: 436 MS

## 2025-07-09 PROCEDURE — 43239 EGD BIOPSY SINGLE/MULTIPLE: CPT | Mod: XS,,, | Performed by: INTERNAL MEDICINE

## 2025-07-09 PROCEDURE — 88305 TISSUE EXAM BY PATHOLOGIST: CPT | Mod: 26,,, | Performed by: PATHOLOGY

## 2025-07-09 PROCEDURE — 27201012 HC FORCEPS, HOT/COLD, DISP: Performed by: INTERNAL MEDICINE

## 2025-07-09 PROCEDURE — 25000003 PHARM REV CODE 250: Performed by: INTERNAL MEDICINE

## 2025-07-09 PROCEDURE — 37000008 HC ANESTHESIA 1ST 15 MINUTES: Performed by: INTERNAL MEDICINE

## 2025-07-09 PROCEDURE — 43249 ESOPH EGD DILATION <30 MM: CPT | Performed by: INTERNAL MEDICINE

## 2025-07-09 PROCEDURE — 63600175 PHARM REV CODE 636 W HCPCS

## 2025-07-09 PROCEDURE — C1726 CATH, BAL DIL, NON-VASCULAR: HCPCS | Performed by: INTERNAL MEDICINE

## 2025-07-09 PROCEDURE — 93010 ELECTROCARDIOGRAM REPORT: CPT | Mod: ,,, | Performed by: INTERNAL MEDICINE

## 2025-07-09 PROCEDURE — 88305 TISSUE EXAM BY PATHOLOGIST: CPT | Mod: TC | Performed by: INTERNAL MEDICINE

## 2025-07-09 PROCEDURE — 63600175 PHARM REV CODE 636 W HCPCS: Performed by: ANESTHESIOLOGY

## 2025-07-09 PROCEDURE — 43239 EGD BIOPSY SINGLE/MULTIPLE: CPT | Mod: XS | Performed by: INTERNAL MEDICINE

## 2025-07-09 PROCEDURE — 93005 ELECTROCARDIOGRAM TRACING: CPT

## 2025-07-09 PROCEDURE — 91040 ESOPH BALLOON DISTENSION TST: CPT | Mod: TC | Performed by: INTERNAL MEDICINE

## 2025-07-09 PROCEDURE — 43249 ESOPH EGD DILATION <30 MM: CPT | Mod: ,,, | Performed by: INTERNAL MEDICINE

## 2025-07-09 PROCEDURE — 37000009 HC ANESTHESIA EA ADD 15 MINS: Performed by: INTERNAL MEDICINE

## 2025-07-09 RX ORDER — FENTANYL CITRATE 50 UG/ML
25 INJECTION, SOLUTION INTRAMUSCULAR; INTRAVENOUS EVERY 5 MIN PRN
Status: DISCONTINUED | OUTPATIENT
Start: 2025-07-09 | End: 2025-07-09 | Stop reason: HOSPADM

## 2025-07-09 RX ORDER — PROPOFOL 10 MG/ML
VIAL (ML) INTRAVENOUS
Status: DISCONTINUED | OUTPATIENT
Start: 2025-07-09 | End: 2025-07-09

## 2025-07-09 RX ORDER — SODIUM CHLORIDE 9 MG/ML
INJECTION, SOLUTION INTRAVENOUS CONTINUOUS
Status: DISCONTINUED | OUTPATIENT
Start: 2025-07-09 | End: 2025-07-09 | Stop reason: HOSPADM

## 2025-07-09 RX ORDER — FENTANYL CITRATE 50 UG/ML
25 INJECTION, SOLUTION INTRAMUSCULAR; INTRAVENOUS EVERY 5 MIN PRN
Status: DISCONTINUED | OUTPATIENT
Start: 2025-07-09 | End: 2025-07-09

## 2025-07-09 RX ORDER — PHENYLEPHRINE HYDROCHLORIDE 10 MG/ML
INJECTION INTRAVENOUS
Status: DISCONTINUED | OUTPATIENT
Start: 2025-07-09 | End: 2025-07-09

## 2025-07-09 RX ORDER — LIDOCAINE HYDROCHLORIDE 20 MG/ML
INJECTION INTRAVENOUS
Status: DISCONTINUED | OUTPATIENT
Start: 2025-07-09 | End: 2025-07-09

## 2025-07-09 RX ADMIN — FENTANYL CITRATE 25 MCG: 50 INJECTION INTRAMUSCULAR; INTRAVENOUS at 02:07

## 2025-07-09 RX ADMIN — GLYCOPYRROLATE 0.2 MG: 0.2 INJECTION, SOLUTION INTRAMUSCULAR; INTRAVENOUS at 01:07

## 2025-07-09 RX ADMIN — LIDOCAINE HYDROCHLORIDE 100 MG: 20 INJECTION INTRAVENOUS at 01:07

## 2025-07-09 RX ADMIN — PROPOFOL 20 MG: 10 INJECTION, EMULSION INTRAVENOUS at 01:07

## 2025-07-09 RX ADMIN — FENTANYL CITRATE 25 MCG: 50 INJECTION INTRAMUSCULAR; INTRAVENOUS at 03:07

## 2025-07-09 RX ADMIN — PHENYLEPHRINE HYDROCHLORIDE 100 MCG: 10 INJECTION INTRAVENOUS at 01:07

## 2025-07-09 RX ADMIN — SODIUM CHLORIDE: 0.9 INJECTION, SOLUTION INTRAVENOUS at 01:07

## 2025-07-09 RX ADMIN — PROPOFOL 175 MCG/KG/MIN: 10 INJECTION, EMULSION INTRAVENOUS at 01:07

## 2025-07-09 RX ADMIN — PROPOFOL 70 MG: 10 INJECTION, EMULSION INTRAVENOUS at 01:07

## 2025-07-09 NOTE — ANESTHESIA POSTPROCEDURE EVALUATION
Anesthesia Post Evaluation    Patient: Jasson Pineda    Procedure(s) Performed: Procedure(s) (LRB):  EGD (ESOPHAGOGASTRODUODENOSCOPY) (N/A)    Final Anesthesia Type: general      Patient location during evaluation: PACU  Patient participation: Yes- Able to Participate  Level of consciousness: awake and alert and oriented  Post-procedure vital signs: reviewed and stable  Pain management: adequate  Airway patency: patent    PONV status at discharge: No PONV  Anesthetic complications: no      Cardiovascular status: blood pressure returned to baseline and hemodynamically stable  Respiratory status: unassisted  Hydration status: euvolemic  Follow-up not needed.        Chest pain consistent with post op esophageal dilation. Treated with fentanyl. EKG and CXR reassuring.       Vitals Value Taken Time   /59 07/09/25 15:23   Temp 36.8 °C (98.2 °F) 07/09/25 13:50   Pulse 72 07/09/25 15:23   Resp 16 07/09/25 15:23   SpO2 98 % 07/09/25 15:23         No case tracking events are documented in the log.      Pain/Riley Score: Pain Rating Prior to Med Admin: 8 (7/9/2025  2:44 PM)  Riley Score: 10 (7/9/2025  2:12 PM)

## 2025-07-09 NOTE — NURSING
" Pt stated prior to leaving "her pain was now a 5/10 and was much better: Dr. Varela notified and dc 'ed the pt. RN educated pt to go to the emergency department if pain gets worse.   "

## 2025-07-09 NOTE — ANESTHESIA PREPROCEDURE EVALUATION
2025  [facility]  Anesthesia Pre-Operative Evaluation         Patient Name: Jasson Pineda  YOB: 1950  MRN: [id number]    SUBJECTIVE:     Pre-operative evaluation for Procedure(s) (LRB):  EGD (ESOPHAGOGASTRODUODENOSCOPY) (N/A)     2025    Jasson Pineda is a 74 y.o. female w/ a pertinent PMHx of PE (XARELTO), COPD here      Full medical history listed below      LDA: None documented.    Prev airway: None documented.     GTTs: None documented.        Problem List[1]    Review of patient's allergies indicates:   Allergen Reactions    Sulfa (sulfonamide antibiotics) Rash       Current Inpatient Medications:      Medications Ordered Prior to Encounter[2]    Past Surgical History:   Procedure Laterality Date    ARTHROSCOPIC REPAIR OF ROTATOR CUFF OF SHOULDER Left 2019    Procedure: REPAIR, ROTATOR CUFF, ARTHROSCOPIC;  Surgeon: Sedrick Patel MD;  Location: [facility] OR;  Service: Orthopedics;  Laterality: Left;    ARTHROSCOPIC TENOTOMY OF BICEPS TENDON Left 2019    Procedure: TENOTOMY, BICEPS, ARTHROSCOPIC;  Surgeon: Sedrick Patel MD;  Location: [facility] OR;  Service: Orthopedics;  Laterality: Left;    ARTHROSCOPY OF SHOULDER WITH REMOVAL OF DISTAL CLAVICLE Left 2019    Procedure: ARTHROSCOPY, SHOULDER, WITH DISTAL CLAVICLE EXCISION;  Surgeon: Sedrick Patel MD;  Location: [facility] OR;  Service: Orthopedics;  Laterality: Left;    BREAST CYST EXCISION Left      SECTION      x1    COLONOSCOPY N/A 2018    Procedure: COLONOSCOPY;  Surgeon: Armand Foy MD;  Location: [facility] ENDO ([address] [place] [address]);  Service: Endoscopy;  Laterality: N/A;    EPIDURAL STEROID INJECTION Bilateral 2023    Procedure: TRANSFORAMINAL EPIDURAL INJECTION, T6-T7 clear to hold Xarelto 2 days;  Surgeon: Bill Easley MD;  Location: [facility] PAIN MGT;  Service: Pain  Management;  Laterality: Bilateral;    ESOPHAGEAL DILATION N/A 11/27/2024    Procedure: DILATION, ESOPHAGUS;  Surgeon: James Burgos MD;  Location: [facility] 2ND FLR;  Service: ENT;  Laterality: N/A;  INFINITY BALLOONS    ESOPHAGEAL MANOMETRY WITH MEASUREMENT OF IMPEDANCE N/A 07/25/2018    Procedure: MANOMETRY, ESOPHAGEAL, WITH IMPEDANCE MEASUREMENT;  Surgeon: Armand Foy MD;  Location: [facility] ENDO (4TH FLR);  Service: Endoscopy;  Laterality: N/A;    ESOPHAGEAL MANOMETRY WITH MEASUREMENT OF IMPEDANCE N/A 7/31/2024    Procedure: MANOMETRY, ESOPHAGUS, WITH IMPEDANCE MEASUREMENT;  Surgeon: Armand Foy MD;  Location: [facility] ENDO (4TH FLR);  Service: Endoscopy;  Laterality: N/A;  referral dr foy/ prep inst given in office / xarelto  7/29-pre call complete-GT  Dr. Gooden on vacation, Dr. Rhoades to read-KPvt    ESOPHAGOGASTRODUODENOSCOPY N/A 07/17/2018    Procedure: EGD (ESOPHAGOGASTRODUODENOSCOPY);  Surgeon: Armand Foy MD;  Location: [facility] (4TH FLR);  Service: Endoscopy;  Laterality: N/A;  pt currently on Lovenox and Xarelto - ok per Dr. Doty to hold Plavix 2 days prior and Lovenox 24hr prior to case/see scanned media 7/9/ for documentation of hold -- SM        ESOPHAGOGASTRODUODENOSCOPY N/A 03/28/2022    Procedure: EGD (ESOPHAGOGASTRODUODENOSCOPY);  Surgeon: Armand Foy MD;  Location: [facility] (2ND FLR);  Service: Endoscopy;  Laterality: N/A;  okay to hold Xarelto for 2 days per Dr. Doty with lovenox bridgings - see note on 3/21/22  2nd floor for ASAP availabilty  fully vaccinated - sm    EXTRACORPOREAL SHOCK WAVE LITHOTRIPSY Left 02/09/2021    Procedure: LITHOTRIPSY-EXTRACORPOREAL SHOCK WAVE;  Surgeon: Jeremias Eric MD;  Location: [facility];  Service: Urology;  Laterality: Left;    HERNIA REPAIR      umbilical    KIDNEY STONE SURGERY      ureteral stent    left knee surgery      SHOULDER ARTHROSCOPY Left 06/11/2019    Procedure: ARTHROSCOPY, SHOULDER;  Surgeon: Sedrick Patel MD;   "Location: [facility] OR;  Service: Orthopedics;  Laterality: Left;  BLOCK    TONSILLECTOMY      TOTAL SHOULDER ARTHROPLASTY Right        Social History[3]    OBJECTIVE:     Vital Signs Range (Last 24H):  Temp:  [36.7 °C (98.1 °F)]   Pulse:  [85]   Resp:  [16]   BP: (111)/(75)   SpO2:  [99 %]       CBC:   No results for input(s): "WBC", "RBC", "HGB", "HCT", "PLT", "MCV", "MCH", "MCHC" in the last 72 hours.    CMP: No results for input(s): "NA", "K", "CL", "CO2", "BUN", "CREATININE", "GLU", "MG", "PHOS", "CALCIUM", "ALBUMIN", "PROT", "ALKPHOS", "ALT", "AST", "BILITOT" in the last 72 hours.    INR:  No results for input(s): "PT", "INR", "PROTIME", "APTT" in the last 72 hours.    Diagnostic Studies: No relevant studies.    EKG:   Results for orders placed or performed during the hospital encounter of 12/08/24   EKG 12-lead    Collection Time: 03/07/25  9:56 AM   Result Value Ref Range    QRS Duration 70 ms    [facility] QTC Calculation 405 ms    Narrative    Test Reason : R07.89,    Vent. Rate :  73 BPM     Atrial Rate :  73 BPM     P-R Int : 146 ms          QRS Dur :  70 ms      QT Int : 368 ms       P-R-T Axes :  47  49  48 degrees    QTcB Int : 405 ms    Normal sinus rhythm  Normal ECG  When compared with ECG of 08-Dec-2024 07:22,  No significant change was found  Confirmed by Jeet Rogers ([id number]) on 3/8/2025 8:50:22 AM    Referred By: AAAREFERRAL SELF           Confirmed By: Jeet Rogers        2D ECHO:   Results for orders placed during the hospital encounter of 12/08/24    Echo    Interpretation Summary    Left Ventricle: The left ventricle is normal in size. Normal wall thickness. There is normal systolic function. Ejection fraction is approximately 55%. There is normal diastolic function.    Right Ventricle: Normal right ventricular cavity size. Wall thickness is normal. Systolic function is normal.    Left Atrium: Left atrium is mildly dilated.    Right Atrium: Right atrium is mildly dilated.    Mitral Valve: There is " mild regurgitation with a centrally directed jet.    Tricuspid Valve: There is mild regurgitation.    IVC/SVC: Normal venous pressure at 3 mmHg.    The estimated pulmonary artery systolic pressure is 21 mmHg.         ASSESSMENT/PLAN:           Pre-op Assessment    I have reviewed the Patient Summary Reports.     I have reviewed the Nursing Notes. I have reviewed the NPO Status.   I have reviewed the Medications.     Review of Systems  Anesthesia Hx:   History of prior surgery of interest to airway management or planning:          Denies Family Hx of Anesthesia complications.    Denies Personal Hx of Anesthesia complications.                        Physical Exam  General: Well nourished, Cooperative, Alert and Oriented    Airway:  Mallampati: II   Mouth Opening: Normal  TM Distance: Normal  Tongue: Normal  Neck ROM: Normal ROM    Dental:  Intact    Chest/Lungs:  Clear to auscultation, Normal Respiratory Rate    Heart:  Rate: Normal  Rhythm: Regular Rhythm        Anesthesia Plan  Type of Anesthesia, risks & benefits discussed:    Anesthesia Type: Gen Natural Airway  Intra-op Monitoring Plan: Standard ASA Monitors  Post Op Pain Control Plan: multimodal analgesia and IV/PO Opioids PRN  Induction:  IV  Airway Plan: Video  Informed Consent: Informed consent signed with the Patient and all parties understand the risks and agree with anesthesia plan.  All questions answered.   ASA Score: 3  Day of Surgery Review of History & Physical: H&P Update referred to the surgeon/provider.    Ready For Surgery From Anesthesia Perspective.     .           [1]   Patient Active Problem List  Diagnosis    Kidney stone    Herniated disc, cervical    Complete rotator cuff tear or rupture of right shoulder, not specified as traumatic    Shoulder arthritis    OAB (overactive bladder)    Body mass index (BMI) of 25.0 to 29.9    Atypical chest pain    Ineffective esophageal motility    SOB (shortness of breath)    Complete rotator cuff tear or  rupture of left shoulder, not specified as traumatic    Impingement syndrome of left shoulder    Primary osteoarthritis of left shoulder    Labral tear of long head of biceps tendon, initial encounter    History of DVT of lower extremity    Chronic pulmonary embolism without acute cor pulmonale    Mixed hyperlipidemia    Nephrolithiasis    Tortuous aorta    Atherosclerosis of aorta    Current use of long term anticoagulation    History of iron deficiency anemia    Esophageal dysphagia    Epigastric abdominal pain    Lower esophageal ring ([place]atzki)    Xyphoidalgia    Unintended weight loss    H/O gastroesophageal reflux (GERD)   [2]   No current facility-administered medications on file prior to encounter.     Current Outpatient Medications on File Prior to Encounter   Medication Sig Dispense Refill    pregabalin (LYRICA) 150 MG capsule Take 150 mg by mouth 2 (two) times daily.      atorvastatin (LIPITOR) 80 MG tablet Take 1 tablet (80 mg total) by mouth once daily. (Patient not taking: Reported on 6/20/2025) 90 tablet 3    cholecalciferol, vitamin D3, 1,250 mcg (50,000 unit) capsule Take 1 capsule (50,000 Units total) by mouth once a week. 12 capsule 3    ferrous sulfate (FEOSOL) 325 mg (65 mg iron) Tab tablet Take 1 tablet (325 mg total) by mouth daily with breakfast. (Patient not taking: Reported on 6/20/2025) 180 tablet 2    omeprazole (PRILOSEC) 20 MG capsule Take 1 capsule (20 mg total) by mouth 2 (two) times daily before meals. 180 capsule 3    ondansetron (ZOFRAN-ODT) 4 MG TbDL Take 1 tablet (4 mg total) by mouth every 6 (six) hours as needed (nausea and vomiting). (Patient not taking: Reported on 6/20/2025) 20 tablet 0    oxybutynin (DITROPAN-XL) 10 MG 24 hr tablet TAKE 1 TABLET BY MOUTH EVERY DAY (Patient not taking: Reported on 6/20/2025) 90 tablet 3    pantoprazole (PROTONIX) 40 MG tablet Take 1 tablet (40 mg total) by mouth once daily. (Patient not taking: Reported on 6/20/2025) 90 tablet 0     sucralfate (CARAFATE) 100 mg/mL suspension Take 10 mLs (1 g total) by mouth every 6 (six) hours as needed. (Patient not taking: Reported on 6/20/2025) 414 mL 0    tiZANidine (ZANAFLEX) 4 MG tablet Take 4 mg by mouth nightly as needed (Muscle Spasms). (Patient not taking: Reported on 6/20/2025)      traMADol (ULTRAM) 50 mg tablet Take 50 mg by mouth 2 (two) times daily as needed for Pain.  1    XARELTO 20 mg Tab Take 1 tablet (20 mg total) by mouth every evening. 30 tablet 6   [3]   Social History  Socioeconomic History    Marital status:     Number of children: 2   Occupational History    Occupation: teacher      Comment:     Tobacco Use    Smoking status: Never    Smokeless tobacco: Never   Substance and Sexual Activity    Alcohol use: No    Drug use: No    Sexual activity: Not Currently     Partners: Male     Social Drivers of Health     Financial Resource Strain: Low Risk  (6/19/2025)    Overall Financial Resource Strain (CARDIA)     Difficulty of Paying Living Expenses: Not very hard   Food Insecurity: No Food Insecurity (6/19/2025)    Hunger Vital Sign     Worried About Running Out of Food in the Last Year: Never true     Ran Out of Food in the Last Year: Never true   Transportation Needs: No Transportation Needs (6/19/2025)    PRAPARE - Transportation     Lack of Transportation (Medical): No     Lack of Transportation (Non-Medical): No   Physical Activity: Inactive (6/19/2025)    Exercise Vital Sign     Days of Exercise per Week: 0 days     Minutes of Exercise per Session: 10 min   Stress: No Stress Concern Present (6/19/2025)    [employer] [place] [employer] - Occupational Stress Questionnaire     Feeling of Stress : Not at all   Housing Stability: Low Risk  (6/19/2025)    Housing Stability Vital Sign     Unable to Pay for Housing in the Last Year: No     Number of Times Moved in the Last Year: 0     Homeless in the Last Year: No

## 2025-07-09 NOTE — H&P
Short Stay Endoscopy History and Physical    PCP - Osbaldo Hall MD     Procedure - EGD with Endoflip and dilation  ASA - per anesthesia  Mallampati - per anesthesia  History of Anesthesia problems - no  Family history Anesthesia problems -  no   Plan of anesthesia - General    HPI:  This is a 74 y.o. female here for evaluation of : Dysphagia      ROS:  Constitutional: No fevers, chills, No weight loss  CV: No chest pain  Pulm: No cough, No shortness of breath  Ophtho: No vision changes  GI: see HPI  Derm: No rash    Medical History:  has a past medical history of Anticoagulant long-term use, Chronic back pain, Chronic neck pain, DVT (deep venous thrombosis), Herniated disc, cervical, Kidney stone, Lumbar herniated disc, PE (pulmonary embolism), Pulmonary emboli, and Pulmonary embolism.    Surgical History:  has a past surgical history that includes Kidney stone surgery; left knee surgery; Tonsillectomy;  section; Hernia repair; Total shoulder arthroplasty (Right); Breast cyst excision (Left); Esophagogastroduodenoscopy (N/A, 2018); Colonoscopy (N/A, 2018); Esophageal manometry with measurement of impedance (N/A, 2018); Shoulder arthroscopy (Left, 2019); Arthroscopic repair of rotator cuff of shoulder (Left, 2019); Arthroscopy of shoulder with removal of distal clavicle (Left, 2019); Arthroscopic tenotomy of biceps tendon (Left, 2019); Extracorporeal shock wave lithotripsy (Left, 2021); Esophagogastroduodenoscopy (N/A, 2022); Epidural steroid injection (Bilateral, 2023); Esophageal manometry with measurement of impedance (N/A, 2024); and Esophageal dilation (N/A, 2024).    Family History: family history includes Arthritis in her sister; Breast cancer (age of onset: 45) in her maternal cousin; Breast cancer (age of onset: 50) in her maternal aunt; Cervical cancer in her maternal aunt; Colon cancer (age of onset: 74) in her  mother; Diabetes in her sister; Liver cancer in her maternal grandfather and sister; Lupus in her daughter and sister; No Known Problems in her daughter and father; Ovarian cancer in her mother.. Otherwise no colon cancer, inflammatory bowel disease, or GI malignancies.    Social History:  reports that she has never smoked. She has never used smokeless tobacco. She reports that she does not drink alcohol and does not use drugs.    Review of patient's allergies indicates:   Allergen Reactions    Sulfa (sulfonamide antibiotics) Rash       Medications:   Prescriptions Prior to Admission[1]    Physical Exam:    Vital Signs:   Vitals:    07/09/25 1240   BP: 111/75   Pulse: 85   Resp: 16   Temp: 98.1 °F (36.7 °C)       Gen: NAD, lying comfortably  HENT: NCAT, oropharynx clear  Eyes: anicteric sclerae, EOMI grossly  Neck: supple, no visible masses/goiter  Cardiac: RRR  Lungs: non-labored breathing  Abd: soft, NT/ND, normoactive BS  Ext: no LE edema, warm, well perfused  Skin: skin intact on exposed body parts, no visible rashes, lesions  Neuro: A&Ox4, neuro exam grossly intact, moves all extremities  Psych: appropriate mood, affect      Labs:  Lab Results   Component Value Date    WBC 7.10 03/07/2025    HGB 13.9 03/07/2025    HCT 42.7 03/07/2025     03/07/2025    CHOL 303 (H) 01/11/2024    TRIG 174 (H) 01/11/2024    HDL 77 (H) 01/11/2024    ALT 17 03/07/2025    AST 14 03/07/2025     03/07/2025    K 3.8 03/07/2025     03/07/2025    CREATININE 1.0 03/07/2025    BUN 17 03/07/2025    CO2 21 (L) 03/07/2025    TSH 0.422 12/08/2024    INR 1.0 12/08/2024    HGBA1C 5.2 01/11/2024       Plan:  EGD with Endoflip and dilation for dysphagia.    I have explained the risks and benefits of endoscopy procedures to the patient including but not limited to bleeding, perforation, infection, and death.  The patient was asked if they understand and allowed to ask any further questions to their satisfaction.      Johnna LERNER  MD [facility]         [1]   Medications Prior to Admission   Medication Sig Dispense Refill Last Dose/Taking    enoxaparin (LOVENOX) 60 mg/0.6 mL Syrg Inject 0.6 mLs (60 mg total) into the skin 2 (two) times daily. 4 each 1 7/8/2025    pregabalin (LYRICA) 150 MG capsule Take 150 mg by mouth 2 (two) times daily.   Past Week    atorvastatin (LIPITOR) 80 MG tablet Take 1 tablet (80 mg total) by mouth once daily. (Patient not taking: Reported on 6/20/2025) 90 tablet 3     cholecalciferol, vitamin D3, 1,250 mcg (50,000 unit) capsule Take 1 capsule (50,000 Units total) by mouth once a week. 12 capsule 3     ferrous sulfate (FEOSOL) 325 mg (65 mg iron) Tab tablet Take 1 tablet (325 mg total) by mouth daily with breakfast. (Patient not taking: Reported on 6/20/2025) 180 tablet 2     omeprazole (PRILOSEC) 20 MG capsule Take 1 capsule (20 mg total) by mouth 2 (two) times daily before meals. 180 capsule 3 7/6/2025    ondansetron (ZOFRAN-ODT) 4 MG TbDL Take 1 tablet (4 mg total) by mouth every 6 (six) hours as needed (nausea and vomiting). (Patient not taking: Reported on 6/20/2025) 20 tablet 0     oxybutynin (DITROPAN-XL) 10 MG 24 hr tablet TAKE 1 TABLET BY MOUTH EVERY DAY (Patient not taking: Reported on 6/20/2025) 90 tablet 3     pantoprazole (PROTONIX) 40 MG tablet Take 1 tablet (40 mg total) by mouth once daily. (Patient not taking: Reported on 6/20/2025) 90 tablet 0     sucralfate (CARAFATE) 100 mg/mL suspension Take 10 mLs (1 g total) by mouth every 6 (six) hours as needed. (Patient not taking: Reported on 6/20/2025) 414 mL 0     tiZANidine (ZANAFLEX) 4 MG tablet Take 4 mg by mouth nightly as needed (Muscle Spasms). (Patient not taking: Reported on 6/20/2025)       traMADol (ULTRAM) 50 mg tablet Take 50 mg by mouth 2 (two) times daily as needed for Pain.  1     XARELTO 20 mg Tab Take 1 tablet (20 mg total) by mouth every evening. 30 tablet 6 7/6/2025

## 2025-07-09 NOTE — PROVATION PATIENT INSTRUCTIONS
Discharge Summary/Instructions after an Endoscopic Procedure  Patient Name: Jasson Pineda  Patient MRN: [id number]  Patient YOB: 1950 Wednesday, July 9, 2025  Johnna Varela MD  Dear patient,  As a result of recent federal legislation (The Federal Cures Act), you may   receive lab or pathology results from your procedure in your [facility]   account before your physician is able to contact you. Your physician or   their representative will relay the results to you with their   recommendations at their soonest availability.  Thank you,  RESTRICTIONS:  During your procedure today, you received medications for sedation.  These   medications may affect your judgment, balance and coordination.  Therefore,   for 24 hours, you have the following restrictions:   - DO NOT drive a car, operate machinery, make legal/financial decisions,   sign important papers or drink alcohol.    ACTIVITY:  Today: no heavy lifting, straining or running due to procedural   sedation/anesthesia.  The following day: return to full activity including work.  DIET:  Eat and drink normally unless instructed otherwise.     TREATMENT FOR COMMON SIDE EFFECTS:  - Mild abdominal pain, nausea, belching, bloating or excessive gas:  rest,   eat lightly and use a heating pad.  - Sore Throat: treat with throat lozenges and/or gargle with warm salt   water.  - Because air was used during the procedure, expelling large amounts of air   from your rectum or belching is normal.  - If a bowel prep was taken, you may not have a bowel movement for 1-3 days.    This is normal.  SYMPTOMS TO WATCH FOR AND REPORT TO YOUR PHYSICIAN:  1. Abdominal pain or bloating, other than gas cramps.  2. Chest pain.  3. Back pain.  4. Signs of infection such as: chills or fever occurring within 24 hours   after the procedure.  5. Rectal bleeding, which would show as bright red, maroon, or black stools.   (A tablespoon of blood from the rectum is not serious, especially  if   hemorrhoids are present.)  6. Vomiting.  7. Weakness or dizziness.  GO DIRECTLY TO THE NEAREST EMERGENCY ROOM IF YOU HAVE ANY OF THE FOLLOWING:      Difficulty breathing              Chills and/or fever over 101 F   Persistent vomiting and/or vomiting blood   Severe abdominal pain   Severe chest pain   Black, tarry stools   Bleeding- more than one tablespoon   Any other symptom or condition that you feel may need urgent attention  Your doctor recommends these additional instructions:  If any biopsies were taken, your doctors clinic will contact you in 1 to 2   weeks with any results.  - Discharge patient to home.   - Resume previous diet.   - Continue present medications.   - Await pathology results.  For questions, problems or results please call your physician - Johnna Varela MD at Work:  [phone].  [facility], EMERGENCY ROOM PHONE NUMBER: [phone]  IF A COMPLICATION OR EMERGENCY SITUATION ARISES AND YOU ARE UNABLE TO REACH   YOUR PHYSICIAN - GO DIRECTLY TO THE EMERGENCY ROOM.  Johnna Varela MD  7/9/2025 1:52:51 PM  This report has been verified and signed electronically.  Dear patient,  As a result of recent federal legislation (The Federal Cures Act), you may   receive lab or pathology results from your procedure in your [facility]   account before your physician is able to contact you. Your physician or   their representative will relay the results to you with their   recommendations at their soonest availability.  Thank you,  PROVATION

## 2025-07-09 NOTE — TRANSFER OF CARE
"Anesthesia Transfer of Care Note    Patient: Jasson Pineda    Procedure(s) Performed: Procedure(s) (LRB):  EGD (ESOPHAGOGASTRODUODENOSCOPY) (N/A)    Patient location: GI    Anesthesia Type: general    Transport from OR: Transported from OR on room air with adequate spontaneous ventilation    Post pain: adequate analgesia    Post assessment: no apparent anesthetic complications    Post vital signs: stable    Level of consciousness: sedated and responds to stimulation    Nausea/Vomiting: no nausea/vomiting    Complications: none    Transfer of care protocol was followed      Last vitals: Visit Vitals  BP 98/57 (Patient Position: Lying)   Pulse 85   Temp 36.7 °C (98.1 °F) (Temporal)   Resp 16   Ht 5' 1" (1.549 m)   Wt 58.6 kg (129 lb 3 oz)   SpO2 99%   Breastfeeding No   BMI 24.41 kg/m²     "

## 2025-07-09 NOTE — PLAN OF CARE
Pt c/o midsternal chest pain radiating to back rated 8/10. Pt states that this is pain that she has been experiencing prior to procedure. Dr. Mccoy and Dr. Varela informed.  Awaiting further orders.

## 2025-07-09 NOTE — PLAN OF CARE
Pt c/o back pain when laying on left side.  Pt states that this has been a chronic problem, not acute.  Pt repositioned self to right side, reports relief of back pain.

## 2025-07-11 ENCOUNTER — TELEPHONE (OUTPATIENT)
Dept: INTERNAL MEDICINE | Facility: CLINIC | Age: 75
End: 2025-07-11
Payer: MEDICARE

## 2025-07-11 LAB
ESTROGEN SERPL-MCNC: NORMAL PG/ML
INSULIN SERPL-ACNC: NORMAL U[IU]/ML
LAB AP CLINICAL INFORMATION: NORMAL
LAB AP GROSS DESCRIPTION: NORMAL
LAB AP PERFORMING LOCATION(S): NORMAL
LAB AP REPORT FOOTNOTES: NORMAL

## 2025-07-11 RX ORDER — OXYBUTYNIN CHLORIDE 10 MG/1
10 TABLET, EXTENDED RELEASE ORAL
Qty: 90 TABLET | Refills: 3 | OUTPATIENT
Start: 2025-07-11

## 2025-07-11 NOTE — TELEPHONE ENCOUNTER
Copied from CRM #[id number]. Topic: General Inquiry - Patient Advice  >> Jul 11, 2025 11:51 AM Ya wrote:  .Type : Patient Call    Who Called : Patient's daughter (Dana)      Does the patient know what this is regarding?: Requesting a call back regarding pt's overall care. Has a couple of questions for provider/staff. Pt is currently in ER.      Would the patient rather a call back or a response via [facility]? Call      Best Call Back Number: [phone] - Dana    Additional Information:   no dermatitis, no environmental allergies, no food allergies, no immunosuppressive disorder, and no pruritus.

## 2025-07-14 ENCOUNTER — TELEPHONE (OUTPATIENT)
Dept: GASTROENTEROLOGY | Facility: CLINIC | Age: 75
End: 2025-07-14
Payer: MEDICARE

## 2025-07-14 ENCOUNTER — OFFICE VISIT (OUTPATIENT)
Dept: INTERNAL MEDICINE | Facility: CLINIC | Age: 75
End: 2025-07-14
Attending: INTERNAL MEDICINE
Payer: MEDICARE

## 2025-07-14 ENCOUNTER — LAB VISIT (OUTPATIENT)
Dept: LAB | Facility: OTHER | Age: 75
End: 2025-07-14
Attending: INTERNAL MEDICINE
Payer: MEDICARE

## 2025-07-14 VITALS
HEART RATE: 84 BPM | BODY MASS INDEX: 24.89 KG/M2 | HEIGHT: 61 IN | WEIGHT: 131.81 LBS | OXYGEN SATURATION: 98 % | SYSTOLIC BLOOD PRESSURE: 100 MMHG | DIASTOLIC BLOOD PRESSURE: 58 MMHG

## 2025-07-14 DIAGNOSIS — R41.3 MEMORY LOSS: Primary | ICD-10-CM

## 2025-07-14 DIAGNOSIS — R41.3 MEMORY LOSS: ICD-10-CM

## 2025-07-14 DIAGNOSIS — Z72.89 OTHER PROBLEMS RELATED TO LIFESTYLE: ICD-10-CM

## 2025-07-14 DIAGNOSIS — Z87.19 H/O GASTROESOPHAGEAL REFLUX (GERD): ICD-10-CM

## 2025-07-14 LAB
ABSOLUTE EOSINOPHIL (OHS): 0.01 K/UL
ABSOLUTE MONOCYTE (OHS): 0.4 K/UL (ref 0.3–1)
ABSOLUTE NEUTROPHIL COUNT (OHS): 5.89 K/UL (ref 1.8–7.7)
ALBUMIN SERPL BCP-MCNC: 4.4 G/DL (ref 3.5–5.2)
ALP SERPL-CCNC: 53 UNIT/L (ref 40–150)
ALT SERPL W/O P-5'-P-CCNC: 12 UNIT/L (ref 10–44)
ANION GAP (OHS): 13 MMOL/L (ref 8–16)
AST SERPL-CCNC: 15 UNIT/L (ref 11–45)
BASOPHILS # BLD AUTO: 0.02 K/UL
BASOPHILS NFR BLD AUTO: 0.3 %
BILIRUB SERPL-MCNC: 0.3 MG/DL (ref 0.1–1)
BUN SERPL-MCNC: 15 MG/DL (ref 8–23)
CALCIUM SERPL-MCNC: 9.7 MG/DL (ref 8.7–10.5)
CHLORIDE SERPL-SCNC: 107 MMOL/L (ref 95–110)
CO2 SERPL-SCNC: 23 MMOL/L (ref 23–29)
CREAT SERPL-MCNC: 0.8 MG/DL (ref 0.5–1.4)
ERYTHROCYTE [DISTWIDTH] IN BLOOD BY AUTOMATED COUNT: 13.8 % (ref 11.5–14.5)
GFR SERPLBLD CREATININE-BSD FMLA CKD-EPI: >60 ML/MIN/1.73/M2
GLUCOSE SERPL-MCNC: 87 MG/DL (ref 70–110)
HCT VFR BLD AUTO: 41.3 % (ref 37–48.5)
HGB BLD-MCNC: 12.8 GM/DL (ref 12–16)
IMM GRANULOCYTES # BLD AUTO: 0.02 K/UL (ref 0–0.04)
IMM GRANULOCYTES NFR BLD AUTO: 0.3 % (ref 0–0.5)
LYMPHOCYTES # BLD AUTO: 1.23 K/UL (ref 1–4.8)
MAGNESIUM SERPL-MCNC: 2 MG/DL (ref 1.6–2.6)
MCH RBC QN AUTO: 27.3 PG (ref 27–31)
MCHC RBC AUTO-ENTMCNC: 31 G/DL (ref 32–36)
MCV RBC AUTO: 88 FL (ref 82–98)
NUCLEATED RBC (/100WBC) (OHS): 0 /100 WBC
PLATELET # BLD AUTO: 307 K/UL (ref 150–450)
PMV BLD AUTO: 10.6 FL (ref 9.2–12.9)
POTASSIUM SERPL-SCNC: 3.8 MMOL/L (ref 3.5–5.1)
PROT SERPL-MCNC: 8.4 GM/DL (ref 6–8.4)
RBC # BLD AUTO: 4.69 M/UL (ref 4–5.4)
RELATIVE EOSINOPHIL (OHS): 0.1 %
RELATIVE LYMPHOCYTE (OHS): 16.2 % (ref 18–48)
RELATIVE MONOCYTE (OHS): 5.3 % (ref 4–15)
RELATIVE NEUTROPHIL (OHS): 77.8 % (ref 38–73)
SODIUM SERPL-SCNC: 143 MMOL/L (ref 136–145)
T PALLIDUM IGG+IGM SER QL: NEGATIVE
TSH SERPL-ACNC: 0.48 UIU/ML (ref 0.4–4)
VIT B12 SERPL-MCNC: 461 PG/ML (ref 210–950)
WBC # BLD AUTO: 7.57 K/UL (ref 3.9–12.7)

## 2025-07-14 PROCEDURE — 83735 ASSAY OF MAGNESIUM: CPT

## 2025-07-14 PROCEDURE — 84460 ALANINE AMINO (ALT) (SGPT): CPT

## 2025-07-14 PROCEDURE — 36415 COLL VENOUS BLD VENIPUNCTURE: CPT

## 2025-07-14 PROCEDURE — 82607 VITAMIN B-12: CPT

## 2025-07-14 PROCEDURE — 99999 PR PBB SHADOW E&M-EST. PATIENT-LVL III: CPT | Mod: PBBFAC,,, | Performed by: INTERNAL MEDICINE

## 2025-07-14 PROCEDURE — 99214 OFFICE O/P EST MOD 30 MIN: CPT | Mod: S$PBB,,, | Performed by: INTERNAL MEDICINE

## 2025-07-14 PROCEDURE — 84443 ASSAY THYROID STIM HORMONE: CPT

## 2025-07-14 PROCEDURE — G2211 COMPLEX E/M VISIT ADD ON: HCPCS | Mod: ,,, | Performed by: INTERNAL MEDICINE

## 2025-07-14 PROCEDURE — 85025 COMPLETE CBC W/AUTO DIFF WBC: CPT

## 2025-07-14 PROCEDURE — 86593 SYPHILIS TEST NON-TREP QUANT: CPT

## 2025-07-14 PROCEDURE — 99213 OFFICE O/P EST LOW 20 MIN: CPT | Mod: PBBFAC | Performed by: INTERNAL MEDICINE

## 2025-07-14 NOTE — TELEPHONE ENCOUNTER
----- Message from Johnna Vareal MD sent at 7/12/2025  9:42 AM CDT -----  Please let Ms. Spaulding know that there were no concerning findings on the biopsies of her esophagus from her recent EGD procedure.  ----- Message -----  From: Lab, Background User  Sent: 7/11/2025   1:55 PM CDT  To: Johnna Varela MD

## 2025-07-14 NOTE — PROGRESS NOTES
"Subjective:       Patient ID: Jasson Pineda is a 74 y.o. female.    Chief Complaint: No chief complaint on file.    CHIEF COMPLAINT:  Sandip presents today for evaluation of memory problems.    Patient present today by herself with concerns for frequent lapses in memory.  She has recently seen in the emergency department can not tell me what brought her there.  She does know she has chronic chest pain but can not recall what took her to the ED. we got her daughter Dana on the phone who corroborated patient's story that she has been extremely forgetful.  Minutes after having a discussion patient will have no recollection.  Patient reports a daily clouded head feeling.  Daughter states that change in memory has been present for a few years but has worsened in the past 12 months    MEMORY ISSUES:  She reports ongoing memory issues for several months, occurring daily. her children frequently point out memory lapses, often saying "mommy, don't you remember?" Specific memory problems include forgetting appointments, inability to recall information told to her within the same day, and experiencing difficulty with directions to familiar locations. She describes feeling a persistent mental fogginess and notes the memory issues have been progressively worsening. She feels cognitively impaired, stating memory problems feel like a "fogness" that feels "funny" and impacts daily functioning. She acknowledges these memory difficulties are significant enough to cause concern and disrupt normal daily activities.    CURRENT MEDICATIONS:  She is currently on Tramadol and Pregabalin (Lyrica) for pain management. She has been without these medications for approximately 1-2 days prior to a recent endoscopy. Her healthcare provider recommends discontinuing these medications due to potential side effects of memory loss.            History of Present Illness              Review of Systems   Unable to perform ROS: Dementia     " "  Objective:      Vitals:    07/14/25 0950   BP: (!) 100/58   BP Location: Left arm   Patient Position: Sitting   Pulse: 84   SpO2: 98%   Weight: 59.8 kg (131 lb 13.4 oz)   Height: 5' 1" (1.549 m)      Physical Exam  Constitutional:       General: She is not in acute distress.     Appearance: Normal appearance. She is well-developed. She is not diaphoretic.   HENT:      Head: Normocephalic and atraumatic.   Eyes:      General: No scleral icterus.        Right eye: No discharge.         Left eye: No discharge.      Conjunctiva/sclera: Conjunctivae normal.   Pulmonary:      Effort: Pulmonary effort is normal. No respiratory distress.   Abdominal:      General: There is no distension.   Skin:     General: Skin is warm and dry.   Neurological:      Mental Status: She is alert and oriented to person, place, and time.   Psychiatric:         Speech: Speech normal.         Assessment:       1. Memory loss    2. H/O gastroesophageal reflux (GERD)    3. Other problems related to lifestyle        Plan:       Diagnoses and all orders for this visit:    Memory loss   Recommend that daughter confirm that patient is indeed stopped her Lyrica and tramadol.  Continue to hold and monitor and follow-up with neuropsychology if symptoms do not resolve.   -     Magnesium; Future  -     Ambulatory referral/consult to Adult Neuropsychology; Future  -     Comprehensive Metabolic Panel; Future  -     CBC Auto Differential; Future  -     TSH; Future  -     Vitamin B12; Future  -     Treponema Pallidium Antibodies IgG, IgM; Future    H/O gastroesophageal reflux (GERD)    Other problems related to lifestyle  -     Treponema Pallidium Antibodies IgG, IgM; Future           Assessment & Plan           >40 minutes were spent today reviewing patient chart.  Much of today's visit was spent discussing with patient my chart review, their concerns, health maintenance, my assessment, and recommendations.    Visit today is associated with current or " anticipated ongoing medical care related to this patient's single serious condition/complex condition of chronic blood thinner. The patient will return to see me as these issues will be followed longitudinally.    This note was generated with the assistance of ambient listening technology. Verbal consent was obtained by the patient and accompanying visitor(s) for the recording of patient appointment to facilitate this note. I attest to having reviewed and edited the generated note for accuracy, though some syntax or spelling errors may persist. Please contact the author of this note for any clarification.      Osbaldo Mendez MD  Internal Medicine-[facility]        Side effects of medication(s) were discussed in detail and patient voiced understanding.  Patient will call back for any issues or complications.

## 2025-07-15 ENCOUNTER — TELEPHONE (OUTPATIENT)
Dept: INTERNAL MEDICINE | Facility: CLINIC | Age: 75
End: 2025-07-15
Payer: MEDICARE

## 2025-07-15 NOTE — TELEPHONE ENCOUNTER
----- Message from Osbaldo Mendez MD sent at 7/15/2025  7:36 AM CDT -----  Please let patient's daughter Dana know that mom's recent labs look fine and to continue plan discuss yesterday.    Respectfully,  Osbaldo Mendez    ----- Message -----  From: Lab, Background User  Sent: 7/14/2025   1:59 PM CDT  To: Osbaldo Mendez MD

## 2025-07-15 NOTE — TELEPHONE ENCOUNTER
Telephone Ms. Pineda, to inform her of lab findings and to continue with plan that was discussed on yesterday

## 2025-07-21 ENCOUNTER — TELEPHONE (OUTPATIENT)
Dept: INTERNAL MEDICINE | Facility: CLINIC | Age: 75
End: 2025-07-21
Payer: MEDICARE

## 2025-07-21 DIAGNOSIS — R07.9 CHEST PAIN, UNSPECIFIED TYPE: Primary | ICD-10-CM

## 2025-07-21 NOTE — TELEPHONE ENCOUNTER
Copied from CRM #[id number]. Topic: General Inquiry - Patient Advice  >> Jul 21, 2025  4:08 PM Roz wrote:  Type:  Needs Medical Advice    Who Called: pt daughter   Would the patient rather a call back or a response via [facility]? Call back   Best Call Back Number: [phone]  Additional Information: pt daughter requesting a call back in regards to phone to pt earlier states the pt is having some memory issues and wanted to f/u on conversation

## 2025-07-21 NOTE — TELEPHONE ENCOUNTER
Pt reports experiencing crying, shakes, lightheadedness, and poor appetite since stopping pregablin and would like a refill

## 2025-07-21 NOTE — TELEPHONE ENCOUNTER
Copied from CRM #[id number]. Topic: General Inquiry - Status Check  >> Jul 21, 2025  3:47 PM Lulu wrote:  Type:  Needs Medical Advice    Who Called: Patient's daughter, Dana Morris Call Back Number: [phone]    Additional Information: Patient is requesting a call back in regards to the status of medication refill. She states a call was placed to her mother but she cannot recall the conversation and requesting a call back from nurse

## 2025-07-21 NOTE — TELEPHONE ENCOUNTER
Copied from CRM #[id number]. Topic: Medications - Medication Question  >> Jul 21, 2025  8:30 AM Yvonne wrote:  Type:  Needs Medical Advice    Who Called: pt's daughters   Symptoms (please be specific): withdrawal from lyrica : not eating, shakes, lightheaded, crying   How long has patient had these symptoms:  07/14   Pharmacy name and phone #:  [id number] - [place] - [address] [facility]  [address] [place] [address]  Phone: [phone] Fax: [phone]  Would the patient rather a call back or a response via [facility]? Call   Best Call Back Number: [phone] daughter / [phone] pt   Additional Information: daughter requesting to put pt back on lyrica

## 2025-07-22 RX ORDER — PREGABALIN 150 MG/1
150 CAPSULE ORAL 2 TIMES DAILY
Qty: 60 CAPSULE | Refills: 5 | OUTPATIENT
Start: 2025-07-22 | End: 2026-01-20

## 2025-07-22 NOTE — TELEPHONE ENCOUNTER
Lyrica sent to for patient by another provider according to patients daughter Dana. Dana asked for an appt for her mom to come in to discuss the pain in her chest radiating to her back which is an ongoing issue

## 2025-07-22 NOTE — TELEPHONE ENCOUNTER
"LOV with Dr. Hall 7/14    "Memory loss              Recommend that daughter confirm that patient is indeed stopped her Lyrica and tramadol.  Continue to hold and monitor and follow-up with neuropsychology if symptoms do not resolve. "  "

## 2025-07-22 NOTE — TELEPHONE ENCOUNTER
Care Due:                  Date            Visit Type   Department     Provider  --------------------------------------------------------------------------------                                \[facility]\"" INTERNAL  Last Visit: 07-      FOLLOW UP    MEDICINE       Osbaldo Hall                              EP -                              PRIMARY      [facility] INTERNAL  Next Visit: 01-      CARE ([facility])   MEDICINE       Osbaldo Hall                                                            Last  Test          Frequency    Reason                     Performed    Due Date  --------------------------------------------------------------------------------    Vitamin D...  12 months..  cholecalciferol,.........  Not Found    Overdue    [employer] Embedded Care Due Messages. Reference number: [id number].   7/22/2025 10:27:31 AM CDT

## 2025-07-23 ENCOUNTER — OFFICE VISIT (OUTPATIENT)
Dept: INTERNAL MEDICINE | Facility: CLINIC | Age: 75
End: 2025-07-23
Payer: MEDICARE

## 2025-07-23 ENCOUNTER — LAB VISIT (OUTPATIENT)
Dept: LAB | Facility: OTHER | Age: 75
End: 2025-07-23
Attending: INTERNAL MEDICINE
Payer: MEDICARE

## 2025-07-23 VITALS
HEIGHT: 61 IN | DIASTOLIC BLOOD PRESSURE: 64 MMHG | HEART RATE: 78 BPM | WEIGHT: 129.88 LBS | OXYGEN SATURATION: 98 % | SYSTOLIC BLOOD PRESSURE: 98 MMHG | BODY MASS INDEX: 24.52 KG/M2

## 2025-07-23 DIAGNOSIS — R35.0 URINARY FREQUENCY: ICD-10-CM

## 2025-07-23 DIAGNOSIS — R07.89 XIPHOIDALGIA: Primary | ICD-10-CM

## 2025-07-23 DIAGNOSIS — R41.3 MEMORY LOSS: ICD-10-CM

## 2025-07-23 DIAGNOSIS — K22.2 SCHATZKI'S RING: ICD-10-CM

## 2025-07-23 LAB
BACTERIA #/AREA URNS AUTO: ABNORMAL /HPF
BILIRUB UR QL STRIP.AUTO: NEGATIVE
CLARITY UR: CLEAR
COLOR UR AUTO: YELLOW
GLUCOSE UR QL STRIP: NEGATIVE
HGB UR QL STRIP: ABNORMAL
HYALINE CASTS UR QL AUTO: 2 /LPF (ref 0–1)
KETONES UR QL STRIP: NEGATIVE
LEUKOCYTE ESTERASE UR QL STRIP: NEGATIVE
MICROSCOPIC COMMENT: ABNORMAL
NITRITE UR QL STRIP: NEGATIVE
PH UR STRIP: 6 [PH]
PROT UR QL STRIP: NEGATIVE
RBC #/AREA URNS AUTO: 4 /HPF (ref 0–4)
SP GR UR STRIP: 1.01
SQUAMOUS #/AREA URNS AUTO: <1 /HPF
UROBILINOGEN UR STRIP-ACNC: NEGATIVE EU/DL
WBC #/AREA URNS AUTO: 1 /HPF (ref 0–5)

## 2025-07-23 PROCEDURE — 99999 PR PBB SHADOW E&M-EST. PATIENT-LVL IV: CPT | Mod: PBBFAC,,, | Performed by: COUNSELOR

## 2025-07-23 PROCEDURE — 99215 OFFICE O/P EST HI 40 MIN: CPT | Mod: S$PBB,,, | Performed by: COUNSELOR

## 2025-07-23 PROCEDURE — 99214 OFFICE O/P EST MOD 30 MIN: CPT | Mod: PBBFAC,25 | Performed by: COUNSELOR

## 2025-07-23 PROCEDURE — 87086 URINE CULTURE/COLONY COUNT: CPT

## 2025-07-23 PROCEDURE — 81003 URINALYSIS AUTO W/O SCOPE: CPT

## 2025-07-23 PROCEDURE — 96372 THER/PROPH/DIAG INJ SC/IM: CPT | Mod: PBBFAC

## 2025-07-23 PROCEDURE — 99999PBSHW PR PBB SHADOW TECHNICAL ONLY FILED TO HB: Mod: JZ,PBBFAC,,

## 2025-07-23 RX ORDER — KETOROLAC TROMETHAMINE 30 MG/ML
15 INJECTION, SOLUTION INTRAMUSCULAR; INTRAVENOUS
Status: COMPLETED | OUTPATIENT
Start: 2025-07-23 | End: 2025-07-23

## 2025-07-23 RX ORDER — KETOROLAC TROMETHAMINE 15 MG/ML
15 INJECTION, SOLUTION INTRAMUSCULAR; INTRAVENOUS
Status: DISCONTINUED | OUTPATIENT
Start: 2025-07-23 | End: 2025-07-23

## 2025-07-23 RX ADMIN — KETOROLAC TROMETHAMINE 15 MG: 30 INJECTION, SOLUTION INTRAMUSCULAR; INTRAVENOUS at 12:07

## 2025-07-23 NOTE — PATIENT INSTRUCTIONS
Schedule a follow up with Dr. Varela in GI.   Schedule an US of the chest. Make sure to scan the area that hurts on your chest.   Follow up with your pain medicine specialist. Also make sure to bring up the pain you are having in your chest.   You can try voltaren gel or lidocaine patches on the pain that hurts on your chest. You can take tylenol and your other prescribed pain meds as well.  I will follow up on the results from your urine studies.

## 2025-07-23 NOTE — PROGRESS NOTES
"Subjective:       Patient ID: Jasson Pineda is a 74 y.o. female with history of cervical herniated disc, HLD, aortic atherosclerosis, OAB, nephrolithiasis, DVT, PE on chronic anticoagulation, esophageal dysphagia, Schatzki's ring, GERD, rotator cuff tear, xiphoidalgia.     Chief Complaint: Xiphoidalgia [R07.89]    Patient is new to me, PCP is Dr. Osbaldo Hall. Here today for the following:    History of Present Illness    CHIEF COMPLAINT:  Patient presents today with chest pain and memory issues.    CHEST PAIN:  She reports constant, non-stabbing chest pain that radiates to the back, localized directly in the middle, present for years. The pain has been progressively worsening over time and significantly impacts sleep, preventing her from lying on her back, stomach, or right side. She denies that the pain is food related, nausea. She has some SPICER with normal recent cardiac workup and troponin negative days ago.    COGNITIVE CONCERNS:  She reports progressive short-term memory loss, characterized by frequently forgetting recent conversations within minutes. She experiences disorientation in familiar locations, including her house, granddaughter's school, and local neighborhood, requiring assistance with navigation. She has difficulty retaining important family information, such as details about her grandchildren's college acceptances.    Recent lab work by PCP were normal.     GI CONCERNS:  Recent endoscopy revealed a Schatzki ring in the esophagus, which was dilated, and a small hiatal hernia. Biopsies were normal. She reports poor appetite and has limited her diet to primarily smoothies and soups due to pain with eating and sensation of food feeling "stuck" after swallowing. She denies significant swallowing difficulties but notes discomfort with eating. No appetite improvement noted since the endoscopic procedure.    GENITOURINARY:  She reports frequent urination occurring 2-3 times nightly and " "throughout the day with sensation of incomplete bladder emptying. She denies burning with urination, vaginal odor, or vaginal discharge. She was treated with rocephin in ER about 1 week ago for presumptive UTI. This has not improved her symptoms.     MEDICATIONS:  She recently discontinued Lyrica and tramadol due to the memory concerns by her PCP. She recently experienced withdrawal symptoms including shaking, dizziness, and sweats. Lyrica was restarted after one week due to severity of withdrawal symptoms. They report they have continued tramadol.    ROS:  General: -fever, -chills, -fatigue, -weight gain, -weight loss  Eyes: -vision changes, -redness, -discharge  ENT: -ear pain, -nasal congestion, -sore throat  Cardiovascular: +chest pain, -palpitations, -lower extremity edema, +chest pain at rest  Respiratory: -cough, -shortness of breath, +exertional dyspnea  Gastrointestinal: -abdominal pain, -nausea, -vomiting, -diarrhea, -constipation, -blood in stool, +difficulty swallowing, +sense of lump/mass in throat when swallowing, +loss of appetite  Genitourinary: -dysuria, -hematuria, -frequency, +excessive urination, +incomplete emptying  Musculoskeletal: -joint pain, -muscle pain  Skin: -rash, -lesion  Neurological: -headache, -dizziness, -numbness, -tingling, +forgetfulness, +memory problems, +memory loss  Psychiatric: -anxiety, -depression, -sleep difficulty       Current Outpatient Medications   Medication Instructions    cholecalciferol (vitamin D3) 50,000 Units, Oral, Weekly    omeprazole (PRILOSEC) 20 mg, Oral, 2 times daily before meals    XARELTO 20 mg, Oral, Nightly     Objective:      Vitals:    07/23/25 1110   BP: 98/64   Pulse: 78   SpO2: 98%   Weight: 58.9 kg (129 lb 13.6 oz)   Height: 5' 1" (1.549 m)   PainSc:   8   PainLoc: Chest     Body mass index is 24.54 kg/m².    Physical Exam  Vitals reviewed. Exam conducted with a chaperone present.   Constitutional:       General: She is not in acute " distress.     Appearance: Normal appearance. She is well-developed and normal weight. She is not ill-appearing, toxic-appearing or diaphoretic.   HENT:      Head: Normocephalic and atraumatic.      Nose: Nose normal.      Mouth/Throat:      Pharynx: No oropharyngeal exudate.   Eyes:      General: No scleral icterus.        Right eye: No discharge.         Left eye: No discharge.      Extraocular Movements: Extraocular movements intact.      Pupils: Pupils are equal, round, and reactive to light.   Neck:      Thyroid: No thyromegaly.      Trachea: No tracheal deviation.   Cardiovascular:      Rate and Rhythm: Normal rate and regular rhythm.      Heart sounds: Normal heart sounds. No murmur heard.     No friction rub. No gallop.   Pulmonary:      Effort: Pulmonary effort is normal. No respiratory distress.      Breath sounds: Normal breath sounds. No stridor. No wheezing, rhonchi or rales.   Abdominal:      General: Abdomen is flat. Bowel sounds are normal. There is no distension.      Palpations: Abdomen is soft. There is no mass.      Tenderness: There is abdominal tenderness. There is no guarding or rebound.      Hernia: No hernia is present.      Comments: No epigastric tenderness. Some LLQ tenderness to palpation.    Musculoskeletal:         General: Tenderness present. No deformity.      Cervical back: Neck supple.      Right lower leg: No edema.      Left lower leg: No edema.      Comments: Significant tenderness noted over xiphoid with a 2 cm x 2 cm soft tissue mass noted.   Overlying skin was without excoriation, laceration, erythema.     Lymphadenopathy:      Cervical: No cervical adenopathy.   Skin:     General: Skin is warm and dry.      Findings: No erythema.   Neurological:      Mental Status: She is alert.      Gait: Gait normal.   Psychiatric:         Mood and Affect: Mood normal.         Behavior: Behavior normal.         Thought Content: Thought content normal.         Judgment: Judgment normal.       Comments: Patient appropriately dressed, with good hygiene and grooming  Posture, movement, eye contact, all normal.  Good attitude, cooperative with examiner.   Mood was slightly dysphoric with appropriate and congruent affect.   Rate of speech normal with good fluency.  Thought process goal-directed and usually organized with occasional difficulty remembering previous parts of conversation.   Immediate memory seemed impaired with slightly impaired recent memory and largely intact remote memory.          Assessment:       1. Xiphoidalgia    2. Urinary frequency    3. Memory loss    4. Schatzki's ring        IMPRESSION:  - Assessed chronic chest pain, which appears separate from GI issues but may have a GI component.  - Evaluated current pain management regimen, including Lyrica and tramadol.  - Considered topical pain management options as adjunct therapy.  - Assessed memory issues and need for neuropsychological evaluation.  - Reviewed recent endoscopy findings, including Schatzki ring dilation and small hiatal hernia.  - Discussed potential causes of chest pain, including muscular and bony origins.    Plan:     PLAN SUMMARY:  - Order urinalysis with culture to rule out UTI/infection  - Order focused ultrasound of chest/mediastinum to evaluate palpable nodule  - Continue Omeprazole (Prilosec) twice daily  - Continue Lyrica and Tramadol at current doses  - Prescribe Voltaren gel for chest pain, with lidocaine or Biofreeze patch as alternatives  - Refer to neuropsychologist for cognitive assessment  - Toradol injection for immediate pain relief  - Follow up with pain specialist to discuss medication management  - Follow up after ultrasound results are available  - Follow up with gastroenterologist    Greg  - Monitored patient's constant chest pain that penetrates to the back, worsens over time, and is not associated with nausea or bowel movement issues.  - Physical exam revealed tenderness upon palpation,  especially in the middle of the back and chest area, with a palpable nodule that feels like a soft tissue mass.  - Ordered focused ultrasound of chest/mediastinum to evaluate the palpable nodule and determine if there is an underlying structural cause.  - For pain management, prescribed Voltaren gel to be applied to painful chest area as needed, with alternatives including lidocaine patch or Biofreeze patch.  - Administered a Toradol injection for immediate pain relief.  - Advised follow-up with a gastroenterologist and suggested consultation with a pain management specialist specifically for chest wall pain if no improvement occurs with current treatment.  -     US Chest Mediastinum; Future; Expected date: 07/23/2025  -     Discontinue: ketorolac injection 15 mg  -     ketorolac injection 15 mg    Urinary frequency  - Monitored patient's frequent urination (especially at night), urinary urgency, and feeling of incomplete bladder emptying.  - No burning or other UTI symptoms reported.  - Planned to repeat urinalysis and culture to confirm absence of infection as cause of urinary symptoms.  -     Urinalysis, Reflex to Urine Culture Urine, Clean Catch; Future; Expected date: 07/23/2025  -     Urine Culture High Risk ($$); Future; Expected date: 07/23/2025    Memory loss  - Monitored patient's memory issues affecting daily activities, including forgetting conversations.  - Discussed importance of neuropsychological evaluation for comprehensive cognitive assessment.  - Explained limitations of current understanding and treatment options for cognitive decline.  - Reinforced previous referral to neuropsychologist and encouraged patient to continue efforts to schedule this appointment.  - Instructed patient to contact the office if unable to secure the neuropsychology appointment.    Schatzki's ring  - Monitored patient's pain and difficulty swallowing, with food feeling stuck after eating.  - Evaluated endoscopy results  showing a Schatzki ring with dilation and a web in the lower esophagus; biopsies were normal.  - Continued Omeprazole (Prilosec) twice daily.  - Referred to gastroenterologist Dr. Varela for further evaluation and potential additional testing.    PAIN MANAGEMENT:  - Monitored patient's current use of Tramadol and Lyrica for pain management.  - Noted withdrawal symptoms when Lyrica was discontinued, including shaking, dizziness, and sweats.  - Continued both Lyrica and Tramadol at current doses.  - Confirmed appointment with pain specialist for further evaluation of pain management regimen, with plans to discuss medication management to address side effects and effectiveness.    FOLLOW-UP:  - Follow up after ultrasound results are available.         57 minutes were spent in chart review, documentation and review of results, and evaluation, treatment, and counseling of patient on the same day of service. This note was generated with the assistance of ambient listening technology. Verbal consent was obtained by the patient and accompanying visitor(s) for the recording of patient appointment to facilitate this note. I attest to having reviewed and edited the generated note for accuracy, though some syntax or spelling errors may persist. Please contact the author of this note for any clarification.    Eugene Barber PA-C    7/23/2025

## 2025-07-23 NOTE — PROGRESS NOTES
"Per order, patient to receive 0.5mL of Toradol (ketorolac tromethamine) 30mg/mL. The area of injection was palpated using the medial fold and the iliac crest as anatomical landmarks. Patient was advised to relax the muscle. The area was cleaned with alcohol and allowed to dry. Using a 25g 1.5" needle, 0.5mL of Toradol (ketorolac tromethamine) 30mg/mL was placed intramuscularly into the Left upper outer gluteal quadrant. Patient experienced no complications and was discharged in stable condition. Toradol Lot: [id number] Exp: 11/2025     "

## 2025-07-24 LAB — HOLD SPECIMEN: NORMAL

## 2025-07-25 ENCOUNTER — RESULTS FOLLOW-UP (OUTPATIENT)
Dept: INTERNAL MEDICINE | Facility: CLINIC | Age: 75
End: 2025-07-25
Payer: MEDICARE

## 2025-07-25 ENCOUNTER — TELEPHONE (OUTPATIENT)
Dept: INTERNAL MEDICINE | Facility: CLINIC | Age: 75
End: 2025-07-25
Payer: MEDICARE

## 2025-07-25 LAB — BACTERIA UR CULT: NORMAL

## 2025-07-25 NOTE — TELEPHONE ENCOUNTER
----- Message from Chandrakant Barber PA-C sent at 7/25/2025  8:15 AM CDT -----    Please reach out to patient or daughter on file to help schedule US mediastinum for their xiphoid pain. I discussed lab results with them already.   Just want to ensure they have something scheduled. Thank you!

## 2025-07-25 NOTE — TELEPHONE ENCOUNTER
Spoke to the pt and got her scheduled for her Ultrasound.    Appointment Information   Name: NABOR ZEPEDA     Date: 8/1/2025     Appt Time: 2:15 PM     Visit Type: US CHEST MEDIASTINUM [[id number]]     Provider: DIONNA GARAY Dept: [facility], Radiology       Dept Address: [address] [place] [id number]       Dept Phone Number: [phone]

## 2025-07-25 NOTE — TELEPHONE ENCOUNTER
Spoke to the patient on the telephone.  They did not recall our visit from 2 days ago.  However, explained that there urinalysis and culture were negative.  They say that they are still having some frequency but no other issues.  I will have staff reach out to try to help them schedule their ultrasound for the xiphoid pain that they were describing when they saw me.  She expressed understanding and was agreeable to this plan still.

## 2025-08-01 ENCOUNTER — HOSPITAL ENCOUNTER (OUTPATIENT)
Dept: RADIOLOGY | Facility: OTHER | Age: 75
Discharge: HOME | End: 2025-08-01
Attending: COUNSELOR
Payer: MEDICARE

## 2025-08-01 DIAGNOSIS — R07.89 XIPHOIDALGIA: ICD-10-CM

## 2025-08-01 PROCEDURE — 76604 US EXAM CHEST: CPT | Mod: 26,,, | Performed by: RADIOLOGY

## 2025-08-01 PROCEDURE — 76604 US EXAM CHEST: CPT | Mod: TC

## 2025-08-04 ENCOUNTER — TELEPHONE (OUTPATIENT)
Dept: INTERNAL MEDICINE | Facility: CLINIC | Age: 75
End: 2025-08-04
Payer: MEDICARE

## 2025-08-04 NOTE — TELEPHONE ENCOUNTER
----- Message from Chandrakant Barber PA-C sent at 8/4/2025 11:14 AM CDT -----  I attempted to call patient and daughter listed as alternative contact, both no answer and no voicemail available plus no [employer] messaging.  US of their chest showed a focal soft tissue swelling but no fluid collection. It is difficult to determine what this is, and the best next step is an MRI given how long she has had pain there and   how disruptive it is to her life.   Please reach out to patient to help schedule and confirm they have no known contrast allergy (if known), if they have a pacemaker or defibrillator implanted, prosthetics, aneurysm, surgical clips, or   other foreign objects in their body. They will also need to complete a lab test prior to ensure their kidneys can handle the contrast. Please forward any questions to me. Thank you!  ----- Message -----  From: Katherine, Rad Results In  Sent: 8/1/2025   2:45 PM CDT  To: Chandrakant Barber PA-C

## 2025-08-12 ENCOUNTER — HOSPITAL ENCOUNTER (OUTPATIENT)
Dept: RADIOLOGY | Facility: OTHER | Age: 75
Discharge: HOME | End: 2025-08-12
Attending: COUNSELOR
Payer: MEDICARE

## 2025-08-12 DIAGNOSIS — R07.89 XIPHODYNIA: ICD-10-CM

## 2025-08-12 DIAGNOSIS — R22.2 LOCALIZED SWELLING, MASS AND LUMP, TRUNK: ICD-10-CM

## 2025-08-12 PROCEDURE — 25500020 PHARM REV CODE 255: Performed by: COUNSELOR

## 2025-08-12 PROCEDURE — A9585 GADOBUTROL INJECTION: HCPCS | Performed by: COUNSELOR

## 2025-08-12 PROCEDURE — 71552 MRI CHEST W/O & W/DYE: CPT | Mod: TC

## 2025-08-12 PROCEDURE — 71552 MRI CHEST W/O & W/DYE: CPT | Mod: 26,,, | Performed by: INTERNAL MEDICINE

## 2025-08-12 RX ORDER — GADOBUTROL 604.72 MG/ML
6 INJECTION INTRAVENOUS
Status: COMPLETED | OUTPATIENT
Start: 2025-08-12 | End: 2025-08-12

## 2025-08-12 RX ADMIN — GADOBUTROL 6 ML: 604.72 INJECTION INTRAVENOUS at 10:08

## 2025-08-13 ENCOUNTER — RESULTS FOLLOW-UP (OUTPATIENT)
Dept: INTERNAL MEDICINE | Facility: CLINIC | Age: 75
End: 2025-08-13
Payer: MEDICARE

## 2025-08-26 RX ORDER — OXYBUTYNIN CHLORIDE 10 MG/1
10 TABLET, EXTENDED RELEASE ORAL
Qty: 90 TABLET | Refills: 3 | OUTPATIENT
Start: 2025-08-26

## (undated) DEVICE — KIT TRACTION SHLDR ST DISP LF

## (undated) DEVICE — NDL SUREFIRE SCORPION RC

## (undated) DEVICE — GAUZE SPONGE 4X4 12PLY

## (undated) DEVICE — GLOVE BIOGEL SKINSENSE PI 7.0

## (undated) DEVICE — WAND COBLATION TURBOVAC 90

## (undated) DEVICE — ALCOHOL 70% ISOP W/GREEN 16OZ

## (undated) DEVICE — SUT PDS II 0 CT VIL MONO 36

## (undated) DEVICE — CANNULA PASSPORT 8 MM X 3CM

## (undated) DEVICE — GLOVE BIOGEL SKINSENSE PI 8.0

## (undated) DEVICE — DRAPE INCISE IOBAN 2 23X17IN

## (undated) DEVICE — GLOVE BIOGEL SKINSENSE PI 7.5

## (undated) DEVICE — PAD ABD 8X10 STERILE

## (undated) DEVICE — SOL IRR NACL .9% 3000ML

## (undated) DEVICE — KIT ANTIFOG W/SPONG & FLUID

## (undated) DEVICE — Device

## (undated) DEVICE — TRAY ENT 4/CS

## (undated) DEVICE — DRESSING XEROFORM FOIL PK 1X8

## (undated) DEVICE — TAPE SURG MEDIPORE 6X72IN

## (undated) DEVICE — BLADE GATOR 4.2

## (undated) DEVICE — SOL 9P NACL IRR PIC IL

## (undated) DEVICE — POSITIONER IV ARMBOARD FOAM

## (undated) DEVICE — DRAPE STERI U-SHAPED 47X51IN

## (undated) DEVICE — DRAPE PLASTIC U 60X72

## (undated) DEVICE — APPLICATOR CHLORAPREP ORN 26ML

## (undated) DEVICE — DRESSING LEUKOPLAST FLEX 1X3IN

## (undated) DEVICE — UNDERGLOVES BIOGEL PI SZ 7 LF

## (undated) DEVICE — PAD SHOULDER CARE POLAR

## (undated) DEVICE — SUT PROLENE 3-0 FS-1 MONO18

## (undated) DEVICE — TUBE SET INFLOW/OUTFLOW

## (undated) DEVICE — SUPPORT SLING SHOT II MEDIUM

## (undated) DEVICE — SEE MEDLINE ITEM 157150

## (undated) DEVICE — SET EXTENSION 30 IN W/LL ROLLE